# Patient Record
Sex: FEMALE | Race: WHITE | Employment: OTHER | URBAN - METROPOLITAN AREA
[De-identification: names, ages, dates, MRNs, and addresses within clinical notes are randomized per-mention and may not be internally consistent; named-entity substitution may affect disease eponyms.]

---

## 2016-12-01 PROCEDURE — G9678 ONCOLOGY CARE MODEL SERVICE: HCPCS | Performed by: INTERNAL MEDICINE

## 2017-01-01 PROCEDURE — G9678 ONCOLOGY CARE MODEL SERVICE: HCPCS | Performed by: INTERNAL MEDICINE

## 2017-01-03 RX ORDER — DILTIAZEM HYDROCHLORIDE 180 MG/1
CAPSULE, EXTENDED RELEASE ORAL
Qty: 180 CAPSULE | Refills: 1 | Status: SHIPPED | OUTPATIENT
Start: 2017-01-03 | End: 2017-07-06

## 2017-01-12 ENCOUNTER — SNF/IP PROF CHARGE ONLY (OUTPATIENT)
Dept: HEMATOLOGY/ONCOLOGY | Facility: HOSPITAL | Age: 82
End: 2017-01-12

## 2017-01-12 DIAGNOSIS — C50.211 BREAST CANCER OF UPPER-INNER QUADRANT OF RIGHT FEMALE BREAST (HCC): Primary | ICD-10-CM

## 2017-02-15 RX ORDER — LOSARTAN POTASSIUM 25 MG/1
TABLET ORAL
Qty: 90 TABLET | Refills: 0 | Status: SHIPPED | OUTPATIENT
Start: 2017-02-15 | End: 2017-05-16

## 2017-02-15 RX ORDER — ATENOLOL 50 MG/1
TABLET ORAL
Qty: 90 TABLET | Refills: 0 | Status: SHIPPED | OUTPATIENT
Start: 2017-02-15 | End: 2017-05-21

## 2017-02-15 RX ORDER — KETOCONAZOLE 20 MG/G
CREAM TOPICAL
Qty: 30 G | Refills: 1 | Status: SHIPPED | OUTPATIENT
Start: 2017-02-15 | End: 2017-03-08

## 2017-02-22 ENCOUNTER — OFFICE VISIT (OUTPATIENT)
Dept: HEMATOLOGY/ONCOLOGY | Facility: HOSPITAL | Age: 82
End: 2017-02-22
Attending: NURSE PRACTITIONER
Payer: MEDICARE

## 2017-02-22 VITALS
BODY MASS INDEX: 31.72 KG/M2 | SYSTOLIC BLOOD PRESSURE: 132 MMHG | HEART RATE: 54 BPM | RESPIRATION RATE: 16 BRPM | WEIGHT: 168 LBS | HEIGHT: 61 IN | TEMPERATURE: 99 F | DIASTOLIC BLOOD PRESSURE: 52 MMHG

## 2017-02-22 DIAGNOSIS — Z79.811 LONG TERM CURRENT USE OF AROMATASE INHIBITOR: ICD-10-CM

## 2017-02-22 DIAGNOSIS — C50.211 BREAST CANCER OF UPPER-INNER QUADRANT OF RIGHT FEMALE BREAST (HCC): Primary | ICD-10-CM

## 2017-02-22 DIAGNOSIS — Z51.81 MEDICATION MONITORING ENCOUNTER: ICD-10-CM

## 2017-02-22 DIAGNOSIS — Z78.0 POST-MENOPAUSAL: ICD-10-CM

## 2017-02-22 PROCEDURE — 99213 OFFICE O/P EST LOW 20 MIN: CPT | Performed by: NURSE PRACTITIONER

## 2017-02-22 PROCEDURE — G0463 HOSPITAL OUTPT CLINIC VISIT: HCPCS | Performed by: NURSE PRACTITIONER

## 2017-02-22 NOTE — PROGRESS NOTES
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  Disp: 30 g Rfl: 1   LOSARTAN POTASSIUM 25 MG Oral Tab TAKE ONE TABLET BY MOUTH ONCE DAILY Disp: 90 tablet Rfl: 0   ATENOLOL 50 MG Oral Tab TAKE 1 T KNEE REPLACEMENT SURGERY Right 2012    ELECTROCARDIOGRAM, COMPLETE  10/9/2012    Comment scanned to media tab    CATARACT EXTRACTION W/  INTRAOCULAR LENS IMPLANT Bilateral 2008    Comment Elijah Dos Santos MD    [address] Neck: Normal range of motion. Neck supple. No tracheal deviation present. No thyromegaly present. Cardiovascular: Normal rate, regular rhythm and normal heart sounds. No murmur heard.   Pulmonary/Chest: Effort normal and breath sounds normal. No respir Staging comments: KI 67 18%        Pathologic stage from 8/12/2016: Stage IA (T1b, N0(i-), cM0) - Signed by Lissette Sanchez MD on 8/24/2016      Again, discussed with the patient the natural history of breast cancer.      Given her early stage, and he

## 2017-02-26 RX ORDER — ATORVASTATIN CALCIUM 20 MG/1
TABLET, FILM COATED ORAL
Qty: 30 TABLET | Refills: 0 | Status: SHIPPED | OUTPATIENT
Start: 2017-02-26 | End: 2017-03-26

## 2017-03-08 NOTE — TELEPHONE ENCOUNTER
Please advise no current ha1c    Diabetes Medications  Protocol Criteria:  · Appointment scheduled in the past 6 months or the next 3 months  · A1C < 7.5 in the past 6 months  · Creatinine in the past 12 months  · Creatinine result < 1.5   Recent Visits

## 2017-03-08 NOTE — TELEPHONE ENCOUNTER
From: Daniel Patel  To:  David King MD  Sent: 3/6/2017 9:56 AM CST  Subject: Medication Renewal Request    Original authorizing provider: Jhonathan Wellington MD    [address] [facility] [place] would like a refill of the following medications:  METFORMIN HCL

## 2017-03-09 RX ORDER — KETOCONAZOLE 20 MG/G
CREAM TOPICAL
Qty: 30 G | Refills: 0 | Status: SHIPPED | OUTPATIENT
Start: 2017-03-09 | End: 2017-05-01

## 2017-03-09 RX ORDER — METFORMIN HYDROCHLORIDE 500 MG/1
500 TABLET, EXTENDED RELEASE ORAL
Qty: 90 TABLET | Refills: 0 | Status: SHIPPED | OUTPATIENT
Start: 2017-03-09 | End: 2017-06-06

## 2017-03-16 ENCOUNTER — TELEPHONE (OUTPATIENT)
Dept: HEMATOLOGY/ONCOLOGY | Facility: HOSPITAL | Age: 82
End: 2017-03-16

## 2017-03-17 ENCOUNTER — SNF/IP PROF CHARGE ONLY (OUTPATIENT)
Dept: HEMATOLOGY/ONCOLOGY | Facility: HOSPITAL | Age: 82
End: 2017-03-17

## 2017-03-17 DIAGNOSIS — C50.211 BREAST CANCER OF UPPER-INNER QUADRANT OF RIGHT FEMALE BREAST (HCC): Primary | ICD-10-CM

## 2017-03-21 ENCOUNTER — HOSPITAL ENCOUNTER (OUTPATIENT)
Dept: MAMMOGRAPHY | Facility: HOSPITAL | Age: 82
Discharge: HOME | End: 2017-03-21
Attending: INTERNAL MEDICINE
Payer: MEDICARE

## 2017-03-21 DIAGNOSIS — C50.919 BREAST CANCER (HCC): ICD-10-CM

## 2017-03-21 PROCEDURE — 77065 DX MAMMO INCL CAD UNI: CPT | Performed by: RADIOLOGY

## 2017-03-27 RX ORDER — ATORVASTATIN CALCIUM 20 MG/1
TABLET, FILM COATED ORAL
Qty: 30 TABLET | Refills: 2 | Status: SHIPPED | OUTPATIENT
Start: 2017-03-27 | End: 2017-06-02

## 2017-05-02 RX ORDER — KETOCONAZOLE 20 MG/G
CREAM TOPICAL
Qty: 30 G | Refills: 0 | Status: SHIPPED | OUTPATIENT
Start: 2017-05-02 | End: 2017-06-01

## 2017-05-17 NOTE — TELEPHONE ENCOUNTER
Hypertensive Medications; PLEASE ADVISE ON REFILL. THANKS.   Protocol Criteria:  · Appointment scheduled in the past 6 months or in the next 3 months  · BMP or CMP in the past 12 months  · Creatinine result < 2  Recent Visits       Provider Department [facility]

## 2017-05-19 RX ORDER — LOSARTAN POTASSIUM 25 MG/1
TABLET ORAL
Qty: 90 TABLET | Refills: 0 | Status: SHIPPED | OUTPATIENT
Start: 2017-05-19 | End: 2017-08-15

## 2017-05-26 RX ORDER — ATENOLOL 50 MG/1
TABLET ORAL
Qty: 90 TABLET | Refills: 0 | Status: SHIPPED | OUTPATIENT
Start: 2017-05-26 | End: 2017-08-15

## 2017-05-29 DIAGNOSIS — E11.9 DIABETES TYPE 2, CONTROLLED (HCC): ICD-10-CM

## 2017-05-30 NOTE — TELEPHONE ENCOUNTER
From: Ever Patel  To:  Markie Avery MD  Sent: 5/29/2017 7:04 AM CDT  Subject: Medication Renewal Request    Original authorizing provider: Cristina Curtis MD    [address] [facility] would like a refill of the following medications:  Glucose Blood (LEE

## 2017-06-04 NOTE — TELEPHONE ENCOUNTER
Last refilled on 5/2/17  Tommy Goltz out    ·   Recent Visits       Provider Department Primary Dx    6 months ago Javier Bravo MD [facility], [facility], [address], [place] Sudden onset of severe headache    7 months ago Javier Bravo MD [address] [place] [address]

## 2017-06-04 NOTE — TELEPHONE ENCOUNTER
Failed protocol, labs done > 12 months ago. Please advise.    Vetrone out-  Cholesterol Medications  Protocol Criteria:  · Appointment scheduled in the past 12 months or in the next 3 months  · ALT & LDL on file in the past 12 months  · ALT result < 80  ·

## 2017-06-05 ENCOUNTER — HOSPITAL ENCOUNTER (OUTPATIENT)
Dept: BONE DENSITY | Facility: HOSPITAL | Age: 82
Discharge: HOME | End: 2017-06-05
Attending: NURSE PRACTITIONER
Payer: MEDICARE

## 2017-06-05 DIAGNOSIS — C50.211 BREAST CANCER OF UPPER-INNER QUADRANT OF RIGHT FEMALE BREAST (HCC): ICD-10-CM

## 2017-06-05 DIAGNOSIS — Z78.0 POST-MENOPAUSAL: ICD-10-CM

## 2017-06-05 DIAGNOSIS — Z79.811 LONG TERM CURRENT USE OF AROMATASE INHIBITOR: ICD-10-CM

## 2017-06-05 PROCEDURE — 77080 DXA BONE DENSITY AXIAL: CPT | Performed by: NURSE PRACTITIONER

## 2017-06-07 RX ORDER — KETOCONAZOLE 20 MG/G
CREAM TOPICAL
Qty: 30 G | Refills: 0 | Status: SHIPPED | OUTPATIENT
Start: 2017-06-07 | End: 2018-01-23

## 2017-06-07 RX ORDER — METFORMIN HYDROCHLORIDE 500 MG/1
TABLET, EXTENDED RELEASE ORAL
Qty: 30 TABLET | Refills: 0 | Status: SHIPPED | OUTPATIENT
Start: 2017-06-07 | End: 2017-07-06

## 2017-06-09 RX ORDER — ATORVASTATIN CALCIUM 20 MG/1
TABLET, FILM COATED ORAL
Qty: 30 TABLET | Refills: 0 | Status: SHIPPED | OUTPATIENT
Start: 2017-06-09 | End: 2017-06-28

## 2017-06-19 RX ORDER — ANASTROZOLE 1 MG/1
TABLET ORAL
Qty: 30 TABLET | Refills: 5 | Status: SHIPPED | OUTPATIENT
Start: 2017-06-19 | End: 2018-02-14

## 2017-06-29 RX ORDER — ATORVASTATIN CALCIUM 20 MG/1
TABLET, FILM COATED ORAL
Qty: 30 TABLET | Refills: 0 | Status: SHIPPED
Start: 2017-06-29 | End: 2017-09-04

## 2017-06-29 NOTE — TELEPHONE ENCOUNTER
Refill Protocol Appointment Criteria: Refilled per protocol    · Appointment scheduled in the past 12 months or in the next 3 months  Recent Outpatient Visits            4 months ago Breast cancer of upper-inner quadrant of right female breast ([facility] [address]    Karissa Fernández

## 2017-06-29 NOTE — TELEPHONE ENCOUNTER
From: Junior Patel  Sent: 6/28/2017 7:59 PM CDT  Subject: Medication Renewal Request    Junior Villa.  Amanda would like a refill of the following medications:  atorvastatin 20 MG Oral Tab Eloina Aguillon MD]    Preferred pharmacy: [address]

## 2017-07-09 NOTE — TELEPHONE ENCOUNTER
Pending Prescriptions Disp Refills    DilTIAZem HCl ER Coated Beads (CARTIA XT) 180 MG Oral Capsule SR 24 Hr 180 capsule 1    MetFORMIN HCl  MG Oral Tablet 24 Hr 30 tablet 0     Sig: PLEASE SCHEDULE AN APPOINTMENT.  1 daily.            Unable to nestor

## 2017-07-09 NOTE — TELEPHONE ENCOUNTER
Hypertensive Medications  Protocol Criteria:  · Appointment scheduled in the past 6 months or in the next 3 months  · BMP or CMP in the past 12 months  · Creatinine result < 2  Recent Outpatient Visits            4 months ago Breast cancer of upper-inner q Hematology Oncology JONA Sarkar    Office Visit    7 months ago Sudden onset of severe headache    Anup Matute MD    Office Visit    8 months ago [facility], [facility]

## 2017-07-09 NOTE — TELEPHONE ENCOUNTER
From: Jennifer Marin  Sent: 7/6/2017 8:01 PM CDT  Subject: Medication Renewal Request    Dominic MoralesDhiraj Patel would like a refill of the following medications:  CARTIA  MG Oral Capsule SR 24 Hr Joyce Leavitt MD]  MetFORMIN HCl  MG Oral Tablet

## 2017-07-10 RX ORDER — METFORMIN HYDROCHLORIDE 500 MG/1
TABLET, EXTENDED RELEASE ORAL
Qty: 90 TABLET | Refills: 0 | Status: SHIPPED | OUTPATIENT
Start: 2017-07-10 | End: 2018-01-09

## 2017-07-10 RX ORDER — DILTIAZEM HYDROCHLORIDE 180 MG/1
180 CAPSULE, COATED, EXTENDED RELEASE ORAL DAILY
Qty: 60 CAPSULE | Refills: 0 | Status: SHIPPED
Start: 2017-07-10 | End: 2017-07-11

## 2017-07-11 ENCOUNTER — TELEPHONE (OUTPATIENT)
Dept: INTERNAL MEDICINE CLINIC | Facility: CLINIC | Age: 82
End: 2017-07-11

## 2017-07-11 RX ORDER — DILTIAZEM HYDROCHLORIDE 180 MG/1
360 CAPSULE, COATED, EXTENDED RELEASE ORAL DAILY
Qty: 120 CAPSULE | Refills: 0 | Status: SHIPPED | OUTPATIENT
Start: 2017-07-11 | End: 2017-09-05

## 2017-07-11 RX ORDER — DILTIAZEM HYDROCHLORIDE 180 MG/1
180 CAPSULE, COATED, EXTENDED RELEASE ORAL DAILY
Qty: 60 CAPSULE | Refills: 0 | Status: CANCELLED
Start: 2017-07-11

## 2017-07-11 NOTE — TELEPHONE ENCOUNTER
Called the pharmacy who stated prior refills are 2 capsule daily of Debi. Which was sent on 1/3/17 for 90 day and 1 refill. I pended as such.

## 2017-07-11 NOTE — TELEPHONE ENCOUNTER
Chace Hernandez states that they have a question regarding the script for the cartia medication that was sent to them yesterday.

## 2017-07-19 ENCOUNTER — OFFICE VISIT (OUTPATIENT)
Dept: INTERNAL MEDICINE CLINIC | Facility: CLINIC | Age: 82
End: 2017-07-19

## 2017-07-19 VITALS
HEIGHT: 61 IN | BODY MASS INDEX: 31.3 KG/M2 | HEART RATE: 55 BPM | DIASTOLIC BLOOD PRESSURE: 63 MMHG | WEIGHT: 165.81 LBS | SYSTOLIC BLOOD PRESSURE: 116 MMHG

## 2017-07-19 DIAGNOSIS — R26.9 GAIT DISORDER: ICD-10-CM

## 2017-07-19 DIAGNOSIS — R53.83 FATIGUE, UNSPECIFIED TYPE: ICD-10-CM

## 2017-07-19 DIAGNOSIS — E78.00 HYPERCHOLESTEROLEMIA: ICD-10-CM

## 2017-07-19 DIAGNOSIS — I10 ESSENTIAL HYPERTENSION: ICD-10-CM

## 2017-07-19 DIAGNOSIS — E11.9 CONTROLLED TYPE 2 DIABETES MELLITUS WITHOUT COMPLICATION, WITHOUT LONG-TERM CURRENT USE OF INSULIN (HCC): Primary | ICD-10-CM

## 2017-07-19 PROCEDURE — 99214 OFFICE O/P EST MOD 30 MIN: CPT | Performed by: INTERNAL MEDICINE

## 2017-07-19 PROCEDURE — G0463 HOSPITAL OUTPT CLINIC VISIT: HCPCS | Performed by: INTERNAL MEDICINE

## 2017-07-19 NOTE — PROGRESS NOTES
HPI:    Patient ID: Cecilio Nava is a 80year old female. She has shoulder and left knee pain  She has trouble walking. She feels tired on the Anastrazole. Diabetes   She presents for her follow-up diabetic visit.  She has type 2 diabetes savannah 5-325 MG Oral Tab Take 1 tablet by mouth every 6 (six) hours as needed for Pain. Disp: 40 tablet Rfl: 0   Neomycin-Polymyxin-Pramoxine 1 % External Cream Apply 1 Application topically 2 (two) times daily.  Disp: 14 g Rfl: 0   Calcium-Phosphorus-Vitamin D (C help her severe osteopenia  - PHYSICAL THERAPY - INTERNAL           #[id number]

## 2017-08-16 ENCOUNTER — OFFICE VISIT (OUTPATIENT)
Dept: HEMATOLOGY/ONCOLOGY | Facility: HOSPITAL | Age: 82
End: 2017-08-16
Attending: NURSE PRACTITIONER
Payer: MEDICARE

## 2017-08-16 VITALS
BODY MASS INDEX: 30.96 KG/M2 | WEIGHT: 164 LBS | SYSTOLIC BLOOD PRESSURE: 129 MMHG | HEIGHT: 61 IN | HEART RATE: 58 BPM | TEMPERATURE: 98 F | DIASTOLIC BLOOD PRESSURE: 49 MMHG | RESPIRATION RATE: 18 BRPM

## 2017-08-16 DIAGNOSIS — Z17.0 MALIGNANT NEOPLASM OF UPPER-INNER QUADRANT OF RIGHT BREAST IN FEMALE, ESTROGEN RECEPTOR POSITIVE (HCC): Primary | ICD-10-CM

## 2017-08-16 DIAGNOSIS — M85.89 OSTEOPENIA OF MULTIPLE SITES: ICD-10-CM

## 2017-08-16 DIAGNOSIS — Z51.81 MEDICATION MONITORING ENCOUNTER: ICD-10-CM

## 2017-08-16 DIAGNOSIS — C50.211 MALIGNANT NEOPLASM OF UPPER-INNER QUADRANT OF RIGHT BREAST IN FEMALE, ESTROGEN RECEPTOR POSITIVE (HCC): Primary | ICD-10-CM

## 2017-08-16 PROCEDURE — G0463 HOSPITAL OUTPT CLINIC VISIT: HCPCS | Performed by: INTERNAL MEDICINE

## 2017-08-16 PROCEDURE — 99214 OFFICE O/P EST MOD 30 MIN: CPT | Performed by: INTERNAL MEDICINE

## 2017-08-16 NOTE — PROGRESS NOTES
HPI       Shannon Herrera is a 80year old female who is here today for f/u diagnosis of Malignant neoplasm of upper-inner quadrant of right breast in female, estrogen receptor positive (hcc)  (primary encounter diagnosis)  Medication monitoring encount Hematological: Negative for adenopathy. Bruises/bleeds easily. Psychiatric/Behavioral: Positive for sleep disturbance (good nights and bad nights). The patient is not nervous/anxious. Negative depression.              Current Outpatient Prescripti Diagnosis Date   • Breast CA ([facility] [address] 8/12/2016    Invasive ductal carcinoma, right   • Essential hypertension    • Hyperlipidemia    • Osteoarthritis    • Type II or unspecified type diabetes mellitus without mention of complication, not stated as uncontroll 08/16/17 : 74.4 kg (164 lb)  07/19/17 : 75.2 kg (165 lb 12.8 oz)  02/22/17 : 76.2 kg (168 lb)  11/28/16 : 75.5 kg (166 lb 6.4 oz)  11/20/16 : 75.8 kg (167 lb)  11/03/16 : 75.7 kg (166 lb 14.4 oz)    Physical Exam   Constitutional: She is oriented to person Neurological: She is alert and oriented to person, place, and time. Gait normal.   Skin: No rash noted. No erythema. Psychiatric: She has a normal mood and affect.  Her behavior is normal. Judgment and thought content normal.   Nursing note and vitals rev COMPARISON:           [facility] for [facility], [facility] DEXA SCAN OSTEOPORSIS EVAL, 4/08/2015, 14:58.      INDICATIONS:            Asymptomatic menopausal state     TECHNIQUE:              Measurement of bone mineral density of the lumbar spine and h * Low bone mass (T score between -1.0 and -2.5 at the femoral neck or spine) and a 10-year probability of a hip fracture > 3% or a 10-year probability of a major osteoporosis-related fracture > 20% based on the [place]-adapted WHO algorithm     Dictated by (CST

## 2017-08-20 RX ORDER — LOSARTAN POTASSIUM 25 MG/1
25 TABLET ORAL DAILY
Qty: 90 TABLET | Refills: 0 | Status: SHIPPED
Start: 2017-08-20 | End: 2017-11-11

## 2017-08-20 RX ORDER — ATENOLOL 50 MG/1
50 TABLET ORAL DAILY
Qty: 90 TABLET | Refills: 0 | Status: SHIPPED
Start: 2017-08-20 | End: 2017-11-26

## 2017-08-23 ENCOUNTER — LAB ENCOUNTER (OUTPATIENT)
Dept: LAB | Facility: HOSPITAL | Age: 82
End: 2017-08-23
Attending: INTERNAL MEDICINE
Payer: MEDICARE

## 2017-08-23 DIAGNOSIS — E11.9 CONTROLLED TYPE 2 DIABETES MELLITUS WITHOUT COMPLICATION, WITHOUT LONG-TERM CURRENT USE OF INSULIN (HCC): ICD-10-CM

## 2017-08-23 DIAGNOSIS — R53.83 FATIGUE, UNSPECIFIED TYPE: ICD-10-CM

## 2017-08-23 LAB
ALBUMIN SERPL BCP-MCNC: 3.8 G/DL (ref 3.5–4.8)
ALBUMIN/GLOB SERPL: 1.3 {RATIO} (ref 1–2)
ALP SERPL-CCNC: 97 U/L (ref 32–100)
ALT SERPL-CCNC: 17 U/L (ref 14–54)
ANION GAP SERPL CALC-SCNC: 6 MMOL/L (ref 0–18)
AST SERPL-CCNC: 24 U/L (ref 15–41)
BASOPHILS # BLD: 0.1 K/UL (ref 0–0.2)
BASOPHILS NFR BLD: 1 %
BILIRUB SERPL-MCNC: 0.7 MG/DL (ref 0.3–1.2)
BUN SERPL-MCNC: 23 MG/DL (ref 8–20)
BUN/CREAT SERPL: 30.3 (ref 10–20)
CALCIUM SERPL-MCNC: 9.4 MG/DL (ref 8.5–10.5)
CHLORIDE SERPL-SCNC: 101 MMOL/L (ref 95–110)
CHOLEST SERPL-MCNC: 164 MG/DL (ref 110–200)
CO2 SERPL-SCNC: 29 MMOL/L (ref 22–32)
CREAT SERPL-MCNC: 0.76 MG/DL (ref 0.5–1.5)
CREAT UR-MCNC: 43 MG/DL
EOSINOPHIL # BLD: 0.1 K/UL (ref 0–0.7)
EOSINOPHIL NFR BLD: 2 %
ERYTHROCYTE [DISTWIDTH] IN BLOOD BY AUTOMATED COUNT: 15.3 % (ref 11–15)
GLOBULIN PLAS-MCNC: 3 G/DL (ref 2.5–3.7)
GLUCOSE SERPL-MCNC: 105 MG/DL (ref 70–99)
HBA1C MFR BLD: 6.4 % (ref 4–6)
HCT VFR BLD AUTO: 42.7 % (ref 35–48)
HDLC SERPL-MCNC: 60 MG/DL
HGB BLD-MCNC: 14.2 G/DL (ref 12–16)
LDLC SERPL CALC-MCNC: 87 MG/DL (ref 0–99)
LYMPHOCYTES # BLD: 1.5 K/UL (ref 1–4)
LYMPHOCYTES NFR BLD: 21 %
MCH RBC QN AUTO: 29.7 PG (ref 27–32)
MCHC RBC AUTO-ENTMCNC: 33.2 G/DL (ref 32–37)
MCV RBC AUTO: 89.6 FL (ref 80–100)
MICROALBUMIN UR-MCNC: 0.3 MG/DL (ref 0–1.8)
MICROALBUMIN/CREAT UR: 7 MG/G{CREAT} (ref 0–20)
MONOCYTES # BLD: 0.5 K/UL (ref 0–1)
MONOCYTES NFR BLD: 7 %
NEUTROPHILS # BLD AUTO: 5.1 K/UL (ref 1.8–7.7)
NEUTROPHILS NFR BLD: 70 %
NONHDLC SERPL-MCNC: 104 MG/DL
OSMOLALITY UR CALC.SUM OF ELEC: 286 MOSM/KG (ref 275–295)
PLATELET # BLD AUTO: 245 K/UL (ref 140–400)
PMV BLD AUTO: 8.3 FL (ref 7.4–10.3)
POTASSIUM SERPL-SCNC: 4.6 MMOL/L (ref 3.3–5.1)
PROT SERPL-MCNC: 6.8 G/DL (ref 5.9–8.4)
RBC # BLD AUTO: 4.76 M/UL (ref 3.7–5.4)
SODIUM SERPL-SCNC: 136 MMOL/L (ref 136–144)
TRIGL SERPL-MCNC: 87 MG/DL (ref 1–149)
TSH SERPL-ACNC: 1.48 UIU/ML (ref 0.45–5.33)
VIT B12 SERPL-MCNC: 1077 PG/ML (ref 181–914)
WBC # BLD AUTO: 7.3 K/UL (ref 4–11)

## 2017-08-23 PROCEDURE — 80053 COMPREHEN METABOLIC PANEL: CPT

## 2017-08-23 PROCEDURE — 83036 HEMOGLOBIN GLYCOSYLATED A1C: CPT

## 2017-08-23 PROCEDURE — 36415 COLL VENOUS BLD VENIPUNCTURE: CPT

## 2017-08-23 PROCEDURE — 84443 ASSAY THYROID STIM HORMONE: CPT

## 2017-08-23 PROCEDURE — 85025 COMPLETE CBC W/AUTO DIFF WBC: CPT

## 2017-08-23 PROCEDURE — 82570 ASSAY OF URINE CREATININE: CPT

## 2017-08-23 PROCEDURE — 80061 LIPID PANEL: CPT

## 2017-08-23 PROCEDURE — 82607 VITAMIN B-12: CPT

## 2017-08-23 PROCEDURE — 82043 UR ALBUMIN QUANTITATIVE: CPT

## 2017-09-02 RX ORDER — ATORVASTATIN CALCIUM 20 MG/1
TABLET, FILM COATED ORAL
Qty: 30 TABLET | Refills: 0 | Status: CANCELLED
Start: 2017-09-02

## 2017-09-04 RX ORDER — ATORVASTATIN CALCIUM 20 MG/1
TABLET, FILM COATED ORAL
Qty: 90 TABLET | Refills: 1 | Status: SHIPPED | OUTPATIENT
Start: 2017-09-04 | End: 2018-03-08

## 2017-09-05 NOTE — TELEPHONE ENCOUNTER
From: Dahlia Patel  Sent: 9/2/2017 11:14 AM CDT  Subject: Medication Renewal Request    Dahlia Bailey.  Amanda would like a refill of the following medications:  atorvastatin 20 MG Oral Tab Shira Pugh MD]    Preferred pharmacy: [address]

## 2017-09-06 RX ORDER — DILTIAZEM HYDROCHLORIDE 180 MG/1
360 CAPSULE, COATED, EXTENDED RELEASE ORAL DAILY
Qty: 120 CAPSULE | Refills: 0 | Status: SHIPPED
Start: 2017-09-06 | End: 2017-11-13

## 2017-09-07 NOTE — TELEPHONE ENCOUNTER
Hypertensive Medications  Protocol Criteria:  · Appointment scheduled in the past 6 months or in the next 3 months  · BMP or CMP in the past 12 months  · Creatinine result < 2  Recent Outpatient Visits            3 weeks ago Malignant neoplasm of upper-inn

## 2017-09-07 NOTE — TELEPHONE ENCOUNTER
From: Gadiel Patel  Sent: 9/5/2017 10:32 AM CDT  Subject: Medication Renewal Request    Gadiel Padron.  Amanda would like a refill of the following medications:  DilTIAZem HCl ER Coated Beads (CARTIA XT) 180 MG Oral Capsule SR 24 Hr Vy Joshi MD]    P

## 2017-10-24 ENCOUNTER — NURSE TRIAGE (OUTPATIENT)
Dept: OTHER | Age: 82
End: 2017-10-24

## 2017-10-24 NOTE — TELEPHONE ENCOUNTER
Action Requested: Summary for Provider     []  Critical Lab, Recommendations Needed  [] Need Additional Advice  []   FYI    []   Need Orders  [] Need Medications Sent to Pharmacy  [x]  Other     SUMMARY: Patient requesting appt with Dr. Penny Germain for drainin

## 2017-10-25 ENCOUNTER — OFFICE VISIT (OUTPATIENT)
Dept: INTERNAL MEDICINE CLINIC | Facility: CLINIC | Age: 82
End: 2017-10-25

## 2017-10-25 VITALS
HEART RATE: 72 BPM | SYSTOLIC BLOOD PRESSURE: 118 MMHG | DIASTOLIC BLOOD PRESSURE: 62 MMHG | RESPIRATION RATE: 18 BRPM | BODY MASS INDEX: 31.87 KG/M2 | HEIGHT: 61 IN | WEIGHT: 168.81 LBS

## 2017-10-25 DIAGNOSIS — I10 ESSENTIAL HYPERTENSION: ICD-10-CM

## 2017-10-25 DIAGNOSIS — L02.415 CUTANEOUS ABSCESS OF RIGHT LOWER EXTREMITY: Primary | ICD-10-CM

## 2017-10-25 DIAGNOSIS — R42 VERTIGO: ICD-10-CM

## 2017-10-25 DIAGNOSIS — E11.9 CONTROLLED TYPE 2 DIABETES MELLITUS WITHOUT COMPLICATION, WITHOUT LONG-TERM CURRENT USE OF INSULIN (HCC): ICD-10-CM

## 2017-10-25 PROBLEM — H61.23 BILATERAL IMPACTED CERUMEN: Status: ACTIVE | Noted: 2017-10-25

## 2017-10-25 PROCEDURE — G0463 HOSPITAL OUTPT CLINIC VISIT: HCPCS | Performed by: INTERNAL MEDICINE

## 2017-10-25 PROCEDURE — 99214 OFFICE O/P EST MOD 30 MIN: CPT | Performed by: INTERNAL MEDICINE

## 2017-10-25 RX ORDER — MECLIZINE HCL 12.5 MG/1
12.5 TABLET ORAL 3 TIMES DAILY PRN
Qty: 30 TABLET | Refills: 3 | Status: ON HOLD | OUTPATIENT
Start: 2017-10-25 | End: 2018-12-03

## 2017-10-25 NOTE — ASSESSMENT & PLAN NOTE
Pt has had this in the past.  She does the home exercises for positional vertigo, but does not have time for PT. She would like to try meclizine.   She may benefit from cerumen removal.  Advised Debrox, then earwash

## 2017-10-25 NOTE — PATIENT INSTRUCTIONS
Use Debrox ear drops for 2 weeks. Come back for an earwash if your ears are still plugged. Please get a flu shot at a pharmacy.

## 2017-10-25 NOTE — ASSESSMENT & PLAN NOTE
This has an odd appearance. It may be due to the fact that it has been chronically inflamed, but I discussed with the pt that it may be a skin cancer. Will refer to surgery for excision.

## 2017-10-25 NOTE — PROGRESS NOTES
HPI:    Patient ID: Cayla Rodriguez is a 80year old female. Pt has a skin cyst on her leg which has been there for years. Three weeks ago it started to drain and hurt. When she gets out of bed she gets dizzy.   It happens every once in a while, but i DilTIAZem HCl ER Coated Beads (CARTIA XT) 180 MG Oral Capsule SR 24 Hr Take 2 capsules (360 mg total) by mouth daily.  Disp: 120 capsule Rfl: 0   ATORVASTATIN 20 MG Oral Tab TAKE ONE TABLET BY MOUTH EVERY DAY Disp: 90 tablet Rfl: 1   Losartan Potassium 25 M Constitutional: She is oriented to person, place, and time. She appears well-developed and well-nourished. No distress. HENT:   Head: Normocephalic and atraumatic. Right Ear: Cerumen present . Left Ear: Cerumen present.   Nose: Nose normal.   Mouth/Thr

## 2017-10-26 ENCOUNTER — CHARTING TRANS (OUTPATIENT)
Dept: OTHER | Age: 82
End: 2017-10-26

## 2017-10-30 ENCOUNTER — TELEPHONE (OUTPATIENT)
Dept: INTERNAL MEDICINE CLINIC | Facility: CLINIC | Age: 82
End: 2017-10-30

## 2017-10-30 RX ORDER — CIPROFLOXACIN 500 MG/1
500 TABLET, FILM COATED ORAL 2 TIMES DAILY
Qty: 20 TABLET | Refills: 0 | Status: SHIPPED | OUTPATIENT
Start: 2017-10-30 | End: 2017-11-09

## 2017-10-30 NOTE — TELEPHONE ENCOUNTER
I reached the pt. She expressed understanding and will take the medication. She said the abscess is smaller since I saw her last week.

## 2017-10-30 NOTE — TELEPHONE ENCOUNTER
Left message. I sent a prescription to her pharmacy. It is very important for her to see the surgeon.

## 2017-11-02 ENCOUNTER — OFFICE VISIT (OUTPATIENT)
Dept: SURGERY | Facility: CLINIC | Age: 82
End: 2017-11-02

## 2017-11-02 VITALS — WEIGHT: 168 LBS | BODY MASS INDEX: 31.72 KG/M2 | HEIGHT: 61 IN

## 2017-11-02 DIAGNOSIS — L72.3 SEBACEOUS CYST: Primary | ICD-10-CM

## 2017-11-02 DIAGNOSIS — L02.415 ABSCESS OF RIGHT THIGH: ICD-10-CM

## 2017-11-02 PROCEDURE — G0463 HOSPITAL OUTPT CLINIC VISIT: HCPCS | Performed by: SURGERY

## 2017-11-02 PROCEDURE — 99204 OFFICE O/P NEW MOD 45 MIN: CPT | Performed by: SURGERY

## 2017-11-02 NOTE — PROGRESS NOTES
History and Physical      Brandon Kingston is a 80year old female. HPI   Patient presents with:  Abscess: Pt referred by Dr. Tristan Huang regarding abscess under right buttock area.   Pt states abscess started 3 wks ago,  started draining on its own and is Rfl: 0   ANASTROZOLE 1 MG Oral Tab tab TAKE 1 TABLET (1 MG TOTAL) BY MOUTH DAILY.  Disp: 30 tablet Rfl: 5   KETOCONAZOLE 2 % External Cream APPLY TO AFFECTED AREA(S) TWO TIMES A DAY Disp: 30 g Rfl: 0   Glucose Blood (LEE CONTOUR NEXT TEST) In Vitro Strip alert and responsive no acute distress noted  Head/Face: normocephalic  Nose/Mouth/Throat: nose and throat are clear palate is intact mucous membranes are moist no oral lesions are noted  Neck/Thyroid: neck is supple without adenopathy  Respiratory: normal

## 2017-11-12 RX ORDER — LOSARTAN POTASSIUM 25 MG/1
TABLET ORAL
Qty: 90 TABLET | Refills: 0 | Status: SHIPPED | OUTPATIENT
Start: 2017-11-12 | End: 2018-02-16

## 2017-11-13 RX ORDER — DILTIAZEM HYDROCHLORIDE 180 MG/1
CAPSULE, EXTENDED RELEASE ORAL
Qty: 120 CAPSULE | Refills: 5 | Status: SHIPPED | OUTPATIENT
Start: 2017-11-13 | End: 2018-01-13

## 2017-11-28 RX ORDER — ATENOLOL 50 MG/1
TABLET ORAL
Qty: 90 TABLET | Refills: 0 | Status: SHIPPED | OUTPATIENT
Start: 2017-11-28 | End: 2018-01-30

## 2017-11-28 NOTE — TELEPHONE ENCOUNTER
Hypertensive Medications: Refilled per protocol    Protocol Criteria:  · Appointment scheduled in the past 6 months or in the next 3 months  · BMP or CMP in the past 12 months  · Creatinine result < 2  Recent Outpatient Visits            3 weeks ago [facility]

## 2017-12-03 RX ORDER — ATENOLOL 50 MG/1
TABLET ORAL
Qty: 90 TABLET | Refills: 1 | Status: SHIPPED | OUTPATIENT
Start: 2017-12-03 | End: 2018-01-13

## 2017-12-05 ENCOUNTER — OFFICE VISIT (OUTPATIENT)
Dept: SURGERY | Facility: CLINIC | Age: 82
End: 2017-12-05

## 2017-12-05 DIAGNOSIS — L72.3 SEBACEOUS CYST: Primary | ICD-10-CM

## 2017-12-05 PROCEDURE — G0463 HOSPITAL OUTPT CLINIC VISIT: HCPCS | Performed by: SURGERY

## 2017-12-05 PROCEDURE — 99214 OFFICE O/P EST MOD 30 MIN: CPT | Performed by: SURGERY

## 2017-12-06 ENCOUNTER — TELEPHONE (OUTPATIENT)
Dept: SURGERY | Facility: CLINIC | Age: 82
End: 2017-12-06

## 2017-12-06 NOTE — H&P
History and Physical      [place] is a 80year old female. HPI   Patient presents with: Follow - Up: Patient here for follow up sebaceous cyst, abscess of right thigh. Last seen 11/02/2017. Denies drainage or bleeding or fever.  States area i test blood sugar 1 time every day Disp: 100 each Rfl: 3   LEE MICROLET LANCETS Does not apply Misc Test by Intradermal route 1 times every day Disp: 100 each Rfl: 3   CARTIA  MG Oral Capsule SR 24 Hr  Disp:  Rfl: 1       ALLERGIES  No Known Allergi adenopathy  Respiratory: normal to inspection lungs are clear to auscultation bilaterally normal respiratory effort  Cardiovascular: regular rate and rhythm no murmurs, gallups, or rubs  Abdomen: soft non-tender non-distended no organomegaly noted no ashkan

## 2017-12-06 NOTE — TELEPHONE ENCOUNTER
Contacted patient. She states she would like to keep surgery date 01/17/2018 as she has made arrangements. All questions answered.

## 2017-12-20 ENCOUNTER — APPOINTMENT (OUTPATIENT)
Dept: LAB | Facility: HOSPITAL | Age: 82
End: 2017-12-20
Attending: INTERNAL MEDICINE
Payer: MEDICARE

## 2017-12-20 DIAGNOSIS — E11.9 CONTROLLED TYPE 2 DIABETES MELLITUS WITHOUT COMPLICATION, WITHOUT LONG-TERM CURRENT USE OF INSULIN (HCC): ICD-10-CM

## 2017-12-20 PROCEDURE — 83036 HEMOGLOBIN GLYCOSYLATED A1C: CPT

## 2017-12-20 PROCEDURE — 36415 COLL VENOUS BLD VENIPUNCTURE: CPT

## 2018-01-10 RX ORDER — METFORMIN HYDROCHLORIDE 500 MG/1
TABLET, EXTENDED RELEASE ORAL
Qty: 90 TABLET | Refills: 0 | Status: SHIPPED | OUTPATIENT
Start: 2018-01-10 | End: 2018-04-15

## 2018-01-10 NOTE — TELEPHONE ENCOUNTER
Diabetes Medications Medication refilled for 90 days per protocol for Metformin/    Protocol Criteria:  · Appointment scheduled in the past 6 months or the next 3 months  · A1C < 7.5 in the past 6 months  · Creatinine in the past 12 months  · Creatinine re

## 2018-01-13 VITALS — HEIGHT: 61 IN | BODY MASS INDEX: 30.21 KG/M2 | WEIGHT: 160 LBS

## 2018-01-13 RX ORDER — ASCORBIC ACID 1000 MG
100 TABLET ORAL 2 TIMES DAILY
COMMUNITY

## 2018-01-13 RX ORDER — OMEGA-3S/DHA/EPA/FISH OIL/D3 1150-1000
LIQUID (ML) ORAL DAILY
COMMUNITY
End: 2021-04-06

## 2018-01-13 RX ORDER — MULTIVIT-MIN/IRON FUM/FOLIC AC 7.5 MG-4
1 TABLET ORAL DAILY
COMMUNITY
End: 2019-07-24 | Stop reason: ALTCHOICE

## 2018-01-17 ENCOUNTER — HOSPITAL ENCOUNTER (OUTPATIENT)
Facility: HOSPITAL | Age: 83
Setting detail: HOSPITAL OUTPATIENT SURGERY
Discharge: HOME | End: 2018-01-17
Attending: SURGERY | Admitting: SURGERY
Payer: MEDICARE

## 2018-01-17 ENCOUNTER — SURGERY (OUTPATIENT)
Age: 83
End: 2018-01-17

## 2018-01-17 PROCEDURE — 0JBL0ZZ EXCISION OF RIGHT UPPER LEG SUBCUTANEOUS TISSUE AND FASCIA, OPEN APPROACH: ICD-10-PCS | Performed by: SURGERY

## 2018-01-17 PROCEDURE — 11602 EXC TR-EXT MAL+MARG 1.1-2 CM: CPT | Performed by: SURGERY

## 2018-01-17 PROCEDURE — 0JQL0ZZ REPAIR RIGHT UPPER LEG SUBCUTANEOUS TISSUE AND FASCIA, OPEN APPROACH: ICD-10-PCS | Performed by: SURGERY

## 2018-01-17 PROCEDURE — 12032 INTMD RPR S/A/T/EXT 2.6-7.5: CPT | Performed by: SURGERY

## 2018-01-17 RX ORDER — BUPIVACAINE HYDROCHLORIDE AND EPINEPHRINE 5; 5 MG/ML; UG/ML
INJECTION, SOLUTION PERINEURAL AS NEEDED
Status: DISCONTINUED | OUTPATIENT
Start: 2018-01-17 | End: 2018-01-17 | Stop reason: HOSPADM

## 2018-01-17 NOTE — BRIEF OP NOTE
Pre-Operative Diagnosis: Right posterior thigh skin cyst      Post-Operative Diagnosis: Right posterior thigh skin cyst      Procedure Performed:   Procedure(s):  Excisional biopsy right posterior thigh skin cyst     Surgeon(s) and Role:     NILTON Salinas

## 2018-01-17 NOTE — OPERATIVE REPORT
[facility]    PATIENT'S NAME: Vanna Morocho   ATTENDING PHYSICIAN: Vira Thurman MD   OPERATING PHYSICIAN: Vira Thurman MD   PATIENT ACCOUNT#:   [id number]    LOCATION:  [address]  MEDICAL RECORD #:   [id number]       DATE [facility] condition.      Dictated By Ian Leone MD  d: 01/17/2018 08:09:12  t: 01/17/2018 46:79:92  Leonardo Guillen [id number]  [facility]/

## 2018-01-17 NOTE — H&P
History and Physical        Carroll Trejo is a 80year old female.        HPI   Patient presents with: Follow - Up: Patient here for follow up sebaceous cyst, abscess of right thigh. Last seen 11/02/2017. Denies drainage or bleeding or fever.  States ar In Vitro Strip To test blood sugar 1 time every day Disp: 100 each Rfl: 3   LEE MICROLET LANCETS Does not apply Misc Test by Intradermal route 1 times every day Disp: 100 each Rfl: 3   CARTIA  MG Oral Capsule SR 24 Hr   Disp:  Rfl: 1         ALLERG are noted  Neck/Thyroid: neck is supple without adenopathy  Respiratory: normal to inspection lungs are clear to auscultation bilaterally normal respiratory effort  Cardiovascular: regular rate and rhythm no murmurs, gallups, or rubs  Abdomen: soft non-ten

## 2018-01-17 NOTE — INTERVAL H&P NOTE
Pre-op Diagnosis: Right posterior thigh skin cyst     The above referenced H&P was reviewed by Merline Rudd, MD on 1/17/2018, the patient was examined and no significant changes have occurred in the patient's condition since the H&P was performed.   I discu

## 2018-01-24 RX ORDER — KETOCONAZOLE 20 MG/G
CREAM TOPICAL
Qty: 30 G | Refills: 2 | Status: SHIPPED | OUTPATIENT
Start: 2018-01-24 | End: 2018-07-03

## 2018-01-24 NOTE — TELEPHONE ENCOUNTER
No existing protocol, pt has been having this filled for quite some time, last refill 06/2017 with no additional refills. Med pended for your review, thank you.

## 2018-01-30 ENCOUNTER — OFFICE VISIT (OUTPATIENT)
Dept: SURGERY | Facility: CLINIC | Age: 83
End: 2018-01-30

## 2018-01-30 DIAGNOSIS — C44.92 KERATOACANTHOMA TYPE SQUAMOUS CELL CARCINOMA OF SKIN: Primary | ICD-10-CM

## 2018-01-30 PROCEDURE — G0463 HOSPITAL OUTPT CLINIC VISIT: HCPCS | Performed by: SURGERY

## 2018-01-30 PROCEDURE — 99024 POSTOP FOLLOW-UP VISIT: CPT | Performed by: SURGERY

## 2018-01-30 RX ORDER — ATENOLOL 100 MG/1
TABLET ORAL
COMMUNITY
Start: 2017-11-28 | End: 2018-05-09

## 2018-01-31 RX ORDER — ATENOLOL 100 MG/1
TABLET ORAL
Qty: 45 TABLET | Refills: 0 | Status: SHIPPED | OUTPATIENT
Start: 2018-01-31 | End: 2018-05-09

## 2018-01-31 NOTE — TELEPHONE ENCOUNTER
Rx approved for 90 days per protocol.     Hypertensive Medications  Protocol Criteria:  · Appointment scheduled in the past 6 months or in the next 3 months  · BMP or CMP in the past 12 months  · Creatinine result < 2  Recent Outpatient Visits            Rafi Smith

## 2018-01-31 NOTE — PROGRESS NOTES
Postoperative Patient Follow-up      1/30/2018    Timothy Patel 80year old      HPI  Patient presents with:  Post-Op: Patient here for first post op and suture removal. S/P Excisional biopsy, right posterior thigh skin cyst (1.6 cm narrowest diameter)

## 2018-02-07 ENCOUNTER — NURSE NAVIGATOR ENCOUNTER (OUTPATIENT)
Dept: HEMATOLOGY/ONCOLOGY | Facility: HOSPITAL | Age: 83
End: 2018-02-07

## 2018-02-14 ENCOUNTER — OFFICE VISIT (OUTPATIENT)
Dept: HEMATOLOGY/ONCOLOGY | Facility: HOSPITAL | Age: 83
End: 2018-02-14
Attending: INTERNAL MEDICINE
Payer: MEDICARE

## 2018-02-14 VITALS
DIASTOLIC BLOOD PRESSURE: 47 MMHG | TEMPERATURE: 98 F | HEIGHT: 61 IN | HEART RATE: 52 BPM | WEIGHT: 165 LBS | RESPIRATION RATE: 16 BRPM | BODY MASS INDEX: 31.15 KG/M2 | SYSTOLIC BLOOD PRESSURE: 125 MMHG

## 2018-02-14 DIAGNOSIS — Z17.0 MALIGNANT NEOPLASM OF UPPER-INNER QUADRANT OF RIGHT BREAST IN FEMALE, ESTROGEN RECEPTOR POSITIVE (HCC): Primary | ICD-10-CM

## 2018-02-14 DIAGNOSIS — M85.89 OSTEOPENIA OF MULTIPLE SITES: ICD-10-CM

## 2018-02-14 DIAGNOSIS — Z51.81 MEDICATION MONITORING ENCOUNTER: ICD-10-CM

## 2018-02-14 DIAGNOSIS — C50.211 MALIGNANT NEOPLASM OF UPPER-INNER QUADRANT OF RIGHT BREAST IN FEMALE, ESTROGEN RECEPTOR POSITIVE (HCC): Primary | ICD-10-CM

## 2018-02-14 PROCEDURE — G0463 HOSPITAL OUTPT CLINIC VISIT: HCPCS | Performed by: INTERNAL MEDICINE

## 2018-02-14 PROCEDURE — 99214 OFFICE O/P EST MOD 30 MIN: CPT | Performed by: INTERNAL MEDICINE

## 2018-02-14 RX ORDER — ANASTROZOLE 1 MG/1
TABLET ORAL
Qty: 30 TABLET | Refills: 6 | Status: SHIPPED | OUTPATIENT
Start: 2018-02-14 | End: 2018-09-14

## 2018-02-14 NOTE — PROGRESS NOTES
JEET       Merle Gastelum is a 80year old female who is here today for f/u diagnosis of Malignant neoplasm of upper-inner quadrant of right breast in female, estrogen receptor positive (hcc)  (primary encounter diagnosis)  Medication monitoring encount Neurological: Positive for headaches (rarely). Negative for dizziness, weakness, light-headedness and numbness. Vertigo in past    Hematological: Negative for adenopathy. Does not bruise/bleed easily.    Psychiatric/Behavioral: Positive for sleep dis • Diabetes mellitus, type II ([facility] [address]    • Hearing impairment    • High blood pressure    • Hyperlipidemia    • Osteoarthritis    • [facility]    R posterior thigh     Past Surgical History:  No date: APPENDECTOMY  2008: 07/19/17 : 75.2 kg (165 lb 12.8 oz)    Physical Exam   Constitutional: She is oriented to person, place, and time. Vital signs are normal. She appears well-developed and well-nourished. HENT:   Head: Normocephalic and atraumatic.    Right Ear: External ea Psychiatric: She has a normal mood and affect. Her behavior is normal. Judgment and thought content normal.   Nursing note and vitals reviewed.           ASSESSMENT/PLAN:   Malignant neoplasm of upper-inner quadrant of right breast in female, estrogen recep TECHNIQUE:              Measurement of bone mineral density of the lumbar spine and hip was performed on a Hologic dual energy x-ray absorptiometry scanner.      FINDINGS:                       LEFT FEMORAL NECK                         BMD:              0.5 Approved by (CST): Yanni Cooper MD on 6/05/2017 at 14:00          =====  CONCLUSION:             * Severe osteopenia left femoral neck and mild osteopenia total left hip. Normal bone density lumbar spine.   * 7% decrease in bone density of left h

## 2018-02-15 NOTE — PROGRESS NOTES
Met with patient during her appointment with Dr. Je Garay today.   Per Dr. Je Garay, orders placed for diagnostic mammogram.  Navigator assisted patient in coordinating mammo appointment for 2/20/18 at 10:40am.  Appointment information given to patient - pt acknowled

## 2018-02-16 LAB — 25(OH)D3 SERPL-MCNC: 46.6 NG/ML

## 2018-02-16 RX ORDER — LOSARTAN POTASSIUM 25 MG/1
TABLET ORAL
Qty: 90 TABLET | Refills: 0 | Status: SHIPPED | OUTPATIENT
Start: 2018-02-16 | End: 2018-05-09

## 2018-02-16 NOTE — TELEPHONE ENCOUNTER
Hypertensive Medications: Refilled per protocol    Protocol Criteria:  · Appointment scheduled in the past 6 months or in the next 3 months  · BMP or CMP in the past 12 months  · Creatinine result < 2  Recent Outpatient Visits            2 days ago [facility]

## 2018-02-20 ENCOUNTER — HOSPITAL ENCOUNTER (OUTPATIENT)
Dept: MAMMOGRAPHY | Facility: HOSPITAL | Age: 83
Discharge: HOME | End: 2018-02-20
Attending: INTERNAL MEDICINE
Payer: MEDICARE

## 2018-02-20 DIAGNOSIS — C50.211 MALIGNANT NEOPLASM OF UPPER-INNER QUADRANT OF RIGHT BREAST IN FEMALE, ESTROGEN RECEPTOR POSITIVE (HCC): ICD-10-CM

## 2018-02-20 DIAGNOSIS — Z17.0 MALIGNANT NEOPLASM OF UPPER-INNER QUADRANT OF RIGHT BREAST IN FEMALE, ESTROGEN RECEPTOR POSITIVE (HCC): ICD-10-CM

## 2018-02-20 PROCEDURE — 77066 DX MAMMO INCL CAD BI: CPT | Performed by: INTERNAL MEDICINE

## 2018-02-28 ENCOUNTER — SNF/IP PROF CHARGE ONLY (OUTPATIENT)
Dept: HEMATOLOGY/ONCOLOGY | Facility: HOSPITAL | Age: 83
End: 2018-02-28

## 2018-02-28 DIAGNOSIS — Z17.0 MALIGNANT NEOPLASM OF UPPER-INNER QUADRANT OF RIGHT BREAST IN FEMALE, ESTROGEN RECEPTOR POSITIVE (HCC): ICD-10-CM

## 2018-02-28 DIAGNOSIS — C50.211 MALIGNANT NEOPLASM OF UPPER-INNER QUADRANT OF RIGHT BREAST IN FEMALE, ESTROGEN RECEPTOR POSITIVE (HCC): ICD-10-CM

## 2018-02-28 PROCEDURE — G9678 ONCOLOGY CARE MODEL SERVICE: HCPCS | Performed by: INTERNAL MEDICINE

## 2018-03-08 RX ORDER — ATORVASTATIN CALCIUM 20 MG/1
TABLET, FILM COATED ORAL
Qty: 90 TABLET | Refills: 0 | Status: SHIPPED | OUTPATIENT
Start: 2018-03-08 | End: 2018-06-04

## 2018-03-08 NOTE — TELEPHONE ENCOUNTER
Medication refilled for 90 days per protocol.  Atorvastatin    Cholesterol Medications  Protocol Criteria:  · Appointment scheduled in the past 12 months or in the next 3 months  · ALT & LDL on file in the past 12 months  · ALT result < 80  · LDL result <13

## 2018-03-31 ENCOUNTER — SNF/IP PROF CHARGE ONLY (OUTPATIENT)
Dept: HEMATOLOGY/ONCOLOGY | Facility: HOSPITAL | Age: 83
End: 2018-03-31

## 2018-03-31 DIAGNOSIS — Z17.0 MALIGNANT NEOPLASM OF UPPER-INNER QUADRANT OF RIGHT BREAST IN FEMALE, ESTROGEN RECEPTOR POSITIVE (HCC): ICD-10-CM

## 2018-03-31 DIAGNOSIS — C50.211 MALIGNANT NEOPLASM OF UPPER-INNER QUADRANT OF RIGHT BREAST IN FEMALE, ESTROGEN RECEPTOR POSITIVE (HCC): ICD-10-CM

## 2018-03-31 PROCEDURE — G9678 ONCOLOGY CARE MODEL SERVICE: HCPCS | Performed by: INTERNAL MEDICINE

## 2018-04-15 RX ORDER — METFORMIN HYDROCHLORIDE 500 MG/1
TABLET, EXTENDED RELEASE ORAL
Qty: 90 TABLET | Refills: 1 | Status: SHIPPED | OUTPATIENT
Start: 2018-04-15 | End: 2018-10-13

## 2018-04-30 ENCOUNTER — SNF/IP PROF CHARGE ONLY (OUTPATIENT)
Dept: HEMATOLOGY/ONCOLOGY | Facility: HOSPITAL | Age: 83
End: 2018-04-30

## 2018-04-30 DIAGNOSIS — Z17.0 MALIGNANT NEOPLASM OF UPPER-INNER QUADRANT OF RIGHT BREAST IN FEMALE, ESTROGEN RECEPTOR POSITIVE (HCC): ICD-10-CM

## 2018-04-30 DIAGNOSIS — C50.211 MALIGNANT NEOPLASM OF UPPER-INNER QUADRANT OF RIGHT BREAST IN FEMALE, ESTROGEN RECEPTOR POSITIVE (HCC): ICD-10-CM

## 2018-04-30 PROCEDURE — G9678 ONCOLOGY CARE MODEL SERVICE: HCPCS | Performed by: INTERNAL MEDICINE

## 2018-05-07 RX ORDER — ATENOLOL 100 MG/1
TABLET ORAL
Qty: 45 TABLET | Refills: 0 | Status: CANCELLED | OUTPATIENT
Start: 2018-05-07

## 2018-05-08 ENCOUNTER — OFFICE VISIT (OUTPATIENT)
Dept: OTOLARYNGOLOGY | Facility: CLINIC | Age: 83
End: 2018-05-08

## 2018-05-08 VITALS
BODY MASS INDEX: 31.15 KG/M2 | SYSTOLIC BLOOD PRESSURE: 155 MMHG | TEMPERATURE: 98 F | DIASTOLIC BLOOD PRESSURE: 69 MMHG | HEIGHT: 61 IN | WEIGHT: 165 LBS

## 2018-05-08 DIAGNOSIS — H61.23 BILATERAL IMPACTED CERUMEN: Primary | ICD-10-CM

## 2018-05-08 PROCEDURE — 69210 REMOVE IMPACTED EAR WAX UNI: CPT | Performed by: OTOLARYNGOLOGY

## 2018-05-09 ENCOUNTER — OFFICE VISIT (OUTPATIENT)
Dept: INTERNAL MEDICINE CLINIC | Facility: CLINIC | Age: 83
End: 2018-05-09

## 2018-05-09 ENCOUNTER — APPOINTMENT (OUTPATIENT)
Dept: LAB | Facility: HOSPITAL | Age: 83
End: 2018-05-09
Attending: INTERNAL MEDICINE
Payer: MEDICARE

## 2018-05-09 ENCOUNTER — HOSPITAL ENCOUNTER (OUTPATIENT)
Dept: GENERAL RADIOLOGY | Facility: HOSPITAL | Age: 83
Discharge: HOME | End: 2018-05-09
Attending: INTERNAL MEDICINE | Admitting: INTERNAL MEDICINE
Payer: MEDICARE

## 2018-05-09 VITALS
HEART RATE: 56 BPM | SYSTOLIC BLOOD PRESSURE: 162 MMHG | HEIGHT: 61 IN | DIASTOLIC BLOOD PRESSURE: 78 MMHG | WEIGHT: 161.88 LBS | BODY MASS INDEX: 30.56 KG/M2

## 2018-05-09 DIAGNOSIS — M79.604 BILATERAL LEG PAIN: Primary | ICD-10-CM

## 2018-05-09 DIAGNOSIS — I10 ESSENTIAL HYPERTENSION: ICD-10-CM

## 2018-05-09 DIAGNOSIS — E11.65 UNCONTROLLED TYPE 2 DIABETES MELLITUS WITH HYPERGLYCEMIA, WITHOUT LONG-TERM CURRENT USE OF INSULIN (HCC): ICD-10-CM

## 2018-05-09 DIAGNOSIS — M79.605 BILATERAL LEG PAIN: ICD-10-CM

## 2018-05-09 DIAGNOSIS — M79.605 BILATERAL LEG PAIN: Primary | ICD-10-CM

## 2018-05-09 DIAGNOSIS — N81.89 PELVIC RELAXATION: ICD-10-CM

## 2018-05-09 DIAGNOSIS — M79.604 BILATERAL LEG PAIN: ICD-10-CM

## 2018-05-09 DIAGNOSIS — M85.89 OSTEOPENIA OF MULTIPLE SITES: ICD-10-CM

## 2018-05-09 PROCEDURE — 36415 COLL VENOUS BLD VENIPUNCTURE: CPT

## 2018-05-09 PROCEDURE — 99214 OFFICE O/P EST MOD 30 MIN: CPT | Performed by: INTERNAL MEDICINE

## 2018-05-09 PROCEDURE — 72100 X-RAY EXAM L-S SPINE 2/3 VWS: CPT | Performed by: INTERNAL MEDICINE

## 2018-05-09 PROCEDURE — 80053 COMPREHEN METABOLIC PANEL: CPT

## 2018-05-09 PROCEDURE — G0463 HOSPITAL OUTPT CLINIC VISIT: HCPCS | Performed by: INTERNAL MEDICINE

## 2018-05-09 PROCEDURE — 83036 HEMOGLOBIN GLYCOSYLATED A1C: CPT

## 2018-05-09 PROCEDURE — 82306 VITAMIN D 25 HYDROXY: CPT

## 2018-05-09 RX ORDER — LOSARTAN POTASSIUM 50 MG/1
50 TABLET ORAL
Qty: 90 TABLET | Refills: 1 | Status: SHIPPED | OUTPATIENT
Start: 2018-05-09 | End: 2018-11-12

## 2018-05-09 RX ORDER — ATENOLOL 50 MG/1
50 TABLET ORAL DAILY
Qty: 90 TABLET | Refills: 1 | Status: SHIPPED | OUTPATIENT
Start: 2018-05-09 | End: 2018-11-02

## 2018-05-09 NOTE — PROGRESS NOTES
HPI:    Patient ID: Kirk Stephen is a 80year old female. Pt has c/o pain in her legs in the front and back--the entire leg from the buttocks to the ankles. No back pain. It hurts to stand and walk in the evening.   She doesn't have it in the [facility] CHEMOTHERAPY      Comment: rt breast.  1995: CHOLECYSTECTOMY  2012: KNEE REPLACEMENT SURGERY Right  2016: LUMPECTOMY RIGHT      Comment: cancer  8/2016: MASTECTOMY PARTIAL Right  01/17/2018: SKIN SURGERY Right  No date: TONSILLECTOMY  No date: WRIST FRACTU History   Problem Relation Age of Onset   • Heart Disease Father      CAD   • Heart Disease Mother      CAD   • Diabetes Mother    • Breast Cancer Mother 80     had lumpectomy and RT. No other treatment.    of stroke at 80   • appendicitis[other] [OTHE urinating. Will refer to urology. Uncontrolled type 2 diabetes mellitus with hyperglycemia, without long-term current use of insulin ([facility] [address]     She is overdue for labs. She will do them today.          Relevant Orders    HEMOGLOBIN A1C    COMP META

## 2018-05-09 NOTE — ASSESSMENT & PLAN NOTE
Her pain symptoms are consistent with spinal stenosis, since she has pain with walking which is relieved with sitting. Will check x-ray. Continue Tylenol qpm. Follow-up in 6 weeks.

## 2018-05-09 NOTE — PATIENT INSTRUCTIONS
Dr. Bentley Iglesias (urologist)  [phone]   [facility] [address], [place], [place] [address]    Tylenol (acetomenophen)  500 mg 2 tabs in the early evening.

## 2018-05-09 NOTE — PROGRESS NOTES
Preet Kebede is a 80year old female.  Patient presents with:  Ear Wax: both ears    HPI:   She feels that her ears are blocked with wax once again    Current Outpatient Prescriptions:  METFORMIN HCL  MG Oral Tablet 24 Hr TAKE ONE TABLET BY MOUTH Smokeless tobacco: Never Used                      Alcohol use:  No                 REVIEW OF SYSTEMS:   GENERAL HEALTH: feels well otherwise  GENERAL : denies fever, chills, sweats, weight loss, weight gain  SKIN

## 2018-05-31 ENCOUNTER — SNF/IP PROF CHARGE ONLY (OUTPATIENT)
Dept: HEMATOLOGY/ONCOLOGY | Facility: HOSPITAL | Age: 83
End: 2018-05-31

## 2018-05-31 DIAGNOSIS — Z17.0 MALIGNANT NEOPLASM OF UPPER-INNER QUADRANT OF RIGHT BREAST IN FEMALE, ESTROGEN RECEPTOR POSITIVE (HCC): ICD-10-CM

## 2018-05-31 DIAGNOSIS — C50.211 MALIGNANT NEOPLASM OF UPPER-INNER QUADRANT OF RIGHT BREAST IN FEMALE, ESTROGEN RECEPTOR POSITIVE (HCC): ICD-10-CM

## 2018-05-31 PROCEDURE — G9678 ONCOLOGY CARE MODEL SERVICE: HCPCS | Performed by: INTERNAL MEDICINE

## 2018-06-05 RX ORDER — ATORVASTATIN CALCIUM 20 MG/1
TABLET, FILM COATED ORAL
Qty: 90 TABLET | Refills: 0 | Status: SHIPPED | OUTPATIENT
Start: 2018-06-05 | End: 2018-09-04

## 2018-06-06 NOTE — TELEPHONE ENCOUNTER
Cholesterol Medications  Protocol Criteria:  · Appointment scheduled in the past 12 months or in the next 3 months  · ALT & LDL on file in the past 12 months  · ALT result < 80  · LDL result <130   Recent Outpatient Visits            3 weeks ago Bilateral

## 2018-06-20 ENCOUNTER — OFFICE VISIT (OUTPATIENT)
Dept: INTERNAL MEDICINE CLINIC | Facility: CLINIC | Age: 83
End: 2018-06-20

## 2018-06-20 VITALS
TEMPERATURE: 98 F | SYSTOLIC BLOOD PRESSURE: 122 MMHG | HEIGHT: 61 IN | RESPIRATION RATE: 18 BRPM | BODY MASS INDEX: 30.74 KG/M2 | DIASTOLIC BLOOD PRESSURE: 70 MMHG | HEART RATE: 54 BPM | WEIGHT: 162.81 LBS

## 2018-06-20 DIAGNOSIS — M79.604 BILATERAL LEG PAIN: Primary | ICD-10-CM

## 2018-06-20 DIAGNOSIS — E11.9 CONTROLLED TYPE 2 DIABETES MELLITUS WITHOUT COMPLICATION, WITHOUT LONG-TERM CURRENT USE OF INSULIN (HCC): ICD-10-CM

## 2018-06-20 DIAGNOSIS — N81.89 PELVIC RELAXATION: ICD-10-CM

## 2018-06-20 DIAGNOSIS — M79.605 BILATERAL LEG PAIN: Primary | ICD-10-CM

## 2018-06-20 PROCEDURE — G0463 HOSPITAL OUTPT CLINIC VISIT: HCPCS | Performed by: INTERNAL MEDICINE

## 2018-06-20 PROCEDURE — 99214 OFFICE O/P EST MOD 30 MIN: CPT | Performed by: INTERNAL MEDICINE

## 2018-06-20 RX ORDER — ACETAMINOPHEN 500 MG
1000 TABLET ORAL EVERY 6 HOURS PRN
COMMUNITY

## 2018-06-20 NOTE — PROGRESS NOTES
HPI:    Patient ID: Preet Kebede is a 80year old female. She feels good now, but she gets pain at night. She sleeps 2-3 hours at a time, gets up to go to the bathroom and then has a lot of pain. Sometimes her blood sugar is a little high---130. CHOLECYSTECTOMY  2012: KNEE REPLACEMENT SURGERY Right  2016: LUMPECTOMY RIGHT      Comment: cancer  8/2016: MASTECTOMY PARTIAL Right  01/17/2018: SKIN SURGERY Right  No date: TONSILLECTOMY  No date: WRIST FRACTURE SURGERY Right         Current Outpatient P Alcohol use: No              Family History   Problem Relation Age of Onset   • Heart Disease Father      CAD   • Heart Disease Mother      CAD   • Diabetes Mother    • Breast Cancer Mother 80     had lumpectomy and RT.   No other treat

## 2018-06-20 NOTE — ASSESSMENT & PLAN NOTE
Patient takes 2 Tylenol at 6 PM.  Since the patient has most of her pain at night, I advised her to take 2 extra Tylenol at bedtime.   Maximum 6 Tylenol daily

## 2018-06-26 PROBLEM — N81.4 UTEROVAGINAL PROLAPSE: Status: ACTIVE | Noted: 2018-06-26

## 2018-06-26 PROBLEM — N39.3 STRESS INCONTINENCE (FEMALE) (MALE): Status: ACTIVE | Noted: 2018-06-26

## 2018-06-30 ENCOUNTER — SNF/IP PROF CHARGE ONLY (OUTPATIENT)
Dept: HEMATOLOGY/ONCOLOGY | Facility: HOSPITAL | Age: 83
End: 2018-06-30

## 2018-06-30 DIAGNOSIS — Z17.0 MALIGNANT NEOPLASM OF UPPER-INNER QUADRANT OF RIGHT BREAST IN FEMALE, ESTROGEN RECEPTOR POSITIVE (HCC): ICD-10-CM

## 2018-06-30 DIAGNOSIS — C50.211 MALIGNANT NEOPLASM OF UPPER-INNER QUADRANT OF RIGHT BREAST IN FEMALE, ESTROGEN RECEPTOR POSITIVE (HCC): ICD-10-CM

## 2018-06-30 PROCEDURE — G9678 ONCOLOGY CARE MODEL SERVICE: HCPCS | Performed by: INTERNAL MEDICINE

## 2018-07-03 RX ORDER — KETOCONAZOLE 20 MG/G
CREAM TOPICAL
Qty: 30 G | Refills: 2 | Status: SHIPPED | OUTPATIENT
Start: 2018-07-03 | End: 2018-10-12

## 2018-07-03 NOTE — TELEPHONE ENCOUNTER
Please advise on refill request.    Refill Protocol Appointment Criteria  · Appointment scheduled in the past 6 months or in the next 3 months  Recent Outpatient Visits            1 week ago Uterovaginal prolapse    [address]

## 2018-07-16 DIAGNOSIS — E11.9 DIABETES TYPE 2, CONTROLLED (HCC): ICD-10-CM

## 2018-07-17 RX ORDER — LANCETS
EACH MISCELLANEOUS
Qty: 100 EACH | Refills: 2 | Status: SHIPPED | OUTPATIENT
Start: 2018-07-17 | End: 2020-03-11

## 2018-07-17 NOTE — TELEPHONE ENCOUNTER
Pharmacy called indicated that needed information on Rx for medicare also needed test strips as well.  Rx's resent to pharmacy

## 2018-07-17 NOTE — TELEPHONE ENCOUNTER
Refill Protocol Appointment Criteria  · Appointment scheduled in the past 12 months or in the next 3 months  Recent Outpatient Visits            3 weeks ago Uterovaginal prolapse    Saadia Awad MD    Office Visit

## 2018-07-31 ENCOUNTER — SNF/IP PROF CHARGE ONLY (OUTPATIENT)
Dept: HEMATOLOGY/ONCOLOGY | Facility: HOSPITAL | Age: 83
End: 2018-07-31

## 2018-07-31 DIAGNOSIS — C50.211 MALIGNANT NEOPLASM OF UPPER-INNER QUADRANT OF RIGHT BREAST IN FEMALE, ESTROGEN RECEPTOR POSITIVE (HCC): ICD-10-CM

## 2018-07-31 DIAGNOSIS — Z17.0 MALIGNANT NEOPLASM OF UPPER-INNER QUADRANT OF RIGHT BREAST IN FEMALE, ESTROGEN RECEPTOR POSITIVE (HCC): ICD-10-CM

## 2018-07-31 PROCEDURE — G9678 ONCOLOGY CARE MODEL SERVICE: HCPCS | Performed by: INTERNAL MEDICINE

## 2018-08-14 ENCOUNTER — OFFICE VISIT (OUTPATIENT)
Dept: HEMATOLOGY/ONCOLOGY | Facility: HOSPITAL | Age: 83
End: 2018-08-14
Attending: INTERNAL MEDICINE
Payer: MEDICARE

## 2018-08-14 VITALS
BODY MASS INDEX: 30.96 KG/M2 | HEIGHT: 61 IN | RESPIRATION RATE: 16 BRPM | DIASTOLIC BLOOD PRESSURE: 52 MMHG | TEMPERATURE: 98 F | HEART RATE: 60 BPM | SYSTOLIC BLOOD PRESSURE: 137 MMHG | WEIGHT: 164 LBS

## 2018-08-14 DIAGNOSIS — Z51.81 MEDICATION MONITORING ENCOUNTER: ICD-10-CM

## 2018-08-14 DIAGNOSIS — C50.211 MALIGNANT NEOPLASM OF UPPER-INNER QUADRANT OF RIGHT BREAST IN FEMALE, ESTROGEN RECEPTOR POSITIVE (HCC): Primary | ICD-10-CM

## 2018-08-14 DIAGNOSIS — Z17.0 MALIGNANT NEOPLASM OF UPPER-INNER QUADRANT OF RIGHT BREAST IN FEMALE, ESTROGEN RECEPTOR POSITIVE (HCC): Primary | ICD-10-CM

## 2018-08-14 PROCEDURE — 99214 OFFICE O/P EST MOD 30 MIN: CPT | Performed by: NURSE PRACTITIONER

## 2018-08-14 NOTE — PROGRESS NOTES
JEET       Carroll Trejo is a 80year old female who is here today for f/u diagnosis of Malignant neoplasm of upper-inner quadrant of right breast in female, estrogen receptor positive (hcc)  (primary encounter diagnosis)  Medication monitoring encount Neurological: Positive for dizziness (hx vertigo ) and headaches (rarely). Negative for weakness, light-headedness and numbness. Vertigo in past    Hematological: Negative for adenopathy. Does not bruise/bleed easily.    Psychiatric/Behavioral: Posit Glucose Blood (LEE CONTOUR NEXT TEST) In Vitro Strip To test blood sugar 1 time every day Disp: 100 each Rfl: 3   CARTIA  MG Oral Capsule SR 24 Hr Take 360 mg by mouth daily.    Disp:  Rfl: 1     Allergies:     Adhesive Tape           RASH    Past M /52 (BP Location: Left arm, Patient Position: Sitting, Cuff Size: adult)   Pulse 60   Temp 98.4 °F (36.9 °C) (Oral)   Resp 16   Ht 1.549 m (5' 1\")   Wt 74.4 kg (164 lb)   BMI 30.99 kg/m²   Wt Readings from Last 6 Encounters:  06/26/18 : 73.5 kg (16 She has no cervical adenopathy. She has no axillary adenopathy. Right: No inguinal and no supraclavicular adenopathy present. Left: No inguinal and no supraclavicular adenopathy present.    Neurological: She is alert and oriented to pers COMPARISON: [facility], [facility] DIGITAL UNI RT, 8/12/2016, 8:26. [facility] DIGITAL UNI RT, 7/12/2016, 12:06. [place], [place] DIGITAL ADDITIONAL VIEW RT, 6/27/2016, 9:58.   [facility] [place]

## 2018-09-04 DIAGNOSIS — E11.9 CONTROLLED TYPE 2 DIABETES MELLITUS WITHOUT COMPLICATION, WITHOUT LONG-TERM CURRENT USE OF INSULIN (HCC): Primary | ICD-10-CM

## 2018-09-04 RX ORDER — ATORVASTATIN CALCIUM 20 MG/1
TABLET, FILM COATED ORAL
Qty: 90 TABLET | Refills: 0 | Status: SHIPPED | OUTPATIENT
Start: 2018-09-04 | End: 2018-12-04

## 2018-09-04 NOTE — TELEPHONE ENCOUNTER
Pt failed protocol due to no recent labs. Pt does have OV scheduled 9/20/18 with Dr Marcelina Anand. Routed to MD for review.

## 2018-09-14 RX ORDER — ANASTROZOLE 1 MG/1
TABLET ORAL
Qty: 30 TABLET | Refills: 5 | Status: SHIPPED | OUTPATIENT
Start: 2018-09-14 | End: 2019-02-18

## 2018-09-20 ENCOUNTER — OFFICE VISIT (OUTPATIENT)
Dept: INTERNAL MEDICINE CLINIC | Facility: CLINIC | Age: 83
End: 2018-09-20
Payer: MEDICARE

## 2018-09-20 VITALS
SYSTOLIC BLOOD PRESSURE: 139 MMHG | WEIGHT: 162.69 LBS | HEART RATE: 54 BPM | DIASTOLIC BLOOD PRESSURE: 60 MMHG | BODY MASS INDEX: 30.71 KG/M2 | HEIGHT: 61 IN

## 2018-09-20 DIAGNOSIS — M79.605 BILATERAL LEG PAIN: ICD-10-CM

## 2018-09-20 DIAGNOSIS — I10 ESSENTIAL HYPERTENSION: ICD-10-CM

## 2018-09-20 DIAGNOSIS — H81.10 BENIGN PAROXYSMAL POSITIONAL VERTIGO, UNSPECIFIED LATERALITY: Primary | ICD-10-CM

## 2018-09-20 DIAGNOSIS — M79.604 BILATERAL LEG PAIN: ICD-10-CM

## 2018-09-20 DIAGNOSIS — E11.9 CONTROLLED TYPE 2 DIABETES MELLITUS WITHOUT COMPLICATION, WITHOUT LONG-TERM CURRENT USE OF INSULIN (HCC): ICD-10-CM

## 2018-09-20 PROCEDURE — 99214 OFFICE O/P EST MOD 30 MIN: CPT | Performed by: INTERNAL MEDICINE

## 2018-09-20 PROCEDURE — G0463 HOSPITAL OUTPT CLINIC VISIT: HCPCS | Performed by: INTERNAL MEDICINE

## 2018-09-20 NOTE — ASSESSMENT & PLAN NOTE
Her diabetes is controlled. Her last A1c was 6.3. The patient is concerned because her blood sugars have been a little bit higher lately. We will check another A1c along with fasting lipids.

## 2018-09-20 NOTE — PROGRESS NOTES
HPI:    Patient ID: Shanna Jacques is a 80year old female. She has positional vertigo. She has had it in the past.  She saw Dr. Kai Francois in the past and found the treatment to be very helpful.   Last week she had a temp of 100 and diarrhea which last Neurological: Positive for vertigo. Negative for tingling and numbness. Past Surgical History:  No date: APPENDECTOMY  2008: CATARACT EXTRACTION W/  INTRAOCULAR LENS IMPLANT;  Bilateral      Comment:  Dhaval Landa MD  2016: CHEMOTHERAPY      Com daily. Disp:  Rfl:    NON FORMULARY daily. Disp:  Rfl:    Meclizine HCl 12.5 MG Oral Tab Take 1 tablet (12.5 mg total) by mouth 3 (three) times daily as needed for Dizziness.  CAUSES DROWSINESS Disp: 30 tablet Rfl: 3   Glucose Blood (LEE CONTOUR NEXT TEST therapy. I will refer her for more therapy. Relevant Orders    PHYSICAL THERAPY - INTERNAL       Unprioritized    Diabetes type 2, controlled ([facility] [address]     Her diabetes is controlled. Her last A1c was 6.3.   The patient is concerned because her blood

## 2018-09-21 NOTE — ASSESSMENT & PLAN NOTE
Patient had this in the past and it responded to vestibular physical therapy. I will refer her for more therapy.

## 2018-10-02 ENCOUNTER — LAB ENCOUNTER (OUTPATIENT)
Dept: LAB | Facility: HOSPITAL | Age: 83
End: 2018-10-02
Attending: INTERNAL MEDICINE
Payer: MEDICARE

## 2018-10-02 DIAGNOSIS — E11.9 CONTROLLED TYPE 2 DIABETES MELLITUS WITHOUT COMPLICATION, WITHOUT LONG-TERM CURRENT USE OF INSULIN (HCC): ICD-10-CM

## 2018-10-02 PROCEDURE — 80048 BASIC METABOLIC PNL TOTAL CA: CPT

## 2018-10-02 PROCEDURE — 83036 HEMOGLOBIN GLYCOSYLATED A1C: CPT

## 2018-10-02 PROCEDURE — 84443 ASSAY THYROID STIM HORMONE: CPT

## 2018-10-02 PROCEDURE — 85025 COMPLETE CBC W/AUTO DIFF WBC: CPT

## 2018-10-02 PROCEDURE — 82043 UR ALBUMIN QUANTITATIVE: CPT

## 2018-10-02 PROCEDURE — 82570 ASSAY OF URINE CREATININE: CPT

## 2018-10-02 PROCEDURE — 36415 COLL VENOUS BLD VENIPUNCTURE: CPT

## 2018-10-14 RX ORDER — METFORMIN HYDROCHLORIDE 500 MG/1
TABLET, EXTENDED RELEASE ORAL
Qty: 90 TABLET | Refills: 1 | Status: SHIPPED | OUTPATIENT
Start: 2018-10-14 | End: 2019-04-14

## 2018-10-15 RX ORDER — KETOCONAZOLE 20 MG/G
CREAM TOPICAL
Qty: 60 G | Refills: 2 | Status: SHIPPED | OUTPATIENT
Start: 2018-10-15 | End: 2020-03-06 | Stop reason: ALTCHOICE

## 2018-11-02 DIAGNOSIS — I10 ESSENTIAL HYPERTENSION: ICD-10-CM

## 2018-11-03 RX ORDER — ATENOLOL 50 MG/1
TABLET ORAL
Qty: 90 TABLET | Refills: 0 | Status: SHIPPED | OUTPATIENT
Start: 2018-11-03 | End: 2019-01-30

## 2018-11-12 DIAGNOSIS — I10 ESSENTIAL HYPERTENSION: ICD-10-CM

## 2018-11-13 RX ORDER — DILTIAZEM HYDROCHLORIDE 180 MG/1
CAPSULE, EXTENDED RELEASE ORAL
Qty: 180 CAPSULE | Refills: 0 | Status: ON HOLD | OUTPATIENT
Start: 2018-11-13 | End: 2018-12-03

## 2018-11-13 RX ORDER — LOSARTAN POTASSIUM 50 MG/1
TABLET ORAL
Qty: 90 TABLET | Refills: 0 | Status: SHIPPED | OUTPATIENT
Start: 2018-11-13 | End: 2019-02-20

## 2018-11-14 NOTE — TELEPHONE ENCOUNTER
Refill passed per [facility] protocol.     Hypertensive Medications  Protocol Criteria:  · Appointment scheduled in the past 6 months or in the next 3 months  · BMP or CMP in the past 12 months  · Creatinine result < 2  Recent Outpatient Visits

## 2018-12-03 ENCOUNTER — APPOINTMENT (OUTPATIENT)
Dept: GENERAL RADIOLOGY | Facility: HOSPITAL | Age: 83
End: 2018-12-03
Attending: EMERGENCY MEDICINE
Payer: MEDICARE

## 2018-12-03 ENCOUNTER — APPOINTMENT (OUTPATIENT)
Dept: CV DIAGNOSTICS | Facility: HOSPITAL | Age: 83
End: 2018-12-03
Attending: Other
Payer: MEDICARE

## 2018-12-03 ENCOUNTER — APPOINTMENT (OUTPATIENT)
Dept: CT IMAGING | Facility: HOSPITAL | Age: 83
End: 2018-12-03
Attending: EMERGENCY MEDICINE
Payer: MEDICARE

## 2018-12-03 ENCOUNTER — APPOINTMENT (OUTPATIENT)
Dept: MRI IMAGING | Facility: HOSPITAL | Age: 83
End: 2018-12-03
Attending: Other
Payer: MEDICARE

## 2018-12-03 ENCOUNTER — HOSPITAL ENCOUNTER (OUTPATIENT)
Facility: HOSPITAL | Age: 83
Setting detail: OBSERVATION
Discharge: HOME | End: 2018-12-04
Attending: EMERGENCY MEDICINE
Payer: MEDICARE

## 2018-12-03 ENCOUNTER — APPOINTMENT (OUTPATIENT)
Dept: ULTRASOUND IMAGING | Facility: HOSPITAL | Age: 83
End: 2018-12-03
Attending: Other
Payer: MEDICARE

## 2018-12-03 DIAGNOSIS — R53.1 LEFT-SIDED WEAKNESS: ICD-10-CM

## 2018-12-03 DIAGNOSIS — R53.1 WEAKNESS GENERALIZED: Primary | ICD-10-CM

## 2018-12-03 DIAGNOSIS — M54.5 CHRONIC LOW BACK PAIN, UNSPECIFIED BACK PAIN LATERALITY, WITH SCIATICA PRESENCE UNSPECIFIED: ICD-10-CM

## 2018-12-03 DIAGNOSIS — G89.29 CHRONIC LOW BACK PAIN, UNSPECIFIED BACK PAIN LATERALITY, WITH SCIATICA PRESENCE UNSPECIFIED: ICD-10-CM

## 2018-12-03 PROBLEM — I63.9 ACUTE CVA (CEREBROVASCULAR ACCIDENT) (HCC): Status: ACTIVE | Noted: 2018-12-03

## 2018-12-03 PROBLEM — G45.9 TIA (TRANSIENT ISCHEMIC ATTACK): Status: ACTIVE | Noted: 2018-12-03

## 2018-12-03 PROCEDURE — 99220 INITIAL OBSERVATION CARE,LEVL III: CPT | Performed by: HOSPITALIST

## 2018-12-03 PROCEDURE — 93880 EXTRACRANIAL BILAT STUDY: CPT | Performed by: OTHER

## 2018-12-03 PROCEDURE — 70450 CT HEAD/BRAIN W/O DYE: CPT | Performed by: EMERGENCY MEDICINE

## 2018-12-03 PROCEDURE — 99203 OFFICE O/P NEW LOW 30 MIN: CPT | Performed by: OTHER

## 2018-12-03 PROCEDURE — 93307 TTE W/O DOPPLER COMPLETE: CPT | Performed by: OTHER

## 2018-12-03 PROCEDURE — 71045 X-RAY EXAM CHEST 1 VIEW: CPT | Performed by: EMERGENCY MEDICINE

## 2018-12-03 PROCEDURE — 70551 MRI BRAIN STEM W/O DYE: CPT | Performed by: OTHER

## 2018-12-03 PROCEDURE — 70496 CT ANGIOGRAPHY HEAD: CPT | Performed by: EMERGENCY MEDICINE

## 2018-12-03 RX ORDER — ANASTROZOLE 1 MG/1
1 TABLET ORAL DAILY
Status: DISCONTINUED | OUTPATIENT
Start: 2018-12-04 | End: 2018-12-04

## 2018-12-03 RX ORDER — SODIUM CHLORIDE 9 MG/ML
INJECTION, SOLUTION INTRAVENOUS ONCE
Status: COMPLETED | OUTPATIENT
Start: 2018-12-03 | End: 2018-12-03

## 2018-12-03 RX ORDER — ASPIRIN 81 MG/1
324 TABLET, CHEWABLE ORAL ONCE
Status: COMPLETED | OUTPATIENT
Start: 2018-12-03 | End: 2018-12-03

## 2018-12-03 RX ORDER — ATENOLOL 50 MG/1
50 TABLET ORAL
Status: DISCONTINUED | OUTPATIENT
Start: 2018-12-03 | End: 2018-12-03

## 2018-12-03 RX ORDER — LIDOCAINE 50 MG/G
1 PATCH TOPICAL DAILY PRN
Status: DISCONTINUED | OUTPATIENT
Start: 2018-12-03 | End: 2018-12-04

## 2018-12-03 RX ORDER — DILTIAZEM HYDROCHLORIDE 180 MG/1
360 CAPSULE, COATED, EXTENDED RELEASE ORAL NIGHTLY
Status: DISCONTINUED | OUTPATIENT
Start: 2018-12-03 | End: 2018-12-04

## 2018-12-03 RX ORDER — SODIUM CHLORIDE 0.9 % (FLUSH) 0.9 %
3 SYRINGE (ML) INJECTION AS NEEDED
Status: DISCONTINUED | OUTPATIENT
Start: 2018-12-03 | End: 2018-12-04

## 2018-12-03 RX ORDER — LOSARTAN POTASSIUM 50 MG/1
50 TABLET ORAL
Status: DISCONTINUED | OUTPATIENT
Start: 2018-12-04 | End: 2018-12-04

## 2018-12-03 RX ORDER — ACETAMINOPHEN 325 MG/1
650 TABLET ORAL EVERY 6 HOURS PRN
Status: DISCONTINUED | OUTPATIENT
Start: 2018-12-03 | End: 2018-12-04

## 2018-12-03 RX ORDER — ANASTROZOLE 1 MG/1
1 TABLET ORAL DAILY
Status: DISCONTINUED | OUTPATIENT
Start: 2018-12-03 | End: 2018-12-03

## 2018-12-03 RX ORDER — LOSARTAN POTASSIUM 50 MG/1
50 TABLET ORAL
Status: DISCONTINUED | OUTPATIENT
Start: 2018-12-03 | End: 2018-12-03

## 2018-12-03 RX ORDER — ASPIRIN 81 MG/1
81 TABLET, CHEWABLE ORAL DAILY
Status: DISCONTINUED | OUTPATIENT
Start: 2018-12-04 | End: 2018-12-04

## 2018-12-03 RX ORDER — ATENOLOL 50 MG/1
50 TABLET ORAL NIGHTLY
Status: DISCONTINUED | OUTPATIENT
Start: 2018-12-03 | End: 2018-12-04

## 2018-12-03 RX ORDER — HEPARIN SODIUM 5000 [USP'U]/ML
5000 INJECTION, SOLUTION INTRAVENOUS; SUBCUTANEOUS EVERY 12 HOURS SCHEDULED
Status: DISCONTINUED | OUTPATIENT
Start: 2018-12-03 | End: 2018-12-04

## 2018-12-03 RX ORDER — ONDANSETRON 2 MG/ML
4 INJECTION INTRAMUSCULAR; INTRAVENOUS EVERY 6 HOURS PRN
Status: DISCONTINUED | OUTPATIENT
Start: 2018-12-03 | End: 2018-12-04

## 2018-12-03 RX ORDER — ATORVASTATIN CALCIUM 20 MG/1
20 TABLET, FILM COATED ORAL NIGHTLY
Status: DISCONTINUED | OUTPATIENT
Start: 2018-12-03 | End: 2018-12-04

## 2018-12-03 RX ORDER — DEXTROSE MONOHYDRATE 25 G/50ML
50 INJECTION, SOLUTION INTRAVENOUS AS NEEDED
Status: DISCONTINUED | OUTPATIENT
Start: 2018-12-03 | End: 2018-12-04

## 2018-12-03 RX ORDER — ACETAMINOPHEN 500 MG
1000 TABLET ORAL EVERY 6 HOURS PRN
Status: DISCONTINUED | OUTPATIENT
Start: 2018-12-03 | End: 2018-12-04

## 2018-12-03 NOTE — H&P
[facility]    PATIENT'S NAME: Danielle Little   ATTENDING PHYSICIAN: Derek Ledbetter MD   PATIENT ACCOUNT#:   [id number]    LOCATION:  [place] [address]  MEDICAL RECORD #:   [id number]       YOB: 1931  ADMISSION DATE:       12/03/ to left knee osteoarthritis. This morning, she felt that her leg was more weak than usual and she had fine motor skills and could not  small objects with her left hand, and she was dropping objects from her left hand.   She woke up with the symptoms be admitted to telemetry floor on observation. Aspirin. Follow up on 2-D echocardiogram study results. Physical and occupational therapy, neurology consultation, Dr. Jason Coyne was notified. Fall precautions. Further recommendations to follow.     Dictated

## 2018-12-03 NOTE — ED INITIAL ASSESSMENT (HPI)
Via EMS from home--patient was trying to get up out of her chair but was unable to due to weakness in her legs. [place] scale negative.

## 2018-12-03 NOTE — CONSULTS
Consult dictated. TIA. No neurological deficits at the present time. No indication for TPA. Stroke order sets implemented. Thank you for consult.

## 2018-12-03 NOTE — CONSULTS
[facility]    PATIENT'S NAME: Bette Barnes   ATTENDING PHYSICIAN: Nichole Briseno.  Ariana Escobedo MD   CONSULTING PHYSICIAN: Tee Munguia MD   PATIENT ACCOUNT#:   [id number]    LOCATION:  [address]  MEDICAL RECORD #:   [id number]       DATE OF BIRTH XII are normal.  Strength in the arms and legs is normal.  No pronator drift. Deep tendon reflexes are symmetric, albeit reduced at the Achilles without Babinski sign. No carotid bruits.   Joint position and sense to hands and feet normal.  Vibration -2 a

## 2018-12-03 NOTE — ED PROVIDER NOTES
Patient Seen in: [facility] Emergency Department    History   Patient presents with:  Weakness    Stated Complaint: weakness    HPI    Patient presents to the emergency department today with weakness.   She was sitting in a chair and was going to g TAKE ONE TABLET BY MOUTH ONE TIME DAILY    ATENOLOL 50 MG Oral Tab,  TAKE ONE TABLET BY MOUTH ONE TIME DAILY    ketoconazole 2 % External Cream,  APPLY 1 APPLICATION TO THE AFFECTED AREA(S) TWO TIMES A DAY   METFORMIN HCL  MG Oral Tablet 24 Hr,  TAKE use: No    Drug use: No      Review of Systems  Constitutional: no fever, has otherwise been feeling well, normal appetite  HEENT: No cough, no congestion  Cardiovascular: no chest pain  Respiratory: no shortness of breath  Gastrointestinal: no abdominal p the heel to toe down the shin with the left leg as compared to the right it was able to do it fairly well. Psychiatric:  Normal affect. Oriented. No unusual behavior. Interacting well.     Presents with generalized weakness we will do blood tests urinal oximetry    Cardiac Monitor: Normal sinus rhythm    Disposition and Plan     We recommend that you schedule follow up care with a primary care provider within the next three months to obtain basic health screening including reassessment of your blood press

## 2018-12-04 VITALS
WEIGHT: 163.13 LBS | HEART RATE: 54 BPM | TEMPERATURE: 98 F | DIASTOLIC BLOOD PRESSURE: 60 MMHG | OXYGEN SATURATION: 95 % | SYSTOLIC BLOOD PRESSURE: 147 MMHG | RESPIRATION RATE: 16 BRPM | HEIGHT: 61 IN | BODY MASS INDEX: 30.8 KG/M2

## 2018-12-04 PROCEDURE — 99213 OFFICE O/P EST LOW 20 MIN: CPT | Performed by: OTHER

## 2018-12-04 PROCEDURE — 99217 OBSERVATION CARE DISCHARGE: CPT | Performed by: HOSPITALIST

## 2018-12-04 RX ORDER — ASPIRIN 81 MG/1
81 TABLET, CHEWABLE ORAL DAILY
Qty: 30 TABLET | Refills: 11 | Status: SHIPPED | OUTPATIENT
Start: 2018-12-05

## 2018-12-04 RX ORDER — MAGNESIUM OXIDE 400 MG (241.3 MG MAGNESIUM) TABLET
400 TABLET ONCE
Status: COMPLETED | OUTPATIENT
Start: 2018-12-04 | End: 2018-12-04

## 2018-12-04 RX ORDER — POTASSIUM CHLORIDE 20 MEQ/1
40 TABLET, EXTENDED RELEASE ORAL ONCE
Status: COMPLETED | OUTPATIENT
Start: 2018-12-04 | End: 2018-12-04

## 2018-12-04 RX ORDER — HYDROCODONE BITARTRATE AND ACETAMINOPHEN 5; 325 MG/1; MG/1
1 TABLET ORAL EVERY 6 HOURS PRN
Status: DISCONTINUED | OUTPATIENT
Start: 2018-12-04 | End: 2018-12-04

## 2018-12-04 RX ORDER — LIDOCAINE 50 MG/G
1 PATCH TOPICAL DAILY PRN
Qty: 30 PATCH | Refills: 1 | Status: SHIPPED | OUTPATIENT
Start: 2018-12-04 | End: 2020-03-06 | Stop reason: ALTCHOICE

## 2018-12-04 NOTE — PROGRESS NOTES
[facility]    Progress Note    Omega Rosalino Aspito Patient Status:  Observation    1931 MRN [id number]   Location [address] [facility] Attending Candance Lewis, MD   Hosp Day # 0 PCP Faviola Gonzalez MD       Subjective:   Ivis Barnes 1,000 mg 1,000 mg Oral Q6H PRN   atorvastatin (LIPITOR) tab 20 mg 20 mg Oral Nightly   DilTIAZem HCl ER Coated Beads (CARDIZEM CD) 24 hr cap 360 mg 360 mg Oral Nightly   anastrozole (ARIMIDEX) tab 1 mg 1 mg Oral Daily   atenolol (TENORMIN) tab 50 mg 50 mg Recommend followup imaging with MRI/MRA or CTA. 2.  There are mild microvascular white matter ischemic changes, likely related to long-standing hypertension and/or diabetes. 3.  Atherosclerosis in the anterior and posterior circulations.   Exam discussed wi internal carotid artery, normal variation. 3. No large proximal ICA MCA or vertebral basilar occlusion or dissection. 4. No other significant abnormalities. .    Dictated by (CST): Brody Fitzgerald MD on 12/03/2018 at 12:53     Approved by (CST): Chester Lovell

## 2018-12-04 NOTE — CM/SW NOTE
MARY received MD order to verify social support and [facility] needs. Pt has been screened per chart review and during nursing rounds. Pt is from home w/ her spouse and 2 sons. RN informed MARY that pt has declined C. Possible discharge today 12/4/18.  MARY/ARIEL will re

## 2018-12-04 NOTE — PLAN OF CARE
Problem: Diabetes/Glucose Control  Goal: Glucose maintained within prescribed range  INTERVENTIONS:  - Monitor Blood Glucose as ordered  - Assess for signs and symptoms of hyperglycemia and hypoglycemia  - Administer ordered medications to maintain glucose threatening arrhythmias  - Monitor electrolytes and administer replacement therapy as ordered  Outcome: Progressing      Problem: NEUROLOGICAL - ADULT  Goal: Achieves stable or improved neurological status  INTERVENTIONS  - Assess for and report changes in

## 2018-12-04 NOTE — PLAN OF CARE
Problem: Patient Centered Care  Goal: Patient preferences are identified and integrated in the patient's plan of care  Interventions:  - What would you like us to know as we care for you?  \"I live at home with my  and two sons: jamil and lawrence\"  - P medications to optimize hemodynamic stability  - Monitor arterial and/or venous puncture sites for bleeding and/or hematoma  - Assess quality of pulses, skin color and temperature  - Assess for signs of decreased coronary artery perfusion - ex.  Angina  - E

## 2018-12-05 ENCOUNTER — PATIENT OUTREACH (OUTPATIENT)
Dept: CASE MANAGEMENT | Age: 83
End: 2018-12-05

## 2018-12-05 DIAGNOSIS — Z02.9 ENCOUNTERS FOR ADMINISTRATIVE PURPOSES: ICD-10-CM

## 2018-12-05 NOTE — DISCHARGE SUMMARY
[facility]    Discharge Summary    Junior Dense Aspito Patient Status:  Observation    1931 MRN [id number]   Location [address] [facility] Attending No att. providers found   Hosp Day # 0 PCP Eloina Aguillon MD     Date of Admission: 12 left hand, and was having difficulty with fine motor skills in her left hand. Came into the emergency department for evaluation. Initially, she had a stroke alert. CT scan of brain and CTA of the brain were unremarkable. Symptoms improved.   MRI scan of TYLENOL EXTRA STRENGTH      Take 1,000 mg by mouth every 6 (six) hours as needed for Pain.    Refills:  0     anastrozole 1 MG Tabs tab  Commonly known as:  ARIMIDEX      TAKE ONE TABLET BY MOUTH ONE TIME DAILY   Quantity:  30 tablet  Refills:  5     atenol Visits: Follow-up Information     Tiffanie Norman MD In 1 week.     Specialty:  Internal Medicine  Why:  primary care follow up  Contact information:  [address]  [phone]             Nilton Sauceda MD.    Specialty:  Jaqueline Ocampo

## 2018-12-05 NOTE — PROGRESS NOTES
S/w patient for hospital follow up. Patient rushed phone call stating that she was doing god, left sided weakness was improved. She still has some weakness with her  in left hand. No home healthcare. Patient stated that she doesn't need it.  Attempted t

## 2018-12-06 RX ORDER — ATORVASTATIN CALCIUM 20 MG/1
TABLET, FILM COATED ORAL
Qty: 90 TABLET | Refills: 0 | Status: SHIPPED | OUTPATIENT
Start: 2018-12-06 | End: 2019-03-04

## 2019-01-30 DIAGNOSIS — I10 ESSENTIAL HYPERTENSION: ICD-10-CM

## 2019-01-30 RX ORDER — ATENOLOL 50 MG/1
TABLET ORAL
Qty: 90 TABLET | Refills: 0 | Status: SHIPPED | OUTPATIENT
Start: 2019-01-30 | End: 2019-04-30

## 2019-02-09 NOTE — TELEPHONE ENCOUNTER
LMTCB on  H#.      Future Appointments   Date Time Provider Dario Almonte   2/18/2019 11:00 AM Jairo Salcedo MD [address] [place] [address] HEM ONC EMO

## 2019-02-13 RX ORDER — DILTIAZEM HYDROCHLORIDE 180 MG/1
CAPSULE, EXTENDED RELEASE ORAL
Qty: 180 CAPSULE | Refills: 0 | Status: SHIPPED | OUTPATIENT
Start: 2019-02-13 | End: 2019-05-13

## 2019-02-18 ENCOUNTER — OFFICE VISIT (OUTPATIENT)
Dept: HEMATOLOGY/ONCOLOGY | Facility: HOSPITAL | Age: 84
End: 2019-02-18
Attending: INTERNAL MEDICINE
Payer: MEDICARE

## 2019-02-18 VITALS
HEIGHT: 61 IN | OXYGEN SATURATION: 94 % | RESPIRATION RATE: 18 BRPM | HEART RATE: 56 BPM | TEMPERATURE: 99 F | BODY MASS INDEX: 29.83 KG/M2 | SYSTOLIC BLOOD PRESSURE: 153 MMHG | DIASTOLIC BLOOD PRESSURE: 52 MMHG | WEIGHT: 158 LBS

## 2019-02-18 DIAGNOSIS — Z51.81 MEDICATION MONITORING ENCOUNTER: ICD-10-CM

## 2019-02-18 DIAGNOSIS — Z17.0 MALIGNANT NEOPLASM OF UPPER-INNER QUADRANT OF RIGHT BREAST IN FEMALE, ESTROGEN RECEPTOR POSITIVE (HCC): Primary | ICD-10-CM

## 2019-02-18 DIAGNOSIS — M85.89 OSTEOPENIA OF MULTIPLE SITES: ICD-10-CM

## 2019-02-18 DIAGNOSIS — C50.211 MALIGNANT NEOPLASM OF UPPER-INNER QUADRANT OF RIGHT BREAST IN FEMALE, ESTROGEN RECEPTOR POSITIVE (HCC): Primary | ICD-10-CM

## 2019-02-18 PROCEDURE — 99214 OFFICE O/P EST MOD 30 MIN: CPT | Performed by: INTERNAL MEDICINE

## 2019-02-18 RX ORDER — ANASTROZOLE 1 MG/1
TABLET ORAL
Qty: 30 TABLET | Refills: 6 | Status: SHIPPED | OUTPATIENT
Start: 2019-02-18 | End: 2019-08-21

## 2019-02-18 NOTE — PROGRESS NOTES
JEET       Brandon Kingston is a 80year old female who is here today for f/u diagnosis of Malignant neoplasm of upper-inner quadrant of right breast in female, estrogen receptor positive (hcc)  (primary encounter diagnosis)  Medication monitoring encount atorvastatin 20 MG Oral Tab TAKE ONE TABLET BY MOUTH ONE TIME DAILY. please do fasting blood tests Disp: 90 tablet Rfl: 0   aspirin 81 MG Oral Chew Tab Chew 1 tablet (81 mg total) by mouth daily.  Disp: 30 tablet Rfl: 11   lidocaine 5 % External Patch Place • Osteoarthritis    • Squamous cell carcinoma, keratinizing 2018    R posterior thigh     Past Surgical History:   Procedure Laterality Date   • APPENDECTOMY     • CATARACT EXTRACTION W/  INTRAOCULAR LENS IMPLANT Bilateral 2008    Scarlet Cross MD   • C Forced sexual activity: Not on file    Other Topics      Concerns:         Service: No        Blood Transfusions: Not Asked        Caffeine Concern: Yes          coffee, 1 cups daily        Occupational Exposure: No        Hobby Hazards: Not No murmur heard. Pulmonary/Chest: Effort normal and breath sounds normal. No respiratory distress. She has no wheezes. Right breast exhibits skin change (see diagram).  Right breast exhibits no inverted nipple, no mass, no nipple discharge and no tendernes Local therapy completed with lumpectomy and staging with SLNBx. No RT recommended as tumor < 1cm and ER + on AI. On anastrozole since August of 2016 to complete 5 yrs on August of 2021. Arthralgias and fatigue:  Recommend increased activity.      D BI-RADS CATEGORY:      DIAGNOSTIC CATEGORY 2--BENIGN FINDING:       RECOMMENDATIONS:   ROUTINE SCREENING MAMMOGRAM AND CLINICAL EVALUATION. PLEASE NOTE: NORMAL MAMMOGRAM DOES NOT EXCLUDE THE POSSIBILITY OF BREAST CANCER.   A CLINICAL

## 2019-02-20 DIAGNOSIS — I10 ESSENTIAL HYPERTENSION: ICD-10-CM

## 2019-02-20 RX ORDER — LOSARTAN POTASSIUM 50 MG/1
TABLET ORAL
Qty: 90 TABLET | Refills: 0 | Status: SHIPPED | OUTPATIENT
Start: 2019-02-20 | End: 2019-05-15

## 2019-03-04 RX ORDER — ATORVASTATIN CALCIUM 20 MG/1
TABLET, FILM COATED ORAL
Qty: 90 TABLET | Refills: 0 | Status: SHIPPED | OUTPATIENT
Start: 2019-03-04 | End: 2019-05-31

## 2019-04-14 RX ORDER — METFORMIN HYDROCHLORIDE 500 MG/1
TABLET, EXTENDED RELEASE ORAL
Qty: 30 TABLET | Refills: 0 | Status: SHIPPED | OUTPATIENT
Start: 2019-04-14 | End: 2019-05-12

## 2019-04-30 DIAGNOSIS — I10 ESSENTIAL HYPERTENSION: ICD-10-CM

## 2019-05-01 RX ORDER — ATENOLOL 50 MG/1
TABLET ORAL
Qty: 90 TABLET | Refills: 0 | Status: SHIPPED | OUTPATIENT
Start: 2019-05-01 | End: 2019-07-24

## 2019-05-13 RX ORDER — METFORMIN HYDROCHLORIDE 500 MG/1
TABLET, EXTENDED RELEASE ORAL
Qty: 90 TABLET | Refills: 0 | Status: SHIPPED | OUTPATIENT
Start: 2019-05-13 | End: 2019-08-08

## 2019-05-13 RX ORDER — DILTIAZEM HYDROCHLORIDE 180 MG/1
CAPSULE, COATED, EXTENDED RELEASE ORAL
Qty: 180 CAPSULE | Refills: 0 | Status: SHIPPED | OUTPATIENT
Start: 2019-05-13 | End: 2019-07-24 | Stop reason: ALTCHOICE

## 2019-05-13 RX ORDER — METFORMIN HYDROCHLORIDE 500 MG/1
500 TABLET, EXTENDED RELEASE ORAL
Qty: 30 TABLET | Refills: 0 | Status: CANCELLED | OUTPATIENT
Start: 2019-05-13

## 2019-05-13 NOTE — TELEPHONE ENCOUNTER
90 day refill given on 05/13/19, appointment needed for further refills. [facility] sent to schedule appt.   Hypertensive Medications  Protocol Criteria:  · Appointment scheduled in the past 6 months or in the next 3 months  · BMP or CMP in the past 12 mo

## 2019-05-13 NOTE — TELEPHONE ENCOUNTER
Refill passed per [facility] protocol. 90 day refill given on 05/13/19, appointment needed for further refills.       Diabetes Medications  Protocol Criteria:  · Appointment scheduled in the past 6 months or the next 3 months  · A1C < 7.5 in the past 6

## 2019-05-15 DIAGNOSIS — I10 ESSENTIAL HYPERTENSION: ICD-10-CM

## 2019-05-15 RX ORDER — LOSARTAN POTASSIUM 50 MG/1
TABLET ORAL
Qty: 30 TABLET | Refills: 0 | Status: SHIPPED | OUTPATIENT
Start: 2019-05-15 | End: 2019-06-13

## 2019-06-01 RX ORDER — ATORVASTATIN CALCIUM 20 MG/1
TABLET, FILM COATED ORAL
Qty: 30 TABLET | Refills: 0 | Status: SHIPPED | OUTPATIENT
Start: 2019-06-01 | End: 2019-06-28

## 2019-06-01 NOTE — TELEPHONE ENCOUNTER
Please review; protocol failed. Please advise been over a year since ALT tested.   Cholesterol Medications  Protocol Criteria:  · Appointment scheduled in the past 12 months or in the next 3 months  · ALT & LDL on file in the past 12 months  · ALT result <

## 2019-06-13 DIAGNOSIS — I10 ESSENTIAL HYPERTENSION: ICD-10-CM

## 2019-06-14 NOTE — TELEPHONE ENCOUNTER
Please advise on refill request. Failed protocol due to appt required, LOV: 9/20/18, patient was contacted and advised a follow up appt is being requested at this time, pt agreed to call back to schedule appt.  Please advise on refill request.    Note to Ph 3.1 05/09/2018    AGRATIO 1.2 07/27/2016    ANIONGAP 7 12/04/2018    OSMOCALC 287 12/04/2018

## 2019-06-15 RX ORDER — LOSARTAN POTASSIUM 50 MG/1
TABLET ORAL
Qty: 30 TABLET | Refills: 0 | Status: SHIPPED | OUTPATIENT
Start: 2019-06-15 | End: 2019-07-13

## 2019-06-28 NOTE — TELEPHONE ENCOUNTER
Please review; protocol failed due to labs. Patient was informed on 6/1/19 script to schedule appt and no OV scheduled.   Note to Pharmacy:   PLEASE ASK PT TO SCHEDULE AN APPOINTMENT    Requested Prescriptions     Pending Prescriptions Disp Refills   •

## 2019-07-01 RX ORDER — ATORVASTATIN CALCIUM 20 MG/1
TABLET, FILM COATED ORAL
Qty: 30 TABLET | Refills: 0 | Status: SHIPPED | OUTPATIENT
Start: 2019-07-01 | End: 2019-07-27

## 2019-07-13 DIAGNOSIS — I10 ESSENTIAL HYPERTENSION: ICD-10-CM

## 2019-07-14 NOTE — TELEPHONE ENCOUNTER
CSS please assist patient in scheduling a follow up appointment - thank you - patient was to make appointment and did not     My chart message also sent

## 2019-07-22 DIAGNOSIS — I10 ESSENTIAL HYPERTENSION: ICD-10-CM

## 2019-07-22 RX ORDER — LOSARTAN POTASSIUM 50 MG/1
50 TABLET ORAL
Qty: 30 TABLET | Refills: 0 | OUTPATIENT
Start: 2019-07-22

## 2019-07-22 RX ORDER — LOSARTAN POTASSIUM 50 MG/1
TABLET ORAL
Qty: 30 TABLET | Refills: 0 | Status: SHIPPED | OUTPATIENT
Start: 2019-07-22 | End: 2019-08-25

## 2019-07-22 RX ORDER — ATENOLOL 50 MG/1
50 TABLET ORAL
Qty: 90 TABLET | Refills: 0 | OUTPATIENT
Start: 2019-07-22

## 2019-07-22 NOTE — TELEPHONE ENCOUNTER
Refill passed per [facility] protocol.   Hypertensive Medications  Protocol Criteria:  · Appointment scheduled in the past 6 months or in the next 3 months  · BMP or CMP in the past 12 months  · Creatinine result < 2  Recent Outpatient Visits

## 2019-07-23 ENCOUNTER — NURSE TRIAGE (OUTPATIENT)
Dept: INTERNAL MEDICINE CLINIC | Facility: CLINIC | Age: 84
End: 2019-07-23

## 2019-07-23 DIAGNOSIS — I10 ESSENTIAL HYPERTENSION: ICD-10-CM

## 2019-07-23 RX ORDER — LOSARTAN POTASSIUM 50 MG/1
TABLET ORAL
Qty: 30 TABLET | Refills: 0 | OUTPATIENT
Start: 2019-07-23

## 2019-07-23 NOTE — TELEPHONE ENCOUNTER
Pt scheduled via [employer]  Appointment For: Carroll Trejo ([id number])   Visit Type: MYCHART FOLLOW UP [phone])      8/20/2019    1:00 PM  20 mins. Niyah Adams MD        [facility]-INTERNAL MED      Patient Comments:   spinal stenosis and cough with phlegm

## 2019-07-24 ENCOUNTER — OFFICE VISIT (OUTPATIENT)
Dept: INTERNAL MEDICINE CLINIC | Facility: CLINIC | Age: 84
End: 2019-07-24
Payer: MEDICARE

## 2019-07-24 VITALS
BODY MASS INDEX: 29.92 KG/M2 | HEIGHT: 61 IN | SYSTOLIC BLOOD PRESSURE: 144 MMHG | DIASTOLIC BLOOD PRESSURE: 69 MMHG | HEART RATE: 59 BPM | TEMPERATURE: 99 F | WEIGHT: 158.5 LBS

## 2019-07-24 DIAGNOSIS — I10 ESSENTIAL HYPERTENSION: ICD-10-CM

## 2019-07-24 DIAGNOSIS — M79.604 PAIN IN BOTH LOWER EXTREMITIES: ICD-10-CM

## 2019-07-24 DIAGNOSIS — M79.605 PAIN IN BOTH LOWER EXTREMITIES: ICD-10-CM

## 2019-07-24 DIAGNOSIS — J20.9 ACUTE BRONCHITIS, UNSPECIFIED ORGANISM: Primary | ICD-10-CM

## 2019-07-24 PROCEDURE — G0463 HOSPITAL OUTPT CLINIC VISIT: HCPCS | Performed by: INTERNAL MEDICINE

## 2019-07-24 PROCEDURE — 99214 OFFICE O/P EST MOD 30 MIN: CPT | Performed by: INTERNAL MEDICINE

## 2019-07-24 RX ORDER — ATENOLOL 50 MG/1
50 TABLET ORAL
Qty: 90 TABLET | Refills: 0 | Status: SHIPPED | OUTPATIENT
Start: 2019-07-24 | End: 2019-09-24

## 2019-07-24 RX ORDER — AZITHROMYCIN 250 MG/1
TABLET, FILM COATED ORAL
Qty: 6 TABLET | Refills: 0 | Status: SHIPPED | OUTPATIENT
Start: 2019-07-24 | End: 2019-09-24 | Stop reason: ALTCHOICE

## 2019-07-24 NOTE — PATIENT INSTRUCTIONS
You may use otc plain Robitussin cough syrup. Take the antibiotics as prescribed.   Follow-up with PCP in 1 to 2 months

## 2019-07-24 NOTE — TELEPHONE ENCOUNTER
Action Requested: Summary for Provider     []  Critical Lab, Recommendations Needed  [] Need Additional Advice  [x]   FYI    []   Need Orders  [] Need Medications Sent to Pharmacy  []  Other     SUMMARY: Patient has had a dry cough, post nasal drip, and ru

## 2019-07-24 NOTE — PROGRESS NOTES
Domenica Dyer is a 80year old female. Patient presents with:  Cough  Leg Pain      HPI:     HPI  Patient is here for acute visit. PCP Dr. Amalia Calvin. She has a cough that started about 2 weeks ago. She has phlegm but not able to bring it up.   Her nose APPLY 1 APPLICATION TO THE AFFECTED AREA(S) TWO TIMES A DAY Disp: 60 g Rfl: 2   MICROLET LANCETS Does not apply Misc TEST BLOOD GLUCOSE ONCE DAILY Disp: 100 each Rfl: 2   LEE MICROLET LANCETS Does not apply Misc Test blood sugar once daily Disp: 100 each Positive for hearing loss. Running nose   Respiratory: Positive for cough. Negative for hemoptysis, sputum production, shortness of breath and wheezing. Cardiovascular: Negative for chest pain, palpitations, claudication and leg swelling.    [place] mouth once daily. Refill. Blood pressure slightly elevated. On atenolol 50 mg daily and Cartia 180. Refill given for atenolol.   Patient to follow-up with PCP in 1 to 2 months for physical and labs    Pain in both lower extremities  Has DJD lumbar spine

## 2019-07-29 RX ORDER — ATORVASTATIN CALCIUM 20 MG/1
TABLET, FILM COATED ORAL
Qty: 30 TABLET | Refills: 0 | Status: SHIPPED | OUTPATIENT
Start: 2019-07-29 | End: 2019-08-25

## 2019-07-29 NOTE — TELEPHONE ENCOUNTER
Cholesterol Medications  Protocol Criteria:  · Appointment scheduled in the past 12 months or in the next 3 months  · ALT & LDL on file in the past 12 months  · ALT result < 80  · LDL result <130   Recent Outpatient Visits            5 days ago [facility]

## 2019-07-29 NOTE — TELEPHONE ENCOUNTER
Please review; protocol failed. Pt overdue for labs. Pt had OV 7/24/19, but it was a focused visit.   Please advise if pt needs f/u OV

## 2019-08-11 RX ORDER — METFORMIN HYDROCHLORIDE 500 MG/1
500 TABLET, EXTENDED RELEASE ORAL
Qty: 30 TABLET | Refills: 0 | Status: SHIPPED | OUTPATIENT
Start: 2019-08-11 | End: 2019-08-12

## 2019-08-11 RX ORDER — DILTIAZEM HYDROCHLORIDE 180 MG/1
CAPSULE, COATED, EXTENDED RELEASE ORAL
Qty: 60 CAPSULE | Refills: 0 | Status: SHIPPED | OUTPATIENT
Start: 2019-08-11 | End: 2019-08-12

## 2019-08-14 RX ORDER — DILTIAZEM HYDROCHLORIDE 180 MG/1
CAPSULE, COATED, EXTENDED RELEASE ORAL
Qty: 60 CAPSULE | Refills: 0 | Status: SHIPPED | OUTPATIENT
Start: 2019-08-14 | End: 2019-09-24

## 2019-08-14 RX ORDER — METFORMIN HYDROCHLORIDE 500 MG/1
500 TABLET, EXTENDED RELEASE ORAL
Qty: 30 TABLET | Refills: 0 | Status: SHIPPED | OUTPATIENT
Start: 2019-08-14 | End: 2019-09-24

## 2019-08-14 NOTE — TELEPHONE ENCOUNTER
Dr Ninfa Horowitz, please advise. OV 7/24/19 Dr Tyrese Stock, referred patient to see you for annual appt. Last scripts marked 30-day only until appt.   Requested Prescriptions     Pending Prescriptions Disp Refills   • dilTIAZem HCl ER Coated Beads (CARTIA XT) 180 MG O

## 2019-08-21 ENCOUNTER — OFFICE VISIT (OUTPATIENT)
Dept: HEMATOLOGY/ONCOLOGY | Facility: HOSPITAL | Age: 84
End: 2019-08-21
Attending: INTERNAL MEDICINE
Payer: MEDICARE

## 2019-08-21 VITALS
HEART RATE: 54 BPM | OXYGEN SATURATION: 93 % | SYSTOLIC BLOOD PRESSURE: 137 MMHG | TEMPERATURE: 99 F | BODY MASS INDEX: 30.21 KG/M2 | HEIGHT: 61 IN | RESPIRATION RATE: 18 BRPM | WEIGHT: 160 LBS | DIASTOLIC BLOOD PRESSURE: 48 MMHG

## 2019-08-21 DIAGNOSIS — Z17.0 MALIGNANT NEOPLASM OF UPPER-INNER QUADRANT OF RIGHT BREAST IN FEMALE, ESTROGEN RECEPTOR POSITIVE (HCC): Primary | ICD-10-CM

## 2019-08-21 DIAGNOSIS — Z51.81 MEDICATION MONITORING ENCOUNTER: ICD-10-CM

## 2019-08-21 DIAGNOSIS — C50.211 MALIGNANT NEOPLASM OF UPPER-INNER QUADRANT OF RIGHT BREAST IN FEMALE, ESTROGEN RECEPTOR POSITIVE (HCC): Primary | ICD-10-CM

## 2019-08-21 PROCEDURE — 99214 OFFICE O/P EST MOD 30 MIN: CPT | Performed by: INTERNAL MEDICINE

## 2019-08-21 RX ORDER — ANASTROZOLE 1 MG/1
TABLET ORAL
Qty: 90 TABLET | Refills: 3 | Status: SHIPPED | OUTPATIENT
Start: 2019-08-21 | End: 2019-11-14

## 2019-08-21 NOTE — PROGRESS NOTES
JEET Aguirre is a 80year old female who is here today for f/u diagnosis of Malignant neoplasm of upper-inner quadrant of right breast in female, estrogen receptor positive (hcc)  (primary encounter diagnosis)  Medication monitoring encounter azithromycin 250 MG Oral Tab Take two tablets by mouth today, then one daily.  Disp: 6 tablet Rfl: 0   LOSARTAN POTASSIUM 50 MG Oral Tab TAKE ONE TABLET BY MOUTH ONE TIME DAILY  Disp: 30 tablet Rfl: 0   anastrozole 1 MG Oral Tab tab TAKE ONE TABLET BY MOUTH Bharti Biswas MD   • CHEMOTHERAPY  2016    rt breast.   • CHOLECYSTECTOMY  1995   • EXCISION LESION LOWER EXTREMITY Right 1/17/2018    Performed by Ghulam Fleming MD at [address] [facility] [address]   • KNEE REPLACEMENT SURGERY Right 2012   • LUMPECTOMY RIGHT  2016    c Pulmonary/Chest: Effort normal and breath sounds normal. No respiratory distress. She has no wheezes. Right breast exhibits skin change (see diagram). Right breast exhibits no inverted nipple, no mass, no nipple discharge and no tenderness.  Left breast exh No RT recommended as tumor < 1cm and ER + on AI. On anastrozole since August of 2016 to complete 5 yrs on August of 2021. Arthralgias and fatigue:  Recommend increased activity. DEXA 2017 with worsening osteopenia. Vitamin D level wnl.  Taking ca

## 2019-08-23 ENCOUNTER — OFFICE VISIT (OUTPATIENT)
Dept: OTOLARYNGOLOGY | Facility: CLINIC | Age: 84
End: 2019-08-23
Payer: MEDICARE

## 2019-08-23 VITALS
BODY MASS INDEX: 30.21 KG/M2 | HEIGHT: 61 IN | WEIGHT: 160 LBS | DIASTOLIC BLOOD PRESSURE: 62 MMHG | SYSTOLIC BLOOD PRESSURE: 128 MMHG | TEMPERATURE: 99 F

## 2019-08-23 DIAGNOSIS — H61.23 BILATERAL IMPACTED CERUMEN: Primary | ICD-10-CM

## 2019-08-23 PROCEDURE — 69210 REMOVE IMPACTED EAR WAX UNI: CPT | Performed by: OTOLARYNGOLOGY

## 2019-08-23 NOTE — PROGRESS NOTES
Shy Garcia is a 80year old female. Patient presents with:  Cerumen Impaction: pt prresents with bilateral cerumen impaction     HPI:   Her is ears have been blocked.   She got some water in her ears a few days ago and she really cannot hear    Curren Disp:  Rfl:    Glucose Blood (LEE CONTOUR NEXT TEST) In Vitro Strip To test blood sugar 1 time every day Disp: 100 each Rfl: 3   CARTIA  MG Oral Capsule SR 24 Hr Take 360 mg by mouth nightly.    Disp:  Rfl: 1      Past Medical History:   Diagnosis D procedure well. All questions were answered. ASSESSMENT AND PLAN:   1. Bilateral impacted cerumen  Cerumen cleared bilaterally with immediate improvement in her ears.   Return for any problems.  - REMOVAL IMPACTED CERUMEN REQUIRING INSTRUMENTATION, UNILAT

## 2019-08-25 DIAGNOSIS — I10 ESSENTIAL HYPERTENSION: ICD-10-CM

## 2019-08-25 NOTE — TELEPHONE ENCOUNTER
Please review; protocol failed. Staff unsuccessful reaching pt via phone on 8/12/19 TE re: overdue for appt. Victor message also sent and read by pt on 8/17/19.       Requested Prescriptions   Pending Prescriptions Disp Refills   • ATORVASTATIN 20 MG Oral

## 2019-08-26 RX ORDER — LOSARTAN POTASSIUM 50 MG/1
TABLET ORAL
Qty: 30 TABLET | Refills: 0 | Status: SHIPPED | OUTPATIENT
Start: 2019-08-26 | End: 2019-09-24

## 2019-08-26 RX ORDER — ATORVASTATIN CALCIUM 20 MG/1
TABLET, FILM COATED ORAL
Qty: 30 TABLET | Refills: 0 | Status: SHIPPED | OUTPATIENT
Start: 2019-08-26 | End: 2019-09-22

## 2019-09-05 RX ORDER — DILTIAZEM HYDROCHLORIDE 180 MG/1
CAPSULE, COATED, EXTENDED RELEASE ORAL
Qty: 180 CAPSULE | Refills: 1 | Status: SHIPPED | OUTPATIENT
Start: 2019-09-05 | End: 2020-03-23

## 2019-09-05 NOTE — TELEPHONE ENCOUNTER
Refill passed per [facility] protocol.     Hypertensive Medications  Protocol Criteria:  · Appointment scheduled in the past 6 months or in the next 3 months  · BMP or CMP in the past 12 months  · Creatinine result < 2  Recent Outpatient Visits

## 2019-09-16 DIAGNOSIS — I10 ESSENTIAL HYPERTENSION: ICD-10-CM

## 2019-09-17 NOTE — TELEPHONE ENCOUNTER
To reception staff, pls call pt for appt,then  send back to Triage when completed. Future Appointments   Date Time Provider Dario Almonte   2/25/2020  1:00 PM Kota Camarena MD [facility] HEM ONC EMO         Refill passed per [facility] protocol. 12/04/2018    [facility] [phone] 12/04/2018

## 2019-09-20 DIAGNOSIS — I10 ESSENTIAL HYPERTENSION: ICD-10-CM

## 2019-09-22 RX ORDER — ATORVASTATIN CALCIUM 20 MG/1
TABLET, FILM COATED ORAL
Qty: 90 TABLET | Refills: 1 | Status: SHIPPED | OUTPATIENT
Start: 2019-09-22 | End: 2021-12-07

## 2019-09-22 NOTE — TELEPHONE ENCOUNTER
Please review; protocol failed.     Requested Prescriptions   Pending Prescriptions Disp Refills   • ATORVASTATIN 20 MG Oral Tab [Pharmacy Med Name: Atorvastatin Calcium 20 Mg Tab Nort] 30 tablet 0     Sig: TAKE ONE TABLET BY MOUTH ONE TIME DAILY       Chol

## 2019-09-24 ENCOUNTER — LAB ENCOUNTER (OUTPATIENT)
Dept: LAB | Facility: HOSPITAL | Age: 84
End: 2019-09-24
Attending: INTERNAL MEDICINE
Payer: MEDICARE

## 2019-09-24 ENCOUNTER — OFFICE VISIT (OUTPATIENT)
Dept: INTERNAL MEDICINE CLINIC | Facility: CLINIC | Age: 84
End: 2019-09-24
Payer: MEDICARE

## 2019-09-24 VITALS
HEART RATE: 54 BPM | WEIGHT: 160.19 LBS | DIASTOLIC BLOOD PRESSURE: 76 MMHG | SYSTOLIC BLOOD PRESSURE: 130 MMHG | BODY MASS INDEX: 30.24 KG/M2 | HEIGHT: 61 IN

## 2019-09-24 DIAGNOSIS — E11.9 CONTROLLED TYPE 2 DIABETES MELLITUS WITHOUT COMPLICATION, WITHOUT LONG-TERM CURRENT USE OF INSULIN (HCC): Primary | ICD-10-CM

## 2019-09-24 DIAGNOSIS — E11.9 CONTROLLED TYPE 2 DIABETES MELLITUS WITHOUT COMPLICATION, WITHOUT LONG-TERM CURRENT USE OF INSULIN (HCC): ICD-10-CM

## 2019-09-24 DIAGNOSIS — M48.061 SPINAL STENOSIS OF LUMBAR REGION, UNSPECIFIED WHETHER NEUROGENIC CLAUDICATION PRESENT: ICD-10-CM

## 2019-09-24 DIAGNOSIS — H35.30 MACULAR DEGENERATION, UNSPECIFIED LATERALITY, UNSPECIFIED TYPE: ICD-10-CM

## 2019-09-24 DIAGNOSIS — I10 ESSENTIAL HYPERTENSION: ICD-10-CM

## 2019-09-24 PROBLEM — R53.1 WEAKNESS GENERALIZED: Status: RESOLVED | Noted: 2018-12-03 | Resolved: 2019-09-24

## 2019-09-24 PROBLEM — E11.65 UNCONTROLLED TYPE 2 DIABETES MELLITUS WITH HYPERGLYCEMIA, WITHOUT LONG-TERM CURRENT USE OF INSULIN (HCC): Status: RESOLVED | Noted: 2018-05-09 | Resolved: 2019-09-24

## 2019-09-24 PROBLEM — R53.1 LEFT-SIDED WEAKNESS: Status: RESOLVED | Noted: 2018-12-03 | Resolved: 2019-09-24

## 2019-09-24 LAB
ALBUMIN SERPL-MCNC: 3.7 G/DL (ref 3.4–5)
ALBUMIN/GLOB SERPL: 0.9 {RATIO} (ref 1–2)
ALP LIVER SERPL-CCNC: 130 U/L (ref 55–142)
ALT SERPL-CCNC: 17 U/L (ref 13–56)
ANION GAP SERPL CALC-SCNC: 6 MMOL/L (ref 0–18)
AST SERPL-CCNC: 6 U/L (ref 15–37)
BASOPHILS # BLD AUTO: 0.04 X10(3) UL (ref 0–0.2)
BASOPHILS NFR BLD AUTO: 0.5 %
BILIRUB SERPL-MCNC: 0.3 MG/DL (ref 0.1–2)
BUN BLD-MCNC: 34 MG/DL (ref 7–18)
BUN/CREAT SERPL: 28.8 (ref 10–20)
CALCIUM BLD-MCNC: 9.7 MG/DL (ref 8.5–10.1)
CHLORIDE SERPL-SCNC: 106 MMOL/L (ref 98–112)
CO2 SERPL-SCNC: 29 MMOL/L (ref 21–32)
CREAT BLD-MCNC: 1.18 MG/DL (ref 0.55–1.02)
CREAT UR-SCNC: 158 MG/DL
DEPRECATED RDW RBC AUTO: 52.6 FL (ref 35.1–46.3)
EOSINOPHIL # BLD AUTO: 0.13 X10(3) UL (ref 0–0.7)
EOSINOPHIL NFR BLD AUTO: 1.6 %
ERYTHROCYTE [DISTWIDTH] IN BLOOD BY AUTOMATED COUNT: 15 % (ref 11–15)
EST. AVERAGE GLUCOSE BLD GHB EST-MCNC: 137 MG/DL (ref 68–126)
GLOBULIN PLAS-MCNC: 3.9 G/DL (ref 2.8–4.4)
GLUCOSE BLD-MCNC: 92 MG/DL (ref 70–99)
HBA1C MFR BLD HPLC: 6.4 % (ref ?–5.7)
HCT VFR BLD AUTO: 46.5 % (ref 35–48)
HGB BLD-MCNC: 14.9 G/DL (ref 12–16)
IMM GRANULOCYTES # BLD AUTO: 0.01 X10(3) UL (ref 0–1)
IMM GRANULOCYTES NFR BLD: 0.1 %
LDLC SERPL DIRECT ASSAY-MCNC: 161 MG/DL (ref ?–100)
LYMPHOCYTES # BLD AUTO: 2.03 X10(3) UL (ref 1–4)
LYMPHOCYTES NFR BLD AUTO: 24.3 %
M PROTEIN MFR SERPL ELPH: 7.6 G/DL (ref 6.4–8.2)
MCH RBC QN AUTO: 30.3 PG (ref 26–34)
MCHC RBC AUTO-ENTMCNC: 32 G/DL (ref 31–37)
MCV RBC AUTO: 94.5 FL (ref 80–100)
MICROALBUMIN UR-MCNC: 1.55 MG/DL
MICROALBUMIN/CREAT 24H UR-RTO: 9.8 UG/MG (ref ?–30)
MONOCYTES # BLD AUTO: 0.83 X10(3) UL (ref 0.1–1)
MONOCYTES NFR BLD AUTO: 9.9 %
NEUTROPHILS # BLD AUTO: 5.32 X10 (3) UL (ref 1.5–7.7)
NEUTROPHILS # BLD AUTO: 5.32 X10(3) UL (ref 1.5–7.7)
NEUTROPHILS NFR BLD AUTO: 63.6 %
OSMOLALITY SERPL CALC.SUM OF ELEC: 299 MOSM/KG (ref 275–295)
PATIENT FASTING: NO
PLATELET # BLD AUTO: 284 10(3)UL (ref 150–450)
POTASSIUM SERPL-SCNC: 4.5 MMOL/L (ref 3.5–5.1)
RBC # BLD AUTO: 4.92 X10(6)UL (ref 3.8–5.3)
SODIUM SERPL-SCNC: 141 MMOL/L (ref 136–145)
TSI SER-ACNC: 1.16 MIU/ML (ref 0.36–3.74)
WBC # BLD AUTO: 8.4 X10(3) UL (ref 4–11)

## 2019-09-24 PROCEDURE — G0463 HOSPITAL OUTPT CLINIC VISIT: HCPCS | Performed by: INTERNAL MEDICINE

## 2019-09-24 PROCEDURE — 36415 COLL VENOUS BLD VENIPUNCTURE: CPT

## 2019-09-24 PROCEDURE — 80053 COMPREHEN METABOLIC PANEL: CPT

## 2019-09-24 PROCEDURE — 82043 UR ALBUMIN QUANTITATIVE: CPT

## 2019-09-24 PROCEDURE — 83036 HEMOGLOBIN GLYCOSYLATED A1C: CPT

## 2019-09-24 PROCEDURE — 99214 OFFICE O/P EST MOD 30 MIN: CPT | Performed by: INTERNAL MEDICINE

## 2019-09-24 PROCEDURE — 82570 ASSAY OF URINE CREATININE: CPT

## 2019-09-24 PROCEDURE — 85025 COMPLETE CBC W/AUTO DIFF WBC: CPT

## 2019-09-24 PROCEDURE — 83721 ASSAY OF BLOOD LIPOPROTEIN: CPT

## 2019-09-24 PROCEDURE — 84443 ASSAY THYROID STIM HORMONE: CPT

## 2019-09-24 RX ORDER — LOSARTAN POTASSIUM 50 MG/1
50 TABLET ORAL
Qty: 90 TABLET | Refills: 1 | Status: SHIPPED | OUTPATIENT
Start: 2019-09-24 | End: 2020-03-11

## 2019-09-24 RX ORDER — METFORMIN HYDROCHLORIDE 500 MG/1
500 TABLET, EXTENDED RELEASE ORAL
Qty: 90 TABLET | Refills: 1 | Status: SHIPPED | OUTPATIENT
Start: 2019-09-24 | End: 2020-03-22

## 2019-09-24 RX ORDER — ATENOLOL 50 MG/1
50 TABLET ORAL
Qty: 90 TABLET | Refills: 1 | Status: SHIPPED | OUTPATIENT
Start: 2019-09-24 | End: 2020-04-13

## 2019-09-24 NOTE — PROGRESS NOTES
HPI:    Patient ID: Vick Teresa is a 80year old female. Pt's   in February. They were  76 years. Her 2 sons live with her. She sees Dr. Melissa Francisco for her breast cancer and takes the anastrozole.   Her vision is very bad due to macula Current Outpatient Medications:  metFORMIN HCl  MG Oral Tablet 24 Hr Take 1 tablet (500 mg total) by mouth daily with breakfast. Disp: 90 tablet Rfl: 1   losartan Potassium 50 MG Oral Tab Take 1 tablet (50 mg total) by mouth once daily.  Disp: 90 Smokeless tobacco: Never Used    Alcohol use: No    Drug use: No    Family History   Problem Relation Age of Onset   • Heart Disease Father         CAD   • Heart Disease Mother         CAD   • Diabetes Mother    • Breast Cancer Mother 80        had lumpect (Completed)    CBC WITH DIFFERENTIAL WITH PLATELET (Completed)    Spinal stenosis of lumbar region     Patient continues to have leg pain.          Macular degeneration     Per patient she has severe vision loss and has been referred to the low vision speci

## 2019-09-25 RX ORDER — LOSARTAN POTASSIUM 50 MG/1
50 TABLET ORAL
Qty: 30 TABLET | Refills: 0 | OUTPATIENT
Start: 2019-09-25

## 2019-09-25 RX ORDER — LOSARTAN POTASSIUM 50 MG/1
TABLET ORAL
Qty: 90 TABLET | Refills: 0 | OUTPATIENT
Start: 2019-09-25

## 2019-11-15 RX ORDER — ANASTROZOLE 1 MG/1
TABLET ORAL
Qty: 90 TABLET | Refills: 3 | Status: SHIPPED | OUTPATIENT
Start: 2019-11-15 | End: 2021-03-16

## 2019-12-21 DIAGNOSIS — I10 ESSENTIAL HYPERTENSION: ICD-10-CM

## 2019-12-30 RX ORDER — LOSARTAN POTASSIUM 50 MG/1
TABLET ORAL
Qty: 90 TABLET | Refills: 0 | OUTPATIENT
Start: 2019-12-30

## 2020-02-25 ENCOUNTER — OFFICE VISIT (OUTPATIENT)
Dept: HEMATOLOGY/ONCOLOGY | Facility: HOSPITAL | Age: 85
End: 2020-02-25
Attending: INTERNAL MEDICINE
Payer: MEDICARE

## 2020-02-25 VITALS
DIASTOLIC BLOOD PRESSURE: 60 MMHG | SYSTOLIC BLOOD PRESSURE: 148 MMHG | RESPIRATION RATE: 18 BRPM | HEIGHT: 61 IN | WEIGHT: 164 LBS | OXYGEN SATURATION: 91 % | TEMPERATURE: 98 F | HEART RATE: 62 BPM | BODY MASS INDEX: 30.96 KG/M2

## 2020-02-25 DIAGNOSIS — Z17.0 MALIGNANT NEOPLASM OF UPPER-INNER QUADRANT OF RIGHT BREAST IN FEMALE, ESTROGEN RECEPTOR POSITIVE (HCC): Primary | ICD-10-CM

## 2020-02-25 DIAGNOSIS — Z51.81 MEDICATION MONITORING ENCOUNTER: ICD-10-CM

## 2020-02-25 DIAGNOSIS — C50.211 MALIGNANT NEOPLASM OF UPPER-INNER QUADRANT OF RIGHT BREAST IN FEMALE, ESTROGEN RECEPTOR POSITIVE (HCC): Primary | ICD-10-CM

## 2020-02-25 DIAGNOSIS — M85.89 OSTEOPENIA OF MULTIPLE SITES: ICD-10-CM

## 2020-02-25 PROCEDURE — 99214 OFFICE O/P EST MOD 30 MIN: CPT | Performed by: INTERNAL MEDICINE

## 2020-02-25 NOTE — PROGRESS NOTES
HPI     Sarah Cordero is a 80year old female who is here today for f/u diagnosis of Malignant neoplasm of upper-inner quadrant of right breast in female, estrogen receptor positive (hcc)  (primary encounter diagnosis)  Medication monitoring encounter • metFORMIN HCl  MG Oral Tablet 24 Hr Take 1 tablet (500 mg total) by mouth daily with breakfast. 90 tablet 1   • losartan Potassium 50 MG Oral Tab Take 1 tablet (50 mg total) by mouth once daily.  90 tablet 1   • atenolol 50 MG Oral Tab Take 1 tablet Dennis Pizano MD   • CHEMOTHERAPY  2016    rt breast.   • CHOLECYSTECTOMY  1995   • EXCISION LESION LOWER EXTREMITY Right 1/17/2018    Performed by Sheron Thao MD at [address]   • KNEE REPLACEMENT SURGERY Right 2012   • LUMPECTOMY RIGHT  2016    c Pulmonary/Chest: Effort normal and breath sounds normal. No respiratory distress. She has no wheezes. Right breast exhibits skin change (see diagram). Right breast exhibits no inverted nipple, no mass, no nipple discharge and no tenderness.  Left breast exh Local therapy completed with lumpectomy and staging with SLNBx. No RT recommended as tumor < 1cm and ER + on AI. On anastrozole since August of 2016 to complete 5 yrs on August of 2021. Arthralgias and fatigue:  Recommend increased activity.      D

## 2020-03-06 ENCOUNTER — OFFICE VISIT (OUTPATIENT)
Dept: INTERNAL MEDICINE CLINIC | Facility: CLINIC | Age: 85
End: 2020-03-06
Payer: MEDICARE

## 2020-03-06 VITALS
SYSTOLIC BLOOD PRESSURE: 148 MMHG | DIASTOLIC BLOOD PRESSURE: 64 MMHG | HEART RATE: 65 BPM | TEMPERATURE: 98 F | WEIGHT: 163.63 LBS | BODY MASS INDEX: 30.89 KG/M2 | HEIGHT: 61 IN

## 2020-03-06 DIAGNOSIS — J06.9 ACUTE URI: Primary | ICD-10-CM

## 2020-03-06 PROCEDURE — 99213 OFFICE O/P EST LOW 20 MIN: CPT | Performed by: INTERNAL MEDICINE

## 2020-03-06 PROCEDURE — G0463 HOSPITAL OUTPT CLINIC VISIT: HCPCS | Performed by: INTERNAL MEDICINE

## 2020-03-06 RX ORDER — BENZONATATE 100 MG/1
100 CAPSULE ORAL 3 TIMES DAILY PRN
Qty: 20 CAPSULE | Refills: 0 | Status: SHIPPED | OUTPATIENT
Start: 2020-03-06 | End: 2020-06-13 | Stop reason: ALTCHOICE

## 2020-03-06 NOTE — PROGRESS NOTES
Cayla Rodriguez is a 80year old female. Patient presents with:  Cough    HPI:   Since Monday, 4 days ago, she has had persistent symptoms of nasal congestion with clear drainage and nonproductive coughing.   Sometimes coughing spells can be prolonged and CONTOUR NEXT TEST) In Vitro Strip To test blood sugar 1 time every day 100 each 3   • CARTIA  MG Oral Capsule SR 24 Hr Take 360 mg by mouth nightly.     1       Adhesive Tape           RASH   Past Medical History:   Diagnosis Date   • Breast CA ([facility] [address]

## 2020-03-09 DIAGNOSIS — I10 ESSENTIAL HYPERTENSION: ICD-10-CM

## 2020-03-11 DIAGNOSIS — E11.9 DIABETES TYPE 2, CONTROLLED (HCC): ICD-10-CM

## 2020-03-11 RX ORDER — LANCETS
EACH MISCELLANEOUS
Qty: 100 EACH | Refills: 3 | Status: SHIPPED | OUTPATIENT
Start: 2020-03-11 | End: 2021-10-15

## 2020-03-11 RX ORDER — LOSARTAN POTASSIUM 50 MG/1
TABLET ORAL
Qty: 90 TABLET | Refills: 0 | Status: SHIPPED | OUTPATIENT
Start: 2020-03-11 | End: 2020-06-15

## 2020-03-11 NOTE — TELEPHONE ENCOUNTER
Please review; protocol failed.     Hypertensive Medications Protocol Failed3/9 9:18 AM   GFR Non- > 50

## 2020-03-12 NOTE — TELEPHONE ENCOUNTER
Refill passed per [facility], [facility] protocol.   Refill Protocol Appointment Criteria  · Appointment scheduled in the past 12 months or in the next 3 months  Recent Outpatient Visits            5 days ago Acute URI    [facility], [facility], [address], [facility]

## 2020-03-16 RX ORDER — BLOOD SUGAR DIAGNOSTIC
STRIP MISCELLANEOUS
Qty: 100 STRIP | Refills: 5 | OUTPATIENT
Start: 2020-03-16 | End: 2020-03-16

## 2020-03-16 RX ORDER — LANCETS
EACH MISCELLANEOUS
Qty: 100 EACH | Refills: 5 | Status: SHIPPED | OUTPATIENT
Start: 2020-03-16 | End: 2020-03-16

## 2020-03-16 RX ORDER — LANCETS
EACH MISCELLANEOUS
Qty: 100 EACH | Refills: 0 | Status: SHIPPED | OUTPATIENT
Start: 2020-03-16 | End: 2020-03-16

## 2020-03-16 NOTE — TELEPHONE ENCOUNTER
Refill passed per [facility], [facility] protocol.     Diabetic Supplies  Protocol Criteria:  · Appointment scheduled in past 12 months or the next 3 months    Requested Prescriptions     Pending Prescriptions Disp Refills   • Glucose Blood (CONTOUR NEXT TEST) In

## 2020-03-17 RX ORDER — LANCETS
EACH MISCELLANEOUS
Qty: 100 EACH | Refills: 0 | Status: SHIPPED | OUTPATIENT
Start: 2020-03-17 | End: 2021-03-16

## 2020-03-17 RX ORDER — BLOOD SUGAR DIAGNOSTIC
STRIP MISCELLANEOUS
Qty: 100 STRIP | Refills: 5 | Status: SHIPPED | OUTPATIENT
Start: 2020-03-17 | End: 2020-06-02

## 2020-03-22 DIAGNOSIS — E11.9 CONTROLLED TYPE 2 DIABETES MELLITUS WITHOUT COMPLICATION, WITHOUT LONG-TERM CURRENT USE OF INSULIN (HCC): ICD-10-CM

## 2020-03-22 RX ORDER — METFORMIN HYDROCHLORIDE 500 MG/1
500 TABLET, EXTENDED RELEASE ORAL
Qty: 90 TABLET | Refills: 0 | Status: SHIPPED | OUTPATIENT
Start: 2020-03-22 | End: 2020-07-10

## 2020-03-23 RX ORDER — DILTIAZEM HYDROCHLORIDE 180 MG/1
CAPSULE, COATED, EXTENDED RELEASE ORAL
Qty: 180 CAPSULE | Refills: 0 | Status: SHIPPED | OUTPATIENT
Start: 2020-03-23 | End: 2020-11-03

## 2020-04-13 DIAGNOSIS — I10 ESSENTIAL HYPERTENSION: ICD-10-CM

## 2020-04-13 RX ORDER — ATENOLOL 50 MG/1
50 TABLET ORAL
Qty: 90 TABLET | Refills: 0 | Status: SHIPPED | OUTPATIENT
Start: 2020-04-13 | End: 2020-06-13

## 2020-04-13 RX ORDER — ATENOLOL 50 MG/1
50 TABLET ORAL
Qty: 90 TABLET | Refills: 1 | Status: SHIPPED | OUTPATIENT
Start: 2020-04-13 | End: 2021-03-04

## 2020-06-02 RX ORDER — BLOOD SUGAR DIAGNOSTIC
STRIP MISCELLANEOUS
Qty: 100 STRIP | Refills: 5 | Status: SHIPPED | OUTPATIENT
Start: 2020-06-02 | End: 2021-03-16

## 2020-06-02 RX ORDER — BLOOD SUGAR DIAGNOSTIC
STRIP MISCELLANEOUS
Qty: 100 STRIP | Refills: 5 | Status: SHIPPED | OUTPATIENT
Start: 2020-06-02 | End: 2020-06-02

## 2020-06-03 ENCOUNTER — TELEPHONE (OUTPATIENT)
Dept: INTERNAL MEDICINE CLINIC | Facility: CLINIC | Age: 85
End: 2020-06-03

## 2020-06-03 NOTE — TELEPHONE ENCOUNTER
Pharmacy wanted to know if Dr could change directions on Acc-chek strips since pt checks twice a day at times and insurance will not refill since it will be too soon.  Please advise       Current Outpatient Medications   Medication Sig Dispense Refill   • G

## 2020-06-03 NOTE — TELEPHONE ENCOUNTER
Please call pt. Medicare only covers diabetic strips for testing once a day unless she is on insulin. If she tests more than once a day, she needs to pay for the strips out of pocket.   Also, please ask the pt to schedule an appointment with me, either by

## 2020-06-09 ENCOUNTER — NURSE TRIAGE (OUTPATIENT)
Dept: INTERNAL MEDICINE CLINIC | Facility: CLINIC | Age: 85
End: 2020-06-09

## 2020-06-09 NOTE — TELEPHONE ENCOUNTER
Per appointment notes, patient states she is experiencing, \"Pain with swelling in left shoulder. \"    Patient scheduled herself an appointment 06/13/2020 with Dr. Mickey Bean via My Chart.  Contact patient to discuss symptoms and transfer to Triage, daphney

## 2020-06-09 NOTE — TELEPHONE ENCOUNTER
Action Requested: Summary for Provider     []  Critical Lab, Recommendations Needed  [] Need Additional Advice  []   FYI    []   Need Orders  [] Need Medications Sent to Pharmacy  []  Other     SUMMARY: Per protocol advised sooner appt but pt declines and

## 2020-06-13 ENCOUNTER — OFFICE VISIT (OUTPATIENT)
Dept: INTERNAL MEDICINE CLINIC | Facility: CLINIC | Age: 85
End: 2020-06-13
Payer: MEDICARE

## 2020-06-13 VITALS
TEMPERATURE: 97 F | SYSTOLIC BLOOD PRESSURE: 138 MMHG | WEIGHT: 160.5 LBS | RESPIRATION RATE: 20 BRPM | HEART RATE: 61 BPM | BODY MASS INDEX: 30.3 KG/M2 | HEIGHT: 61 IN | DIASTOLIC BLOOD PRESSURE: 65 MMHG

## 2020-06-13 DIAGNOSIS — R22.32 MASS OF SKIN OF LEFT SHOULDER: ICD-10-CM

## 2020-06-13 DIAGNOSIS — I10 ESSENTIAL HYPERTENSION: ICD-10-CM

## 2020-06-13 DIAGNOSIS — E11.9 CONTROLLED TYPE 2 DIABETES MELLITUS WITHOUT COMPLICATION, WITHOUT LONG-TERM CURRENT USE OF INSULIN (HCC): Primary | ICD-10-CM

## 2020-06-13 DIAGNOSIS — M19.90 OSTEOARTHRITIS, UNSPECIFIED OSTEOARTHRITIS TYPE, UNSPECIFIED SITE: ICD-10-CM

## 2020-06-13 DIAGNOSIS — E78.00 HYPERCHOLESTEROLEMIA: ICD-10-CM

## 2020-06-13 PROBLEM — M79.604 BILATERAL LEG PAIN: Status: RESOLVED | Noted: 2018-05-09 | Resolved: 2020-06-13

## 2020-06-13 PROBLEM — I63.9 ACUTE CVA (CEREBROVASCULAR ACCIDENT) (HCC): Status: RESOLVED | Noted: 2018-12-03 | Resolved: 2020-06-13

## 2020-06-13 PROBLEM — R42 VERTIGO: Status: RESOLVED | Noted: 2017-10-25 | Resolved: 2020-06-13

## 2020-06-13 PROBLEM — H81.10 BENIGN PAROXYSMAL POSITIONAL VERTIGO: Status: RESOLVED | Noted: 2018-09-20 | Resolved: 2020-06-13

## 2020-06-13 PROBLEM — M79.605 BILATERAL LEG PAIN: Status: RESOLVED | Noted: 2018-05-09 | Resolved: 2020-06-13

## 2020-06-13 PROBLEM — L02.415 CUTANEOUS ABSCESS OF RIGHT LOWER EXTREMITY: Status: RESOLVED | Noted: 2017-10-25 | Resolved: 2020-06-13

## 2020-06-13 PROCEDURE — 99214 OFFICE O/P EST MOD 30 MIN: CPT | Performed by: INTERNAL MEDICINE

## 2020-06-13 PROCEDURE — G0463 HOSPITAL OUTPT CLINIC VISIT: HCPCS | Performed by: INTERNAL MEDICINE

## 2020-06-13 NOTE — PATIENT INSTRUCTIONS
Do fasting labs soon. Fast for 12 hours. Water is okay. Walk in. At this time, they are asking patients to schedule blood tests, rather than walk-in. Please call central registration at [phone].

## 2020-06-13 NOTE — ASSESSMENT & PLAN NOTE
No unusual location for ganglion cyst, yet it feels like that. Alternatively it could be a lipoma. I will refer her to the orthopedist for further evaluation and treatment.

## 2020-06-13 NOTE — PROGRESS NOTES
HPI:    Patient ID: Shanna Jacques is a 80year old female. Pt c/o new mass on her left shoulder for the past 2-3 weeks or so. Pt c/o arthritis everywhere. Shoulder pain, back, leg, and hip pain. Pt sees the eye doctor.     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 • ASTAXANTHIN OR Take by mouth. • LUTEIN OR Take by mouth.      • Multip Allergies:  Adhesive Tape           RASH     Social History    Tobacco Use      Smoking status: Never Smoker      Smokeless tobacco: Never Used    Alcohol use: No    Drug use: No    Family History   Problem Relation Age of Onset   • Heart Disease Fat Patient's last A1c was 6.3 which is excellent. Patient is due for another A1c, urine microalbumin, and metabolic panel. She sees the eye doctor regularly for her macular degeneration. Follow-up in 4 months.            HTN (hypertension)     Patient is du

## 2020-06-13 NOTE — ASSESSMENT & PLAN NOTE
Patient does not get relief with Tylenol and says ibuprofen bothers her stomach. She takes supplements that her son gives her.

## 2020-06-13 NOTE — ASSESSMENT & PLAN NOTE
Patient's last A1c was 6.3 which is excellent. Patient is due for another A1c, urine microalbumin, and metabolic panel. She sees the eye doctor regularly for her macular degeneration. Follow-up in 4 months.

## 2020-06-15 DIAGNOSIS — I10 ESSENTIAL HYPERTENSION: ICD-10-CM

## 2020-06-15 RX ORDER — LOSARTAN POTASSIUM 50 MG/1
TABLET ORAL
Qty: 90 TABLET | Refills: 1 | Status: SHIPPED | OUTPATIENT
Start: 2020-06-15 | End: 2021-01-12

## 2020-06-24 ENCOUNTER — LAB ENCOUNTER (OUTPATIENT)
Dept: LAB | Facility: HOSPITAL | Age: 85
End: 2020-06-24
Attending: INTERNAL MEDICINE
Payer: MEDICARE

## 2020-06-24 DIAGNOSIS — E11.9 CONTROLLED TYPE 2 DIABETES MELLITUS WITHOUT COMPLICATION, WITHOUT LONG-TERM CURRENT USE OF INSULIN (HCC): ICD-10-CM

## 2020-06-24 DIAGNOSIS — I10 ESSENTIAL HYPERTENSION: ICD-10-CM

## 2020-06-24 DIAGNOSIS — E78.00 HYPERCHOLESTEROLEMIA: ICD-10-CM

## 2020-06-24 PROCEDURE — 82570 ASSAY OF URINE CREATININE: CPT

## 2020-06-24 PROCEDURE — 80053 COMPREHEN METABOLIC PANEL: CPT

## 2020-06-24 PROCEDURE — 36415 COLL VENOUS BLD VENIPUNCTURE: CPT

## 2020-06-24 PROCEDURE — 80061 LIPID PANEL: CPT

## 2020-06-24 PROCEDURE — 83036 HEMOGLOBIN GLYCOSYLATED A1C: CPT

## 2020-06-24 PROCEDURE — 85025 COMPLETE CBC W/AUTO DIFF WBC: CPT

## 2020-06-24 PROCEDURE — 82043 UR ALBUMIN QUANTITATIVE: CPT

## 2020-06-24 PROCEDURE — 84443 ASSAY THYROID STIM HORMONE: CPT

## 2020-07-01 ENCOUNTER — HOSPITAL ENCOUNTER (OUTPATIENT)
Dept: GENERAL RADIOLOGY | Facility: HOSPITAL | Age: 85
Discharge: HOME | End: 2020-07-01
Attending: ORTHOPAEDIC SURGERY | Admitting: ORTHOPAEDIC SURGERY
Payer: MEDICARE

## 2020-07-01 ENCOUNTER — OFFICE VISIT (OUTPATIENT)
Dept: ORTHOPEDICS CLINIC | Facility: CLINIC | Age: 85
End: 2020-07-01
Payer: MEDICARE

## 2020-07-01 VITALS
WEIGHT: 160 LBS | SYSTOLIC BLOOD PRESSURE: 113 MMHG | DIASTOLIC BLOOD PRESSURE: 59 MMHG | RESPIRATION RATE: 22 BRPM | HEART RATE: 88 BPM | HEIGHT: 61 IN | BODY MASS INDEX: 30.21 KG/M2

## 2020-07-01 DIAGNOSIS — M25.512 LEFT SHOULDER PAIN, UNSPECIFIED CHRONICITY: ICD-10-CM

## 2020-07-01 DIAGNOSIS — M75.102 LEFT ROTATOR CUFF TEAR ARTHROPATHY: Primary | ICD-10-CM

## 2020-07-01 DIAGNOSIS — M12.812 LEFT ROTATOR CUFF TEAR ARTHROPATHY: Primary | ICD-10-CM

## 2020-07-01 PROCEDURE — 99203 OFFICE O/P NEW LOW 30 MIN: CPT | Performed by: ORTHOPAEDIC SURGERY

## 2020-07-01 PROCEDURE — 73030 X-RAY EXAM OF SHOULDER: CPT | Performed by: ORTHOPAEDIC SURGERY

## 2020-07-01 PROCEDURE — 20610 DRAIN/INJ JOINT/BURSA W/O US: CPT | Performed by: ORTHOPAEDIC SURGERY

## 2020-07-01 RX ORDER — TRIAMCINOLONE ACETONIDE 40 MG/ML
40 INJECTION, SUSPENSION INTRA-ARTICULAR; INTRAMUSCULAR ONCE
Status: COMPLETED | OUTPATIENT
Start: 2020-07-01 | End: 2020-07-01

## 2020-07-01 RX ADMIN — TRIAMCINOLONE ACETONIDE 40 MG: 40 INJECTION, SUSPENSION INTRA-ARTICULAR; INTRAMUSCULAR at 17:30:00

## 2020-07-01 NOTE — PROGRESS NOTES
Per verbal order from Dr. Rocco Gayle, draw up 3ml of 0.5% Marcaine & 2ml 1% lidocaine and 1ml of Kenalog 40 for cortisone injection to left. shoulder Musa López    Patient provided education handout for cortisone injection.    Patient left office without

## 2020-07-01 NOTE — PROGRESS NOTES
NURSING INTAKE COMMENTS: Patient presents with:  Mass: Left shoulder - onset about 1 mo ago - no injury - here with her son - has no pain in the mass - has pain in her shoulder rated as 4-5/10 on and off - no numbness or tingling       HPI: This 80 year ol Vitro Strip Test blood sugar once daily1 Dx:E11.9, non-insulin dependent 100 strip 5   • atenolol 50 MG Oral Tab Take 1 tablet (50 mg total) by mouth once daily.  90 tablet 1   • dilTIAZem HCl ER Coated Beads (CARTIA XT) 180 MG Oral Capsule SR 24 Hr TAKE TW Glaucoma Neg    • Macular degeneration Neg        Social History    Occupational History      Occupation:         Comment: retired    Tobacco Use      Smoking status: Never Smoker      Smokeless tobacco: Never Used    Substance and Sexual Activi flexion 150 degrees. External rotation active and passive is to 50 degrees. Internal rotation 60 degrees. Abduction external rotation belly press strength are 4-5. Corbin impingement sign mildly positive.   Neurological: Left hand neurologically intact 06/24/2020    CREATSERUM 0.96 06/24/2020    GFRNAA 53 (L) 06/24/2020    GFRAA 61 06/24/2020        Assessment and Plan:  Diagnoses and all orders for this visit:    Left rotator cuff tear arthropathy  -     DRAIN/INJECT LARGE JOINT/BURSA  -     triamcinolo

## 2020-07-05 ENCOUNTER — HOSPITAL ENCOUNTER (EMERGENCY)
Facility: HOSPITAL | Age: 85
Discharge: HOME | End: 2020-07-05
Attending: EMERGENCY MEDICINE
Payer: MEDICARE

## 2020-07-05 VITALS
TEMPERATURE: 98 F | OXYGEN SATURATION: 94 % | RESPIRATION RATE: 18 BRPM | HEIGHT: 61 IN | HEART RATE: 82 BPM | DIASTOLIC BLOOD PRESSURE: 66 MMHG | BODY MASS INDEX: 29.27 KG/M2 | WEIGHT: 155 LBS | SYSTOLIC BLOOD PRESSURE: 145 MMHG

## 2020-07-05 DIAGNOSIS — M75.21 BICEPS TENDONITIS ON RIGHT: Primary | ICD-10-CM

## 2020-07-05 PROCEDURE — 99282 EMERGENCY DEPT VISIT SF MDM: CPT

## 2020-07-05 NOTE — ED INITIAL ASSESSMENT (HPI)
Patient complain of right arm pain that started this am. Patient has history of arthritis. No fall or trauma.

## 2020-07-05 NOTE — ED PROVIDER NOTES
Patient Seen in: [facility] AND [facility] Emergency Department      History   Patient presents with:  Pain    Stated Complaint: pain in right anterior elbow    HPI    20-year-old female with multiple medical problems presents to the emergency department for kassandra 145/66   Pulse 82   Temp 98.2 °F (36.8 °C) (Temporal)   Resp 18   Ht 154.9 cm (5' 1\")   Wt 70.3 kg   SpO2 94%   BMI 29.29 kg/m²         Physical Exam    Physical Exam   Constitutional: AAOx3, well nourished, NAD  Head: Normocephalic and atraumatic.    Ears

## 2020-07-10 DIAGNOSIS — E11.9 CONTROLLED TYPE 2 DIABETES MELLITUS WITHOUT COMPLICATION, WITHOUT LONG-TERM CURRENT USE OF INSULIN (HCC): ICD-10-CM

## 2020-07-10 RX ORDER — METFORMIN HYDROCHLORIDE 500 MG/1
500 TABLET, EXTENDED RELEASE ORAL
Qty: 90 TABLET | Refills: 1 | Status: SHIPPED | OUTPATIENT
Start: 2020-07-10 | End: 2021-02-07

## 2020-07-14 ENCOUNTER — HOSPITAL ENCOUNTER (EMERGENCY)
Facility: HOSPITAL | Age: 85
Discharge: HOME | End: 2020-07-14
Attending: EMERGENCY MEDICINE
Payer: MEDICARE

## 2020-07-14 ENCOUNTER — APPOINTMENT (OUTPATIENT)
Dept: GENERAL RADIOLOGY | Facility: HOSPITAL | Age: 85
End: 2020-07-14
Attending: EMERGENCY MEDICINE
Payer: MEDICARE

## 2020-07-14 VITALS
TEMPERATURE: 98 F | HEART RATE: 81 BPM | HEIGHT: 61 IN | DIASTOLIC BLOOD PRESSURE: 74 MMHG | RESPIRATION RATE: 18 BRPM | BODY MASS INDEX: 29.27 KG/M2 | SYSTOLIC BLOOD PRESSURE: 128 MMHG | OXYGEN SATURATION: 98 % | WEIGHT: 155 LBS

## 2020-07-14 DIAGNOSIS — S42.202A CLOSED FRACTURE OF PROXIMAL END OF LEFT HUMERUS, UNSPECIFIED FRACTURE MORPHOLOGY, INITIAL ENCOUNTER: Primary | ICD-10-CM

## 2020-07-14 PROCEDURE — 73030 X-RAY EXAM OF SHOULDER: CPT | Performed by: EMERGENCY MEDICINE

## 2020-07-14 PROCEDURE — 93005 ELECTROCARDIOGRAM TRACING: CPT

## 2020-07-14 PROCEDURE — 99284 EMERGENCY DEPT VISIT MOD MDM: CPT

## 2020-07-14 PROCEDURE — 93010 ELECTROCARDIOGRAM REPORT: CPT | Performed by: EMERGENCY MEDICINE

## 2020-07-14 RX ORDER — ACETAMINOPHEN 500 MG
1000 TABLET ORAL ONCE
Status: COMPLETED | OUTPATIENT
Start: 2020-07-14 | End: 2020-07-14

## 2020-07-15 NOTE — ED PROVIDER NOTES
Patient Seen in: [facility] AND [facility] Emergency Department    History   Patient presents with:  Fall    Stated Complaint: fall    HPI    HPI: Faviola Gamble is a 80year old female who presents after an injury to the l shoulder that occurred after glf ont TAKE TWO CAPSULES BY MOUTH DAILY   Accu-Chek Soft Touch Lancets Does not apply Misc,  TEST ONE TIME DAILY   Microlet Lancets Does not apply Misc,  TEST ONE TIME DAILY   anastrozole 1 MG Oral Tab tab,  TAKE ONE TABLET BY MOUTH ONE TIME DAILY   Glucose Bloo [07/14/20 1953]   /88   Pulse 92   Resp 20   Temp 98 °F (36.7 °C)   Temp src    SpO2 99 %   O2 Device None (Room air)       Current:/74   Pulse 81   Temp 98 °F (36.7 °C)   Resp 18   Ht 154.9 cm (5' 1\")   Wt 70.3 kg   SpO2 98%   BMI 29.29 kg/m²

## 2020-07-15 NOTE — ED INITIAL ASSESSMENT (HPI)
Arrived via ems for mechanical fall while in kitchen tonight. No LOC and did not hit head. C/o left shoulder pain. Pt has cyst to left shoulder.

## 2020-07-15 NOTE — ED NOTES
Discharge instructions reviewed. Pt verbalized understanding with no further questions. Pain controlled. Steady gait. Speaking in full clear sentences at discharge. Sling in place to LUE. Spouse present at bedside for discharge instructions.

## 2020-07-16 ENCOUNTER — MED REC SCAN ONLY (OUTPATIENT)
Dept: INTERNAL MEDICINE CLINIC | Facility: CLINIC | Age: 85
End: 2020-07-16

## 2020-07-20 ENCOUNTER — HOSPITAL ENCOUNTER (EMERGENCY)
Facility: HOSPITAL | Age: 85
Discharge: HOME | End: 2020-07-20
Attending: EMERGENCY MEDICINE
Payer: MEDICARE

## 2020-07-20 ENCOUNTER — OFFICE VISIT (OUTPATIENT)
Dept: ORTHOPEDICS CLINIC | Facility: CLINIC | Age: 85
End: 2020-07-20
Payer: MEDICARE

## 2020-07-20 VITALS
SYSTOLIC BLOOD PRESSURE: 151 MMHG | OXYGEN SATURATION: 97 % | DIASTOLIC BLOOD PRESSURE: 61 MMHG | HEART RATE: 64 BPM | TEMPERATURE: 98 F | WEIGHT: 155 LBS | HEIGHT: 61 IN | BODY MASS INDEX: 29.27 KG/M2 | RESPIRATION RATE: 21 BRPM

## 2020-07-20 VITALS — HEIGHT: 61 IN | BODY MASS INDEX: 29.27 KG/M2 | WEIGHT: 155 LBS

## 2020-07-20 DIAGNOSIS — S42.255A NONDISPLACED FRACTURE OF GREATER TUBEROSITY OF LEFT HUMERUS, INITIAL ENCOUNTER FOR CLOSED FRACTURE: Primary | ICD-10-CM

## 2020-07-20 DIAGNOSIS — S42.255D CLOSED NONDISPLACED FRACTURE OF GREATER TUBEROSITY OF LEFT HUMERUS WITH ROUTINE HEALING, SUBSEQUENT ENCOUNTER: Primary | ICD-10-CM

## 2020-07-20 PROCEDURE — 99283 EMERGENCY DEPT VISIT LOW MDM: CPT

## 2020-07-20 PROCEDURE — 93005 ELECTROCARDIOGRAM TRACING: CPT

## 2020-07-20 PROCEDURE — G0463 HOSPITAL OUTPT CLINIC VISIT: HCPCS | Performed by: ORTHOPAEDIC SURGERY

## 2020-07-20 PROCEDURE — 23620 CLTX GR HMRL TBRS FX WO MNPJ: CPT | Performed by: ORTHOPAEDIC SURGERY

## 2020-07-20 PROCEDURE — 99214 OFFICE O/P EST MOD 30 MIN: CPT | Performed by: ORTHOPAEDIC SURGERY

## 2020-07-20 PROCEDURE — 93010 ELECTROCARDIOGRAM REPORT: CPT | Performed by: EMERGENCY MEDICINE

## 2020-07-20 RX ORDER — TRAMADOL HYDROCHLORIDE 50 MG/1
50 TABLET ORAL EVERY 8 HOURS PRN
Qty: 10 TABLET | Refills: 0 | Status: SHIPPED | OUTPATIENT
Start: 2020-07-20 | End: 2020-07-22

## 2020-07-20 NOTE — ED PROVIDER NOTES
Patient Seen in: [facility] Emergency Department      History   Patient presents with:  Pain    Stated Complaint: Shoulder/Arm Pain (fractured last week)     HPI    51-year-old female presents for complaint of left arm pain.   She was seen here rec All other systems reviewed and are negative. Positive for stated complaint: Shoulder/Arm Pain (fractured last week)   Other systems are as noted in HPI. Constitutional and vital signs reviewed.       All other systems reviewed and negative except as n Psychiatric:         Attention and Perception: Attention normal.         Mood and Affect: Mood normal.         Speech: Speech normal.         Behavior: Behavior normal.               ED Course   Labs Reviewed - No data to display  EKG    Rate, intervals an

## 2020-07-20 NOTE — ED INITIAL ASSESSMENT (HPI)
Patient presents to ER with c/o left arm pain. Patient was seen here last Tuesday s/p fall and fx her arm. Patient states the pain is worse and cant manage it at home.  Sling in  Place upon arrival

## 2020-07-20 NOTE — H&P
NURSING INTAKE COMMENTS: Patient presents with:  Consult: left arm injury-pt here with son, Shani Johnson and he states pt fell on  7/14/20 and went to ER . pt went to ER today again d/t pain      HPI: This 80year old female presents today with complaints of left 01/17/2018   • TONSILLECTOMY     • WRIST FRACTURE SURGERY Right      Current Outpatient Medications   Medication Sig Dispense Refill   • traMADol HCl 50 MG Oral Tab Take 1 tablet (50 mg total) by mouth every 8 (eight) hours as needed for Pain.  10 tablet 0 daily.     • NON FORMULARY daily. • CARTIA  MG Oral Capsule SR 24 Hr Take 360 mg by mouth nightly.     1       Adhesive Tape           RASH  Family History   Problem Relation Age of Onset   • Heart Disease Father         CAD   • Heart Disease Moth upper extremity exam demonstrates walker circumscribed cyst overlying the acromioclavicular joint that is not tender to palpation. There is no specific sites of tenderness to palpation about the shoulder.   There is limited shoulder range of motion seconda [facility] 2nd Floor, X SHOULDER LT, 1/05/2016, 10:48 AM.  INDICATIONS: Left shoulder pain and palpable mass x 1 month, no trauma. TECHNIQUE: 3 views were obtained.    FINDINGS:  BONES: Mild narrowing of the glenohumeral joint with inferior osteophy degenerative joint disease expected healing time can be up to 4 to 6 months from her injury. I expect she will continue to have some pain and disability related to her degenerative joint disease as well.   I recommended sling for 6 weeks, but want the vane

## 2020-07-20 NOTE — ED NOTES
PT safe to DC home per MD. Pt's sling from home adjusted and fitted properly. DC teaching done, pt verbalizes understanding. Ambulatory with steady gait to exit.

## 2020-07-20 NOTE — CM/SW NOTE
Appointment made for today at 10:45 with Dr Swati Shelton, orthopedic surgeon at the [address] [place] [address], [address] [place] [address], 2nd floor

## 2020-07-28 RX ORDER — TRAMADOL HYDROCHLORIDE 50 MG/1
50 TABLET ORAL EVERY 8 HOURS PRN
Qty: 10 TABLET | Refills: 0 | Status: ON HOLD | OUTPATIENT
Start: 2020-07-28 | End: 2020-12-07

## 2020-07-30 ENCOUNTER — OFFICE VISIT (OUTPATIENT)
Dept: PHYSICAL THERAPY | Age: 85
End: 2020-07-30
Attending: ORTHOPAEDIC SURGERY
Payer: MEDICARE

## 2020-07-30 DIAGNOSIS — S42.255A NONDISPLACED FRACTURE OF GREATER TUBEROSITY OF LEFT HUMERUS, INITIAL ENCOUNTER FOR CLOSED FRACTURE: ICD-10-CM

## 2020-07-30 PROCEDURE — 97110 THERAPEUTIC EXERCISES: CPT | Performed by: PHYSICAL THERAPIST

## 2020-07-30 PROCEDURE — 97162 PT EVAL MOD COMPLEX 30 MIN: CPT | Performed by: PHYSICAL THERAPIST

## 2020-07-30 NOTE — PROGRESS NOTES
UPPER EXTREMITY EVALUATION:   Referring Physician: Dr. Jeimy Grajeda  Diagnosis: Nondisplaced fracture of greater tuberosity of left humerus, initial encounter for closed fracture (E88.311I)     Date of Onset: 7/14/2020 Date of Service: 7/30/2020     PATIENT PROM:  Shoulder (Left)   Flexion:  48 degs*   Abduction:  40 degs*   ER:  -7 degs*    IR:  22 degs*    (*) Endrange pain      Today’s Treatment and Response: Patient education provided on PROM, gentle ROM, proper use of the sling, goal setting with pat participate in planning and for this course of care. Thank you for your referral. Please co-sign or sign and return this letter via fax as soon as possible to [phone].  If you have any questions, please contact me at Dept: [phone]    Sincerely,

## 2020-08-04 ENCOUNTER — TELEPHONE (OUTPATIENT)
Dept: INTERNAL MEDICINE CLINIC | Facility: CLINIC | Age: 85
End: 2020-08-04

## 2020-08-04 RX ORDER — CIPROFLOXACIN 250 MG/1
250 TABLET, FILM COATED ORAL 2 TIMES DAILY
Qty: 10 TABLET | Refills: 0 | Status: SHIPPED | OUTPATIENT
Start: 2020-08-04 | End: 2020-08-09

## 2020-08-04 NOTE — TELEPHONE ENCOUNTER
Patient and  son calling reports burning and pain with urination , frequency and urgency , x 3 days   Denies unusual color and odor, fever     Pt is recovering from a recent shoulder fracture, very hard for her to get around     Asking for an antibiotic  t

## 2020-08-04 NOTE — TELEPHONE ENCOUNTER
Requested Prescriptions     Signed Prescriptions Disp Refills   • Ciprofloxacin HCl 250 MG Oral Tab 10 tablet 0     Sig: Take 1 tablet (250 mg total) by mouth 2 (two) times daily for 5 days.      Authorizing Provider: Hussain Mukherjee

## 2020-08-05 ENCOUNTER — OFFICE VISIT (OUTPATIENT)
Dept: PHYSICAL THERAPY | Age: 85
End: 2020-08-05
Attending: ORTHOPAEDIC SURGERY
Payer: MEDICARE

## 2020-08-05 DIAGNOSIS — S42.255A NONDISPLACED FRACTURE OF GREATER TUBEROSITY OF LEFT HUMERUS, INITIAL ENCOUNTER FOR CLOSED FRACTURE: ICD-10-CM

## 2020-08-05 PROCEDURE — 97110 THERAPEUTIC EXERCISES: CPT | Performed by: PHYSICAL THERAPIST

## 2020-08-05 NOTE — PROGRESS NOTES
Date: 8/5/2020      Dx: Nondisplaced fracture of greater tuberosity of left humerus, initial encounter for closed fracture (P90.444T)            Authorized # of Visits:  18       Referring MD: Barbara Cali  Next MD visit: none scheduled    Medication Changes si Patient to have reduced and abolished (L) shoulder pain to enable a return to dressing, cooking, crossword puzzle, and driving activities.   3. Patient to have WFL of (L) shoulder AROM and at least 4-/5 MMT in all motions to promote all lifting, reaching, c

## 2020-08-12 ENCOUNTER — OFFICE VISIT (OUTPATIENT)
Dept: PHYSICAL THERAPY | Age: 85
End: 2020-08-12
Attending: ORTHOPAEDIC SURGERY
Payer: MEDICARE

## 2020-08-12 DIAGNOSIS — S42.255A NONDISPLACED FRACTURE OF GREATER TUBEROSITY OF LEFT HUMERUS, INITIAL ENCOUNTER FOR CLOSED FRACTURE: ICD-10-CM

## 2020-08-12 PROCEDURE — 97110 THERAPEUTIC EXERCISES: CPT | Performed by: PHYSICAL THERAPIST

## 2020-08-12 NOTE — PROGRESS NOTES
Date: 8/12/2020      Dx: Nondisplaced fracture of greater tuberosity of left humerus, initial encounter for closed fracture (R09.731D)            Authorized # of Visits:  18       Referring MD: Avery Perez  Next MD visit: none scheduled    Medication Changes s back symptoms. Goals:   Goals     • Therapy Goals      (18 visits)  1. Patient to consistently perform their HEP and it's progression to maintain their improved condition.   2. Patient to have reduced and abolished (L) shoulder pain to enable a return

## 2020-08-14 ENCOUNTER — OFFICE VISIT (OUTPATIENT)
Dept: PHYSICAL THERAPY | Age: 85
End: 2020-08-14
Attending: ORTHOPAEDIC SURGERY
Payer: MEDICARE

## 2020-08-14 DIAGNOSIS — S42.255A NONDISPLACED FRACTURE OF GREATER TUBEROSITY OF LEFT HUMERUS, INITIAL ENCOUNTER FOR CLOSED FRACTURE: ICD-10-CM

## 2020-08-14 PROCEDURE — 97110 THERAPEUTIC EXERCISES: CPT | Performed by: PHYSICAL THERAPIST

## 2020-08-14 NOTE — PROGRESS NOTES
Date: 8/14/2020      Dx: Nondisplaced fracture of greater tuberosity of left humerus, initial encounter for closed fracture (Q92.806H)            Authorized # of Visits:  18       Referring MD: Swati Hanson  Next MD visit: none scheduled    Medication Changes s (L) arm    Supine: (L) bicep curl (active) x 20 reps    Supine: PROM (L) shoulder abduction x 20 reps, flexion 2 x 10 reps, IR-ER x 20 reps    Re-measured    Seated: (B) scapula squeezes 10 reps x 10 cts ea    FRED over mat/table x 10 reps           Assess

## 2020-08-19 ENCOUNTER — OFFICE VISIT (OUTPATIENT)
Dept: PHYSICAL THERAPY | Age: 85
End: 2020-08-19
Attending: ORTHOPAEDIC SURGERY
Payer: MEDICARE

## 2020-08-19 DIAGNOSIS — S42.255A NONDISPLACED FRACTURE OF GREATER TUBEROSITY OF LEFT HUMERUS, INITIAL ENCOUNTER FOR CLOSED FRACTURE: ICD-10-CM

## 2020-08-19 PROCEDURE — 97110 THERAPEUTIC EXERCISES: CPT | Performed by: PHYSICAL THERAPIST

## 2020-08-19 NOTE — PROGRESS NOTES
Date: 8/19/2020      Dx: Nondisplaced fracture of greater tuberosity of left humerus, initial encounter for closed fracture (Q81.530Q)  (7/14/2020)          Authorized # of Visits:  18       Referring MD: Hellen Randolph  Next MD visit: none scheduled    Medicatio lumbar roll and 1-2 pillows under (L) arm    Supine: (L) bicep curl (active) x 20 reps    Supine: PROM (L) shoulder abduction x 20 reps, flexion 2 x 10 reps, IR-ER x 20 reps    Re-measured    Seated: (B) scapula squeezes 10 reps x 10 cts ea    FRED over ma

## 2020-08-24 ENCOUNTER — HOSPITAL ENCOUNTER (OUTPATIENT)
Dept: GENERAL RADIOLOGY | Facility: HOSPITAL | Age: 85
Discharge: HOME | End: 2020-08-24
Attending: ORTHOPAEDIC SURGERY
Payer: MEDICARE

## 2020-08-24 ENCOUNTER — OFFICE VISIT (OUTPATIENT)
Dept: ORTHOPEDICS CLINIC | Facility: CLINIC | Age: 85
End: 2020-08-24
Payer: MEDICARE

## 2020-08-24 DIAGNOSIS — Z47.89 ORTHOPEDIC AFTERCARE: ICD-10-CM

## 2020-08-24 DIAGNOSIS — S42.255D CLOSED NONDISPLACED FRACTURE OF GREATER TUBEROSITY OF LEFT HUMERUS WITH ROUTINE HEALING, SUBSEQUENT ENCOUNTER: Primary | ICD-10-CM

## 2020-08-24 PROCEDURE — G0463 HOSPITAL OUTPT CLINIC VISIT: HCPCS | Performed by: ORTHOPAEDIC SURGERY

## 2020-08-24 PROCEDURE — 73030 X-RAY EXAM OF SHOULDER: CPT | Performed by: ORTHOPAEDIC SURGERY

## 2020-08-24 PROCEDURE — 99024 POSTOP FOLLOW-UP VISIT: CPT | Performed by: ORTHOPAEDIC SURGERY

## 2020-08-24 NOTE — PROGRESS NOTES
NURSING INTAKE COMMENTS: Patient presents with:  Fracture: left humerus -- Rates pain 0/10. Denies numbness or tingling. Right handed. Son with pt.        HPI: This 80year old female presents today with complaints of follow-up left proximal humerus greater blood sugar once daily1 Dx:E11.9, non-insulin dependent 100 strip 5   • atenolol 50 MG Oral Tab Take 1 tablet (50 mg total) by mouth once daily.  90 tablet 1   • dilTIAZem HCl ER Coated Beads (CARTIA XT) 180 MG Oral Capsule SR 24 Hr TAKE TWO CAPSULES BY CANDIDO retired    Tobacco Use      Smoking status: Never Smoker      Smokeless tobacco: Never Used    Substance and Sexual Activity      Alcohol use: No      Drug use: No      Sexual activity: Not on file       Review of Systems:  GENERAL: feels generally well, n COMPLETE (MIN 2 VIEWS), LEFT (CPT=73030), 7/01/2020, 1:39 PM.  [facility], [facility]. [facility] 2nd Floor, X SHOULDER LT, 1/05/2016, 10:48 AM.  INDICATIONS: FU left shoulder greater tuberosity fracture. TECHNIQUE: 4 views were obtained.    FINDINGS:   B this visit:    Closed nondisplaced fracture of greater tuberosity of left humerus with routine healing, subsequent encounter    Orthopedic aftercare  -     XR SHOULDER, COMPLETE (MIN 2 VIEWS), LEFT (CPT=73030);  Future        Assessment: Clinically and radi

## 2020-08-26 ENCOUNTER — OFFICE VISIT (OUTPATIENT)
Dept: HEMATOLOGY/ONCOLOGY | Facility: HOSPITAL | Age: 85
End: 2020-08-26
Attending: INTERNAL MEDICINE
Payer: MEDICARE

## 2020-08-26 ENCOUNTER — APPOINTMENT (OUTPATIENT)
Dept: PHYSICAL THERAPY | Age: 85
End: 2020-08-26
Attending: ORTHOPAEDIC SURGERY
Payer: MEDICARE

## 2020-08-26 VITALS
OXYGEN SATURATION: 96 % | TEMPERATURE: 99 F | BODY MASS INDEX: 30.21 KG/M2 | WEIGHT: 160 LBS | RESPIRATION RATE: 16 BRPM | HEIGHT: 61 IN | HEART RATE: 82 BPM | DIASTOLIC BLOOD PRESSURE: 61 MMHG | SYSTOLIC BLOOD PRESSURE: 151 MMHG

## 2020-08-26 DIAGNOSIS — C50.211 MALIGNANT NEOPLASM OF UPPER-INNER QUADRANT OF RIGHT BREAST IN FEMALE, ESTROGEN RECEPTOR POSITIVE (HCC): Primary | ICD-10-CM

## 2020-08-26 DIAGNOSIS — Z17.0 MALIGNANT NEOPLASM OF UPPER-INNER QUADRANT OF RIGHT BREAST IN FEMALE, ESTROGEN RECEPTOR POSITIVE (HCC): Primary | ICD-10-CM

## 2020-08-26 DIAGNOSIS — Z51.81 MEDICATION MONITORING ENCOUNTER: ICD-10-CM

## 2020-08-26 PROCEDURE — 99214 OFFICE O/P EST MOD 30 MIN: CPT | Performed by: INTERNAL MEDICINE

## 2020-08-26 NOTE — PROGRESS NOTES
JEET Brunner is a 80year old female who is here today for f/u diagnosis of Malignant neoplasm of upper-inner quadrant of right breast in female, estrogen receptor positive (hcc)  (primary encounter diagnosis)  Medication monitoring encounter 1   • LOSARTAN POTASSIUM 50 MG Oral Tab TAKE ONE TABLET BY MOUTH ONE TIME DAILY  90 tablet 1   • TURMERIC CURCUMIN OR Take by mouth. • ASTAXANTHIN OR Take by mouth. • LUTEIN OR Take by mouth.      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 Conjunctivae and EOM are normal. No scleral icterus. Neck: Normal range of motion. Neck supple. Cardiovascular: Normal rate, regular rhythm and normal heart sounds. No murmur heard.   Pulmonary/Chest: Effort normal and br Pathologic stage from 8/12/2016: Stage IA (T1b, N0(i-), cM0) - Signed by Katty Galloway MD on 8/24/2016      Her prognosis is good. Local therapy completed with lumpectomy and staging with SLNBx.     No RT recommended as tumor < 1cm and ER + on A

## 2020-08-28 ENCOUNTER — OFFICE VISIT (OUTPATIENT)
Dept: PHYSICAL THERAPY | Age: 85
End: 2020-08-28
Attending: ORTHOPAEDIC SURGERY
Payer: MEDICARE

## 2020-08-28 DIAGNOSIS — S42.255A NONDISPLACED FRACTURE OF GREATER TUBEROSITY OF LEFT HUMERUS, INITIAL ENCOUNTER FOR CLOSED FRACTURE: ICD-10-CM

## 2020-08-28 PROCEDURE — 97110 THERAPEUTIC EXERCISES: CPT | Performed by: PHYSICAL THERAPIST

## 2020-08-28 NOTE — PROGRESS NOTES
Date: 8/28/2020      Dx: Nondisplaced fracture of greater tuberosity of left humerus, initial encounter for closed fracture (L88.239V)  (7/14/2020)          Authorized # of Visits:  18       Referring MD: Josie Worthy  Next MD visit: none scheduled    Medicatio (active) x 20 reps    Supine: PROM (L) shoulder abduction x 20 reps, flexion 2 x 10 reps, IR-ER x 20 reps    Re-measured    Seated: (B) scapula squeezes 10 reps x 10 cts ea    FRED over mat/table x 10 reps    NuStep LE only L2-1 x 10 mins     Pendulum (L) Treatment: 45 mins Total Treatment Time: 46 mins

## 2020-09-01 ENCOUNTER — OFFICE VISIT (OUTPATIENT)
Dept: PHYSICAL THERAPY | Age: 85
End: 2020-09-01
Attending: ORTHOPAEDIC SURGERY
Payer: MEDICARE

## 2020-09-01 PROCEDURE — 97110 THERAPEUTIC EXERCISES: CPT | Performed by: PHYSICAL THERAPIST

## 2020-09-01 NOTE — PROGRESS NOTES
Date: 8/28/2020      Dx: Nondisplaced fracture of greater tuberosity of left humerus, initial encounter for closed fracture (Q82.126E)  (7/14/2020)          Authorized # of Visits:  18       Referring MD: Margie Diaz  Next MD visit: none scheduled    Medicatio reps    Re-measured    Seated: (B) scapula squeezes 10 reps x 10 cts ea    FRED over mat/table x 10 reps    NuStep LE only L2-1 x 10 mins     Pendulum (L) UE x 20 reps all directions    Sitting posture correction with lumbar roll and (L) arm in abduction o all motions to promote all lifting, reaching, carrying, pulling, and pushing without discomfort or deviation. Plan:   Re-assess next session and continue with PROM and postural exercises. Charges:  TherEx x 3       Total Timed Treatment: 47 mi

## 2020-09-02 ENCOUNTER — TELEPHONE (OUTPATIENT)
Dept: PHYSICAL THERAPY | Age: 85
End: 2020-09-02

## 2020-09-02 ENCOUNTER — APPOINTMENT (OUTPATIENT)
Dept: PHYSICAL THERAPY | Age: 85
End: 2020-09-02
Attending: ORTHOPAEDIC SURGERY
Payer: MEDICARE

## 2020-09-02 NOTE — TELEPHONE ENCOUNTER
Patient did not show for her appointment today. She was called up but no answer. Left message reminding her of her next PT appointment next week on 9/9/2020 (Wednesday) @ 2:30 pm.  Asked to confirm appointment.

## 2020-09-09 ENCOUNTER — OFFICE VISIT (OUTPATIENT)
Dept: PHYSICAL THERAPY | Age: 85
End: 2020-09-09
Attending: ORTHOPAEDIC SURGERY
Payer: MEDICARE

## 2020-09-09 PROCEDURE — 97110 THERAPEUTIC EXERCISES: CPT | Performed by: PHYSICAL THERAPIST

## 2020-09-09 NOTE — PROGRESS NOTES
Date: 8/28/2020      Dx: Nondisplaced fracture of greater tuberosity of left humerus, initial encounter for closed fracture (D26.727S)  (7/14/2020)          Authorized # of Visits:  18       Referring MD: Avery Perez  Next MD visit: none scheduled    Medicatio directions    Sitting posture correction with lumbar roll and 1-2 pillows under (L) arm    Supine: (L) bicep curl (active) x 20 reps    Supine: PROM (L) shoulder abduction x 20 reps, flexion 2 x 10 reps, IR-ER x 20 reps    Re-measured    Seated: (B) scapul 30 degs 0 wt 2 x 10 reps    (B) UE redTB:    - n.row 10 reps x 10 cts  - w.row 5 reps x 10 cts; NE tired    Seated: (R/L) functional bicep curl 1 lb x 10 reps ea; NE tired            Assessment:   Jose Antonio Slot progressed today with light resisted AROM using 1 l

## 2020-09-11 ENCOUNTER — OFFICE VISIT (OUTPATIENT)
Dept: PHYSICAL THERAPY | Age: 85
End: 2020-09-11
Attending: ORTHOPAEDIC SURGERY
Payer: MEDICARE

## 2020-09-11 PROCEDURE — 97110 THERAPEUTIC EXERCISES: CPT | Performed by: PHYSICAL THERAPIST

## 2020-09-11 NOTE — PROGRESS NOTES
Date: 9/11/2020      Dx: Nondisplaced fracture of greater tuberosity of left humerus, initial encounter for closed fracture (A68.099Y)  (7/14/2020)          Authorized # of Visits:  18       Referring MD: Lashell Rodriguez  Next MD visit: none scheduled    Medicatio directions    Sitting posture correction with lumbar roll and 1-2 pillows under (L) arm    Supine: (L) bicep curl (active) x 20 reps    Supine: PROM (L) shoulder abduction x 20 reps, flexion 2 x 10 reps, IR-ER x 20 reps    Re-measured    Seated: (B) scapul 30 degs 0 wt 2 x 10 reps    (B) UE redTB:    - n.row 10 reps x 10 cts  - w.row 5 reps x 10 cts; NE tired    Seated: (R/L) functional bicep curl straight/diagonal  1 lb 2 x 10 reps ea; NE tired                Date: 9/11/2020  Tx#: 9/18        Scifit UE only

## 2020-09-16 ENCOUNTER — OFFICE VISIT (OUTPATIENT)
Dept: PHYSICAL THERAPY | Age: 85
End: 2020-09-16
Attending: ORTHOPAEDIC SURGERY
Payer: MEDICARE

## 2020-09-16 PROCEDURE — 97110 THERAPEUTIC EXERCISES: CPT | Performed by: PHYSICAL THERAPIST

## 2020-09-16 NOTE — PROGRESS NOTES
Date: 9/11/2020      Dx: Nondisplaced fracture of greater tuberosity of left humerus, initial encounter for closed fracture (R98.818E)  (7/14/2020)          Authorized # of Visits:  18       Referring MD: Margie Diaz  Next MD visit: none scheduled    Medicatio directions    Sitting posture correction with lumbar roll and 1-2 pillows under (L) arm    Supine: (L) bicep curl (active) x 20 reps    Supine: PROM (L) shoulder abduction x 20 reps, flexion 2 x 10 reps, IR-ER x 20 reps    Re-measured    Seated: (B) scapul 30 degs 0 wt 2 x 10 reps    (B) UE redTB:    - n.row 10 reps x 10 cts  - w.row 5 reps x 10 cts; NE tired    Seated: (R/L) functional bicep curl straight/diagonal  1 lb 2 x 10 reps ea; NE tired                Date: 9/11/2020  Tx#: 9/18 Date: 9/16/2020  Tx#: shoulder pain to enable a return to dressing, cooking, crossword puzzle, and driving activities.   3. Patient to have WFL of (L) shoulder AROM and at least 4-/5 MMT in all motions to promote all lifting, reaching, carrying, pulling, and pushing without disc

## 2020-09-18 ENCOUNTER — OFFICE VISIT (OUTPATIENT)
Dept: PHYSICAL THERAPY | Age: 85
End: 2020-09-18
Attending: ORTHOPAEDIC SURGERY
Payer: MEDICARE

## 2020-09-18 PROCEDURE — 97110 THERAPEUTIC EXERCISES: CPT | Performed by: PHYSICAL THERAPIST

## 2020-09-18 NOTE — PROGRESS NOTES
Date: 9/11/2020      Dx: Nondisplaced fracture of greater tuberosity of left humerus, initial encounter for closed fracture (X45.484L)  (7/14/2020)          Authorized # of Visits:  18       Referring MD: Marilu Oconnor  Next MD visit: none scheduled    Medicatio directions    Sitting posture correction with lumbar roll and 1-2 pillows under (L) arm    Supine: (L) bicep curl (active) x 20 reps    Supine: PROM (L) shoulder abduction x 20 reps, flexion 2 x 10 reps, IR-ER x 20 reps    Re-measured    Seated: (B) scapul 30 degs 0 wt 2 x 10 reps    (B) UE redTB:    - n.row 10 reps x 10 cts  - w.row 5 reps x 10 cts; NE tired    Seated: (R/L) functional bicep curl straight/diagonal  1 lb 2 x 10 reps ea; NE tired                Date: 9/11/2020  Tx#: 9/18 Date: 9/16/2020  Tx#: ea; tired painful (R>L) shoulder    (L) UE OH wall towel reach 10 reps x 10 ct hold ea        Assessment:   Didier Humphreys was able to progress today with heavier weights, more repetitions, and shorter rest in between exercises.   She required minimal cueing to av

## 2020-09-23 ENCOUNTER — OFFICE VISIT (OUTPATIENT)
Dept: PHYSICAL THERAPY | Age: 85
End: 2020-09-23
Attending: ORTHOPAEDIC SURGERY
Payer: MEDICARE

## 2020-09-23 PROCEDURE — 97110 THERAPEUTIC EXERCISES: CPT | Performed by: PHYSICAL THERAPIST

## 2020-09-23 NOTE — PROGRESS NOTES
Date: 9/23/2020      Dx: Nondisplaced fracture of greater tuberosity of left humerus, initial encounter for closed fracture (S23.821P)  (7/14/2020)          Authorized # of Visits:  18       Referring MD: Swati Shelton  Next MD visit: none scheduled    Medicatio correction with lumbar roll and 1-2 pillows under (L) arm    Supine: (L) bicep curl (active) x 20 reps    Supine: PROM (L) shoulder abduction x 20 reps, flexion 2 x 10 reps, IR-ER x 20 reps    Re-measured    Seated: (B) scapula squeezes 10 reps x 10 cts ea UE redTB:    - n.row 10 reps x 10 cts  - w.row 5 reps x 10 cts; NE tired    Seated: (R/L) functional bicep curl straight/diagonal  1 lb 2 x 10 reps ea; NE tired                Date: 9/11/2020  Tx#: 9/18 Date: 9/16/2020  Tx#: 10/18 9/18/2020  Tx#: 10/18 Noe tired painful (R>L) shoulder    (L) UE OH wall towel reach 10 reps x 10 ct hold ea    Seated: (L) UE eccentric scaption 6 reps x 10 eccen ct ea; PDM Scifit UE only L5 x 5 mins ea fwd/bwkd    AROM-PRE:   - Supine inclined (mid): (L) UE punch to OH 1 lb x 20

## 2020-09-25 ENCOUNTER — OFFICE VISIT (OUTPATIENT)
Dept: PHYSICAL THERAPY | Age: 85
End: 2020-09-25
Attending: ORTHOPAEDIC SURGERY
Payer: MEDICARE

## 2020-09-25 PROCEDURE — 97110 THERAPEUTIC EXERCISES: CPT | Performed by: PHYSICAL THERAPIST

## 2020-09-25 NOTE — PROGRESS NOTES
Date: 9/25/2020      Dx: Nondisplaced fracture of greater tuberosity of left humerus, initial encounter for closed fracture (Z92.350O)  (7/14/2020)          Authorized # of Visits:  18       Referring MD: Ernie Key  Next MD visit: none scheduled    Medicatio lumbar roll and 1-2 pillows under (L) arm    Supine: (L) bicep curl (active) x 20 reps    Supine: PROM (L) shoulder abduction x 20 reps, flexion 2 x 10 reps, IR-ER x 20 reps    Re-measured    Seated: (B) scapula squeezes 10 reps x 10 cts ea    FRED over ma n.row 10 reps x 10 cts  - w.row 5 reps x 10 cts; NE tired    Seated: (R/L) functional bicep curl straight/diagonal  1 lb 2 x 10 reps ea; NE tired                Date: 9/11/2020  Tx#: 9/18 Date: 9/16/2020  Tx#: 10/18 9/18/2020  Tx#: 10/18 Date: 9/23/2020  T cts ea; tired painful (R>L) shoulder    (L) UE OH wall towel reach 10 reps x 10 ct hold ea    Seated: (L) UE eccentric scaption 6 reps x 10 eccen ct ea; PDM Scifit UE only L5 x 5 mins ea fwd/bwkd    AROM-PRE:   - Supine inclined (mid): (L) UE punch to OH 1 Goals      (18 visits)  1. Patient to consistently perform their HEP and it's progression to maintain their improved condition.   2. Patient to have reduced and abolished (L) shoulder pain to enable a return to dressing, cooking, crossword puzzle, and drivi

## 2020-09-30 ENCOUNTER — OFFICE VISIT (OUTPATIENT)
Dept: PHYSICAL THERAPY | Age: 85
End: 2020-09-30
Attending: ORTHOPAEDIC SURGERY
Payer: MEDICARE

## 2020-09-30 PROCEDURE — 97110 THERAPEUTIC EXERCISES: CPT | Performed by: PHYSICAL THERAPIST

## 2020-09-30 NOTE — PROGRESS NOTES
Date: 9/30/2020      Dx: Nondisplaced fracture of greater tuberosity of left humerus, initial encounter for closed fracture (U11.557P)  (7/14/2020)          Authorized # of Visits:  18       Referring MD: Tali Petersen  Next MD visit: none scheduled    Medicatio Pendulum (L) UE x 20 reps all directions    Sitting posture correction with lumbar roll and 1-2 pillows under (L) arm    Supine: (L) bicep curl (active) x 20 reps    Supine: PROM (L) shoulder abduction x 20 reps, flexion 2 x 10 reps, IR-ER x 20 reps x 10 reps     SLY: (L) UE abd to 30 degs 0 wt 2 x 10 reps    (B) UE redTB:    - n.row 10 reps x 10 cts  - w.row 5 reps x 10 cts; NE tired    Seated: (R/L) functional bicep curl straight/diagonal  1 lb 2 x 10 reps ea; NE tired                Date: 9/11/2020 lbs 2 x 20 reps    (B) UE greenTB n.row 10 reps x 10 cts ea; tired    (B) UE greenTB extension 5 reps x 10 cts ea; tired painful (R>L) shoulder    (L) UE OH wall towel reach 10 reps x 10 ct hold ea    Seated: (L) UE eccentric scaption 6 reps x 10 eccen ct scaption 5 reps x 10 eccen ct ea; NE    + 1 lb 5 reps x 5 eccen ct ea; PDM (mid range)    (L) UE D1D2 extension with redTB 2 x 10 reps ea       Assessment:   Kimani Ring performed all of her (L) shoulder exercises in standing or sitting today.   She had minimal

## 2020-10-02 ENCOUNTER — OFFICE VISIT (OUTPATIENT)
Dept: PHYSICAL THERAPY | Age: 85
End: 2020-10-02
Attending: ORTHOPAEDIC SURGERY
Payer: MEDICARE

## 2020-10-02 PROCEDURE — 97110 THERAPEUTIC EXERCISES: CPT | Performed by: PHYSICAL THERAPIST

## 2020-10-02 NOTE — PROGRESS NOTES
Date: 10/2/2020      Dx: Nondisplaced fracture of greater tuberosity of left humerus, initial encounter for closed fracture (V25.948J)  (7/14/2020)          Authorized # of Visits:  18       Referring MD: Jaswinder Montana  Next MD visit: none scheduled    Medicatio knee    Pendulum (L) UE x 20 reps all directions    Supine: (L) bicep curl (active) x 20 reps    Supine: PROM (L) shoulder abduction x 20 reps, flexion 2 x 10 reps, IR-ER x 20 reps    Sitting posture correction with lumbar roll    (B) scapula squeezes 10 r cts       Standing: (L) UE isometric extension 10 reps x 10 cts ea Scifit UE only L1 x 5 mins ea fwd/bwkd    AROM:   - Supine inclined: (L) UE punch @ 90 degs 1 lb 2 x 10 reps; NE    - Supine inclined: (L) UE s.punch (1 step) 1 lb 2 x 10 reps; NE    - Supi (mid): (L) UE punch @ 90 degs 2 lb x 20 reps; NE    - Supine inclined (mid): (L) UE s.punch (1 step) 3 lbs 2 x 20 reps; NE    - Supine inclined (mid): (L) UE flexion to OH x 10 reps + 1 lb x 10 reps; NE tired    SLY: (L) UE ER 1 lb x 10 reps + 2 lbs x 7 re lbs forward/diagonal 2 x 10 reps    (B) UE greenTB n.row, extension, w.row 10 reps x 10 cts ea    (L) UE OH wall towel reach with 1 lb x 10 reps; no pain just tired    (L) UE OH reach on wall with towel to eccen abduction 2 x 10 reps    (L/R) UE eccentric HEP and it's progression to maintain their improved condition. 2. Patient to have reduced and abolished (L) shoulder pain to enable a return to dressing, cooking, crossword puzzle, and driving activities.   3. Patient to have WFL of (L) shoulder AROM and a

## 2020-10-06 ENCOUNTER — OFFICE VISIT (OUTPATIENT)
Dept: PHYSICAL THERAPY | Age: 85
End: 2020-10-06
Attending: ORTHOPAEDIC SURGERY
Payer: MEDICARE

## 2020-10-06 PROCEDURE — 97110 THERAPEUTIC EXERCISES: CPT | Performed by: PHYSICAL THERAPIST

## 2020-10-06 NOTE — PROGRESS NOTES
Date: 10/6/2020      Dx: Nondisplaced fracture of greater tuberosity of left humerus, initial encounter for closed fracture (A36.245E)  (7/14/2020)          Authorized # of Visits:  18       Referring MD: Ford Do  Next MD visit: none scheduled    Medicatio (active) x 20 reps    Supine: PROM (L) shoulder abduction x 20 reps, flexion 2 x 10 reps, IR-ER x 20 reps    Re-measured    Seated: (B) scapula squeezes 10 reps x 10 cts ea    FRED over mat/table x 10 reps    NuStep LE only L2-1 x 10 mins     Pendulum (L) (R/L) functional bicep curl straight/diagonal  1 lb 2 x 10 reps ea; NE tired                Date: 9/11/2020  Tx#: 9/18 Date: 9/16/2020  Tx#: 10/18 9/18/2020  Tx#: 10/18 Date: 9/23/2020  Tx#: 11/18 Date: 9/25/2020  Tx#: 12/18 Date: 9/30/2020  Tx#: 13/18   S shoulder    (L) UE OH wall towel reach 10 reps x 10 ct hold ea    Seated: (L) UE eccentric scaption 6 reps x 10 eccen ct ea; PDM Scifit UE only L5 x 5 mins ea fwd/bwkd    AROM-PRE:   - Supine inclined (mid): (L) UE punch to OH 1 lb x 20 reps; NE    - [employer] ea       Date: 10/2/2020  Tx#: 14/18 Date: 10/6/2020  Tx#: 15/18      Scifit UE only L6-5 x 5 mins ea fwd/bkwd    (R) Shoulder test motion: Flexion: major loss; ERP,  Abduction: major loss; ERP  Extension: nil loss; NE  ER: nil loss; NE  IR: major loss; ER shoulder directional preference exercise toward IR. She still has limited (R) shoulder flexion and abduction motions but with less pain than last visit.   She was instructed to continue with the same exercise for the (R) shoulder at home but less now to ab

## 2020-10-08 RX ORDER — TRAMADOL HYDROCHLORIDE 50 MG/1
TABLET ORAL
Qty: 10 TABLET | Refills: 0 | OUTPATIENT
Start: 2020-10-08

## 2020-10-08 RX ORDER — TRAMADOL HYDROCHLORIDE 50 MG/1
50 TABLET ORAL EVERY 8 HOURS PRN
Qty: 10 TABLET | Refills: 0 | OUTPATIENT
Start: 2020-10-08

## 2020-10-09 ENCOUNTER — OFFICE VISIT (OUTPATIENT)
Dept: PHYSICAL THERAPY | Age: 85
End: 2020-10-09
Attending: ORTHOPAEDIC SURGERY
Payer: MEDICARE

## 2020-10-09 PROCEDURE — 97110 THERAPEUTIC EXERCISES: CPT | Performed by: PHYSICAL THERAPIST

## 2020-10-09 NOTE — PROGRESS NOTES
Date: 10/9/2020      Dx: Nondisplaced fracture of greater tuberosity of left humerus, initial encounter for closed fracture (J47.808T)  (7/14/2020)          Authorized # of Visits:  18       Referring MD: Damien London  Next MD visit: none scheduled    Medicatio under (L) arm    Supine: (L) bicep curl (active) x 20 reps    Supine: PROM (L) shoulder abduction x 20 reps, flexion 2 x 10 reps, IR-ER x 20 reps    Re-measured    Seated: (B) scapula squeezes 10 reps x 10 cts ea    FRED over mat/table x 10 reps    NuStep w.row 5 reps x 10 cts; NE tired    Seated: (R/L) functional bicep curl straight/diagonal  1 lb 2 x 10 reps ea; NE tired                Date: 9/11/2020  Tx#: 9/18 Date: 9/16/2020  Tx#: 10/18 9/18/2020  Tx#: 10/18 Date: 9/23/2020  Tx#: 11/18 Date: 9/25/2020 10 cts ea; tired painful (R>L) shoulder    (L) UE OH wall towel reach 10 reps x 10 ct hold ea    Seated: (L) UE eccentric scaption 6 reps x 10 eccen ct ea; PDM Scifit UE only L5 x 5 mins ea fwd/bwkd    AROM-PRE:   - Supine inclined (mid): (L) UE punch to O extension with redTB 2 x 10 reps ea       Date: 10/2/2020  Tx#: 14/18 Date: 10/6/2020  Tx#: 15/18 Date: 10/9/2020  Tx#: 16/18     Scifit UE only L6-5 x 5 mins ea fwd/bkwd    (R) Shoulder test motion: Flexion: major loss; ERP,  Abduction: major loss; ERP  E moderate loss; ERP  IR: moderate loss; ERP    Standing: (R) shoulder repeated IR with towel OP with therapist guide 2 x 10 reps; P, NW (R) anterior shoulder    (R/L) UE eccen IR load greenTB 2 x 5 reps x 10 eccen ct ea    Supine: (R) UE punch @ 90 degs 2 x

## 2020-10-14 ENCOUNTER — OFFICE VISIT (OUTPATIENT)
Dept: PHYSICAL THERAPY | Age: 85
End: 2020-10-14
Attending: ORTHOPAEDIC SURGERY
Payer: MEDICARE

## 2020-10-14 PROCEDURE — 97110 THERAPEUTIC EXERCISES: CPT | Performed by: PHYSICAL THERAPIST

## 2020-10-14 NOTE — PROGRESS NOTES
Date: 10/14/2020      Dx: Nondisplaced fracture of greater tuberosity of left humerus, initial encounter for closed fracture (P16.114S)  (7/14/2020)          Authorized # of Visits:  18       Referring MD: Sherry Briggs  Next MD visit: none scheduled    Savita Martins roll and 1-2 pillows under (L) arm    Supine: (L) bicep curl (active) x 20 reps    Supine: PROM (L) shoulder abduction x 20 reps, flexion 2 x 10 reps, IR-ER x 20 reps    Re-measured    Seated: (B) scapula squeezes 10 reps x 10 cts ea    FRED over mat/table 10 reps x 10 cts  - w.row 5 reps x 10 cts; NE tired    Seated: (R/L) functional bicep curl straight/diagonal  1 lb 2 x 10 reps ea; NE tired                Date: 9/11/2020  Tx#: 9/18 Date: 9/16/2020  Tx#: 10/18 9/18/2020  Tx#: 10/18 Date: 9/23/2020  Tx#: 11 extension 5 reps x 10 cts ea; tired painful (R>L) shoulder    (L) UE OH wall towel reach 10 reps x 10 ct hold ea    Seated: (L) UE eccentric scaption 6 reps x 10 eccen ct ea; PDM Scifit UE only L5 x 5 mins ea fwd/bwkd    AROM-PRE:   - Supine inclined (mid) range)    (L) UE D1D2 extension with redTB 2 x 10 reps ea       Date: 10/2/2020  Tx#: 14/18 Date: 10/6/2020  Tx#: 15/18 Date: 10/9/2020  Tx#: 16/18 Date: 10/14/2020  Tx#: 17/18    Scifit UE only L6-5 x 5 mins ea fwd/bkwd    (R) Shoulder test motion: Flexio motion: Flexion: moderate loss; ERP   Abduction: moderate loss; ERP  IR: moderate loss; ERP    Standing: (R) shoulder repeated IR with towel OP with therapist guide 2 x 10 reps; P, NW (R) anterior shoulder    (R/L) UE eccen IR load greenTB 2 x 5 reps x 10 AROM and at least 4-/5 MMT in all motions to promote all lifting, reaching, carrying, pulling, and pushing without discomfort or deviation.             Plan:   Re-assess next session and continue with AROM to light PRE's and postural exercises for the (L) s

## 2020-10-23 ENCOUNTER — OFFICE VISIT (OUTPATIENT)
Dept: PHYSICAL THERAPY | Age: 85
End: 2020-10-23
Attending: ORTHOPAEDIC SURGERY
Payer: MEDICARE

## 2020-10-23 PROCEDURE — 97110 THERAPEUTIC EXERCISES: CPT | Performed by: PHYSICAL THERAPIST

## 2020-10-23 NOTE — PROGRESS NOTES
Date: 10/23/2020      Dx: Nondisplaced fracture of greater tuberosity of left humerus, initial encounter for closed fracture (Y81.440L)  (7/14/2020)          Authorized # of Visits:  18       Referring MD: Damien London  Next MD visit: none scheduled    Junito Villarreal reps    Sitting posture correction with lumbar roll    (B) scapula squeezes 10 reps x 10 cts ea    FRED over rail x 10 reps     NuStep LE only L1 x 10 mins     Pendulum (L) UE x 20 reps all directions    Sitting posture correction with lumbar roll and 1-2 reps; NE    - Supine inclined: (L) UE s.punch (1 step) 1 lb 2 x 10 reps; NE    - Supine inclined: (L) UE flexion to OH 2 x 10 reps; NE    SLY: (L) UE ER 0 wt 2 x 10 reps     SLY: (L) UE abd to 30 degs 0 wt 2 x 10 reps    (B) UE redTB:    - n.row 10 reps x 10 reps + 1 lb x 10 reps; NE tired    SLY: (L) UE ER 1 lb x 10 reps + 2 lbs x 7 reps     SLY: (L) UE abd to 30 degs 1 lb x 10+15 reps    Seated: (L) bicep curl2 lbs 2 x 20 reps    (B) UE greenTB n.row 10 reps x 10 cts ea; tired    (B) UE greenTB extension OH reach on wall with towel to eccen abduction 2 x 10 reps    (L/R) UE eccentric IR load with yellowTB 10 reps x 10 eccen ct ea; NE    Seated: (L) UE eccentric scaption 5 reps x 10 eccen ct ea; NE    + 1 lb 5 reps x 5 eccen ct ea; PDM (mid range)    (L) UE ea    Seated: (L) shoulder eccentric scaption with 1 lb 10 reps x 5 eccen ct ea    Seated: (B) UE functional bicep curl 2 lbs  forward/diagonal 2 x 10 reps     Standing (R) UE IR with towel OP x 10 rps; P, NW Scifit UE only L6-5 x 5 mins ea fwd/bkwd    (R) UE OH reach with towel on wall 10+5 reps x 3-5 cts hold       Assessment: Emily Latif has attended 18 physical therapy from 7/30/2020 to 10/23/2020.   She is painfree in the (L) shoulder and now presents with the following: (L) Shoulder AROM/MMT: Abduction; 183

## 2020-10-28 ENCOUNTER — OFFICE VISIT (OUTPATIENT)
Dept: PHYSICAL THERAPY | Age: 85
End: 2020-10-28
Attending: ORTHOPAEDIC SURGERY
Payer: MEDICARE

## 2020-10-28 PROCEDURE — 97110 THERAPEUTIC EXERCISES: CPT | Performed by: PHYSICAL THERAPIST

## 2020-10-28 NOTE — PROGRESS NOTES
Date: 10/28/2020      Dx: Chronic right shoulder pain (M25.511,G89.29)        Authorized # of Visits:  18 + 10 = 28       Referring MD: Travis Bailey.   Next MD visit: none scheduled    Medication Changes since last visit?: No    Subjective:  Jairo Epps report th cts ea    FRED over rail x 10 reps     NuStep LE only L1 x 10 mins     Pendulum (L) UE x 20 reps all directions    Sitting posture correction with lumbar roll and 1-2 pillows under (L) arm    Supine: (L) bicep curl (active) x 20 reps    Supine: PROM (L) sh inclined: (L) UE flexion to OH 2 x 10 reps; NE    SLY: (L) UE ER 0 wt 2 x 10 reps     SLY: (L) UE abd to 30 degs 0 wt 2 x 10 reps    (B) UE redTB:    - n.row 10 reps x 10 cts  - w.row 5 reps x 10 cts; NE tired    Seated: (R/L) functional bicep curl straigh SLY: (L) UE abd to 30 degs 1 lb x 10+15 reps    Seated: (L) bicep curl2 lbs 2 x 20 reps    (B) UE greenTB n.row 10 reps x 10 cts ea; tired    (B) UE greenTB extension 5 reps x 10 cts ea; tired painful (R>L) shoulder    (L) UE OH wall towel reach 10 reps load with yellowTB 10 reps x 10 eccen ct ea; NE    Seated: (L) UE eccentric scaption 5 reps x 10 eccen ct ea; NE    + 1 lb 5 reps x 5 eccen ct ea; PDM (mid range)    (L) UE D1D2 extension with redTB 2 x 10 reps ea       Date: 10/2/2020  Tx#: 14/18 Date: 10 ea    Seated: (B) UE functional bicep curl 2 lbs  forward/diagonal 2 x 10 reps     Standing (R) UE IR with towel OP x 10 rps; P, NW Scifit UE only L6-5 x 5 mins ea fwd/bkwd    (R) Shoulder test motion: Flexion: moderate loss; ERP   Abduction: moderate loss 10/28/2020  Tx#: 19/28       Scifit UE only L6.5 x 5 mins ea fwd/bkwd    Standing: (R) shoulder repeated IR with belt OP x 10 reps; P, NW (R) anterior shoulder    (R/L) UE eccen IR load greenTB x 15 reps x 10 eccen ct ea    Supine incline (minimal): (R) UE Treatment: 56 mins Total Treatment Time:  58 mins

## 2020-10-28 NOTE — PROGRESS NOTES
Date: 10/28/2020      Dx: Chronic right shoulder pain (M25.511,G89.29)        Authorized # of Visits:  18 + 10 = 28       Referring MD: Aleja German.   Next MD visit: none scheduled    Medication Changes since last visit?: No    Subjective:  Jake Martinez report th education.

## 2020-10-30 ENCOUNTER — APPOINTMENT (OUTPATIENT)
Dept: PHYSICAL THERAPY | Age: 85
End: 2020-10-30
Attending: ORTHOPAEDIC SURGERY
Payer: MEDICARE

## 2020-11-03 RX ORDER — DILTIAZEM HYDROCHLORIDE 180 MG/1
CAPSULE, COATED, EXTENDED RELEASE ORAL
Qty: 180 CAPSULE | Refills: 0 | Status: SHIPPED | OUTPATIENT
Start: 2020-11-03 | End: 2021-03-05

## 2020-11-04 ENCOUNTER — OFFICE VISIT (OUTPATIENT)
Dept: PHYSICAL THERAPY | Age: 85
End: 2020-11-04
Attending: ORTHOPAEDIC SURGERY
Payer: MEDICARE

## 2020-11-04 PROCEDURE — 97110 THERAPEUTIC EXERCISES: CPT | Performed by: PHYSICAL THERAPIST

## 2020-11-04 NOTE — PROGRESS NOTES
Date: 10/28/2020      Dx: Chronic right shoulder pain (M25.511,G89.29)        Authorized # of Visits:  18 + 10 = 28       Referring MD: Rylan Ellis.   Next MD visit: none scheduled    Medication Changes since last visit?: No    Subjective:  Erma Noonan report th cts ea    FRED over rail x 10 reps     NuStep LE only L1 x 10 mins     Pendulum (L) UE x 20 reps all directions    Sitting posture correction with lumbar roll and 1-2 pillows under (L) arm    Supine: (L) bicep curl (active) x 20 reps    Supine: PROM (L) sh inclined: (L) UE flexion to OH 2 x 10 reps; NE    SLY: (L) UE ER 0 wt 2 x 10 reps     SLY: (L) UE abd to 30 degs 0 wt 2 x 10 reps    (B) UE redTB:    - n.row 10 reps x 10 cts  - w.row 5 reps x 10 cts; NE tired    Seated: (R/L) functional bicep curl straigh SLY: (L) UE abd to 30 degs 1 lb x 10+15 reps    Seated: (L) bicep curl2 lbs 2 x 20 reps    (B) UE greenTB n.row 10 reps x 10 cts ea; tired    (B) UE greenTB extension 5 reps x 10 cts ea; tired painful (R>L) shoulder    (L) UE OH wall towel reach 10 reps load with yellowTB 10 reps x 10 eccen ct ea; NE    Seated: (L) UE eccentric scaption 5 reps x 10 eccen ct ea; NE    + 1 lb 5 reps x 5 eccen ct ea; PDM (mid range)    (L) UE D1D2 extension with redTB 2 x 10 reps ea       Date: 10/2/2020  Tx#: 14/18 Date: 10 ea    Seated: (B) UE functional bicep curl 2 lbs  forward/diagonal 2 x 10 reps     Standing (R) UE IR with towel OP x 10 rps; P, NW Scifit UE only L6-5 x 5 mins ea fwd/bkwd    (R) Shoulder test motion: Flexion: moderate loss; ERP   Abduction: moderate loss 10/28/2020  Tx#: 19/28 Date: 11/4/2020  Tx#: 20/28      Scifit UE only L6.5 x 5 mins ea fwd/bkwd    Standing: (R) shoulder repeated IR with belt OP x 10 reps; P, NW (R) anterior shoulder    (R/L) UE eccen IR load greenTB x 15 reps x 10 eccen ct ea    Supin pulling, and pushing without discomfort or deviation. Plan:   Continue to treat the (R) shoulder primarily with directional preference exercise, stability, strengthening, postural exercises, and patient education. Charges:  TherEx x 3        Tot

## 2020-11-06 ENCOUNTER — OFFICE VISIT (OUTPATIENT)
Dept: PHYSICAL THERAPY | Age: 85
End: 2020-11-06
Attending: ORTHOPAEDIC SURGERY
Payer: MEDICARE

## 2020-11-06 PROCEDURE — 97110 THERAPEUTIC EXERCISES: CPT | Performed by: PHYSICAL THERAPIST

## 2020-11-06 NOTE — PROGRESS NOTES
Date: 10/28/2020      Dx: Chronic right shoulder pain (M25.511,G89.29)        Authorized # of Visits:  18 + 10 = 28       Referring MD: Hayley Jean.   Next MD visit: none scheduled    Medication Changes since last visit?: No    Subjective:  Karely perez th cts ea    FRED over rail x 10 reps     NuStep LE only L1 x 10 mins     Pendulum (L) UE x 20 reps all directions    Sitting posture correction with lumbar roll and 1-2 pillows under (L) arm    Supine: (L) bicep curl (active) x 20 reps    Supine: PROM (L) sh inclined: (L) UE flexion to OH 2 x 10 reps; NE    SLY: (L) UE ER 0 wt 2 x 10 reps     SLY: (L) UE abd to 30 degs 0 wt 2 x 10 reps    (B) UE redTB:    - n.row 10 reps x 10 cts  - w.row 5 reps x 10 cts; NE tired    Seated: (R/L) functional bicep curl straigh SLY: (L) UE abd to 30 degs 1 lb x 10+15 reps    Seated: (L) bicep curl2 lbs 2 x 20 reps    (B) UE greenTB n.row 10 reps x 10 cts ea; tired    (B) UE greenTB extension 5 reps x 10 cts ea; tired painful (R>L) shoulder    (L) UE OH wall towel reach 10 reps load with yellowTB 10 reps x 10 eccen ct ea; NE    Seated: (L) UE eccentric scaption 5 reps x 10 eccen ct ea; NE    + 1 lb 5 reps x 5 eccen ct ea; PDM (mid range)    (L) UE D1D2 extension with redTB 2 x 10 reps ea       Date: 10/2/2020  Tx#: 14/18 Date: 10 ea    Seated: (B) UE functional bicep curl 2 lbs  forward/diagonal 2 x 10 reps     Standing (R) UE IR with towel OP x 10 rps; P, NW Scifit UE only L6-5 x 5 mins ea fwd/bkwd    (R) Shoulder test motion: Flexion: moderate loss; ERP   Abduction: moderate loss 10/28/2020  Tx#: 19/28 Date: 11/4/2020  Tx#: 20/28 Date: 11/6/2020  Tx#: 21/28     Scifit UE only L6.5 x 5 mins ea fwd/bkwd    Standing: (R) shoulder repeated IR with belt OP x 10 reps; P, NW (R) anterior shoulder    (R/L) UE eccen IR load greenTB x 15 rep Assessment: Isabel continue to improve with (R) shoulder strength. She is able to lift heavier weights with increased repetition. Her (R) shoulder fatigues easily but she is steadily improving in her endurance in performing exercises.   She has (R) s

## 2020-11-11 ENCOUNTER — OFFICE VISIT (OUTPATIENT)
Dept: PHYSICAL THERAPY | Age: 85
End: 2020-11-11
Attending: ORTHOPAEDIC SURGERY
Payer: MEDICARE

## 2020-11-11 PROCEDURE — 97110 THERAPEUTIC EXERCISES: CPT | Performed by: PHYSICAL THERAPIST

## 2020-11-11 NOTE — PROGRESS NOTES
Date: 11/11/2020      Dx: Chronic right shoulder pain (M25.511,G89.29)        Authorized # of Visits:  18 + 10 = 28       Referring MD: Freedom Larson.   Next MD visit: none scheduled    Medication Changes since last visit?: No    Subjective:  Luna perez th x 10 reps, IR-ER x 20 reps    Re-measured    Seated: (B) scapula squeezes 10 reps x 10 cts ea    FRED over mat/table x 10 reps    NuStep LE only L2-1 x 10 mins     Pendulum (L) UE x 20 reps all directions    Sitting posture correction with lumbar roll and tired                Date: 9/11/2020  Tx#: 9/18 Date: 9/16/2020  Tx#: 10/18 9/18/2020  Tx#: 10/18 Date: 9/23/2020  Tx#: 11/18 Date: 9/25/2020  Tx#: 12/18 Date: 9/30/2020  Tx#: 13/18   Scifit UE only L3 x 3 mins ea fwd/bwkd    AROM-PRE:   - Supine inclined eccentric scaption 6 reps x 10 eccen ct ea; PDM Scifit UE only L5 x 5 mins ea fwd/bwkd    AROM-PRE:   - Supine inclined (mid): (L) UE punch to OH 1 lb x 20 reps; NE    - Supine inclined (mid): (L) UE s.punch (1 step) 3 lbs 2 x 20 reps; NE    - Supine incli 10/9/2020  Tx#: 16/18 Date: 10/14/2020  Tx#: 17/18 Date: 10/23/2020  Tx#: 18/18   Scifit UE only L6-5 x 5 mins ea fwd/bkwd    (R) Shoulder test motion: Flexion: major loss; ERP,  Abduction: major loss; ERP  Extension: nil loss; NE  ER: nil loss; NE  IR: ma ERP    Standing: (R) shoulder repeated IR with towel OP with therapist guide 2 x 10 reps; P, NW (R) anterior shoulder    (R/L) UE eccen IR load greenTB 2 x 5 reps x 10 eccen ct ea    Supine: (R) UE punch @ 90 degs 2 x 10 reps; NE tired    Supine: (R) UE s. eccen ct ea    Supine incline (minimal): (R) UE punch to ceiling 1 lb 2 x 10 reps; NE tired    Supine incline (minimal): (R) UE s.punch to ceiling (1 step) 2-3 lbs 2 x 20 reps; NE tired    Supine incline: (B) UE flexion to [facility] with cane + 2 lbs 2 x 5 reps incline: (R) UE flexion to OH 1 lb 2 x 7 reps; NE tired    Supine: (R) shoulder AAROM flexion to OH x 10 reps; P, NW    Seated: (R/L) bicep curl straight/diagonal 3 lbs 3 x 10 reps ea; NE tired    Seated: (R) ER with redTB manual resist 2 x 10 reps x 5 cts

## 2020-11-13 ENCOUNTER — OFFICE VISIT (OUTPATIENT)
Dept: PHYSICAL THERAPY | Age: 85
End: 2020-11-13
Attending: ORTHOPAEDIC SURGERY
Payer: MEDICARE

## 2020-11-13 PROCEDURE — 97110 THERAPEUTIC EXERCISES: CPT | Performed by: PHYSICAL THERAPIST

## 2020-11-13 NOTE — PROGRESS NOTES
Date: 11/13/2020      Dx: Chronic right shoulder pain (M25.511,G89.29)        Authorized # of Visits:  18 + 10 = 28       Referring MD: Aleja German.   Next MD visit: none scheduled    Medication Changes since last visit?: No    Subjective:  Jake Martinez report th reps, flexion 2 x 10 reps, IR-ER x 20 reps    Re-measured    Seated: (B) scapula squeezes 10 reps x 10 cts ea    FRED over mat/table x 10 reps    NuStep LE only L2-1 x 10 mins     Pendulum (L) UE x 20 reps all directions    Sitting posture correction with repsebastian de jesus; NE tired                Date: 9/11/2020  Tx#: 9/18 Date: 9/16/2020  Tx#: 10/18 9/18/2020  Tx#: 10/18 Date: 9/23/2020  Tx#: 11/18 Date: 9/25/2020  Tx#: 12/18 Date: 9/30/2020  Tx#: 13/18   Scifit UE only L3 x 3 mins ea fwd/bwkd    AROM-PRE:   Sheron Riedel ea    Seated: (L) UE eccentric scaption 6 reps x 10 eccen ct ea; PDM Scifit UE only L5 x 5 mins ea fwd/bwkd    AROM-PRE:   - Supine inclined (mid): (L) UE punch to OH 1 lb x 20 reps; NE    - Supine inclined (mid): (L) UE s.punch (1 step) 3 lbs 2 x 20 reps; 15/18 Date: 10/9/2020  Tx#: 16/18 Date: 10/14/2020  Tx#: 17/18 Date: 10/23/2020  Tx#: 18/18   Scifit UE only L6-5 x 5 mins ea fwd/bkwd    (R) Shoulder test motion: Flexion: major loss; ERP,  Abduction: major loss; ERP  Extension: nil loss; NE  ER: nil loss loss; ERP    Standing: (R) shoulder repeated IR with towel OP with therapist guide 2 x 10 reps; P, NW (R) anterior shoulder    (R/L) UE eccen IR load greenTB 2 x 5 reps x 10 eccen ct ea    Supine: (R) UE punch @ 90 degs 2 x 10 reps; NE tired    Supine: (R) eccen IR load greenTB x 15 reps x 10 eccen ct ea    Supine incline (minimal): (R) UE punch to ceiling 1 lb 2 x 10 reps; NE tired    Supine incline (minimal): (R) UE s.punch to ceiling (1 step) 2-3 lbs 2 x 20 reps; NE tired    Supine incline: (B) UE flexion lbs 2 x 20 reps; NE tired    Supine incline: (R) UE flexion to OH 1 lb 2 x 7 reps; NE tired    Supine: (R) shoulder AAROM flexion to OH x 10 reps; P, NW    Seated: (R/L) bicep curl straight/diagonal 3 lbs 3 x 10 reps ea; NE tired    Seated: (R) ER with red Plan:   Continue to treat the (R) shoulder primarily with directional preference exercise, stability, strengthening, postural exercises, and patient education. Charges:  TherEx x 4       Total Timed Treatment:  55 mins Total Treatment Time:  56 mi

## 2020-11-18 ENCOUNTER — OFFICE VISIT (OUTPATIENT)
Dept: PHYSICAL THERAPY | Age: 85
End: 2020-11-18
Attending: ORTHOPAEDIC SURGERY
Payer: MEDICARE

## 2020-11-18 PROCEDURE — 97110 THERAPEUTIC EXERCISES: CPT | Performed by: PHYSICAL THERAPIST

## 2020-11-18 NOTE — PROGRESS NOTES
Date: 11/18/2020      Dx: Chronic right shoulder pain (M25.511,G89.29)        Authorized # of Visits:  18 + 10 = 28       Referring MD: Petros Cage.   Next MD visit: none scheduled    Medication Changes since last visit?: No    Subjective:  Delores Nash report th reps    Supine: PROM (L) shoulder abduction x 20 reps, flexion 2 x 10 reps, IR-ER x 20 reps    Re-measured    Seated: (B) scapula squeezes 10 reps x 10 cts ea    FRED over mat/table x 10 reps    NuStep LE only L2-1 x 10 mins     Pendulum (L) UE x 20 reps a functional bicep curl straight/diagonal  1 lb 2 x 10 reps ea; NE tired                Date: 9/11/2020  Tx#: 9/18 Date: 9/16/2020  Tx#: 10/18 9/18/2020  Tx#: 10/18 Date: 9/23/2020  Tx#: 11/18 Date: 9/25/2020  Tx#: 12/18 Date: 9/30/2020  Tx#: 13/18   Scifit OH wall towel reach 10 reps x 10 ct hold ea    Seated: (L) UE eccentric scaption 6 reps x 10 eccen ct ea; PDM Scifit UE only L5 x 5 mins ea fwd/bwkd    AROM-PRE:   - Supine inclined (mid): (L) UE punch to OH 1 lb x 20 reps; NE    - Supine inclined (mid): ( 10/2/2020  Tx#: 14/18 Date: 10/6/2020  Tx#: 15/18 Date: 10/9/2020  Tx#: 16/18 Date: 10/14/2020  Tx#: 17/18 Date: 10/23/2020  Tx#: 18/18   Scifit UE only L6-5 x 5 mins ea fwd/bkwd    (R) Shoulder test motion: Flexion: major loss; ERP,  Abduction: major loss Abduction: moderate loss; ERP  IR: moderate loss; ERP    Standing: (R) shoulder repeated IR with towel OP with therapist guide 2 x 10 reps; P, NW (R) anterior shoulder    (R/L) UE eccen IR load greenTB 2 x 5 reps x 10 eccen ct ea    Supine: (R) UE punch reps; P, NW (R) anterior shoulder    (R/L) UE eccen IR load greenTB x 15 reps x 10 eccen ct ea    Supine incline (minimal): (R) UE punch to ceiling 1 lb 2 x 10 reps; NE tired    Supine incline (minimal): (R) UE s.punch to ceiling (1 step) 2-3 lbs 2 x 20 re incline: (R) UE s.punch to ceiling (1 step) 4 lbs 2 x 20 reps; NE tired    Supine incline: (R) UE flexion to OH 1 lb 2 x 7 reps; NE tired    Supine: (R) shoulder AAROM flexion to OH x 10 reps; P, NW    Seated: (R/L) bicep curl straight/diagonal 3 lbs 3 x 1 the elbow and scapula. She required rest periods between her exercises. Goals:    (18+10 visits)  1. Patient to consistently perform her HEP and it's progression to maintain their improved condition.   2. Patient to have reduced and abolished (R) shou

## 2020-11-20 ENCOUNTER — OFFICE VISIT (OUTPATIENT)
Dept: PHYSICAL THERAPY | Age: 85
End: 2020-11-20
Attending: ORTHOPAEDIC SURGERY
Payer: MEDICARE

## 2020-11-20 PROCEDURE — 97110 THERAPEUTIC EXERCISES: CPT | Performed by: PHYSICAL THERAPIST

## 2020-11-20 NOTE — PROGRESS NOTES
Date: 11/20/2020      Dx: Chronic right shoulder pain (M25.511,G89.29)        Authorized # of Visits:  18 + 10 = 28       Referring MD: Lydia Meredith.   Next MD visit: none scheduled    Medication Changes since last visit?: No    Subjective:  Malini Donnelly report th flexion 2 x 10 reps, IR-ER x 20 reps    Re-measured    Seated: (B) scapula squeezes 10 reps x 10 cts ea    FRED over mat/table x 10 reps    NuStep LE only L2-1 x 10 mins     Pendulum (L) UE x 20 reps all directions    Sitting posture correction with lumbar NE tired                Date: 9/11/2020  Tx#: 9/18 Date: 9/16/2020  Tx#: 10/18 9/18/2020  Tx#: 10/18 Date: 9/23/2020  Tx#: 11/18 Date: 9/25/2020  Tx#: 12/18 Date: 9/30/2020  Tx#: 13/18   Scifit UE only L3 x 3 mins ea fwd/bwkd    AROM-PRE:   - Supine inclin eccentric scaption 6 reps x 10 eccen ct ea; PDM Scifit UE only L5 x 5 mins ea fwd/bwkd    AROM-PRE:   - Supine inclined (mid): (L) UE punch to OH 1 lb x 20 reps; NE    - Supine inclined (mid): (L) UE s.punch (1 step) 3 lbs 2 x 20 reps; NE    - Supine incli 10/9/2020  Tx#: 16/18 Date: 10/14/2020  Tx#: 17/18 Date: 10/23/2020  Tx#: 18/18   Scifit UE only L6-5 x 5 mins ea fwd/bkwd    (R) Shoulder test motion: Flexion: major loss; ERP,  Abduction: major loss; ERP  Extension: nil loss; NE  ER: nil loss; NE  IR: ma ERP    Standing: (R) shoulder repeated IR with towel OP with therapist guide 2 x 10 reps; P, NW (R) anterior shoulder    (R/L) UE eccen IR load greenTB 2 x 5 reps x 10 eccen ct ea    Supine: (R) UE punch @ 90 degs 2 x 10 reps; NE tired    Supine: (R) UE s. load greenTB x 15 reps x 10 eccen ct ea    Supine incline (minimal): (R) UE punch to ceiling 1 lb 2 x 10 reps; NE tired    Supine incline (minimal): (R) UE s.punch to ceiling (1 step) 2-3 lbs 2 x 20 reps; NE tired    Supine incline: (B) UE flexion to OH wi 20 reps; NE tired    Supine incline: (R) UE flexion to OH 1 lb 2 x 7 reps; NE tired    Supine: (R) shoulder AAROM flexion to OH x 10 reps; P, NW    Seated: (R/L) bicep curl straight/diagonal 3 lbs 3 x 10 reps ea; NE tired    Seated: (R) ER with redWHITNEY hinds lift 5 reps x 5 cts ea + 1 lb 5 reps x 5 cts; P, NW    Supine incline: (R) UE punch to ceiling 3 lbs 2 x 10; NE tired    Supine incline: (R) UE s.punch to ceiling (1 step) 4 lbs 2 x 15 reps    Supine incline: (R) UE flexion to OH 1 lb 2 x 10 + 2 lbs x 5 re

## 2020-11-25 ENCOUNTER — OFFICE VISIT (OUTPATIENT)
Dept: PHYSICAL THERAPY | Age: 85
End: 2020-11-25
Attending: ORTHOPAEDIC SURGERY
Payer: MEDICARE

## 2020-11-25 PROCEDURE — 97110 THERAPEUTIC EXERCISES: CPT | Performed by: PHYSICAL THERAPIST

## 2020-11-25 NOTE — PROGRESS NOTES
Date: 11/25/2020      Dx: Chronic right shoulder pain (M25.511,G89.29)        Authorized # of Visits:  18 + 10 = 28       Referring MD: Marisa Thornton.   Next MD visit: none scheduled    Medication Changes since last visit?: No    Subjective:  Jose Antonio Slot report th reps    Re-measured    Seated: (B) scapula squeezes 10 reps x 10 cts ea    FRED over mat/table x 10 reps    NuStep LE only L2-1 x 10 mins     Pendulum (L) UE x 20 reps all directions    Sitting posture correction with lumbar roll and (L) arm in abduction o 9/11/2020  Tx#: 9/18 Date: 9/16/2020  Tx#: 10/18 9/18/2020  Tx#: 10/18 Date: 9/23/2020  Tx#: 11/18 Date: 9/25/2020  Tx#: 12/18 Date: 9/30/2020  Tx#: 13/18   Scifit UE only L3 x 3 mins ea fwd/bwkd    AROM-PRE:   - Supine inclined (mid): (L) UE punch @ 90 de eccen ct ea; PDM Scifit UE only L5 x 5 mins ea fwd/bwkd    AROM-PRE:   - Supine inclined (mid): (L) UE punch to OH 1 lb x 20 reps; NE    - Supine inclined (mid): (L) UE s.punch (1 step) 3 lbs 2 x 20 reps; NE    - Supine inclined (mid): (L) UE flexion to [facility] 10/14/2020  Tx#: 17/18 Date: 10/23/2020  Tx#: 18/18   Scifit UE only L6-5 x 5 mins ea fwd/bkwd    (R) Shoulder test motion: Flexion: major loss; ERP,  Abduction: major loss; ERP  Extension: nil loss; NE  ER: nil loss; NE  IR: major loss; ERP    Standing: r repeated IR with towel OP with therapist guide 2 x 10 reps; P, NW (R) anterior shoulder    (R/L) UE eccen IR load greenTB 2 x 5 reps x 10 eccen ct ea    Supine: (R) UE punch @ 90 degs 2 x 10 reps; NE tired    Supine: (R) UE s.punch @ 90 degs (1 step) 2 x 2 eccen ct ea    Supine incline (minimal): (R) UE punch to ceiling 1 lb 2 x 10 reps; NE tired    Supine incline (minimal): (R) UE s.punch to ceiling (1 step) 2-3 lbs 2 x 20 reps; NE tired    Supine incline: (B) UE flexion to [facility] with cane + 2 lbs 2 x 5 reps incline: (R) UE flexion to OH 1 lb 2 x 7 reps; NE tired    Supine: (R) shoulder AAROM flexion to OH x 10 reps; P, NW    Seated: (R/L) bicep curl straight/diagonal 3 lbs 3 x 10 reps ea; NE tired    Seated: (R) ER with redTB manual resist 2 x 10 reps x 5 cts lift 5 reps x 5 cts ea + 1 lb 5 reps x 5 cts; P, NW    Supine incline: (R) UE punch to ceiling 3 lbs 2 x 10; NE tired    Supine incline: (R) UE s.punch to ceiling (1 step) 4 lbs 2 x 15 reps    Supine incline: (R) UE flexion to OH 1 lb 2 x 10 + 2 lbs x 5 re

## 2020-11-27 ENCOUNTER — OFFICE VISIT (OUTPATIENT)
Dept: PHYSICAL THERAPY | Age: 85
End: 2020-11-27
Attending: ORTHOPAEDIC SURGERY
Payer: MEDICARE

## 2020-11-27 PROCEDURE — 97110 THERAPEUTIC EXERCISES: CPT | Performed by: PHYSICAL THERAPIST

## 2020-11-27 NOTE — PROGRESS NOTES
Date: 11/25/2020      Dx: Chronic right shoulder pain (M25.511,G89.29)        Authorized # of Visits:  18 + 10 = 28       Referring MD: Rylan Ellis.   Next MD visit: none scheduled    Medication Changes since last visit?: No    Subjective:  Erma Noonan report th reps    Re-measured    Seated: (B) scapula squeezes 10 reps x 10 cts ea    FRED over mat/table x 10 reps    NuStep LE only L2-1 x 10 mins     Pendulum (L) UE x 20 reps all directions    Sitting posture correction with lumbar roll and (L) arm in abduction o 9/11/2020  Tx#: 9/18 Date: 9/16/2020  Tx#: 10/18 9/18/2020  Tx#: 10/18 Date: 9/23/2020  Tx#: 11/18 Date: 9/25/2020  Tx#: 12/18 Date: 9/30/2020  Tx#: 13/18   Scifit UE only L3 x 3 mins ea fwd/bwkd    AROM-PRE:   - Supine inclined (mid): (L) UE punch @ 90 de eccen ct ea; PDM Scifit UE only L5 x 5 mins ea fwd/bwkd    AROM-PRE:   - Supine inclined (mid): (L) UE punch to OH 1 lb x 20 reps; NE    - Supine inclined (mid): (L) UE s.punch (1 step) 3 lbs 2 x 20 reps; NE    - Supine inclined (mid): (L) UE flexion to [facility] 10/14/2020  Tx#: 17/18 Date: 10/23/2020  Tx#: 18/18   Scifit UE only L6-5 x 5 mins ea fwd/bkwd    (R) Shoulder test motion: Flexion: major loss; ERP,  Abduction: major loss; ERP  Extension: nil loss; NE  ER: nil loss; NE  IR: major loss; ERP    Standing: r repeated IR with towel OP with therapist guide 2 x 10 reps; P, NW (R) anterior shoulder    (R/L) UE eccen IR load greenTB 2 x 5 reps x 10 eccen ct ea    Supine: (R) UE punch @ 90 degs 2 x 10 reps; NE tired    Supine: (R) UE s.punch @ 90 degs (1 step) 2 x 2 eccen ct ea    Supine incline (minimal): (R) UE punch to ceiling 1 lb 2 x 10 reps; NE tired    Supine incline (minimal): (R) UE s.punch to ceiling (1 step) 2-3 lbs 2 x 20 reps; NE tired    Supine incline: (B) UE flexion to [facility] with cane + 2 lbs 2 x 5 reps incline: (R) UE flexion to OH 1 lb 2 x 7 reps; NE tired    Supine: (R) shoulder AAROM flexion to OH x 10 reps; P, NW    Seated: (R/L) bicep curl straight/diagonal 3 lbs 3 x 10 reps ea; NE tired    Seated: (R) ER with redTB manual resist 2 x 10 reps x 5 cts ea    Seated: (B) UE cane OH lift 5 reps x 5 cts ea + 1 lb 5 reps x 5 cts; P, NW    Supine incline: (R) UE punch to ceiling 3 lbs 2 x 10; NE tired    Supine incline: (R) UE s.punch to ceiling (1 step) 4 lbs 2 x 15 reps    Supine incline: (R) UE flexion to consistently perform her HEP and it's progression to maintain their improved condition. 2. Patient to have reduced and abolished (R) shoulder pain to enable a return to dressing, cooking, crossword puzzle, and driving activities.   3. Patient to have [facility]

## 2020-12-02 ENCOUNTER — APPOINTMENT (OUTPATIENT)
Dept: GENERAL RADIOLOGY | Facility: HOSPITAL | Age: 85
DRG: 481 | End: 2020-12-02
Attending: EMERGENCY MEDICINE
Payer: MEDICARE

## 2020-12-02 ENCOUNTER — HOSPITAL ENCOUNTER (INPATIENT)
Facility: HOSPITAL | Age: 85
LOS: 4 days | Discharge: NURSING, REHAB OR LONG-TERM CARE | DRG: 481 | End: 2020-12-07
Attending: EMERGENCY MEDICINE | Admitting: HOSPITALIST
Payer: MEDICARE

## 2020-12-02 ENCOUNTER — OFFICE VISIT (OUTPATIENT)
Dept: PHYSICAL THERAPY | Age: 85
End: 2020-12-02
Attending: ORTHOPAEDIC SURGERY
Payer: MEDICARE

## 2020-12-02 DIAGNOSIS — S72.421A CLOSED BICONDYLAR FRACTURE OF RIGHT FEMUR, INITIAL ENCOUNTER (HCC): Primary | ICD-10-CM

## 2020-12-02 DIAGNOSIS — S72.431A CLOSED BICONDYLAR FRACTURE OF RIGHT FEMUR, INITIAL ENCOUNTER (HCC): Primary | ICD-10-CM

## 2020-12-02 PROCEDURE — 73502 X-RAY EXAM HIP UNI 2-3 VIEWS: CPT | Performed by: EMERGENCY MEDICINE

## 2020-12-02 PROCEDURE — 71045 X-RAY EXAM CHEST 1 VIEW: CPT | Performed by: EMERGENCY MEDICINE

## 2020-12-02 PROCEDURE — 73552 X-RAY EXAM OF FEMUR 2/>: CPT | Performed by: EMERGENCY MEDICINE

## 2020-12-02 PROCEDURE — 99222 1ST HOSP IP/OBS MODERATE 55: CPT | Performed by: HOSPITALIST

## 2020-12-02 PROCEDURE — 97110 THERAPEUTIC EXERCISES: CPT | Performed by: PHYSICAL THERAPIST

## 2020-12-02 RX ORDER — MORPHINE SULFATE 4 MG/ML
2 INJECTION, SOLUTION INTRAMUSCULAR; INTRAVENOUS ONCE
Status: COMPLETED | OUTPATIENT
Start: 2020-12-02 | End: 2020-12-02

## 2020-12-02 RX ORDER — TRAMADOL HYDROCHLORIDE 50 MG/1
50 TABLET ORAL ONCE
Status: DISCONTINUED | OUTPATIENT
Start: 2020-12-02 | End: 2020-12-02

## 2020-12-02 RX ORDER — ONDANSETRON 2 MG/ML
4 INJECTION INTRAMUSCULAR; INTRAVENOUS ONCE
Status: COMPLETED | OUTPATIENT
Start: 2020-12-02 | End: 2020-12-02

## 2020-12-02 NOTE — PATIENT INSTRUCTIONS
(R) Shoulder 10 reps 1-2x/day          (L) Shoulder 10 reps 1-2x/day. (R) Shoulder; 1-2x/day only        (L) Shoulder 1-2x/day only. 2-3x/week only.

## 2020-12-02 NOTE — PROGRESS NOTES
Date: 12/2/2020      Dx: Chronic right shoulder pain (M25.511,G89.29)        Authorized # of Visits:  18 + 10 = 28       Referring MD: Antonette Gonzalez.   Next MD visit: none scheduled    Medication Changes since last visit?: No    Subjective:  Isabel andres bicep curl (active) x 20 reps    Supine: PROM (L) shoulder abduction x 20 reps, flexion 2 x 10 reps, IR-ER x 20 reps    Re-measured    Seated: (B) scapula squeezes 10 reps x 10 cts ea    FRED over mat/table x 10 reps    NuStep LE only L2-1 x 10 mins     Pe tired    Seated: (R/L) functional bicep curl straight/diagonal  1 lb 2 x 10 reps ea; NE tired                Date: 9/11/2020  Tx#: 9/18 Date: 9/16/2020  Tx#: 10/18 9/18/2020  Tx#: 10/18 Date: 9/23/2020  Tx#: 11/18 Date: 9/25/2020  Tx#: 12/18 Date: 9/30/202 (R>L) shoulder    (L) UE OH wall towel reach 10 reps x 10 ct hold ea    Seated: (L) UE eccentric scaption 6 reps x 10 eccen ct ea; PDM Scifit UE only L5 x 5 mins ea fwd/bwkd    AROM-PRE:   - Supine inclined (mid): (L) UE punch to OH 1 lb x 20 reps; NE    - reps ea       Date: 10/2/2020  Tx#: 14/18 Date: 10/6/2020  Tx#: 15/18 Date: 10/9/2020  Tx#: 16/18 Date: 10/14/2020  Tx#: 17/18 Date: 10/23/2020  Tx#: 18/18   Scifit UE only L6-5 x 5 mins ea fwd/bkwd    (R) Shoulder test motion: Flexion: major loss; ERP,  A moderate loss; ERP   Abduction: moderate loss; ERP  IR: moderate loss; ERP    Standing: (R) shoulder repeated IR with towel OP with therapist guide 2 x 10 reps; P, NW (R) anterior shoulder    (R/L) UE eccen IR load greenTB 2 x 5 reps x 10 eccen ct ea    Gonzales with belt OP x 10 reps; P, NW (R) anterior shoulder    (R/L) UE eccen IR load greenTB x 15 reps x 10 eccen ct ea    Supine incline (minimal): (R) UE punch to ceiling 1 lb 2 x 10 reps; NE tired    Supine incline (minimal): (R) UE s.punch to ceiling (1 step) NE tired    Supine incline: (R) UE s.punch to ceiling (1 step) 4 lbs 2 x 20 reps; NE tired    Supine incline: (R) UE flexion to OH 1 lb 2 x 7 reps; NE tired    Supine: (R) shoulder AAROM flexion to OH x 10 reps; P, NW    Seated: (R/L) bicep curl straight/d fwd/bkwd    Standing: repeated (R) UE h.adduction x 10 reps; NE    Standing: (R) UE eccentric h.adduction greenTB 10 reps x 10 eccen ct ea    Seated: (B) UE cane OH lift 5 reps x 5 cts ea + 1 lb 5 reps x 5 cts; P, NW    Supine incline: (R) UE punch to ceil fwd/bkwd    Standing: repeated (R) UE h.adduction x 10 reps; NE    Standing: (R) UE eccentric h.adduction blueTB 10 reps x 10 eccen ct ea    Standing: (R) shoulder repeated IR with belt OP x 10 reps; P, NW (R) anterior shoulder    (R/L) UE eccen IR load bl

## 2020-12-03 ENCOUNTER — ANESTHESIA (OUTPATIENT)
Dept: SURGERY | Facility: HOSPITAL | Age: 85
DRG: 481 | End: 2020-12-03
Payer: MEDICARE

## 2020-12-03 ENCOUNTER — APPOINTMENT (OUTPATIENT)
Dept: GENERAL RADIOLOGY | Facility: HOSPITAL | Age: 85
DRG: 481 | End: 2020-12-03
Attending: ORTHOPAEDIC SURGERY
Payer: MEDICARE

## 2020-12-03 ENCOUNTER — ANESTHESIA EVENT (OUTPATIENT)
Dept: SURGERY | Facility: HOSPITAL | Age: 85
DRG: 481 | End: 2020-12-03
Payer: MEDICARE

## 2020-12-03 PROBLEM — S72.431A CLOSED BICONDYLAR FRACTURE OF RIGHT FEMUR, INITIAL ENCOUNTER (HCC): Status: ACTIVE | Noted: 2020-12-03

## 2020-12-03 PROBLEM — S72.421A CLOSED BICONDYLAR FRACTURE OF RIGHT FEMUR, INITIAL ENCOUNTER (HCC): Status: ACTIVE | Noted: 2020-12-03

## 2020-12-03 PROCEDURE — 27506 TREATMENT OF THIGH FRACTURE: CPT | Performed by: ORTHOPAEDIC SURGERY

## 2020-12-03 PROCEDURE — 0QSC36Z REPOSITION LEFT LOWER FEMUR WITH INTRAMEDULLARY INTERNAL FIXATION DEVICE, PERCUTANEOUS APPROACH: ICD-10-PCS | Performed by: ORTHOPAEDIC SURGERY

## 2020-12-03 PROCEDURE — 99233 SBSQ HOSP IP/OBS HIGH 50: CPT | Performed by: HOSPITALIST

## 2020-12-03 PROCEDURE — 76000 FLUOROSCOPY <1 HR PHYS/QHP: CPT | Performed by: ORTHOPAEDIC SURGERY

## 2020-12-03 DEVICE — IMPLANTABLE DEVICE: Type: IMPLANTABLE DEVICE | Site: LEG | Status: FUNCTIONAL

## 2020-12-03 DEVICE — SCREW BN 5MM 4.3MM 36MM NLEX: Type: IMPLANTABLE DEVICE | Site: LEG | Status: FUNCTIONAL

## 2020-12-03 DEVICE — SCREW BN 5MM 4.3MM 38MM NLEX: Type: IMPLANTABLE DEVICE | Site: LEG | Status: FUNCTIONAL

## 2020-12-03 RX ORDER — HALOPERIDOL 5 MG/ML
0.25 INJECTION INTRAMUSCULAR ONCE AS NEEDED
Status: DISCONTINUED | OUTPATIENT
Start: 2020-12-03 | End: 2020-12-03 | Stop reason: HOSPADM

## 2020-12-03 RX ORDER — FAMOTIDINE 10 MG/ML
20 INJECTION, SOLUTION INTRAVENOUS DAILY
Status: DISCONTINUED | OUTPATIENT
Start: 2020-12-03 | End: 2020-12-06

## 2020-12-03 RX ORDER — NEOSTIGMINE METHYLSULFATE 1 MG/ML
INJECTION INTRAVENOUS AS NEEDED
Status: DISCONTINUED | OUTPATIENT
Start: 2020-12-03 | End: 2020-12-03 | Stop reason: SURG

## 2020-12-03 RX ORDER — ONDANSETRON 2 MG/ML
4 INJECTION INTRAMUSCULAR; INTRAVENOUS ONCE AS NEEDED
Status: DISCONTINUED | OUTPATIENT
Start: 2020-12-03 | End: 2020-12-03 | Stop reason: HOSPADM

## 2020-12-03 RX ORDER — SODIUM CHLORIDE, SODIUM LACTATE, POTASSIUM CHLORIDE, CALCIUM CHLORIDE 600; 310; 30; 20 MG/100ML; MG/100ML; MG/100ML; MG/100ML
INJECTION, SOLUTION INTRAVENOUS CONTINUOUS PRN
Status: DISCONTINUED | OUTPATIENT
Start: 2020-12-03 | End: 2020-12-03 | Stop reason: SURG

## 2020-12-03 RX ORDER — DEXAMETHASONE SODIUM PHOSPHATE 4 MG/ML
VIAL (ML) INJECTION AS NEEDED
Status: DISCONTINUED | OUTPATIENT
Start: 2020-12-03 | End: 2020-12-03 | Stop reason: SURG

## 2020-12-03 RX ORDER — HYDROMORPHONE HYDROCHLORIDE 1 MG/ML
0.6 INJECTION, SOLUTION INTRAMUSCULAR; INTRAVENOUS; SUBCUTANEOUS EVERY 5 MIN PRN
Status: DISCONTINUED | OUTPATIENT
Start: 2020-12-03 | End: 2020-12-03 | Stop reason: HOSPADM

## 2020-12-03 RX ORDER — PROCHLORPERAZINE EDISYLATE 5 MG/ML
5 INJECTION INTRAMUSCULAR; INTRAVENOUS ONCE AS NEEDED
Status: DISCONTINUED | OUTPATIENT
Start: 2020-12-03 | End: 2020-12-03 | Stop reason: HOSPADM

## 2020-12-03 RX ORDER — LABETALOL HYDROCHLORIDE 5 MG/ML
INJECTION, SOLUTION INTRAVENOUS AS NEEDED
Status: DISCONTINUED | OUTPATIENT
Start: 2020-12-03 | End: 2020-12-03 | Stop reason: SURG

## 2020-12-03 RX ORDER — GLYCOPYRROLATE 0.2 MG/ML
INJECTION, SOLUTION INTRAMUSCULAR; INTRAVENOUS AS NEEDED
Status: DISCONTINUED | OUTPATIENT
Start: 2020-12-03 | End: 2020-12-03 | Stop reason: SURG

## 2020-12-03 RX ORDER — SODIUM CHLORIDE, SODIUM LACTATE, POTASSIUM CHLORIDE, CALCIUM CHLORIDE 600; 310; 30; 20 MG/100ML; MG/100ML; MG/100ML; MG/100ML
INJECTION, SOLUTION INTRAVENOUS CONTINUOUS
Status: DISCONTINUED | OUTPATIENT
Start: 2020-12-03 | End: 2020-12-03 | Stop reason: HOSPADM

## 2020-12-03 RX ORDER — MORPHINE SULFATE 4 MG/ML
4 INJECTION, SOLUTION INTRAMUSCULAR; INTRAVENOUS EVERY 10 MIN PRN
Status: DISCONTINUED | OUTPATIENT
Start: 2020-12-03 | End: 2020-12-03 | Stop reason: HOSPADM

## 2020-12-03 RX ORDER — HYDROMORPHONE HYDROCHLORIDE 1 MG/ML
0.2 INJECTION, SOLUTION INTRAMUSCULAR; INTRAVENOUS; SUBCUTANEOUS EVERY 5 MIN PRN
Status: DISCONTINUED | OUTPATIENT
Start: 2020-12-03 | End: 2020-12-03 | Stop reason: HOSPADM

## 2020-12-03 RX ORDER — HYDROCODONE BITARTRATE AND ACETAMINOPHEN 5; 325 MG/1; MG/1
2 TABLET ORAL AS NEEDED
Status: DISCONTINUED | OUTPATIENT
Start: 2020-12-03 | End: 2020-12-03 | Stop reason: HOSPADM

## 2020-12-03 RX ORDER — HYDROMORPHONE HYDROCHLORIDE 1 MG/ML
1 INJECTION, SOLUTION INTRAMUSCULAR; INTRAVENOUS; SUBCUTANEOUS EVERY 2 HOUR PRN
Status: DISCONTINUED | OUTPATIENT
Start: 2020-12-03 | End: 2020-12-07

## 2020-12-03 RX ORDER — ONDANSETRON 2 MG/ML
4 INJECTION INTRAMUSCULAR; INTRAVENOUS ONCE
Status: COMPLETED | OUTPATIENT
Start: 2020-12-03 | End: 2020-12-03

## 2020-12-03 RX ORDER — ASPIRIN 325 MG
325 TABLET ORAL 2 TIMES DAILY
Status: DISCONTINUED | OUTPATIENT
Start: 2020-12-03 | End: 2020-12-04

## 2020-12-03 RX ORDER — NALOXONE HYDROCHLORIDE 0.4 MG/ML
80 INJECTION, SOLUTION INTRAMUSCULAR; INTRAVENOUS; SUBCUTANEOUS AS NEEDED
Status: DISCONTINUED | OUTPATIENT
Start: 2020-12-03 | End: 2020-12-03 | Stop reason: HOSPADM

## 2020-12-03 RX ORDER — MORPHINE SULFATE 10 MG/ML
6 INJECTION, SOLUTION INTRAMUSCULAR; INTRAVENOUS EVERY 10 MIN PRN
Status: DISCONTINUED | OUTPATIENT
Start: 2020-12-03 | End: 2020-12-03 | Stop reason: HOSPADM

## 2020-12-03 RX ORDER — HYDROCODONE BITARTRATE AND ACETAMINOPHEN 5; 325 MG/1; MG/1
1 TABLET ORAL AS NEEDED
Status: DISCONTINUED | OUTPATIENT
Start: 2020-12-03 | End: 2020-12-03 | Stop reason: HOSPADM

## 2020-12-03 RX ORDER — POLYETHYLENE GLYCOL 3350 17 G/17G
17 POWDER, FOR SOLUTION ORAL DAILY PRN
Status: DISCONTINUED | OUTPATIENT
Start: 2020-12-03 | End: 2020-12-07

## 2020-12-03 RX ORDER — ROCURONIUM BROMIDE 10 MG/ML
INJECTION, SOLUTION INTRAVENOUS AS NEEDED
Status: DISCONTINUED | OUTPATIENT
Start: 2020-12-03 | End: 2020-12-03 | Stop reason: SURG

## 2020-12-03 RX ORDER — HYDROMORPHONE HYDROCHLORIDE 1 MG/ML
0.4 INJECTION, SOLUTION INTRAMUSCULAR; INTRAVENOUS; SUBCUTANEOUS EVERY 5 MIN PRN
Status: DISCONTINUED | OUTPATIENT
Start: 2020-12-03 | End: 2020-12-03 | Stop reason: HOSPADM

## 2020-12-03 RX ORDER — DEXTROSE MONOHYDRATE 25 G/50ML
50 INJECTION, SOLUTION INTRAVENOUS
Status: DISCONTINUED | OUTPATIENT
Start: 2020-12-03 | End: 2020-12-03 | Stop reason: HOSPADM

## 2020-12-03 RX ORDER — SODIUM PHOSPHATE, DIBASIC AND SODIUM PHOSPHATE, MONOBASIC 7; 19 G/133ML; G/133ML
1 ENEMA RECTAL ONCE AS NEEDED
Status: DISCONTINUED | OUTPATIENT
Start: 2020-12-03 | End: 2020-12-07

## 2020-12-03 RX ORDER — ATENOLOL 50 MG/1
50 TABLET ORAL
Status: DISCONTINUED | OUTPATIENT
Start: 2020-12-03 | End: 2020-12-07

## 2020-12-03 RX ORDER — HYDROMORPHONE HYDROCHLORIDE 1 MG/ML
2 INJECTION, SOLUTION INTRAMUSCULAR; INTRAVENOUS; SUBCUTANEOUS EVERY 2 HOUR PRN
Status: DISCONTINUED | OUTPATIENT
Start: 2020-12-03 | End: 2020-12-07

## 2020-12-03 RX ORDER — HYDROCODONE BITARTRATE AND ACETAMINOPHEN 5; 325 MG/1; MG/1
1 TABLET ORAL EVERY 4 HOURS PRN
Status: DISCONTINUED | OUTPATIENT
Start: 2020-12-03 | End: 2020-12-04

## 2020-12-03 RX ORDER — MORPHINE SULFATE 2 MG/ML
2 INJECTION, SOLUTION INTRAMUSCULAR; INTRAVENOUS EVERY 2 HOUR PRN
Status: DISCONTINUED | OUTPATIENT
Start: 2020-12-03 | End: 2020-12-07

## 2020-12-03 RX ORDER — DOCUSATE SODIUM 100 MG/1
100 CAPSULE, LIQUID FILLED ORAL 2 TIMES DAILY
Status: DISCONTINUED | OUTPATIENT
Start: 2020-12-03 | End: 2020-12-07

## 2020-12-03 RX ORDER — MORPHINE SULFATE 4 MG/ML
2 INJECTION, SOLUTION INTRAMUSCULAR; INTRAVENOUS EVERY 10 MIN PRN
Status: DISCONTINUED | OUTPATIENT
Start: 2020-12-03 | End: 2020-12-03 | Stop reason: HOSPADM

## 2020-12-03 RX ORDER — BISACODYL 10 MG
10 SUPPOSITORY, RECTAL RECTAL
Status: DISCONTINUED | OUTPATIENT
Start: 2020-12-03 | End: 2020-12-07

## 2020-12-03 RX ORDER — METOPROLOL TARTRATE 5 MG/5ML
2.5 INJECTION INTRAVENOUS ONCE
Status: DISCONTINUED | OUTPATIENT
Start: 2020-12-03 | End: 2020-12-03 | Stop reason: HOSPADM

## 2020-12-03 RX ORDER — ACETAMINOPHEN 325 MG/1
650 TABLET ORAL EVERY 4 HOURS PRN
Status: DISCONTINUED | OUTPATIENT
Start: 2020-12-03 | End: 2020-12-07

## 2020-12-03 RX ORDER — HYDROCODONE BITARTRATE AND ACETAMINOPHEN 5; 325 MG/1; MG/1
2 TABLET ORAL EVERY 4 HOURS PRN
Status: DISCONTINUED | OUTPATIENT
Start: 2020-12-03 | End: 2020-12-04

## 2020-12-03 RX ORDER — HYDROMORPHONE HYDROCHLORIDE 1 MG/ML
0.5 INJECTION, SOLUTION INTRAMUSCULAR; INTRAVENOUS; SUBCUTANEOUS EVERY 2 HOUR PRN
Status: DISCONTINUED | OUTPATIENT
Start: 2020-12-03 | End: 2020-12-07

## 2020-12-03 RX ORDER — EPHEDRINE SULFATE 50 MG/ML
INJECTION, SOLUTION INTRAVENOUS AS NEEDED
Status: DISCONTINUED | OUTPATIENT
Start: 2020-12-03 | End: 2020-12-03 | Stop reason: SURG

## 2020-12-03 RX ORDER — DEXTROSE MONOHYDRATE 25 G/50ML
50 INJECTION, SOLUTION INTRAVENOUS
Status: DISCONTINUED | OUTPATIENT
Start: 2020-12-03 | End: 2020-12-07

## 2020-12-03 RX ORDER — CEFAZOLIN SODIUM/WATER 2 G/20 ML
2 SYRINGE (ML) INTRAVENOUS EVERY 8 HOURS
Status: COMPLETED | OUTPATIENT
Start: 2020-12-03 | End: 2020-12-04

## 2020-12-03 RX ORDER — SODIUM CHLORIDE 9 MG/ML
INJECTION, SOLUTION INTRAVENOUS CONTINUOUS
Status: DISCONTINUED | OUTPATIENT
Start: 2020-12-03 | End: 2020-12-04

## 2020-12-03 RX ORDER — ONDANSETRON 2 MG/ML
INJECTION INTRAMUSCULAR; INTRAVENOUS
Status: COMPLETED
Start: 2020-12-03 | End: 2020-12-03

## 2020-12-03 RX ORDER — TRANEXAMIC ACID 10 MG/ML
INJECTION, SOLUTION INTRAVENOUS AS NEEDED
Status: DISCONTINUED | OUTPATIENT
Start: 2020-12-03 | End: 2020-12-03 | Stop reason: SURG

## 2020-12-03 RX ORDER — PHENYLEPHRINE HCL 10 MG/ML
VIAL (ML) INJECTION AS NEEDED
Status: DISCONTINUED | OUTPATIENT
Start: 2020-12-03 | End: 2020-12-03 | Stop reason: SURG

## 2020-12-03 RX ORDER — MORPHINE SULFATE 4 MG/ML
4 INJECTION, SOLUTION INTRAMUSCULAR; INTRAVENOUS EVERY 2 HOUR PRN
Status: DISCONTINUED | OUTPATIENT
Start: 2020-12-03 | End: 2020-12-07

## 2020-12-03 RX ORDER — ONDANSETRON 2 MG/ML
4 INJECTION INTRAMUSCULAR; INTRAVENOUS EVERY 6 HOURS PRN
Status: DISCONTINUED | OUTPATIENT
Start: 2020-12-03 | End: 2020-12-07

## 2020-12-03 RX ORDER — LIDOCAINE HYDROCHLORIDE 10 MG/ML
INJECTION, SOLUTION EPIDURAL; INFILTRATION; INTRACAUDAL; PERINEURAL AS NEEDED
Status: DISCONTINUED | OUTPATIENT
Start: 2020-12-03 | End: 2020-12-03 | Stop reason: SURG

## 2020-12-03 RX ADMIN — PHENYLEPHRINE HCL 100 MCG: 10 MG/ML VIAL (ML) INJECTION at 15:40:00

## 2020-12-03 RX ADMIN — SODIUM CHLORIDE, SODIUM LACTATE, POTASSIUM CHLORIDE, CALCIUM CHLORIDE: 600; 310; 30; 20 INJECTION, SOLUTION INTRAVENOUS at 14:35:00

## 2020-12-03 RX ADMIN — DEXAMETHASONE SODIUM PHOSPHATE 4 MG: 4 MG/ML VIAL (ML) INJECTION at 14:10:00

## 2020-12-03 RX ADMIN — NEOSTIGMINE METHYLSULFATE 4 MG: 1 INJECTION INTRAVENOUS at 16:06:00

## 2020-12-03 RX ADMIN — PHENYLEPHRINE HCL 50 MCG: 10 MG/ML VIAL (ML) INJECTION at 15:01:00

## 2020-12-03 RX ADMIN — ONDANSETRON 4 MG: 2 INJECTION INTRAMUSCULAR; INTRAVENOUS at 15:52:00

## 2020-12-03 RX ADMIN — ROCURONIUM BROMIDE 30 MG: 10 INJECTION, SOLUTION INTRAVENOUS at 14:10:00

## 2020-12-03 RX ADMIN — LABETALOL HYDROCHLORIDE 10 MG: 5 INJECTION, SOLUTION INTRAVENOUS at 16:11:00

## 2020-12-03 RX ADMIN — GLYCOPYRROLATE 0.8 MG: 0.2 INJECTION, SOLUTION INTRAMUSCULAR; INTRAVENOUS at 16:04:00

## 2020-12-03 RX ADMIN — PHENYLEPHRINE HCL 100 MCG: 10 MG/ML VIAL (ML) INJECTION at 15:43:00

## 2020-12-03 RX ADMIN — EPHEDRINE SULFATE 5 MG: 50 INJECTION, SOLUTION INTRAVENOUS at 14:32:00

## 2020-12-03 RX ADMIN — LIDOCAINE HYDROCHLORIDE 50 MG: 10 INJECTION, SOLUTION EPIDURAL; INFILTRATION; INTRACAUDAL; PERINEURAL at 14:10:00

## 2020-12-03 RX ADMIN — TRANEXAMIC ACID 1000 MG: 10 INJECTION, SOLUTION INTRAVENOUS at 16:06:00

## 2020-12-03 NOTE — OPERATIVE REPORT
[facility] OPERATING ROOM  Operative Note     Donal Patel Location: OR   [facility] [id number] MRN [id number]   Admission Date 12/2/2020 Operation Date 12/3/2020   Attending Physician Tamia Skinner MD Operating Physician Alisa Stearns MD      Pr None     Condition: Stable and good     Estimated Blood Loss: Blood Output: 100 mL (12/3/2020  3:49 PM)      IVF: 1400 mL     Case in Detail: Patient was admitted to the hospital after a fall sustaining an isolated left distal femur fracture.   I discussed passed until it was fully seated. Guidewire was removed.   Triple trochar was passed through the guide to skin and an incision was made over the lateral femur down to bone, guide was passed down to bone and drilled bicortically, measured and then appropria

## 2020-12-03 NOTE — PROGRESS NOTES
[facility]  Hospitalist Progress Note     Kota Foster Aspito Patient Status:  Inpatient    119/1931  80year old [facility] [id number]   Location 431/431-A Attending Leopoldo Bruns, MD   Hosp Day # 0 PCP Sunni Meredith MD     ASSESSMENT/PLAN    Dis 28.0     No results for input(s): PT, INR, PTT in the last 168 hours.     • atenolol  50 mg Oral Daily   • Insulin Regular Human  1-7 Units Subcutaneous Q6H [facility]   • famoTIDine  20 mg Intravenous Daily   • ceFAZolin  1 g Intravenous On Call to OR     glucose

## 2020-12-03 NOTE — ANESTHESIA PREPROCEDURE EVALUATION
Anesthesia PreOp Note    HPI:     Gordon Garrett is a 80year old female who presents for preoperative consultation requested by: Destin Whitt MD    Date of Surgery: 12/2/2020 - 12/3/2020    Procedure(s):  DISTAL FEMUR OPEN REDUCTION INTERNAL FIX 06/27/2012        Past Medical History:   Diagnosis Date   • Breast CA ([facility] 08/12/2016   • Diabetes ([facility]    • Diabetes mellitus, type II ([facility]    • Essential hypertension    • Hearing impairment    • High blood pressure    • High cholesterol    • Hyperli mouth nightly.  , Disp: , Rfl: 1, 12/2/2020 at Unknown time    •  traMADol HCl 50 MG Oral Tab, Take 1 tablet (50 mg total) by mouth every 8 (eight) hours as needed for Pain., Disp: 10 tablet, Rfl: 0, More than a month at Unknown time    •  LOSARTAN EDWARDOI tab 4 tablet, 4 tablet, Oral, Q15 Min PRN, Edward Greco MD    Or    •  [facility] Hold] dextrose 50 % injection 50 mL, 50 mL, Intravenous, Q15 Min PRN, Edward Greco MD    Or    •  [facility] Hold] glucose (DEX4) oral liquid 30 g, 30 g, Oral, Q15 Min PRN, Ashkan Parents, Cancer Mother 80        had lumpectomy and RT. No other treatment.    of stroke at 80   • Other (appendicitis[other]) Brother         age 15   • Other (alive and well[other]) Sister    • Other (alive and well[other]) Son         x3   • Breast Cancer Se Weight Concern: Not Asked        Special Diet: Not Asked        Back Care: Not Asked        Exercise: Not Asked        Bike Helmet: Not Asked        Seat Belt: Not Asked        Self-Exams: Not Asked    Social History Narrative      Not on file      Anna legal guardian or family member of the nature of the anesthetic plan, benefits, risks including possible dental damage if relevant, major complications, and any alternative forms of anesthetic management.    All of the patient's questions were answered to t

## 2020-12-03 NOTE — PLAN OF CARE
Problem: Patient Centered Care  Goal: Patient preferences are identified and integrated in the patient's plan of care  Description: Interventions:  - What would you like us to know as we care for you? Hard of hearing, live at home with sons.   - Provide t Patient alert and oriented X4. On room air, tele in place. Deloria Kudo in place, scds on, Immobilizer on, Patient refused Morphine stating she feels uncomfortable after taking it , pain med switched to IV dilaudid. Right arm precaution.  Fall precautions follo

## 2020-12-03 NOTE — ED PROVIDER NOTES
Patient Seen in: [facility] AND [facility] Emergency Department      History   Patient presents with:  Fall    Stated Complaint: fall    HPI    The patient is an 19-year-old female who had her left knee gave out causing her to fall landing onto the left knee jus O2 Device None (Room air)       Current:/60   Pulse 68   Temp 99 °F (37.2 °C) (Temporal)   Resp 15   SpO2 93%         Physical Exam  Vitals signs and nursing note reviewed. Constitutional:       General: She is not in acute distress.      Appearance CBC W/ DIFFERENTIAL - Abnormal; Notable for the following components:       Result Value    RDW-SD 47.7 (*)     All other components within normal limits   CBC WITH DIFFERENTIAL WITH PLATELET    Narrative:      The following orders were created for panel or Comminuted fracture distal femoral metadiaphysis with one full shaft with posterior displacement. No definite intra-articular extension. Background of severe degenerative changes in the knee. Left hip/pelvis:  No fractures or diastasis.   Mild degenera

## 2020-12-03 NOTE — H&P
[address] Patient Status:  Emergency    1931 MRN [id number]   Location [address] Attending Vesta Beverly MD   Hosp Day # 0 PCP Dusty Baker MD     Date: Breast Cancer Mother 80        had lumpectomy and RT. No other treatment.    of stroke at 80   • Other (appendicitis[other]) Brother         age 15   • Other (alive and well[other]) Sister    • Other (alive and well[other]) Son         x3   • Breast Ca daily.   NON FORMULARY   Yes No   Sig: daily. TURMERIC CURCUMIN OR   Yes No   Sig: Take by mouth. acetaminophen 500 MG Oral Tab   Yes No   Sig: Take 1,000 mg by mouth every 6 (six) hours as needed for Pain.    anastrozole 1 MG Oral Tab tab   No No   Sig organomegaly. Lymphatics:  No lymphadenopathy neck, axilla, groin. Musculoskeletal: Normal range of motion. normal strength. Feet:  Normal pulses. Neurologic:  Alert, oriented, no focal deficits, cranial nerves II-XII are grossly intact.   Cognition an

## 2020-12-03 NOTE — CONSULTS
[facility]    Report of Consultation    Veronica Patel Patient Status:  Inpatient    1931 MRN [id number]   Location [address] [facility] Attending Carlyle Dash MD   Hosp Day # 0 PCP Taina White MD     Date of Admi Age of Onset   • Heart Disease Father         CAD   • Heart Disease Mother         CAD   • Diabetes Mother    • Breast Cancer Mother 80        had lumpectomy and RT. No other treatment.    of stroke at 80   • Other (appendicitis[other]) Brother Or    •  [MAR Hold] HYDROmorphone HCl (DILAUDID) 1 MG/ML injection 2 mg, 2 mg, Intravenous, Q2H PRN    •  ceFAZolin (ANCEF) IVPB 1g/100ml in 0.9% NaCl minibag/add-van, 1 g, Intravenous, On Call to OR        •  dilTIAZem HCl ER Coated Beads (CARTIA XT) 180 mg by mouth 2 (two) times daily. •  NON FORMULARY, daily.     •  Glucose Blood (LEE CONTOUR NEXT TEST) In Vitro Strip, To test blood sugar 1 time every day        Allergies    Adhesive Tape           RASH    Review of Systems:    A comprehensive review 6:02 AM     Finalized by (CST): Joseluis Chester MD on 12/03/2020 at 6:04 AM          Xr Chest Ap Portable  (cpt=71045)    Result Date: 12/3/2020  CONCLUSION:  1. No acute findings. 2. Findings were described by [facility].     Dictated by (CST

## 2020-12-03 NOTE — ANESTHESIA POSTPROCEDURE EVALUATION
Patient: Faviola Gamble    Procedure Summary     Date: 12/03/20 Room / Location: [address] [place] [address] OR 04 / [facility] OR    Anesthesia Start: 1732 Anesthesia Stop: 1640    Procedure: DISTAL FEMUR OPEN REDUCTION INTERNAL FIXATION (Left Leg Upper) Diagnosis:       Fe

## 2020-12-03 NOTE — ED NOTES
Orders for admission, patient is aware of plan and ready to go upstairs. Any questions, please call ED RN Sky Thomason  at extension [phone]. Type of COVID test sent: Rapid  COVID Suspicion level: Low    Titratable drug(s) infusing: None.  2mg MS IVP given x2  Rat

## 2020-12-03 NOTE — ANESTHESIA PROCEDURE NOTES
Airway  Date/Time: 12/3/2020 2:13 PM  Urgency: Elective    Airway not difficult    General Information and Staff    Patient location during procedure: OR  Anesthesiologist: Odalis Brandt MD  Resident/CRNA: Leoncio Marion CRNA  Performed: CRNA     Indicat

## 2020-12-03 NOTE — PLAN OF CARE
Pt A&O.x4. VSS and meds given with sips of water in am. Pt NPO and maintaining bed rest with immobilizer on left leg with deformity and swelling noted. Bruising noted and skin intact. RA.  SCD and blue boots on right leg. Pure wick in place, voiding.   An meds.      Interventions:  - PT, OT, pain meds, non-pharmacologic pain control, education on pain meds.   - See additional Care Plan goals for specific interventions  Outcome: Progressing  Goal: Patient/Family Short Term Goal  Description: Patient's Short T

## 2020-12-04 PROCEDURE — 99233 SBSQ HOSP IP/OBS HIGH 50: CPT | Performed by: HOSPITALIST

## 2020-12-04 RX ORDER — HEPARIN SODIUM 5000 [USP'U]/ML
5000 INJECTION, SOLUTION INTRAVENOUS; SUBCUTANEOUS EVERY 12 HOURS SCHEDULED
Status: DISCONTINUED | OUTPATIENT
Start: 2020-12-04 | End: 2020-12-07

## 2020-12-04 RX ORDER — TRAMADOL HYDROCHLORIDE 50 MG/1
50 TABLET ORAL EVERY 6 HOURS PRN
Status: DISCONTINUED | OUTPATIENT
Start: 2020-12-04 | End: 2020-12-05

## 2020-12-04 NOTE — OCCUPATIONAL THERAPY NOTE
OCCUPATIONAL THERAPY EVALUATION - INPATIENT      Room Number: 431/431-A  Evaluation Date: 12/4/2020  Type of Evaluation: Initial  Presenting Problem: (L distal femur ORIF, IM nail ) due to fall.      Physician Order: IP Consult to Occupational Therapy  Reas education;Patient/Family training;Equipment eval/education; Compensatory technique education       OCCUPATIONAL THERAPY MEDICAL/SOCIAL HISTORY     Problem List   Principal Problem:    Closed bicondylar fracture of right femur, initial encounter ([facility]  Activ BEARING RESTRICTION  Weight Bearing Restriction: L lower extremity           L Lower Extremity: Non-Weight Bearing    PAIN ASSESSMENT  Ratin  Location: (LLE )  Management Techniques: Repositioning(Pt did not need pain medication at this time )    DENIAI aware of session/findings; All patient questions and concerns addressed; Alarm set    OT Goals  Patient self-stated goal is: Recover from surgery      Patient will complete LE dressing with CGA with AE prn and adhere to LLE NWB.    Comment:     Patient will c

## 2020-12-04 NOTE — PROGRESS NOTES
[facility]  Hospitalist Progress Note     Julianna Patel Patient Status:  Inpatient    119/1931  80year old CSN [id number]   Location 431/431-A Attending Justin Dunham MD   Hosp Day # 1 PCP Patrick Dickens MD     ASSESSMENT/PLAN    Dis 12/02/20  2340 12/03/20  0741 12/04/20  0826   * 212* 145*   BUN 45* 40* 36*   CREATSERUM 1.22* 1.13* 1.17*   GFRAA 45* 50* 48*   GFRNAA 39* 43* 41*   CA 9.4 8.8 8.3*    139 140   K 4.7 4.3 4.4    107 108   CO2 31.0 28.0 26.0     No resu 12-16 yrs

## 2020-12-04 NOTE — CM/SW NOTE
MARY met with the pt. At bedside. The pt. Lives with her 2 sons Holly Molina and Shaista Singh in a 2 level home with the bedrooms on the 2nd floor. There are 2 steps to enter from the garage. The pt.  Reports being independent prior to admission with adls and ambulation

## 2020-12-04 NOTE — PLAN OF CARE
Problem: Diabetes/Glucose Control  Goal: Glucose maintained within prescribed range  Description: INTERVENTIONS:  - Monitor Blood Glucose as ordered  - Assess for signs and symptoms of hyperglycemia and hypoglycemia  - Administer ordered medications to m resources  Description: INTERVENTIONS:  - Identify barriers to discharge w/pt and caregiver  - Include patient/family/discharge partner in discharge planning  - Arrange for needed discharge resources and transportation as appropriate  - Identify discharge

## 2020-12-04 NOTE — PROGRESS NOTES
[facility]    Progress Note    Namita Trina Aspito Patient Status:  Inpatient    1931 MRN [id number]   Location [facility] 4W/SW/SE Attending Maxi Barry MD   Hosp Day # 1 PCP Mickey Bean MD        Subjective:   Boris Frye CREATSERUM 1.17 (H) 12/04/2020    BUN 36 (H) 12/04/2020     12/04/2020    K 4.4 12/04/2020     12/04/2020    CO2 26.0 12/04/2020     (H) 12/04/2020    CA 8.3 (L) 12/04/2020    ALB 3.6 06/24/2020    ALKPHO 99 06/24/2020    BILT 0.6 06/24/ Fritz Lopez MD  12/4/2020

## 2020-12-04 NOTE — PHYSICAL THERAPY NOTE
PHYSICAL THERAPY EVALUATION - INPATIENT     Room Number: 431/431-A  Evaluation Date: 12/4/2020  Type of Evaluation: Initial   Physician Order: PT Eval and Treat    Presenting Problem: Distal femur ORIF  Reason for Therapy: Mobility Dysfunction and Dischar bed mobility, which are below the patient's pre-admission status. Pt is currently requiring two person assist, prior to surgery was navigating house hold distances mod I.     The patient's Approx Degree of Impairment: 61.29% has been calculated based on do SURGERY Right 2012   • LUMPECTOMY RIGHT  2016    cancer   • MASTECTOMY PARTIAL Right 8/2016   • SKIN SURGERY Right 01/17/2018   • TONSILLECTOMY     • WRIST FRACTURE SURGERY Right        HOME SITUATION  Type of Home: House   Home Layout: Two level  Stairs t Sitting down on and standing up from a chair with arms (e.g., wheelchair, bedside commode, etc.): A Lot   -   Moving from lying on back to sitting on the side of the bed?: A Little   How much help from another person does the patient currently need. ..   - patient in preparation for discharge.    Goal #5   Current Status    Goal #6    Goal #6  Current Status

## 2020-12-05 PROBLEM — S72.402A CLOSED FRACTURE OF LEFT DISTAL FEMUR (HCC): Status: ACTIVE | Noted: 2020-12-02

## 2020-12-05 PROCEDURE — 99232 SBSQ HOSP IP/OBS MODERATE 35: CPT | Performed by: HOSPITALIST

## 2020-12-05 RX ORDER — HYDRALAZINE HYDROCHLORIDE 20 MG/ML
10 INJECTION INTRAMUSCULAR; INTRAVENOUS EVERY 4 HOURS PRN
Status: DISCONTINUED | OUTPATIENT
Start: 2020-12-05 | End: 2020-12-07

## 2020-12-05 RX ORDER — DILTIAZEM HYDROCHLORIDE 180 MG/1
180 CAPSULE, EXTENDED RELEASE ORAL DAILY
Status: DISCONTINUED | OUTPATIENT
Start: 2020-12-05 | End: 2020-12-07

## 2020-12-05 RX ORDER — TRAMADOL HYDROCHLORIDE 50 MG/1
50 TABLET ORAL EVERY 12 HOURS PRN
Status: DISCONTINUED | OUTPATIENT
Start: 2020-12-05 | End: 2020-12-07

## 2020-12-05 RX ORDER — TRAMADOL HYDROCHLORIDE 50 MG/1
50 TABLET ORAL EVERY 12 HOURS PRN
Status: DISCONTINUED | OUTPATIENT
Start: 2020-12-05 | End: 2020-12-05

## 2020-12-05 NOTE — PROGRESS NOTES
[facility] Pharmacy Note:  Renal Dose Adjustment for Tramadol Elsa Cheng    Bari Patel has been prescribed Tramadol (ULTRAM) 50 mg orally every 6 hours as needed for pain. Estimated Creatinine Clearance: 24.6 mL/min (A) (based on SCr of 1.17 mg/dL (H)).

## 2020-12-05 NOTE — PROGRESS NOTES
[facility]    Progress Note    [facility] Aspito Patient Status:  Inpatient    1931 MRN [id number]   Location [facility] 4W/SW/SE Attending Matilde Collins MD   Hosp Day # 2 PCP Judi Freitas MD        Subjective:   Sol Brazil 12/05/2020    HCT 27.2 (L) 12/05/2020    .0 12/05/2020    CREATSERUM 1.17 (H) 12/04/2020    BUN 36 (H) 12/04/2020     12/04/2020    K 4.4 12/04/2020     12/04/2020    CO2 26.0 12/04/2020     (H) 12/04/2020    CA 8.3 (L) 12/04/2020

## 2020-12-05 NOTE — PROGRESS NOTES
[facility]  Hospitalist Progress Note     Boris Patel Patient Status:  Inpatient    119/1931  80year old CSN [id number]   Location 431/431-A Attending Lambert Vasquez MD   Hosp Day # 2 PCP Jean Garcia MD     ASSESSMENT/PLAN    Dis 1.17*   GFRAA 45* 50* 48*   GFRNAA 39* 43* 41*   CA 9.4 8.8 8.3*    139 140   K 4.7 4.3 4.4    107 108   CO2 31.0 28.0 26.0     No results for input(s): PT, INR, PTT in the last 168 hours.     • dilTIAZem  180 mg Oral Daily   • Insulin Aspart Pe

## 2020-12-05 NOTE — PHYSICAL THERAPY NOTE
PHYSICAL THERAPY HIP TREATMENT NOTE - INPATIENT    Room Number: 431/431-A            Presenting Problem: Distal femur ORIF    Problem List  Principal Problem:    Closed bicondylar fracture of right femur, initial encounter [facility])  Active Problems:    Closed Restriction: L lower extremity           L Lower Extremity: Non-Weight Bearing    PAIN ASSESSMENT   Ratin  Location: L knee  Management Techniques: Activity promotion; Body mechanics;Repositioning;Breathing techniques    BALANCE  Static Sitting: Good  D present    CURRENT GOALS   Goals to be met by: 12/18/20  Patient Goal Patient's self-stated goal is: get better.     Goal #1 Patient is able to demonstrate supine - sit EOB @ level: modified independent     Goal #1   Current Status Mod A   Goal #2 Patient i

## 2020-12-05 NOTE — PLAN OF CARE
Problem: Patient Centered Care  Goal: Patient preferences are identified and integrated in the patient's plan of care  Description: Interventions:  - Provide timely, complete, and accurate information to patient/family  - Incorporate patient and family k needs  - Identify cognitive and physical deficits and behaviors that affect risk of falls.   - [place] fall precautions as indicated by assessment.  - Educate pt/family on patient safety including physical limitations  - Instruct pt to call for assistance this morning that pt BP was 174/61, MD ordered diltizem and pt given atenolol as prescribed. Recheck BP at 1045 was 129/53. Plan for patient is to discharge to skilled nursing facility. Awaiting on  recommendations. Will continue to monitor pt.

## 2020-12-05 NOTE — CM/SW NOTE
Received message from Dr. Lamon Babinski - pt's son in room and would like to discuss SNF options. SW obtained list of accepting SNFs via [employer]. SW met w/ pt and her son, Kriss Cook, in pt's room to discuss.  Kriss 182Jocy and pt confirmed top choice would be [facility]

## 2020-12-06 PROCEDURE — 99232 SBSQ HOSP IP/OBS MODERATE 35: CPT | Performed by: HOSPITALIST

## 2020-12-06 RX ORDER — FAMOTIDINE 20 MG/1
20 TABLET ORAL DAILY
Status: DISCONTINUED | OUTPATIENT
Start: 2020-12-07 | End: 2020-12-07

## 2020-12-06 NOTE — PROGRESS NOTES
[facility]    Progress Note    Ashkan Dobbins Amanda Patient Status:  Inpatient    1931 MRN [id number]   Location [facility] 4W/SW/SE Attending Lj Mustafa MD   Hosp Day # 3 PCP Dana Gordon MD        Subjective:   Eleazar Kolb  12/04/2020    CO2 26.0 12/04/2020     (H) 12/04/2020    CA 8.3 (L) 12/04/2020    ALB 3.6 06/24/2020    ALKPHO 99 06/24/2020    BILT 0.6 06/24/2020    TP 7.3 06/24/2020    AST 13 (L) 06/24/2020    ALT 19 06/24/2020    INR 1.0 12/03/2018    TSH

## 2020-12-06 NOTE — PHYSICAL THERAPY NOTE
PHYSICAL THERAPY TREATMENT NOTE - INPATIENT     Room Number: 431/431-A       Presenting Problem: Distal femur ORIF    Problem List  Active Problems:    Closed fracture of left distal femur ([address]    Closed bicondylar fracture of distal end of right femur (H Static Sitting: Good  Dynamic Sitting: Fair +           Static Standing: Poor +  Dynamic Standing: Poor    ACTIVITY TOLERANCE                         O2 W assistance level: minimum assistance   Goal #3   Current Status SPT with mod A and NWB on LLE while holding onto the therapist   Goal #4 Patient will negotiate 2 stairs/one curb w/ assistive device and mod Ax1   Goal #4   Current Status NT   Goal #5 Juliette

## 2020-12-06 NOTE — PROGRESS NOTES
[facility] Pharmacy Note: Route Optimization for Famotidine (PEPCID)    Patient is currently on Famotidine (PEPCID) 20 mg IV every 24 hours.    The patient meets the criteria to convert to the oral equivalent as established by the IV to Oral conversion protocol ap

## 2020-12-06 NOTE — PROGRESS NOTES
[place] [facility] - [facility]  Hospitalist Progress Note     Emerita Patel Patient Status:  Inpatient    119/1931  80year old CSN [id number]   Location 431/431-A Attending Paty Flores,  [facility] Se Day # 3 PCP Kathleen Vallecillo MD     ASSESSMENT/PLAN    Dis CREATSERUM 1.22* 1.13* 1.17*   GFRAA 45* 50* 48*   GFRNAA 39* 43* 41*   CA 9.4 8.8 8.3*    139 140   K 4.7 4.3 4.4    107 108   CO2 31.0 28.0 26.0     No results for input(s): PT, INR, PTT in the last 168 hours.     • dilTIAZem  180 mg Oral Da

## 2020-12-06 NOTE — CM/SW NOTE
SW reviewed [place] referrals for LYNN and noted Alysa Mejia from [facility] sent message stating they can accept patient. SW resent referral to [facility] and asked for facility to accept patient so SW can reserve in [place].  SW called and spoke

## 2020-12-06 NOTE — PLAN OF CARE
Problem: Patient Centered Care  Goal: Patient preferences are identified and integrated in the patient's plan of care  Description: Interventions:  - What would you like us to know as we care for you?   - Provide timely, complete, and accurate informatio as appropriate  12/6/2020 0212 by Esha Bedoya RN  Outcome: Progressing  12/6/2020 0212 by Esha Bedoya RN  Outcome: Progressing     Problem: SAFETY ADULT - FALL  Goal: Free from fall injury  Description: INTERVENTIONS:  - Assess pt frequently for own health  - Refer to Case Management Department for coordinating discharge planning if the patient needs post-hospital services based on physician/LIP order or complex needs related to functional status, cognitive ability or social support system  12/6/2

## 2020-12-06 NOTE — PLAN OF CARE
Problem: Patient Centered Care  Goal: Patient preferences are identified and integrated in the patient's plan of      Problem: Diabetes/Glucose Control  Goal: Glucose maintained within prescribed range  Description: INTERVENTIONS:  - Monitor Blood Glucos MUSCULOSKELETAL - ADULT  Goal: Maintain proper alignment of affected body part  Description: INTERVENTIONS:  - Support and protect limb and body alignment per provider's orders  - Instruct and reinforce with patient and family use of appropriate assistive

## 2020-12-07 ENCOUNTER — SNF ADMIT/H&P (OUTPATIENT)
Dept: INTERNAL MEDICINE CLINIC | Facility: SKILLED NURSING FACILITY | Age: 85
End: 2020-12-07

## 2020-12-07 VITALS
DIASTOLIC BLOOD PRESSURE: 52 MMHG | BODY MASS INDEX: 31 KG/M2 | OXYGEN SATURATION: 93 % | SYSTOLIC BLOOD PRESSURE: 135 MMHG | HEART RATE: 71 BPM | TEMPERATURE: 99 F | WEIGHT: 162.13 LBS | RESPIRATION RATE: 16 BRPM

## 2020-12-07 DIAGNOSIS — R53.1 WEAKNESS: ICD-10-CM

## 2020-12-07 DIAGNOSIS — S72.402D CLOSED FRACTURE OF DISTAL END OF LEFT FEMUR WITH ROUTINE HEALING, UNSPECIFIED FRACTURE MORPHOLOGY, SUBSEQUENT ENCOUNTER: ICD-10-CM

## 2020-12-07 DIAGNOSIS — I10 ESSENTIAL HYPERTENSION: ICD-10-CM

## 2020-12-07 DIAGNOSIS — N18.30 CONTROLLED TYPE 2 DIABETES MELLITUS WITH STAGE 3 CHRONIC KIDNEY DISEASE, WITHOUT LONG-TERM CURRENT USE OF INSULIN (HCC): ICD-10-CM

## 2020-12-07 DIAGNOSIS — E11.22 CONTROLLED TYPE 2 DIABETES MELLITUS WITH STAGE 3 CHRONIC KIDNEY DISEASE, WITHOUT LONG-TERM CURRENT USE OF INSULIN (HCC): ICD-10-CM

## 2020-12-07 PROCEDURE — 1123F ACP DISCUSS/DSCN MKR DOCD: CPT | Performed by: NURSE PRACTITIONER

## 2020-12-07 PROCEDURE — 99310 SBSQ NF CARE HIGH MDM 45: CPT | Performed by: NURSE PRACTITIONER

## 2020-12-07 PROCEDURE — 99239 HOSP IP/OBS DSCHRG MGMT >30: CPT | Performed by: HOSPITALIST

## 2020-12-07 RX ORDER — TRAMADOL HYDROCHLORIDE 50 MG/1
50 TABLET ORAL EVERY 12 HOURS PRN
Qty: 30 TABLET | Refills: 0 | Status: SHIPPED | OUTPATIENT
Start: 2020-12-07 | End: 2021-03-02

## 2020-12-07 RX ORDER — PSEUDOEPHEDRINE HCL 30 MG
100 TABLET ORAL 2 TIMES DAILY
Refills: 0 | Status: SHIPPED | COMMUNITY
Start: 2020-12-07

## 2020-12-07 RX ORDER — HEPARIN SODIUM 5000 [USP'U]/ML
5000 INJECTION, SOLUTION INTRAVENOUS; SUBCUTANEOUS EVERY 12 HOURS SCHEDULED
Refills: 0 | Status: SHIPPED | COMMUNITY
Start: 2020-12-07 | End: 2021-03-02 | Stop reason: ALTCHOICE

## 2020-12-07 NOTE — PROGRESS NOTES
[employer] [facility] - [facility]  Hospitalist Progress Note     Efrem Patel Patient Status:  Inpatient    119/1931  80year old CSN [id number]   Location 431/431-A Attending Ludivina Garcia,  [facility] Se Day # 4 PCP Katie Bojorquez MD     ASSESSMENT/PLAN    Dis 1.22* 1.13* 1.17*   GFRAA 45* 50* 48*   GFRNAA 39* 43* 41*   CA 9.4 8.8 8.3*    139 140   K 4.7 4.3 4.4    107 108   CO2 31.0 28.0 26.0     No results for input(s): PT, INR, PTT in the last 168 hours.     • famotidine  20 mg Oral Daily   • dilTI

## 2020-12-07 NOTE — CM/SW NOTE
12: 62PM  MARY followed up on DC planning. MARY spoke with RN who states pt's son has requested [facility]. MARY called and spoke with pt's Son Shani Johnson to confirm. MARY spoke with Kadi Urbina at [facility] who states they can accept the pt today.  SW waiting on DC

## 2020-12-07 NOTE — DISCHARGE SUMMARY
[facility] HOSPITALIST  DISCHARGE SUMMARY     Daniel Patel Patient Status:  Inpatient    1931 MRN [id number]   Location [facility] 4W/SW/SE Attending Vladimir Berrios MD   Hosp Day # 4 PCP Jhonathan Wellington MD     DATE OF ADMISSION: 2020  D rales, rhonchi. Abd: Soft, NTND, BS normal, no mass, no HSM, no rebound/guarding. Neuro: Normal reflexes, CN. Sensory/motor exams grossly normal deficit. Coordination  and gait normal.   MS: No joint effusions. No peripheral edema.   Incision clean dry once daily   Quantity: 100 each  Refills: 3     Microlet Lancets Misc      TEST ONE TIME DAILY   Quantity: 100 each  Refills: 3     Accu-Chek Soft Touch Lancets Misc      TEST ONE TIME DAILY   Quantity: 100 each  Refills: 0     Cartia  MG Cp24  Gener ORTHOPEDIC          FOLLOW UP:  Bessie Martin MD  [address]  [phone]    In 2 weeks      The above plan and follow-up instructions were reviewed with the patient and they verbalized understanding and agreement.

## 2020-12-07 NOTE — PLAN OF CARE
Comments: Pt is alert, but slightly forgetful at times, poor short-term recall. Wearing . 5L O2, mostly at all times, attempting to wean. Heparin SubQ. RT/NSR. Tylenol for pain management. OOB S&Px2 to rolling chair.  Dressings over site are c/d/I. AC/HS acc non-pharmacological measures as appropriate and evaluate response  - Consider cultural and social influences on pain and pain management  - Manage/alleviate anxiety  - Utilize distraction and/or relaxation techniques  - Monitor for opioid side effects  - N POLST form as appropriate  - Assess patient's ability to be responsible for managing their own health  - Refer to Case Management Department for coordinating discharge planning if the patient needs post-hospital services based on physician/LIP order or com

## 2020-12-07 NOTE — PROGRESS NOTES
HPI: Keyanna Stephens  Is an 79 yo female with a past medical history significant for hypertension, diabetes, dyslipidemia, breast ca who was admitted to [facility] after her knee \"gave out\", she lowered herself to the ground and felt a snap.  She was found to h tobacco: Never Used    Alcohol use: No    Drug use: No      ALLERGIES:    Adhesive Tape           RASH    CODE STATUS: FULL  reviewed    CURRENT MEDICATIONS: Reviewed on SNF EMR  VITALS: Reviewed   LABS/Imaging: Reviewed     SUBJECTIVE/REVIEW OF SYSTEMS:

## 2020-12-07 NOTE — PROGRESS NOTES
[facility]    Progress Note    Kim Patel Patient Status:  Inpatient    1931 MRN [id number]   Location [facility] 4W/SW/SE Attending Marquis Nate MD   [facility] Day # 4 PCP Kaleigh Power MD        Subjective:   Quentin Blackburn 06/24/2020    ALKPHO 99 06/24/2020    BILT 0.6 06/24/2020    TP 7.3 06/24/2020    AST 13 (L) 06/24/2020    ALT 19 06/24/2020    INR 1.0 12/03/2018    TSH 1.140 06/24/2020    MG 1.7 (L) 12/04/2018    TROP 0.00 12/03/2018    B12 1,077 (H) 08/23/2017       No

## 2020-12-07 NOTE — PLAN OF CARE
Problem: Patient Centered Care  Goal: Patient preferences are identified and integrated in the patient's plan of care  Description: Interventions:  - What would you like us to know as we care for you?   - Provide timely, complete, and accurate informatio physical deficits and behaviors that affect risk of falls.   - [place] fall precautions as indicated by assessment.  - Educate pt/family on patient safety including physical limitations  - Instruct pt to call for assistance with activity based on assessme

## 2020-12-07 NOTE — PHYSICAL THERAPY NOTE
PHYSICAL THERAPY TREATMENT NOTE - INPATIENT     Room Number: 431/431-A       Presenting Problem: Distal femur ORIF    Problem List  Active Problems:    Closed fracture of left distal femur ([address]    Closed bicondylar fracture of distal end of right femur (H Static Sitting: Good  Dynamic Sitting: Fair +           Static Standing: Poor  Dynamic Standing: Poor    ACTIVITY TOLERANCE                         O2 W assistance level: minimum assistance   Goal #3   Current Status SPT with mod A and NWB on LLE while holding onto the therapist   Goal #4 Patient will negotiate 2 stairs/one curb w/ assistive device and mod Ax1   Goal #4   Current Status NT   Goal #5 Juliette

## 2020-12-09 ENCOUNTER — EXTERNAL FACILITY (OUTPATIENT)
Dept: INTERNAL MEDICINE CLINIC | Facility: CLINIC | Age: 85
End: 2020-12-09

## 2020-12-09 DIAGNOSIS — M17.11 PRIMARY OSTEOARTHRITIS OF RIGHT KNEE: ICD-10-CM

## 2020-12-09 DIAGNOSIS — I10 ESSENTIAL HYPERTENSION: ICD-10-CM

## 2020-12-09 DIAGNOSIS — R26.2 DIFFICULTY WALKING: ICD-10-CM

## 2020-12-09 DIAGNOSIS — M85.89 OSTEOPENIA OF MULTIPLE SITES: ICD-10-CM

## 2020-12-09 DIAGNOSIS — R53.1 GENERALIZED WEAKNESS: ICD-10-CM

## 2020-12-09 DIAGNOSIS — S72.402D CLOSED FRACTURE OF DISTAL END OF LEFT FEMUR WITH ROUTINE HEALING, UNSPECIFIED FRACTURE MORPHOLOGY, SUBSEQUENT ENCOUNTER: ICD-10-CM

## 2020-12-09 PROCEDURE — 99306 1ST NF CARE HIGH MDM 50: CPT | Performed by: INTERNAL MEDICINE

## 2020-12-09 PROCEDURE — 1111F DSCHRG MED/CURRENT MED MERGE: CPT | Performed by: INTERNAL MEDICINE

## 2020-12-10 NOTE — PROGRESS NOTES
pt seen 12/09/2020 at [facility]    seen in in bed, no distress noted    pain is stable,     working with pt       81 yo female with a past medical history significant for hypertension, diabetes, dyslipidemia, breast ca who was admitted to [facility] after her knee \" status: Never Smoker      Smokeless tobacco: Never Used    Alcohol use: No    Drug use: No      ALLERGIES:    Adhesive Tape           RASH       SUBJECTIVE/REVIEW OF SYSTEMS:  Seen in room. No sob/cough/cp/palpitations. No hematuria/dysuria/frequency.

## 2020-12-12 ENCOUNTER — EXTERNAL FACILITY (OUTPATIENT)
Dept: INTERNAL MEDICINE CLINIC | Facility: CLINIC | Age: 85
End: 2020-12-12

## 2020-12-12 DIAGNOSIS — I10 ESSENTIAL HYPERTENSION: ICD-10-CM

## 2020-12-12 DIAGNOSIS — M12.812 LEFT ROTATOR CUFF TEAR ARTHROPATHY: ICD-10-CM

## 2020-12-12 DIAGNOSIS — G45.9 TIA (TRANSIENT ISCHEMIC ATTACK): ICD-10-CM

## 2020-12-12 DIAGNOSIS — M48.061 SPINAL STENOSIS OF LUMBAR REGION, UNSPECIFIED WHETHER NEUROGENIC CLAUDICATION PRESENT: ICD-10-CM

## 2020-12-12 DIAGNOSIS — M17.11 PRIMARY OSTEOARTHRITIS OF RIGHT KNEE: ICD-10-CM

## 2020-12-12 DIAGNOSIS — S72.402D CLOSED FRACTURE OF DISTAL END OF LEFT FEMUR WITH ROUTINE HEALING, UNSPECIFIED FRACTURE MORPHOLOGY, SUBSEQUENT ENCOUNTER: ICD-10-CM

## 2020-12-12 DIAGNOSIS — M75.102 LEFT ROTATOR CUFF TEAR ARTHROPATHY: ICD-10-CM

## 2020-12-12 PROCEDURE — 99308 SBSQ NF CARE LOW MDM 20: CPT | Performed by: INTERNAL MEDICINE

## 2020-12-12 NOTE — PROGRESS NOTES
pt seen  12/11/2020 at [facility]    seen in room, no distress noted     pain improving     latest vitals and labs seen       SUBJECTIVE/REVIEW OF SYSTEMS:  Seen in room. No sob/cough/cp/palpitations. No hematuria/dysuria/frequency.        PHYSICAL EXAM:  G

## 2020-12-14 PROCEDURE — 99308 SBSQ NF CARE LOW MDM 20: CPT | Performed by: INTERNAL MEDICINE

## 2020-12-15 ENCOUNTER — SNF VISIT (OUTPATIENT)
Dept: INTERNAL MEDICINE CLINIC | Facility: SKILLED NURSING FACILITY | Age: 85
End: 2020-12-15

## 2020-12-15 ENCOUNTER — EXTERNAL FACILITY (OUTPATIENT)
Dept: INTERNAL MEDICINE CLINIC | Facility: CLINIC | Age: 85
End: 2020-12-15

## 2020-12-15 DIAGNOSIS — R53.1 WEAKNESS: ICD-10-CM

## 2020-12-15 DIAGNOSIS — S72.402D CLOSED FRACTURE OF DISTAL END OF LEFT FEMUR WITH ROUTINE HEALING, UNSPECIFIED FRACTURE MORPHOLOGY, SUBSEQUENT ENCOUNTER: ICD-10-CM

## 2020-12-15 DIAGNOSIS — I10 ESSENTIAL HYPERTENSION: ICD-10-CM

## 2020-12-15 DIAGNOSIS — R53.1 GENERALIZED WEAKNESS: ICD-10-CM

## 2020-12-15 DIAGNOSIS — M85.89 OSTEOPENIA OF MULTIPLE SITES: ICD-10-CM

## 2020-12-15 DIAGNOSIS — E78.00 HYPERCHOLESTEROLEMIA: ICD-10-CM

## 2020-12-15 DIAGNOSIS — M48.061 SPINAL STENOSIS OF LUMBAR REGION, UNSPECIFIED WHETHER NEUROGENIC CLAUDICATION PRESENT: ICD-10-CM

## 2020-12-15 DIAGNOSIS — M25.511 ACUTE PAIN OF RIGHT SHOULDER: ICD-10-CM

## 2020-12-15 PROCEDURE — 99309 SBSQ NF CARE MODERATE MDM 30: CPT | Performed by: NURSE PRACTITIONER

## 2020-12-15 NOTE — PROGRESS NOTES
HPI: Luis Carlos Low  Is an 81 yo female with a past medical history significant for hypertension, diabetes, dyslipidemia, breast ca who was admitted to [facility] after her knee \"gave out\", she lowered herself to the ground and felt a snap.  She was found to h atraumatic/normocephalic, mucous membranes pink and moist, PERRLA, EOMI, sclera anicteric, conjunctiva normal.  +[place]  RESPIRATORY:CTAB  CARDIOVASCULAR:S1S2 RRR  ABDOMEN:  normal active BS+, soft, non distended, nontender   MUSCULOSKELETAL/EXTREMITIES: LLE

## 2020-12-15 NOTE — PROGRESS NOTES
pt seen  12/14/2020 at [facility]    seen in room, no distress noted     pain improving   continue with current care       SUBJECTIVE/REVIEW OF SYSTEMS:  Seen in room. No sob/cough/cp/palpitations. No hematuria/dysuria/frequency.        PHYSICAL EXAM:  GENE

## 2020-12-16 ENCOUNTER — EXTERNAL FACILITY (OUTPATIENT)
Dept: INTERNAL MEDICINE CLINIC | Facility: CLINIC | Age: 85
End: 2020-12-16

## 2020-12-16 DIAGNOSIS — M17.11 PRIMARY OSTEOARTHRITIS OF RIGHT KNEE: ICD-10-CM

## 2020-12-16 DIAGNOSIS — S72.402D CLOSED FRACTURE OF DISTAL END OF LEFT FEMUR WITH ROUTINE HEALING, UNSPECIFIED FRACTURE MORPHOLOGY, SUBSEQUENT ENCOUNTER: ICD-10-CM

## 2020-12-16 DIAGNOSIS — I10 ESSENTIAL HYPERTENSION: ICD-10-CM

## 2020-12-16 DIAGNOSIS — M75.102 LEFT ROTATOR CUFF TEAR ARTHROPATHY: ICD-10-CM

## 2020-12-16 DIAGNOSIS — M12.812 LEFT ROTATOR CUFF TEAR ARTHROPATHY: ICD-10-CM

## 2020-12-16 DIAGNOSIS — M48.061 SPINAL STENOSIS OF LUMBAR REGION, UNSPECIFIED WHETHER NEUROGENIC CLAUDICATION PRESENT: ICD-10-CM

## 2020-12-16 DIAGNOSIS — E78.00 HYPERCHOLESTEROLEMIA: ICD-10-CM

## 2020-12-16 PROCEDURE — 99308 SBSQ NF CARE LOW MDM 20: CPT | Performed by: INTERNAL MEDICINE

## 2020-12-16 NOTE — PROGRESS NOTES
pt seen  12/16/2020 at [facility]    seen in room, no distress noted     most recent labs and vitals noted     SUBJECTIVE/REVIEW OF SYSTEMS:  Seen in room. No sob/cough/cp/palpitations. No hematuria/dysuria/frequency.        PHYSICAL EXAM:  GENERAL HEALTH:

## 2020-12-18 ENCOUNTER — EXTERNAL FACILITY (OUTPATIENT)
Dept: INTERNAL MEDICINE CLINIC | Facility: CLINIC | Age: 85
End: 2020-12-18

## 2020-12-18 DIAGNOSIS — E78.00 HYPERCHOLESTEROLEMIA: ICD-10-CM

## 2020-12-18 DIAGNOSIS — I10 ESSENTIAL HYPERTENSION: ICD-10-CM

## 2020-12-18 DIAGNOSIS — S72.402D CLOSED FRACTURE OF DISTAL END OF LEFT FEMUR WITH ROUTINE HEALING, UNSPECIFIED FRACTURE MORPHOLOGY, SUBSEQUENT ENCOUNTER: ICD-10-CM

## 2020-12-18 DIAGNOSIS — M48.061 SPINAL STENOSIS OF LUMBAR REGION, UNSPECIFIED WHETHER NEUROGENIC CLAUDICATION PRESENT: ICD-10-CM

## 2020-12-18 DIAGNOSIS — G45.9 TIA (TRANSIENT ISCHEMIC ATTACK): ICD-10-CM

## 2020-12-18 DIAGNOSIS — M75.102 LEFT ROTATOR CUFF TEAR ARTHROPATHY: ICD-10-CM

## 2020-12-18 DIAGNOSIS — M12.812 LEFT ROTATOR CUFF TEAR ARTHROPATHY: ICD-10-CM

## 2020-12-18 PROCEDURE — 99308 SBSQ NF CARE LOW MDM 20: CPT | Performed by: INTERNAL MEDICINE

## 2020-12-21 ENCOUNTER — EXTERNAL FACILITY (OUTPATIENT)
Dept: INTERNAL MEDICINE CLINIC | Facility: CLINIC | Age: 85
End: 2020-12-21

## 2020-12-21 DIAGNOSIS — M25.511 ACUTE PAIN OF RIGHT SHOULDER: ICD-10-CM

## 2020-12-21 DIAGNOSIS — S72.421D CLOSED BICONDYLAR FRACTURE OF RIGHT FEMUR WITH ROUTINE HEALING, SUBSEQUENT ENCOUNTER: ICD-10-CM

## 2020-12-21 DIAGNOSIS — S72.431D CLOSED BICONDYLAR FRACTURE OF RIGHT FEMUR WITH ROUTINE HEALING, SUBSEQUENT ENCOUNTER: ICD-10-CM

## 2020-12-21 DIAGNOSIS — E11.22 CONTROLLED TYPE 2 DIABETES MELLITUS WITH STAGE 3 CHRONIC KIDNEY DISEASE, WITHOUT LONG-TERM CURRENT USE OF INSULIN (HCC): ICD-10-CM

## 2020-12-21 DIAGNOSIS — M48.061 SPINAL STENOSIS OF LUMBAR REGION, UNSPECIFIED WHETHER NEUROGENIC CLAUDICATION PRESENT: ICD-10-CM

## 2020-12-21 DIAGNOSIS — I10 ESSENTIAL HYPERTENSION: ICD-10-CM

## 2020-12-21 DIAGNOSIS — E78.00 HYPERCHOLESTEROLEMIA: ICD-10-CM

## 2020-12-21 DIAGNOSIS — N18.30 CONTROLLED TYPE 2 DIABETES MELLITUS WITH STAGE 3 CHRONIC KIDNEY DISEASE, WITHOUT LONG-TERM CURRENT USE OF INSULIN (HCC): ICD-10-CM

## 2020-12-21 PROCEDURE — 99309 SBSQ NF CARE MODERATE MDM 30: CPT | Performed by: INTERNAL MEDICINE

## 2020-12-21 NOTE — PROGRESS NOTES
pt seen  12/21/2020 at [facility]    seen in room, no distress noted except for continued rt shoulder pain, pain patch helps, working with pt, pt to supposed to see ortho today will follow any new recommendations     working with pt,     labs and vitals noted

## 2020-12-22 ENCOUNTER — OFFICE VISIT (OUTPATIENT)
Dept: ORTHOPEDICS CLINIC | Facility: CLINIC | Age: 85
End: 2020-12-22
Payer: MEDICARE

## 2020-12-22 ENCOUNTER — HOSPITAL ENCOUNTER (OUTPATIENT)
Dept: GENERAL RADIOLOGY | Facility: HOSPITAL | Age: 85
Discharge: HOME | End: 2020-12-22
Attending: ORTHOPAEDIC SURGERY
Payer: MEDICARE

## 2020-12-22 ENCOUNTER — SNF VISIT (OUTPATIENT)
Dept: INTERNAL MEDICINE CLINIC | Facility: SKILLED NURSING FACILITY | Age: 85
End: 2020-12-22

## 2020-12-22 VITALS — BODY MASS INDEX: 30.58 KG/M2 | WEIGHT: 162 LBS | HEIGHT: 61 IN

## 2020-12-22 DIAGNOSIS — N18.30 CONTROLLED TYPE 2 DIABETES MELLITUS WITH STAGE 3 CHRONIC KIDNEY DISEASE, WITHOUT LONG-TERM CURRENT USE OF INSULIN (HCC): ICD-10-CM

## 2020-12-22 DIAGNOSIS — Z47.89 ORTHOPEDIC AFTERCARE: Primary | ICD-10-CM

## 2020-12-22 DIAGNOSIS — E11.22 CONTROLLED TYPE 2 DIABETES MELLITUS WITH STAGE 3 CHRONIC KIDNEY DISEASE, WITHOUT LONG-TERM CURRENT USE OF INSULIN (HCC): ICD-10-CM

## 2020-12-22 DIAGNOSIS — S72.421D CLOSED BICONDYLAR FRACTURE OF RIGHT FEMUR WITH ROUTINE HEALING, SUBSEQUENT ENCOUNTER: ICD-10-CM

## 2020-12-22 DIAGNOSIS — Z47.89 ORTHOPEDIC AFTERCARE: ICD-10-CM

## 2020-12-22 DIAGNOSIS — I10 ESSENTIAL HYPERTENSION: ICD-10-CM

## 2020-12-22 DIAGNOSIS — S72.431D CLOSED BICONDYLAR FRACTURE OF RIGHT FEMUR WITH ROUTINE HEALING, SUBSEQUENT ENCOUNTER: ICD-10-CM

## 2020-12-22 PROCEDURE — 73552 X-RAY EXAM OF FEMUR 2/>: CPT | Performed by: ORTHOPAEDIC SURGERY

## 2020-12-22 PROCEDURE — 99024 POSTOP FOLLOW-UP VISIT: CPT | Performed by: ORTHOPAEDIC SURGERY

## 2020-12-22 PROCEDURE — G0463 HOSPITAL OUTPT CLINIC VISIT: HCPCS | Performed by: ORTHOPAEDIC SURGERY

## 2020-12-22 PROCEDURE — 99308 SBSQ NF CARE LOW MDM 20: CPT | Performed by: NURSE PRACTITIONER

## 2020-12-22 NOTE — PROGRESS NOTES
HPI: Gordon Garrett  Is an 81 yo female with a past medical history significant for hypertension, diabetes, dyslipidemia, breast ca who was admitted to [facility] after her knee \"gave out\", she lowered herself to the ground and felt a snap.  She was found to h +[place]  RESPIRATORY:CTAB  CARDIOVASCULAR:S1S2 RRR  ABDOMEN:  normal active BS+, soft, non distended, nontender   MUSCULOSKELETAL/EXTREMITIES: LLE 1+edema  SKIN: warm, dry  WOUND: LLE incisions intact staples no redness/drainage/swelling  NEUROLOGIC: intact;

## 2020-12-22 NOTE — PROGRESS NOTES
NURSING INTAKE COMMENTS: Patient presents with:  Leg Pain: femur fx,s/p orif  Shoulder Pain: right, s/p stretcher transfer, xr done, no fx      HPI: This 80year old female presents today with complaints of routine follow-up 2 weeks status post left distal 1 tablet (50 mg total) by mouth every 12 (twelve) hours as needed for Pain. 30 tablet 0   • Heparin Sodium, Porcine, 5000 UNIT/ML Injection Solution Inject 1 mL (5,000 Units total) into the skin every 12 (twelve) hours.   0   • docusate sodium 100 MG Oral C (LEE CONTOUR NEXT TEST) In Vitro Strip To test blood sugar 1 time every day 100 each 3   • CARTIA  MG Oral Capsule SR 24 Hr Take 360 mg by mouth nightly.     1       Adhesive Tape           RASH  Family History   Problem Relation Age of Onset   • He (snt=62543)    Result Date: 12/22/2020  PROCEDURE: XR FEMUR MIN 2 VIEWS LEFT (CPT=73552)  COMPARISON: [facility], XR FEMUR MIN 2 VIEWS LEFT (CPT=73552), 12/02/2020, 10:58 PM.  INDICATIONS: Distal left femur fracture, post ORIF.   TECHNIQUE: AM     Finalized by (CST): Abner Chester MD on 12/03/2020 at 6:04 AM          Xr Chest Ap Portable  (cpt=71045)    Result Date: 12/3/2020  PROCEDURE: XR CHEST AP PORTABLE  (CPT=71045) TIME: 2258 hours.    COMPARISON: [facility], XR INDICATIONS: Pain in left hip and femur post fall today. TECHNIQUE: AP pelvis and two views left hip. FINDINGS:  BONES: Mild DJD bilateral hips. No evidence of fractures in the pelvis or hips. SOFT TISSUES: Negative. No visible soft tissue swelling.  EFF

## 2020-12-23 PROCEDURE — 99308 SBSQ NF CARE LOW MDM 20: CPT | Performed by: INTERNAL MEDICINE

## 2020-12-24 ENCOUNTER — EXTERNAL FACILITY (OUTPATIENT)
Dept: INTERNAL MEDICINE CLINIC | Facility: CLINIC | Age: 85
End: 2020-12-24

## 2020-12-24 ENCOUNTER — PATIENT MESSAGE (OUTPATIENT)
Dept: ORTHOPEDICS CLINIC | Facility: CLINIC | Age: 85
End: 2020-12-24

## 2020-12-24 DIAGNOSIS — N18.30 CONTROLLED TYPE 2 DIABETES MELLITUS WITH STAGE 3 CHRONIC KIDNEY DISEASE, WITHOUT LONG-TERM CURRENT USE OF INSULIN (HCC): ICD-10-CM

## 2020-12-24 DIAGNOSIS — M48.061 SPINAL STENOSIS OF LUMBAR REGION, UNSPECIFIED WHETHER NEUROGENIC CLAUDICATION PRESENT: ICD-10-CM

## 2020-12-24 DIAGNOSIS — I10 ESSENTIAL HYPERTENSION: ICD-10-CM

## 2020-12-24 DIAGNOSIS — E11.22 CONTROLLED TYPE 2 DIABETES MELLITUS WITH STAGE 3 CHRONIC KIDNEY DISEASE, WITHOUT LONG-TERM CURRENT USE OF INSULIN (HCC): ICD-10-CM

## 2020-12-24 DIAGNOSIS — S42.255D CLOSED NONDISPLACED FRACTURE OF GREATER TUBEROSITY OF LEFT HUMERUS WITH ROUTINE HEALING, SUBSEQUENT ENCOUNTER: Primary | ICD-10-CM

## 2020-12-24 DIAGNOSIS — S72.431D CLOSED BICONDYLAR FRACTURE OF RIGHT FEMUR WITH ROUTINE HEALING, SUBSEQUENT ENCOUNTER: ICD-10-CM

## 2020-12-24 DIAGNOSIS — S72.421D CLOSED BICONDYLAR FRACTURE OF RIGHT FEMUR WITH ROUTINE HEALING, SUBSEQUENT ENCOUNTER: ICD-10-CM

## 2020-12-24 DIAGNOSIS — E78.00 HYPERCHOLESTEROLEMIA: ICD-10-CM

## 2020-12-24 NOTE — TELEPHONE ENCOUNTER
Please see message to patient, I recommend right shoulder physical therapy at [facility] to progress motion and strengthening as tolerated for right shoulder osteoarthritis. Please provide [facility] with a formal order/script if this is required.

## 2020-12-25 NOTE — PROGRESS NOTES
pt seen  12/21/2020 at [facility] [place]    seen in room, no distress noted rt shoulder pain improved , pain patch helps, working with pt,     pt seen ortho     working with pt,     labs and vitals noted     spoke with nurse    [address] [place] [address] [address]:  Seen in r

## 2020-12-28 NOTE — TELEPHONE ENCOUNTER
Dr. Elen Barnett,   I am placing order for PT to [facility] in [facility].   How many times per week? How many weeks?

## 2020-12-29 ENCOUNTER — TELEPHONE (OUTPATIENT)
Dept: INTERNAL MEDICINE CLINIC | Facility: CLINIC | Age: 85
End: 2020-12-29

## 2020-12-29 ENCOUNTER — PATIENT OUTREACH (OUTPATIENT)
Dept: CASE MANAGEMENT | Age: 85
End: 2020-12-29

## 2020-12-29 DIAGNOSIS — Z02.9 ENCOUNTERS FOR ADMINISTRATIVE PURPOSE: ICD-10-CM

## 2020-12-29 NOTE — TELEPHONE ENCOUNTER
S/w Benito Francois PT from [facility] and she wanted to clarify WB order for pt- as notes say NWB but pt family states touch toe WB. Per Dr. Fredy Cooper for toe touch WB during transfers. Dread notified of PM orders.  She also states she will put in for OT as pt needs a

## 2020-12-29 NOTE — PROGRESS NOTES
Initial Post Discharge Follow Up   Discharge Date from SNF: 12/28/20  Contact Date: 12/29/2020    Consent Verification:  Assessment Completed With: Patient  HIPAA Verified?   Yes    Discharge Dx:   S/p LYNN for Distal femur fracture        General:   • Ho Beads (CARTIA XT) 180 MG Oral Capsule SR 24 Hr TAKE TWO CAPSULES BY MOUTH DAILY 180 capsule 0   • METFORMIN HCL  MG Oral Tablet 24 Hr Take 1 tablet (500 mg total) by mouth daily with breakfast. 90 tablet 1   • LOSARTAN POTASSIUM 50 MG Oral Tab TAKE O anything? yes  • Are there any reasons that keep you from taking your medication as prescribed? No  Are you having any concerns with constipation? No    Referrals/orders at D/C:  Home Health/Services ordered at D/C?   Yes   What services:   Rehab, CHF, Strok that the incision was healed up. She states that her pain is minimal. She states that she is tired right now as she has just completed a PT session. She denies having any fever, n/v/c/d, sob or any new or worsening symptoms.  She states that she has not martha

## 2020-12-29 NOTE — TELEPHONE ENCOUNTER
Noted.  Thank you.
Please note reason for call and advise.
TALIA, S/w patient for TCM. She was dc'd 12/28 from rehab. She declined to schedule appt at this time stating that she is not very mobile as she is NWB on left leg. She states that she will call back to schedule when she heals up more.      TCM book by date:
no

## 2020-12-30 ENCOUNTER — PATIENT MESSAGE (OUTPATIENT)
Dept: ORTHOPEDICS CLINIC | Facility: CLINIC | Age: 85
End: 2020-12-30

## 2020-12-30 RX ORDER — LIDOCAINE 50 MG/G
1 PATCH TOPICAL
Qty: 30 PATCH | Refills: 0 | Status: SHIPPED | OUTPATIENT
Start: 2020-12-30

## 2020-12-30 NOTE — TELEPHONE ENCOUNTER
I placed an order for lidocaine patch for the right shoulder but the patient can  at [facility] in [facility].

## 2020-12-31 ENCOUNTER — TELEPHONE (OUTPATIENT)
Dept: ORTHOPEDICS CLINIC | Facility: CLINIC | Age: 85
End: 2020-12-31

## 2020-12-31 DIAGNOSIS — S72.432A CLOSED BICONDYLAR FRACTURE OF LEFT FEMUR, INITIAL ENCOUNTER (HCC): Primary | ICD-10-CM

## 2020-12-31 DIAGNOSIS — S72.422A CLOSED BICONDYLAR FRACTURE OF LEFT FEMUR, INITIAL ENCOUNTER (HCC): Primary | ICD-10-CM

## 2021-01-04 NOTE — TELEPHONE ENCOUNTER
Faxed order to [facility]  Confirmation of fax received.

## 2021-01-05 ENCOUNTER — PATIENT MESSAGE (OUTPATIENT)
Dept: INTERNAL MEDICINE CLINIC | Facility: CLINIC | Age: 86
End: 2021-01-05

## 2021-01-05 ENCOUNTER — NURSE TRIAGE (OUTPATIENT)
Dept: INTERNAL MEDICINE CLINIC | Facility: CLINIC | Age: 86
End: 2021-01-05

## 2021-01-05 RX ORDER — CEPHALEXIN 250 MG/1
250 CAPSULE ORAL 2 TIMES DAILY
Qty: 14 CAPSULE | Refills: 0 | Status: SHIPPED | OUTPATIENT
Start: 2021-01-05 | End: 2021-01-12

## 2021-01-06 NOTE — TELEPHONE ENCOUNTER
Action Requested: Summary for Provider     []  Critical Lab, Recommendations Needed  [x] Need Additional Advice  []   FYI    []   Need Orders  [x] Need Medications Sent to Pharmacy  []  Other     SUMMARY: Verified name and .     Patient reports urinary s

## 2021-01-06 NOTE — TELEPHONE ENCOUNTER
Pt advised to call    From: Keyanna Stephens  To: Harini Cooper MD  Sent: 1/5/2021  2:17 PM CST  Subject: Prescription Question    I have a urinary tract infection. This started a couple of days ago. It burns when I urinate. I have no fever.  I broke my le

## 2021-01-06 NOTE — TELEPHONE ENCOUNTER
From: Evie Patel  To: Iris Silverman MD  Sent: 1/5/2021 2:17 PM CST  Subject: Prescription Question    I have a urinary tract infection. This started a couple of days ago. It burns when I urinate. I have no fever.  I broke my left leg so I am unable t

## 2021-01-12 DIAGNOSIS — I10 ESSENTIAL HYPERTENSION: ICD-10-CM

## 2021-01-12 RX ORDER — LOSARTAN POTASSIUM 50 MG/1
TABLET ORAL
Qty: 90 TABLET | Refills: 0 | Status: SHIPPED | OUTPATIENT
Start: 2021-01-12 | End: 2021-03-16

## 2021-01-19 ENCOUNTER — HOSPITAL ENCOUNTER (OUTPATIENT)
Dept: GENERAL RADIOLOGY | Facility: HOSPITAL | Age: 86
Discharge: HOME | End: 2021-01-19
Attending: ORTHOPAEDIC SURGERY
Payer: MEDICARE

## 2021-01-19 ENCOUNTER — OFFICE VISIT (OUTPATIENT)
Dept: ORTHOPEDICS CLINIC | Facility: CLINIC | Age: 86
End: 2021-01-19
Payer: MEDICARE

## 2021-01-19 ENCOUNTER — TELEPHONE (OUTPATIENT)
Dept: ORTHOPEDICS CLINIC | Facility: CLINIC | Age: 86
End: 2021-01-19

## 2021-01-19 VITALS
SYSTOLIC BLOOD PRESSURE: 119 MMHG | BODY MASS INDEX: 30.58 KG/M2 | DIASTOLIC BLOOD PRESSURE: 53 MMHG | HEIGHT: 61 IN | HEART RATE: 52 BPM | WEIGHT: 162 LBS

## 2021-01-19 DIAGNOSIS — Z47.89 ORTHOPEDIC AFTERCARE: ICD-10-CM

## 2021-01-19 DIAGNOSIS — S72.422D CLOSED BICONDYLAR FRACTURE OF LEFT FEMUR WITH ROUTINE HEALING, SUBSEQUENT ENCOUNTER: ICD-10-CM

## 2021-01-19 DIAGNOSIS — M25.511 RIGHT SHOULDER PAIN, UNSPECIFIED CHRONICITY: ICD-10-CM

## 2021-01-19 DIAGNOSIS — M19.011 PRIMARY OSTEOARTHRITIS OF RIGHT SHOULDER: Primary | ICD-10-CM

## 2021-01-19 DIAGNOSIS — S42.255D CLOSED NONDISPLACED FRACTURE OF GREATER TUBEROSITY OF LEFT HUMERUS WITH ROUTINE HEALING, SUBSEQUENT ENCOUNTER: Primary | ICD-10-CM

## 2021-01-19 DIAGNOSIS — S72.432D CLOSED BICONDYLAR FRACTURE OF LEFT FEMUR WITH ROUTINE HEALING, SUBSEQUENT ENCOUNTER: ICD-10-CM

## 2021-01-19 PROCEDURE — 73552 X-RAY EXAM OF FEMUR 2/>: CPT | Performed by: ORTHOPAEDIC SURGERY

## 2021-01-19 PROCEDURE — 20610 DRAIN/INJ JOINT/BURSA W/O US: CPT | Performed by: ORTHOPAEDIC SURGERY

## 2021-01-19 PROCEDURE — 99024 POSTOP FOLLOW-UP VISIT: CPT | Performed by: ORTHOPAEDIC SURGERY

## 2021-01-19 PROCEDURE — 73030 X-RAY EXAM OF SHOULDER: CPT | Performed by: ORTHOPAEDIC SURGERY

## 2021-01-19 RX ORDER — TRIAMCINOLONE ACETONIDE 40 MG/ML
40 INJECTION, SUSPENSION INTRA-ARTICULAR; INTRAMUSCULAR ONCE
Status: COMPLETED | OUTPATIENT
Start: 2021-01-19 | End: 2021-01-19

## 2021-01-19 RX ADMIN — TRIAMCINOLONE ACETONIDE 40 MG: 40 INJECTION, SUSPENSION INTRA-ARTICULAR; INTRAMUSCULAR at 11:04:00

## 2021-01-19 NOTE — PROGRESS NOTES
NURSING INTAKE COMMENTS: Patient presents with:  Leg Pain: follow up left femur fx  Shoulder Pain: follow up right shoulder, ogoing pain      HPI: This 80year old female presents today with complaints of right shoulder pain.   The patient also presents for (MEDI-PATCH-LIDOCAINE EX) Apply topically. • Heparin Sodium, Porcine, 5000 UNIT/ML Injection Solution Inject 1 mL (5,000 Units total) into the skin every 12 (twelve) hours.   0   • docusate sodium 100 MG Oral Cap Take 100 mg by mouth 2 (two) times daily (Patient not taking: Reported on 1/19/2021 ) 30 patch 0   • traMADol HCl 50 MG Oral Tab Take 1 tablet (50 mg total) by mouth every 12 (twelve) hours as needed for Pain.  (Patient not taking: Reported on 1/19/2021 ) 30 tablet 0   • acetaminophen 500 MG Oral demonstrates healed surgical scars that are clean, dry, intact. Knee range of motion is about 10 degrees to 100 degrees with minimal pain and some retropatellar crepitus. Good quad control with no extensor lag with leg raise.     Imaging: Xr Femur Min 2 V Lester Lowe MD on 1/19/2021 at 11:57 AM          Xr Femur Min 2 Views Left (cpt=73552)    Result Date: 12/22/2020  PROCEDURE: XR FEMUR MIN 2 VIEWS LEFT (CPT=73552)  COMPARISON: [facility], XR FEMUR MIN 2 VIEWS LEFT (CPT=73552), 12/02 swelling. CONCLUSION: 1. Osteoarthritis more severe at the glenohumeral joint. 2.     Demineralization. Dictated by (CST): Renata Pickering MD on 1/19/2021 at 1:21 PM     Finalized by (CST):  Renata Pickering MD on 1/19/2021 at 1:24 PM recognition technology. Please excuse any typos.     Dennie Ice, MD

## 2021-01-19 NOTE — PROCEDURES
Patient identified right shoulder as correct procedure site, this was verified with consent and 2 patient identifiers. A timeout was performed. Posterior inferior acromion skin was prepped with Betadine and alcohol.  Injection site was anesthetized with eth

## 2021-01-19 NOTE — TELEPHONE ENCOUNTER
Per Kinjal Francisco needs to verify how many visits w/ physical therapist is allowed.  Please advise

## 2021-01-20 NOTE — TELEPHONE ENCOUNTER
Spoke to David from [place] [address] therapy. Informed her of Brinda's message. David states that they recommend OT 2 times per week for 3 weeks in addition to the PT. States that this is home therapy. Order placed for OT external for [facility] home therapy.    Faxed or

## 2021-01-20 NOTE — TELEPHONE ENCOUNTER
OUTPATIENT PROGRESS NOTE  TRANSITIONAL CARE MANAGEMENT - HOSPITAL DISCHARGE FOLLOW-UP      CHIEF COMPLAINT  Transitional Care Management (Hospital Discharge Follow-Up)      Ms. Kumari is unaccompanied today. She comes in after a short stay in the hospital with congestive heart failure exacerbation she has had chronic congestive heart failure for a number of years she saw Dr. Ambriz in the hospital medications were changed diagnostic studies were reviewed.    SUBJECTIVE   The patient was discharged from the hospital on 9/1/17. The Discharge Summary was reviewed. It documents that the patient was hospitalized for congestive heart failure, coronary heart disease, systolic and diastolic congestive heart failure, hypertension.    Pertinent un-finalized hospital performed diagnostic tests - were reviewed..    Pertinent un-finalized hospital lab tests - were reviewed.    Advanced Directives:  · Patient has an Advance Directives document, which is on file    Durable Medical Equipment/Assistive devices prescribed: None     MEDICATIONS  The discharge medication list was reviewed. Outpatient medications were updated today. She is fully compliant with the medication regimen prescribed at the time of discharge.    HISTORIES  I have personally reviewed and updated the following electronic medical record sections: Allergies, Problem List, Past Medical History, Past Surgical History, Social History and Family History.    REVIEW OF SYSTEMS  GENERAL: denies fever, chills, night sweats, fatigue, weight loss and weight gain  NECK: denies swelling or masses in the neck, stiffness in the neck, limited range of motion in the neck, goiter, pain in the neck, dysphagia, odynophagia, sensation of food sticking and reflux  CARDIORESPIRATORY: denies chest pain, palpitations, fast heart rate, edema, cough, hemoptysis, wheeze, shortness of breath, sputum, paroxysmal nocturnal dyspnea, orthopnea, cyanosis and claudication  GASTROINTESTINAL: denies  Yes, OT too, please place order.   You can ask [facility] how many visits they want of OT, usually less than PT. abdominal pain, nausea, vomiting, constipation, diarrhea, black tarry stools, blood in the stools, dysphagia, odynophagia, sour burps, hematemesis, change in bowel habits, flatulence, jaundice and hemorrhoids  NEUROLOGIC: denies headache, weakness, numbness, loss of sensation, visual disturbance, taste disturbance, speech disturbance, trouble with balance or coordination, loss of memory, confusion and tremor  PSYCHOLOGICAL: denies symptoms of depression, feeling sad or blue, social avoidance, suicide ideation, history of suicide attempt, family history of suicide, hopelessness, helplessness, guilt, insomnia, anxiety and lack of motivation  MUSCULOSKELETAL: denies joint stiffness, joint pain, joint swelling, muscle pain, blanching of the fingers with cold exposure, stiff neck, tension headache, back pain, shoulder pain and radiating symptoms    PHYSICAL EXAM  Visit Vitals  /60   Pulse 64   Resp 16   Ht 5' 9\" (1.753 m)   Wt 104.8 kg   BMI 34.11 kg/m²     General appearance: alert and cooperative  Neck: no adenopathy, no carotid bruit, no JVD, supple, symmetrical, trachea midline and thyroid not enlarged, symmetric, no tenderness/mass/nodules  Lungs: clear to auscultation bilaterally  Heart: regular rate and rhythm, S1, S2 normal, no murmur, click, rub or gallop and regularly irregular rhythm  Abdomen: Soft, non-tender; bowel sounds normal; no masses, no organomegaly  Extremities: extremities normal, atraumatic, no cyanosis or edema  Neurologic: Alert and oriented x3, normal strength and tone. Normal symmetric reflexes. Normal coordination and gait      ASSESSMENT  1. Hypertensive heart disease without CHF (congestive heart failure)    2. Hospital discharge follow-up    3. Chronic atrial fibrillation (CMS/HCC)    4. Acute on chronic combined systolic and diastolic congestive heart failure (CMS/HCC)         PLAN  Continue same diuretics antihypertensives, D SOFIA diet, watch sodium, recheck lab, she has follow-up  with her cardiologist in a few weeks. No need at this time that I can see for cardiac rehabilitation. Follow-up in 3 months    Patient adherence to her treatment plan was assessed. She is   fully compliant with the entire discharge treatment plan. Patient was seen for a detailed history, detailed examination, medical decision making of moderate complexity Jhony Spring DO    .

## 2021-01-26 ENCOUNTER — TELEPHONE (OUTPATIENT)
Dept: ORTHOPEDICS CLINIC | Facility: CLINIC | Age: 86
End: 2021-01-26

## 2021-01-26 NOTE — TELEPHONE ENCOUNTER
S/w Fran Perez at [facility] and she states Dr. Damien London had ordered an additional 2 wks of HH to help pt transition from [facility] to [facility] and she has been working with pt with gait and stairs, but thinks pt needs a a couple more weeks after next wk to help pt get more com

## 2021-01-27 DIAGNOSIS — Z23 NEED FOR VACCINATION: ICD-10-CM

## 2021-02-07 ENCOUNTER — TELEPHONE (OUTPATIENT)
Dept: INTERNAL MEDICINE CLINIC | Facility: CLINIC | Age: 86
End: 2021-02-07

## 2021-02-07 DIAGNOSIS — E11.9 CONTROLLED TYPE 2 DIABETES MELLITUS WITHOUT COMPLICATION, WITHOUT LONG-TERM CURRENT USE OF INSULIN (HCC): ICD-10-CM

## 2021-02-07 RX ORDER — METFORMIN HYDROCHLORIDE 500 MG/1
500 TABLET, EXTENDED RELEASE ORAL
Qty: 90 TABLET | Refills: 0 | Status: SHIPPED | OUTPATIENT
Start: 2021-02-07 | End: 2021-05-04

## 2021-02-07 NOTE — TELEPHONE ENCOUNTER
Do you still use [employer]? You have not responded to the message regarding the COVID vaccine. Please schedule your COVID vaccine through [employer] or call [phone] and press 3 to speak to a .   The vaccine can be done at an [facility] in

## 2021-02-10 NOTE — TELEPHONE ENCOUNTER
1st call left message to patient voice mail,  Dr Cyndy Chávez needs patient to come in for follow up.

## 2021-03-02 ENCOUNTER — HOSPITAL ENCOUNTER (OUTPATIENT)
Dept: GENERAL RADIOLOGY | Facility: HOSPITAL | Age: 86
Discharge: HOME | End: 2021-03-02
Attending: ORTHOPAEDIC SURGERY
Payer: MEDICARE

## 2021-03-02 ENCOUNTER — ORDER TRANSCRIPTION (OUTPATIENT)
Dept: PHYSICAL THERAPY | Age: 86
End: 2021-03-02

## 2021-03-02 ENCOUNTER — OFFICE VISIT (OUTPATIENT)
Dept: ORTHOPEDICS CLINIC | Facility: CLINIC | Age: 86
End: 2021-03-02
Payer: MEDICARE

## 2021-03-02 DIAGNOSIS — Z47.89 ORTHOPEDIC AFTERCARE: ICD-10-CM

## 2021-03-02 DIAGNOSIS — S72.432D CLOSED BICONDYLAR FRACTURE OF LEFT FEMUR WITH ROUTINE HEALING, SUBSEQUENT ENCOUNTER: ICD-10-CM

## 2021-03-02 DIAGNOSIS — S72.432D CLOSED BICONDYLAR FRACTURE OF LEFT FEMUR WITH ROUTINE HEALING, SUBSEQUENT ENCOUNTER: Primary | ICD-10-CM

## 2021-03-02 DIAGNOSIS — S72.422D CLOSED BICONDYLAR FRACTURE OF LEFT FEMUR WITH ROUTINE HEALING, SUBSEQUENT ENCOUNTER: Primary | ICD-10-CM

## 2021-03-02 DIAGNOSIS — S72.422D CLOSED BICONDYLAR FRACTURE OF LEFT FEMUR WITH ROUTINE HEALING, SUBSEQUENT ENCOUNTER: ICD-10-CM

## 2021-03-02 DIAGNOSIS — M19.011 PRIMARY OSTEOARTHRITIS OF RIGHT SHOULDER: Primary | ICD-10-CM

## 2021-03-02 PROCEDURE — 99024 POSTOP FOLLOW-UP VISIT: CPT | Performed by: ORTHOPAEDIC SURGERY

## 2021-03-02 PROCEDURE — 73552 X-RAY EXAM OF FEMUR 2/>: CPT | Performed by: ORTHOPAEDIC SURGERY

## 2021-03-02 NOTE — PROGRESS NOTES
NURSING INTAKE COMMENTS: Patient presents with: Follow - Up: Follow up for right shoulder pain and s/p left distal femur fracture intramedullary nail about 12 weeks ago. Did not feel home therapy was too helpful for her right shoulder. No pain today.  Here total) by mouth daily with breakfast. 90 tablet 0   • LOSARTAN POTASSIUM 50 MG Oral Tab TAKE ONE TABLET BY MOUTH ONE TIME DAILY  90 tablet 0   • lidocaine 5 % External Patch Place 1 patch onto the skin Q24H PRN.  30 patch 0   • Lido-Capsaicin-Men-Methyl Sal Take 360 mg by mouth nightly.     1       Adhesive Tape           RASH  Family History   Problem Relation Age of Onset   • Heart Disease Father         CAD   • Heart Disease Mother         CAD   • Diabetes Mother    • Breast Cancer Mother 80        had lump Floor, XR FEMUR MIN 2 VIEWS LEFT (CPT=73552), 12/22/2020, 11:21 AM.  [facility], XR FLUOROSCOPY C-ARM  TIME< 1 HOUR (CPT=76000), 12/03/2020, 2:36 PM.  [facility], XR HIP W OR WO PELVIS 2 OR 3 VIEWS, LEFT (CPT=73502), 12/02/2 on 3/02/2021 at 10:49 AM     Finalized by (CST): Arsalan Delgado MD on 3/02/2021 at 10:52 AM                 Lab Results   Component Value Date    WBC 8.5 12/08/2020    HGB 8.3 (L) 12/15/2020    .0 12/08/2020      Lab Results   Component Value Da

## 2021-03-03 ENCOUNTER — TELEPHONE (OUTPATIENT)
Dept: INTERNAL MEDICINE CLINIC | Facility: CLINIC | Age: 86
End: 2021-03-03

## 2021-03-03 DIAGNOSIS — I10 ESSENTIAL HYPERTENSION: ICD-10-CM

## 2021-03-03 NOTE — TELEPHONE ENCOUNTER
Please ask pt to schedule a follow-up appointment with me. We called last month and she has not scheduled yet.

## 2021-03-04 RX ORDER — ATENOLOL 50 MG/1
50 TABLET ORAL DAILY
Qty: 30 TABLET | Refills: 0 | Status: SHIPPED | OUTPATIENT
Start: 2021-03-04 | End: 2021-03-05

## 2021-03-05 DIAGNOSIS — I10 ESSENTIAL HYPERTENSION: ICD-10-CM

## 2021-03-05 RX ORDER — DILTIAZEM HYDROCHLORIDE 180 MG/1
CAPSULE, COATED, EXTENDED RELEASE ORAL
Qty: 180 CAPSULE | Refills: 0 | Status: SHIPPED | OUTPATIENT
Start: 2021-03-05 | End: 2021-06-12

## 2021-03-05 RX ORDER — ATENOLOL 50 MG/1
TABLET ORAL
Qty: 90 TABLET | Refills: 0 | Status: SHIPPED | OUTPATIENT
Start: 2021-03-05 | End: 2021-03-16

## 2021-03-08 ENCOUNTER — OFFICE VISIT (OUTPATIENT)
Dept: PHYSICAL THERAPY | Age: 86
End: 2021-03-08
Attending: ORTHOPAEDIC SURGERY
Payer: MEDICARE

## 2021-03-08 DIAGNOSIS — S72.432D CLOSED BICONDYLAR FRACTURE OF LEFT FEMUR WITH ROUTINE HEALING, SUBSEQUENT ENCOUNTER: ICD-10-CM

## 2021-03-08 DIAGNOSIS — S72.422D CLOSED BICONDYLAR FRACTURE OF LEFT FEMUR WITH ROUTINE HEALING, SUBSEQUENT ENCOUNTER: ICD-10-CM

## 2021-03-08 PROCEDURE — 97110 THERAPEUTIC EXERCISES: CPT

## 2021-03-08 PROCEDURE — 97163 PT EVAL HIGH COMPLEX 45 MIN: CPT

## 2021-03-08 NOTE — PROGRESS NOTES
PHYSICAL THERAPY EVALUATION:     Referring Physician: Dr. 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 No      Have you recently had thoughts of hurting yourself?   Jose Alberto Barrera stand and needs assistance on functional transfers. TUG test 26/25/21 secs, 30 secs sit to stand x4 reps only.     Precautions:  Fall Risk  OBJECTIVE:   Observation/Transfers mod I with sit to supine, supine to sit with mod A of 1  Gait:  pt ambulates on le including changing pain levels. PLAN OF CARE:    Goals: (to be met in 12 visits)  1. Pt will be I with HEP,its progression and management of symptoms to continue with gains in therapy.   2. Pt will demonstrate improved strength on BLE muscles graded below need for these services furnished under this plan of treatment and while under my care.     X___________________________________________________ Date____________________    Certification From: 9/5/8773  To:6/6/2021

## 2021-03-10 ENCOUNTER — OFFICE VISIT (OUTPATIENT)
Dept: PHYSICAL THERAPY | Age: 86
End: 2021-03-10
Attending: ORTHOPAEDIC SURGERY
Payer: MEDICARE

## 2021-03-10 DIAGNOSIS — S72.422D CLOSED BICONDYLAR FRACTURE OF LEFT FEMUR WITH ROUTINE HEALING, SUBSEQUENT ENCOUNTER: ICD-10-CM

## 2021-03-10 DIAGNOSIS — S72.432D CLOSED BICONDYLAR FRACTURE OF LEFT FEMUR WITH ROUTINE HEALING, SUBSEQUENT ENCOUNTER: ICD-10-CM

## 2021-03-10 PROCEDURE — 97110 THERAPEUTIC EXERCISES: CPT

## 2021-03-10 NOTE — PROGRESS NOTES
Dx: Closed bicondylar fracture of left femur with routine healing, subsequent encounter (E33.462M,V85.872P)       Insurance   medicare       POC# of visits: 12       Authorized  # visits by insurance  :  12  Expiration date  of Authorization: (90 days from or better to be able to return to PLOF  7.  Pt will improve functional transfers in all aspects by increasing sit to stand ability and perform supine to sit wit no assist in the clinic       Date: 3/10/2021  TX#: 2/12 Date:               TX#: 3/ Date:

## 2021-03-15 ENCOUNTER — OFFICE VISIT (OUTPATIENT)
Dept: PHYSICAL THERAPY | Age: 86
End: 2021-03-15
Attending: ORTHOPAEDIC SURGERY
Payer: MEDICARE

## 2021-03-15 DIAGNOSIS — S72.422D CLOSED BICONDYLAR FRACTURE OF LEFT FEMUR WITH ROUTINE HEALING, SUBSEQUENT ENCOUNTER: ICD-10-CM

## 2021-03-15 DIAGNOSIS — S72.432D CLOSED BICONDYLAR FRACTURE OF LEFT FEMUR WITH ROUTINE HEALING, SUBSEQUENT ENCOUNTER: ICD-10-CM

## 2021-03-15 PROCEDURE — 97110 THERAPEUTIC EXERCISES: CPT

## 2021-03-15 NOTE — PROGRESS NOTES
Dx: Closed bicondylar fracture of left femur with routine healing, subsequent encounter (G02.340H,P86.400P)       Insurance   medicare       POC# of visits: 12       Authorized  # visits by insurance  :  12  Expiration date  of Authorization: (90 days from better to be able to return to PLOF  7.  Pt will improve functional transfers in all aspects by increasing sit to stand ability and perform supine to sit wit no assist in the clinic       Date: 3/10/2021  TX#: 2/12 Date:     3/15/43741          TX#: 3/12 Hung

## 2021-03-16 ENCOUNTER — LAB ENCOUNTER (OUTPATIENT)
Dept: LAB | Facility: HOSPITAL | Age: 86
End: 2021-03-16
Attending: INTERNAL MEDICINE
Payer: MEDICARE

## 2021-03-16 ENCOUNTER — OFFICE VISIT (OUTPATIENT)
Dept: INTERNAL MEDICINE CLINIC | Facility: CLINIC | Age: 86
End: 2021-03-16
Payer: MEDICARE

## 2021-03-16 VITALS
DIASTOLIC BLOOD PRESSURE: 68 MMHG | HEART RATE: 67 BPM | BODY MASS INDEX: 31.63 KG/M2 | SYSTOLIC BLOOD PRESSURE: 145 MMHG | RESPIRATION RATE: 16 BRPM | WEIGHT: 167.5 LBS | HEIGHT: 61 IN

## 2021-03-16 DIAGNOSIS — N18.30 CONTROLLED TYPE 2 DIABETES MELLITUS WITH STAGE 3 CHRONIC KIDNEY DISEASE, WITHOUT LONG-TERM CURRENT USE OF INSULIN (HCC): ICD-10-CM

## 2021-03-16 DIAGNOSIS — D64.9 ANEMIA, UNSPECIFIED TYPE: ICD-10-CM

## 2021-03-16 DIAGNOSIS — E11.22 CONTROLLED TYPE 2 DIABETES MELLITUS WITH STAGE 3 CHRONIC KIDNEY DISEASE, WITHOUT LONG-TERM CURRENT USE OF INSULIN (HCC): ICD-10-CM

## 2021-03-16 DIAGNOSIS — R60.0 BILATERAL LOWER EXTREMITY EDEMA: ICD-10-CM

## 2021-03-16 DIAGNOSIS — I10 ESSENTIAL HYPERTENSION: ICD-10-CM

## 2021-03-16 DIAGNOSIS — R60.0 BILATERAL LOWER EXTREMITY EDEMA: Primary | ICD-10-CM

## 2021-03-16 PROBLEM — H35.3212 EXUDATIVE AGE-RELATED MACULAR DEGENERATION, RIGHT EYE, WITH INACTIVE CHOROIDAL NEOVASCULARIZATION (HCC): Status: ACTIVE | Noted: 2021-03-16

## 2021-03-16 PROBLEM — H35.3212 EXUDATIVE AGE-RELATED MACULAR DEGENERATION, RIGHT EYE, WITH INACTIVE CHOROIDAL NEOVASCULARIZATION (HCC): Status: RESOLVED | Noted: 2021-03-16 | Resolved: 2021-03-16

## 2021-03-16 LAB
ALBUMIN SERPL-MCNC: 3.5 G/DL (ref 3.4–5)
ANION GAP SERPL CALC-SCNC: 3 MMOL/L (ref 0–18)
BASOPHILS # BLD AUTO: 0.04 X10(3) UL (ref 0–0.2)
BASOPHILS NFR BLD AUTO: 0.5 %
BUN BLD-MCNC: 31 MG/DL (ref 7–18)
BUN/CREAT SERPL: 27.7 (ref 10–20)
CALCIUM BLD-MCNC: 9.3 MG/DL (ref 8.5–10.1)
CHLORIDE SERPL-SCNC: 105 MMOL/L (ref 98–112)
CO2 SERPL-SCNC: 31 MMOL/L (ref 21–32)
CREAT BLD-MCNC: 1.12 MG/DL
DEPRECATED HBV CORE AB SER IA-ACNC: 31.1 NG/ML
DEPRECATED RDW RBC AUTO: 50.8 FL (ref 35.1–46.3)
EOSINOPHIL # BLD AUTO: 0.05 X10(3) UL (ref 0–0.7)
EOSINOPHIL NFR BLD AUTO: 0.6 %
ERYTHROCYTE [DISTWIDTH] IN BLOOD BY AUTOMATED COUNT: 14.6 % (ref 11–15)
EST. AVERAGE GLUCOSE BLD GHB EST-MCNC: 148 MG/DL (ref 68–126)
FOLATE SERPL-MCNC: 8.8 NG/ML (ref 8.7–?)
GLUCOSE BLD-MCNC: 94 MG/DL (ref 70–99)
HBA1C MFR BLD HPLC: 6.8 % (ref ?–5.7)
HCT VFR BLD AUTO: 42.2 %
HGB BLD-MCNC: 13.1 G/DL
IMM GRANULOCYTES # BLD AUTO: 0.02 X10(3) UL (ref 0–1)
IMM GRANULOCYTES NFR BLD: 0.2 %
IRON SATURATION: 12 %
IRON SERPL-MCNC: 55 UG/DL
LDLC SERPL DIRECT ASSAY-MCNC: 159 MG/DL (ref ?–100)
LYMPHOCYTES # BLD AUTO: 1.93 X10(3) UL (ref 1–4)
LYMPHOCYTES NFR BLD AUTO: 23.6 %
MCH RBC QN AUTO: 29 PG (ref 26–34)
MCHC RBC AUTO-ENTMCNC: 31 G/DL (ref 31–37)
MCV RBC AUTO: 93.6 FL
MONOCYTES # BLD AUTO: 0.66 X10(3) UL (ref 0.1–1)
MONOCYTES NFR BLD AUTO: 8.1 %
NEUTROPHILS # BLD AUTO: 5.49 X10 (3) UL (ref 1.5–7.7)
NEUTROPHILS # BLD AUTO: 5.49 X10(3) UL (ref 1.5–7.7)
NEUTROPHILS NFR BLD AUTO: 67 %
OSMOLALITY SERPL CALC.SUM OF ELEC: 294 MOSM/KG (ref 275–295)
PHOSPHATE SERPL-MCNC: 3.2 MG/DL (ref 2.5–4.9)
PLATELET # BLD AUTO: 289 10(3)UL (ref 150–450)
POTASSIUM SERPL-SCNC: 4.3 MMOL/L (ref 3.5–5.1)
RBC # BLD AUTO: 4.51 X10(6)UL
SODIUM SERPL-SCNC: 139 MMOL/L (ref 136–145)
TOTAL IRON BINDING CAPACITY: 478 UG/DL (ref 240–450)
TRANSFERRIN SERPL-MCNC: 321 MG/DL (ref 200–360)
TSI SER-ACNC: 1.36 MIU/ML (ref 0.36–3.74)
VIT B12 SERPL-MCNC: 599 PG/ML (ref 193–986)
WBC # BLD AUTO: 8.2 X10(3) UL (ref 4–11)

## 2021-03-16 PROCEDURE — 84443 ASSAY THYROID STIM HORMONE: CPT

## 2021-03-16 PROCEDURE — 83036 HEMOGLOBIN GLYCOSYLATED A1C: CPT

## 2021-03-16 PROCEDURE — 99214 OFFICE O/P EST MOD 30 MIN: CPT | Performed by: INTERNAL MEDICINE

## 2021-03-16 PROCEDURE — 82746 ASSAY OF FOLIC ACID SERUM: CPT

## 2021-03-16 PROCEDURE — 85025 COMPLETE CBC W/AUTO DIFF WBC: CPT

## 2021-03-16 PROCEDURE — 82607 VITAMIN B-12: CPT

## 2021-03-16 PROCEDURE — 82728 ASSAY OF FERRITIN: CPT

## 2021-03-16 PROCEDURE — 36415 COLL VENOUS BLD VENIPUNCTURE: CPT

## 2021-03-16 PROCEDURE — 83721 ASSAY OF BLOOD LIPOPROTEIN: CPT

## 2021-03-16 PROCEDURE — 80069 RENAL FUNCTION PANEL: CPT

## 2021-03-16 PROCEDURE — 84466 ASSAY OF TRANSFERRIN: CPT

## 2021-03-16 PROCEDURE — 83540 ASSAY OF IRON: CPT

## 2021-03-16 RX ORDER — LANCETS
EACH MISCELLANEOUS
Qty: 100 EACH | Refills: 1 | Status: SHIPPED | OUTPATIENT
Start: 2021-03-16 | End: 2021-03-17

## 2021-03-16 RX ORDER — LOSARTAN POTASSIUM 50 MG/1
50 TABLET ORAL DAILY
Qty: 90 TABLET | Refills: 1 | Status: SHIPPED | OUTPATIENT
Start: 2021-03-16 | End: 2021-04-06

## 2021-03-16 RX ORDER — ANASTROZOLE 1 MG/1
TABLET ORAL
Qty: 90 TABLET | Refills: 0 | Status: SHIPPED | OUTPATIENT
Start: 2021-03-16 | End: 2021-06-18

## 2021-03-16 RX ORDER — FUROSEMIDE 20 MG/1
20 TABLET ORAL DAILY
Qty: 14 TABLET | Refills: 0 | Status: SHIPPED | OUTPATIENT
Start: 2021-03-16 | End: 2021-04-06

## 2021-03-16 RX ORDER — KETOCONAZOLE 20 MG/G
1 CREAM TOPICAL 2 TIMES DAILY
Qty: 30 G | Refills: 1 | Status: SHIPPED | OUTPATIENT
Start: 2021-03-16

## 2021-03-16 RX ORDER — POTASSIUM CHLORIDE 750 MG/1
10 TABLET, EXTENDED RELEASE ORAL DAILY
Qty: 14 TABLET | Refills: 0 | Status: SHIPPED | OUTPATIENT
Start: 2021-03-16 | End: 2021-04-06

## 2021-03-16 RX ORDER — ATENOLOL 50 MG/1
50 TABLET ORAL DAILY
Qty: 90 TABLET | Refills: 1 | Status: SHIPPED | OUTPATIENT
Start: 2021-03-16 | End: 2021-10-09

## 2021-03-16 RX ORDER — BLOOD SUGAR DIAGNOSTIC
STRIP MISCELLANEOUS
Qty: 100 STRIP | Refills: 5 | Status: SHIPPED | OUTPATIENT
Start: 2021-03-16

## 2021-03-16 NOTE — PATIENT INSTRUCTIONS
You can schedule a COVID vaccine appointment by calling [phone] and choosing option #3 (schedule an appointment). The scheduling telephone line is available Monday – Friday, 7 a.m. to 6 p.m. and Saturday, 8 a.m. to 1 p.m.   You can also schedule the va

## 2021-03-16 NOTE — ASSESSMENT & PLAN NOTE
Patient has recent onset worsening lower extremity edema. She has had this in the past.  I will give her furosemide and potassium for 2 weeks. Follow-up in 3 weeks.

## 2021-03-16 NOTE — PROGRESS NOTES
HPI:    Patient ID: [place] is a 80year old female. HPI:  Pt has swelling in her legs bilat for a week or so. No CP or SOB. She needs refills on her medications.     Past Surgical History:   Procedure Laterality Date   • APPENDECTOMY     • CA furosemide 20 MG Oral Tab Take 1 tablet (20 mg total) by mouth daily.  14 tablet 0   • DILTIAZEM HCL ER COATED BEADS 180 MG Oral Capsule SR 24 Hr TAKE TWO CAPSULES BY MOUTH DAILY 180 capsule 0   • METFORMIN HCL  MG Oral Tablet 24 Hr Take 1 tablet (500 CAD   • Heart Disease Mother         CAD   • Diabetes Mother    • Breast Cancer Mother 80        had lumpectomy and RT. No other treatment.    of stroke at 80   • Other (appendicitis[other]) Brother         age 15   • Other (alive and well[other]) Sis use of insulin ([facility]     The patient's A1c was 6.5 in December. We will recheck this today. Continue current medication.          Relevant Medications    losartan Potassium 50 MG Oral Tab    Glucose Blood (ACCU-CHEK AMBIKA PLUS) In Vitro Strip    Accu-Chek

## 2021-03-17 ENCOUNTER — TELEPHONE (OUTPATIENT)
Dept: INTERNAL MEDICINE CLINIC | Facility: CLINIC | Age: 86
End: 2021-03-17

## 2021-03-17 DIAGNOSIS — N18.30 CONTROLLED TYPE 2 DIABETES MELLITUS WITH STAGE 3 CHRONIC KIDNEY DISEASE, WITHOUT LONG-TERM CURRENT USE OF INSULIN (HCC): ICD-10-CM

## 2021-03-17 DIAGNOSIS — E11.22 CONTROLLED TYPE 2 DIABETES MELLITUS WITH STAGE 3 CHRONIC KIDNEY DISEASE, WITHOUT LONG-TERM CURRENT USE OF INSULIN (HCC): ICD-10-CM

## 2021-03-17 RX ORDER — LANCETS
EACH MISCELLANEOUS
Qty: 100 EACH | Refills: 1 | Status: SHIPPED | OUTPATIENT
Start: 2021-03-17 | End: 2021-10-08

## 2021-03-17 NOTE — TELEPHONE ENCOUNTER
[facility] pharmacy needs the diagnosis code and if the patient is insulin dependant     Accu-Chek Soft Touch Lancets Does not apply Misc

## 2021-03-22 ENCOUNTER — OFFICE VISIT (OUTPATIENT)
Dept: PHYSICAL THERAPY | Age: 86
End: 2021-03-22
Attending: ORTHOPAEDIC SURGERY
Payer: MEDICARE

## 2021-03-22 DIAGNOSIS — S72.432D CLOSED BICONDYLAR FRACTURE OF LEFT FEMUR WITH ROUTINE HEALING, SUBSEQUENT ENCOUNTER: ICD-10-CM

## 2021-03-22 DIAGNOSIS — S72.422D CLOSED BICONDYLAR FRACTURE OF LEFT FEMUR WITH ROUTINE HEALING, SUBSEQUENT ENCOUNTER: ICD-10-CM

## 2021-03-22 PROCEDURE — 97110 THERAPEUTIC EXERCISES: CPT

## 2021-03-22 NOTE — PROGRESS NOTES
Dx: Closed bicondylar fracture of left femur with routine healing, subsequent encounter (R86.220X,C46.285T)       Insurance   medicare       POC# of visits: 12       Authorized  # visits by insurance  :  12  Expiration date  of Authorization: (90 days from ,symptoms and functional limitations 50% or better to be able to return to PLOF  7.  Pt will improve functional transfers in all aspects by increasing sit to stand ability and perform supine to sit wit no assist in the clinic       Date: 3/10/2021  TX#: 2/1

## 2021-03-24 ENCOUNTER — OFFICE VISIT (OUTPATIENT)
Dept: PHYSICAL THERAPY | Age: 86
End: 2021-03-24
Attending: ORTHOPAEDIC SURGERY
Payer: MEDICARE

## 2021-03-24 DIAGNOSIS — S72.432D CLOSED BICONDYLAR FRACTURE OF LEFT FEMUR WITH ROUTINE HEALING, SUBSEQUENT ENCOUNTER: ICD-10-CM

## 2021-03-24 DIAGNOSIS — S72.422D CLOSED BICONDYLAR FRACTURE OF LEFT FEMUR WITH ROUTINE HEALING, SUBSEQUENT ENCOUNTER: ICD-10-CM

## 2021-03-24 PROCEDURE — 97110 THERAPEUTIC EXERCISES: CPT

## 2021-03-24 NOTE — PROGRESS NOTES
Dx: Closed bicondylar fracture of left femur with routine healing, subsequent encounter (K54.628H,N40.098W)       Insurance   medicare       POC# of visits: 12       Authorized  # visits by insurance  :  12  Expiration date  of Authorization: (90 days from assessment. 5. Pt will demonstrate ability to ambulate with least resistive device to no device 150'x 1 or better with modified I if safe  6.  Pt will report decreased in pain ,symptoms and functional limitations 50% or better to be able to return to PLOF mod A with HHA due to fear of falling  Gait/NMRed:   Others: Others: Others: Others:  Others:     HEP: ( see pt instructions )   3/10/21: seated LAQ, hip flexion, AP, hip add and hip abd    Charges: thereax x3       Total Timed Treatment: 40 min  Total [place]

## 2021-03-29 ENCOUNTER — OFFICE VISIT (OUTPATIENT)
Dept: PHYSICAL THERAPY | Age: 86
End: 2021-03-29
Attending: ORTHOPAEDIC SURGERY
Payer: MEDICARE

## 2021-03-29 DIAGNOSIS — S72.422D CLOSED BICONDYLAR FRACTURE OF LEFT FEMUR WITH ROUTINE HEALING, SUBSEQUENT ENCOUNTER: ICD-10-CM

## 2021-03-29 DIAGNOSIS — S72.432D CLOSED BICONDYLAR FRACTURE OF LEFT FEMUR WITH ROUTINE HEALING, SUBSEQUENT ENCOUNTER: ICD-10-CM

## 2021-03-29 PROCEDURE — 97116 GAIT TRAINING THERAPY: CPT

## 2021-03-29 PROCEDURE — 97110 THERAPEUTIC EXERCISES: CPT

## 2021-03-29 NOTE — PROGRESS NOTES
Dx: Closed bicondylar fracture of left femur with routine healing, subsequent encounter (G45.589R,T48.076K)       Insurance   medicare       POC# of visits: 12       Authorized  # visits by insurance  :  12  Expiration date  of Authorization: (90 days from on various surfaces and static/dynamic balance at least 5-10 secs from initial assessment.: on going  5. Pt will demonstrate ability to ambulate with least resistive device to no device 150'x 1 or better with modified I if safe: CGA with cane  6.  Pt will r foam  SLR x10 actively with QS  Heel slides 2x10  LAQ2# 2x10  seated hip flexion x2x10 x1#     Manual: Manual: Manual: Manual: Manual:   Gait/NMRed: Gait/NMRed: Gait/NMRed: Gait/NMRed:  Unilateral support of bed with walking forward and backwards x3 rounds

## 2021-03-31 ENCOUNTER — OFFICE VISIT (OUTPATIENT)
Dept: PHYSICAL THERAPY | Age: 86
End: 2021-03-31
Attending: ORTHOPAEDIC SURGERY
Payer: MEDICARE

## 2021-03-31 DIAGNOSIS — S72.422D CLOSED BICONDYLAR FRACTURE OF LEFT FEMUR WITH ROUTINE HEALING, SUBSEQUENT ENCOUNTER: ICD-10-CM

## 2021-03-31 DIAGNOSIS — S72.432D CLOSED BICONDYLAR FRACTURE OF LEFT FEMUR WITH ROUTINE HEALING, SUBSEQUENT ENCOUNTER: ICD-10-CM

## 2021-03-31 PROCEDURE — 97110 THERAPEUTIC EXERCISES: CPT

## 2021-03-31 NOTE — PROGRESS NOTES
Dx: Closed bicondylar fracture of left femur with routine healing, subsequent encounter (M00.171M,G16.673A)       Insurance   medicare       POC# of visits: 12       Authorized  # visits by insurance  :  12  Expiration date  of Authorization: (90 days from half a grade or better to be able to walk with least restrictive device.: on going  3. Pt will demonstrate improved  AROM on L knee to equal that of the contralateral knee for improved gait pattern.: on going  4.  Pt will demonstrate LLE  SLS to improve gait assisted x10 only    Supine TKEs 1# 2x10  With roll under  LAQ1# 2x10  seated hip flexion x2x10    Standing hip exercises YTB 2x10 : flex/ext/abd     Theraex:  Standing hip exercises YTB 2x10 : flex/ext/abd on foam  SLR x10 actively with QS  Heel slides 2x

## 2021-04-05 ENCOUNTER — OFFICE VISIT (OUTPATIENT)
Dept: PHYSICAL THERAPY | Age: 86
End: 2021-04-05
Attending: ORTHOPAEDIC SURGERY
Payer: MEDICARE

## 2021-04-05 PROCEDURE — 97110 THERAPEUTIC EXERCISES: CPT

## 2021-04-05 NOTE — PROGRESS NOTES
ProgressSummary  Pt has attended 8 visits in Physical Therapy.      Dx: Closed bicondylar fracture of left femur with routine healing, subsequent encounter (B66.372D,Z11.432D)       Insurance   medicare       POC# of visits: 12       Authorized  # visits continued skilled Physical Therapy to address the above impairments and meet established goals as per prior POC. Plan is to see pt 3-4 more time then discharge to HEP I as she will be continuing to use RW for safety.       Objectives and Goal status as note pattern.: on going  4. Pt will demonstrate LLE  SLS to improve gait on various surfaces and static/dynamic balance at least 5-10 secs from initial assessment.: 5 secs with increased confidence but BUE support  5.  Pt will demonstrate ability to ambulate wit falling  Gait/NMRed:  CGA HHA with straight cane 25'x2 with rest between x70'x1  Scar mobility x3 mins   Others: Others: Others: Others:  Others:     HEP: ( see pt instructions )   3/10/21: seated LAQ, hip flexion, AP, hip add and hip abd  4/5/21: standing

## 2021-04-06 ENCOUNTER — LAB ENCOUNTER (OUTPATIENT)
Dept: LAB | Facility: HOSPITAL | Age: 86
End: 2021-04-06
Attending: INTERNAL MEDICINE
Payer: MEDICARE

## 2021-04-06 ENCOUNTER — OFFICE VISIT (OUTPATIENT)
Dept: INTERNAL MEDICINE CLINIC | Facility: CLINIC | Age: 86
End: 2021-04-06
Payer: MEDICARE

## 2021-04-06 VITALS
HEIGHT: 61 IN | RESPIRATION RATE: 18 BRPM | BODY MASS INDEX: 31.15 KG/M2 | HEART RATE: 61 BPM | SYSTOLIC BLOOD PRESSURE: 136 MMHG | DIASTOLIC BLOOD PRESSURE: 78 MMHG | WEIGHT: 165 LBS

## 2021-04-06 DIAGNOSIS — N18.31 STAGE 3A CHRONIC KIDNEY DISEASE (HCC): ICD-10-CM

## 2021-04-06 DIAGNOSIS — R60.0 BILATERAL LOWER EXTREMITY EDEMA: Primary | ICD-10-CM

## 2021-04-06 DIAGNOSIS — C50.211 MALIGNANT NEOPLASM OF UPPER-INNER QUADRANT OF RIGHT BREAST IN FEMALE, ESTROGEN RECEPTOR POSITIVE (HCC): ICD-10-CM

## 2021-04-06 DIAGNOSIS — Z17.0 MALIGNANT NEOPLASM OF UPPER-INNER QUADRANT OF RIGHT BREAST IN FEMALE, ESTROGEN RECEPTOR POSITIVE (HCC): ICD-10-CM

## 2021-04-06 DIAGNOSIS — R60.0 BILATERAL LOWER EXTREMITY EDEMA: ICD-10-CM

## 2021-04-06 DIAGNOSIS — E11.21 CONTROLLED TYPE 2 DIABETES MELLITUS WITH DIABETIC NEPHROPATHY, WITHOUT LONG-TERM CURRENT USE OF INSULIN (HCC): ICD-10-CM

## 2021-04-06 DIAGNOSIS — H35.3212 EXUDATIVE AGE-RELATED MACULAR DEGENERATION, RIGHT EYE, WITH INACTIVE CHOROIDAL NEOVASCULARIZATION (HCC): ICD-10-CM

## 2021-04-06 DIAGNOSIS — Z78.0 ASYMPTOMATIC MENOPAUSE: ICD-10-CM

## 2021-04-06 DIAGNOSIS — I10 ESSENTIAL HYPERTENSION: ICD-10-CM

## 2021-04-06 PROCEDURE — 36415 COLL VENOUS BLD VENIPUNCTURE: CPT

## 2021-04-06 PROCEDURE — 82306 VITAMIN D 25 HYDROXY: CPT | Performed by: INTERNAL MEDICINE

## 2021-04-06 PROCEDURE — 80048 BASIC METABOLIC PNL TOTAL CA: CPT

## 2021-04-06 PROCEDURE — 99214 OFFICE O/P EST MOD 30 MIN: CPT | Performed by: INTERNAL MEDICINE

## 2021-04-06 RX ORDER — POTASSIUM CHLORIDE 750 MG/1
10 TABLET, EXTENDED RELEASE ORAL DAILY PRN
Qty: 30 TABLET | Refills: 2 | Status: SHIPPED | OUTPATIENT
Start: 2021-04-06 | End: 2021-12-07

## 2021-04-06 RX ORDER — LOSARTAN POTASSIUM 50 MG/1
50 TABLET ORAL DAILY
Qty: 90 TABLET | Refills: 1 | Status: SHIPPED | OUTPATIENT
Start: 2021-04-06 | End: 2021-10-09

## 2021-04-06 RX ORDER — LOSARTAN POTASSIUM 100 MG/1
100 TABLET ORAL DAILY
Qty: 90 TABLET | Refills: 1 | Status: SHIPPED | OUTPATIENT
Start: 2021-04-06 | End: 2021-04-06

## 2021-04-06 RX ORDER — FUROSEMIDE 20 MG/1
20 TABLET ORAL DAILY PRN
Qty: 30 TABLET | Refills: 2 | Status: SHIPPED | OUTPATIENT
Start: 2021-04-06 | End: 2021-12-07

## 2021-04-06 NOTE — PROGRESS NOTES
HPI:    Patient ID: David Brunner is a 80year old female. HPI:  The swelling in her legs improved after 2 weeks of furosemide. She stopped the furosemide 1 week ago when her legs are still improved compared to previously.   Her son is concerned abou ONE TIME DAILY 100 each 1   • ANASTROZOLE 1 MG Oral Tab tab TAKE ONE TABLET BY MOUTH ONE TIME DAILY 90 tablet 0   • atenolol 50 MG Oral Tab Take 1 tablet (50 mg total) by mouth daily.  90 tablet 1   • Glucose Blood (ACCU-CHEK AMBIKA PLUS) In [facility] Heart Disease Father         CAD   • Heart Disease Mother         CAD   • Diabetes Mother    • Breast Cancer Mother 80        had lumpectomy and RT. No other treatment.    of stroke at 80   • Other (appendicitis[other]) Brother         age 15   • Other remarkably. We will continue her current blood pressure medication doses. Relevant Medications    furosemide 20 MG Oral Tab    losartan 50 MG Oral Tab    Breast cancer of upper-inner quadrant of right female breast ([address]     This is in remission.

## 2021-04-06 NOTE — ASSESSMENT & PLAN NOTE
Advised patient to get compression stockings. Patient may take furosemide and potassium as needed. Recheck BMP today.

## 2021-04-06 NOTE — ASSESSMENT & PLAN NOTE
Patient did not do the bone density which was ordered twice in the past by Dr. Alisha Cronin. I will reorder this. She has had 2 fractures in the past 2 years.

## 2021-04-06 NOTE — ASSESSMENT & PLAN NOTE
Patient's blood pressure was rechecked and improved remarkably. We will continue her current blood pressure medication doses.

## 2021-04-07 ENCOUNTER — OFFICE VISIT (OUTPATIENT)
Dept: PHYSICAL THERAPY | Age: 86
End: 2021-04-07
Attending: ORTHOPAEDIC SURGERY
Payer: MEDICARE

## 2021-04-07 PROCEDURE — 97110 THERAPEUTIC EXERCISES: CPT

## 2021-04-07 NOTE — PROGRESS NOTES
Dx: Closed bicondylar fracture of left femur with routine healing, subsequent encounter (Q50.557N,Q04.608X)       Insurance   medicare       POC# of visits: 12       Authorized  # visits by insurance  :  12  Expiration date  of Authorization: (90 days fro go:26/25/21 secs now 23/23/22      Functional transfers supine <>Sit with SBA of 1       RLE (MMT) LLE (MMT)   Hip flexion 3+/5 3+/5   Hip extension 2+/5able to do bridges 2+/5 able to do bridges   Hip abd 3+/5 modified 2+/5        RLE (MMT) LLE (MMT)   An Theraex:  Reassessed  Sit to stand with RW  Step ups 3\" 2x10  Standing hip exercises YTB 2x10:flex/abd/ext  SLS on foam  5 secs x5 x2  standing with feet together on foam 1 min x2 tries:CGA   Foam beam x2 rounds then c/o knee pain  Theraex:  Step ups 3\"

## 2021-04-13 ENCOUNTER — OFFICE VISIT (OUTPATIENT)
Dept: PHYSICAL THERAPY | Age: 86
End: 2021-04-13
Attending: ORTHOPAEDIC SURGERY
Payer: MEDICARE

## 2021-04-13 PROCEDURE — 97110 THERAPEUTIC EXERCISES: CPT

## 2021-04-13 NOTE — PROGRESS NOTES
Dx: Closed bicondylar fracture of left femur with routine healing, subsequent encounter (B36.437U,T42.638C)       Insurance   medicare       POC# of visits: 12       Authorized  # visits by insurance  :  12  Expiration date  of Authorization: (90 days fro Hip abd 3+/5 modified 2+/5        RLE (MMT) LLE (MMT)   Ankle DF 5/5 5/5   Ankle PF NT/5 NT/5           RLE(ROM) LLE (ROM) RLE (MMT) LLE (MMT)   Knee flexion 120 112 5/5 4/5   Knee extension 0 0 5/5 4/5         Goals: (to be met in 12 visits)  1.  Pt will Foam beam x2 rounds then c/o knee pain  Theraex:  Step ups 3\" 2x10  on foam  standing Marches 1# 5 secs x5 x2  Standing on foam hip abd 1# 2x10  Seated hip flexion 2x10  Seated knee extension 1# 2x10  standing with feet together on foam 1 min x2 tries:C

## 2021-04-19 ENCOUNTER — OFFICE VISIT (OUTPATIENT)
Dept: PHYSICAL THERAPY | Age: 86
End: 2021-04-19
Attending: ORTHOPAEDIC SURGERY
Payer: MEDICARE

## 2021-04-19 PROCEDURE — 97110 THERAPEUTIC EXERCISES: CPT

## 2021-04-19 NOTE — PROGRESS NOTES
DischargeSumshayy  Pt has attended 11 visits in Physical Therapy   Dx: Closed bicondylar fracture of left femur with routine healing, subsequent encounter (E02.422D,S72.432D)       Insurance   medicare       POC# of visits: 12       Authorized  # visits likewise improved on L knee at 110-112 but has been staying the same so advised to keep consistent with HEP ,shown different ways to maintain    Pt will be discharged from skilled PT at this time, being a good candidate to continue with HEP I.  She is to co to improve gait on various surfaces and static/dynamic balance at least 5-10 secs from initial assessment.: 5 secs with increased confidence but BUE support  5.  Pt will demonstrate ability to ambulate with least resistive device to no device 150'x 1 or bet forward with BUE  Sit to stand tests  Heel slides x2x10  Seated knee flexion 10 secs x5 reps with contralateral leg OP  Seated hip flexion 2# 1x10  Seated knee extension 2# 1x10  Seated knee flexion with towel on floor x7   Manual: Manual: Manual: Manual:

## 2021-05-04 DIAGNOSIS — E11.9 CONTROLLED TYPE 2 DIABETES MELLITUS WITHOUT COMPLICATION, WITHOUT LONG-TERM CURRENT USE OF INSULIN (HCC): ICD-10-CM

## 2021-05-04 RX ORDER — METFORMIN HYDROCHLORIDE 500 MG/1
500 TABLET, EXTENDED RELEASE ORAL
Qty: 90 TABLET | Refills: 1 | Status: SHIPPED | OUTPATIENT
Start: 2021-05-04 | End: 2021-12-07

## 2021-06-12 RX ORDER — DILTIAZEM HYDROCHLORIDE 180 MG/1
CAPSULE, COATED, EXTENDED RELEASE ORAL
Qty: 180 CAPSULE | Refills: 0 | Status: SHIPPED | OUTPATIENT
Start: 2021-06-12 | End: 2021-09-08

## 2021-06-18 RX ORDER — ANASTROZOLE 1 MG/1
TABLET ORAL
Qty: 90 TABLET | Refills: 0 | Status: SHIPPED | OUTPATIENT
Start: 2021-06-18 | End: 2021-08-25

## 2021-08-25 ENCOUNTER — OFFICE VISIT (OUTPATIENT)
Dept: HEMATOLOGY/ONCOLOGY | Facility: HOSPITAL | Age: 86
End: 2021-08-25
Attending: INTERNAL MEDICINE
Payer: MEDICARE

## 2021-08-25 VITALS
HEART RATE: 52 BPM | WEIGHT: 167.69 LBS | RESPIRATION RATE: 16 BRPM | DIASTOLIC BLOOD PRESSURE: 58 MMHG | HEIGHT: 61 IN | TEMPERATURE: 99 F | BODY MASS INDEX: 31.66 KG/M2 | OXYGEN SATURATION: 94 % | SYSTOLIC BLOOD PRESSURE: 136 MMHG

## 2021-08-25 DIAGNOSIS — C50.211 MALIGNANT NEOPLASM OF UPPER-INNER QUADRANT OF RIGHT BREAST IN FEMALE, ESTROGEN RECEPTOR POSITIVE (HCC): Primary | ICD-10-CM

## 2021-08-25 DIAGNOSIS — Z17.0 MALIGNANT NEOPLASM OF UPPER-INNER QUADRANT OF RIGHT BREAST IN FEMALE, ESTROGEN RECEPTOR POSITIVE (HCC): Primary | ICD-10-CM

## 2021-08-25 DIAGNOSIS — Z51.81 MEDICATION MONITORING ENCOUNTER: ICD-10-CM

## 2021-08-25 PROCEDURE — 99214 OFFICE O/P EST MOD 30 MIN: CPT | Performed by: INTERNAL MEDICINE

## 2021-08-25 NOTE — PROGRESS NOTES
HPI     Keyanna Stephens is a 80year old female who is here today for f/u diagnosis of Malignant neoplasm of upper-inner quadrant of right breast in female, estrogen receptor positive (hcc)  (primary encounter diagnosis)  Medication monitoring encounter Tab Take 1 tablet (20 mg total) by mouth daily as needed. 30 tablet 2   • Potassium Chloride ER 10 MEQ Oral Tab CR Take 1 tablet (10 mEq total) by mouth daily as needed.  Take with furosemide 30 tablet 2   • cholecalciferol (VITAMIN D HIGH POTENCY) 25 MCG ( • Hearing impairment    • High blood pressure    • High cholesterol    • Hyperlipidemia    • Osteoarthritis    • Squamous cell carcinoma, keratinizing 2018    R posterior thigh     Past Surgical History:   Procedure Laterality Date   • APPENDECTOMY and reactive to light. Cardiovascular:      Rate and Rhythm: Normal rate and regular rhythm. Heart sounds: Normal heart sounds. No murmur heard. Pulmonary:      Effort: Pulmonary effort is normal. No respiratory distress.       Breath sounds: Nor - Signed by Thong Lockhart MD on 8/10/2016        Staging comments: Tana Mckeons 67 18%        Pathologic stage from 8/12/2016: Stage IA (T1b, N0(i-), cM0) - Signed by Thong Lockhart MD on 8/24/2016      Her prognosis is good.       Local therapy completed with lum

## 2021-09-08 RX ORDER — DILTIAZEM HYDROCHLORIDE 180 MG/1
360 CAPSULE, COATED, EXTENDED RELEASE ORAL DAILY
Qty: 180 CAPSULE | Refills: 0 | Status: SHIPPED | OUTPATIENT
Start: 2021-09-08 | End: 2021-12-07

## 2021-09-08 NOTE — TELEPHONE ENCOUNTER
Please review. Protocol failed / No Protocol. Requested Prescriptions   Pending Prescriptions Disp Refills    dilTIAZem HCl ER Coated Beads 180 MG Oral Capsule SR 24 Hr 180 capsule 0     Sig: Take 2 capsules (360 mg total) by mouth daily.         Hyperte

## 2021-10-08 DIAGNOSIS — I10 ESSENTIAL HYPERTENSION: ICD-10-CM

## 2021-10-08 DIAGNOSIS — N18.30 CONTROLLED TYPE 2 DIABETES MELLITUS WITH STAGE 3 CHRONIC KIDNEY DISEASE, WITHOUT LONG-TERM CURRENT USE OF INSULIN (HCC): ICD-10-CM

## 2021-10-08 DIAGNOSIS — E11.22 CONTROLLED TYPE 2 DIABETES MELLITUS WITH STAGE 3 CHRONIC KIDNEY DISEASE, WITHOUT LONG-TERM CURRENT USE OF INSULIN (HCC): ICD-10-CM

## 2021-10-08 RX ORDER — ATENOLOL 50 MG/1
50 TABLET ORAL DAILY
Qty: 90 TABLET | Refills: 1 | OUTPATIENT
Start: 2021-10-08

## 2021-10-08 RX ORDER — LOSARTAN POTASSIUM 50 MG/1
50 TABLET ORAL DAILY
Qty: 90 TABLET | Refills: 1 | OUTPATIENT
Start: 2021-10-08

## 2021-10-08 RX ORDER — LANCETS
EACH MISCELLANEOUS
Qty: 100 EACH | Refills: 3 | Status: SHIPPED | OUTPATIENT
Start: 2021-10-08 | End: 2021-10-11

## 2021-10-08 NOTE — TELEPHONE ENCOUNTER
Refill passed per [facility], [facility] protocol. Requested Prescriptions   Pending Prescriptions Disp Refills    ATENOLOL 50 MG Oral Tab [Pharmacy Med Name: Atenolol 50 Mg Tab Unic] 90 tablet 0     Sig: Take 1 tablet (50 mg total) by mouth daily.         Hypert In 2 months Lauren Dixon MD [address] [facility] [facility] small growth on hand and ulcer on leg  10/8 sent [employer] offering to schedule a sooner appointment with a different provider    In 10 months Kem Wilcox 19

## 2021-10-08 NOTE — TELEPHONE ENCOUNTER
Please review; protocol failed or has no protocol    Requested Prescriptions   Pending Prescriptions Disp Refills    ATENOLOL 50 MG Oral Tab [Pharmacy Med Name: Atenolol 50 Mg Tab [facility]] 90 tablet 0     Sig: Take 1 tablet (50 mg total) by mouth daily. months ago     [facility] in [place], [place]    Office Visit    6 months ago     [facility] in [place], [place]    Office Visit    6 months ago Bilateral lower extremity edema    [facility], [facility], [address], [place]

## 2021-10-09 RX ORDER — ATENOLOL 50 MG/1
50 TABLET ORAL DAILY
Qty: 90 TABLET | Refills: 0 | Status: SHIPPED | OUTPATIENT
Start: 2021-10-09 | End: 2021-12-07

## 2021-10-09 RX ORDER — LOSARTAN POTASSIUM 50 MG/1
50 TABLET ORAL DAILY
Qty: 90 TABLET | Refills: 0 | Status: SHIPPED | OUTPATIENT
Start: 2021-10-09 | End: 2021-12-07

## 2021-10-10 ENCOUNTER — PATIENT MESSAGE (OUTPATIENT)
Dept: INTERNAL MEDICINE CLINIC | Facility: CLINIC | Age: 86
End: 2021-10-10

## 2021-10-10 DIAGNOSIS — N18.30 CONTROLLED TYPE 2 DIABETES MELLITUS WITH STAGE 3 CHRONIC KIDNEY DISEASE, WITHOUT LONG-TERM CURRENT USE OF INSULIN (HCC): ICD-10-CM

## 2021-10-10 DIAGNOSIS — E11.22 CONTROLLED TYPE 2 DIABETES MELLITUS WITH STAGE 3 CHRONIC KIDNEY DISEASE, WITHOUT LONG-TERM CURRENT USE OF INSULIN (HCC): ICD-10-CM

## 2021-10-10 RX ORDER — LANCETS
EACH MISCELLANEOUS
Qty: 100 EACH | Refills: 3 | Status: CANCELLED | OUTPATIENT
Start: 2021-10-10

## 2021-10-11 RX ORDER — LANCETS
EACH MISCELLANEOUS
Qty: 100 EACH | Refills: 3 | Status: SHIPPED | OUTPATIENT
Start: 2021-10-11 | End: 2021-10-15

## 2021-10-11 NOTE — TELEPHONE ENCOUNTER
From: Vick Teresa  To: Katie Bojorquez MD  Sent: 10/10/2021 5:29 PM CDT  Subject: refill for accu-chek softclix lancets    The pharmacy said they did not receive the prescription for the Accu-Chek softclix lancets. Please re-send it. Thank You.

## 2021-10-15 ENCOUNTER — TELEPHONE (OUTPATIENT)
Dept: INTERNAL MEDICINE CLINIC | Facility: CLINIC | Age: 86
End: 2021-10-15

## 2021-10-15 DIAGNOSIS — E11.9 DIABETES TYPE 2, CONTROLLED (HCC): ICD-10-CM

## 2021-10-15 RX ORDER — LANCETS
1 EACH MISCELLANEOUS DAILY
Qty: 100 EACH | Refills: 3 | Status: SHIPPED | OUTPATIENT
Start: 2021-10-15 | End: 2023-03-10

## 2021-10-29 ENCOUNTER — LAB ENCOUNTER (OUTPATIENT)
Dept: LAB | Facility: HOSPITAL | Age: 86
End: 2021-10-29
Attending: INTERNAL MEDICINE
Payer: MEDICARE

## 2021-10-29 ENCOUNTER — TELEPHONE (OUTPATIENT)
Dept: INTERNAL MEDICINE CLINIC | Facility: CLINIC | Age: 86
End: 2021-10-29

## 2021-10-29 DIAGNOSIS — E11.21 CONTROLLED TYPE 2 DIABETES MELLITUS WITH DIABETIC NEPHROPATHY, WITHOUT LONG-TERM CURRENT USE OF INSULIN (HCC): ICD-10-CM

## 2021-10-29 DIAGNOSIS — R39.9 URINARY SYMPTOM OR SIGN: ICD-10-CM

## 2021-10-29 DIAGNOSIS — R39.9 URINARY SYMPTOM OR SIGN: Primary | ICD-10-CM

## 2021-10-29 PROCEDURE — 87088 URINE BACTERIA CULTURE: CPT

## 2021-10-29 PROCEDURE — 83036 HEMOGLOBIN GLYCOSYLATED A1C: CPT

## 2021-10-29 PROCEDURE — 87186 SC STD MICRODIL/AGAR DIL: CPT

## 2021-10-29 PROCEDURE — 80069 RENAL FUNCTION PANEL: CPT

## 2021-10-29 PROCEDURE — 81001 URINALYSIS AUTO W/SCOPE: CPT

## 2021-10-29 PROCEDURE — 87086 URINE CULTURE/COLONY COUNT: CPT

## 2021-10-29 PROCEDURE — 36415 COLL VENOUS BLD VENIPUNCTURE: CPT

## 2021-10-29 NOTE — TELEPHONE ENCOUNTER
Patient states she has had dysuria, frequency, and some incontinence for approximately two week. Patient denies hematuria, fever, abdominal/back/flank pain, nausea, vomiting. Patient requesting an antibiotic.

## 2021-10-29 NOTE — TELEPHONE ENCOUNTER
I wrote an order for a urine specimen. After I see the preliminary results, I will send a prescription for antibiotics.   Also, when she is at the lab, I would like for her to do a blood test.  She does not have to fast.

## 2021-10-29 NOTE — TELEPHONE ENCOUNTER
Patient informed of provider's response/order as per below, and voiced understating and agrees with plan.

## 2021-10-29 NOTE — TELEPHONE ENCOUNTER
----- Message from Crane. Aspito sent at 10/29/2021  9:35 AM CDT -----  Regarding: Pain when urinating  Hi Dr. Enriquez Sites,  I have been having off and on pain when urinating for about a couple of weeks. It burns and stings when it comes out.  Can you pleas

## 2021-11-22 ENCOUNTER — OFFICE VISIT (OUTPATIENT)
Dept: ORTHOPEDICS CLINIC | Facility: CLINIC | Age: 86
End: 2021-11-22
Payer: MEDICARE

## 2021-11-22 VITALS
HEART RATE: 52 BPM | HEIGHT: 61 IN | BODY MASS INDEX: 31.15 KG/M2 | DIASTOLIC BLOOD PRESSURE: 60 MMHG | SYSTOLIC BLOOD PRESSURE: 139 MMHG | WEIGHT: 165 LBS

## 2021-11-22 DIAGNOSIS — M19.011 PRIMARY OSTEOARTHRITIS OF RIGHT SHOULDER: Primary | ICD-10-CM

## 2021-11-22 PROCEDURE — 20610 DRAIN/INJ JOINT/BURSA W/O US: CPT | Performed by: ORTHOPAEDIC SURGERY

## 2021-11-22 RX ORDER — TRIAMCINOLONE ACETONIDE 40 MG/ML
40 INJECTION, SUSPENSION INTRA-ARTICULAR; INTRAMUSCULAR ONCE
Status: COMPLETED | OUTPATIENT
Start: 2021-11-22 | End: 2021-11-22

## 2021-11-22 RX ADMIN — TRIAMCINOLONE ACETONIDE 40 MG: 40 INJECTION, SUSPENSION INTRA-ARTICULAR; INTRAMUSCULAR at 12:00:00

## 2021-11-22 NOTE — PROGRESS NOTES
NURSING INTAKE COMMENTS: Patient presents with:  Shoulder Pain: Pt here for f/u on Right shoulder pain, requesting injection today, pain 5/10      HPI: This 80year old female presents today for chronic right shoulder pain consistent with advanced osteoart furosemide 20 MG Oral Tab Take 1 tablet (20 mg total) by mouth daily as needed. 30 tablet 2   • Potassium Chloride ER 10 MEQ Oral Tab CR Take 1 tablet (10 mEq total) by mouth daily as needed.  Take with furosemide 30 tablet 2   • cholecalciferol (VITAMIN D Social History    Occupational History      Occupation:         Comment: retired    Tobacco Use      Smoking status: Never Smoker      Smokeless tobacco: Never Used    Vaping Use      Vaping Use: Never used    Substance and Sexual Activity

## 2021-11-22 NOTE — PROCEDURES
Per Dr. Sharon Farah verbal instruction, draw up 1mL Kenalog-40 and 4mL Xylocaine 1% for right shoulder injection. Patient had left prior to obtaining post-injection vitals. Pt provided w/ education handout for cortisone injection.

## 2021-12-07 ENCOUNTER — OFFICE VISIT (OUTPATIENT)
Dept: INTERNAL MEDICINE CLINIC | Facility: CLINIC | Age: 86
End: 2021-12-07
Payer: MEDICARE

## 2021-12-07 VITALS
RESPIRATION RATE: 16 BRPM | HEIGHT: 61 IN | TEMPERATURE: 98 F | HEART RATE: 64 BPM | WEIGHT: 165 LBS | SYSTOLIC BLOOD PRESSURE: 145 MMHG | DIASTOLIC BLOOD PRESSURE: 72 MMHG | BODY MASS INDEX: 31.15 KG/M2

## 2021-12-07 DIAGNOSIS — E78.00 HYPERCHOLESTEROLEMIA: ICD-10-CM

## 2021-12-07 DIAGNOSIS — R60.0 BILATERAL LOWER EXTREMITY EDEMA: ICD-10-CM

## 2021-12-07 DIAGNOSIS — E11.21 CONTROLLED TYPE 2 DIABETES MELLITUS WITH DIABETIC NEPHROPATHY, WITHOUT LONG-TERM CURRENT USE OF INSULIN (HCC): Primary | ICD-10-CM

## 2021-12-07 DIAGNOSIS — I10 HYPERTENSION, UNSPECIFIED TYPE: ICD-10-CM

## 2021-12-07 DIAGNOSIS — L57.0 KERATOSIS: ICD-10-CM

## 2021-12-07 PROBLEM — E11.22 TYPE 2 DIABETES MELLITUS WITH DIABETIC CHRONIC KIDNEY DISEASE (HCC): Status: ACTIVE | Noted: 2021-12-07

## 2021-12-07 PROBLEM — N18.31 STAGE 3A CHRONIC KIDNEY DISEASE (HCC): Status: RESOLVED | Noted: 2021-04-06 | Resolved: 2021-12-07

## 2021-12-07 PROCEDURE — 99215 OFFICE O/P EST HI 40 MIN: CPT | Performed by: INTERNAL MEDICINE

## 2021-12-07 RX ORDER — POTASSIUM CHLORIDE 750 MG/1
10 TABLET, EXTENDED RELEASE ORAL DAILY PRN
Qty: 30 TABLET | Refills: 2 | Status: SHIPPED | OUTPATIENT
Start: 2021-12-07

## 2021-12-07 RX ORDER — LOSARTAN POTASSIUM 50 MG/1
50 TABLET ORAL DAILY
Qty: 90 TABLET | Refills: 1 | Status: SHIPPED | OUTPATIENT
Start: 2021-12-07

## 2021-12-07 RX ORDER — ATENOLOL 50 MG/1
50 TABLET ORAL DAILY
Qty: 90 TABLET | Refills: 1 | Status: SHIPPED | OUTPATIENT
Start: 2021-12-07

## 2021-12-07 RX ORDER — METFORMIN HYDROCHLORIDE 500 MG/1
500 TABLET, EXTENDED RELEASE ORAL
Qty: 90 TABLET | Refills: 1 | Status: CANCELLED | OUTPATIENT
Start: 2021-12-07

## 2021-12-07 RX ORDER — DILTIAZEM HYDROCHLORIDE 180 MG/1
360 CAPSULE, COATED, EXTENDED RELEASE ORAL DAILY
Qty: 180 CAPSULE | Refills: 1 | Status: SHIPPED | OUTPATIENT
Start: 2021-12-07

## 2021-12-07 RX ORDER — ATORVASTATIN CALCIUM 20 MG/1
20 TABLET, FILM COATED ORAL DAILY
Qty: 90 TABLET | Refills: 1 | Status: SHIPPED | OUTPATIENT
Start: 2021-12-07

## 2021-12-07 RX ORDER — FUROSEMIDE 20 MG/1
20 TABLET ORAL DAILY PRN
Qty: 30 TABLET | Refills: 2 | Status: SHIPPED | OUTPATIENT
Start: 2021-12-07

## 2021-12-07 NOTE — PATIENT INSTRUCTIONS
Dr. Sheri Rudd (dermatologist):  [phone]  or  Dr. Julio César Frances. [phone]    Stop taking metformin. Call if home blood sugar readings are greater than 200 for more than 3 days. Please schedule your next appointment in March.   Do fasting l

## 2021-12-07 NOTE — PROGRESS NOTES
HPI:    Patient ID: Marbella Bailey is a 80year old female. HPI:  She has a growth on her right hand. She didn't see Dr. Chun Andres (ophthalmologist) this year yet. She did do blood tests in late October. The leg swelling waxes and wanes.     Past Surgic Units total) by mouth daily.  30 capsule 0   • Glucose Blood (ACCU-CHEK AMBIKA PLUS) In Vitro Strip Test blood sugar once daily1 Dx:E11.9, non-insulin dependent 100 strip 5   • ketoconazole 2 % External Cream Apply 1 Application topically 2 (two) times daily Location: Left arm, Patient Position: Sitting, Cuff Size: adult)   Pulse 64   Temp 98 °F (36.7 °C) (Temporal)   Resp 16   Ht 5' 1\" (1.549 m)   Wt 165 lb (74.8 kg)   Breastfeeding No   BMI 31.18 kg/m²   PHYSICAL EXAM:   Physical Exam  Vitals and nursing no Hypercholesterolemia     Controlled. Continue present management. Relevant Medications    atorvastatin 20 MG Oral Tab    Bilateral lower extremity edema     Patient does not have pain due to the edema.   Advised patient's son that she can take furo

## 2021-12-08 NOTE — ASSESSMENT & PLAN NOTE
Controlled. Continue present management.
Patient does not have pain due to the edema. Advised patient's son that she can take furosemide and potassium when the legs are uncomfortably swollen, but that we should use it sparingly to prevent worsening nephropathy.
Patient's blood pressure is acceptable for age. Continue present management.
Patient's kidney function has worsened slightly. We will stop the Metformin. Advised patient and her son that we do not need tight diabetic control and that we need to balance the risks and benefits of the medications.   Patient will continue to monitor h
This is likely a seborrheic keratosis.   Since it causes some discomfort, she will see a dermatologist.
No

## 2022-03-19 ENCOUNTER — LAB ENCOUNTER (OUTPATIENT)
Dept: LAB | Facility: HOSPITAL | Age: 87
End: 2022-03-19
Attending: INTERNAL MEDICINE
Payer: MEDICARE

## 2022-03-19 DIAGNOSIS — E11.21 CONTROLLED TYPE 2 DIABETES MELLITUS WITH DIABETIC NEPHROPATHY, WITHOUT LONG-TERM CURRENT USE OF INSULIN (HCC): ICD-10-CM

## 2022-03-19 LAB
ALBUMIN SERPL-MCNC: 3.4 G/DL (ref 3.4–5)
ALBUMIN/GLOB SERPL: 0.9 {RATIO} (ref 1–2)
ALP LIVER SERPL-CCNC: 167 U/L
ALT SERPL-CCNC: 25 U/L
ANION GAP SERPL CALC-SCNC: 6 MMOL/L (ref 0–18)
AST SERPL-CCNC: 16 U/L (ref 15–37)
BASOPHILS # BLD AUTO: 0.04 X10(3) UL (ref 0–0.2)
BASOPHILS NFR BLD AUTO: 0.4 %
BILIRUB SERPL-MCNC: 0.4 MG/DL (ref 0.1–2)
BUN BLD-MCNC: 38 MG/DL (ref 7–18)
BUN/CREAT SERPL: 33.3 (ref 10–20)
CALCIUM BLD-MCNC: 9.5 MG/DL (ref 8.5–10.1)
CHLORIDE SERPL-SCNC: 107 MMOL/L (ref 98–112)
CHOLEST SERPL-MCNC: 236 MG/DL (ref ?–200)
CO2 SERPL-SCNC: 29 MMOL/L (ref 21–32)
CREAT BLD-MCNC: 1.14 MG/DL
CREAT UR-SCNC: 87.2 MG/DL
DEPRECATED RDW RBC AUTO: 49.9 FL (ref 35.1–46.3)
EOSINOPHIL # BLD AUTO: 0.15 X10(3) UL (ref 0–0.7)
EOSINOPHIL NFR BLD AUTO: 1.7 %
ERYTHROCYTE [DISTWIDTH] IN BLOOD BY AUTOMATED COUNT: 14.6 % (ref 11–15)
EST. AVERAGE GLUCOSE BLD GHB EST-MCNC: 154 MG/DL (ref 68–126)
FASTING PATIENT LIPID ANSWER: YES
FASTING STATUS PATIENT QL REPORTED: YES
GLOBULIN PLAS-MCNC: 3.7 G/DL (ref 2.8–4.4)
GLUCOSE BLD-MCNC: 114 MG/DL (ref 70–99)
HBA1C MFR BLD: 7 % (ref ?–5.7)
HCT VFR BLD AUTO: 43.4 %
HDLC SERPL-MCNC: 67 MG/DL (ref 40–59)
HGB BLD-MCNC: 13.8 G/DL
IMM GRANULOCYTES # BLD AUTO: 0.03 X10(3) UL (ref 0–1)
IMM GRANULOCYTES NFR BLD: 0.3 %
LDLC SERPL CALC-MCNC: 154 MG/DL (ref ?–100)
LYMPHOCYTES # BLD AUTO: 3.11 X10(3) UL (ref 1–4)
LYMPHOCYTES NFR BLD AUTO: 34.5 %
MCH RBC QN AUTO: 29.4 PG (ref 26–34)
MCHC RBC AUTO-ENTMCNC: 31.8 G/DL (ref 31–37)
MCV RBC AUTO: 92.3 FL
MICROALBUMIN UR-MCNC: 2.28 MG/DL
MICROALBUMIN/CREAT 24H UR-RTO: 26.1 UG/MG (ref ?–30)
MONOCYTES # BLD AUTO: 0.76 X10(3) UL (ref 0.1–1)
MONOCYTES NFR BLD AUTO: 8.4 %
NEUTROPHILS # BLD AUTO: 4.93 X10 (3) UL (ref 1.5–7.7)
NEUTROPHILS # BLD AUTO: 4.93 X10(3) UL (ref 1.5–7.7)
NEUTROPHILS NFR BLD AUTO: 54.7 %
NONHDLC SERPL-MCNC: 169 MG/DL (ref ?–130)
OSMOLALITY SERPL CALC.SUM OF ELEC: 304 MOSM/KG (ref 275–295)
PLATELET # BLD AUTO: 266 10(3)UL (ref 150–450)
POTASSIUM SERPL-SCNC: 4.6 MMOL/L (ref 3.5–5.1)
PROT SERPL-MCNC: 7.1 G/DL (ref 6.4–8.2)
RBC # BLD AUTO: 4.7 X10(6)UL
SODIUM SERPL-SCNC: 142 MMOL/L (ref 136–145)
TRIGL SERPL-MCNC: 84 MG/DL (ref 30–149)
VLDLC SERPL CALC-MCNC: 16 MG/DL (ref 0–30)
WBC # BLD AUTO: 9 X10(3) UL (ref 4–11)

## 2022-03-19 PROCEDURE — 80053 COMPREHEN METABOLIC PANEL: CPT

## 2022-03-19 PROCEDURE — 36415 COLL VENOUS BLD VENIPUNCTURE: CPT

## 2022-03-19 PROCEDURE — 80061 LIPID PANEL: CPT

## 2022-03-19 PROCEDURE — 82570 ASSAY OF URINE CREATININE: CPT

## 2022-03-19 PROCEDURE — 82043 UR ALBUMIN QUANTITATIVE: CPT

## 2022-03-19 PROCEDURE — 85025 COMPLETE CBC W/AUTO DIFF WBC: CPT

## 2022-03-19 PROCEDURE — 83036 HEMOGLOBIN GLYCOSYLATED A1C: CPT

## 2022-03-22 ENCOUNTER — OFFICE VISIT (OUTPATIENT)
Dept: INTERNAL MEDICINE CLINIC | Facility: CLINIC | Age: 87
End: 2022-03-22
Payer: MEDICARE

## 2022-03-22 VITALS
HEIGHT: 61 IN | SYSTOLIC BLOOD PRESSURE: 116 MMHG | HEART RATE: 60 BPM | BODY MASS INDEX: 31.15 KG/M2 | WEIGHT: 165 LBS | DIASTOLIC BLOOD PRESSURE: 68 MMHG | RESPIRATION RATE: 16 BRPM

## 2022-03-22 DIAGNOSIS — R07.89 CHEST HEAVINESS: ICD-10-CM

## 2022-03-22 DIAGNOSIS — N81.4 UTEROVAGINAL PROLAPSE: ICD-10-CM

## 2022-03-22 DIAGNOSIS — H35.3212 EXUDATIVE AGE-RELATED MACULAR DEGENERATION, RIGHT EYE, WITH INACTIVE CHOROIDAL NEOVASCULARIZATION (HCC): ICD-10-CM

## 2022-03-22 DIAGNOSIS — N18.30 CONTROLLED TYPE 2 DIABETES MELLITUS WITH STAGE 3 CHRONIC KIDNEY DISEASE, WITHOUT LONG-TERM CURRENT USE OF INSULIN (HCC): ICD-10-CM

## 2022-03-22 DIAGNOSIS — N39.3 SUI (STRESS URINARY INCONTINENCE, FEMALE): ICD-10-CM

## 2022-03-22 DIAGNOSIS — E11.22 CONTROLLED TYPE 2 DIABETES MELLITUS WITH STAGE 3 CHRONIC KIDNEY DISEASE, WITHOUT LONG-TERM CURRENT USE OF INSULIN (HCC): ICD-10-CM

## 2022-03-22 DIAGNOSIS — I10 HYPERTENSION, UNSPECIFIED TYPE: ICD-10-CM

## 2022-03-22 DIAGNOSIS — E78.00 HYPERCHOLESTEROLEMIA: ICD-10-CM

## 2022-03-22 DIAGNOSIS — R60.0 BILATERAL LOWER EXTREMITY EDEMA: ICD-10-CM

## 2022-03-22 PROCEDURE — 99215 OFFICE O/P EST HI 40 MIN: CPT | Performed by: INTERNAL MEDICINE

## 2022-03-22 RX ORDER — LOSARTAN POTASSIUM 50 MG/1
50 TABLET ORAL DAILY
Qty: 90 TABLET | Refills: 1 | Status: SHIPPED | OUTPATIENT
Start: 2022-03-22

## 2022-03-22 RX ORDER — POTASSIUM CHLORIDE 750 MG/1
10 TABLET, EXTENDED RELEASE ORAL DAILY PRN
Qty: 30 TABLET | Refills: 2 | Status: SHIPPED | OUTPATIENT
Start: 2022-03-22

## 2022-03-22 RX ORDER — FUROSEMIDE 20 MG/1
20 TABLET ORAL DAILY PRN
Qty: 30 TABLET | Refills: 2 | Status: SHIPPED | OUTPATIENT
Start: 2022-03-22

## 2022-03-22 RX ORDER — DILTIAZEM HYDROCHLORIDE 180 MG/1
360 CAPSULE, COATED, EXTENDED RELEASE ORAL DAILY
Qty: 180 CAPSULE | Refills: 1 | Status: SHIPPED | OUTPATIENT
Start: 2022-03-22

## 2022-03-22 RX ORDER — ATENOLOL 50 MG/1
50 TABLET ORAL DAILY
Qty: 90 TABLET | Refills: 1 | Status: SHIPPED | OUTPATIENT
Start: 2022-03-22

## 2022-03-22 RX ORDER — ATORVASTATIN CALCIUM 20 MG/1
20 TABLET, FILM COATED ORAL DAILY
Qty: 90 TABLET | Refills: 1 | Status: SHIPPED | OUTPATIENT
Start: 2022-03-22

## 2022-03-22 RX ORDER — NITROGLYCERIN 0.4 MG/1
0.4 TABLET SUBLINGUAL EVERY 5 MIN PRN
Qty: 25 TABLET | Refills: 2 | Status: SHIPPED | OUTPATIENT
Start: 2022-03-22

## 2022-03-22 NOTE — ASSESSMENT & PLAN NOTE
Patient was given a pessary by Dr. Delia Garcia. She would like a second opinion. I will refer her to Dr. Avel Latif.

## 2022-03-22 NOTE — ASSESSMENT & PLAN NOTE
We stopped the Metformin 3 months ago because patient's GFR had declined. Patient's A1c off the Metformin today is 7.0 which is good for her age. She was not able to see the ophthalmologist yet, but she will schedule that. Continue diet. Follow-up in 3 months.

## 2022-03-22 NOTE — PATIENT INSTRUCTIONS
Please schedule your next appointment in June. Please do your blood tests 2-5 days prior to your appointment with me.   You do not need to fast.     Please do not take vitamins or supplements for 3 days prior to the blood test.

## 2022-03-22 NOTE — ASSESSMENT & PLAN NOTE
Patient complains of an episode of chest heaviness lasting 5 minutes. Advised patient that this could well be angina and I recommended a chemical stress test.  Patient declined this. Due to her age, we will treat her conservatively. Continue current medications for high blood pressure and hypercholesterolemia. Continue aspirin. Advised ambulance and ER for any episodes lasting longer than 15 minutes. I also gave her sublingual nitroglycerin with instructions on how to use it.

## 2022-04-07 ENCOUNTER — TELEPHONE (OUTPATIENT)
Dept: INTERNAL MEDICINE CLINIC | Facility: CLINIC | Age: 87
End: 2022-04-07

## 2022-04-07 NOTE — TELEPHONE ENCOUNTER
Received form from pharmacy requesting more information regarding patient's diabetic testing supplies. Patient is a type II diabetic with diabetic nephropathy. Her diabetes is controlled. Her last A1c was 7.0 on 3/19/2022. Patient is to test once daily. Please fax progress notes from 12/7/2021.

## 2022-04-22 ENCOUNTER — TELEPHONE (OUTPATIENT)
Dept: INTERNAL MEDICINE CLINIC | Facility: CLINIC | Age: 87
End: 2022-04-22

## 2022-04-22 DIAGNOSIS — E11.22 CONTROLLED TYPE 2 DIABETES MELLITUS WITH STAGE 3 CHRONIC KIDNEY DISEASE, WITHOUT LONG-TERM CURRENT USE OF INSULIN (HCC): ICD-10-CM

## 2022-04-22 DIAGNOSIS — N18.30 CONTROLLED TYPE 2 DIABETES MELLITUS WITH STAGE 3 CHRONIC KIDNEY DISEASE, WITHOUT LONG-TERM CURRENT USE OF INSULIN (HCC): ICD-10-CM

## 2022-04-22 RX ORDER — BLOOD SUGAR DIAGNOSTIC
STRIP MISCELLANEOUS
Qty: 100 STRIP | Refills: 3 | Status: SHIPPED | OUTPATIENT
Start: 2022-04-22 | End: 2022-04-22

## 2022-04-22 RX ORDER — BLOOD SUGAR DIAGNOSTIC
STRIP MISCELLANEOUS
Qty: 100 STRIP | Refills: 3 | Status: SHIPPED | OUTPATIENT
Start: 2022-04-22

## 2022-04-22 RX ORDER — BLOOD SUGAR DIAGNOSTIC
STRIP MISCELLANEOUS
Qty: 100 STRIP | Refills: 5 | OUTPATIENT
Start: 2022-04-22

## 2022-04-22 NOTE — TELEPHONE ENCOUNTER
Refill passed per [facility] protocol. Requested Prescriptions   Pending Prescriptions Disp Refills    ACCU-CHEK KARON PLUS In Vitro Strip [Pharmacy Med Name: Accu-Chek Karon Plus Cassie Roch]  0     Sig: TEST ONE TIME DAILY        Diabetic Supplies Protocol Passed - 4/22/2022 10:42 AM        Passed - Appointment in past 12 or next 3 months              Future Appointments         Provider Department Appt Notes    In 4 months Gisele Reeves, [address] Hematology Oncology [facility] VISIT. CL  1y            Recent Outpatient Visits              1 month ago Chest heaviness    [facility], Sharon Nava MD    Office Visit    4 months ago Controlled type 2 diabetes mellitus with diabetic nephropathy, without long-term current use of insulin [facility])    [place], [place], Sharon Nava MD    Office Visit    5 months ago Primary osteoarthritis of right shoulder    [facility] for Rosalie Gooden MD    Office Visit    8 months ago Malignant neoplasm of upper-inner quadrant of right breast in female, estrogen receptor positive [facility])    [facility] Gisele Reeves MD    Office Visit    1 year ago     [facility] in [place], [place], [place]    Office Visit

## 2022-04-22 NOTE — TELEPHONE ENCOUNTER
[id number] please resend script due to needing insulin statement, if patient is on insulin or not.

## 2022-04-22 NOTE — TELEPHONE ENCOUNTER
Dr. Carlos Cronin please advise script needs to be re-sent so that it states if patient takes insulin or not

## 2022-06-18 ENCOUNTER — LAB ENCOUNTER (OUTPATIENT)
Dept: LAB | Facility: HOSPITAL | Age: 87
End: 2022-06-18
Attending: INTERNAL MEDICINE
Payer: MEDICARE

## 2022-06-18 DIAGNOSIS — N18.30 CONTROLLED TYPE 2 DIABETES MELLITUS WITH STAGE 3 CHRONIC KIDNEY DISEASE, WITHOUT LONG-TERM CURRENT USE OF INSULIN (HCC): ICD-10-CM

## 2022-06-18 DIAGNOSIS — E11.22 CONTROLLED TYPE 2 DIABETES MELLITUS WITH STAGE 3 CHRONIC KIDNEY DISEASE, WITHOUT LONG-TERM CURRENT USE OF INSULIN (HCC): ICD-10-CM

## 2022-06-18 LAB
ANION GAP SERPL CALC-SCNC: 7 MMOL/L (ref 0–18)
BUN BLD-MCNC: 39 MG/DL (ref 7–18)
BUN/CREAT SERPL: 33.9 (ref 10–20)
CALCIUM BLD-MCNC: 9.8 MG/DL (ref 8.5–10.1)
CHLORIDE SERPL-SCNC: 105 MMOL/L (ref 98–112)
CO2 SERPL-SCNC: 29 MMOL/L (ref 21–32)
CREAT BLD-MCNC: 1.15 MG/DL
EST. AVERAGE GLUCOSE BLD GHB EST-MCNC: 143 MG/DL (ref 68–126)
FASTING STATUS PATIENT QL REPORTED: NO
GLUCOSE BLD-MCNC: 183 MG/DL (ref 70–99)
HBA1C MFR BLD: 6.6 % (ref ?–5.7)
OSMOLALITY SERPL CALC.SUM OF ELEC: 306 MOSM/KG (ref 275–295)
POTASSIUM SERPL-SCNC: 4.9 MMOL/L (ref 3.5–5.1)
SODIUM SERPL-SCNC: 141 MMOL/L (ref 136–145)

## 2022-06-18 PROCEDURE — 80048 BASIC METABOLIC PNL TOTAL CA: CPT

## 2022-06-18 PROCEDURE — 36415 COLL VENOUS BLD VENIPUNCTURE: CPT

## 2022-06-18 PROCEDURE — 83036 HEMOGLOBIN GLYCOSYLATED A1C: CPT

## 2022-06-21 ENCOUNTER — OFFICE VISIT (OUTPATIENT)
Dept: INTERNAL MEDICINE CLINIC | Facility: CLINIC | Age: 87
End: 2022-06-21
Payer: MEDICARE

## 2022-06-21 ENCOUNTER — HOSPITAL ENCOUNTER (OUTPATIENT)
Dept: GENERAL RADIOLOGY | Facility: HOSPITAL | Age: 87
Discharge: HOME | End: 2022-06-21
Attending: INTERNAL MEDICINE
Payer: MEDICARE

## 2022-06-21 VITALS
HEART RATE: 58 BPM | DIASTOLIC BLOOD PRESSURE: 72 MMHG | WEIGHT: 177.19 LBS | SYSTOLIC BLOOD PRESSURE: 128 MMHG | HEIGHT: 61 IN | RESPIRATION RATE: 16 BRPM | BODY MASS INDEX: 33.45 KG/M2

## 2022-06-21 DIAGNOSIS — I10 HYPERTENSION, UNSPECIFIED TYPE: ICD-10-CM

## 2022-06-21 DIAGNOSIS — N18.30 CONTROLLED TYPE 2 DIABETES MELLITUS WITH STAGE 3 CHRONIC KIDNEY DISEASE, WITHOUT LONG-TERM CURRENT USE OF INSULIN (HCC): ICD-10-CM

## 2022-06-21 DIAGNOSIS — R60.0 BILATERAL LOWER EXTREMITY EDEMA: ICD-10-CM

## 2022-06-21 DIAGNOSIS — M25.512 ACUTE PAIN OF LEFT SHOULDER: Primary | ICD-10-CM

## 2022-06-21 DIAGNOSIS — M25.512 ACUTE PAIN OF LEFT SHOULDER: ICD-10-CM

## 2022-06-21 DIAGNOSIS — E11.22 CONTROLLED TYPE 2 DIABETES MELLITUS WITH STAGE 3 CHRONIC KIDNEY DISEASE, WITHOUT LONG-TERM CURRENT USE OF INSULIN (HCC): ICD-10-CM

## 2022-06-21 PROBLEM — R07.89 CHEST HEAVINESS: Status: RESOLVED | Noted: 2022-03-22 | Resolved: 2022-06-21

## 2022-06-21 PROCEDURE — 73030 X-RAY EXAM OF SHOULDER: CPT | Performed by: INTERNAL MEDICINE

## 2022-06-21 PROCEDURE — 1125F AMNT PAIN NOTED PAIN PRSNT: CPT | Performed by: INTERNAL MEDICINE

## 2022-06-21 PROCEDURE — 99214 OFFICE O/P EST MOD 30 MIN: CPT | Performed by: INTERNAL MEDICINE

## 2022-06-21 RX ORDER — TRAMADOL HYDROCHLORIDE 50 MG/1
50 TABLET ORAL EVERY 6 HOURS PRN
Qty: 40 TABLET | Refills: 1 | Status: SHIPPED | OUTPATIENT
Start: 2022-06-21

## 2022-06-21 RX ORDER — LOSARTAN POTASSIUM 50 MG/1
50 TABLET ORAL DAILY
Qty: 90 TABLET | Refills: 1 | Status: SHIPPED | OUTPATIENT
Start: 2022-06-21

## 2022-06-21 RX ORDER — ATENOLOL 50 MG/1
50 TABLET ORAL DAILY
Qty: 90 TABLET | Refills: 1 | Status: SHIPPED | OUTPATIENT
Start: 2022-06-21

## 2022-06-21 RX ORDER — POTASSIUM CHLORIDE 750 MG/1
10 TABLET, EXTENDED RELEASE ORAL DAILY PRN
Qty: 30 TABLET | Refills: 2 | COMMUNITY
Start: 2022-06-21

## 2022-06-21 NOTE — PATIENT INSTRUCTIONS
Please see your eye doctor. Please schedule your next appointment in September. Please do your blood tests 2-5 days prior to your appointment with me. You do not need to fast.     Please do not take vitamins or supplements for 3 days prior to the blood test.    Take furosemide every other day for the next week.

## 2022-07-19 ENCOUNTER — OFFICE VISIT (OUTPATIENT)
Dept: INTERNAL MEDICINE CLINIC | Facility: CLINIC | Age: 87
End: 2022-07-19
Payer: MEDICARE

## 2022-07-19 VITALS
HEIGHT: 61 IN | SYSTOLIC BLOOD PRESSURE: 133 MMHG | BODY MASS INDEX: 33.04 KG/M2 | HEART RATE: 58 BPM | WEIGHT: 175 LBS | DIASTOLIC BLOOD PRESSURE: 69 MMHG

## 2022-07-19 DIAGNOSIS — R60.0 BILATERAL LOWER EXTREMITY EDEMA: Primary | ICD-10-CM

## 2022-07-19 DIAGNOSIS — I10 HYPERTENSION, UNSPECIFIED TYPE: ICD-10-CM

## 2022-07-19 PROCEDURE — 99213 OFFICE O/P EST LOW 20 MIN: CPT | Performed by: INTERNAL MEDICINE

## 2022-07-19 PROCEDURE — 1126F AMNT PAIN NOTED NONE PRSNT: CPT | Performed by: INTERNAL MEDICINE

## 2022-07-19 NOTE — ASSESSMENT & PLAN NOTE
Likely due to venous insufficiency. No improvement with Lasix, so she stopped it. Pt can tolerate the symptoms.

## 2022-07-19 NOTE — PATIENT INSTRUCTIONS
Try Voltaren (diclofenac) gel 1-2 times daily. I will be at the [facility] location at [address] on Mondays 8:30 - 4 and Tuesdays 9:30 -5:40. I will be at [employer] at [address] [facility] on Thursdays from 8:30-4.

## 2022-08-24 ENCOUNTER — OFFICE VISIT (OUTPATIENT)
Dept: HEMATOLOGY/ONCOLOGY | Facility: HOSPITAL | Age: 87
End: 2022-08-24
Attending: INTERNAL MEDICINE
Payer: MEDICARE

## 2022-08-24 VITALS
DIASTOLIC BLOOD PRESSURE: 70 MMHG | RESPIRATION RATE: 18 BRPM | HEART RATE: 63 BPM | HEIGHT: 61 IN | BODY MASS INDEX: 32.92 KG/M2 | TEMPERATURE: 99 F | WEIGHT: 174.38 LBS | SYSTOLIC BLOOD PRESSURE: 144 MMHG | OXYGEN SATURATION: 94 %

## 2022-08-24 DIAGNOSIS — Z85.3 HISTORY OF RIGHT BREAST CANCER: Primary | ICD-10-CM

## 2022-08-24 DIAGNOSIS — Z08 ENCOUNTER FOR FOLLOW-UP SURVEILLANCE OF BREAST CANCER: ICD-10-CM

## 2022-08-24 DIAGNOSIS — Z85.3 ENCOUNTER FOR FOLLOW-UP SURVEILLANCE OF BREAST CANCER: ICD-10-CM

## 2022-08-24 PROBLEM — Z51.81 MEDICATION MONITORING ENCOUNTER: Status: RESOLVED | Noted: 2017-02-22 | Resolved: 2022-08-24

## 2022-08-24 PROCEDURE — 99213 OFFICE O/P EST LOW 20 MIN: CPT | Performed by: INTERNAL MEDICINE

## 2022-10-15 ENCOUNTER — LAB ENCOUNTER (OUTPATIENT)
Dept: LAB | Facility: HOSPITAL | Age: 87
End: 2022-10-15
Attending: INTERNAL MEDICINE
Payer: MEDICARE

## 2022-10-15 DIAGNOSIS — N18.30 CONTROLLED TYPE 2 DIABETES MELLITUS WITH STAGE 3 CHRONIC KIDNEY DISEASE, WITHOUT LONG-TERM CURRENT USE OF INSULIN (HCC): ICD-10-CM

## 2022-10-15 DIAGNOSIS — E11.22 CONTROLLED TYPE 2 DIABETES MELLITUS WITH STAGE 3 CHRONIC KIDNEY DISEASE, WITHOUT LONG-TERM CURRENT USE OF INSULIN (HCC): ICD-10-CM

## 2022-10-15 LAB
ANION GAP SERPL CALC-SCNC: 7 MMOL/L (ref 0–18)
BUN BLD-MCNC: 36 MG/DL (ref 7–18)
BUN/CREAT SERPL: 30.8 (ref 10–20)
CALCIUM BLD-MCNC: 9.4 MG/DL (ref 8.5–10.1)
CHLORIDE SERPL-SCNC: 107 MMOL/L (ref 98–112)
CO2 SERPL-SCNC: 26 MMOL/L (ref 21–32)
CREAT BLD-MCNC: 1.17 MG/DL
EST. AVERAGE GLUCOSE BLD GHB EST-MCNC: 148 MG/DL (ref 68–126)
FASTING STATUS PATIENT QL REPORTED: YES
GFR SERPLBLD BASED ON 1.73 SQ M-ARVRAT: 44 ML/MIN/1.73M2 (ref 60–?)
GLUCOSE BLD-MCNC: 125 MG/DL (ref 70–99)
HBA1C MFR BLD: 6.8 % (ref ?–5.7)
OSMOLALITY SERPL CALC.SUM OF ELEC: 300 MOSM/KG (ref 275–295)
POTASSIUM SERPL-SCNC: 4.6 MMOL/L (ref 3.5–5.1)
SODIUM SERPL-SCNC: 140 MMOL/L (ref 136–145)

## 2022-10-15 PROCEDURE — 80048 BASIC METABOLIC PNL TOTAL CA: CPT

## 2022-10-15 PROCEDURE — 83036 HEMOGLOBIN GLYCOSYLATED A1C: CPT

## 2022-10-15 PROCEDURE — 36415 COLL VENOUS BLD VENIPUNCTURE: CPT

## 2022-10-17 ENCOUNTER — OFFICE VISIT (OUTPATIENT)
Dept: INTERNAL MEDICINE CLINIC | Facility: CLINIC | Age: 87
End: 2022-10-17
Payer: MEDICARE

## 2022-10-17 VITALS
HEIGHT: 61 IN | RESPIRATION RATE: 16 BRPM | HEART RATE: 59 BPM | SYSTOLIC BLOOD PRESSURE: 126 MMHG | TEMPERATURE: 98 F | BODY MASS INDEX: 32.85 KG/M2 | DIASTOLIC BLOOD PRESSURE: 53 MMHG | WEIGHT: 174 LBS | OXYGEN SATURATION: 96 %

## 2022-10-17 DIAGNOSIS — M79.89 BILATERAL SWELLING OF FEET: ICD-10-CM

## 2022-10-17 DIAGNOSIS — G89.29 CHRONIC LEFT SHOULDER PAIN: Primary | ICD-10-CM

## 2022-10-17 DIAGNOSIS — M25.512 CHRONIC LEFT SHOULDER PAIN: Primary | ICD-10-CM

## 2022-10-17 PROCEDURE — 1126F AMNT PAIN NOTED NONE PRSNT: CPT | Performed by: INTERNAL MEDICINE

## 2022-10-17 PROCEDURE — 99214 OFFICE O/P EST MOD 30 MIN: CPT | Performed by: INTERNAL MEDICINE

## 2022-11-10 ENCOUNTER — TELEMEDICINE (OUTPATIENT)
Dept: INTERNAL MEDICINE CLINIC | Facility: CLINIC | Age: 87
End: 2022-11-10

## 2022-11-10 DIAGNOSIS — R05.1 ACUTE COUGH: ICD-10-CM

## 2022-11-10 DIAGNOSIS — R68.89 CONGESTION OF THROAT: ICD-10-CM

## 2022-11-10 DIAGNOSIS — J06.9 UPPER RESPIRATORY TRACT INFECTION, UNSPECIFIED TYPE: Primary | ICD-10-CM

## 2022-11-10 PROCEDURE — 99213 OFFICE O/P EST LOW 20 MIN: CPT | Performed by: INTERNAL MEDICINE

## 2022-11-10 RX ORDER — BENZONATATE 100 MG/1
100 CAPSULE ORAL 3 TIMES DAILY PRN
Qty: 20 CAPSULE | Refills: 0 | Status: SHIPPED | OUTPATIENT
Start: 2022-11-10 | End: 2022-11-17

## 2022-12-03 DIAGNOSIS — I10 HYPERTENSION, UNSPECIFIED TYPE: ICD-10-CM

## 2022-12-05 RX ORDER — DILTIAZEM HYDROCHLORIDE 180 MG/1
CAPSULE, COATED, EXTENDED RELEASE ORAL
Qty: 180 CAPSULE | Refills: 0 | Status: SHIPPED | OUTPATIENT
Start: 2022-12-05

## 2023-01-02 DIAGNOSIS — I10 HYPERTENSION, UNSPECIFIED TYPE: ICD-10-CM

## 2023-01-03 RX ORDER — ATENOLOL 50 MG/1
TABLET ORAL
Qty: 90 TABLET | Refills: 0 | Status: SHIPPED | OUTPATIENT
Start: 2023-01-03

## 2023-02-25 ENCOUNTER — LAB ENCOUNTER (OUTPATIENT)
Dept: LAB | Facility: HOSPITAL | Age: 88
End: 2023-02-25
Attending: INTERNAL MEDICINE
Payer: MEDICARE

## 2023-02-25 DIAGNOSIS — E11.21 CONTROLLED TYPE 2 DIABETES MELLITUS WITH DIABETIC NEPHROPATHY, WITHOUT LONG-TERM CURRENT USE OF INSULIN (HCC): ICD-10-CM

## 2023-02-25 LAB
ALBUMIN SERPL-MCNC: 3.5 G/DL (ref 3.4–5)
ALBUMIN/GLOB SERPL: 0.9 {RATIO} (ref 1–2)
ALP LIVER SERPL-CCNC: 125 U/L
ALT SERPL-CCNC: 12 U/L
ANION GAP SERPL CALC-SCNC: 4 MMOL/L (ref 0–18)
AST SERPL-CCNC: 9 U/L (ref 15–37)
BASOPHILS # BLD AUTO: 0.03 X10(3) UL (ref 0–0.2)
BASOPHILS NFR BLD AUTO: 0.3 %
BILIRUB SERPL-MCNC: 0.5 MG/DL (ref 0.1–2)
BUN BLD-MCNC: 37 MG/DL (ref 7–18)
BUN/CREAT SERPL: 31.9 (ref 10–20)
CALCIUM BLD-MCNC: 9.9 MG/DL (ref 8.5–10.1)
CHLORIDE SERPL-SCNC: 107 MMOL/L (ref 98–112)
CHOLEST SERPL-MCNC: 163 MG/DL (ref ?–200)
CO2 SERPL-SCNC: 28 MMOL/L (ref 21–32)
CREAT BLD-MCNC: 1.16 MG/DL
CREAT UR-SCNC: 99 MG/DL
DEPRECATED RDW RBC AUTO: 50.4 FL (ref 35.1–46.3)
EOSINOPHIL # BLD AUTO: 0.04 X10(3) UL (ref 0–0.7)
EOSINOPHIL NFR BLD AUTO: 0.4 %
ERYTHROCYTE [DISTWIDTH] IN BLOOD BY AUTOMATED COUNT: 15 % (ref 11–15)
EST. AVERAGE GLUCOSE BLD GHB EST-MCNC: 143 MG/DL (ref 68–126)
FASTING PATIENT LIPID ANSWER: YES
FASTING STATUS PATIENT QL REPORTED: YES
GFR SERPLBLD BASED ON 1.73 SQ M-ARVRAT: 45 ML/MIN/1.73M2 (ref 60–?)
GLOBULIN PLAS-MCNC: 3.9 G/DL (ref 2.8–4.4)
GLUCOSE BLD-MCNC: 120 MG/DL (ref 70–99)
HBA1C MFR BLD: 6.6 % (ref ?–5.7)
HCT VFR BLD AUTO: 43 %
HDLC SERPL-MCNC: 64 MG/DL (ref 40–59)
HGB BLD-MCNC: 13.8 G/DL
IMM GRANULOCYTES # BLD AUTO: 0.04 X10(3) UL (ref 0–1)
IMM GRANULOCYTES NFR BLD: 0.4 %
LDLC SERPL CALC-MCNC: 83 MG/DL (ref ?–100)
LYMPHOCYTES # BLD AUTO: 2.75 X10(3) UL (ref 1–4)
LYMPHOCYTES NFR BLD AUTO: 27.8 %
MCH RBC QN AUTO: 29.2 PG (ref 26–34)
MCHC RBC AUTO-ENTMCNC: 32.1 G/DL (ref 31–37)
MCV RBC AUTO: 90.9 FL
MICROALBUMIN UR-MCNC: 3.73 MG/DL
MICROALBUMIN/CREAT 24H UR-RTO: 37.7 UG/MG (ref ?–30)
MONOCYTES # BLD AUTO: 0.76 X10(3) UL (ref 0.1–1)
MONOCYTES NFR BLD AUTO: 7.7 %
NEUTROPHILS # BLD AUTO: 6.27 X10 (3) UL (ref 1.5–7.7)
NEUTROPHILS # BLD AUTO: 6.27 X10(3) UL (ref 1.5–7.7)
NEUTROPHILS NFR BLD AUTO: 63.4 %
NONHDLC SERPL-MCNC: 99 MG/DL (ref ?–130)
OSMOLALITY SERPL CALC.SUM OF ELEC: 298 MOSM/KG (ref 275–295)
PLATELET # BLD AUTO: 261 10(3)UL (ref 150–450)
POTASSIUM SERPL-SCNC: 4.2 MMOL/L (ref 3.5–5.1)
PROT SERPL-MCNC: 7.4 G/DL (ref 6.4–8.2)
RBC # BLD AUTO: 4.73 X10(6)UL
SODIUM SERPL-SCNC: 139 MMOL/L (ref 136–145)
TRIGL SERPL-MCNC: 83 MG/DL (ref 30–149)
VLDLC SERPL CALC-MCNC: 13 MG/DL (ref 0–30)
WBC # BLD AUTO: 9.9 X10(3) UL (ref 4–11)

## 2023-02-25 PROCEDURE — 85025 COMPLETE CBC W/AUTO DIFF WBC: CPT

## 2023-02-25 PROCEDURE — 36415 COLL VENOUS BLD VENIPUNCTURE: CPT

## 2023-02-25 PROCEDURE — 80053 COMPREHEN METABOLIC PANEL: CPT

## 2023-02-25 PROCEDURE — 82043 UR ALBUMIN QUANTITATIVE: CPT

## 2023-02-25 PROCEDURE — 82570 ASSAY OF URINE CREATININE: CPT

## 2023-02-25 PROCEDURE — 80061 LIPID PANEL: CPT

## 2023-02-25 PROCEDURE — 83036 HEMOGLOBIN GLYCOSYLATED A1C: CPT

## 2023-02-27 ENCOUNTER — OFFICE VISIT (OUTPATIENT)
Facility: CLINIC | Age: 88
End: 2023-02-27

## 2023-02-27 VITALS
HEIGHT: 61 IN | SYSTOLIC BLOOD PRESSURE: 136 MMHG | HEART RATE: 62 BPM | WEIGHT: 170 LBS | RESPIRATION RATE: 18 BRPM | OXYGEN SATURATION: 98 % | BODY MASS INDEX: 32.1 KG/M2 | DIASTOLIC BLOOD PRESSURE: 58 MMHG

## 2023-02-27 DIAGNOSIS — R32 URINARY INCONTINENCE, UNSPECIFIED TYPE: Primary | ICD-10-CM

## 2023-02-27 DIAGNOSIS — M79.604 PAIN OF RIGHT LOWER EXTREMITY: ICD-10-CM

## 2023-02-27 DIAGNOSIS — E78.00 HYPERCHOLESTEROLEMIA: ICD-10-CM

## 2023-02-27 DIAGNOSIS — I10 HYPERTENSION, UNSPECIFIED TYPE: ICD-10-CM

## 2023-02-27 DIAGNOSIS — E11.22 CONTROLLED TYPE 2 DIABETES MELLITUS WITH STAGE 3 CHRONIC KIDNEY DISEASE, WITHOUT LONG-TERM CURRENT USE OF INSULIN (HCC): ICD-10-CM

## 2023-02-27 DIAGNOSIS — N18.30 CONTROLLED TYPE 2 DIABETES MELLITUS WITH STAGE 3 CHRONIC KIDNEY DISEASE, WITHOUT LONG-TERM CURRENT USE OF INSULIN (HCC): ICD-10-CM

## 2023-02-27 DIAGNOSIS — Z46.89 PESSARY MAINTENANCE: ICD-10-CM

## 2023-02-27 DIAGNOSIS — H35.3212 EXUDATIVE AGE-RELATED MACULAR DEGENERATION, RIGHT EYE, WITH INACTIVE CHOROIDAL NEOVASCULARIZATION (HCC): ICD-10-CM

## 2023-02-27 PROCEDURE — 1125F AMNT PAIN NOTED PAIN PRSNT: CPT | Performed by: INTERNAL MEDICINE

## 2023-02-27 PROCEDURE — 99215 OFFICE O/P EST HI 40 MIN: CPT | Performed by: INTERNAL MEDICINE

## 2023-02-27 RX ORDER — ATENOLOL 50 MG/1
50 TABLET ORAL DAILY
Qty: 90 TABLET | Refills: 1 | Status: SHIPPED | OUTPATIENT
Start: 2023-02-27

## 2023-02-27 RX ORDER — LOSARTAN POTASSIUM 50 MG/1
50 TABLET ORAL DAILY
Qty: 90 TABLET | Refills: 1 | Status: SHIPPED | OUTPATIENT
Start: 2023-02-27

## 2023-02-27 RX ORDER — DILTIAZEM HYDROCHLORIDE 180 MG/1
360 CAPSULE, EXTENDED RELEASE ORAL NIGHTLY
Qty: 90 CAPSULE | Refills: 1 | Status: SHIPPED | OUTPATIENT
Start: 2023-02-27

## 2023-02-27 RX ORDER — KETOCONAZOLE 20 MG/G
CREAM TOPICAL
Qty: 60 G | Refills: 2 | Status: SHIPPED | OUTPATIENT
Start: 2023-02-27

## 2023-02-27 RX ORDER — ATORVASTATIN CALCIUM 20 MG/1
20 TABLET, FILM COATED ORAL DAILY
Qty: 90 TABLET | Refills: 1 | Status: SHIPPED | OUTPATIENT
Start: 2023-02-27

## 2023-02-27 NOTE — ASSESSMENT & PLAN NOTE
I was unable to reposition the pessary. Patient has an appointment with Dr. Carlos Latif for next month and I called and spoke to [facility] in the office, who will try to get the patient in sooner than that.

## 2023-02-27 NOTE — PATIENT INSTRUCTIONS
Take ibuprofen 200 mg 2 tabs 4 times daily as needed for pain. Stop it and call if you have nausea, abdominal pain, or other stomach problems.

## 2023-03-04 ENCOUNTER — HOSPITAL ENCOUNTER (EMERGENCY)
Facility: HOSPITAL | Age: 88
Discharge: HOME | End: 2023-03-04
Attending: EMERGENCY MEDICINE
Payer: MEDICARE

## 2023-03-04 VITALS
TEMPERATURE: 98 F | BODY MASS INDEX: 32.1 KG/M2 | WEIGHT: 170 LBS | RESPIRATION RATE: 18 BRPM | SYSTOLIC BLOOD PRESSURE: 155 MMHG | HEIGHT: 61 IN | HEART RATE: 75 BPM | OXYGEN SATURATION: 95 % | DIASTOLIC BLOOD PRESSURE: 81 MMHG

## 2023-03-04 DIAGNOSIS — Z96.0 PRESENCE OF PESSARY: Primary | ICD-10-CM

## 2023-03-04 PROCEDURE — 99282 EMERGENCY DEPT VISIT SF MDM: CPT

## 2023-03-04 PROCEDURE — 99283 EMERGENCY DEPT VISIT LOW MDM: CPT

## 2023-03-04 NOTE — DISCHARGE INSTRUCTIONS
Take Tylenol as needed for pain. Case management will call you tomorrow or Monday to help facilitate follow-up with urogynecology.

## 2023-03-04 NOTE — ED INITIAL ASSESSMENT (HPI)
Pt presents with son for c/o pain from pessary with vaginal bleeding. Pt reports she was seen by PCP on Monday who reported that the pessary is broken and must come out. Pt's son reports appointment is scheduled for march 29 but states \"she cannot wait that long. \"

## 2023-03-07 ENCOUNTER — PATIENT OUTREACH (OUTPATIENT)
Dept: CASE MANAGEMENT | Age: 88
End: 2023-03-07

## 2023-03-07 NOTE — PROGRESS NOTES
1st attempt Urogynecology    [facility] [address]   [facility]  [phone]  Apt made for Thurs.  March 9th @10:30am    Patients son Dick Haines notified

## 2023-03-09 ENCOUNTER — TELEPHONE (OUTPATIENT)
Dept: INTERNAL MEDICINE CLINIC | Facility: CLINIC | Age: 88
End: 2023-03-09

## 2023-03-09 ENCOUNTER — EKG ENCOUNTER (OUTPATIENT)
Dept: LAB | Facility: HOSPITAL | Age: 88
End: 2023-03-09
Attending: OBSTETRICS & GYNECOLOGY
Payer: MEDICARE

## 2023-03-09 ENCOUNTER — OFFICE VISIT (OUTPATIENT)
Dept: UROLOGY | Facility: HOSPITAL | Age: 88
End: 2023-03-09
Attending: OBSTETRICS & GYNECOLOGY
Payer: MEDICARE

## 2023-03-09 VITALS — TEMPERATURE: 98 F | HEIGHT: 61 IN | WEIGHT: 170 LBS | BODY MASS INDEX: 32.1 KG/M2

## 2023-03-09 DIAGNOSIS — R35.1 NOCTURIA: ICD-10-CM

## 2023-03-09 DIAGNOSIS — T83.9XXA PROBLEM WITH VAGINAL PESSARY, INITIAL ENCOUNTER (HCC): Primary | ICD-10-CM

## 2023-03-09 DIAGNOSIS — N39.3 FEMALE STRESS INCONTINENCE: ICD-10-CM

## 2023-03-09 DIAGNOSIS — E11.21 CONTROLLED TYPE 2 DIABETES MELLITUS WITH DIABETIC NEPHROPATHY, WITHOUT LONG-TERM CURRENT USE OF INSULIN (HCC): Primary | ICD-10-CM

## 2023-03-09 DIAGNOSIS — N39.41 URGE INCONTINENCE: ICD-10-CM

## 2023-03-09 DIAGNOSIS — N95.2 POSTMENOPAUSAL ATROPHIC VAGINITIS: ICD-10-CM

## 2023-03-09 DIAGNOSIS — N81.84 PELVIC MUSCLE WASTING: ICD-10-CM

## 2023-03-09 DIAGNOSIS — E11.21 CONTROLLED TYPE 2 DIABETES MELLITUS WITH DIABETIC NEPHROPATHY, WITHOUT LONG-TERM CURRENT USE OF INSULIN (HCC): ICD-10-CM

## 2023-03-09 LAB
BILIRUB UR QL: NEGATIVE
CLARITY UR: CLEAR
COLOR UR: YELLOW
CONTROL RUN WITHIN 24 HOURS?: YES
GLUCOSE UR-MCNC: NEGATIVE MG/DL
KETONES UR-MCNC: NEGATIVE MG/DL
NITRITE UR QL STRIP.AUTO: NEGATIVE
NITRITE URINE: NEGATIVE
PH UR: 5 [PH] (ref 5–8)
PROT UR-MCNC: NEGATIVE MG/DL
SP GR UR STRIP: 1.01 (ref 1–1.03)
UROBILINOGEN UR STRIP-ACNC: <2
VIT C UR-MCNC: NEGATIVE MG/DL

## 2023-03-09 PROCEDURE — 81001 URINALYSIS AUTO W/SCOPE: CPT | Performed by: OBSTETRICS & GYNECOLOGY

## 2023-03-09 PROCEDURE — 93005 ELECTROCARDIOGRAM TRACING: CPT

## 2023-03-09 PROCEDURE — 99212 OFFICE O/P EST SF 10 MIN: CPT

## 2023-03-09 PROCEDURE — 93010 ELECTROCARDIOGRAM REPORT: CPT | Performed by: INTERNAL MEDICINE

## 2023-03-09 PROCEDURE — 51701 INSERT BLADDER CATHETER: CPT

## 2023-03-09 PROCEDURE — 81002 URINALYSIS NONAUTO W/O SCOPE: CPT | Performed by: OBSTETRICS & GYNECOLOGY

## 2023-03-09 PROCEDURE — 87086 URINE CULTURE/COLONY COUNT: CPT | Performed by: OBSTETRICS & GYNECOLOGY

## 2023-03-09 RX ORDER — LIDOCAINE HYDROCHLORIDE 20 MG/ML
10 JELLY TOPICAL ONCE
Status: COMPLETED | OUTPATIENT
Start: 2023-03-09 | End: 2023-03-09

## 2023-03-09 RX ADMIN — LIDOCAINE HYDROCHLORIDE 10 ML: 20 JELLY TOPICAL at 11:53:00

## 2023-03-09 NOTE — TELEPHONE ENCOUNTER
I called patient and spoke to her son , he agreed to take her to complete EKG by today. Unable to document sooner.

## 2023-03-09 NOTE — TELEPHONE ENCOUNTER
Patient son called asking for clarification on if patient is cleared or not for a procedure that is scheduled for Monday at the [facility] [phone]. Requesting for a call back.

## 2023-03-09 NOTE — TELEPHONE ENCOUNTER
Patient is at [facility]. No order in system for EKG. Per Dr Viji Quintero, patient needs EKG. Will have to place order now. Order entered. Registration notified.

## 2023-03-09 NOTE — TELEPHONE ENCOUNTER
Please call pt's son and ask if he can take her to do an EKG either this evening (open at [facility] until 7:30) or tomorrow morning. Dr. Curtis Schafer might be able to schedule her for Monday and she will need an EKG.

## 2023-03-10 ENCOUNTER — TELEPHONE (OUTPATIENT)
Dept: UROLOGY | Facility: HOSPITAL | Age: 88
End: 2023-03-10

## 2023-03-10 LAB
ATRIAL RATE: 69 BPM
P AXIS: 74 DEGREES
P-R INTERVAL: 186 MS
Q-T INTERVAL: 374 MS
QRS DURATION: 68 MS
QTC CALCULATION (BEZET): 400 MS
R AXIS: -34 DEGREES
T AXIS: 10 DEGREES
VENTRICULAR RATE: 69 BPM

## 2023-03-10 NOTE — PAT NURSING NOTE
Spoke with Courtney for Dr Carlos Latif, notifying her that patient has been taking turmeric. Last dose was 3/9/2023. States she will notify doctor.

## 2023-03-10 NOTE — TELEPHONE ENCOUNTER
TC to son Donya Crow of   He had questions regarding anesthesia for upcoming pessary removal procedure for his mother on Monday  Informed we will defer to anesthesiologist for anesthesia recommendations and will use light sedation if able, may have to escalate depending on procedure findings. Answered his questions and he verbalized understanding.

## 2023-03-10 NOTE — TELEPHONE ENCOUNTER
TC to patient spoke with son Felipa Parents and informed him that the urine culture from 3/09/2023 showed no growth. No treatment is necessary.   Felipa Parents verbalized understanding

## 2023-03-10 NOTE — TELEPHONE ENCOUNTER
EKG shows left axis deviation and question of inferior infarct age undetermined. There is significant artifact which makes it difficult to interpret. When compared to the EKG of 12/2/2020, there are minimal changes. EKG is okay for surgery. CMP, CBC, and A1c are also okay for surgery. patient is cleared for surgery.

## 2023-03-10 NOTE — TELEPHONE ENCOUNTER
TC from [facility] regarding pt's upcoming procedure Monday 3/13/23. PAT wanted us aware pt was on tumeric and was instructed by PAT to stop taking it d/t NSAID/blood thinning effects. Pt last took tumeric 3/9/23. Shira Stewart, [facility] aware and pt ok to proceed with surgery Monday.

## 2023-03-11 ENCOUNTER — LAB ENCOUNTER (OUTPATIENT)
Dept: LAB | Age: 88
End: 2023-03-11
Attending: OBSTETRICS & GYNECOLOGY
Payer: MEDICARE

## 2023-03-11 DIAGNOSIS — Z01.818 PREOP TESTING: ICD-10-CM

## 2023-03-12 LAB — SARS-COV-2 RNA RESP QL NAA+PROBE: NOT DETECTED

## 2023-03-13 ENCOUNTER — ANESTHESIA (OUTPATIENT)
Dept: SURGERY | Facility: HOSPITAL | Age: 88
End: 2023-03-13
Payer: MEDICARE

## 2023-03-13 ENCOUNTER — ANESTHESIA EVENT (OUTPATIENT)
Dept: SURGERY | Facility: HOSPITAL | Age: 88
End: 2023-03-13
Payer: MEDICARE

## 2023-03-13 ENCOUNTER — HOSPITAL ENCOUNTER (OUTPATIENT)
Facility: HOSPITAL | Age: 88
Setting detail: HOSPITAL OUTPATIENT SURGERY
Discharge: HOME | End: 2023-03-13
Attending: OBSTETRICS & GYNECOLOGY | Admitting: OBSTETRICS & GYNECOLOGY
Payer: MEDICARE

## 2023-03-13 VITALS
HEIGHT: 61 IN | TEMPERATURE: 97 F | RESPIRATION RATE: 16 BRPM | SYSTOLIC BLOOD PRESSURE: 120 MMHG | WEIGHT: 170 LBS | HEART RATE: 54 BPM | BODY MASS INDEX: 32.1 KG/M2 | OXYGEN SATURATION: 91 % | DIASTOLIC BLOOD PRESSURE: 50 MMHG

## 2023-03-13 DIAGNOSIS — Z01.818 PREOP TESTING: Primary | ICD-10-CM

## 2023-03-13 LAB
GLUCOSE BLDC GLUCOMTR-MCNC: 123 MG/DL (ref 70–99)
GLUCOSE BLDC GLUCOMTR-MCNC: 141 MG/DL (ref 70–99)

## 2023-03-13 PROCEDURE — 82962 GLUCOSE BLOOD TEST: CPT

## 2023-03-13 PROCEDURE — 0UCG7ZZ EXTIRPATION OF MATTER FROM VAGINA, VIA NATURAL OR ARTIFICIAL OPENING: ICD-10-PCS | Performed by: OBSTETRICS & GYNECOLOGY

## 2023-03-13 RX ORDER — NICOTINE POLACRILEX 4 MG
15 LOZENGE BUCCAL
Status: DISCONTINUED | OUTPATIENT
Start: 2023-03-13 | End: 2023-03-13

## 2023-03-13 RX ORDER — MORPHINE SULFATE 4 MG/ML
2 INJECTION, SOLUTION INTRAMUSCULAR; INTRAVENOUS EVERY 10 MIN PRN
Status: CANCELLED | OUTPATIENT
Start: 2023-03-13

## 2023-03-13 RX ORDER — HYDROMORPHONE HYDROCHLORIDE 1 MG/ML
0.4 INJECTION, SOLUTION INTRAMUSCULAR; INTRAVENOUS; SUBCUTANEOUS EVERY 5 MIN PRN
Status: DISCONTINUED | OUTPATIENT
Start: 2023-03-13 | End: 2023-03-13

## 2023-03-13 RX ORDER — HYDROMORPHONE HYDROCHLORIDE 1 MG/ML
0.2 INJECTION, SOLUTION INTRAMUSCULAR; INTRAVENOUS; SUBCUTANEOUS EVERY 5 MIN PRN
Status: CANCELLED | OUTPATIENT
Start: 2023-03-13

## 2023-03-13 RX ORDER — NALOXONE HYDROCHLORIDE 0.4 MG/ML
80 INJECTION, SOLUTION INTRAMUSCULAR; INTRAVENOUS; SUBCUTANEOUS AS NEEDED
Status: CANCELLED | OUTPATIENT
Start: 2023-03-13 | End: 2023-03-13

## 2023-03-13 RX ORDER — MORPHINE SULFATE 10 MG/ML
6 INJECTION, SOLUTION INTRAMUSCULAR; INTRAVENOUS EVERY 10 MIN PRN
Status: DISCONTINUED | OUTPATIENT
Start: 2023-03-13 | End: 2023-03-13

## 2023-03-13 RX ORDER — ONDANSETRON 2 MG/ML
4 INJECTION INTRAMUSCULAR; INTRAVENOUS EVERY 6 HOURS PRN
Status: DISCONTINUED | OUTPATIENT
Start: 2023-03-13 | End: 2023-03-13

## 2023-03-13 RX ORDER — METOCLOPRAMIDE 10 MG/1
10 TABLET ORAL ONCE
Status: COMPLETED | OUTPATIENT
Start: 2023-03-13 | End: 2023-03-13

## 2023-03-13 RX ORDER — MORPHINE SULFATE 4 MG/ML
2 INJECTION, SOLUTION INTRAMUSCULAR; INTRAVENOUS EVERY 10 MIN PRN
Status: DISCONTINUED | OUTPATIENT
Start: 2023-03-13 | End: 2023-03-13

## 2023-03-13 RX ORDER — FAMOTIDINE 20 MG/1
20 TABLET, FILM COATED ORAL ONCE
Status: COMPLETED | OUTPATIENT
Start: 2023-03-13 | End: 2023-03-13

## 2023-03-13 RX ORDER — NICOTINE POLACRILEX 4 MG
15 LOZENGE BUCCAL
Status: DISCONTINUED | OUTPATIENT
Start: 2023-03-13 | End: 2023-03-13 | Stop reason: HOSPADM

## 2023-03-13 RX ORDER — HYDROMORPHONE HYDROCHLORIDE 1 MG/ML
0.6 INJECTION, SOLUTION INTRAMUSCULAR; INTRAVENOUS; SUBCUTANEOUS EVERY 5 MIN PRN
Status: CANCELLED | OUTPATIENT
Start: 2023-03-13

## 2023-03-13 RX ORDER — HYDROMORPHONE HYDROCHLORIDE 1 MG/ML
0.4 INJECTION, SOLUTION INTRAMUSCULAR; INTRAVENOUS; SUBCUTANEOUS EVERY 5 MIN PRN
Status: CANCELLED | OUTPATIENT
Start: 2023-03-13

## 2023-03-13 RX ORDER — LIDOCAINE HYDROCHLORIDE 20 MG/ML
JELLY TOPICAL AS NEEDED
Status: DISCONTINUED | OUTPATIENT
Start: 2023-03-13 | End: 2023-03-13 | Stop reason: HOSPADM

## 2023-03-13 RX ORDER — METOCLOPRAMIDE HYDROCHLORIDE 5 MG/ML
10 INJECTION INTRAMUSCULAR; INTRAVENOUS EVERY 8 HOURS PRN
Status: CANCELLED | OUTPATIENT
Start: 2023-03-13

## 2023-03-13 RX ORDER — LIDOCAINE HYDROCHLORIDE 10 MG/ML
INJECTION, SOLUTION EPIDURAL; INFILTRATION; INTRACAUDAL; PERINEURAL AS NEEDED
Status: DISCONTINUED | OUTPATIENT
Start: 2023-03-13 | End: 2023-03-13 | Stop reason: SURG

## 2023-03-13 RX ORDER — LIDOCAINE HYDROCHLORIDE 20 MG/ML
10 JELLY TOPICAL ONCE
Status: SHIPPED | OUTPATIENT
Start: 2023-03-13

## 2023-03-13 RX ORDER — SODIUM CHLORIDE, SODIUM LACTATE, POTASSIUM CHLORIDE, CALCIUM CHLORIDE 600; 310; 30; 20 MG/100ML; MG/100ML; MG/100ML; MG/100ML
INJECTION, SOLUTION INTRAVENOUS CONTINUOUS
Status: DISCONTINUED | OUTPATIENT
Start: 2023-03-13 | End: 2023-03-13

## 2023-03-13 RX ORDER — METOCLOPRAMIDE HYDROCHLORIDE 5 MG/ML
10 INJECTION INTRAMUSCULAR; INTRAVENOUS EVERY 8 HOURS PRN
Status: DISCONTINUED | OUTPATIENT
Start: 2023-03-13 | End: 2023-03-13

## 2023-03-13 RX ORDER — NICOTINE POLACRILEX 4 MG
30 LOZENGE BUCCAL
Status: DISCONTINUED | OUTPATIENT
Start: 2023-03-13 | End: 2023-03-13

## 2023-03-13 RX ORDER — CEFAZOLIN SODIUM/WATER 2 G/20 ML
2 SYRINGE (ML) INTRAVENOUS ONCE
Status: COMPLETED | OUTPATIENT
Start: 2023-03-13 | End: 2023-03-13

## 2023-03-13 RX ORDER — SODIUM CHLORIDE, SODIUM LACTATE, POTASSIUM CHLORIDE, CALCIUM CHLORIDE 600; 310; 30; 20 MG/100ML; MG/100ML; MG/100ML; MG/100ML
INJECTION, SOLUTION INTRAVENOUS CONTINUOUS
Status: CANCELLED | OUTPATIENT
Start: 2023-03-13

## 2023-03-13 RX ORDER — ONDANSETRON 2 MG/ML
4 INJECTION INTRAMUSCULAR; INTRAVENOUS EVERY 6 HOURS PRN
Status: CANCELLED | OUTPATIENT
Start: 2023-03-13

## 2023-03-13 RX ORDER — ACETAMINOPHEN 500 MG
1000 TABLET ORAL ONCE
Status: COMPLETED | OUTPATIENT
Start: 2023-03-13 | End: 2023-03-13

## 2023-03-13 RX ORDER — DEXTROSE MONOHYDRATE 25 G/50ML
50 INJECTION, SOLUTION INTRAVENOUS
Status: DISCONTINUED | OUTPATIENT
Start: 2023-03-13 | End: 2023-03-13

## 2023-03-13 RX ORDER — HYDROMORPHONE HYDROCHLORIDE 1 MG/ML
0.6 INJECTION, SOLUTION INTRAMUSCULAR; INTRAVENOUS; SUBCUTANEOUS EVERY 5 MIN PRN
Status: DISCONTINUED | OUTPATIENT
Start: 2023-03-13 | End: 2023-03-13

## 2023-03-13 RX ORDER — METOPROLOL TARTRATE 5 MG/5ML
2.5 INJECTION INTRAVENOUS ONCE
Status: DISCONTINUED | OUTPATIENT
Start: 2023-03-13 | End: 2023-03-13

## 2023-03-13 RX ORDER — NALOXONE HYDROCHLORIDE 0.4 MG/ML
80 INJECTION, SOLUTION INTRAMUSCULAR; INTRAVENOUS; SUBCUTANEOUS AS NEEDED
Status: DISCONTINUED | OUTPATIENT
Start: 2023-03-13 | End: 2023-03-13

## 2023-03-13 RX ORDER — MORPHINE SULFATE 10 MG/ML
6 INJECTION, SOLUTION INTRAMUSCULAR; INTRAVENOUS EVERY 10 MIN PRN
Status: CANCELLED | OUTPATIENT
Start: 2023-03-13

## 2023-03-13 RX ORDER — DEXTROSE MONOHYDRATE 25 G/50ML
50 INJECTION, SOLUTION INTRAVENOUS
Status: DISCONTINUED | OUTPATIENT
Start: 2023-03-13 | End: 2023-03-13 | Stop reason: HOSPADM

## 2023-03-13 RX ORDER — METOPROLOL TARTRATE 5 MG/5ML
2.5 INJECTION INTRAVENOUS ONCE
Status: CANCELLED | OUTPATIENT
Start: 2023-03-13 | End: 2023-03-13

## 2023-03-13 RX ORDER — HYDROMORPHONE HYDROCHLORIDE 1 MG/ML
0.2 INJECTION, SOLUTION INTRAMUSCULAR; INTRAVENOUS; SUBCUTANEOUS EVERY 5 MIN PRN
Status: DISCONTINUED | OUTPATIENT
Start: 2023-03-13 | End: 2023-03-13

## 2023-03-13 RX ORDER — MORPHINE SULFATE 4 MG/ML
4 INJECTION, SOLUTION INTRAMUSCULAR; INTRAVENOUS EVERY 10 MIN PRN
Status: CANCELLED | OUTPATIENT
Start: 2023-03-13

## 2023-03-13 RX ORDER — NICOTINE POLACRILEX 4 MG
30 LOZENGE BUCCAL
Status: DISCONTINUED | OUTPATIENT
Start: 2023-03-13 | End: 2023-03-13 | Stop reason: HOSPADM

## 2023-03-13 RX ORDER — BUPIVACAINE HYDROCHLORIDE AND EPINEPHRINE 2.5; 5 MG/ML; UG/ML
INJECTION, SOLUTION EPIDURAL; INFILTRATION; INTRACAUDAL; PERINEURAL AS NEEDED
Status: DISCONTINUED | OUTPATIENT
Start: 2023-03-13 | End: 2023-03-13 | Stop reason: HOSPADM

## 2023-03-13 RX ORDER — MORPHINE SULFATE 4 MG/ML
4 INJECTION, SOLUTION INTRAMUSCULAR; INTRAVENOUS EVERY 10 MIN PRN
Status: DISCONTINUED | OUTPATIENT
Start: 2023-03-13 | End: 2023-03-13

## 2023-03-13 RX ADMIN — LIDOCAINE HYDROCHLORIDE 25 MG: 10 INJECTION, SOLUTION EPIDURAL; INFILTRATION; INTRACAUDAL; PERINEURAL at 10:23:00

## 2023-03-13 RX ADMIN — CEFAZOLIN SODIUM/WATER 2 G: 2 G/20 ML SYRINGE (ML) INTRAVENOUS at 10:26:00

## 2023-03-13 NOTE — OPERATIVE REPORT
Pre Op: vaginal foreign body (retained pessary), uterovaginal prolapse  Post op: same    Procedure: exam under anesthesia, removal of vaginal foreign body (cube pessary), cystoscopy    Surgeon: Krista Odell DO  Assist: Sun Lackey    EBL: 25cc   UOP 22VS     No complications  No specimen sent: cube pessary & stone fragments removed    Findings: bilateral ureteral efflux, no evidence of bladder, ureteral, or urethral injury. Hemostasis upon completion. Stage III uterovaginal prolapse      PROCEDURE:  The patient was taken to the operating room, with IV running after informed consent was obtained. She was placed in the supine position and induced under general anesthesia via LMA. The patient was then placed in the dorsal lithotomy position and prepped and draped in the usual sterile fashion. Exam demonstrated calcified cube pessary. Urine was drained from the bladder    The edge of the cube pessary grasped with ring forceps, stone/calcification noted along each side of the cube pessary. Stone and debris was cleared from each side and cube pessary and pessary collapsed and removed. Vagina copiously irrigated of stone fragments and debris. Photos taken. Careful examination of vagina demonstrated uterovaginal prolapse without evidence of fistula. Cystoscopy was performed  urine was seen from both ureteral orifices. There was no evidence of bladder, ureteral, or urethral injury noted on cystoscopy. Rectal exam was wnl, rectal prolapse noted while under anesthesia    Inspection at this point revealed intact vagina, with hemostasis. The patient was then removed from the dorsal lithotomy position, awakened and extubated and transferred from the operating room to the recovery room in stable condition, having tolerated the procedure well. Sponge, lap, & needle counts were correct.

## 2023-03-13 NOTE — ANESTHESIA POSTPROCEDURE EVALUATION
Patient: Alvina Cavazos    Procedure Summary     Date: 03/13/23 Room / Location: [facility] / [address] [place] [address] MAIN OR    Anesthesia Start: 5800 Anesthesia Stop:     Procedure: EXAM UNDER ANESTHESIA, REMOVAL OF VAGINAL FOREIGN BODY; RETAINED PESSARY, Cystoscopy (Vagina ) Diagnosis: (Uterovaginal prolapse)    Surgeons: Nhung Carroll DO Anesthesiologist: Alecia Campoverde MD    Anesthesia Type: general ASA Status: 3          Anesthesia Type: general    Vitals Value Taken Time   /56 03/13/23 1100   Temp 97.0 03/13/23 1103   Pulse 52 03/13/23 1102   Resp 18 03/13/23 1102   SpO2 95 % 03/13/23 1102   Vitals shown include unvalidated device data.     [address] [place] [address] AN Post Evaluation:   Patient Evaluated in PACU  Patient Participation: complete - patient participated  Level of Consciousness: awake and alert  Pain Score: 0  Pain Management: adequate  Airway Patency:patent  Dental exam unchanged from preop  Yes    Cardiovascular Status: acceptable  Respiratory Status: acceptable and nasal cannula  Postoperative Hydration acceptable  Comments: Report to [address] [place] [address] Donovan Chand CRNA  3/13/2023 11:03 AM

## 2023-03-13 NOTE — DISCHARGE INSTRUCTIONS
[facility]     Post-Operative Guidelines      AVOID CONSTIPATION - Take Miralax: one capful in water or juice each morning. You can also take each evening if needed. Use milk of magnesia daily as needed to avoid straining with BMs  No lifting over 10 lbs. (1 gallon of milk is 8 lbs. )  It is okay to walk up and down stairs and to walk outside, but be careful not to become fatigued. Showers and tub baths are okay, even if you have a catheter or abdominal incision. You may ride in a car immediately. Please call us for any of the following:  Temperature above 100.5 for 4 hours or above 101.0 at any time  Chest pain or trouble breathing  Vaginal bleeding heavier than a period  Redness, tenderness or swelling of your legs  Pain or burning when you urinate  Redness, pain or foul discharge from incision    Office telephone, [phone], after hours [address]: POST-OP ANESTHESIA  The medication that you received for sedation or general anesthesia can last up to 24 hours. Your judgment and reflexes may be altered, even if you feel like your normal self. We Recommend:   Do not drive any motor vehicle or bicycle   Avoid mowing the lawn, playing sports, or working with power tools/applicances (power saws, electric knives or mixers)   That you have someone stay with you on your first night home   Do not drink alcohol or take sleeping pills or tranquilizers   Do not sign legal documents within 24 hours of your procedure   If you had a nerve block for your surgery, take extra care not to put any pressure on your arm or hand for 24 hours    It is normal:  For you to have a sore throat if you had a breathing tube during surgery (while you were asleep!). The sore throat should get better within 48 hours.  You can gargle with warm salt water (1/2 tsp in 4 oz warm water) or use a throat lozenge for comfort  To feel muscle aches or soreness especially in the abdomen, chest or neck. The achy feeling should go away in the next 24 hours  To feel weak, sleepy or \"wiped out\". Your should start feeling better in the next 24 hours. To experience mild discomforts such as sore lip or tongue, headache, cramps, gas pains or a bloated feeling in your abdomen. To experience mild back pain or soreness for a day or two if you had spinal or epidural anesthesia. If you had laparoscopic surgery, to feel shoulder pain or discomfort on the day of surgery. For some patients to have nausea after surgery/anesthesia    If you feel nausea or experience vomiting:   Try to move around less.    Eat less than usual or drink only liquids until the next morning   Nausea should resolve in about 24 hours    If you have a problem when you are at home:    Call your surgeons office

## 2023-03-13 NOTE — ANESTHESIA PROCEDURE NOTES
Airway  Date/Time: 3/13/2023 10:25 AM  Urgency: elective    Airway not difficult    General Information and Staff    Patient location during procedure: OR  Resident/CRNA: Jhony Funez CRNA  Performed: CRNA   Performed by: Jhony Funez CRNA  Authorized by: Elton Valles MD      Indications and Patient Condition  Indications for airway management: anesthesia  Spontaneous Ventilation: absent  Mask difficulty assessment: 0 - not attempted    Final Airway Details  Final airway type: supraglottic airway      Successful airway: classic  Size 3       Number of attempts at approach: 1  Number of other approaches attempted: 0

## 2023-03-13 NOTE — H&P
79 y/o female with vaginal pain, retained foreign body (pessary), and uterovaginal prolapse for surgical mgmt  Pre operative clearance with Dr Piyush Villarreal, pt seen & examined without changes. Thorough discussion of surgical risks, benefits, and alternatives including, but not limited to bleeding/clots, infection, injury to nearby organs (urethra, bladder, ureters, bowel, blood vessels), dyspareunia, de jayna UI, worsening UI, recurrence, voiding dysfunction, and pain. Discussed pain mgmt and potential need for narcotics. Discussed addiction potential with narcotics. IL  reviewed. Plan to proceed with removal of vaginal foreign body (retained pessary), cystoscopy, possible posterior repair as consented  All questions answered.    Joy Simmons  [phone]

## 2023-03-14 ENCOUNTER — TELEPHONE (OUTPATIENT)
Dept: UROLOGY | Facility: CLINIC | Age: 88
End: 2023-03-14

## 2023-03-14 NOTE — TELEPHONE ENCOUNTER
TC to patient after procedure for pessary removal  Spoke with son, Sarah Ramsay  Patient is doing well  Some discomfort, controlled with Tylenol  Some spotting  Voiding well on her own  Denies nausea or vomiting  Appetite normal  Instructed to make appointment for pessary fitting in 2-3 weeks at [facility] clinic  He verbalized understanding and had no further questions

## 2023-03-31 ENCOUNTER — OFFICE VISIT (OUTPATIENT)
Dept: UROLOGY | Facility: HOSPITAL | Age: 88
End: 2023-03-31
Attending: OBSTETRICS & GYNECOLOGY
Payer: MEDICARE

## 2023-03-31 VITALS — WEIGHT: 170 LBS | HEIGHT: 61 IN | RESPIRATION RATE: 18 BRPM | BODY MASS INDEX: 32.1 KG/M2

## 2023-03-31 DIAGNOSIS — N39.41 URGE INCONTINENCE: ICD-10-CM

## 2023-03-31 DIAGNOSIS — N81.2 UTEROVAGINAL PROLAPSE, INCOMPLETE: Primary | ICD-10-CM

## 2023-03-31 DIAGNOSIS — R35.1 NOCTURIA: ICD-10-CM

## 2023-03-31 DIAGNOSIS — N95.2 POSTMENOPAUSAL ATROPHIC VAGINITIS: ICD-10-CM

## 2023-03-31 DIAGNOSIS — N81.84 PELVIC MUSCLE WASTING: ICD-10-CM

## 2023-03-31 LAB
CONTROL RUN WITHIN 24 HOURS?: YES
LEUKOCYTE ESTERASE URINE: NEGATIVE
NITRITE URINE: NEGATIVE

## 2023-03-31 PROCEDURE — 87086 URINE CULTURE/COLONY COUNT: CPT | Performed by: OBSTETRICS & GYNECOLOGY

## 2023-03-31 PROCEDURE — 87088 URINE BACTERIA CULTURE: CPT | Performed by: OBSTETRICS & GYNECOLOGY

## 2023-03-31 PROCEDURE — 87186 SC STD MICRODIL/AGAR DIL: CPT | Performed by: OBSTETRICS & GYNECOLOGY

## 2023-03-31 PROCEDURE — 57160 INSERT PESSARY/OTHER DEVICE: CPT | Performed by: OBSTETRICS & GYNECOLOGY

## 2023-03-31 PROCEDURE — 81002 URINALYSIS NONAUTO W/O SCOPE: CPT | Performed by: OBSTETRICS & GYNECOLOGY

## 2023-03-31 PROCEDURE — 51701 INSERT BLADDER CATHETER: CPT

## 2023-04-03 ENCOUNTER — TELEPHONE (OUTPATIENT)
Dept: UROLOGY | Facility: HOSPITAL | Age: 88
End: 2023-04-03

## 2023-04-03 DIAGNOSIS — N39.0 UTI (URINARY TRACT INFECTION): Primary | ICD-10-CM

## 2023-04-03 RX ORDER — CIPROFLOXACIN 250 MG/1
250 TABLET, FILM COATED ORAL 2 TIMES DAILY
Qty: 20 TABLET | Refills: 0 | Status: SHIPPED | OUTPATIENT
Start: 2023-04-03 | End: 2023-04-13

## 2023-04-03 NOTE — TELEPHONE ENCOUNTER
Telephone call to patient with test results    Ucx + for    10,000 - 50,000 CFU/ML Pseudomonas aeruginosa            Allergies reviewed    Discussed with KOLTON Mary Cipro 250 mg BID x 10 days     Will send antibiotics to pt's preferred pharmacy    Encouraged PO hydration, AZO prn for pain    Encouraged to avoid bladder irritants such as caffeine, alcohol, or carbonation    Inquired about how new pessary is doing. Pt reports no discomfort or pain.       All questions answered     Follow up as scheduled, sooner prn    Patient understands and agrees to plan

## 2023-04-11 ENCOUNTER — OFFICE VISIT (OUTPATIENT)
Dept: UROLOGY | Facility: CLINIC | Age: 88
End: 2023-04-11
Attending: OBSTETRICS & GYNECOLOGY
Payer: MEDICARE

## 2023-04-11 VITALS — BODY MASS INDEX: 32.1 KG/M2 | HEIGHT: 61 IN | TEMPERATURE: 98 F | WEIGHT: 170 LBS

## 2023-04-11 DIAGNOSIS — N39.3 FEMALE STRESS INCONTINENCE: ICD-10-CM

## 2023-04-11 DIAGNOSIS — N39.41 URGE INCONTINENCE: ICD-10-CM

## 2023-04-11 DIAGNOSIS — R35.1 NOCTURIA: Primary | ICD-10-CM

## 2023-04-11 DIAGNOSIS — N81.2 UTEROVAGINAL PROLAPSE, INCOMPLETE: ICD-10-CM

## 2023-04-11 PROCEDURE — 57160 INSERT PESSARY/OTHER DEVICE: CPT | Performed by: OBSTETRICS & GYNECOLOGY

## 2023-04-11 PROCEDURE — 99212 OFFICE O/P EST SF 10 MIN: CPT

## 2023-04-11 PROCEDURE — 87086 URINE CULTURE/COLONY COUNT: CPT | Performed by: OBSTETRICS & GYNECOLOGY

## 2023-05-16 ENCOUNTER — OFFICE VISIT (OUTPATIENT)
Dept: UROLOGY | Facility: CLINIC | Age: 88
End: 2023-05-16
Attending: OBSTETRICS & GYNECOLOGY
Payer: MEDICARE

## 2023-05-16 VITALS — WEIGHT: 170 LBS | HEIGHT: 61 IN | BODY MASS INDEX: 32.1 KG/M2

## 2023-05-16 DIAGNOSIS — N95.2 POSTMENOPAUSAL ATROPHIC VAGINITIS: ICD-10-CM

## 2023-05-16 DIAGNOSIS — N81.2 UTEROVAGINAL PROLAPSE, INCOMPLETE: Primary | ICD-10-CM

## 2023-05-16 DIAGNOSIS — N39.41 URGE INCONTINENCE: ICD-10-CM

## 2023-05-16 LAB
CONTROL RUN WITHIN 24 HOURS?: YES
LEUKOCYTE ESTERASE URINE: NEGATIVE
NITRITE URINE: NEGATIVE

## 2023-05-16 PROCEDURE — 99212 OFFICE O/P EST SF 10 MIN: CPT

## 2023-05-16 PROCEDURE — 87186 SC STD MICRODIL/AGAR DIL: CPT | Performed by: OBSTETRICS & GYNECOLOGY

## 2023-05-16 PROCEDURE — 81002 URINALYSIS NONAUTO W/O SCOPE: CPT | Performed by: OBSTETRICS & GYNECOLOGY

## 2023-05-16 PROCEDURE — 87086 URINE CULTURE/COLONY COUNT: CPT | Performed by: OBSTETRICS & GYNECOLOGY

## 2023-05-16 PROCEDURE — 51701 INSERT BLADDER CATHETER: CPT | Performed by: OBSTETRICS & GYNECOLOGY

## 2023-05-16 RX ORDER — TROSPIUM CHLORIDE ER 60 MG/1
60 CAPSULE ORAL DAILY
Qty: 30 CAPSULE | Refills: 11 | Status: SHIPPED | OUTPATIENT
Start: 2023-05-16

## 2023-05-16 RX ORDER — ESTRADIOL 0.1 MG/G
CREAM VAGINAL
Qty: 42 G | Refills: 3 | Status: SHIPPED | OUTPATIENT
Start: 2023-05-16

## 2023-05-19 ENCOUNTER — TELEPHONE (OUTPATIENT)
Dept: UROLOGY | Facility: CLINIC | Age: 88
End: 2023-05-19

## 2023-05-19 RX ORDER — CIPROFLOXACIN 250 MG/1
250 TABLET, FILM COATED ORAL 2 TIMES DAILY
Qty: 20 TABLET | Refills: 0 | Status: SHIPPED | OUTPATIENT
Start: 2023-05-19 | End: 2023-05-29

## 2023-05-19 NOTE — TELEPHONE ENCOUNTER
Son Bin Villegas answered phone for patient and states patient is eating and unavailable to talk. HIPAA verified and gave ucx results and plan for cipro 250mg bid x 10d to [employer]. [employer] states that his \"mom has no symptoms, the urine dip showed neg nitrites and neg leukocyte esterase, and she has asymptomatic bacteremia\". At this point [employer] is declining antibiotics for his mom, he says he will have her push fluids and he can giver her D-Mannose. He is also declining the vaginal estrace cream and the trospium rx'd at last visit. He states he contacted patients oncologist, as she has history of breast CA, and they said they would not recommend it. He states that patient is emptying well at home and not bothered by UUI so doesn't need the trospium. Explained to [employer] that we are offering treatment for positive ucx and he understands the risk of not treating such as worsening symptoms, possible sepsis, and hospitalization. He understands and will observe for fever, chills, confusion, worsening condition. Update provided to Miky GONZALES and .

## 2023-05-19 NOTE — TELEPHONE ENCOUNTER
TC to [employer] in re: pseudomonas + ucx  LM  Encourage abx as rx'd given bacteria severity  Will d/w pt's family

## 2023-05-30 ENCOUNTER — OFFICE VISIT (OUTPATIENT)
Facility: CLINIC | Age: 88
End: 2023-05-30

## 2023-05-30 VITALS
TEMPERATURE: 98 F | OXYGEN SATURATION: 96 % | HEIGHT: 61 IN | DIASTOLIC BLOOD PRESSURE: 68 MMHG | SYSTOLIC BLOOD PRESSURE: 142 MMHG | HEART RATE: 55 BPM | BODY MASS INDEX: 32 KG/M2 | RESPIRATION RATE: 20 BRPM

## 2023-05-30 DIAGNOSIS — I10 HYPERTENSION, UNSPECIFIED TYPE: ICD-10-CM

## 2023-05-30 DIAGNOSIS — G89.29 CHRONIC SHOULDER PAIN, UNSPECIFIED LATERALITY: ICD-10-CM

## 2023-05-30 DIAGNOSIS — E78.00 HYPERCHOLESTEROLEMIA: ICD-10-CM

## 2023-05-30 DIAGNOSIS — M25.519 CHRONIC SHOULDER PAIN, UNSPECIFIED LATERALITY: ICD-10-CM

## 2023-05-30 DIAGNOSIS — E11.21 CONTROLLED TYPE 2 DIABETES MELLITUS WITH DIABETIC NEPHROPATHY, WITHOUT LONG-TERM CURRENT USE OF INSULIN (HCC): Primary | ICD-10-CM

## 2023-05-30 PROCEDURE — 1126F AMNT PAIN NOTED NONE PRSNT: CPT | Performed by: INTERNAL MEDICINE

## 2023-05-30 PROCEDURE — 99214 OFFICE O/P EST MOD 30 MIN: CPT | Performed by: INTERNAL MEDICINE

## 2023-05-30 RX ORDER — LOSARTAN POTASSIUM 50 MG/1
50 TABLET ORAL DAILY
Qty: 90 TABLET | Refills: 1 | Status: SHIPPED | OUTPATIENT
Start: 2023-05-30

## 2023-05-30 RX ORDER — DILTIAZEM HYDROCHLORIDE 180 MG/1
360 CAPSULE, COATED, EXTENDED RELEASE ORAL DAILY
Qty: 180 CAPSULE | Refills: 1 | Status: SHIPPED | OUTPATIENT
Start: 2023-05-30

## 2023-05-30 RX ORDER — BLOOD-GLUCOSE METER
1 EACH MISCELLANEOUS DAILY
Qty: 1 KIT | Refills: 0 | Status: SHIPPED | OUTPATIENT
Start: 2023-05-30 | End: 2023-05-30

## 2023-05-30 RX ORDER — BLOOD GLUCOSE CONTROL HIGH,LOW
1 EACH MISCELLANEOUS
Qty: 1 EACH | Refills: 11 | Status: SHIPPED | OUTPATIENT
Start: 2023-05-30

## 2023-05-30 RX ORDER — ATORVASTATIN CALCIUM 20 MG/1
20 TABLET, FILM COATED ORAL DAILY
Qty: 90 TABLET | Refills: 1 | Status: SHIPPED | OUTPATIENT
Start: 2023-05-30

## 2023-05-30 RX ORDER — BLOOD SUGAR DIAGNOSTIC
STRIP MISCELLANEOUS
Qty: 100 STRIP | Refills: 11 | Status: SHIPPED | OUTPATIENT
Start: 2023-05-30

## 2023-05-30 RX ORDER — ATENOLOL 50 MG/1
50 TABLET ORAL DAILY
Qty: 90 TABLET | Refills: 1 | Status: SHIPPED | OUTPATIENT
Start: 2023-05-30

## 2023-05-30 RX ORDER — LANCETS
EACH MISCELLANEOUS
Qty: 100 EACH | Refills: 3 | Status: SHIPPED | OUTPATIENT
Start: 2023-05-30

## 2023-05-30 RX ORDER — BLOOD-GLUCOSE METER
1 EACH MISCELLANEOUS DAILY
Qty: 1 KIT | Refills: 0 | Status: SHIPPED | OUTPATIENT
Start: 2023-05-30

## 2023-05-30 NOTE — PATIENT INSTRUCTIONS
Please schedule your next appointment in August.    Please do your blood tests 2-5 days prior to your appointment with me.   You do not need to fast.     Please do not take vitamins or supplements for 3 days prior to the blood test.

## 2023-06-06 ENCOUNTER — PATIENT MESSAGE (OUTPATIENT)
Facility: CLINIC | Age: 88
End: 2023-06-06

## 2023-06-06 DIAGNOSIS — N18.30 CONTROLLED TYPE 2 DIABETES MELLITUS WITH STAGE 3 CHRONIC KIDNEY DISEASE, WITHOUT LONG-TERM CURRENT USE OF INSULIN (HCC): Primary | ICD-10-CM

## 2023-06-06 DIAGNOSIS — E11.22 CONTROLLED TYPE 2 DIABETES MELLITUS WITH STAGE 3 CHRONIC KIDNEY DISEASE, WITHOUT LONG-TERM CURRENT USE OF INSULIN (HCC): Primary | ICD-10-CM

## 2023-06-06 DIAGNOSIS — E11.21 CONTROLLED TYPE 2 DIABETES MELLITUS WITH DIABETIC NEPHROPATHY, WITHOUT LONG-TERM CURRENT USE OF INSULIN (HCC): ICD-10-CM

## 2023-06-07 RX ORDER — BLOOD GLUCOSE CONTROL HIGH,LOW
1 EACH MISCELLANEOUS
Qty: 1 EACH | Refills: 11 | Status: SHIPPED | OUTPATIENT
Start: 2023-06-07

## 2023-06-07 RX ORDER — LANCETS
EACH MISCELLANEOUS
Qty: 100 EACH | Refills: 3 | Status: SHIPPED | OUTPATIENT
Start: 2023-06-07

## 2023-06-07 RX ORDER — BLOOD SUGAR DIAGNOSTIC
STRIP MISCELLANEOUS
Qty: 100 STRIP | Refills: 11 | Status: SHIPPED | OUTPATIENT
Start: 2023-06-07

## 2023-06-07 RX ORDER — BLOOD-GLUCOSE METER
1 EACH MISCELLANEOUS DAILY
Qty: 1 KIT | Refills: 0 | Status: SHIPPED | OUTPATIENT
Start: 2023-06-07

## 2023-06-07 NOTE — TELEPHONE ENCOUNTER
From: Andrzej Patel  To: Kyung Crigler, MD  Sent: 6/6/2023 4:00 PM CDT  Subject: Freddy Graves Me meter etc.    Hi Dr. Stephanie Lopez,  My pharmacy is requesting documentation regarding insulin in order to process the order for the Accu-check Guide Me meter, lancets and test strips. Thank You.

## 2023-06-19 ENCOUNTER — VIRTUAL PHONE E/M (OUTPATIENT)
Dept: UROLOGY | Facility: CLINIC | Age: 88
End: 2023-06-19
Attending: OBSTETRICS & GYNECOLOGY
Payer: MEDICARE

## 2023-06-19 DIAGNOSIS — R35.1 NOCTURIA: ICD-10-CM

## 2023-06-19 DIAGNOSIS — N39.41 URGE INCONTINENCE: Primary | ICD-10-CM

## 2023-06-19 NOTE — PROGRESS NOTES
Patient to follow up UUI  Given circumstances surrounding COVID-19 this visit is being conducted as a televisit with pt's consent. Pt in safe, private environment for televisit, provider located in the office setting    Spoke with pt's son    She is currently using pessary   Didn't start bladder meds, has noted improvement in \"dribbling\" complaints with increased PO intake and bladder retraining    Happy with pessary, no complaints  Bowels reg per son    No UTIs    They report +improvement Oncology doesn't want her to use vag estrogen given h/o breast ca    There were no vitals taken for this visit. Impression/Plan:  (N39.41) Urge incontinence  (primary encounter diagnosis)    (R35.1) Nocturia      Discussion Items:   Discussed dietary and behavioral modifications for mgmt of urinary symptoms  Discussed pharmacologic and nonpharmacologic mgmt options of urinary symptoms - reviewed risks, benefits, alternatives, and goals of treatment    Discussed management of pelvic organ prolapse including but not limited to behavioral modifications, conservative options, and surgical management. Discussed pessary management including benefits and risks. Discussed importance of keeping regularly scheduled pessary checks in prevention of complications related to pessary use.    Cont pessary with office care    CPM  Call with s/sx of UTI  F/u Pessary care appt as scheduled in August, sooner prn      All questions answered  They understand and agree to plan        Sabi Fraction, DO, FACOG, FACS

## 2023-07-16 NOTE — PROGRESS NOTES
pt seen  12/18/2020 at [facility]    seen in room, no distress noted     working with pt,     labs and vitals noted     spoke with nurse    [address] [place] [address] [address]:  Seen in room. No sob/cough/cp/palpitations. No hematuria/dysuria/frequency.        PHY
Support Present

## 2023-08-30 ENCOUNTER — APPOINTMENT (OUTPATIENT)
Dept: HEMATOLOGY/ONCOLOGY | Facility: HOSPITAL | Age: 88
End: 2023-08-30
Attending: INTERNAL MEDICINE
Payer: MEDICARE

## 2023-09-22 ENCOUNTER — LAB ENCOUNTER (OUTPATIENT)
Dept: LAB | Age: 88
End: 2023-09-22
Attending: INTERNAL MEDICINE
Payer: MEDICARE

## 2023-09-22 DIAGNOSIS — E11.21 CONTROLLED TYPE 2 DIABETES MELLITUS WITH DIABETIC NEPHROPATHY, WITHOUT LONG-TERM CURRENT USE OF INSULIN (HCC): ICD-10-CM

## 2023-09-22 LAB
ALBUMIN SERPL-MCNC: 3.1 G/DL (ref 3.4–5)
ANION GAP SERPL CALC-SCNC: 6 MMOL/L (ref 0–18)
BUN BLD-MCNC: 33 MG/DL (ref 7–18)
BUN/CREAT SERPL: 26.2 (ref 10–20)
CALCIUM BLD-MCNC: 9.3 MG/DL (ref 8.5–10.1)
CHLORIDE SERPL-SCNC: 107 MMOL/L (ref 98–112)
CO2 SERPL-SCNC: 28 MMOL/L (ref 21–32)
CREAT BLD-MCNC: 1.26 MG/DL
CREAT UR-SCNC: 65.9 MG/DL
EGFRCR SERPLBLD CKD-EPI 2021: 40 ML/MIN/1.73M2 (ref 60–?)
EST. AVERAGE GLUCOSE BLD GHB EST-MCNC: 157 MG/DL (ref 68–126)
GLUCOSE BLD-MCNC: 145 MG/DL (ref 70–99)
HBA1C MFR BLD: 7.1 % (ref ?–5.7)
MICROALBUMIN UR-MCNC: 4.66 MG/DL
MICROALBUMIN/CREAT 24H UR-RTO: 70.7 UG/MG (ref ?–30)
OSMOLALITY SERPL CALC.SUM OF ELEC: 302 MOSM/KG (ref 275–295)
PHOSPHATE SERPL-MCNC: 3.2 MG/DL (ref 2.5–4.9)
POTASSIUM SERPL-SCNC: 4.1 MMOL/L (ref 3.5–5.1)
SODIUM SERPL-SCNC: 141 MMOL/L (ref 136–145)

## 2023-09-22 PROCEDURE — 82043 UR ALBUMIN QUANTITATIVE: CPT

## 2023-09-22 PROCEDURE — 80069 RENAL FUNCTION PANEL: CPT

## 2023-09-22 PROCEDURE — 83036 HEMOGLOBIN GLYCOSYLATED A1C: CPT

## 2023-09-22 PROCEDURE — 36415 COLL VENOUS BLD VENIPUNCTURE: CPT

## 2023-09-22 PROCEDURE — 82570 ASSAY OF URINE CREATININE: CPT

## 2023-09-26 ENCOUNTER — OFFICE VISIT (OUTPATIENT)
Facility: CLINIC | Age: 88
End: 2023-09-26

## 2023-09-26 VITALS
BODY MASS INDEX: 32 KG/M2 | SYSTOLIC BLOOD PRESSURE: 128 MMHG | OXYGEN SATURATION: 94 % | RESPIRATION RATE: 18 BRPM | DIASTOLIC BLOOD PRESSURE: 52 MMHG | HEART RATE: 60 BPM | HEIGHT: 61 IN | TEMPERATURE: 98 F

## 2023-09-26 DIAGNOSIS — E78.00 HYPERCHOLESTEROLEMIA: ICD-10-CM

## 2023-09-26 DIAGNOSIS — I10 HYPERTENSION, UNSPECIFIED TYPE: ICD-10-CM

## 2023-09-26 DIAGNOSIS — E11.21 CONTROLLED TYPE 2 DIABETES MELLITUS WITH DIABETIC NEPHROPATHY, WITHOUT LONG-TERM CURRENT USE OF INSULIN (HCC): Primary | ICD-10-CM

## 2023-09-26 PROCEDURE — 1125F AMNT PAIN NOTED PAIN PRSNT: CPT | Performed by: INTERNAL MEDICINE

## 2023-09-26 PROCEDURE — 99214 OFFICE O/P EST MOD 30 MIN: CPT | Performed by: INTERNAL MEDICINE

## 2023-09-26 RX ORDER — ATORVASTATIN CALCIUM 20 MG/1
20 TABLET, FILM COATED ORAL DAILY
Qty: 90 TABLET | Refills: 1 | Status: SHIPPED | OUTPATIENT
Start: 2023-09-26

## 2023-09-26 RX ORDER — DILTIAZEM HYDROCHLORIDE 180 MG/1
360 CAPSULE, COATED, EXTENDED RELEASE ORAL DAILY
Qty: 180 CAPSULE | Refills: 1 | Status: SHIPPED | OUTPATIENT
Start: 2023-09-26

## 2023-09-26 RX ORDER — ATENOLOL 50 MG/1
50 TABLET ORAL DAILY
Qty: 90 TABLET | Refills: 1 | Status: SHIPPED | OUTPATIENT
Start: 2023-09-26

## 2023-09-26 NOTE — ASSESSMENT & PLAN NOTE
Reviewed recent labs. A1c was 7.1. Advised patient to schedule her eye doctor appointment which is due in November. Kidney function is stable. Continue present management.

## 2023-10-17 ENCOUNTER — OFFICE VISIT (OUTPATIENT)
Dept: UROLOGY | Facility: CLINIC | Age: 88
End: 2023-10-17
Attending: OBSTETRICS & GYNECOLOGY
Payer: MEDICARE

## 2023-10-17 VITALS
HEIGHT: 61 IN | DIASTOLIC BLOOD PRESSURE: 69 MMHG | SYSTOLIC BLOOD PRESSURE: 128 MMHG | BODY MASS INDEX: 30.21 KG/M2 | WEIGHT: 160 LBS

## 2023-10-17 DIAGNOSIS — N95.2 POSTMENOPAUSAL ATROPHIC VAGINITIS: ICD-10-CM

## 2023-10-17 DIAGNOSIS — N39.41 URGE INCONTINENCE: ICD-10-CM

## 2023-10-17 DIAGNOSIS — N81.2 UTEROVAGINAL PROLAPSE, INCOMPLETE: Primary | ICD-10-CM

## 2023-10-17 PROCEDURE — 87186 SC STD MICRODIL/AGAR DIL: CPT | Performed by: OBSTETRICS & GYNECOLOGY

## 2023-10-17 PROCEDURE — 87086 URINE CULTURE/COLONY COUNT: CPT | Performed by: OBSTETRICS & GYNECOLOGY

## 2023-10-17 PROCEDURE — 51701 INSERT BLADDER CATHETER: CPT | Performed by: OBSTETRICS & GYNECOLOGY

## 2023-10-17 PROCEDURE — 99212 OFFICE O/P EST SF 10 MIN: CPT

## 2023-10-17 PROCEDURE — 87077 CULTURE AEROBIC IDENTIFY: CPT | Performed by: OBSTETRICS & GYNECOLOGY

## 2023-10-20 ENCOUNTER — TELEPHONE (OUTPATIENT)
Dept: UROLOGY | Facility: CLINIC | Age: 88
End: 2023-10-20

## 2023-10-20 RX ORDER — CIPROFLOXACIN 250 MG/1
250 TABLET, FILM COATED ORAL 2 TIMES DAILY
Qty: 20 TABLET | Refills: 0 | Status: SHIPPED | OUTPATIENT
Start: 2023-10-20

## 2023-10-20 NOTE — TELEPHONE ENCOUNTER
TC to pt son w/ ucx results. Kenn Jhaveri Daily, PA-cipro 250mg po BID x 10 d. Pt son Irene Orourke, said he spoke to Dr Avel Latif about the fact his mom didn't sx and he feels he doesn't want to tx UTI. Discussed the result of Pseudomonas aeruginosa   and the importance of tx this particular bacteria and potential for urosepsis. He voiced an understanding. Informed will order abx in case he changes his mind over the wknd.

## 2023-11-29 DIAGNOSIS — I10 HYPERTENSION, UNSPECIFIED TYPE: ICD-10-CM

## 2023-11-30 RX ORDER — DILTIAZEM HYDROCHLORIDE 180 MG/1
360 CAPSULE, COATED, EXTENDED RELEASE ORAL DAILY
Qty: 180 CAPSULE | Refills: 1 | OUTPATIENT
Start: 2023-11-30

## 2023-11-30 NOTE — TELEPHONE ENCOUNTER
Duplicate request, previously addressed. Too soon     Disp Refills Start End    dilTIAZem HCl ER Coated Beads 180 MG Oral Capsule SR 24 Hr 180 capsule 1 9/26/2023     Sig - Route:  Take 2 capsules (360 mg total) by mouth daily. - Oral    Sent to pharmacy as: dilTIAZem HCl ER Coated Beads 180 MG Oral Capsule Extended Release 24 Hour (CARDIZEM CD)    E-Prescribing Status: Receipt confirmed by pharmacy (9/26/2023 10:48 AM CDT)

## 2024-01-02 DIAGNOSIS — I10 HYPERTENSION, UNSPECIFIED TYPE: ICD-10-CM

## 2024-01-03 RX ORDER — LOSARTAN POTASSIUM 50 MG/1
50 TABLET ORAL DAILY
Qty: 90 TABLET | Refills: 1 | Status: SHIPPED | OUTPATIENT
Start: 2024-01-03

## 2024-01-03 NOTE — TELEPHONE ENCOUNTER
Please review; protocol failed/ has no protocol    No active /future labs noted     Requested Prescriptions   Pending Prescriptions Disp Refills    losartan 50 MG Oral Tab 90 tablet 1     Sig: Take 1 tablet (50 mg total) by mouth daily.       Hypertensive Medications Protocol Failed - 1/2/2024  7:18 PM        Failed - CMP or BMP in past 6 months     No results found for this or any previous visit (from the past 4392 hour(s)).            Failed - EGFRCR or GFRNAA > 50     GFR Evaluation  EGFRCR: 40 , resulted on 9/22/2023          Passed - In person appointment in the past 12 or next 3 months     Recent Outpatient Visits              2 months ago Uterovaginal prolapse, incomplete    [facility] [place] [facility] Liz Truong DO    Office Visit    3 months ago Controlled type 2 diabetes mellitus with diabetic nephropathy, without long-term current use of insulin ([facility])    [facility] Laura, MD    Office Visit    6 months ago Urge incontinence    [facility] [place] [facility] Liz Truong DO    Virtual Phone E/M    7 months ago Controlled type 2 diabetes mellitus with diabetic nephropathy, without long-term current use of insulin ([facility])    [facility] [place] Vetrone, Laura, MD    Office Visit    7 months ago Uterovaginal prolapse, incomplete    [facility] [place] [facility] Liz Truong DO    Office Visit          Future Appointments         Provider Department Appt Notes    In 1 week Ashley Irizarry, PA-C [facility] [place] [facility] 3 MO PESS CHECK    In 3 weeks Fredrick Cornelius MD [facility], [place], [place] arthritis               Passed - Last BP reading less than 140/90     BP Readings from Last 1 Encounters:   10/17/23 128/69               Passed - In person appointment or  virtual visit in the past 6 months     Recent Outpatient Visits              2 months ago Uterovaginal prolapse, incomplete    [facility] [place] [facility] Liz Truong,     Office Visit    3 months ago Controlled type 2 diabetes mellitus with diabetic nephropathy, without long-term current use of insulin ([facility])    [facility]    Office Visit    6 months ago Urge incontinence    [facility] [place] [facility] Liz Truong,     Virtual Phone E/M    7 months ago Controlled type 2 diabetes mellitus with diabetic nephropathy, without long-term current use of insulin ([facility])    [facility]    Office Visit    7 months ago Uterovaginal prolapse, incomplete    [facility] [place] [facility] Liz Truong,     Office Visit          Future Appointments         Provider Department Appt Notes    In 1 week Ashley Irizarry PA-C [facility] [place] [facility] 3 MO PESS CHECK    In 3 weeks Fredrick Cornelius MD [facility], [place], [place] arthritis                  Recent Outpatient Visits              2 months ago Uterovaginal prolapse, incomplete    [facility] [place] [facility] Liz Truong,     Office Visit    3 months ago Controlled type 2 diabetes mellitus with diabetic nephropathy, without long-term current use of insulin ([facility])    [facility] [place] Vetrone, Laura, MD    Office Visit    6 months ago Urge incontinence    [facility] [place] [facility] Liz Truong,     Virtual Phone E/M    7 months ago Controlled type 2 diabetes mellitus with diabetic nephropathy, without long-term current use of insulin ([facility])    [facility], [place]  Vijaya Thorne Laura, MD    Office Visit    7 months ago Uterovaginal prolapse, incomplete    [facility] [place] [facility] Liz Truong DO    Office Visit          Future Appointments         Provider Department Appt Notes    In 1 week Ashley Irizarry, PA-C [facility] [place] [facility] 3 MO PESS CHECK    In 3 weeks Fredrick Cornelius MD [facility], [place] arthritis

## 2024-01-29 ENCOUNTER — LAB ENCOUNTER (OUTPATIENT)
Dept: LAB | Facility: REFERENCE LAB | Age: 89
End: 2024-01-29
Attending: INTERNAL MEDICINE
Payer: MEDICARE

## 2024-01-29 ENCOUNTER — OFFICE VISIT (OUTPATIENT)
Dept: INTERNAL MEDICINE CLINIC | Facility: CLINIC | Age: 89
End: 2024-01-29

## 2024-01-29 VITALS
TEMPERATURE: 98 F | SYSTOLIC BLOOD PRESSURE: 130 MMHG | HEIGHT: 61 IN | HEART RATE: 68 BPM | DIASTOLIC BLOOD PRESSURE: 80 MMHG | OXYGEN SATURATION: 96 % | WEIGHT: 160 LBS | RESPIRATION RATE: 17 BRPM | BODY MASS INDEX: 30.21 KG/M2

## 2024-01-29 DIAGNOSIS — N18.30 STAGE 3 CHRONIC KIDNEY DISEASE, UNSPECIFIED WHETHER STAGE 3A OR 3B CKD (HCC): ICD-10-CM

## 2024-01-29 DIAGNOSIS — M54.50 ACUTE LEFT-SIDED LOW BACK PAIN WITHOUT SCIATICA: ICD-10-CM

## 2024-01-29 DIAGNOSIS — I10 HYPERTENSION, UNSPECIFIED TYPE: ICD-10-CM

## 2024-01-29 DIAGNOSIS — E11.22 CONTROLLED TYPE 2 DIABETES MELLITUS WITH STAGE 3 CHRONIC KIDNEY DISEASE, WITHOUT LONG-TERM CURRENT USE OF INSULIN (HCC): ICD-10-CM

## 2024-01-29 DIAGNOSIS — N18.30 CONTROLLED TYPE 2 DIABETES MELLITUS WITH STAGE 3 CHRONIC KIDNEY DISEASE, WITHOUT LONG-TERM CURRENT USE OF INSULIN (HCC): ICD-10-CM

## 2024-01-29 DIAGNOSIS — Z76.89 ENCOUNTER TO ESTABLISH CARE: Primary | ICD-10-CM

## 2024-01-29 LAB
ANION GAP SERPL CALC-SCNC: 7 MMOL/L (ref 0–18)
BUN BLD-MCNC: 32 MG/DL (ref 9–23)
BUN/CREAT SERPL: 29.4 (ref 10–20)
CALCIUM BLD-MCNC: 9.8 MG/DL (ref 8.7–10.4)
CHLORIDE SERPL-SCNC: 105 MMOL/L (ref 98–112)
CO2 SERPL-SCNC: 28 MMOL/L (ref 21–32)
CREAT BLD-MCNC: 1.09 MG/DL
EGFRCR SERPLBLD CKD-EPI 2021: 48 ML/MIN/1.73M2 (ref 60–?)
EST. AVERAGE GLUCOSE BLD GHB EST-MCNC: 146 MG/DL (ref 68–126)
FASTING STATUS PATIENT QL REPORTED: NO
GLUCOSE BLD-MCNC: 133 MG/DL (ref 70–99)
HBA1C MFR BLD: 6.7 % (ref ?–5.7)
OSMOLALITY SERPL CALC.SUM OF ELEC: 299 MOSM/KG (ref 275–295)
POTASSIUM SERPL-SCNC: 4.6 MMOL/L (ref 3.5–5.1)
SODIUM SERPL-SCNC: 140 MMOL/L (ref 136–145)

## 2024-01-29 PROCEDURE — 36415 COLL VENOUS BLD VENIPUNCTURE: CPT

## 2024-01-29 PROCEDURE — 83036 HEMOGLOBIN GLYCOSYLATED A1C: CPT

## 2024-01-29 PROCEDURE — 80048 BASIC METABOLIC PNL TOTAL CA: CPT

## 2024-01-29 NOTE — PROGRESS NOTES
Subjective:     Patient ID: Isabel Patel is a 89 year old female.    HPI  Patient comes in today first time to establish care also with complaint of some low back pain to lower side she says she hurt it while in bed trying to get off the bed she has history of chronic arthritis mostly in the shoulders she uses Lidoderm patches which helped and she has been using a patch to the lower back.  Patient lives with 2 sons one of the sons here today he is the caretaker.  Patient is mostly homebound uses a walker she has diabetes as per son last time A1c was 7.1 but not taking any meds, he says the blood sugars have been good in the low 100s would like to recheck also in the past kidney function has been little bit off son admits that patient could drink more water but she does not want to do that because that she has to go to the bathroom     History/Other:   Review of Systems   Constitutional: Negative.    HENT: Negative.     Eyes: Negative.    Respiratory: Negative.     Cardiovascular: Negative.    Gastrointestinal: Negative.    Genitourinary: Negative.    Musculoskeletal:  Positive for back pain.        Left side low back pain   Skin: Negative.    Neurological: Negative.    Psychiatric/Behavioral: Negative.       Current Outpatient Medications   Medication Sig Dispense Refill    losartan 50 MG Oral Tab Take 1 tablet (50 mg total) by mouth daily. 90 tablet 1    ciprofloxacin (CIPRO) 250 MG Oral Tab Take 1 tablet (250 mg total) by mouth 2 (two) times daily. (Patient not taking: Reported on 1/29/2024) 20 tablet 0    dilTIAZem HCl ER Coated Beads 180 MG Oral Capsule SR 24 Hr Take 2 capsules (360 mg total) by mouth daily. 180 capsule 1    atorvastatin 20 MG Oral Tab Take 1 tablet (20 mg total) by mouth daily. 90 tablet 1    atenolol 50 MG Oral Tab Take 1 tablet (50 mg total) by mouth daily. 90 tablet 1    Accu-Chek Softclix Lancets Does not apply Misc Use once a day. 100 each 3    Blood Glucose Calibration (ACCU-CHEK  GUIDE CONTROL) In Vitro Liquid 1 each by In Vitro route every 30 (thirty) days. 1 each 11    Blood Glucose Monitoring Suppl (ACCU-CHEK GUIDE ME) w/Device Does not apply Kit 1 each daily. Check once a day. 1 kit 0    Glucose Blood (ACCU-CHEK GUIDE) In Vitro Strip Check once daily 100 strip 11    ACCU-CHEK AMBIKA PLUS In Vitro Strip TEST ONE TIME DAILY 100 strip 3    ketoconazole 2 % External Cream Apply to affected areas under breasts twice a day. 60 g 2    ACCU-CHEK SOFTCLIX LANCETS Does not apply Misc TEST ONE TIME DAILY 100 each 3    cholecalciferol (VITAMIN D HIGH POTENCY) 25 MCG (1000 UT) Oral Cap Take 1 capsule (1,000 Units total) by mouth daily. 30 capsule 0    lidocaine 5 % External Patch Place 1 patch onto the skin Q24H PRN. 30 patch 0    TURMERIC CURCUMIN OR Take by mouth.      aspirin 81 MG Oral Chew Tab Chew 1 tablet (81 mg total) by mouth daily. 30 tablet 11    acetaminophen 500 MG Oral Tab Take 2 tablets (1,000 mg total) by mouth every 6 (six) hours as needed for Pain.      Coenzyme Q10 (CO Q 10) 10 MG Oral Cap Take 100 mg by mouth 2 (two) times daily.       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    CHOLECYSTECTOMY  1995    D & C  1980    KNEE REPLACEMENT SURGERY Right 2012    LUMPECTOMY RIGHT  2016    cancer     MASTECTOMY PARTIAL Right 2016      [id number]    SKIN SURGERY Right 2018    TONSILLECTOMY      WRIST FRACTURE SURGERY Right       Family History   Problem Relation Age of Onset    Heart Disease Father         CAD    Diabetes Father     Heart Disease Mother         CAD    Diabetes Mother     Breast Cancer Mother 83        had lumpectomy and RT.  No other treatment.   of stroke at 89    Other (appendicitis[other]) Brother         age 13    Other (alive and well[other]) Sister     Other (alive and well[other]) Son         x3    Breast Cancer Self 85    Glaucoma Neg     Macular degeneration Neg       Social History:   Social History     Socioeconomic History    Marital status:     Number of children: 3   Occupational History    Occupation:      Comment: retired   Tobacco Use    Smoking status: Never    Smokeless tobacco: Never   Vaping Use    Vaping Use: Never used   Substance and Sexual Activity    Alcohol use: Not Currently    Drug use: Never    Sexual activity: Not Currently   Other Topics Concern     Service No    Caffeine Concern Yes     Comment: coffee, 1 cups daily    Occupational Exposure No        Objective:   Physical Exam  Vitals and nursing note reviewed.   Constitutional:       Appearance: She is well-developed.   HENT:      Head: Normocephalic and atraumatic.      Right Ear: External ear normal.      Left Ear: External ear normal.      Nose: Nose normal.   Eyes:      Conjunctiva/sclera: Conjunctivae normal.      Pupils: Pupils are equal, round, and reactive to light.   Cardiovascular:      Rate and Rhythm: Normal rate and regular rhythm.      Heart sounds: Normal heart sounds.   Pulmonary:      Effort: Pulmonary effort is normal.      Breath sounds: Normal breath sounds.   Abdominal:      General: Bowel sounds are normal.      Palpations: Abdomen is soft.   Genitourinary:     Vagina: Normal.   Musculoskeletal:         General: Tenderness present. Normal range  of motion.      Cervical back: Normal range of motion and neck supple.      Comments: Left side low back pain   Skin:     General: Skin is warm and dry.   Neurological:      Mental Status: She is alert and oriented to person, place, and time.      Deep Tendon Reflexes: Reflexes are normal and symmetric.   Psychiatric:         Behavior: Behavior normal.         Thought Content: Thought content normal.         Judgment: Judgment normal.         Assessment & Plan:   1. Encounter to establish care - pt will come in for annual exam   2. Hypertension, unspecified type - well controlled take medication watch diet     3. 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 3 chronic kidney disease, without long-term current use of insulin (HCC) - will retest, check bs atr home, watch diet    4. Stage 3 chronic kidney disease, unspecified whether stage 3a or 3b CKD ([facility]) - make sure to drink more fluids, up to 64 oz /day   5. Acute left-sided low back pain without sciatica - warm compression as need med for p;ain        Orders Placed This Encounter   Procedures    Hemoglobin A1C    Basic Metabolic Panel (8) [E]       Meds This Visit:  Requested Prescriptions      No prescriptions requested or ordered in this encounter       Imaging & Referrals:  None

## 2024-02-05 ENCOUNTER — OFFICE VISIT (OUTPATIENT)
Dept: UROLOGY | Facility: CLINIC | Age: 89
End: 2024-02-05
Attending: OBSTETRICS & GYNECOLOGY
Payer: MEDICARE

## 2024-02-05 VITALS — BODY MASS INDEX: 31 KG/M2 | TEMPERATURE: 98 F | WEIGHT: 163 LBS

## 2024-02-05 DIAGNOSIS — N81.84 PELVIC MUSCLE WASTING: ICD-10-CM

## 2024-02-05 DIAGNOSIS — N95.2 POSTMENOPAUSAL ATROPHIC VAGINITIS: ICD-10-CM

## 2024-02-05 DIAGNOSIS — N39.41 URGE INCONTINENCE: ICD-10-CM

## 2024-02-05 DIAGNOSIS — N81.2 UTEROVAGINAL PROLAPSE, INCOMPLETE: Primary | ICD-10-CM

## 2024-02-05 PROCEDURE — 99212 OFFICE O/P EST SF 10 MIN: CPT

## 2024-02-05 NOTE — PROGRESS NOTES
Pt presents for f/u of pelvic organ prolapse, pessary care  Using #3 ring with support, office care  Son, Haja, in waiting room -- discussed visit with son on the phone following visit    Reports pessary is comfortable   Denies discharge  Denies bleeding  Denies s/sx of UTI  Bowels reg    Happy with pessary, feels supported  She is not currently interested in surgical management of her pelvic organ prolapse    Hx of pessary removal under anesthesia 3/2023    Hx of ER+ breast CA, per son pt is to avoid vag estrogen per recs of her oncologist (Dr. Wadsworth)  Using vag moisturizers but externally only    Urogynecology Summary:  Urogynecology Summary  Prolapse: No  AGUSTO: No  Urge Incontinence: Yes  Nocturia Frequency: 2 (2-3)  Frequency: 2 - 3 hours  Incomplete emptying: No  Constipation: No  Wears pad day?: 4 (heavy)  Wears Pad Night?: 3 (heavy)  Activities are limited by UI/POP?: Yes (leaking)  Currently Sexually Active: No      Vitals:  Vitals:    02/05/24 1123   Temp: 98.3 °F (36.8 °C)       GENERAL EXAM:  GENERAL: alert & oriented, NAD  HEENT: NC/AT, sclera without injection  SKIN: no rashes  LUNGS:  without increased respiratory effort  ABDOMEN: soft, non-tender, non-distended, no masses appreciated  EXT: no edema    PELVIC EXAM:  Ext. Gen: +atrophy, no lesions, erythema, scattered epidermal inclusion cysts  Urethra: +atrophy, nontender  Bladder: some fullness, nontender  Vagina: ++atrophy, some erythema of anterior vaginal wall, no bleeding  Cervix: wnl  Uterus mobile  Perineum: wnl  Anus: wnl  Rectum: nontender, nl sphincter tone      PELVIC SUPPORT  [place]:  2  Ant:  3  Post:  2  CST:  neg  UVJ: somewhat hypermobile    Her pessary was removed, cleaned, and reinserted without difficulty. (Calcifications noted, easily washed)    Impression:    ICD-10-CM    1. Uterovaginal prolapse, incomplete  N81.2       2. Postmenopausal atrophic vaginitis  N95.2       3. Pelvic muscle wasting  N81.84       4. Urge incontinence  N39.41            Discussion Items:   Discussed management of pelvic organ prolapse including but not limited to behavioral modifications, conservative options, and surgical management.   Discussed pessary management including benefits and risks. Discussed importance of keeping regularly scheduled pessary checks in prevention of complications related to pessary use.     Treatment Plan:  Continue pessary management, office care -- will need more frequent changes  Cont OTC vag moisturizers -- use intravaginally  Encouraged daily pelvic exercises  Cont bowel regimen  Call with s/sx of UTI, problems with pessary     All questions answered  Pt understands and agrees to plan     Return in about 6 weeks (around 3/18/2024) for pessary care.      Zulema Holland PA-C

## 2024-02-12 ENCOUNTER — HOSPITAL ENCOUNTER (INPATIENT)
Facility: HOSPITAL | Age: 89
LOS: 6 days | Discharge: HOME HEALTH | End: 2024-02-18
Attending: EMERGENCY MEDICINE | Admitting: INTERNAL MEDICINE
Payer: MEDICARE

## 2024-02-12 ENCOUNTER — HOSPITAL ENCOUNTER (INPATIENT)
Facility: HOSPITAL | Age: 89
LOS: 6 days | Discharge: HOME | End: 2024-02-18
Attending: EMERGENCY MEDICINE | Admitting: INTERNAL MEDICINE
Payer: MEDICARE

## 2024-02-12 ENCOUNTER — OFFICE VISIT (OUTPATIENT)
Dept: INTERNAL MEDICINE CLINIC | Facility: CLINIC | Age: 89
End: 2024-02-12

## 2024-02-12 ENCOUNTER — APPOINTMENT (OUTPATIENT)
Dept: GENERAL RADIOLOGY | Facility: HOSPITAL | Age: 89
End: 2024-02-12
Attending: EMERGENCY MEDICINE
Payer: MEDICARE

## 2024-02-12 VITALS
TEMPERATURE: 98 F | SYSTOLIC BLOOD PRESSURE: 120 MMHG | RESPIRATION RATE: 18 BRPM | BODY MASS INDEX: 30.78 KG/M2 | HEIGHT: 61 IN | DIASTOLIC BLOOD PRESSURE: 63 MMHG | HEART RATE: 63 BPM | OXYGEN SATURATION: 97 % | WEIGHT: 163 LBS

## 2024-02-12 DIAGNOSIS — I48.91 ATRIAL FIBRILLATION, NEW ONSET (HCC): Primary | ICD-10-CM

## 2024-02-12 DIAGNOSIS — I49.9 IRREGULAR HEART RATE: Primary | ICD-10-CM

## 2024-02-12 DIAGNOSIS — I48.92 ATRIAL FLUTTER, UNSPECIFIED TYPE (HCC): ICD-10-CM

## 2024-02-12 LAB
ANION GAP SERPL CALC-SCNC: 7 MMOL/L (ref 0–18)
ATRIAL RATE: 326 BPM
BASOPHILS # BLD AUTO: 0.03 X10(3) UL (ref 0–0.2)
BASOPHILS NFR BLD AUTO: 0.4 %
BUN BLD-MCNC: 29 MG/DL (ref 9–23)
BUN/CREAT SERPL: 27.9 (ref 10–20)
CALCIUM BLD-MCNC: 9.6 MG/DL (ref 8.7–10.4)
CHLORIDE SERPL-SCNC: 107 MMOL/L (ref 98–112)
CO2 SERPL-SCNC: 25 MMOL/L (ref 21–32)
CREAT BLD-MCNC: 1.04 MG/DL
DEPRECATED RDW RBC AUTO: 47.8 FL (ref 35.1–46.3)
EGFRCR SERPLBLD CKD-EPI 2021: 50 ML/MIN/1.73M2 (ref 60–?)
EOSINOPHIL # BLD AUTO: 0.06 X10(3) UL (ref 0–0.7)
EOSINOPHIL NFR BLD AUTO: 0.8 %
ERYTHROCYTE [DISTWIDTH] IN BLOOD BY AUTOMATED COUNT: 14.6 % (ref 11–15)
GLUCOSE BLD-MCNC: 105 MG/DL (ref 70–99)
GLUCOSE BLDC GLUCOMTR-MCNC: 130 MG/DL (ref 70–99)
GLUCOSE BLDC GLUCOMTR-MCNC: 139 MG/DL (ref 70–99)
HCT VFR BLD AUTO: 43.5 %
HGB BLD-MCNC: 14.8 G/DL
IMM GRANULOCYTES # BLD AUTO: 0.03 X10(3) UL (ref 0–1)
IMM GRANULOCYTES NFR BLD: 0.4 %
LYMPHOCYTES # BLD AUTO: 1.75 X10(3) UL (ref 1–4)
LYMPHOCYTES NFR BLD AUTO: 22.3 %
MCH RBC QN AUTO: 30.5 PG (ref 26–34)
MCHC RBC AUTO-ENTMCNC: 34 G/DL (ref 31–37)
MCV RBC AUTO: 89.5 FL
MONOCYTES # BLD AUTO: 0.63 X10(3) UL (ref 0.1–1)
MONOCYTES NFR BLD AUTO: 8 %
NEUTROPHILS # BLD AUTO: 5.35 X10 (3) UL (ref 1.5–7.7)
NEUTROPHILS # BLD AUTO: 5.35 X10(3) UL (ref 1.5–7.7)
NEUTROPHILS NFR BLD AUTO: 68.1 %
OSMOLALITY SERPL CALC.SUM OF ELEC: 294 MOSM/KG (ref 275–295)
PLATELET # BLD AUTO: 278 10(3)UL (ref 150–450)
POTASSIUM SERPL-SCNC: 4.5 MMOL/L (ref 3.5–5.1)
Q-T INTERVAL: 338 MS
Q-T INTERVAL: 356 MS
QRS DURATION: 68 MS
QRS DURATION: 78 MS
QTC CALCULATION (BEZET): 415 MS
QTC CALCULATION (BEZET): 455 MS
R AXIS: -31 DEGREES
R AXIS: -35 DEGREES
RBC # BLD AUTO: 4.86 X10(6)UL
SODIUM SERPL-SCNC: 139 MMOL/L (ref 136–145)
T AXIS: -4 DEGREES
T AXIS: -5 DEGREES
TROPONIN I SERPL HS-MCNC: 8 NG/L
TSI SER-ACNC: 1.75 MIU/ML (ref 0.55–4.78)
VENTRICULAR RATE: 109 BPM
VENTRICULAR RATE: 82 BPM
WBC # BLD AUTO: 7.9 X10(3) UL (ref 4–11)

## 2024-02-12 PROCEDURE — 99214 OFFICE O/P EST MOD 30 MIN: CPT | Performed by: INTERNAL MEDICINE

## 2024-02-12 PROCEDURE — 71045 X-RAY EXAM CHEST 1 VIEW: CPT | Performed by: EMERGENCY MEDICINE

## 2024-02-12 PROCEDURE — 93000 ELECTROCARDIOGRAM COMPLETE: CPT | Performed by: INTERNAL MEDICINE

## 2024-02-12 RX ORDER — SODIUM CHLORIDE 9 MG/ML
INJECTION, SOLUTION INTRAVENOUS CONTINUOUS
Status: DISCONTINUED | OUTPATIENT
Start: 2024-02-12 | End: 2024-02-13

## 2024-02-12 RX ORDER — NICOTINE POLACRILEX 4 MG
15 LOZENGE BUCCAL
Status: DISCONTINUED | OUTPATIENT
Start: 2024-02-12 | End: 2024-02-18

## 2024-02-12 RX ORDER — NICOTINE POLACRILEX 4 MG
30 LOZENGE BUCCAL
Status: DISCONTINUED | OUTPATIENT
Start: 2024-02-12 | End: 2024-02-18

## 2024-02-12 RX ORDER — LOSARTAN POTASSIUM 50 MG/1
50 TABLET ORAL DAILY
Status: DISCONTINUED | OUTPATIENT
Start: 2024-02-13 | End: 2024-02-18

## 2024-02-12 RX ORDER — DEXTROSE MONOHYDRATE 25 G/50ML
50 INJECTION, SOLUTION INTRAVENOUS
Status: DISCONTINUED | OUTPATIENT
Start: 2024-02-12 | End: 2024-02-18

## 2024-02-12 RX ORDER — ATORVASTATIN CALCIUM 20 MG/1
20 TABLET, FILM COATED ORAL DAILY
Status: DISCONTINUED | OUTPATIENT
Start: 2024-02-13 | End: 2024-02-18

## 2024-02-12 RX ORDER — ACETAMINOPHEN 500 MG
500 TABLET ORAL EVERY 6 HOURS PRN
Status: DISCONTINUED | OUTPATIENT
Start: 2024-02-12 | End: 2024-02-18

## 2024-02-12 RX ORDER — ATENOLOL 25 MG/1
50 TABLET ORAL DAILY
Status: DISCONTINUED | OUTPATIENT
Start: 2024-02-13 | End: 2024-02-15

## 2024-02-12 RX ORDER — MELATONIN
6 NIGHTLY PRN
Status: DISCONTINUED | OUTPATIENT
Start: 2024-02-12 | End: 2024-02-18

## 2024-02-12 RX ORDER — ASPIRIN 81 MG/1
81 TABLET, CHEWABLE ORAL DAILY
Status: DISCONTINUED | OUTPATIENT
Start: 2024-02-12 | End: 2024-02-12

## 2024-02-12 RX ORDER — DILTIAZEM HYDROCHLORIDE 180 MG/1
360 CAPSULE, EXTENDED RELEASE ORAL DAILY
Status: DISCONTINUED | OUTPATIENT
Start: 2024-02-12 | End: 2024-02-17

## 2024-02-12 NOTE — ED QUICK NOTES
Orders for admission, patient is aware of plan and ready to go upstairs. Any questions, please call ED RN moni at extension [phone].     Patient Covid vaccination status: Unvaccinated     COVID Test Ordered in ED: None    COVID Suspicion at Admission: N/A    Running Infusions:  None    Mental Status/LOC at time of transport: aox4    Other pertinent information:   CIWA score: N/A   NIH score:  N/A

## 2024-02-12 NOTE — PROGRESS NOTES
Subjective:   Patient ID: Isabel Patel is a 89 year old female.    HPI  Patient comes in today with her son with complaint of ongoing heart palpitations for the last few weeks as per son where the heart rate goes up to 130s 140s then comes down patient does feel some discomfort when this happens denies any chest pain.  Patient is on Cardizem looking at the chart no history of atrial fibrillation or a flutter  History/Other:   Review of Systems   Constitutional: Negative.    HENT: Negative.     Eyes: Negative.    Respiratory: Negative.     Cardiovascular:  Positive for palpitations.   Gastrointestinal: Negative.    Genitourinary: Negative.    Musculoskeletal: Negative.    Skin: Negative.    Neurological: Negative.    Psychiatric/Behavioral: Negative.       Current Outpatient Medications   Medication Sig Dispense Refill    losartan 50 MG Oral Tab Take 1 tablet (50 mg total) by mouth daily. 90 tablet 1    dilTIAZem HCl ER Coated Beads 180 MG Oral Capsule SR 24 Hr Take 2 capsules (360 mg total) by mouth daily. 180 capsule 1    atorvastatin 20 MG Oral Tab Take 1 tablet (20 mg total) by mouth daily. 90 tablet 1    atenolol 50 MG Oral Tab Take 1 tablet (50 mg total) by mouth daily. 90 tablet 1    Accu-Chek Softclix Lancets Does not apply Misc Use once a day. 100 each 3    Blood Glucose Calibration (ACCU-CHEK GUIDE CONTROL) In Vitro Liquid 1 each by In Vitro route every 30 (thirty) days. 1 each 11    Blood Glucose Monitoring Suppl (ACCU-CHEK GUIDE ME) w/Device Does not apply Kit 1 each daily. Check once a day. 1 kit 0    Glucose Blood (ACCU-CHEK GUIDE) In Vitro Strip Check once daily 100 strip 11    ACCU-CHEK AMBIKA PLUS In Vitro Strip TEST ONE TIME DAILY 100 strip 3    ACCU-CHEK SOFTCLIX LANCETS Does not apply Misc TEST ONE TIME DAILY 100 each 3    cholecalciferol (VITAMIN D HIGH POTENCY) 25 MCG (1000 UT) Oral Cap Take 1 capsule (1,000 Units total) by mouth daily. 30 capsule 0    lidocaine 5 % External Patch Place 1  patch onto the skin Q24H PRN. 30 patch 0    TURMERIC CURCUMIN OR Take by mouth.      aspirin 81 MG Oral Chew Tab Chew 1 tablet (81 mg total) by mouth daily. 30 tablet 11    acetaminophen 500 MG Oral Tab Take 2 tablets (1,000 mg total) by mouth every 6 (six) hours as needed for Pain.      Coenzyme Q10 (CO Q 10) 10 MG Oral Cap Take 100 mg by mouth 2 (two) times daily.      ketoconazole 2 % External Cream Apply to affected areas under breasts twice a day. (Patient not taking: Reported on 2/5/2024) 60 g 2     Allergies:  Allergies   Allergen Reactions    Adhesive Tape RASH       Objective:   Physical Exam  Vitals and nursing note reviewed.   Constitutional:       Appearance: She is well-developed.   HENT:      Head: Normocephalic and atraumatic.      Right Ear: External ear normal.      Left Ear: External ear normal.      Nose: Nose normal.   Eyes:      Conjunctiva/sclera: Conjunctivae normal.      Pupils: Pupils are equal, round, and reactive to light.   Cardiovascular:      Rate and Rhythm: Tachycardia present. Rhythm irregular.      Heart sounds: Normal heart sounds.   Pulmonary:      Effort: Pulmonary effort is normal. No respiratory distress.      Breath sounds: Normal breath sounds. No wheezing.   Abdominal:      General: Bowel sounds are normal.      Palpations: Abdomen is soft.   Genitourinary:     Vagina: Normal.   Musculoskeletal:         General: Normal range of motion.      Cervical back: Normal range of motion and neck supple.   Skin:     General: Skin is warm and dry.   Neurological:      Mental Status: She is alert and oriented to person, place, and time.      Deep Tendon Reflexes: Reflexes are normal and symmetric.   Psychiatric:         Behavior: Behavior normal.         Thought Content: Thought content normal.         Judgment: Judgment normal.         Assessment & Plan:   1. Irregular heart rate - EKG with atrial flutter with rvr   2. Atrial flutter, unspecified type (HCC) - new diagnosis with rvr,  referred to er for further eval and possible admission. er triage called to notify that pt will be arriving with son,        No orders of the defined types were placed in this encounter.      Meds This Visit:  Requested Prescriptions      No prescriptions requested or ordered in this encounter       Imaging & Referrals:  ELECTROCARDIOGRAM, COMPLETE

## 2024-02-12 NOTE — CONSULTS
Cardiology (consult dictated)    Assessment:  1.  New onset atrial fibrillation with rapid ventricular response.  Symptoms occurring over the past week off-and-on.  [id number] score is 7.  Currently stable.      Plan:  1.  The patient has been started on Eliquis.  She should be given 5 mg twice daily.    2.  IV Cardizem if needed for rate control and then transition back to oral diltiazem.  Will likely need higher dose than on admission.  Continue atenolol and losartan.    3.  Lab work, routine plus thyroid    4.  Echocardiogram.      5.  Dose of Lasix.      Thank you.

## 2024-02-12 NOTE — ED INITIAL ASSESSMENT (HPI)
Patient complains of palpitations for over a week, not associated with chest pain or shortness of breath, saw pcp today for and was told to come to ED for a-fib intermittently over 110

## 2024-02-12 NOTE — ED PROVIDER NOTES
Patient Seen in: [facility] Emergency Department      History     Chief Complaint   Patient presents with    Arrythmia/Palpitations     Stated Complaint: Abnormal EKG    Subjective:   HPI    89-year-old female with history of hypertension, diabetes, hypercholesterolemia, and breast cancer presents with complaints of palpitations and irregular heart beat.  The patient reports symptoms of palpitations over the past 1 to 2 weeks.  Over the past few days she has noted her pulse to be rapid at times (110s) and then slow down to the 80s.  She denies associated dyspnea.  She denies chest pain.  She denies cough, fever, or chills.  She saw her primary physician today who sent the patient to the ED for further evaluation.  She reports feeling well and is without complaints at present.    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    CHOLECYSTECTOMY      D & C      KNEE REPLACEMENT SURGERY Right     LUMPECTOMY RIGHT  2016    cancer    MASTECTOMY PARTIAL Right 2016      [phone]    SKIN SURGERY Right 2018    TONSILLECTOMY      WRIST FRACTURE SURGERY Right                 Social History     Socioeconomic History    Marital status:     Number of children: 3   Occupational History    Occupation:      Comment: retired   Tobacco Use     Smoking status: Never    Smokeless tobacco: Never   Vaping Use    Vaping Use: Never used   Substance and Sexual Activity    Alcohol use: Not Currently    Drug use: Never    Sexual activity: Not Currently   Other Topics Concern     Service No    Caffeine Concern Yes     Comment: coffee, 1 cups daily    Occupational Exposure No              Review of Systems    Positive for stated complaint: Abnormal EKG  Other systems are as noted in HPI.  Constitutional and vital signs reviewed.      All other systems reviewed and negative except as noted above.    Physical Exam     ED Triage Vitals [02/12/24 1229]   /51   Pulse 108   Resp 18   Temp 97.9 °F (36.6 °C)   Temp src Temporal   SpO2 92 %   O2 Device None (Room air)       Current:/83   Pulse 95   Temp 97.9 °F (36.6 °C) (Temporal)   Resp 20   Wt 73.9 kg   SpO2 93%   BMI 30.80 kg/m²         Physical Exam      General Appearance: alert, no distress  Eyes: pupils equal and round no pallor or injection  ENT, Mouth: mucous membranes moist  Respiratory: there are no retractions, lungs are clear to auscultation  Cardiovascular: Irregularly irregular rhythm  Gastrointestinal:  abdomen is soft and non tender, no masses, bowel sounds normal  Neurological: Speech normal.  Moving extremities x 4.  Skin: warm and dry, no rashes.  Musculoskeletal: neck is supple non tender        Extremities are symmetrical, full range of motion.  No leg edema or tenderness noted.  Psychiatric: patient is oriented X 3, there is no agitation    DIFFERENTIAL DIAGNOSIS: After history and physical exam differential diagnosis was considered for cardiac dysrhythmia or other        ED Course     Labs Reviewed   BASIC METABOLIC PANEL (8) - Abnormal; Notable for the following components:       Result Value    Glucose 105 (*)     BUN 29 (*)     Creatinine 1.04 (*)     BUN/CREA Ratio 27.9 (*)     eGFR-Cr 50 (*)     All other components within normal limits   CBC W/ DIFFERENTIAL - Abnormal;  Notable for the following components:    RDW-SD 47.8 (*)     All other components within normal limits   TROPONIN I HIGH SENSITIVITY - Normal   CBC WITH DIFFERENTIAL WITH PLATELET    Narrative:     The following orders were created for panel order CBC With Differential With Platelet.  Procedure                               Abnormality         Status                     ---------                               -----------         ------                     CBC W/ DIFFERENTIAL[[id number]]          Abnormal            Final result                 Please view results for these tests on the individual orders.   TSH W REFLEX TO FREE T4   RAINBOW DRAW LAVENDER   RAINBOW DRAW LIGHT GREEN   RAINBOW DRAW BLUE   RAINBOW DRAW GOLD     EKG    Rate, intervals and axes as noted on EKG Report.  Rate: 82  Rhythm: Atrial Fibrillation  Axis: Left  Reading: Nonspecific EKG                        MDM      Lab, x-ray, and EKG results noted.  Patient with new onset atrial fibrillation with heart rate from .  She is predominantly in the 90s.  Will admit for cardiac telemetry and further cardiology evaluation.  Will begin anticoagulation in the ED.  Discussed with [facility].  Also communicated with Dr. Cornelius, the patient's primary physician.  Admission disposition: 2/12/2024  3:12 PM                                        Medical Decision Making      Disposition and Plan     Clinical Impression:  1. Atrial fibrillation, new onset (HCC)         Disposition:  Admit  2/12/2024  3:12 pm    Follow-up:  No follow-up provider specified.  We recommend that you schedule follow up care with a primary care provider within the next three months to obtain basic health screening including reassessment of your blood pressure.      Medications Prescribed:  Current Discharge Medication List                            Hospital Problems       Present on Admission  Date Reviewed: 2/12/2024            ICD-10-CM Noted POA    *  (Principal) Atrial fibrillation, new onset (HCC) I48.91 2/12/2024 Unknown

## 2024-02-13 ENCOUNTER — APPOINTMENT (OUTPATIENT)
Dept: CV DIAGNOSTICS | Facility: HOSPITAL | Age: 89
End: 2024-02-13
Attending: NURSE PRACTITIONER
Payer: MEDICARE

## 2024-02-13 LAB
ANION GAP SERPL CALC-SCNC: 5 MMOL/L (ref 0–18)
BUN BLD-MCNC: 25 MG/DL (ref 9–23)
BUN/CREAT SERPL: 24.8 (ref 10–20)
CALCIUM BLD-MCNC: 9 MG/DL (ref 8.7–10.4)
CHLORIDE SERPL-SCNC: 107 MMOL/L (ref 98–112)
CO2 SERPL-SCNC: 27 MMOL/L (ref 21–32)
CREAT BLD-MCNC: 1.01 MG/DL
EGFRCR SERPLBLD CKD-EPI 2021: 52 ML/MIN/1.73M2 (ref 60–?)
GLUCOSE BLD-MCNC: 169 MG/DL (ref 70–99)
GLUCOSE BLDC GLUCOMTR-MCNC: 111 MG/DL (ref 70–99)
GLUCOSE BLDC GLUCOMTR-MCNC: 130 MG/DL (ref 70–99)
GLUCOSE BLDC GLUCOMTR-MCNC: 139 MG/DL (ref 70–99)
GLUCOSE BLDC GLUCOMTR-MCNC: 183 MG/DL (ref 70–99)
OSMOLALITY SERPL CALC.SUM OF ELEC: 296 MOSM/KG (ref 275–295)
POTASSIUM SERPL-SCNC: 4.4 MMOL/L (ref 3.5–5.1)
SODIUM SERPL-SCNC: 139 MMOL/L (ref 136–145)

## 2024-02-13 PROCEDURE — 99222 1ST HOSP IP/OBS MODERATE 55: CPT | Performed by: INTERNAL MEDICINE

## 2024-02-13 PROCEDURE — 93306 TTE W/DOPPLER COMPLETE: CPT | Performed by: NURSE PRACTITIONER

## 2024-02-13 RX ORDER — CEFAZOLIN SODIUM/WATER 2 G/20 ML
2 SYRINGE (ML) INTRAVENOUS
Status: DISPENSED | OUTPATIENT
Start: 2024-02-14 | End: 2024-02-14

## 2024-02-13 RX ORDER — CHLORHEXIDINE GLUCONATE 4 G/100ML
30 SOLUTION TOPICAL
Status: COMPLETED | OUTPATIENT
Start: 2024-02-14 | End: 2024-02-14

## 2024-02-13 RX ORDER — SODIUM CHLORIDE 9 MG/ML
INJECTION, SOLUTION INTRAVENOUS
Status: ACTIVE | OUTPATIENT
Start: 2024-02-14 | End: 2024-02-14

## 2024-02-13 RX ORDER — CHLORHEXIDINE GLUCONATE 4 G/100ML
30 SOLUTION TOPICAL
Status: ACTIVE | OUTPATIENT
Start: 2024-02-14 | End: 2024-02-14

## 2024-02-13 RX ORDER — TRAZODONE HYDROCHLORIDE 50 MG/1
50 TABLET ORAL ONCE
Status: COMPLETED | OUTPATIENT
Start: 2024-02-13 | End: 2024-02-13

## 2024-02-13 RX ORDER — CEFAZOLIN SODIUM/WATER 2 G/20 ML
2 SYRINGE (ML) INTRAVENOUS
Status: DISCONTINUED | OUTPATIENT
Start: 2024-02-13 | End: 2024-02-13

## 2024-02-13 NOTE — CONSULTS
[facility] - ELECTROPHYSIOLOGY CONSULT NOTE    Isabel Patel Patient Status:  Inpatient    1931 MRN [id number]   Location [facility] 3W/SW Attending Fredrick Cornelius MD   Hosp Day # 1 PCP Fredrick Cornelius MD     Date of Admission:  2024  Date of Consult:  2024  I was asked by general cardiology, Dr. Banks, and Fredrick Cornelius MD to provide recommendations for evaluation and management of EP rhythm issues.  Impression:     Atrial fibrillation with tachybradycardia  Newly diagnosed atrial fibrillation with normal left atrial size normal EF  Was baseline on atenolol and Cardizem at home came in tachycardic symptomatic palpitations but now severely bradycardic with heart rate of 30  AV node blocking agents being held  Describes shortness of breath walking to the bathroom  Started on Eliquis in the emergency room yesterday  Discussed with son and patient either considering fine-tuning medications preventing syncope or heart failure versus dual-chamber pacemaker with adjustments of medications future cardioversion in a month after anticoagulation and they chose latter    Palpitations  Likely related to the A-fib with rapid rates    Dyspnea on exertion  Normal EF but with dyspnea walking across the room heart rate in the 30s currently    Recommendations:    N.p.o. after midnight hold Eliquis  Dual-chamber permanent pacemaker tomorrow  Will adjust beta-blocker and calcium channel blocker post pacemaker  Will consider cardioversion month after anticoagulation    Reason for Consultation:     Tacky bradycardia, new A-fib    History of Present Illness:   Patient is a 89 year old female who was admitted to the hospital for new A-fib symptomatic with palpitations.    Patient describes feeling short of breath A/G palpitations uncomfortable came to primary and sent to the ER for A-fib rapid rates.  One of her sons is at bedside help with history.  He has a history of osteoarthritis, diabetes,  hypertension possible mini stroke TIA in December 2018, chronic kidney disease, history of breast cancer right lumpectomy.  In the hospital she was given rate control medications for symptomatic A-fib rapid rates now heart rates in the 30s and symptomatic with dyspnea exertion walking to the bathroom.  Echocardiogram shows normal EF and normal-sized left atrium.    Describe the son and patient different ways to manage tachybradycardia syndrome including trying to fine-tune medications to get a middle ground avoid syncope dyspnea palpitations or heart failure on the higher end and concern for syncope with injury with potential for pauses as seen in the hospital.  Lean towards recommendations of a dual-chamber pacemaker followed by rate control medications but could always consider AV node ablation in the future.  But may be able to maintain sinus rhythm with or without antiarrhythmics considering cardioversion a month after anticoagulation since the A-fib is unknown duration and new diagnosis.    Cardiac tests:  EKG atrial fibrillation  Telemetry atrial fibrillation rapid rates later with severe bradycardia heart rate 30 while awake  Echocardiogram normal LV function, left atrium normal volume  Creatinine 1.0  Troponin high-sensitivity 8  TSH 1.7  Hemoglobin 14.8  Past Medical History  Past Medical History:   Diagnosis Date    Acute, but ill-defined, cerebrovascular disease     Arthritis     Breast CA (HCC) 08/12/2016    Cancer (HCC)     Diabetes (HCC)     diet controlled    Diabetes mellitus, type II (HCC)     Essential hypertension     Hearing impairment     High blood pressure     High cholesterol     Hyperlipidemia     Osteoarthritis     Squamous cell carcinoma, 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    CHOLECYSTECTOMY      D & C      KNEE REPLACEMENT SURGERY Right 2012    LUMPECTOMY RIGHT  2016    cancer    MASTECTOMY PARTIAL Right 2016      [phone]    SKIN SURGERY Right 2018    TONSILLECTOMY      WRIST FRACTURE SURGERY Right        Family History  Family History   Problem Relation Age of Onset    Heart Disease Father         CAD    Diabetes Father     Heart Disease Mother         CAD    Diabetes Mother     Breast Cancer Mother 83        had lumpectomy and RT.  No other treatment.   of stroke at 89    Other (appendicitis[other]) Brother         age 13    Other (alive and well[other]) Sister     Other (alive and well[other]) Son         x3    Breast Cancer Self 85    Glaucoma Neg     Macular degeneration Neg        Social History  Pediatric History   Patient Parents    Not on file     Other Topics Concern     Service No    Blood Transfusions Not Asked    Caffeine Concern Yes     Comment: coffee, 1 cups daily    Occupational Exposure No    Hobby Hazards Not Asked    Sleep Concern Not Asked    Stress Concern Not Asked    Weight Concern Not Asked    Special Diet Not Asked    Back Care Not Asked    Exercise Not Asked    Bike Helmet Not Asked    Seat Belt Not Asked    Self-Exams Not Asked   Social History Narrative    Not on file           Current Medications:  Current Facility-Administered Medications   Medication Dose Route Frequency    glucose (Dex4) 15 GM/59ML oral liquid 15 g  15 g Oral Q15 Min PRN    Or    glucose (Glutose) 40% oral gel 15 g  15 g Oral Q15 Min PRN    Or    glucose-vitamin C (Dex-4) chewable tab 4 tablet  4 tablet Oral Q15 Min PRN    Or    dextrose 50% injection 50 mL  50 mL Intravenous Q15 Min PRN    Or    glucose (Dex4) 15 GM/59ML oral liquid 30 g  30 g Oral Q15 Min PRN    Or    glucose (Glutose) 40% oral gel 30 g  30 g Oral Q15 Min PRN    Or    glucose-vitamin C (Dex-4) chewable tab 8 tablet  8  tablet Oral Q15 Min PRN    acetaminophen (Tylenol Extra Strength) tab 500 mg  500 mg Oral Q6H PRN    [Held by provider] atenolol (Tenormin) tab 50 mg  50 mg Oral Daily    atorvastatin (Lipitor) tab 20 mg  20 mg Oral Daily    losartan (Cozaar) tab 50 mg  50 mg Oral Daily    [Held by provider] dilTIAZem ER (CardIZEM CD) 24 hr cap 360 mg  360 mg Oral Daily    apixaban (Eliquis) tab 5 mg  5 mg Oral BID    insulin aspart (NovoLOG) 100 Units/mL FlexPen 1-5 Units  1-5 Units Subcutaneous TID CC    melatonin tab 6 mg  6 mg Oral Nightly PRN     Medications Prior to Admission   Medication Sig    losartan 50 MG Oral Tab Take 1 tablet (50 mg total) by mouth daily.    dilTIAZem HCl ER Coated Beads 180 MG Oral Capsule SR 24 Hr Take 2 capsules (360 mg total) by mouth daily.    atorvastatin 20 MG Oral Tab Take 1 tablet (20 mg total) by mouth daily.    atenolol 50 MG Oral Tab Take 1 tablet (50 mg total) by mouth daily.    cholecalciferol (VITAMIN D HIGH POTENCY) 25 MCG (1000 UT) Oral Cap Take 1 capsule (1,000 Units total) by mouth daily.    lidocaine 5 % External Patch Place 1 patch onto the skin Q24H PRN.    TURMERIC CURCUMIN OR Take by mouth.    aspirin 81 MG Oral Chew Tab Chew 1 tablet (81 mg total) by mouth daily.    acetaminophen 500 MG Oral Tab Take 2 tablets (1,000 mg total) by mouth every 6 (six) hours as needed for Pain.    Coenzyme Q10 (CO Q 10) 10 MG Oral Cap Take 180 mg by mouth daily.    Accu-Chek Softclix Lancets Does not apply Misc Use once a day.    Blood Glucose Calibration (ACCU-CHEK GUIDE CONTROL) In Vitro Liquid 1 each by In Vitro route every 30 (thirty) days.    Blood Glucose Monitoring Suppl (ACCU-CHEK GUIDE ME) w/Device Does not apply Kit 1 each daily. Check once a day.    Glucose Blood (ACCU-CHEK GUIDE) In Vitro Strip Check once daily    ACCU-CHEK AMBIKA PLUS In Vitro Strip TEST ONE TIME DAILY    ACCU-CHEK SOFTCLIX LANCETS Does not apply Misc TEST ONE TIME DAILY       Allergies  Allergies   Allergen Reactions     Adhesive Tape RASH       Review of Systems:   10 pt ROS performed, separate from HPI  Review of Systems:  GENERAL: no fevers, chills, sweats  HEENT: no visual or hearing changes  SKIN: denies any unusual skin lesions or rashes  RESPIRATORY: denies shortness of breath with exertion  CARDIOVASCULAR: no active chest pain, no claudication  GI: denies abdominal pain and denies heartburn  : no dysuria or hematuria  NEURO: denies headaches, focal weaknesses or paresthesias  All other systems reviewed and negative.  fatigue is present  All other systems were reviewed are negative  Physical Exam:   Blood pressure 93/59, pulse 54, temperature 98.3 °F (36.8 °C), temperature source Axillary, resp. rate 18, weight 165 lb 12.8 oz (75.2 kg), SpO2 94%, not currently breastfeeding.    Scheduled Meds:    [Held by provider] atenolol  50 mg Oral Daily    atorvastatin  20 mg Oral Daily    losartan  50 mg Oral Daily    [Held by provider] dilTIAZem HCl ER Coated Beads  360 mg Oral Daily    apixaban  5 mg Oral BID    insulin aspart  1-5 Units Subcutaneous TID CC         Physical Exam:    General: Alert and oriented. No apparent distress. No respiratory or constitutional distress.  HEENT: Normocephalic, anicteric sclera, neck supple  Neck: No JVD, carotids 2+, supple  Cardiac: Regular rate. No pathologic murmur.  Lungs: Clear with normal effort.  Normal excursions and effort.  Abdomen: Soft, non-tender. BS-present.  Extremities: Without clubbing, cyanosis.  Peripheral pulsespresent.  Neurologic: Alert and oriented, normal affect. Motor ok.  Skin: Warm and dry.     Results:     Laboratory Data:  Lab Results   Component Value Date    WBC 7.9 02/12/2024    HGB 14.8 02/12/2024    HCT 43.5 02/12/2024    .0 02/12/2024    CREATSERUM 1.01 02/13/2024    BUN 25 (H) 02/13/2024     02/13/2024    K 4.4 02/13/2024     02/13/2024    CO2 27.0 02/13/2024     (H) 02/13/2024    CA 9.0 02/13/2024    ALB 3.1 (L) 09/22/2023     ALKPHO 125 02/25/2023    TP 7.4 02/25/2023    AST 9 (L) 02/25/2023    ALT 12 (L) 02/25/2023    INR 1.0 12/03/2018    PTP 13.2 12/03/2018    TSH 1.749 02/12/2024    MG 1.7 (L) 12/04/2018    PHOS 3.2 09/22/2023    TROP 0.00 12/03/2018    B12 599 03/16/2021         Imaging: I independently visualized all relevant chest imaging in PACS, agree with radiology interpretation except where noted.  Thank you for allowing me to participate in the care of your patient.    Dejuan Haney MD  [facility]  Cardiac Electrophysiology  2/13/2024    5 level EP Consult

## 2024-02-13 NOTE — PROGRESS NOTES
Cardiology Progress Note    Isabel Patel Patient Status:  Inpatient    1931 MRN [id number]   Location [facility] 3W/SW Attending Fredrick Cornelius MD   Hosp Day # 1 PCP Fredrick Cornelius MD     Interval Note:  - Patient seen and examined  - Vitals show mild desaturations this morning  - Heart rates are within satisfactory range earlier today however now having HR in 30-40s while sitting in chair      Echocardiogram  1. Left ventricle: The cavity size was normal. Wall thickness was normal.      Systolic function was normal. The estimated ejection fraction was 60-65%,      by visual assessment. Unable to assess LV diastolic function due to heart      rhythm.   2. Right ventricle: The cavity size was increased. Systolic function was      reduced.   3. Left atrium: The left atrial volume was normal.   4. Right atrium: The atrium was moderately dilated.   5. Aortic valve: Transvalvular velocity was increased. The findings were      consistent with mild stenosis. There was mild regurgitation. The peak      systolic velocity was 1.57m/sec. The mean systolic gradient was 5mm Hg.      The valve area (VTI) was 1.07cm^2. The valve area (VTI) index was      0.61cm^2/m^2.   6. Tricuspid valve: There was mild-moderate regurgitation.   7. Pulmonary arteries: Systolic pressure was moderately to markedly      increased, estimated to be 60mm Hg.     --------------------------------------------------------------------------------------------------------------------------------  ROS 12 systems reviewed, pertinent findings above.  ROS    History:  Past Medical History:   Diagnosis Date    Acute, but ill-defined, cerebrovascular disease     Arthritis     Breast CA (HCC) 2016    Cancer (HCC)     Diabetes (HCC)     diet controlled    Diabetes mellitus, type II (HCC)     Essential hypertension     Hearing impairment     High blood pressure     High cholesterol     Hyperlipidemia     Osteoarthritis     Squamous cell  carcinoma, keratinizing 2018    R posterior thigh     Past Surgical History:   Procedure Laterality Date    APPENDECTOMY      APPENDECTOMY  194    CATARACT  -    CATARACT EXTRACTION W/  INTRAOCULAR LENS IMPLANT Bilateral     Ian Garcia MD    CHEMOTHERAPY  2016    rt breast.    CHOLECYSTECTOMY      D & C      KNEE REPLACEMENT SURGERY Right 2012    LUMPECTOMY RIGHT  2016    cancer    MASTECTOMY PARTIAL Right 2016      [phone]    SKIN SURGERY Right 2018    TONSILLECTOMY      WRIST FRACTURE SURGERY Right      Family History   Problem Relation Age of Onset    Heart Disease Father         CAD    Diabetes Father     Heart Disease Mother         CAD    Diabetes Mother     Breast Cancer Mother 83        had lumpectomy and RT.  No other treatment.   of stroke at 89    Other (appendicitis[other]) Brother         age 13    Other (alive and well[other]) Sister     Other (alive and well[other]) Son         x3    Breast Cancer Self 85    Glaucoma Neg     Macular degeneration Neg       reports that she has never smoked. She has never used smokeless tobacco. She reports that she does not currently use alcohol. She reports that she does not use drugs.    Objective:   Temp: 98.3 °F (36.8 °C)  Pulse: 83  Resp: 18  BP: 119/60    Intake/Output:     Intake/Output Summary (Last 24 hours) at 2024 1414  Last data filed at 2024 0900  Gross per 24 hour   Intake 687.33 ml   Output --   Net 687.33 ml       Physical Exam:    General: AO3x  HEENT: Normocephalic, anicteric sclera, neck supple.  Neck: No JVD, carotids 2+, no bruits.  Cardiac: Bradycardia, Irregular rate and rhythm. S1, S2 normal. No murmur, pericardial rub, S3.  Lungs: Clear without wheezes, rales, rhonchi or dullness.  Normal excursions and effort.  Abdomen: Soft, non-tender. BS-present.  Extremities: Without clubbing, cyanosis or edema.  Peripheral pulses are 2+.  Neurologic: Non-focal  Skin: Warm and dry.       Assessment    Atrial fibrillation, new onset  Bradycardia, Tachy/guy  Hyperlipidemia  Aortic valve stenosis, mild    Plan  Currently on home doses of atenolol 50mg and diltiazem 360mg  Unable to further titrate meds due to bradycardia - hold courtney blockers until HR recovers  Continue Eliquis  Discussed options with patient's Son which include EP consult for PPM eval - patient and family agreeable for EP eval.    Thank you for allowing me to take part in the care of Marthadilan Patel. Please call with any questions of concerns.      Level of care: L3    Ghazal Lake DO  Interventional Cardiology  [facility]    Ghazal Lake DO  2/13/2024  2:14 PM

## 2024-02-13 NOTE — CONSULTS
[facility]    PATIENT'S NAME: KASI KAM   ATTENDING PHYSICIAN: Fredrick Cornelius MD   CONSULTING PHYSICIAN: Cal Banks MD   PATIENT ACCOUNT#:   [id number]    LOCATION:  [address] [place] [address]  MEDICAL RECORD #:   [id number]       YOB: 1931  ADMISSION DATE:       02/12/2024      CONSULT DATE:  02/12/2024    REPORT OF CONSULTATION    HISTORY OF PRESENT ILLNESS:  The patient is a 89-year-old female who has noted a rapid heartbeat at home off and on for the past week.  She also describes feeling \"edgy\" and occasionally short of breath.  She also has felt generally weak.  There has been no chest pain or dizziness.  She was found to be in rapid atrial fibrillation and is being admitted for further evaluation and management.  She has no prior cardiac history and has not had these symptoms before this week.    PAST MEDICAL HISTORY:  Osteoarthritis; diabetes; hypertension; a \"mini stroke/TIA\" in December 2018, no source was found; chronic kidney disease; and history of breast cancer.  She underwent a right breast lumpectomy and has been on oral medication for 5 years.  No IV chemo or radiation was given.    PAST SURGICAL HISTORY:  Right total knee arthroplasty, pinning of the left femur after femur fracture, appendectomy, cholecystectomy, tonsillectomy, and cataracts.    MEDICATIONS:  Home medications are losartan 50 q.a.m., diltiazem  q.a.m., atorvastatin 20 at bedtime, atenolol 50 q.a.m., aspirin 81 q.a.m.    ALLERGIES:  No known drug allergies.    SOCIAL HISTORY:  She is  and has 2 sons, 1 of whom is with her in the hospital today.  She drinks only occasional alcohol and drinks decaf coffee.  She is a nonsmoker.  She did not work outside, raising the children.     REVIEW OF SYSTEMS:  She has a history of falls and has fallen at least 5 times over the past several years.  One fall is what led to the femur fracture.  A 10-point review of systems otherwise negative except for being  hard of hearing.      PHYSICAL EXAMINATION:    GENERAL:  Well-developed, well-nourished female in no acute distress, alert and oriented x3.  VITAL SIGNS:  Blood pressure upper 130s/80.  Respirations 20, unlabored.  Pulse 116, irregular.  Saturation 92% on room air.  Afebrile.  HEENT:  Unremarkable.  NECK:  Supple.  There is increased jugular venous pressure.  Carotids are brisk without bruits.  No thyromegaly.  LUNGS:  Clear bilaterally except for mild basilar crackles.  HEART:  S1, S2 normal.  There is no gallop, rub, or click.  There is a grade 1 to 2 systolic murmur  early to mid-peaking.  ABDOMEN:  Soft, nontender.  No organomegaly.  EXTREMITIES:  Warm and dry with trace to mild brawny edema bilaterally.    LABORATORY DATA:  Sodium 139, potassium 1.5, bicarb 25, BUN 29, creatinine 1.04.  Troponin normal.  TSH normal.  CBC and differential normal.     EKG shows atrial flutter with a heart rate of 82.  There is leftward axis and poor R-wave progression with nonspecific T-wave change.    Chest x-ray shows early CHF.      ASSESSMENT:    1.   New-onset atrial fibrillation with rapid ventricular response.  One week of symptoms off and on.  CHADS-VASc score is 7.  Currently stable.    2.   Suspect mild CHF.    PLAN:    1.   The patient has been started on Eliquis in the ER.  Will continue.  With her age, weight, and renal function, proper dose is 5 mg b.i.d.   2.   Begin IV Cardizem for rate control and then transition back to oral diltiazem plus atenolol.  3.   Echocardiogram.  4.   Dose of Lasix.    Thank you for this consultation.    Dictated By Cal Banks MD  d: 02/12/2024 16:43:29  t: 02/12/2024 17:13:07  [facility] [id number]  [facility]/

## 2024-02-13 NOTE — H&P
[facility]    History and Physical    Isabel Patel Patient Status:  Inpatient    1931 MRN [id number]   Location [facility] 3W/SW Attending Fredrick Cornelius MD   Hosp Day # 1 PCP Fredrick Cornelius MD     Date:  2024  Date of Admission:  2024    History provided by:patient  HPI:     Chief Complaint   Patient presents with    Arrythmia/Palpitations     Patient came to see me in office yesterday with complaint of having irregular heartbeats where the heart rate will go up in the 130s 140s then down to the 60s 80s EKG done in office showed a flutter patient was sent to ER for further eval found to be in A-fib with rapid ventricular rate cardiology was consulted patient was admitted.  Today seen in bed denies any complaints.    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    CHOLECYSTECTOMY      D & C      KNEE REPLACEMENT SURGERY Right 2012    LUMPECTOMY RIGHT  2016    cancer    MASTECTOMY PARTIAL Right 2016      [id number]    SKIN SURGERY Right 2018    TONSILLECTOMY      WRIST FRACTURE SURGERY Right      Family History   Problem Relation Age of Onset    Heart Disease  Father         CAD    Diabetes Father     Heart Disease Mother         CAD    Diabetes Mother     Breast Cancer Mother 83        had lumpectomy and RT.  No other treatment.   of stroke at 89    Other (appendicitis[other]) Brother         age 13    Other (alive and well[other]) Sister     Other (alive and well[other]) Son         x3    Breast Cancer Self 85    Glaucoma Neg     Macular degeneration Neg      Social History:  Social History     Socioeconomic History    Marital status:     Number of children: 3   Occupational History    Occupation:      Comment: retired   Tobacco Use    Smoking status: Never    Smokeless tobacco: Never   Vaping Use    Vaping Use: Never used   Substance and Sexual Activity    Alcohol use: Not Currently    Drug use: Never    Sexual activity: Not Currently   Other Topics Concern     Service No    Caffeine Concern Yes     Comment: coffee, 1 cups daily    Occupational Exposure No     Social Determinants of Health     Food Insecurity: No Food Insecurity (2024)    Food Insecurity     Food Insecurity: Never true   Transportation Needs: No Transportation Needs (2024)    Transportation Needs     Lack of Transportation: No   Housing Stability: Low Risk  (2024)    Housing Stability     Housing Instability: No     Allergies/Medications:   Allergies:   Allergies   Allergen Reactions    Adhesive Tape RASH     Medications Prior to Admission   Medication Sig    losartan 50 MG Oral Tab Take 1 tablet (50 mg total) by mouth daily.    dilTIAZem HCl ER Coated Beads 180 MG Oral Capsule SR 24 Hr Take 2 capsules (360 mg total) by mouth daily.    atorvastatin 20 MG Oral Tab Take 1 tablet (20 mg total) by mouth daily.    atenolol 50 MG Oral Tab Take 1 tablet (50 mg total) by mouth daily.    cholecalciferol (VITAMIN D HIGH POTENCY) 25 MCG (1000 UT) Oral Cap Take 1 capsule (1,000 Units total) by mouth daily.    lidocaine 5 % External Patch Place 1 patch onto the skin Q24H  PRN.    TURMERIC CURCUMIN OR Take by mouth.    aspirin 81 MG Oral Chew Tab Chew 1 tablet (81 mg total) by mouth daily.    acetaminophen 500 MG Oral Tab Take 2 tablets (1,000 mg total) by mouth every 6 (six) hours as needed for Pain.    Coenzyme Q10 (CO Q 10) 10 MG Oral Cap Take 180 mg by mouth daily.    Accu-Chek Softclix Lancets Does not apply Misc Use once a day.    Blood Glucose Calibration (ACCU-CHEK GUIDE CONTROL) In Vitro Liquid 1 each by In Vitro route every 30 (thirty) days.    Blood Glucose Monitoring Suppl (ACCU-CHEK GUIDE ME) w/Device Does not apply Kit 1 each daily. Check once a day.    Glucose Blood (ACCU-CHEK GUIDE) In Vitro Strip Check once daily    ACCU-CHEK AMBIKA PLUS In Vitro Strip TEST ONE TIME DAILY    ACCU-CHEK SOFTCLIX LANCETS Does not apply Misc TEST ONE TIME DAILY       Review of Systems:     Constitutional: Negative.    HENT: Negative.     Eyes: Negative.    Respiratory:  Positive for shortness of breath.    Cardiovascular: Negative.    Endocrine: Negative.    Genitourinary: Negative.    Musculoskeletal: Negative.    Skin: Negative.    Neurological: Negative.    Psychiatric/Behavioral: Negative.         Physical Exam:   Vital Signs:  Blood pressure 119/60, pulse 83, temperature 98.3 °F (36.8 °C), temperature source Axillary, resp. rate 18, weight 165 lb 12.8 oz (75.2 kg), SpO2 96%, not currently breastfeeding.  Physical Exam  Vitals and nursing note reviewed.   HENT:      Head: Normocephalic.   Cardiovascular:      Rate and Rhythm: Normal rate.   Pulmonary:      Effort: Pulmonary effort is normal.      Breath sounds: Normal breath sounds.   Abdominal:      General: Abdomen is flat.      Palpations: Abdomen is soft.   Musculoskeletal:         General: Normal range of motion.      Cervical back: Normal range of motion.   Neurological:      Mental Status: She is alert and oriented to person, place, and time.   Psychiatric:         Mood and Affect: Mood normal.       Cervical Papanicolaou  contraindicated    Results:     Lab Results   Component Value Date    WBC 7.9 02/12/2024    HGB 14.8 02/12/2024    HCT 43.5 02/12/2024    .0 02/12/2024    CREATSERUM 1.04 (H) 02/12/2024    BUN 29 (H) 02/12/2024     02/12/2024    K 4.5 02/12/2024     02/12/2024    CO2 25.0 02/12/2024     (H) 02/12/2024    CA 9.6 02/12/2024    ALB 3.1 (L) 09/22/2023    ALKPHO 125 02/25/2023    BILT 0.5 02/25/2023    TP 7.4 02/25/2023    AST 9 (L) 02/25/2023    ALT 12 (L) 02/25/2023    INR 1.0 12/03/2018    TSH 1.749 02/12/2024    MG 1.7 (L) 12/04/2018    PHOS 3.2 09/22/2023    TROP 0.00 12/03/2018    TROPHS 8 02/12/2024    B12 599 03/16/2021     XR CHEST AP PORTABLE  (CPT=71045)    Result Date: 2/12/2024  CONCLUSION:  1. No acute airspace consolidation.  Horizontal linear opacities at the left mid and lower chest and to a lesser degree at the right lung base more likely areas of scarring/atelectasis.  Mild cardiomegaly and normal pulmonary vascularity.    Dictated by (CST): Adolph Coreas MD on 2/12/2024 at 3:03 PM     Finalized by (CST): Adolph Coreas MD on 2/12/2024 at 3:05 PM         EKG 12 Lead    Result Date: 2/12/2024  Atrial tachycardia or atypical flutter. Left axis deviation Abnormal ECG When compared with ECG of 12-FEB-2024 11:39, No significant change Confirmed by MARISA DELGADO ([id number]) on 2/12/2024 4:01:51 PM     Assessment/Plan:     Atrial fibrillation, new onset (HCC)  Started on IV Cardizem was switched to p.o. rate okay will follow-up echo results and cardiology recommendations      Controlled type 2 diabetes mellitus with diabetic nephropathy, without long-term current use of insulin (HCC)    Continue with Accu-Cheks and coverage      HTN (hypertension)  Well-controlled continue current treatment        Hypercholesterolemia  Continue home meds          **Certification      PHYSICIAN Certification of Need for Inpatient Hospitalization - Initial Certification    Patient will require inpatient  services that will reasonably be expected to span two midnight's based on the clinical documentation in H+P.   Based on patients current state of illness, I anticipate that, after discharge, patient will require TBD.        Fredrick Cornelius MD  2/13/2024

## 2024-02-13 NOTE — PLAN OF CARE
Ms. is A&O x ***.  Lives at ***.    *** assist with ***.  *** of bowel and bladder.  VSS:  ***.  Denies pain.  Plan:  ***  Will continue to monitor and treat.    Problem: Patient Centered Care  Goal: Patient preferences are identified and integrated in the patient's plan of care  Description: Interventions:  - What would you like us to know as we care for you? I left my hearing aides at home  - Provide timely, complete, and accurate information to patient/family  - Incorporate patient and family knowledge, values, beliefs, and cultural backgrounds into the planning and delivery of care  - Encourage patient/family to participate in care and decision-making at the level they choose  - Honor patient and family perspectives and choices  Outcome: Progressing     Problem: Patient/Family Goals  Goal: Patient/Family Long Term Goal  Description: Patient's Long Term Goal: Discharge home    Interventions:  - Plan of care compliance; medication management, care, and education     - See additional Care Plan goals for specific interventions  Outcome: Progressing  Goal: Patient/Family Short Term Goal  Description: Patient's Short Term Goal: return to normal sinus    Interventions:   - Plan of care compliance; medication management, care, and education     - See additional Care Plan goals for specific interventions  Outcome: Progressing     Problem: CARDIOVASCULAR - ADULT  Goal: Maintains optimal cardiac output and hemodynamic stability  Description: INTERVENTIONS:  - Monitor vital signs, rhythm, and trends  - Monitor for bleeding, hypotension and signs of decreased cardiac output  - Evaluate effectiveness of vasoactive medications to optimize hemodynamic stability  - Monitor arterial and/or venous puncture sites for bleeding and/or hematoma  - Assess quality of pulses, skin color and temperature  - Assess for signs of decreased coronary artery perfusion - ex. Angina  - Evaluate fluid balance, assess for edema, trend  weights  Outcome: Progressing  Goal: Absence of cardiac arrhythmias or at baseline  Description: INTERVENTIONS:  - Continuous cardiac monitoring, monitor vital signs, obtain 12 lead EKG if indicated  - Evaluate effectiveness of antiarrhythmic and heart rate control medications as ordered  - Initiate emergency measures for life threatening arrhythmias  - Monitor electrolytes and administer replacement therapy as ordered  Outcome: Progressing     Problem: METABOLIC/FLUID AND ELECTROLYTES - ADULT  Goal: Glucose maintained within prescribed range  Description: INTERVENTIONS:  - Monitor Blood Glucose as ordered  - Assess for signs and symptoms of hyperglycemia and hypoglycemia  - Administer ordered medications to maintain glucose within target range  - Assess barriers to adequate nutritional intake and initiate nutrition consult as needed  - Instruct patient on self management of diabetes  Outcome: Progressing  Goal: Electrolytes maintained within normal limits  Description: INTERVENTIONS:  - Monitor labs and rhythm and assess patient for signs and symptoms of electrolyte imbalances  - Administer electrolyte replacement as ordered  - Monitor response to electrolyte replacements, including rhythm and repeat lab results as appropriate  - Fluid restriction as ordered  - Instruct patient on fluid and nutrition restrictions as appropriate  Outcome: Progressing     Problem: HEMATOLOGIC - ADULT  Goal: Maintains hematologic stability  Description: INTERVENTIONS  - Assess for signs and symptoms of bleeding or hemorrhage  - Monitor labs and vital signs for trends  - Administer supportive blood products/factors, fluids and medications as ordered and appropriate  - Administer supportive blood products/factors as ordered and appropriate  Outcome: Progressing  Goal: Free from bleeding injury  Description: (Example usage: patient with low platelets)  INTERVENTIONS:  - Avoid intramuscular injections, enemas and rectal medication  administration  - Ensure safe mobilization of patient  - Hold pressure on venipuncture sites to achieve adequate hemostasis  - Assess for signs and symptoms of internal bleeding  - Monitor lab trends  - Patient is to report abnormal signs of bleeding to staff  - Avoid use of toothpicks and dental floss  - Use electric shaver for shaving  - Use soft bristle tooth brush  - Limit straining and forceful nose blowing  Outcome: Progressing

## 2024-02-13 NOTE — PLAN OF CARE
Patient is aox4. 2L NC o2. Plan for 2D echo.   Problem: Patient Centered Care  Goal: Patient preferences are identified and integrated in the patient's plan of care  Description: Interventions:  - What would you like us to know as we care for you? From home with family  - Provide timely, complete, and accurate information to patient/family  - Incorporate patient and family knowledge, values, beliefs, and cultural backgrounds into the planning and delivery of care  - Encourage patient/family to participate in care and decision-making at the level they choose  - Honor patient and family perspectives and choices  Outcome: Progressing     Problem: Patient/Family Goals  Goal: Patient/Family Long Term Goal  Description: Patient's Long Term Goal: to return home    Interventions:  - vital signs  - See additional Care Plan goals for specific interventions  Outcome: Progressing  Problem: CARDIOVASCULAR - ADULT  Goal: Maintains optimal cardiac output and hemodynamic stability  Description: INTERVENTIONS:  - Monitor vital signs, rhythm, and trends  - Monitor for bleeding, hypotension and signs of decreased cardiac output  - Evaluate effectiveness of vasoactive medications to optimize hemodynamic stability  - Monitor arterial and/or venous puncture sites for bleeding and/or hematoma  - Assess quality of pulses, skin color and temperature  - Assess for signs of decreased coronary artery perfusion - ex. Angina  - Evaluate fluid balance, assess for edema, trend weights  Outcome: Progressing  Goal: Absence of cardiac arrhythmias or at baseline  Description: INTERVENTIONS:  - Continuous cardiac monitoring, monitor vital signs, obtain 12 lead EKG if indicated  - Evaluate effectiveness of antiarrhythmic and heart rate control medications as ordered  - Initiate emergency measures for life threatening arrhythmias  - Monitor electrolytes and administer replacement therapy as ordered  Outcome: Progressing     Problem: METABOLIC/FLUID AND  ELECTROLYTES - ADULT  Goal: Glucose maintained within prescribed range  Description: INTERVENTIONS:  - Monitor Blood Glucose as ordered  - Assess for signs and symptoms of hyperglycemia and hypoglycemia  - Administer ordered medications to maintain glucose within target range  - Assess barriers to adequate nutritional intake and initiate nutrition consult as needed  - Instruct patient on self management of diabetes  Outcome: Progressing  Goal: Electrolytes maintained within normal limits  Description: INTERVENTIONS:  - Monitor labs and rhythm and assess patient for signs and symptoms of electrolyte imbalances  - Administer electrolyte replacement as ordered  - Monitor response to electrolyte replacements, including rhythm and repeat lab results as appropriate  - Fluid restriction as ordered  - Instruct patient on fluid and nutrition restrictions as appropriate  Outcome: Progressing     Problem: HEMATOLOGIC - ADULT  Goal: Maintains hematologic stability  Description: INTERVENTIONS  - Assess for signs and symptoms of bleeding or hemorrhage  - Monitor labs and vital signs for trends  - Administer supportive blood products/factors, fluids and medications as ordered and appropriate  - Administer supportive blood products/factors as ordered and appropriate  Outcome: Progressing  Goal: Free from bleeding injury  Description: (Example usage: patient with low platelets)  INTERVENTIONS:  - Avoid intramuscular injections, enemas and rectal medication administration  - Ensure safe mobilization of patient  - Hold pressure on venipuncture sites to achieve adequate hemostasis  - Assess for signs and symptoms of internal bleeding  - Monitor lab trends  - Patient is to report abnormal signs of bleeding to staff  - Avoid use of toothpicks and dental floss  - Use electric shaver for shaving  - Use soft bristle tooth brush  - Limit straining and forceful nose blowing  Outcome: Progressing

## 2024-02-14 ENCOUNTER — APPOINTMENT (OUTPATIENT)
Dept: INTERVENTIONAL RADIOLOGY/VASCULAR | Facility: HOSPITAL | Age: 89
End: 2024-02-14
Attending: NURSE PRACTITIONER
Payer: MEDICARE

## 2024-02-14 LAB
ANION GAP SERPL CALC-SCNC: 5 MMOL/L (ref 0–18)
BASOPHILS # BLD AUTO: 0.03 X10(3) UL (ref 0–0.2)
BASOPHILS NFR BLD AUTO: 0.5 %
BUN BLD-MCNC: 28 MG/DL (ref 9–23)
BUN/CREAT SERPL: 25.9 (ref 10–20)
CALCIUM BLD-MCNC: 9.2 MG/DL (ref 8.7–10.4)
CHLORIDE SERPL-SCNC: 108 MMOL/L (ref 98–112)
CO2 SERPL-SCNC: 27 MMOL/L (ref 21–32)
CREAT BLD-MCNC: 1.08 MG/DL
DEPRECATED RDW RBC AUTO: 50.1 FL (ref 35.1–46.3)
EGFRCR SERPLBLD CKD-EPI 2021: 48 ML/MIN/1.73M2 (ref 60–?)
EOSINOPHIL # BLD AUTO: 0.15 X10(3) UL (ref 0–0.7)
EOSINOPHIL NFR BLD AUTO: 2.3 %
ERYTHROCYTE [DISTWIDTH] IN BLOOD BY AUTOMATED COUNT: 14.6 % (ref 11–15)
GLUCOSE BLD-MCNC: 117 MG/DL (ref 70–99)
GLUCOSE BLDC GLUCOMTR-MCNC: 113 MG/DL (ref 70–99)
GLUCOSE BLDC GLUCOMTR-MCNC: 142 MG/DL (ref 70–99)
GLUCOSE BLDC GLUCOMTR-MCNC: 155 MG/DL (ref 70–99)
HCT VFR BLD AUTO: 43.2 %
HGB BLD-MCNC: 13.7 G/DL
IMM GRANULOCYTES # BLD AUTO: 0.03 X10(3) UL (ref 0–1)
IMM GRANULOCYTES NFR BLD: 0.5 %
LYMPHOCYTES # BLD AUTO: 1.82 X10(3) UL (ref 1–4)
LYMPHOCYTES NFR BLD AUTO: 27.9 %
MCH RBC QN AUTO: 29.3 PG (ref 26–34)
MCHC RBC AUTO-ENTMCNC: 31.7 G/DL (ref 31–37)
MCV RBC AUTO: 92.3 FL
MONOCYTES # BLD AUTO: 0.59 X10(3) UL (ref 0.1–1)
MONOCYTES NFR BLD AUTO: 9 %
MRSA DNA SPEC QL NAA+PROBE: NEGATIVE
NEUTROPHILS # BLD AUTO: 3.9 X10 (3) UL (ref 1.5–7.7)
NEUTROPHILS # BLD AUTO: 3.9 X10(3) UL (ref 1.5–7.7)
NEUTROPHILS NFR BLD AUTO: 59.8 %
OSMOLALITY SERPL CALC.SUM OF ELEC: 297 MOSM/KG (ref 275–295)
PLATELET # BLD AUTO: 255 10(3)UL (ref 150–450)
POTASSIUM SERPL-SCNC: 4.1 MMOL/L (ref 3.5–5.1)
RBC # BLD AUTO: 4.68 X10(6)UL
SODIUM SERPL-SCNC: 140 MMOL/L (ref 136–145)
WBC # BLD AUTO: 6.5 X10(3) UL (ref 4–11)

## 2024-02-14 PROCEDURE — 99232 SBSQ HOSP IP/OBS MODERATE 35: CPT | Performed by: INTERNAL MEDICINE

## 2024-02-14 PROCEDURE — 02H63JZ INSERTION OF PACEMAKER LEAD INTO RIGHT ATRIUM, PERCUTANEOUS APPROACH: ICD-10-PCS | Performed by: INTERNAL MEDICINE

## 2024-02-14 PROCEDURE — 02HK3JZ INSERTION OF PACEMAKER LEAD INTO RIGHT VENTRICLE, PERCUTANEOUS APPROACH: ICD-10-PCS | Performed by: INTERNAL MEDICINE

## 2024-02-14 PROCEDURE — 0JH606Z INSERTION OF PACEMAKER, DUAL CHAMBER INTO CHEST SUBCUTANEOUS TISSUE AND FASCIA, OPEN APPROACH: ICD-10-PCS | Performed by: INTERNAL MEDICINE

## 2024-02-14 RX ORDER — ACETAMINOPHEN 325 MG/1
650 TABLET ORAL EVERY 4 HOURS PRN
Status: DISCONTINUED | OUTPATIENT
Start: 2024-02-14 | End: 2024-02-18

## 2024-02-14 RX ORDER — ACETAMINOPHEN AND CODEINE PHOSPHATE 300; 30 MG/1; MG/1
1 TABLET ORAL EVERY 4 HOURS PRN
Status: DISCONTINUED | OUTPATIENT
Start: 2024-02-14 | End: 2024-02-18

## 2024-02-14 RX ORDER — ACETAMINOPHEN AND CODEINE PHOSPHATE 300; 30 MG/1; MG/1
2 TABLET ORAL EVERY 4 HOURS PRN
Status: DISCONTINUED | OUTPATIENT
Start: 2024-02-14 | End: 2024-02-18

## 2024-02-14 RX ORDER — CEFAZOLIN SODIUM/WATER 2 G/20 ML
2 SYRINGE (ML) INTRAVENOUS EVERY 8 HOURS
Status: COMPLETED | OUTPATIENT
Start: 2024-02-14 | End: 2024-02-15

## 2024-02-14 RX ORDER — LIDOCAINE HYDROCHLORIDE AND EPINEPHRINE 10; 10 MG/ML; UG/ML
INJECTION, SOLUTION INFILTRATION; PERINEURAL
Status: COMPLETED
Start: 2024-02-14 | End: 2024-02-14

## 2024-02-14 RX ORDER — MIDAZOLAM HYDROCHLORIDE 1 MG/ML
INJECTION INTRAMUSCULAR; INTRAVENOUS
Status: COMPLETED
Start: 2024-02-14 | End: 2024-02-14

## 2024-02-14 RX ORDER — CEFAZOLIN SODIUM/WATER 2 G/20 ML
SYRINGE (ML) INTRAVENOUS
Status: COMPLETED
Start: 2024-02-14 | End: 2024-02-14

## 2024-02-14 NOTE — PROGRESS NOTES
[facility]    Progress Note    Isabel Patel Patient Status:  Inpatient    1931 MRN [id number]   Location [facility] 3W/SW Attending Fredrick Cornelius MD   Hosp Day # 2 PCP Fredrick Cornelius MD     Chief Complaint:     Subjective:     Constitutional:  Positive for fatigue.   HENT: Negative.     Eyes: Negative.    Respiratory:  Positive for shortness of breath.    Cardiovascular: Negative.    Gastrointestinal: Negative.    Genitourinary: Negative.    Musculoskeletal: Negative.    Skin: Negative.    Neurological: Negative.        Objective:   Blood pressure (!) 126/93, pulse 102, temperature 98 °F (36.7 °C), temperature source Oral, resp. rate 20, weight 167 lb (75.8 kg), SpO2 93%, not currently breastfeeding.  Physical Exam  Vitals and nursing note reviewed.   HENT:      Head: Normocephalic and atraumatic.      Nose: Nose normal.      Mouth/Throat:      Mouth: Mucous membranes are moist.   Cardiovascular:      Rate and Rhythm: Normal rate.   Pulmonary:      Effort: Pulmonary effort is normal.      Breath sounds: Normal breath sounds.   Abdominal:      General: Abdomen is flat. Bowel sounds are normal.      Palpations: Abdomen is soft.   Musculoskeletal:         General: Normal range of motion.      Cervical back: Normal range of motion and neck supple.   Skin:     General: Skin is warm and dry.   Neurological:      Mental Status: She is alert and oriented to person, place, and time.   Psychiatric:         Mood and Affect: Mood normal.         Results:   Lab Results   Component Value Date    WBC 6.5 2024    HGB 13.7 2024    HCT 43.2 2024    .0 2024    CREATSERUM 1.08 (H) 2024    BUN 28 (H) 2024     2024    K 4.1 2024     2024    CO2 27.0 2024     (H) 2024    CA 9.2 2024    ALB 3.1 (L) 2023    ALKPHO 125 2023    BILT 0.5 2023    TP 7.4 2023    AST 9 (L) 2023    ALT  12 (L) 02/25/2023    INR 1.0 12/03/2018    TSH 1.749 02/12/2024    MG 1.7 (L) 12/04/2018    PHOS 3.2 09/22/2023    TROP 0.00 12/03/2018    TROPHS 8 02/12/2024    B12 599 03/16/2021       XR CHEST AP PORTABLE  (CPT=71045)    Result Date: 2/12/2024  CONCLUSION:  1. No acute airspace consolidation.  Horizontal linear opacities at the left mid and lower chest and to a lesser degree at the right lung base more likely areas of scarring/atelectasis.  Mild cardiomegaly and normal pulmonary vascularity.    Dictated by (CST): Adolph Coreas MD on 2/12/2024 at 3:03 PM     Finalized by (CST): Adolph Coreas MD on 2/12/2024 at 3:05 PM         EKG 12 Lead    Result Date: 2/12/2024  Atrial tachycardia or atypical flutter. Left axis deviation Abnormal ECG When compared with ECG of 12-FEB-2024 11:39, No significant change Confirmed by MARISA DELGADO ([id number]) on 2/12/2024 4:01:51 PM     Assessment & Plan:         Atrial fibrillation, new onset (HCC)  Cardiology on board, for dual chamber pacemaker today             Controlled type 2 diabetes mellitus with diabetic nephropathy, without long-term current use of insulin (HCC)     Continue with Accu-Cheks and coverage       HTN (hypertension)  Well-controlled continue current treatment          Hypercholesterolemia  Continue home meds       I spoke with son, all questions answered     Fredrick Cornelius MD  2/14/2024

## 2024-02-14 NOTE — PLAN OF CARE
NPO for pacemaker placement. Ambulates with 1x walker. CHG bath provided.  Problem: CARDIOVASCULAR - ADULT  Goal: Maintains optimal cardiac output and hemodynamic stability  Description: INTERVENTIONS:  - Monitor vital signs, rhythm, and trends  - Monitor for bleeding, hypotension and signs of decreased cardiac output  - Evaluate effectiveness of vasoactive medications to optimize hemodynamic stability  - Monitor arterial and/or venous puncture sites for bleeding and/or hematoma  - Assess quality of pulses, skin color and temperature  - Assess for signs of decreased coronary artery perfusion - ex. Angina  - Evaluate fluid balance, assess for edema, trend weights  Outcome: Progressing  Goal: Absence of cardiac arrhythmias or at baseline  Description: INTERVENTIONS:  - Continuous cardiac monitoring, monitor vital signs, obtain 12 lead EKG if indicated  - Evaluate effectiveness of antiarrhythmic and heart rate control medications as ordered  - Initiate emergency measures for life threatening arrhythmias  - Monitor electrolytes and administer replacement therapy as ordered  Outcome: Progressing     Problem: METABOLIC/FLUID AND ELECTROLYTES - ADULT  Goal: Glucose maintained within prescribed range  Description: INTERVENTIONS:  - Monitor Blood Glucose as ordered  - Assess for signs and symptoms of hyperglycemia and hypoglycemia  - Administer ordered medications to maintain glucose within target range  - Assess barriers to adequate nutritional intake and initiate nutrition consult as needed  - Instruct patient on self management of diabetes  Outcome: Progressing  Goal: Electrolytes maintained within normal limits  Description: INTERVENTIONS:  - Monitor labs and rhythm and assess patient for signs and symptoms of electrolyte imbalances  - Administer electrolyte replacement as ordered  - Monitor response to electrolyte replacements, including rhythm and repeat lab results as appropriate  - Fluid restriction as ordered  -  Instruct patient on fluid and nutrition restrictions as appropriate  Outcome: Progressing

## 2024-02-14 NOTE — PLAN OF CARE
Patient had pacemaker inserted. VSS stable throughout shift. IV antibiotics given. Plan for chest Xray tomorrow. Call light within reach and fall precautions in place.    Problem: Patient Centered Care  Goal: Patient preferences are identified and integrated in the patient's plan of care  Description: Interventions:  - What would you like us to know as we care for you? From home with son  - Provide timely, complete, and accurate information to patient/family  - Incorporate patient and family knowledge, values, beliefs, and cultural backgrounds into the planning and delivery of care  - Encourage patient/family to participate in care and decision-making at the level they choose  - Honor patient and family perspectives and choices  Outcome: Progressing     Problem: Patient/Family Goals  Goal: Patient/Family Long Term Goal  Description: Patient's Long Term Goal: To go home    Interventions:  - Follow medical regimen  - See additional Care Plan goals for specific interventions  Outcome: Progressing  Goal: Patient/Family Short Term Goal  Description: Patient's Short Term Goal: To not be in AFIB    Interventions:   - Follow medical regimen  - See additional Care Plan goals for specific interventions  Outcome: Progressing     Problem: CARDIOVASCULAR - ADULT  Goal: Maintains optimal cardiac output and hemodynamic stability  Description: INTERVENTIONS:  - Monitor vital signs, rhythm, and trends  - Monitor for bleeding, hypotension and signs of decreased cardiac output  - Evaluate effectiveness of vasoactive medications to optimize hemodynamic stability  - Monitor arterial and/or venous puncture sites for bleeding and/or hematoma  - Assess quality of pulses, skin color and temperature  - Assess for signs of decreased coronary artery perfusion - ex. Angina  - Evaluate fluid balance, assess for edema, trend weights  Outcome: Progressing  Goal: Absence of cardiac arrhythmias or at baseline  Description: INTERVENTIONS:  -  Continuous cardiac monitoring, monitor vital signs, obtain 12 lead EKG if indicated  - Evaluate effectiveness of antiarrhythmic and heart rate control medications as ordered  - Initiate emergency measures for life threatening arrhythmias  - Monitor electrolytes and administer replacement therapy as ordered  Outcome: Progressing     Problem: METABOLIC/FLUID AND ELECTROLYTES - ADULT  Goal: Glucose maintained within prescribed range  Description: INTERVENTIONS:  - Monitor Blood Glucose as ordered  - Assess for signs and symptoms of hyperglycemia and hypoglycemia  - Administer ordered medications to maintain glucose within target range  - Assess barriers to adequate nutritional intake and initiate nutrition consult as needed  - Instruct patient on self management of diabetes  Outcome: Progressing  Goal: Electrolytes maintained within normal limits  Description: INTERVENTIONS:  - Monitor labs and rhythm and assess patient for signs and symptoms of electrolyte imbalances  - Administer electrolyte replacement as ordered  - Monitor response to electrolyte replacements, including rhythm and repeat lab results as appropriate  - Fluid restriction as ordered  - Instruct patient on fluid and nutrition restrictions as appropriate  Outcome: Progressing     Problem: HEMATOLOGIC - ADULT  Goal: Maintains hematologic stability  Description: INTERVENTIONS  - Assess for signs and symptoms of bleeding or hemorrhage  - Monitor labs and vital signs for trends  - Administer supportive blood products/factors, fluids and medications as ordered and appropriate  - Administer supportive blood products/factors as ordered and appropriate  Outcome: Progressing  Goal: Free from bleeding injury  Description: (Example usage: patient with low platelets)  INTERVENTIONS:  - Avoid intramuscular injections, enemas and rectal medication administration  - Ensure safe mobilization of patient  - Hold pressure on venipuncture sites to achieve adequate  hemostasis  - Assess for signs and symptoms of internal bleeding  - Monitor lab trends  - Patient is to report abnormal signs of bleeding to staff  - Avoid use of toothpicks and dental floss  - Use electric shaver for shaving  - Use soft bristle tooth brush  - Limit straining and forceful nose blowing  Outcome: Progressing

## 2024-02-14 NOTE — OPERATIVE REPORT
[facility]    Cardiac Electrophysiology Operative Note    Isabel Patel Location:Cath Lab Suites   [facility] [id number] MRN [id number]   Admission Date 2/12/2024 Procedure Date 2/14/2024   Attending Physician Fredrick Cornelius MD Procedure Physician Shamika Manjarrez MD       Preprocedure Diagnosis:  Atrial fibrillation with tachycardia-bradycardia syndrome, refractory to medical therapy     Postprocedure Diagnosis:  Atrial fibrillation with tachycardia-bradycardia syndrome, refractory to medical therapy    Procedures:    - Implantation of a MRI-conditional dual-chamber permanent pacemaker, right atrial lead, and right ventricular lead ([employer])  - Fluoroscopy   - Venography   - Moderate conscious sedation     :  Shamika Manjarrez M.D.      Anesthesia:  I provided moderate conscious sedation from 11:50 to 12:10.  Versed, fentanyl.     Estimated blood loss:  10 mL      Complications:  None apparent.      Findings:  The patient was brought to the operating room in the postabsorptive and stable state. A procedural pause was performed to confirm the patient's identity and the site and type of surgery. Intravenous conscious sedation was administered. The chest was prepped and draped in a sterile fashion. 1% lidocaine was administered in the left chest, and an incision was made medial to the deltopectoral groove. Electrocautery was used to create a small inferomedially directed prepectoral pocket. The left axillary vein was accessed using venography, fluoroscopy, and the modified Seldinger technique. Two J-tipped guide wires were advanced into the inferior vena cava. Over the first wire, a 6-Korean sheath was advanced into the axillary vein, and through this the right ventricular lead was advanced to the right ventricular septum, confirmed in the NEREYDA and RAMIREZ fluoroscopic perspectives. Lead stability and electrical parameters were satisfactory, no extracardiac stimulation at maximum output, and the  lead was secured to the pectoralis muscle using 3 separate ties of 0-ethibond suture and the collar. The next wire was used to advance a 6-Romanian sheath into the axillary vein, through which the right atrial lead was advanced to the right atrial appendage. Lead stability and electrical parameters were satisfactory, with no extracardiac stimulation at maximum output, and the lead was secured to the pectoralis muscle using 3 separate ties of 0-ethibond suture and the collar.The generator was connected to the leads, with visual inspection and gentle tugging confirming a secure connection. The device pocket was irrigated with sterile solution and hemostasis was excellent. The generator was placed within the pocket with the leads coiled behind it. The incision was closed with deep and intermediate layers of 2-0 Vicryl suture, the subcuticular layer was approximated with Steri-strips, and a sterile bandage placed over the site. The pacemaker was interrogated via the external , with satisfactory findings. The patient was transported to the recovery area in comfortable and stable condition.    Device Settings:   DDDR  ppm         RESULTS:   - Implantation of a MRI-conditional dual-chamber pacemaker      PLAN:   - CXR and device interrogation in AM  - No heparin, lovenox, or other anticoagulants for 48 hours.   - Incision care as directed.  Keep dry for 5 days.  - Follow-up in [facility] for incision check in 7-10 days.  - Arm restrictions, with sling at night for 1 month.     Shamika Manjarrez M.D.   Cardiac Electrophysiology     2/14/2024  -----------------------------------------

## 2024-02-15 ENCOUNTER — APPOINTMENT (OUTPATIENT)
Dept: GENERAL RADIOLOGY | Facility: HOSPITAL | Age: 89
End: 2024-02-15
Attending: INTERNAL MEDICINE
Payer: MEDICARE

## 2024-02-15 PROBLEM — Z95.0 S/P PLACEMENT OF CARDIAC PACEMAKER: Status: ACTIVE | Noted: 2024-02-15

## 2024-02-15 LAB
ANION GAP SERPL CALC-SCNC: 3 MMOL/L (ref 0–18)
BASOPHILS # BLD AUTO: 0.04 X10(3) UL (ref 0–0.2)
BASOPHILS NFR BLD AUTO: 0.4 %
BUN BLD-MCNC: 28 MG/DL (ref 9–23)
BUN/CREAT SERPL: 26.4 (ref 10–20)
CALCIUM BLD-MCNC: 9 MG/DL (ref 8.7–10.4)
CHLORIDE SERPL-SCNC: 108 MMOL/L (ref 98–112)
CO2 SERPL-SCNC: 28 MMOL/L (ref 21–32)
CREAT BLD-MCNC: 1.06 MG/DL
DEPRECATED RDW RBC AUTO: 46.5 FL (ref 35.1–46.3)
EGFRCR SERPLBLD CKD-EPI 2021: 49 ML/MIN/1.73M2 (ref 60–?)
EOSINOPHIL # BLD AUTO: 0.11 X10(3) UL (ref 0–0.7)
EOSINOPHIL NFR BLD AUTO: 1.2 %
ERYTHROCYTE [DISTWIDTH] IN BLOOD BY AUTOMATED COUNT: 14.5 % (ref 11–15)
GLUCOSE BLD-MCNC: 119 MG/DL (ref 70–99)
GLUCOSE BLDC GLUCOMTR-MCNC: 118 MG/DL (ref 70–99)
GLUCOSE BLDC GLUCOMTR-MCNC: 152 MG/DL (ref 70–99)
GLUCOSE BLDC GLUCOMTR-MCNC: 167 MG/DL (ref 70–99)
GLUCOSE BLDC GLUCOMTR-MCNC: 198 MG/DL (ref 70–99)
HCT VFR BLD AUTO: 42.5 %
HGB BLD-MCNC: 13.9 G/DL
IMM GRANULOCYTES # BLD AUTO: 0.03 X10(3) UL (ref 0–1)
IMM GRANULOCYTES NFR BLD: 0.3 %
LYMPHOCYTES # BLD AUTO: 1.34 X10(3) UL (ref 1–4)
LYMPHOCYTES NFR BLD AUTO: 15 %
MCH RBC QN AUTO: 29.3 PG (ref 26–34)
MCHC RBC AUTO-ENTMCNC: 32.7 G/DL (ref 31–37)
MCV RBC AUTO: 89.7 FL
MONOCYTES # BLD AUTO: 0.77 X10(3) UL (ref 0.1–1)
MONOCYTES NFR BLD AUTO: 8.6 %
NEUTROPHILS # BLD AUTO: 6.64 X10 (3) UL (ref 1.5–7.7)
NEUTROPHILS # BLD AUTO: 6.64 X10(3) UL (ref 1.5–7.7)
NEUTROPHILS NFR BLD AUTO: 74.5 %
OSMOLALITY SERPL CALC.SUM OF ELEC: 295 MOSM/KG (ref 275–295)
PLATELET # BLD AUTO: 237 10(3)UL (ref 150–450)
POTASSIUM SERPL-SCNC: 4.2 MMOL/L (ref 3.5–5.1)
RBC # BLD AUTO: 4.74 X10(6)UL
SODIUM SERPL-SCNC: 139 MMOL/L (ref 136–145)
WBC # BLD AUTO: 8.9 X10(3) UL (ref 4–11)

## 2024-02-15 PROCEDURE — 99232 SBSQ HOSP IP/OBS MODERATE 35: CPT | Performed by: INTERNAL MEDICINE

## 2024-02-15 PROCEDURE — 71046 X-RAY EXAM CHEST 2 VIEWS: CPT | Performed by: INTERNAL MEDICINE

## 2024-02-15 RX ORDER — ATENOLOL 25 MG/1
50 TABLET ORAL DAILY
Status: DISCONTINUED | OUTPATIENT
Start: 2024-02-16 | End: 2024-02-18

## 2024-02-15 RX ORDER — FUROSEMIDE 10 MG/ML
40 INJECTION INTRAMUSCULAR; INTRAVENOUS ONCE
Status: COMPLETED | OUTPATIENT
Start: 2024-02-15 | End: 2024-02-15

## 2024-02-15 RX ORDER — ATENOLOL 25 MG/1
100 TABLET ORAL DAILY
Status: DISCONTINUED | OUTPATIENT
Start: 2024-02-16 | End: 2024-02-15

## 2024-02-15 NOTE — PHYSICAL THERAPY NOTE
PHYSICAL THERAPY EVALUATION - INPATIENT     Room Number: 323/323-A  Evaluation Date: 2/15/2024  Type of Evaluation: Initial   Physician Order: PT Eval and Treat    Presenting Problem: Afib with RVR S/P pacemaker implantation 2/14/24  Co-Morbidities : HTN, OA, diabetes, R TKA 2012  Reason for Therapy: Mobility Dysfunction and Discharge Planning    PHYSICAL THERAPY ASSESSMENT   Patient is a 89 year old female admitted 2/12/2024 for Afib with RVR S/P pacemaker implantation 2/14/24.  Prior to admission, patient's baseline is Garth using a rolling walker.  Patient is currently functioning below baseline with bed mobility, transfers, gait, and stair negotiation.  Patient is requiring minimal assist and moderate assist as a result of the following impairments: decreased endurance/aerobic capacity, impaired standing dynamic balance, medical status, limited LUE ROM, increased O2 needs from baseline, and limited usage of RUE (first day after pacemaker sling 24/7, following that likely only at night) .  Physical Therapy will continue to follow for duration of hospitalization.    Patient will benefit from continued skilled PT Services to promote return to prior level of function and safety with continuous assistance and gradual rehabilitative therapy , pending progress when LUE sling removed during day patient demonstrates potential to return home with home-health PT and assist from supportive son.    PLAN  PT Treatment Plan: Bed mobility;Body mechanics;Endurance;Energy conservation;Family education;Patient education;Gait training;Strengthening;Stoop training;Transfer training;Balance training  Rehab Potential : Good  Frequency (Obs): 5x/week    PHYSICAL THERAPY MEDICAL/SOCIAL HISTORY     Problem List  Principal Problem:    Atrial fibrillation, new onset (HCC)  Active Problems:    Controlled type 2 diabetes mellitus with diabetic nephropathy, without long-term current use of insulin (HCC)    HTN (hypertension)     Hypercholesterolemia    S/P placement of cardiac pacemaker      HOME SITUATION  Home Situation  Type of Home: House  Home Layout: Two level;Able to live on main level  Stairs to Enter : 1  Railing: Yes (stoop, uses walker)  Lives With: Son  Drives: No  Patient Regularly Uses: Glasses     Prior Level of [place]: Garth using manuela rolling walker     SUBJECTIVE  \"Hold on to me, I feel like I'm going to fall.\"    PHYSICAL THERAPY EXAMINATION   OBJECTIVE  Precautions: Cardiac;Limb alert - left;Hard of hearing (pacemaker precautions)  Fall Risk: High fall risk    WEIGHT BEARING RESTRICTION  Weight Bearing Restriction: L upper extremity     L Upper Extremity: Other (Comment) (pacemaker, sling first 24 hours AAT then PMs 1 month)          PAIN ASSESSMENT  Ratin  Location: denies       COGNITION  Overall Cognitive Status:  WFL - within functional limits    RANGE OF MOTION AND STRENGTH ASSESSMENT  Upper extremity ROM and strength are within functional limits  RUE, LUE not assessed 2/2 precautions   Lower extremity ROM is within functional limits  BLEs  Lower extremity strength is within functional limits  BLEs    BALANCE  Static Sitting: Good  Dynamic Sitting: Good  Static Standing: Fair +  Dynamic Standing: Fair -    ACTIVITY TOLERANCE  Pulse: 77 (pre, 87 post)  Heart Rate Source: Monitor     BP: 124/64 (sitting pre, 124/63  semifowlers post)  BP Location: Right arm  BP Method: Automatic  Patient Position: Sitting    O2 WALK  Oxygen Therapy  SPO2% on Oxygen at Rest: 95  At rest oxygen flow (liters per minute): 2  SPO2% Ambulation on Oxygen: 94  Ambulation oxygen flow (liters per minute): 2    AM-PAC '6-Clicks' INPATIENT SHORT FORM - BASIC MOBILITY  How much difficulty does the patient currently have...  Patient Difficulty: Turning over in bed (including adjusting bedclothes, sheets and blankets)?: A Little   Patient Difficulty: Sitting down on and standing up from a chair with arms (e.g., wheelchair, bedside  commode, etc.): A Little   Patient Difficulty: Moving from lying on back to sitting on the side of the bed?: A Lot   How much help from another person does the patient currently need...   Help from Another: Moving to and from a bed to a chair (including a wheelchair)?: A Lot   Help from Another: Need to walk in hospital room?: A Lot   Help from Another: Climbing 3-5 steps with a railing?: A Lot     AM-PAC Score:  Raw Score: 14   Approx Degree of Impairment: 61.29%   Standardized Score (AM-PAC Scale): 38.1   CMS Modifier (G-Code): CL    FUNCTIONAL ABILITY STATUS  Functional Mobility/Gait Assessment  Gait Assistance: Moderate assistance;Minimum assistance  Distance (ft): 20'+6'  Assistive Device: Rolling walker (patient-owned manuela walker)  Pattern: L Foot drag;L Decreased stance time (L antalgic gait, min-modA for managing L side of walker, upright posture, and assisting with weight shift to allow LLE advancement)  Rolling: minimal assist  Sit to Supine: maximum assist - at BLEs and trunk  Sit to Stand: minimal assist - from bedside chair, toilet, cues for sequencing and anterior weight shift    Exercise/Education Provided:  Bed mobility  Body mechanics  Energy conservation  Functional activity tolerated  Gait training  Posture  ROM  Transfer training    ADDITIONAL SESSION DETAIL    Pt hard of hearing, seems better in L ear. Pt's son present, both educated on pacemaker precautions (given printed handout). RN reporting patient will be in LUE sling first 24 hours post-op, then just overnight for 1 month. Pt limited at this time 2/2 NWB LUE in sling causing L trunk lean forwards and difficulty managing walker, will likely progress once able to lightly use LUE. Pt reporting history of remote LLE fx and nail, reporting this leg is often more sore.    Patient received seated in bedside chair, agreeable to physical therapy evaluation. Vital signs monitored as noted above, no adverse symptoms and patient stable during  session on 2L. Education with patient and patient's son provided on Pacemaker precautions, Physical therapy plan of care, and physiological benefits of out of bed mobility. Next session anticipate to progress transfers and gait.    Patient history and/or personal factors that may impact the plan of care include co-morbidities (HTN, OA, diabetes, R TKA 2012) affecting activity tolerance, endurance, medical status . Based on the physical therapy examination of the noted systems and functional activity/participation limitations, the patient presentation is evolving given the patient demonstrates worsening of previously stable condition and demonstrates worsening of co-morbidities.    The patient's Approx Degree of Impairment: 61.29% has been calculated based on documentation in the [facility] '6 clicks' Inpatient Basic Mobility Short Form.  Research supports that patients with this level of impairment may benefit from LYNN or IRF, however pending progress once LUE sling is removed patient demonstrates potential to return home with home-health PT and assist from supportive son.  Final disposition will be made by interdisciplinary medical team.    Patient End of Session: In bed;Needs met;Call light within reach;RN aware of session/findings;All patient questions and concerns addressed;Alarm set;Family present    CURRENT GOALS  Goals to be met by: 2/28/24  Patient Goal Patient's self-stated goal is: return home safely   Goal #1 Patient is able to demonstrate supine - sit EOB @ level: minimum assistance     Goal #1   Current Status    Goal #2 Patient is able to demonstrate transfers EOB to/from C at assistance level: supervision with walker - rolling     Goal #2  Current Status    Goal #3 Patient is able to ambulate 50 feet with assist device: walker - rolling at assistance level: supervision   Goal #3   Current Status    Goal #4 Patient will negotiate 1 stairs/one curb w/ assistive device and supervision   Goal #4   Current  Status    Goal #5 Patient to demonstrate independence with home activity/exercise instructions provided to patient in preparation for discharge.   Goal #5   Current Status    Goal #6    Goal #6  Current Status      Patient Evaluation Complexity Level:  History Moderate - 1 or 2 personal factors and/or co-morbidities   Examination of body systems Moderate - addressing a total of 3 or more elements   Clinical Presentation  Moderate - Evolving   Clinical Decision Making  Moderate Complexity     Gait Trainin minutes  Therapeutic Activity:  34 minutes      Gauri Alcaraz, PT, DPT  [facility]  [id number]

## 2024-02-15 NOTE — PROGRESS NOTES
[facility]    Progress Note    Isabel Patel Patient Status:  Inpatient    1931 MRN [id number]   Location [facility] 3W/SW Attending Fredrick Cornelius MD   Hosp Day # 3 PCP Fredrick Cornelius MD     Chief Complaint:     Subjective:     Constitutional:  Positive for fatigue.   HENT: Negative.     Eyes: Negative.    Cardiovascular: Negative.    Gastrointestinal: Negative.    Genitourinary: Negative.    Musculoskeletal: Negative.         Left arm on a sling    Skin: Negative.    Neurological: Negative.        Objective:   Blood pressure 112/79, pulse 97, temperature 98.1 °F (36.7 °C), temperature source Axillary, resp. rate 18, weight 169 lb 4.8 oz (76.8 kg), SpO2 92%, not currently breastfeeding.  Physical Exam  Vitals and nursing note reviewed.   HENT:      Head: Normocephalic and atraumatic.      Nose: Nose normal.      Mouth/Throat:      Mouth: Mucous membranes are moist.   Cardiovascular:      Rate and Rhythm: Normal rate.   Pulmonary:      Effort: Pulmonary effort is normal.      Breath sounds: Normal breath sounds.   Abdominal:      General: Abdomen is flat. Bowel sounds are normal.      Palpations: Abdomen is soft.   Musculoskeletal:         General: Normal range of motion.      Cervical back: Normal range of motion and neck supple.      Comments: Left arm on a sling     Pacemaker wound ok, bandaged    Skin:     General: Skin is warm and dry.   Neurological:      Mental Status: She is alert and oriented to person, place, and time.   Psychiatric:         Mood and Affect: Mood normal.         Results:   Lab Results   Component Value Date    WBC 8.9 02/15/2024    HGB 13.9 02/15/2024    HCT 42.5 02/15/2024    .0 02/15/2024    CREATSERUM 1.06 (H) 02/15/2024    BUN 28 (H) 02/15/2024     02/15/2024    K 4.2 02/15/2024     02/15/2024    CO2 28.0 02/15/2024     (H) 02/15/2024    CA 9.0 02/15/2024    ALB 3.1 (L) 2023    ALKPHO 125 2023    BILT 0.5  02/25/2023    TP 7.4 02/25/2023    AST 9 (L) 02/25/2023    ALT 12 (L) 02/25/2023    INR 1.0 12/03/2018    TSH 1.749 02/12/2024    MG 1.7 (L) 12/04/2018    PHOS 3.2 09/22/2023    TROP 0.00 12/03/2018    TROPHS 8 02/12/2024    B12 599 03/16/2021       No results found.        Assessment & Plan:       Atrial fibrillation, new onset (HCC)  Cardiology on board, fs/p  dual chamber pacemaker placed     Arm on a sling             Controlled type 2 diabetes mellitus with diabetic nephropathy, without long-term current use of insulin (HCC)     Continue with Accu-Cheks and coverage       HTN (hypertension)  Well-controlled continue current treatment          Hypercholesterolemia  Continue home meds        I spoke with son at bedside, all questions answered     Will get sw and pt to evaluate pt for  safe dc    Pt and family want her to go home    2 sons take care of her        Fredrick Cornelius MD  2/15/2024

## 2024-02-15 NOTE — PROGRESS NOTES
Progress Note  Isabel Patel Patient Status:  Inpatient    1931 MRN [id number]   Location [facility] 3W/SW Attending Fredrick Cornelius MD   Hosp Day # 3 PCP Fredrick Cornelius MD     Subjective:  S/p DC-PPM insertion  ([employer])  PO CXR pending  Feels sob, on 2L O2, not on home O2 at baseline    Objective:  /79 (BP Location: Right arm)   Pulse 97   Temp 98.1 °F (36.7 °C) (Axillary)   Resp 18   Wt 169 lb 4.8 oz (76.8 kg)   SpO2 92%   BMI 31.99 kg/m²     Telemetry: AF, rates mostly controlled, occasional RVR 120s    Intake/Output:    Intake/Output Summary (Last 24 hours) at 2/15/2024 1021  Last data filed at 2/15/2024 0323  Gross per 24 hour   Intake 340 ml   Output 900 ml   Net -560 ml     Last 3 Weights   02/15/24 0632 169 lb 4.8 oz (76.8 kg)   24 0615 167 lb (75.8 kg)   24 0449 165 lb 12.8 oz (75.2 kg)   24 1615 164 lb 14.4 oz (74.8 kg)   24 1229 163 lb (73.9 kg)   24 1122 163 lb (73.9 kg)   24 1123 163 lb (73.9 kg)     Labs:  Recent Labs   Lab 24  1023 24  0515 02/15/24  0642   * 117* 119*   BUN 25* 28* 28*   CREATSERUM 1.01 1.08* 1.06*   EGFRCR 52* 48* 49*   CA 9.0 9.2 9.0    140 139   K 4.4 4.1 4.2    108 108   CO2 27.0 27.0 28.0     Recent Labs   Lab 24  1346 24  0515 02/15/24  0642   RBC 4.86 4.68 4.74   HGB 14.8 13.7 13.9   HCT 43.5 43.2 42.5   MCV 89.5 92.3 89.7   MCH 30.5 29.3 29.3   MCHC 34.0 31.7 32.7   RDW 14.6 14.6 14.5   NEPRELIM 5.35 3.90 6.64   WBC 7.9 6.5 8.9   .0 255.0 237.0     Recent Labs   Lab 24  1346   TROPHS 8     Lab Results   Component Value Date/Time    HDL 64 (H) 2023 08:25 AM    LDL 83 2023 08:25 AM    TRIG 83 2023 08:25 AM     No results found for: \"DDIMER\"  Lab Results   Component Value Date    TSH 1.749 2024     Review of Systems:   Constitutional: No fevers, chills, fatigue or night sweats.  ENT: No mouth pain, neck pain, running  nose, headaches or swollen glands.  Skin: No rashes, pruritus or skin changes,  Respiratory: Denies cough, wheezing or shortness of breath.  CV: Denies chest pain, palpitations, orthopnea, PND or dizziness.  Musculoskeletal: No joint pain, stiffness or swelling.  GI: No nausea, vomiting or diarrhea. No blood in stools.  Neurologic: No seizures, tremors, weakness or numbness.     Physical Exam:  General: Alert, cooperative, no distress, appears stated age.  Neck: Supple, symmetrical, trachea midline, no adenopathy, thyroid: no enlargment/tenderness/nodules, no carotid bruit and no JVD.  Lungs: Clear to auscultation bilaterally.  Chest wall: No tenderness or deformity.  Heart: Regular rate and rhythm, S1, S2 normal, no murmur, click, rub or gallop.  Abdomen: Soft, non-tender. Bowel sounds normal. No masses,  No organomegaly.  Extremities: Extremities normal, atraumatic, no cyanosis, mild BLE edema.  Pulses: 2+ and symmetric all extremities.  Neurologic: Grossly intact.    Medications:   atenolol  50 mg Oral Daily    atorvastatin  20 mg Oral Daily    losartan  50 mg Oral Daily    dilTIAZem HCl ER Coated Beads  360 mg Oral Daily    insulin aspart  1-5 Units Subcutaneous TID CC     Assessment:    Tachy-Rebel Syndrome  Came in with AFRVR, had episodes of HR dropping into 30s  -now s/p DC-PPM insertion  -PO device check stable  -PO CXR PA/Lat pending    Atrial fibrillation with RVR  Admitted with AFRVR, started on eliquis in ER  -plan for rate control until 1 month of uninterrupted OAC, then will schedule outpatient DCCV  -diltiazem, atenolol  -HR mostly 90s, occasional 120s  -CHADSVASc= 8 (age x 2, female, htn, chf, TIA x2, DM)    HFpEF  LVEF 60-65%, no RWMA, mild aortic stenosis, mild aortic regurgitation  -appears volume up on exam, feels sob  -BB    Valvular dysfunction  Mild Aortic regurgitation on echo, mild-moderate tricuspid regurgitation, PASP 60mmHg  -mean gradient 5mmHg    Pulmonary Hypertension   PASP  60mmHg    HTN  Normotensive on atenolol, diltiazem, losartan-diastolic intermittently elevated  -can increase atenolol for better BP control if elevated    HLD  LDL 83 last year in February, not since checked  -statin    DM2  A1c 6.7    Hx TIA  Statin, eliquis    CKD-Cr stable 1.06    Hx breast ca  S/p right breast lumpectomy, no hx chemo or radiation    Plan:  -PA/Lat CXR  -Continue to hold eliquis for 48 hour post-op, okay to resume on 2/16 with AM dose  -Continue atenolol, diltiazem 360mg daily, losartan 50mg daily  -Give one time dose of IV lasix 40mg IVP, monitor strict I/Os, daily weights, daily BMP  -Continue statin  -No reaching overhead or behind for 3 months, wear sling at night for one month, keep incision dry for 5 days, plan for outpatient f/u in device clinic in 7-10 days, f/u with Dr. Haney in 4 weeks      Plan of care discussed with patient, RN.      Liz Trevizo, APRN  2/15/2024  10:21 AM  [phone] [place]  [phone] [place]

## 2024-02-15 NOTE — CM/SW NOTE
02/15/24 1000   CM/SW Referral Data   Referral Source Social Work (self-referral)   Reason for Referral Discharge planning   Informant Patient;Son   Medical Hx   Does patient have an established PCP? Yes  (Fredrick Cornelius)   Patient Info   Patient's Current Mental Status at Time of Assessment Alert;Oriented   Patient Communication Issues Hearing impairment   Patient's Home Environment House   Number of Levels in Home 2   Number of Stair in Home Stays on first floor   Patient lives with Son   Patient Status Prior to Admission   Independent with ADLs and Mobility No   Pt. requires assistance with Ambulating;Driving;Bathing  (Walker, shower chair, grab bars)   Discharge Needs   Anticipated D/C needs To be determined       SW self-referral for DC planning. MARY intern met with pt, son Haja at bedside. Pt is home with son. Pt ambulates with a walker. Has grab bars and a shower chair at home. Pt is not interested in HH or other services at this time.     Plan: Return home, pt declining HH    [place], MARY Intern, MSW candidate.

## 2024-02-15 NOTE — CM/SW NOTE
Per chart, OT note indicates pt may benefit from short term rehab. Pt's family prefers home w/ assistance from sons.    SW sent tentative [facility] and HH referrals in [place]. F2F entered/sent for HH. PASRR completed/uploaded to [facility].    Request to [facility] review submitted and pending determination.      Need for DC:  1. PT note  2. CLRC approval/denial  3. Confirm HH or [facility] w/ pt & family  4. If HH or [facility]: List/choice  5. If home: confirm O2 needs (sats/verb/MDO)      PLAN: TBD, poss HH vs [facility] - pending above & med clear      SW/CM to remain available for support and/or discharge planning.         Latesha Alaniz, MSW, LSW [id number]

## 2024-02-15 NOTE — OCCUPATIONAL THERAPY NOTE
OCCUPATIONAL THERAPY EVALUATION - INPATIENT     Room Number: 323/323-A  Evaluation Date: 2/15/2024  Type of Evaluation: Initial    Presenting problem: S/P Dual Chamber Pacemaker implant, New onset of A-Fib, suspected mild CHF    Co-Morbidities: HTN, HX of falls, [place], DM 2, OA, CKD, HX of TIA       Physician Order: IP Consult to Occupational Therapy  Reason for Therapy: ADL/IADL Dysfunction and Discharge Planning    OCCUPATIONAL THERAPY ASSESSMENT   Patient is a 89 year old female admitted 2/12/2024 for Dual Chamber Pacemaker implant, New onset of A-Fib, suspected mild CHF.  Prior to admission, patient's baseline is mod ind with ADL and mobility.  Patient is currently functioning below baseline with toileting, bathing, upper body dressing, lower body dressing, grooming, sling management, bed mobility, transfers, dynamic sitting balance, static standing balance, dynamic standing balance, functional standing tolerance, energy conservation strategies, and aerobic capacity.  Patient is requiring maximum assist as a result of the following impairments: WB restrictions to L UE, pacemaker precautions, decreased functional strength, decreased functional reach, decreased endurance, increased O2 needs from baseline, decreased insight to deficits, and decreased safety awareness. Occupational Therapy will continue to follow for duration of hospitalization.    Patient will benefit from continued skilled OT Services to promote return to prior level of function and safety with continuous assistance and gradual rehabilitative therapy; However, family's goal is for pt to return home with additional support. Pt resides with 2 adult sons; one of which is at home all the time and is able to assist with ADL and mobility.     PLAN  OT Treatment Plan: Balance activities;Energy conservation/work simplification techniques;ADL training;Functional transfer training;UE strengthening/ROM;Patient/Family education;Patient/Family training;Equipment  eval/education;Compensatory technique education  OT Device Recommendations: TBD    OCCUPATIONAL THERAPY MEDICAL/SOCIAL HISTORY   Problem List  Principal Problem:    Atrial fibrillation, new onset (HCC)  Active Problems:    Controlled type 2 diabetes mellitus with diabetic nephropathy, without long-term current use of insulin (HCC)    HTN (hypertension)    Hypercholesterolemia    S/P placement of cardiac pacemaker    HOME SITUATION  Type of Home: House (1 exterior stair)  Home Layout: Two level (Pt lives on main level)  Lives With: Son (2 sons)  Toilet and Equipment: Comfort height toilet; Grab bar  Shower/Tub and Equipment: Tub-shower combo; Grab bar; Shower chair  Hand Dominance: Right  Drives: No  Patient Regularly Uses: Glasses    Stairs in Home: N/A  Use of Assistive Device(s): R/W    Prior Level of [place]: Per pt's son, pt was mod ind with ADL and mobility.    SUBJECTIVE  \"I broke my left leg\"    OCCUPATIONAL THERAPY EXAMINATION    OBJECTIVE  Precautions: Bed/chair alarm; Limb alert - left; Hard of hearing (Pacemaker)  Fall Risk: High fall risk    PAIN ASSESSMENT     ACTIVITY TOLERANCE  Pulse: 77  Heart Rate Source: Monitor     BP: (!) 136/94 ()  BP Location: Right arm  BP Method: Automatic  Patient Position: Semi-Cooper    O2 SATURATIONS  Oxygen Therapy  SPO2% on Oxygen at Rest: 96  At rest oxygen flow (liters per minute): 2  SPO2% Ambulation on Oxygen: 94 (HR 77 BPM, 107/65, MAP 79)  Ambulation oxygen flow (liters per minute): 2    COGNITION  Overall Cognitive Status:  WFL - within functional limits      RANGE OF MOTION   Upper extremity ROM is within functional limits; Except B shoulders WLR, in addition to pacemaker precautions to L UE, pt's B shoulders are limited at baseline     STRENGTH ASSESSMENT  Upper extremity strength: NT     COORDINATION  Gross Motor: WFL   Fine Motor: WFL     ACTIVITIES OF DAILY LIVING ASSESSMENT  AM-PAC ‘6-Clicks’ Inpatient Daily Activity Short Form  How much help  from another person does the patient currently need…  -   Putting on and taking off regular lower body clothing?: A Lot  -   Bathing (including washing, rinsing, drying)?: A Lot  -   Toileting, which includes using toilet, bedpan or urinal? : A Lot  -   Putting on and taking off regular upper body clothing?: A Lot  -   Taking care of personal grooming such as brushing teeth?: A Little  -   Eating meals?: None    AM-PAC Score:  Score: 15  Approx Degree of Impairment: 56.46%  Standardized Score (AM-PAC Scale): 34.69  CMS Modifier (G-Code): CK    FUNCTIONAL TRANSFER ASSESSMENT  Sit to Stand: Edge of Bed  Edge of Bed: Maximum Assist    BED MOBILITY  Rolling: Not Tested  Supine to Sit : Maximum Assist  Sit to Supine (OT): Not Tested  Scooting: Dependent    BALANCE ASSESSMENT  Static Sitting: Stand-by Assist  Static Standing: Maximum Assist       Skilled Therapy Provided: ADL training, bed mobility, functional transfers, standing balance, standing tolerance, sit to stand to sit transfer, bedroom mobility, pt and son education and training.    EDUCATION PROVIDED  Patient: Role of Occupational Therapy; Plan of Care; Discharge Recommendations; Functional Transfer Techniques; Fall Prevention; Weight Bear Status; Surgical Precautions; Compensatory ADL Techniques; Energy Conservation  Patient's Response to Education: Verbalized Understanding; Requires Further Education  Family/Caregiver: Role of Occupational Therapy; Plan of Care; Discharge Recommendations; Functional Transfer Techniques; Fall Prevention; Weight Bear Status; Surgical Precautions; Posture/Positioning; Energy Conservation; Compensatory ADL Techniques  Family/Caregiver's Response to Education: Verbalized Understanding; Requires Further Education    The patient's Approx Degree of Impairment: 56.46% has been calculated based on documentation in the [facility] '6 clicks' Inpatient Daily Activity Short Form.  Research supports that patients with this level of impairment  may benefit from inpatient rehabilitative services for continued skilled OT and PT intervention; however, pt's son reported family is able to provide necessary care at home. Pt would benefit from  services and 24 hr care.  Final disposition will be made by interdisciplinary medical team.     Patient End of Session: Up in chair;Needs met;Call light within reach;RN aware of session/findings;All patient questions and concerns addressed;Alarm set;Family present    OT Goals  Patients self stated goal is: Return to PLOF     Patient will complete functional transfer with Min A  Comment:     Patient will complete toileting with Min A  Comment:     Patient will tolerate standing for 5 minutes in prep for ADL with Min A   Comment:    Patient will complete UE dressing with Min A  Comment:          Goals  on: 24  Frequency: 5x/week    Patient Evaluation Complexity Level:   Occupational Profile/Medical History HIGH - Extensive review of history including review of medical or therapy records    Specific performance deficits impacting engagement in ADL/IADL HIGH  5+ performance deficits    Client Assessment/Performance Deficits HIGH - Comorbidities and significant modifications of tasks    Clinical Decision Making HIGH - Analysis of occupational profile, comprehensive assessments, multiple treatment options    Overall Complexity HIGH       Self-Care Home Management: 10 minutes  Therapeutic Activity: 37 minutes      Sonja Jennings OTR/L  Occupational Therapy  [facility]

## 2024-02-15 NOTE — PLAN OF CARE
Dressing is clean, dry and intact. Pt received PRN tylenol for pain. Plan is for chest xray.  Problem: Patient Centered Care  Goal: Patient preferences are identified and integrated in the patient's plan of care  Description: Interventions:  - What would you like us to know as we care for you? From home with son  - Provide timely, complete, and accurate information to patient/family  - Incorporate patient and family knowledge, values, beliefs, and cultural backgrounds into the planning and delivery of care  - Encourage patient/family to participate in care and decision-making at the level they choose  - Honor patient and family perspectives and choices  Outcome: Progressing     Problem: CARDIOVASCULAR - ADULT  Goal: Maintains optimal cardiac output and hemodynamic stability  Description: INTERVENTIONS:  - Monitor vital signs, rhythm, and trends  - Monitor for bleeding, hypotension and signs of decreased cardiac output  - Evaluate effectiveness of vasoactive medications to optimize hemodynamic stability  - Monitor arterial and/or venous puncture sites for bleeding and/or hematoma  - Assess quality of pulses, skin color and temperature  - Assess for signs of decreased coronary artery perfusion - ex. Angina  - Evaluate fluid balance, assess for edema, trend weights  Outcome: Progressing  Goal: Absence of cardiac arrhythmias or at baseline  Description: INTERVENTIONS:  - Continuous cardiac monitoring, monitor vital signs, obtain 12 lead EKG if indicated  - Evaluate effectiveness of antiarrhythmic and heart rate control medications as ordered  - Initiate emergency measures for life threatening arrhythmias  - Monitor electrolytes and administer replacement therapy as ordered  Outcome: Progressing     Problem: METABOLIC/FLUID AND ELECTROLYTES - ADULT  Goal: Glucose maintained within prescribed range  Description: INTERVENTIONS:  - Monitor Blood Glucose as ordered  - Assess for signs and symptoms of hyperglycemia and  hypoglycemia  - Administer ordered medications to maintain glucose within target range  - Assess barriers to adequate nutritional intake and initiate nutrition consult as needed  - Instruct patient on self management of diabetes  Outcome: Progressing     Problem: HEMATOLOGIC - ADULT  Goal: Maintains hematologic stability  Description: INTERVENTIONS  - Assess for signs and symptoms of bleeding or hemorrhage  - Monitor labs and vital signs for trends  - Administer supportive blood products/factors, fluids and medications as ordered and appropriate  - Administer supportive blood products/factors as ordered and appropriate  Outcome: Progressing  Goal: Free from bleeding injury  Description: (Example usage: patient with low platelets)  INTERVENTIONS:  - Avoid intramuscular injections, enemas and rectal medication administration  - Ensure safe mobilization of patient  - Hold pressure on venipuncture sites to achieve adequate hemostasis  - Assess for signs and symptoms of internal bleeding  - Monitor lab trends  - Patient is to report abnormal signs of bleeding to staff  - Avoid use of toothpicks and dental floss  - Use electric shaver for shaving  - Use soft bristle tooth brush  - Limit straining and forceful nose blowing  Outcome: Progressing

## 2024-02-15 NOTE — PLAN OF CARE
Patient went down for a chest Xray. IV lasix given. 1Lo2 weaning as tolerated. Call light within reach and fall precautions in place.     Problem: Patient Centered Care  Goal: Patient preferences are identified and integrated in the patient's plan of care  Description: Interventions:  - What would you like us to know as we care for you? From home with son  - Provide timely, complete, and accurate information to patient/family  - Incorporate patient and family knowledge, values, beliefs, and cultural backgrounds into the planning and delivery of care  - Encourage patient/family to participate in care and decision-making at the level they choose  - Honor patient and family perspectives and choices  Outcome: Progressing     Problem: Patient/Family Goals  Goal: Patient/Family Long Term Goal  Description: Patient's Long Term Goal: To go home    Interventions:  - Follow medical regimen  - See additional Care Plan goals for specific interventions  Outcome: Progressing     Problem: CARDIOVASCULAR - ADULT  Goal: Maintains optimal cardiac output and hemodynamic stability  Description: INTERVENTIONS:  - Monitor vital signs, rhythm, and trends  - Monitor for bleeding, hypotension and signs of decreased cardiac output  - Evaluate effectiveness of vasoactive medications to optimize hemodynamic stability  - Monitor arterial and/or venous puncture sites for bleeding and/or hematoma  - Assess quality of pulses, skin color and temperature  - Assess for signs of decreased coronary artery perfusion - ex. Angina  - Evaluate fluid balance, assess for edema, trend weights  Outcome: Progressing  Goal: Absence of cardiac arrhythmias or at baseline  Description: INTERVENTIONS:  - Continuous cardiac monitoring, monitor vital signs, obtain 12 lead EKG if indicated  - Evaluate effectiveness of antiarrhythmic and heart rate control medications as ordered  - Initiate emergency measures for life threatening arrhythmias  - Monitor electrolytes and  administer replacement therapy as ordered  Outcome: Progressing     Problem: METABOLIC/FLUID AND ELECTROLYTES - ADULT  Goal: Glucose maintained within prescribed range  Description: INTERVENTIONS:  - Monitor Blood Glucose as ordered  - Assess for signs and symptoms of hyperglycemia and hypoglycemia  - Administer ordered medications to maintain glucose within target range  - Assess barriers to adequate nutritional intake and initiate nutrition consult as needed  - Instruct patient on self management of diabetes  Outcome: Progressing  Goal: Electrolytes maintained within normal limits  Description: INTERVENTIONS:  - Monitor labs and rhythm and assess patient for signs and symptoms of electrolyte imbalances  - Administer electrolyte replacement as ordered  - Monitor response to electrolyte replacements, including rhythm and repeat lab results as appropriate  - Fluid restriction as ordered  - Instruct patient on fluid and nutrition restrictions as appropriate  Outcome: Progressing     Problem: HEMATOLOGIC - ADULT  Goal: Maintains hematologic stability  Description: INTERVENTIONS  - Assess for signs and symptoms of bleeding or hemorrhage  - Monitor labs and vital signs for trends  - Administer supportive blood products/factors, fluids and medications as ordered and appropriate  - Administer supportive blood products/factors as ordered and appropriate  Outcome: Progressing  Goal: Free from bleeding injury  Description: (Example usage: patient with low platelets)  INTERVENTIONS:  - Avoid intramuscular injections, enemas and rectal medication administration  - Ensure safe mobilization of patient  - Hold pressure on venipuncture sites to achieve adequate hemostasis  - Assess for signs and symptoms of internal bleeding  - Monitor lab trends  - Patient is to report abnormal signs of bleeding to staff  - Avoid use of toothpicks and dental floss  - Use electric shaver for shaving  - Use soft bristle tooth brush  - Limit  straining and forceful nose blowing  Outcome: Progressing

## 2024-02-15 NOTE — CDS QUERY
Congestive Heart Failure  CLINICAL DOCUMENTATION CLARIFICATION FORM  Dear Dr. Cornelius:  Clinical information (provided below) congestive heart failure:  PLEASE (X) ALL DIAGNOSES THAT APPLY.  SELECTION BY PROVIDER ONLY    ( x ) Acute on Chronic Diastolic Heart Failure     (  ) Chronic Diastolic Heart Failure    (  ) Other (please specify):       CLINICAL INFORMATION FROM THE MEDICAL RECORD  Clinical Indicators:  2/15 Progress notes: HFpEF LVEF 60-65%  ECHO EF 60-65%  Shortness of breath/dyspnea  Palpitations    Risk Factors:  Mild aortic stenosis  Mild aortic regurgitation  Hypertension      Treatments:  Cardiology Consult  Chest xray  Echo  Oxygen  Daily labs  Dose of Lasix    If you have any questions, please contact Clinical : Sandee Quick RN  at [email or web address]    Thank You!    THIS FORM IS A PERMANENT PART OF THE MEDICAL RECORD

## 2024-02-16 LAB
ANION GAP SERPL CALC-SCNC: 6 MMOL/L (ref 0–18)
BASOPHILS # BLD AUTO: 0.03 X10(3) UL (ref 0–0.2)
BASOPHILS NFR BLD AUTO: 0.3 %
BUN BLD-MCNC: 33 MG/DL (ref 9–23)
BUN/CREAT SERPL: 28.9 (ref 10–20)
CALCIUM BLD-MCNC: 9.5 MG/DL (ref 8.7–10.4)
CHLORIDE SERPL-SCNC: 103 MMOL/L (ref 98–112)
CO2 SERPL-SCNC: 30 MMOL/L (ref 21–32)
CREAT BLD-MCNC: 1.14 MG/DL
D DIMER PPP FEU-MCNC: 2.97 UG/ML FEU (ref ?–0.92)
DEPRECATED RDW RBC AUTO: 46.5 FL (ref 35.1–46.3)
EGFRCR SERPLBLD CKD-EPI 2021: 45 ML/MIN/1.73M2 (ref 60–?)
EOSINOPHIL # BLD AUTO: 0.12 X10(3) UL (ref 0–0.7)
EOSINOPHIL NFR BLD AUTO: 1.3 %
ERYTHROCYTE [DISTWIDTH] IN BLOOD BY AUTOMATED COUNT: 14.6 % (ref 11–15)
GLUCOSE BLD-MCNC: 127 MG/DL (ref 70–99)
GLUCOSE BLDC GLUCOMTR-MCNC: 131 MG/DL (ref 70–99)
GLUCOSE BLDC GLUCOMTR-MCNC: 150 MG/DL (ref 70–99)
GLUCOSE BLDC GLUCOMTR-MCNC: 167 MG/DL (ref 70–99)
GLUCOSE BLDC GLUCOMTR-MCNC: 191 MG/DL (ref 70–99)
HCT VFR BLD AUTO: 44.1 %
HGB BLD-MCNC: 14.4 G/DL
IMM GRANULOCYTES # BLD AUTO: 0.02 X10(3) UL (ref 0–1)
IMM GRANULOCYTES NFR BLD: 0.2 %
LYMPHOCYTES # BLD AUTO: 1.82 X10(3) UL (ref 1–4)
LYMPHOCYTES NFR BLD AUTO: 19.4 %
MCH RBC QN AUTO: 29 PG (ref 26–34)
MCHC RBC AUTO-ENTMCNC: 32.7 G/DL (ref 31–37)
MCV RBC AUTO: 88.9 FL
MONOCYTES # BLD AUTO: 0.82 X10(3) UL (ref 0.1–1)
MONOCYTES NFR BLD AUTO: 8.8 %
NEUTROPHILS # BLD AUTO: 6.56 X10 (3) UL (ref 1.5–7.7)
NEUTROPHILS # BLD AUTO: 6.56 X10(3) UL (ref 1.5–7.7)
NEUTROPHILS NFR BLD AUTO: 70 %
OSMOLALITY SERPL CALC.SUM OF ELEC: 297 MOSM/KG (ref 275–295)
PLATELET # BLD AUTO: 258 10(3)UL (ref 150–450)
POTASSIUM SERPL-SCNC: 4.2 MMOL/L (ref 3.5–5.1)
RBC # BLD AUTO: 4.96 X10(6)UL
SODIUM SERPL-SCNC: 139 MMOL/L (ref 136–145)
WBC # BLD AUTO: 9.4 X10(3) UL (ref 4–11)

## 2024-02-16 PROCEDURE — 99232 SBSQ HOSP IP/OBS MODERATE 35: CPT | Performed by: INTERNAL MEDICINE

## 2024-02-16 PROCEDURE — 99223 1ST HOSP IP/OBS HIGH 75: CPT | Performed by: INTERNAL MEDICINE

## 2024-02-16 RX ORDER — SODIUM CHLORIDE 9 MG/ML
INJECTION, SOLUTION INTRAVENOUS CONTINUOUS
Status: DISCONTINUED | OUTPATIENT
Start: 2024-02-16 | End: 2024-02-17

## 2024-02-16 NOTE — CM/SW NOTE
09:20AM  Per RN rounds, pt's sons expressing wish to bring pt home at DC as one of them is always w/ her.    SW met w/ pt and pt's son Haja at bedside for further DC Planning discussion. HH discussed and list of quality data provided for review.    Pt has hx w/ HH approx 3 yrs ago. Son can not recall name of agency.    Pt remains on 1.5L O2 at this time w/ no home oxygen. SW briefly discussed monitoring this and making arrangements if needed. Pt and son are agreeable.    No questions at this time. SW provided pt and Haja w/ f/up instructions once HH choice is made.    SW initiated referral to [facility] w/ [facility] in the event home O2 needs are indicated at DC.    Documentation for O2 sats: (RN to add to progress note)  Patient's O2 sat on room air is ____% at rest. Pt's O2 sat on room is ____% when ambulating, and ____% on ____ liter while ambulating.      (Reminder: The \"at rest\" and \"while ambulating\" are required statements. If patient is non ambulatory you can use \"with exertion\" such as during a transfer to assess their O2 level)     MD to document AFTER O2 sats:  I had a face to face visit with this patient and I evaluated the patient's O2 saturations.  The patient is mobile in their home and is in need of a portable oxygen tank.  The home oxygen will improve the patient's condition.          Once O2 sats and MD verbiage are completed and IF home O2 is indicated, SW to place MDO in [facility] and request MD to cosign as appropriate. (Dx: CHF)    01:00PM  Per KEYON Wagner - pt currently on RA. Will continue to monitor for needs prior to DC.    Need for DC:  1. HH choice  2. Confirm O2 needs (sats/verb/MDO entered & cosigned)    PLAN: Home w/ 24hr family support, HH, and poss HME O2 - pending above & med clear      SW/CM to remain available for support and/or discharge planning.           Latesha Alaniz, MSW, LSW [id number]

## 2024-02-16 NOTE — PLAN OF CARE
Patient alert and oriented, on 1.5LNC, attempted to wean off oxygen but patient dropped to 87-88% on RA.  No acute events throughout the night. Frequent nurse rounding done.    Problem: Patient Centered Care  Goal: Patient preferences are identified and integrated in the patient's plan of care  Description: Interventions:  - What would you like us to know as we care for you? From home with son  - Provide timely, complete, and accurate information to patient/family  - Incorporate patient and family knowledge, values, beliefs, and cultural backgrounds into the planning and delivery of care  - Encourage patient/family to participate in care and decision-making at the level they choose  - Honor patient and family perspectives and choices  Outcome: Progressing     Problem: Patient/Family Goals  Goal: Patient/Family Long Term Goal  Description: Patient's Long Term Goal: To go home    Interventions:  - Follow medical regimen  - See additional Care Plan goals for specific interventions  Outcome: Progressing  Goal: Patient/Family Short Term Goal  Description: Patient's Short Term Goal: To not be in AFIB    Interventions:   - Follow medical regimen  - See additional Care Plan goals for specific interventions  Outcome: Progressing     Problem: CARDIOVASCULAR - ADULT  Goal: Maintains optimal cardiac output and hemodynamic stability  Description: INTERVENTIONS:  - Monitor vital signs, rhythm, and trends  - Monitor for bleeding, hypotension and signs of decreased cardiac output  - Evaluate effectiveness of vasoactive medications to optimize hemodynamic stability  - Monitor arterial and/or venous puncture sites for bleeding and/or hematoma  - Assess quality of pulses, skin color and temperature  - Assess for signs of decreased coronary artery perfusion - ex. Angina  - Evaluate fluid balance, assess for edema, trend weights  Outcome: Progressing  Goal: Absence of cardiac arrhythmias or at baseline  Description: INTERVENTIONS:  -  Continuous cardiac monitoring, monitor vital signs, obtain 12 lead EKG if indicated  - Evaluate effectiveness of antiarrhythmic and heart rate control medications as ordered  - Initiate emergency measures for life threatening arrhythmias  - Monitor electrolytes and administer replacement therapy as ordered  Outcome: Progressing     Problem: METABOLIC/FLUID AND ELECTROLYTES - ADULT  Goal: Glucose maintained within prescribed range  Description: INTERVENTIONS:  - Monitor Blood Glucose as ordered  - Assess for signs and symptoms of hyperglycemia and hypoglycemia  - Administer ordered medications to maintain glucose within target range  - Assess barriers to adequate nutritional intake and initiate nutrition consult as needed  - Instruct patient on self management of diabetes  Outcome: Progressing  Goal: Electrolytes maintained within normal limits  Description: INTERVENTIONS:  - Monitor labs and rhythm and assess patient for signs and symptoms of electrolyte imbalances  - Administer electrolyte replacement as ordered  - Monitor response to electrolyte replacements, including rhythm and repeat lab results as appropriate  - Fluid restriction as ordered  - Instruct patient on fluid and nutrition restrictions as appropriate  Outcome: Progressing     Problem: HEMATOLOGIC - ADULT  Goal: Maintains hematologic stability  Description: INTERVENTIONS  - Assess for signs and symptoms of bleeding or hemorrhage  - Monitor labs and vital signs for trends  - Administer supportive blood products/factors, fluids and medications as ordered and appropriate  - Administer supportive blood products/factors as ordered and appropriate  Outcome: Progressing  Goal: Free from bleeding injury  Description: (Example usage: patient with low platelets)  INTERVENTIONS:  - Avoid intramuscular injections, enemas and rectal medication administration  - Ensure safe mobilization of patient  - Hold pressure on venipuncture sites to achieve adequate  hemostasis  - Assess for signs and symptoms of internal bleeding  - Monitor lab trends  - Patient is to report abnormal signs of bleeding to staff  - Avoid use of toothpicks and dental floss  - Use electric shaver for shaving  - Use soft bristle tooth brush  - Limit straining and forceful nose blowing  Outcome: Progressing

## 2024-02-16 NOTE — PROGRESS NOTES
Progress Note  Isabel Patel Patient Status:  Inpatient    1931 MRN [id number]   Location [facility] 3W/SW Attending Fredrick Cornelius MD   Hosp Day # 4 PCP Fredrick Cornelius MD     Subjective:  Pt with lightheadedness earlier this afternoon sitting in chair. BP low but recovered when returned to bed. POD 2 s/p PPM. Denies pain or shortness of breath    Objective:  /50 (BP Location: Right arm)   Pulse 101   Temp 98.5 °F (36.9 °C) (Oral)   Resp 19   Wt 163 lb 9.6 oz (74.2 kg)   SpO2 94%   BMI 30.91 kg/m²     Telemetry: afib, v-paced, rate controlled    Intake/Output:    Intake/Output Summary (Last 24 hours) at 2024 1529  Last data filed at 2024 1000  Gross per 24 hour   Intake 580 ml   Output 900 ml   Net -320 ml       Last 3 Weights   24 0340 163 lb 9.6 oz (74.2 kg)   02/15/24 0632 169 lb 4.8 oz (76.8 kg)   24 0615 167 lb (75.8 kg)   24 0449 165 lb 12.8 oz (75.2 kg)   24 1615 164 lb 14.4 oz (74.8 kg)   24 1229 163 lb (73.9 kg)   24 1122 163 lb (73.9 kg)   24 1123 163 lb (73.9 kg)       Labs:  Recent Labs   Lab 24  0515 02/15/24  0642 24  0708   * 119* 127*   BUN 28* 28* 33*   CREATSERUM 1.08* 1.06* 1.14*   EGFRCR 48* 49* 45*   CA 9.2 9.0 9.5    139 139   K 4.1 4.2 4.2    108 103   CO2 27.0 28.0 30.0     Recent Labs   Lab 24  0515 02/15/24  0642 24  0709   RBC 4.68 4.74 4.96   HGB 13.7 13.9 14.4   HCT 43.2 42.5 44.1   MCV 92.3 89.7 88.9   MCH 29.3 29.3 29.0   MCHC 31.7 32.7 32.7   RDW 14.6 14.5 14.6   NEPRELIM 3.90 6.64 6.56   WBC 6.5 8.9 9.4   .0 237.0 258.0         Recent Labs   Lab 24  1346   TROPHS 8       Diagnostics:  No results found.   Review of Systems   Cardiovascular:  Negative for chest pain, dyspnea on exertion, leg swelling and orthopnea.   Respiratory:  Negative for cough and shortness of breath.    Neurological:  Positive for light-headedness.       Physical Exam:    Gen:  alert, oriented x 3, NAD  Heent: pupils equal, reactive. Mucous membranes moist.   Neck: no jvd  Cardiac: irregular rate and rhythm, normal S1,S2, no murmur, clicks, rub or gallop  Lungs: Diminished  Abd: soft, NT/ND +bs  Ext: no edema  Skin: Warm, dry, L upper chest PPI site CDI  Neuro: No focal deficits      Medications:     apixaban  5 mg Oral BID    atenolol  50 mg Oral Daily    atorvastatin  20 mg Oral Daily    losartan  50 mg Oral Daily    dilTIAZem HCl ER Coated Beads  360 mg Oral Daily    insulin aspart  1-5 Units Subcutaneous TID CC      sodium chloride 50 mL/hr at 02/16/24 1422       Assessment:  New Atrial Fibrillation with RVR, Tachy-Rebel Syndrome, S/P [employer] Dual Chamber PPM 2/14  Initially presented with RVR w/intermittent low HR's  Now s/p DC PPM  CXR w/o pneumothorax  V-pacing 70's, afib rate controlled  On bb, dilitiazem  [id number]: On Eliquis - has been on hold for PPM   Chronic HFpEF, Pulm Hypertension  LVEF 60-65%, no RWMA, mild ao stenosis, mild AR, mild-mod TR, PASP 60mmHg  S/P IV Lasix yesterday; wt down   Appears compensated on exam  Hypertension  On atenolol, losartan, diltiazem  Hyperlipidemia - on statin  CKD - Cr stable  Hx Breast Cancer  DMII - A1C 6.7      Plan:  Resume Eliquis tonight  Continue atenolol, diltiazem for afib rate control  If BP remains low, can lower losartan dose  Continue statin  PPM site instructions, sling use and shoulder precautions discussed with patient and son at bedside.     Plan of care discussed with patient, RN.    Jumana Willard, APRN  2/16/2024  3:29 PM  [phone] [facility]  [phone] [facility]

## 2024-02-16 NOTE — OCCUPATIONAL THERAPY NOTE
OCCUPATIONAL THERAPY TREATMENT NOTE - INPATIENT        Room Number: 323/323-A          Problem List  Principal Problem:    Atrial fibrillation, new onset (HCC)  Active Problems:    Controlled type 2 diabetes mellitus with diabetic nephropathy, without long-term current use of insulin (HCC)    HTN (hypertension)    Hypercholesterolemia    S/P placement of cardiac pacemaker      OCCUPATIONAL THERAPY ASSESSMENT   Patient demonstrates good  progress this session, goals remain in progress.    Patient continues to function near baseline with aerobic capacity and ADLs/fx mobility/transfers .   Contributing factors to remaining limitations include decreased functional strength, decreased functional reach, decreased endurance, and impaired balance.  Next session anticipate patient to progress lower body dressing, energy conservation strategies, and aerobic capacity.  Occupational Therapy will continue to follow patient for duration of hospitalization.    Patient continues to benefit from continued skilled OT services: at discharge to promote functional independence and safety with additional support and return home with home health OT.     PLAN  OT Treatment Plan: Balance activities;Energy conservation/work simplification techniques;ADL training;Functional transfer training;UE strengthening/ROM;Patient/Family education;Patient/Family training;Equipment eval/education;Compensatory technique education  OT Device Recommendations: TBD    SUBJECTIVE  \"I feel tired.\"    OBJECTIVE  Precautions: Cardiac;Bed/chair alarm;Hard of hearing (pacemaker precautions)    WEIGHT BEARING RESTRICTION  None    PAIN ASSESSMENT  Ratin    ACTIVITY TOLERANCE  Pulse: 101  Heart Rate Source: Monitor       O2 SATURATIONS  Oxygen Therapy  SPO2% on Oxygen at Rest: 95  At rest oxygen flow (liters per minute): 1.5  SPO2% Ambulation on Room Air: 90 (recovered to 94% within 30 seconds of seated recovery)    ACTIVITIES OF DAILY LIVING ASSESSMENT  AM-PAC  ‘6-Clicks’ Inpatient Daily Activity Short Form  How much help from another person does the patient currently need…  -   Putting on and taking off regular lower body clothing?: A Little  -   Bathing (including washing, rinsing, drying)?: A Little  -   Toileting, which includes using toilet, bedpan or urinal? : A Little  -   Putting on and taking off regular upper body clothing?: A Little  -   Taking care of personal grooming such as brushing teeth?: A Little  -   Eating meals?: None    AM-PAC Score:  Score: 19  Approx Degree of Impairment: 42.8%  Standardized Score (AM-PAC Scale): 40.22  CMS Modifier (G-Code): CK    BED MOBILITY  Not Tested - patient in chair upon therapist arrival    FUNCTIONAL TRANSFER ASSESSMENT  Sit to Stand: CGA  Chair Transfer: CGA    FUNCTIONAL MOBILITY  CGA for in-room mobility using RW  Comments:  Patient with slow shuffled mobility and benefited from frequent standing recovery periods to facilitate PLB. Patient's son reported this is her baseline.    FUNCTIONAL ADL ASSESSMENT  UB Dressing: min assist  LB Dressing: mod assist  Comments:  Provided education on activity pacing d/t SOB. Patient's son reported patient has pulse oximeter at home. Also provided education on pacemaker precautions and compensatory techniques for ADLs.    EDUCATION PROVIDED  Patient: Role of Occupational Therapy; Plan of Care; Discharge Recommendations; Functional Transfer Techniques; Fall Prevention; Posture/Positioning; Energy Conservation; Proper Body Mechanics; Compensatory ADL Techniques (pacemaker precautions)  Patient's Response to Education: Verbalized Understanding; Returned Demonstration  Family/Caregiver: Role of Occupational Therapy; Plan of Care; Discharge Recommendations; Fall Prevention; Energy Conservation  Family/Caregiver's Response to Education: Verbalized Understanding    The patient's Approx Degree of Impairment: 42.8% has been calculated based on documentation in the [facility] '6 clicks' Inpatient  Daily Activity Short Form.  Research supports that patients with this level of impairment may benefit from  OT.  Final disposition will be made by interdisciplinary medical team.    Patient End of Session: Up in chair;Needs met;Call light within reach;RN aware of session/findings;All patient questions and concerns addressed;Alarm set;Family present    OT Goals:  Patient's self stated goal is: Return to PLOF     Patient will complete functional transfer with Min A  Comment: MET with CGA    Patient will complete toileting with Min A  Comment: Ongoing    Patient will tolerate standing for 5 minutes in prep for ADL with Min A   Comment: Ongoing    Patient will complete UE dressing with Min A  Comment: Ongoing          Goals  on: 24  Frequency: 3-5x/week    OT Session Time  Therapeutic Activity: 23 minutes    JALEEL Cool/L  [facility]  #[id number]

## 2024-02-16 NOTE — PHYSICAL THERAPY NOTE
PHYSICAL THERAPY TREATMENT NOTE - INPATIENT     Room Number: 323/323-A       Presenting Problem: Afib with RVR S/P pacemaker implantation 24  Co-Morbidities : HTN, OA, diabetes, R TKA     Problem List  Principal Problem:    Atrial fibrillation, new onset (HCC)  Active Problems:    Controlled type 2 diabetes mellitus with diabetic nephropathy, without long-term current use of insulin (HCC)    HTN (hypertension)    Hypercholesterolemia    S/P placement of cardiac pacemaker      PHYSICAL THERAPY ASSESSMENT   Patient demonstrates good  progress this session, goals  remain in progress.    Patient continues to function near baseline with bed mobility, transfers, gait, and stair negotiation.  Contributing factors to remaining limitations include decreased functional strength, decreased endurance/aerobic capacity, and decreased muscular endurance.  Next session anticipate patient to progress bed mobility, transfers, gait, and stair negotiation.  Physical Therapy will continue to follow patient for duration of hospitalization.    Patient continues to benefit from continued skilled PT services: at discharge to promote functional independence and safety with additional support and return home with home health PT.    PLAN  PT Treatment Plan: Bed mobility;Body mechanics;Coordination;Endurance;Patient education;Gait training;Strengthening;Transfer training;Balance training;Stoop training  Frequency (Obs): 5x/week    SUBJECTIVE  Pt was agreeable to therapy session.         OBJECTIVE  Precautions: Cardiac;Bed/chair alarm;Hard of hearing (pacemaker precautions)    WEIGHT BEARING RESTRICTION     L Upper Extremity: Other (Comment) (pacemaker, sling first 24 hours AAT then PMs 1 month)          PAIN ASSESSMENT   Ratin  Location: denies  Management Techniques: Activity promotion;Body mechanics;Relaxation;Repositioning    BALANCE  Static Sitting: Good  Dynamic Sitting: Fair +  Static Standing: Fair  Dynamic Standing: Fair  -    ACTIVITY TOLERANCE                          O2 WALK  Oxygen Therapy  SPO2% on Oxygen at Rest: 95  At rest oxygen flow (liters per minute): 1.5  SPO2% Ambulation on Room Air: 94    AM-PAC '6-Clicks' INPATIENT SHORT FORM - BASIC MOBILITY  How much difficulty does the patient currently have...  Patient Difficulty: Turning over in bed (including adjusting bedclothes, sheets and blankets)?: A Little   Patient Difficulty: Sitting down on and standing up from a chair with arms (e.g., wheelchair, bedside commode, etc.): A Little   Patient Difficulty: Moving from lying on back to sitting on the side of the bed?: A Little   How much help from another person does the patient currently need...   Help from Another: Moving to and from a bed to a chair (including a wheelchair)?: A Little   Help from Another: Need to walk in hospital room?: A Little   Help from Another: Climbing 3-5 steps with a railing?: A Little     AM-PAC Score:  Raw Score: 18   Approx Degree of Impairment: 46.58%   Standardized Score (AM-PAC Scale): 43.63   CMS Modifier (G-Code): CK    FUNCTIONAL ABILITY STATUS  Functional Mobility/Gait Assessment  Gait Assistance: Contact guard assist  Distance (ft): 20'x2  Assistive Device: Rolling walker  Pattern:  (narrow LUPILLO)  Rolling:  NT  Supine to Sit:  NT  Sit to Supine:  NT  Sit to Stand: contact guard assist    Additional information: Pt is received in the chair with her son present and was cleared for therapy session. Pt is on 1.5L O2 and tested pt on room air throughout the session. Pt is 92 % at rest on room air. Pt is CGA with sit<>stand transfers with the RW. Pt able to AMB about 20' x2 with the RW CGA for balance and safety. Pt with slow gait and decreased step length and narrow LUPILLO but with good balance and safety awareness. Pt educated and took a few standing rest breaks while AMB. Pt required extra time while AMB. Returned pt back to sitting in the chair with all needs within reach. Pt's son reported  that the pt is close to her baseline with AMB.  Tested pt's SpO2 on room air and was 89% but increased to 94% after about 30 sec of purse lipped  breathing  technique. Pt was left on room air after session. Reported RN on the status of the pt.     The patient's Approx Degree of Impairment: 46.58% has been calculated based on documentation in the [facility] '6 clicks' Inpatient Daily Activity Short Form.  Research supports that patients with this level of impairment may benefit from Home with [facility] and assist.  Final disposition will be made by interdisciplinary medical team.      Patient End of Session: In bed;Up in chair;With  staff;Call light within reach;RN aware of session/findings;All patient questions and concerns addressed;Family present    CURRENT GOALS   Goals to be met by: 2/28/24  Patient Goal Patient's self-stated goal is: return home safely   Goal #1 Patient is able to demonstrate supine - sit EOB @ level: minimum assistance     Goal #1   Current Status NT received int he chair   Goal #2 Patient is able to demonstrate transfers [place] to[place] [facility] at assistance level: supervision with walker - rolling     Goal #2  Current Status CGA with the RW   Goal #3 Patient is able to ambulate 50 feet with assist device: walker - rolling at assistance level: supervision   Goal #3   Current Status 20'x2 with the RW CGA   Goal #4 Patient will negotiate 1 stairs/one curb w/ assistive device and supervision   Goal #4   Current Status NT   Goal #5 Patient to demonstrate independence with home activity/exercise instructions provided to patient in preparation for discharge.   Goal #5   Current Status IN PROGRESS   Goal #6    Goal #6  Current Status      Therapeutic Activity: 23 minutes

## 2024-02-16 NOTE — PROGRESS NOTES
[facility]    Progress Note    Isabel Patel Patient Status:  Inpatient    1931 MRN [id number]   Location [facility] 3W/SW Attending Fredrick Cornelius MD   Hosp Day # 4 PCP Fredrick Cornelius MD     Chief Complaint:     Subjective:     Constitutional:  Positive for fatigue.   HENT: Negative.     Eyes: Negative.    Cardiovascular: Negative.    Gastrointestinal: Negative.    Genitourinary: Negative.    Musculoskeletal: Negative.         Left arm on a sling    Skin: Negative.    Neurological: Negative.        Objective:   Blood pressure 132/73, pulse 117, temperature 98 °F (36.7 °C), temperature source Axillary, resp. rate 19, weight 163 lb 9.6 oz (74.2 kg), SpO2 92%, not currently breastfeeding.  Physical Exam  Vitals and nursing note reviewed.   HENT:      Head: Normocephalic and atraumatic.      Nose: Nose normal.      Mouth/Throat:      Mouth: Mucous membranes are moist.   Cardiovascular:      Rate and Rhythm: Normal rate.   Pulmonary:      Effort: Pulmonary effort is normal.      Breath sounds: Normal breath sounds.   Abdominal:      General: Abdomen is flat. Bowel sounds are normal.      Palpations: Abdomen is soft.   Musculoskeletal:         General: Normal range of motion.      Cervical back: Normal range of motion and neck supple.      Comments: Left arm on a sling     Pacemaker wound ok, bandaged    Skin:     General: Skin is warm and dry.   Neurological:      Mental Status: She is alert and oriented to person, place, and time.   Psychiatric:         Mood and Affect: Mood normal.         Results:   Lab Results   Component Value Date    WBC 9.4 2024    HGB 14.4 2024    HCT 44.1 2024    .0 2024    CREATSERUM 1.14 (H) 2024    BUN 33 (H) 2024     2024    K 4.2 2024     2024    CO2 30.0 2024     (H) 2024    CA 9.5 2024    ALB 3.1 (L) 2023    ALKPHO 125 2023    BILT 0.5  02/25/2023    TP 7.4 02/25/2023    AST 9 (L) 02/25/2023    ALT 12 (L) 02/25/2023    INR 1.0 12/03/2018    TSH 1.749 02/12/2024    MG 1.7 (L) 12/04/2018    PHOS 3.2 09/22/2023    TROP 0.00 12/03/2018    TROPHS 8 02/12/2024    B12 599 03/16/2021       XR CHEST PA + LAT CHEST (CPT=71046)    Result Date: 2/15/2024  CONCLUSION:   Interval placement of a left chest wall dual lead pacemaker device.  No pneumothorax.  No new focal opacity.  Redemonstrated bibasilar atelectasis/scarring.  Multiple additional chronic findings are described in detail above and are not significantly changed when compared to 02/12/2024.     Dictated by (CST): Joseluis Hoover MD on 2/15/2024 at 12:04 PM     Finalized by (CST): Joseluis Hoover MD on 2/15/2024 at 12:08 PM               Assessment & Plan:         Pt continues to be hypoxic reqiring o2 1.5 L, cxr with no findings to explain cause, was given IV lasix yesterday by cardiology     Will get pulmo on board to evaluate    Pt later found to be a little hypotensive , cr a little up today will start on low ivf 40 c c hr x 1 L    Will follow     Atrial fibrillation, new onset (HCC)  Cardiology on board, for dual chamber pacemaker today               Controlled type 2 diabetes mellitus with diabetic nephropathy, without long-term current use of insulin (HCC)     Continue with Accu-Cheks and coverage       HTN (hypertension)  Well-controlled continue current treatment          Hypercholesterolemia  Continue home meds        I spoke with son, all questions answered       Fredrick Cornelius MD  2/16/2024

## 2024-02-16 NOTE — CONSULTS
[facility]    Report of Consultation    Isabel Patel Patient Status:  Inpatient    1931 MRN [id number]   Location [facility] 3W/SW Attending Fredrick Cornelius MD   Hosp Day # 4 PCP Fredrick Cornelius MD     Date of Admission:  2024    Reason for Consultation:   Hypoxia    History of Present Illness:   Patient is a 89-year-old female who initially came in with chief complaint of some mild dyspnea found to have atrial fibrillation with by cardiology.  Received dual-chamber pacemaker during hospital course.  Some ongoing hypoxia noted during hospital course.  Requiring oxygen until this morning.  Resting in bed.  Denies history of known lung disease.  Denies significant Dyspnea, cough, wheezing.  No recent history of known pneumonia.    Past Medical History  Past Medical History:   Diagnosis Date    Acute, but ill-defined, cerebrovascular disease     Arthritis     Breast CA (HCC) 2016    Cancer (HCC)     Diabetes (HCC)     diet controlled    Diabetes mellitus, type II (HCC)     Essential hypertension     Hearing impairment     High blood pressure     High cholesterol     Hyperlipidemia     Osteoarthritis     Squamous cell carcinoma, keratinizing     R posterior thigh       Past Surgical History  Past Surgical History:   Procedure Laterality Date    APPENDECTOMY      APPENDECTOMY      CATARACT  -    CATARACT EXTRACTION W/  INTRAOCULAR LENS IMPLANT Bilateral     Ian Garcia MD    CHEMOTHERAPY  2016    rt breast.    CHOLECYSTECTOMY      D & C      KNEE REPLACEMENT SURGERY Right     LUMPECTOMY RIGHT  2016    cancer    MASTECTOMY PARTIAL Right 2016      [phone]    SKIN SURGERY Right 2018    TONSILLECTOMY      WRIST FRACTURE SURGERY Right        Family History  Family History   Problem Relation Age of Onset    Heart Disease Father         CAD    Diabetes Father     Heart Disease Mother         CAD    Diabetes Mother     Breast Cancer  Mother 83        had lumpectomy and RT.  No other treatment.   of stroke at 89    Other (appendicitis[other]) Brother         age 13    Other (alive and well[other]) Sister     Other (alive and well[other]) Son         x3    Breast Cancer Self 85    Glaucoma Neg     Macular degeneration Neg        Social History  Social History     Socioeconomic History    Marital status:     Number of children: 3   Occupational History    Occupation:      Comment: retired   Tobacco Use    Smoking status: Never    Smokeless tobacco: Never   Vaping Use    Vaping Use: Never used   Substance and Sexual Activity    Alcohol use: Not Currently    Drug use: Never    Sexual activity: Not Currently   Other Topics Concern     Service No    Caffeine Concern Yes     Comment: coffee, 1 cups daily    Occupational Exposure No           Current Medications:  Current Facility-Administered Medications   Medication Dose Route Frequency    sodium chloride 0.9% infusion   Intravenous Continuous    atenolol (Tenormin) tab 50 mg  50 mg Oral Daily    acetaminophen (Tylenol) tab 650 mg  650 mg Oral Q4H PRN    Or    acetaminophen-codeine (Tylenol #3) 300-30 MG per tab 1 tablet  1 tablet Oral Q4H PRN    Or    acetaminophen-codeine (Tylenol #3) 300-30 MG per tab 2 tablet  2 tablet Oral Q4H PRN    glucose (Dex4) 15 GM/59ML oral liquid 15 g  15 g Oral Q15 Min PRN    Or    glucose (Glutose) 40% oral gel 15 g  15 g Oral Q15 Min PRN    Or    glucose-vitamin C (Dex-4) chewable tab 4 tablet  4 tablet Oral Q15 Min PRN    Or    dextrose 50% injection 50 mL  50 mL Intravenous Q15 Min PRN    Or    glucose (Dex4) 15 GM/59ML oral liquid 30 g  30 g Oral Q15 Min PRN    Or    glucose (Glutose) 40% oral gel 30 g  30 g Oral Q15 Min PRN    Or    glucose-vitamin C (Dex-4) chewable tab 8 tablet  8 tablet Oral Q15 Min PRN    acetaminophen (Tylenol Extra Strength) tab 500 mg  500 mg Oral Q6H PRN    atorvastatin (Lipitor) tab 20 mg  20 mg Oral Daily     losartan (Cozaar) tab 50 mg  50 mg Oral Daily    dilTIAZem ER (CardIZEM CD) 24 hr cap 360 mg  360 mg Oral Daily    insulin aspart (NovoLOG) 100 Units/mL FlexPen 1-5 Units  1-5 Units Subcutaneous TID CC    melatonin tab 6 mg  6 mg Oral Nightly PRN     Medications Prior to Admission   Medication Sig    losartan 50 MG Oral Tab Take 1 tablet (50 mg total) by mouth daily.    dilTIAZem HCl ER Coated Beads 180 MG Oral Capsule SR 24 Hr Take 2 capsules (360 mg total) by mouth daily.    atorvastatin 20 MG Oral Tab Take 1 tablet (20 mg total) by mouth daily.    atenolol 50 MG Oral Tab Take 1 tablet (50 mg total) by mouth daily.    cholecalciferol (VITAMIN D HIGH POTENCY) 25 MCG (1000 UT) Oral Cap Take 1 capsule (1,000 Units total) by mouth daily.    lidocaine 5 % External Patch Place 1 patch onto the skin Q24H PRN.    TURMERIC CURCUMIN OR Take by mouth.    aspirin 81 MG Oral Chew Tab Chew 1 tablet (81 mg total) by mouth daily.    acetaminophen 500 MG Oral Tab Take 2 tablets (1,000 mg total) by mouth every 6 (six) hours as needed for Pain.    Coenzyme Q10 (CO Q 10) 10 MG Oral Cap Take 180 mg by mouth daily.    Accu-Chek Softclix Lancets Does not apply Misc Use once a day.    Blood Glucose Calibration (ACCU-CHEK GUIDE CONTROL) In Vitro Liquid 1 each by In Vitro route every 30 (thirty) days.    Blood Glucose Monitoring Suppl (ACCU-CHEK GUIDE ME) w/Device Does not apply Kit 1 each daily. Check once a day.    Glucose Blood (ACCU-CHEK GUIDE) In Vitro Strip Check once daily    ACCU-CHEK AMBIKA PLUS In Vitro Strip TEST ONE TIME DAILY    ACCU-CHEK SOFTCLIX LANCETS Does not apply Misc TEST ONE TIME DAILY       Allergies  Allergies   Allergen Reactions    Adhesive Tape RASH       Review of Systems:   Constitutional: denies fevers, chills, weakness, fatigue, recent illness  HEENT: denies headache, sore throat, vision loss  Cardio: denies chest pain, chest pressure, palpitations  Respiratory: denies dyspnea, cough, wheezing, hemoptysis    GI: denies nausea, vomiting, abdominal pain  : denies dysuria, hematuria  Musculoskeletal: denies arthralgia, myalgia  Integumentary: denies rash, itching  Neurological: denies syncope, weakness, dizziness,   Psychiatric: denies depression, anxiety  Hematologic: denies bruising        Physical Exam:   Blood pressure 101/50, pulse 101, temperature 98.5 °F (36.9 °C), temperature source Oral, resp. rate 19, weight 163 lb 9.6 oz (74.2 kg), SpO2 94%, not currently breastfeeding.    Constitutional: no acute distress  Eyes: PERRL  ENT: nares patent  Neck: neck supple, no JVD  Cardio: RRR, S1 S2  Respiratory: clear to auscultation bilaterally, no wheezing, rales, rhonchi, crackles  GI: abdomen soft, non tender, active bowel souds, no organomegaly  Extremities: no clubbing, cyanosis, edema  Neurologic: no gross motor deficits  Skin: warm, dry    Results:   Laboratory Data  Lab Results   Component Value Date    WBC 9.4 02/16/2024    HGB 14.4 02/16/2024    HCT 44.1 02/16/2024    .0 02/16/2024    CREATSERUM 1.14 (H) 02/16/2024    BUN 33 (H) 02/16/2024     02/16/2024    K 4.2 02/16/2024     02/16/2024    CO2 30.0 02/16/2024     (H) 02/16/2024    CA 9.5 02/16/2024    ALB 3.1 (L) 09/22/2023    ALKPHO 125 02/25/2023    TP 7.4 02/25/2023    AST 9 (L) 02/25/2023    ALT 12 (L) 02/25/2023    INR 1.0 12/03/2018    PTP 13.2 12/03/2018    TSH 1.749 02/12/2024    DDIMER 2.97 (H) 02/16/2024    MG 1.7 (L) 12/04/2018    PHOS 3.2 09/22/2023    TROP 0.00 12/03/2018    B12 599 03/16/2021         Imaging  XR CHEST PA + LAT CHEST (CPT=71046)    Result Date: 2/15/2024  CONCLUSION:   Interval placement of a left chest wall dual lead pacemaker device.  No pneumothorax.  No new focal opacity.  Redemonstrated bibasilar atelectasis/scarring.  Multiple additional chronic findings are described in detail above and are not significantly changed when compared to 02/12/2024.     Dictated by (CST): Joseluis Hoover MD on 2/15/2024  at 12:04 PM     Finalized by (CST): Joseluis Hoover MD on 2/15/2024 at 12:08 PM           Assessment   1.  Atrial fibrillation  2.  Hypoxia, improved  3.  Diabetes mellitus  4.  Hypertension  5.  Tachybradycardia syndrome  6.  Pulmonary hypertension    Plan   -Presented with evidence of atrial fibrillation with rapid ventricular rate found to have evidence of episodes of bradycardia noted.  Received PPM during hospital course  -Further conditions per cardiology  -Some ongoing hypoxia present although weaned off oxygen at this time.  -No history of known lung disease  -No clinical suspicion for active pulmonary etiology contributing to patient's hypoxia  -Incentive spirometry  -Discussed with son  -Anticipate discharge today  -Reviewed vitals, labs and imaging    True Lernre DO  Pulmonary Critical Care Medicine  [facility]  2/16/2024  2:54 PM

## 2024-02-16 NOTE — CONGREGATE LIVING REVIEW
Congregate Living Authorization    The [facility] Review Committee (CLRC) has reviewed this case and the committee DOES NOT RECOMMEND discharge to a skilled nursing facility under skilled care.    The CLRC recommends:  Home with home health and any other appropriate caregiver assistance or medical equipment as needed vs respite in SNF.     Does not appear to have skilled services for LYNN, but additional home support appears to be needed.    For questions regarding CLRC approval process, please contact the CM assigned to the case.  For questions regarding RN discharge workflow, please contact the unit Clinical Leader.

## 2024-02-17 LAB
ANION GAP SERPL CALC-SCNC: 5 MMOL/L (ref 0–18)
BUN BLD-MCNC: 33 MG/DL (ref 9–23)
BUN/CREAT SERPL: 32 (ref 10–20)
CALCIUM BLD-MCNC: 9 MG/DL (ref 8.7–10.4)
CHLORIDE SERPL-SCNC: 104 MMOL/L (ref 98–112)
CO2 SERPL-SCNC: 28 MMOL/L (ref 21–32)
CREAT BLD-MCNC: 1.03 MG/DL
EGFRCR SERPLBLD CKD-EPI 2021: 51 ML/MIN/1.73M2 (ref 60–?)
GLUCOSE BLD-MCNC: 120 MG/DL (ref 70–99)
GLUCOSE BLDC GLUCOMTR-MCNC: 134 MG/DL (ref 70–99)
GLUCOSE BLDC GLUCOMTR-MCNC: 140 MG/DL (ref 70–99)
GLUCOSE BLDC GLUCOMTR-MCNC: 143 MG/DL (ref 70–99)
GLUCOSE BLDC GLUCOMTR-MCNC: 225 MG/DL (ref 70–99)
OSMOLALITY SERPL CALC.SUM OF ELEC: 292 MOSM/KG (ref 275–295)
POTASSIUM SERPL-SCNC: 4.2 MMOL/L (ref 3.5–5.1)
SODIUM SERPL-SCNC: 137 MMOL/L (ref 136–145)

## 2024-02-17 PROCEDURE — 99232 SBSQ HOSP IP/OBS MODERATE 35: CPT | Performed by: INTERNAL MEDICINE

## 2024-02-17 RX ORDER — DILTIAZEM HYDROCHLORIDE 180 MG/1
360 CAPSULE, EXTENDED RELEASE ORAL
Status: DISCONTINUED | OUTPATIENT
Start: 2024-02-17 | End: 2024-02-18

## 2024-02-17 NOTE — PROGRESS NOTES
[facility]    Progress Note      Assessment and Plan:   1.  Respiratory impairment-essentially resolved.  The patient is now off oxygen.  She presented with rapid atrial fibrillation and likely had mild interstitial edema with basilar atelectasis.  Now status post permanent pacemaker.    Recommendations: Okay to discharge from my perspective.  Incentive spirometry.    2.  Suspected ESDRAS-can consider outpatient sleep evaluation.    3.  Atrial fibrillation-as per cardiology    4.  Diabetes mellitus    Subjective:   Isabel Patel is a(n) 89 year old female who is breathing comfortably    Objective:   Blood pressure 116/65, pulse 101, temperature 98.3 °F (36.8 °C), temperature source Oral, resp. rate 18, weight 163 lb (73.9 kg), SpO2 92%, not currently breastfeeding.    Physical Exam alert elderly female in good spirits  HEENT examination is unremarkable with pupils equal round and reactive to light and accommodation.   Neck without adenopathy, thyromegaly, JVD nor bruit.   Lungs slightly diminished to auscultation and percussion.  Cardiac regular rate and rhythm no murmur.   Abdomen nontender, without hepatosplenomegaly and no mass appreciable.   Extremities without clubbing cyanosis nor edema.   Neurologic grossly intact with symmetric tone and strength and reflex.  Skin without gross abnormality     Results:     Lab Results   Component Value Date    CREATSERUM 1.03 02/17/2024    BUN 33 02/17/2024     02/17/2024    K 4.2 02/17/2024     02/17/2024    CO2 28.0 02/17/2024     02/17/2024    CA 9.0 02/17/2024       Rosalino Nguyen MD  Medical Director, Critical Care, [facility]  Medical Director, [facility]  Pager: [phone]

## 2024-02-17 NOTE — PLAN OF CARE
Alert x4 pain to shoulders-chronic managed with prn meds. IV fluids 50ml for 1L.  Problem: Patient Centered Care  Goal: Patient preferences are identified and integrated in the patient's plan of care  Description: Interventions:  - What would you like us to know as we care for you? From home with son  - Provide timely, complete, and accurate information to patient/family  - Incorporate patient and family knowledge, values, beliefs, and cultural backgrounds into the planning and delivery of care  - Encourage patient/family to participate in care and decision-making at the level they choose  - Honor patient and family perspectives and choices  Outcome: Progressing     Problem: Patient/Family Goals  Goal: Patient/Family Long Term Goal  Description: Patient's Long Term Goal: To go home    Interventions:  - Follow medical regimen  - See additional Care Plan goals for specific interventions  Outcome: Progressing  Goal: Patient/Family Short Term Goal  Description: Patient's Short Term Goal: To not be in AFIB    Interventions:   - Follow medical regimen  - See additional Care Plan goals for specific interventions  Outcome: Progressing     Problem: CARDIOVASCULAR - ADULT  Goal: Maintains optimal cardiac output and hemodynamic stability  Description: INTERVENTIONS:  - Monitor vital signs, rhythm, and trends  - Monitor for bleeding, hypotension and signs of decreased cardiac output  - Evaluate effectiveness of vasoactive medications to optimize hemodynamic stability  - Monitor arterial and/or venous puncture sites for bleeding and/or hematoma  - Assess quality of pulses, skin color and temperature  - Assess for signs of decreased coronary artery perfusion - ex. Angina  - Evaluate fluid balance, assess for edema, trend weights  Outcome: Progressing  Goal: Absence of cardiac arrhythmias or at baseline  Description: INTERVENTIONS:  - Continuous cardiac monitoring, monitor vital signs, obtain 12 lead EKG if indicated  - Evaluate  effectiveness of antiarrhythmic and heart rate control medications as ordered  - Initiate emergency measures for life threatening arrhythmias  - Monitor electrolytes and administer replacement therapy as ordered  Outcome: Progressing     Problem: METABOLIC/FLUID AND ELECTROLYTES - ADULT  Goal: Glucose maintained within prescribed range  Description: INTERVENTIONS:  - Monitor Blood Glucose as ordered  - Assess for signs and symptoms of hyperglycemia and hypoglycemia  - Administer ordered medications to maintain glucose within target range  - Assess barriers to adequate nutritional intake and initiate nutrition consult as needed  - Instruct patient on self management of diabetes  Outcome: Progressing  Goal: Electrolytes maintained within normal limits  Description: INTERVENTIONS:  - Monitor labs and rhythm and assess patient for signs and symptoms of electrolyte imbalances  - Administer electrolyte replacement as ordered  - Monitor response to electrolyte replacements, including rhythm and repeat lab results as appropriate  - Fluid restriction as ordered  - Instruct patient on fluid and nutrition restrictions as appropriate  Outcome: Progressing     Problem: HEMATOLOGIC - ADULT  Goal: Maintains hematologic stability  Description: INTERVENTIONS  - Assess for signs and symptoms of bleeding or hemorrhage  - Monitor labs and vital signs for trends  - Administer supportive blood products/factors, fluids and medications as ordered and appropriate  - Administer supportive blood products/factors as ordered and appropriate  Outcome: Progressing  Goal: Free from bleeding injury  Description: (Example usage: patient with low platelets)  INTERVENTIONS:  - Avoid intramuscular injections, enemas and rectal medication administration  - Ensure safe mobilization of patient  - Hold pressure on venipuncture sites to achieve adequate hemostasis  - Assess for signs and symptoms of internal bleeding  - Monitor lab trends  - Patient is to  report abnormal signs of bleeding to staff  - Avoid use of toothpicks and dental floss  - Use electric shaver for shaving  - Use soft bristle tooth brush  - Limit straining and forceful nose blowing  Outcome: Progressing

## 2024-02-17 NOTE — PLAN OF CARE
Patient alert, oriented. Forgetful at times. RA. IVF running. Frequent nurse rounding done.    Problem: Patient Centered Care  Goal: Patient preferences are identified and integrated in the patient's plan of care  Description: Interventions:  - What would you like us to know as we care for you? From home with son  - Provide timely, complete, and accurate information to patient/family  - Incorporate patient and family knowledge, values, beliefs, and cultural backgrounds into the planning and delivery of care  - Encourage patient/family to participate in care and decision-making at the level they choose  - Honor patient and family perspectives and choices  Outcome: Progressing     Problem: Patient/Family Goals  Goal: Patient/Family Long Term Goal  Description: Patient's Long Term Goal: To go home    Interventions:  - Follow medical regimen  - See additional Care Plan goals for specific interventions  Outcome: Progressing  Goal: Patient/Family Short Term Goal  Description: Patient's Short Term Goal: To not be in AFIB    Interventions:   - Follow medical regimen  - See additional Care Plan goals for specific interventions  Outcome: Progressing     Problem: CARDIOVASCULAR - ADULT  Goal: Maintains optimal cardiac output and hemodynamic stability  Description: INTERVENTIONS:  - Monitor vital signs, rhythm, and trends  - Monitor for bleeding, hypotension and signs of decreased cardiac output  - Evaluate effectiveness of vasoactive medications to optimize hemodynamic stability  - Monitor arterial and/or venous puncture sites for bleeding and/or hematoma  - Assess quality of pulses, skin color and temperature  - Assess for signs of decreased coronary artery perfusion - ex. Angina  - Evaluate fluid balance, assess for edema, trend weights  Outcome: Progressing  Goal: Absence of cardiac arrhythmias or at baseline  Description: INTERVENTIONS:  - Continuous cardiac monitoring, monitor vital signs, obtain 12 lead EKG if  indicated  - Evaluate effectiveness of antiarrhythmic and heart rate control medications as ordered  - Initiate emergency measures for life threatening arrhythmias  - Monitor electrolytes and administer replacement therapy as ordered  Outcome: Progressing     Problem: METABOLIC/FLUID AND ELECTROLYTES - ADULT  Goal: Glucose maintained within prescribed range  Description: INTERVENTIONS:  - Monitor Blood Glucose as ordered  - Assess for signs and symptoms of hyperglycemia and hypoglycemia  - Administer ordered medications to maintain glucose within target range  - Assess barriers to adequate nutritional intake and initiate nutrition consult as needed  - Instruct patient on self management of diabetes  Outcome: Progressing  Goal: Electrolytes maintained within normal limits  Description: INTERVENTIONS:  - Monitor labs and rhythm and assess patient for signs and symptoms of electrolyte imbalances  - Administer electrolyte replacement as ordered  - Monitor response to electrolyte replacements, including rhythm and repeat lab results as appropriate  - Fluid restriction as ordered  - Instruct patient on fluid and nutrition restrictions as appropriate  Outcome: Progressing     Problem: HEMATOLOGIC - ADULT  Goal: Maintains hematologic stability  Description: INTERVENTIONS  - Assess for signs and symptoms of bleeding or hemorrhage  - Monitor labs and vital signs for trends  - Administer supportive blood products/factors, fluids and medications as ordered and appropriate  - Administer supportive blood products/factors as ordered and appropriate  Outcome: Progressing  Goal: Free from bleeding injury  Description: (Example usage: patient with low platelets)  INTERVENTIONS:  - Avoid intramuscular injections, enemas and rectal medication administration  - Ensure safe mobilization of patient  - Hold pressure on venipuncture sites to achieve adequate hemostasis  - Assess for signs and symptoms of internal bleeding  - Monitor lab  trends  - Patient is to report abnormal signs of bleeding to staff  - Avoid use of toothpicks and dental floss  - Use electric shaver for shaving  - Use soft bristle tooth brush  - Limit straining and forceful nose blowing  Outcome: Progressing

## 2024-02-17 NOTE — CM/SW NOTE
02/17/24 1500   Discharge disposition   Expected discharge disposition Home-Health   Post Acute Care Provider   ([facility])   Discharge transportation Private car     SW informed  provider of pt discharge and requested follow up with pt/family for SOC in the community.     ARMANDO Strong, LSW  Social Work   Ext:#[phone]

## 2024-02-17 NOTE — PROGRESS NOTES
[facility] Cardiology Progress Note    Isabel Patel Patient Status:  Inpatient    1931 MRN [id number]   Location [facility] 3W/SW Attending Fredrick Cornelius MD   Hosp Day # 5 PCP Fredrick Cornelius MD     Subjective:  No CV concerns   Son at bedside     Objective:  /62 (BP Location: Right arm)   Pulse 99   Temp 98.3 °F (36.8 °C) (Oral)   Resp 18   Wt 163 lb (73.9 kg)   SpO2 92%   BMI 30.80 kg/m²     Telemetry: Aflutter       Intake/Output:    Intake/Output Summary (Last 24 hours) at 2024 1438  Last data filed at 2024 1410  Gross per 24 hour   Intake 1224.17 ml   Output 650 ml   Net 574.17 ml       Last 3 Weights   24 0521 163 lb (73.9 kg)   24 0340 163 lb 9.6 oz (74.2 kg)   02/15/24 0632 169 lb 4.8 oz (76.8 kg)   24 0615 167 lb (75.8 kg)   24 0449 165 lb 12.8 oz (75.2 kg)   24 1615 164 lb 14.4 oz (74.8 kg)   24 1229 163 lb (73.9 kg)   24 1122 163 lb (73.9 kg)   24 1123 163 lb (73.9 kg)       Labs:  Recent Labs   Lab 02/15/24  0642 24  0708 24  0546   * 127* 120*   BUN 28* 33* 33*   CREATSERUM 1.06* 1.14* 1.03*   EGFRCR 49* 45* 51*   CA 9.0 9.5 9.0    139 137   K 4.2 4.2 4.2    103 104   CO2 28.0 30.0 28.0     Recent Labs   Lab 24  0515 02/15/24  0642 24  0709   RBC 4.68 4.74 4.96   HGB 13.7 13.9 14.4   HCT 43.2 42.5 44.1   MCV 92.3 89.7 88.9   MCH 29.3 29.3 29.0   MCHC 31.7 32.7 32.7   RDW 14.6 14.5 14.6   NEPRELIM 3.90 6.64 6.56   WBC 6.5 8.9 9.4   .0 237.0 258.0         Recent Labs   Lab 24  1346   TROPHS 8       Diagnostics:         Review of Systems   Respiratory: Negative.     Cardiovascular: Negative.      Physical Exam:    General: Alert and oriented x 3. No apparent distress.   HEENT: Normocephalic, anicteric sclera, neck supple, no thyromegaly or adenopathy.  Neck: No JVD, carotids 2+, no bruits.  Cardiac: Regular rate & rhythm. S1, S2 normal. No murmur,  PAIN - ADULT    • Verbalizes/displays adequate comfort level or patient's stated pain goal Progressing        Patient/Family Goals    • Patient/Family Long Term Goal Progressing    • Patient/Family Short Term Goal Progressing        RESPIRATORY - ADULT pericardial rub, S3, or extra cardiac sounds.  Lungs: Clear without wheezes, rales, rhonchi or dullness.  Normal excursions and effort.  Abdomen: Soft, non-tender. No organosplenomegally, mass or rebound, BS-present.  Extremities: Without clubbing or cyanosis.  No left lower extremity edema, no right lower extremity edema.  Neurologic: Alert and oriented, normal affect. No focal defects  Skin: Warm and dry.   Left upper chest ppm insertion site: C/D/I. Soft. Non-tender. No signs of bruising, bleeding,  hematoma, or infection noted         Medications:   dilTIAZem HCl ER Coated Beads  360 mg Oral Daily with dinner    apixaban  5 mg Oral BID    atenolol  50 mg Oral Daily    atorvastatin  20 mg Oral Daily    losartan  50 mg Oral Daily    insulin aspart  1-5 Units Subcutaneous TID CC      sodium chloride 50 mL/hr at 02/16/24 1422       Assessment:    New Atrial Fibrillation with RVR, Tachy-Rebel Syndrome, S/P [employer] Dual Chamber PPM 2/14  Initially presented with RVR w/intermittent low HR's  Now s/p DC PPM  CXR w/o pneumothorax  V-pacing 70's, afib rate controlled  On atenolol , dilitiazem ER  [id number]: On Eliquis    Chronic HFpEF, Pulm Hypertension  LVEF 60-65%, no RWMA, mild ao stenosis, mild AR, mild-mod TR, PASP 60mmHg  Appears compensated on exam. Off diuretics due to dizziness & hypotension   Hypertension - well controlled   Hyperlipidemia - on statin  CKD - Cr stable  Hx Breast Cancer  DMII - A1C 6.7    Plan:    Discharge withheld yesterday due to hypotension. Pt typically takes cardizem cd at night however it has been given in AM here.   Blood pressures well controlled after tapering medications according to pt's home schedule. Pt asymptomatic & compensated on exam   Ok to discharge home from Cardiology standpoint.   No reaching overhead or behind for 3 months, wear sling at night for one month, keep incision dry for 5 days, plan for outpatient f/u in device clinic in 7-10 days, f/u with   [facility] in 4 weeks     JONA Dc  2/17/2024  2:38 PM  Ph [phone] ([place])  Ph [phone] ([place])        L2  Seen and examined bedside. Agree with the present management.   Blood pressure and heart rate well-controlled.  Stable from cardiac standpoint we will sign off status post pacemaker on 2/14  Thank you for allowing me to participate in the care of your patient, feel free to contact me if you have any questions.    Tom Correa MD  [facility]  Interventional [facility].  [phone]

## 2024-02-17 NOTE — CM/SW NOTE
Reservation made to the following HH provider per pt/son requets.    [facility] [address], [address]  [place] [address]  Phone: [phone]  Fax: [phone]    SW updated pt AVS to reflect choice.    SW/CM to remain available for support and/or discharge planning.      ARMANDO Strong, LSW  Social Work   Ext:#[phone]

## 2024-02-17 NOTE — PROGRESS NOTES
[facility]    Progress Note    Isabel Patel Patient Status:  Inpatient    1931 MRN [id number]   Location [facility] 3W/SW Attending Fredrick Cornelius MD   Hosp Day # 5 PCP Fredrick Cornelius MD     Chief Complaint: She is feeling better.    Subjective:     Constitutional: Negative.    HENT: Negative.     Respiratory: Negative.     Cardiovascular: Negative.    Gastrointestinal: Negative.    Musculoskeletal: Negative.    Skin: Negative.        Objective:   Blood pressure 127/80, pulse 79, temperature 98.9 °F (37.2 °C), temperature source Axillary, resp. rate 18, weight 163 lb (73.9 kg), SpO2 92%, not currently breastfeeding.  Physical Exam  Vitals and nursing note reviewed.   Constitutional:       Appearance: Normal appearance.   HENT:      Head: Normocephalic and atraumatic.   Cardiovascular:      Rate and Rhythm: Normal rate and regular rhythm.      Pulses: Normal pulses.      Heart sounds: Normal heart sounds.   Pulmonary:      Effort: Pulmonary effort is normal.      Breath sounds: Normal breath sounds.   Abdominal:      Palpations: Abdomen is soft.   Musculoskeletal:      Cervical back: Normal range of motion and neck supple.   Skin:     General: Skin is warm.   Neurological:      Mental Status: She is alert. Mental status is at baseline.   Psychiatric:         Mood and Affect: Mood normal.         Results:   Lab Results   Component Value Date    WBC 9.4 2024    HGB 14.4 2024    HCT 44.1 2024    .0 2024    CREATSERUM 1.03 (H) 2024    BUN 33 (H) 2024     2024    K 4.2 2024     2024    CO2 28.0 2024     (H) 2024    CA 9.0 2024    ALB 3.1 (L) 2023    ALKPHO 125 2023    BILT 0.5 2023    TP 7.4 2023    AST 9 (L) 2023    ALT 12 (L) 2023    INR 1.0 2018    TSH 1.749 2024    DDIMER 2.97 (H) 2024    MG 1.7 (L) 2018    PHOS 3.2 2023     TROP 0.00 12/03/2018    TROPHS 8 02/12/2024    B12 599 03/16/2021       XR CHEST PA + LAT CHEST (CPT=71046)    Result Date: 2/15/2024  CONCLUSION:   Interval placement of a left chest wall dual lead pacemaker device.  No pneumothorax.  No new focal opacity.  Redemonstrated bibasilar atelectasis/scarring.  Multiple additional chronic findings are described in detail above and are not significantly changed when compared to 02/12/2024.     Dictated by (CST): Joseluis Hoover MD on 2/15/2024 at 12:04 PM     Finalized by (CST): Joseluis Hoover MD on 2/15/2024 at 12:08 PM               Assessment & Plan:       Respiratory insufficiency resolved she is off oxygen pulmonary is following     Atrial fibrillation, new onset (HCC)  Status post pacemaker, doing okay, cardiology is following             Controlled type 2 diabetes mellitus with diabetic nephropathy, without long-term current use of insulin (HCC)     Continue with Accu-Cheks and sliding scale insulin       HTN (hypertension)  Well-controlled continue current treatment          Hypercholesterolemia  Continue home meds        I spoke with son,      PT OT      MOY MOULTON MD  2/17/2024

## 2024-02-18 VITALS
WEIGHT: 163 LBS | SYSTOLIC BLOOD PRESSURE: 123 MMHG | DIASTOLIC BLOOD PRESSURE: 83 MMHG | HEART RATE: 109 BPM | OXYGEN SATURATION: 93 % | RESPIRATION RATE: 18 BRPM | BODY MASS INDEX: 31 KG/M2 | TEMPERATURE: 99 F

## 2024-02-18 LAB — GLUCOSE BLDC GLUCOMTR-MCNC: 110 MG/DL (ref 70–99)

## 2024-02-18 PROCEDURE — 99232 SBSQ HOSP IP/OBS MODERATE 35: CPT | Performed by: INTERNAL MEDICINE

## 2024-02-18 PROCEDURE — 99239 HOSP IP/OBS DSCHRG MGMT >30: CPT | Performed by: INTERNAL MEDICINE

## 2024-02-18 NOTE — PLAN OF CARE
Medications given per MAR. Patient sleeping, will continue to monitor and follow plan of care.     Problem: Patient Centered Care  Goal: Patient preferences are identified and integrated in the patient's plan of care  Description: Interventions:  - What would you like us to know as we care for you? From home with son  - Provide timely, complete, and accurate information to patient/family  - Incorporate patient and family knowledge, values, beliefs, and cultural backgrounds into the planning and delivery of care  - Encourage patient/family to participate in care and decision-making at the level they choose  - Honor patient and family perspectives and choices  Outcome: Progressing     Problem: Patient/Family Goals  Goal: Patient/Family Long Term Goal  Description: Patient's Long Term Goal: To go home    Interventions:  - Follow medical regimen  - See additional Care Plan goals for specific interventions  Outcome: Progressing  Goal: Patient/Family Short Term Goal  Description: Patient's Short Term Goal: To not be in AFIB    Interventions:   - Follow medical regimen  - See additional Care Plan goals for specific interventions  Outcome: Progressing     Problem: CARDIOVASCULAR - ADULT  Goal: Maintains optimal cardiac output and hemodynamic stability  Description: INTERVENTIONS:  - Monitor vital signs, rhythm, and trends  - Monitor for bleeding, hypotension and signs of decreased cardiac output  - Evaluate effectiveness of vasoactive medications to optimize hemodynamic stability  - Monitor arterial and/or venous puncture sites for bleeding and/or hematoma  - Assess quality of pulses, skin color and temperature  - Assess for signs of decreased coronary artery perfusion - ex. Angina  - Evaluate fluid balance, assess for edema, trend weights  Outcome: Progressing  Goal: Absence of cardiac arrhythmias or at baseline  Description: INTERVENTIONS:  - Continuous cardiac monitoring, monitor vital signs, obtain 12 lead EKG if  indicated  - Evaluate effectiveness of antiarrhythmic and heart rate control medications as ordered  - Initiate emergency measures for life threatening arrhythmias  - Monitor electrolytes and administer replacement therapy as ordered  Outcome: Progressing     Problem: METABOLIC/FLUID AND ELECTROLYTES - ADULT  Goal: Glucose maintained within prescribed range  Description: INTERVENTIONS:  - Monitor Blood Glucose as ordered  - Assess for signs and symptoms of hyperglycemia and hypoglycemia  - Administer ordered medications to maintain glucose within target range  - Assess barriers to adequate nutritional intake and initiate nutrition consult as needed  - Instruct patient on self management of diabetes  Outcome: Progressing  Goal: Electrolytes maintained within normal limits  Description: INTERVENTIONS:  - Monitor labs and rhythm and assess patient for signs and symptoms of electrolyte imbalances  - Administer electrolyte replacement as ordered  - Monitor response to electrolyte replacements, including rhythm and repeat lab results as appropriate  - Fluid restriction as ordered  - Instruct patient on fluid and nutrition restrictions as appropriate  Outcome: Progressing     Problem: HEMATOLOGIC - ADULT  Goal: Maintains hematologic stability  Description: INTERVENTIONS  - Assess for signs and symptoms of bleeding or hemorrhage  - Monitor labs and vital signs for trends  - Administer supportive blood products/factors, fluids and medications as ordered and appropriate  - Administer supportive blood products/factors as ordered and appropriate  Outcome: Progressing  Goal: Free from bleeding injury  Description: (Example usage: patient with low platelets)  INTERVENTIONS:  - Avoid intramuscular injections, enemas and rectal medication administration  - Ensure safe mobilization of patient  - Hold pressure on venipuncture sites to achieve adequate hemostasis  - Assess for signs and symptoms of internal bleeding  - Monitor lab  trends  - Patient is to report abnormal signs of bleeding to staff  - Avoid use of toothpicks and dental floss  - Use electric shaver for shaving  - Use soft bristle tooth brush  - Limit straining and forceful nose blowing  Outcome: Progressing

## 2024-02-18 NOTE — PROGRESS NOTES
[facility]     Progress Note    Isabel Patel Patient Status:  Inpatient    1931 MRN [id number]   Location [facility] 3W/SW Attending Fredrick Cornelius MD   Hosp Day # 6 PCP Fredrick Cornelius MD       Subjective:   Seen and examined.  Denies significant dyspnea    Objective:   Blood pressure 128/65, pulse 85, temperature 98.6 °F (37 °C), temperature source Oral, resp. rate 18, weight 163 lb (73.9 kg), SpO2 92%, not currently breastfeeding.  Intake/Output:   Last 3 shifts: I/O last 3 completed shifts:  In: 1764.2 [P.O.:1000; I.V.:764.2]  Out: 725 [Urine:725]   This shift: No intake/output data recorded.     Vent Settings:      Hemodynamic parameters (last 24 hours):      Scheduled Meds:   Current Facility-Administered Medications   Medication Dose Route Frequency    dilTIAZem ER (CardIZEM CD) 24 hr cap 360 mg  360 mg Oral Daily with dinner    apixaban (Eliquis) tab 5 mg  5 mg Oral BID    atenolol (Tenormin) tab 50 mg  50 mg Oral Daily    acetaminophen (Tylenol) tab 650 mg  650 mg Oral Q4H PRN    Or    acetaminophen-codeine (Tylenol #3) 300-30 MG per tab 1 tablet  1 tablet Oral Q4H PRN    Or    acetaminophen-codeine (Tylenol #3) 300-30 MG per tab 2 tablet  2 tablet Oral Q4H PRN    glucose (Dex4) 15 GM/59ML oral liquid 15 g  15 g Oral Q15 Min PRN    Or    glucose (Glutose) 40% oral gel 15 g  15 g Oral Q15 Min PRN    Or    glucose-vitamin C (Dex-4) chewable tab 4 tablet  4 tablet Oral Q15 Min PRN    Or    dextrose 50% injection 50 mL  50 mL Intravenous Q15 Min PRN    Or    glucose (Dex4) 15 GM/59ML oral liquid 30 g  30 g Oral Q15 Min PRN    Or    glucose (Glutose) 40% oral gel 30 g  30 g Oral Q15 Min PRN    Or    glucose-vitamin C (Dex-4) chewable tab 8 tablet  8 tablet Oral Q15 Min PRN    acetaminophen (Tylenol Extra Strength) tab 500 mg  500 mg Oral Q6H PRN    atorvastatin (Lipitor) tab 20 mg  20 mg Oral Daily    losartan (Cozaar) tab 50 mg  50 mg Oral Daily    insulin aspart (NovoLOG) 100  Units/mL FlexPen 1-5 Units  1-5 Units Subcutaneous TID CC    melatonin tab 6 mg  6 mg Oral Nightly PRN       Continuous Infusions:     Physical Exam  Constitutional: no acute distress  Eyes: PERRL  ENT: nares pateint  Neck: supple, no JVD  Cardio: RRR, S1 S2  Respiratory: clear to auscultation bilaterally, no wheezing, rales, rhonchi, crackles  GI: abdomen soft, non tender, active bowel sounds, no organomegaly  Extremities: no clubbing, cyanosis, edema  Neurologic: no gross motor deficits  Skin: warm, dry      Results:        No results found.          Assessment   1.  Atrial fibrillation  2.  Hypoxia, resolved  3.  Diabetes mellitus  4.  Hypertension  5.  Tachybradycardia syndrome  6.  Pulmonary hypertension     Plan   -Presented with evidence of atrial fibrillation with rapid ventricular rate found to have evidence of episodes of bradycardia noted.  Received PPM during hospital course  -Further conditions per cardiology  -No history of known lung disease  -No clinical suspicion for active pulmonary etiology contributing to patient's hypoxia  -Incentive spirometry  -Discussed with son  -Okay for discharge from pulmonary perspective.  Will sign off    True Lerner DO  Pulmonary Critical Care Medicine  [facility]

## 2024-02-18 NOTE — DISCHARGE SUMMARY
DISCHARGE SUMMARY     Isabel Patel Patient Status:  Inpatient    1931 MRN [id number]   Location [facility] 3W/SW Attending Fredrick Cornelius MD   Hosp Day # 6 PCP Fredrick Cornelius MD     Date of Admission: 2024  Date of Discharge: 2024  Discharge Disposition: Home or Self Care    Admitting Diagnosis:   Atrial fibrillation, new onset (HCC) [I48.91]    Hospital Discharge Diagnoses: a fib Status post pacemaker    TCM Diagnosis at discharge from Hospital: Other: afib s/p pacemaker ; no TCM follow-up needed    Please note that only patients enrolled in the Medicare ACO, BCBS ACO and [facility] HMOs will be handled by a member of the Care Management Team.  For all other patients, please follow usual protocol for discharge care transition.    Lace+ Score: 68  59-90 High Risk  29-58 Medium Risk  0-28   Low Risk.    Risk of readmission: Isabel Patel has Moderate Risk of readmission after discharge from the hospital.    History of Present Illness:     Brief Synopsis: Respiratory insufficiency resolved      Atrial fibrillation, new onset (HCC)  Status post pacemaker, doing okay, cardiology is following             Controlled type 2 diabetes mellitus with diabetic nephropathy, without long-term current use of insulin (HCC)     Continue  home meds        HTN (hypertension)  Well-controlled continue current treatment          Hypercholesterolemia  Continue home meds        I spoke with son,        Discharge Physical Exam:   Physical Exam  Vitals and nursing note reviewed.   Constitutional:       Appearance: Normal appearance.   HENT:      Head: Normocephalic and atraumatic.   Cardiovascular:      Rate and Rhythm: Normal rate and regular rhythm.      Pulses: Normal pulses.      Heart sounds: Normal heart sounds.   Pulmonary:      Effort: Pulmonary effort is normal.      Breath sounds: Normal breath sounds.   Abdominal:      Palpations: Abdomen is soft.   Musculoskeletal:      Cervical back: Normal range of  motion and neck supple.   Skin:     General: Skin is warm.   Neurological:      Mental Status: She is alert. Mental status is at baseline.   Psychiatric:         Mood and Affect: Mood normal.        Discharge Medication List:     Discharge Medications        START taking these medications        Instructions Prescription details   apixaban 5 MG Tabs  Commonly known as: Eliquis      Take 1 tablet (5 mg total) by mouth 2 (two) times daily.   Quantity: 60 tablet  Refills: 1            CONTINUE taking these medications        Instructions Prescription details   Accu-Chek Guide Control Liqd      1 each by In Vitro route every 30 (thirty) days.   Quantity: 1 each  Refills: 11     Accu-Chek Guide Me w/Device Kit      1 each daily. Check once a day.   Quantity: 1 kit  Refills: 0     Accu-Chek Softclix Lancets Misc      TEST ONE TIME DAILY   Quantity: 100 each  Refills: 3     Accu-Chek Softclix Lancets Misc      Use once a day.   Quantity: 100 each  Refills: 3     atenolol 50 MG Tabs  Commonly known as: Tenormin      Take 1 tablet (50 mg total) by mouth daily.   Quantity: 90 tablet  Refills: 1     atorvastatin 20 MG Tabs  Commonly known as: Lipitor      Take 1 tablet (20 mg total) by mouth daily.   Quantity: 90 tablet  Refills: 1     dilTIAZem HCl ER Coated Beads 180 MG Cp24  Commonly known as: CARDIZEM CD      Take 2 capsules (360 mg total) by mouth daily.   Quantity: 180 capsule  Refills: 1     losartan 50 MG Tabs  Commonly known as: Cozaar      Take 1 tablet (50 mg total) by mouth daily.   Quantity: 90 tablet  Refills: 1            STOP taking these medications      aspirin 81 MG Chew               ASK your doctor about these medications        Instructions Prescription details   Accu-Chek Karon Plus Strp      TEST ONE TIME DAILY   Quantity: 100 strip  Refills: 3     Accu-Chek Guide Strp      Check once daily   Quantity: 100 strip  Refills: 11     acetaminophen 500 MG Tabs  Commonly known as: Tylenol Extra Strength       Take 2 tablets (1,000 mg total) by mouth every 6 (six) hours as needed for Pain.   Refills: 0     cholecalciferol 25 MCG (1000 UT) Caps  Commonly known as: Vitamin D High Potency      Take 1 capsule (1,000 Units total) by mouth daily.   Quantity: 30 capsule  Refills: 0     Co Q 10 10 MG Caps      Take 180 mg by mouth daily.   Refills: 0     lidocaine 5 % Ptch  Commonly known as: Lidoderm      Place 1 patch onto the skin Q24H PRN.   Quantity: 30 patch  Refills: 0     TURMERIC CURCUMIN OR      Take by mouth.   Refills: 0               Where to Get Your Medications        These medications were sent to [id number] - [place], [place] - [address] [phone], [phone]  [address] [address]      Hours: 24-hours Phone: [phone]   apixaban 5 MG Tabs         Discharge Plan:  Discharge Condition: Good    Current Discharge Medication List        New Orders    Details   APIXABAN 5 MG Oral Tab Take 1 tablet (5 mg total) by mouth 2 (two) times daily.           Home Meds - Unchanged    Details   losartan 50 MG Oral Tab Take 1 tablet (50 mg total) by mouth daily.      dilTIAZem HCl ER Coated Beads 180 MG Oral Capsule SR 24 Hr Take 2 capsules (360 mg total) by mouth daily.      atorvastatin 20 MG Oral Tab Take 1 tablet (20 mg total) by mouth daily.      atenolol 50 MG Oral Tab Take 1 tablet (50 mg total) by mouth daily.      cholecalciferol (VITAMIN D HIGH POTENCY) 25 MCG (1000 UT) Oral Cap Take 1 capsule (1,000 Units total) by mouth daily.      lidocaine 5 % External Patch Place 1 patch onto the skin Q24H PRN.      TURMERIC CURCUMIN OR Take by mouth.      acetaminophen 500 MG Oral Tab Take 2 tablets (1,000 mg total) by mouth every 6 (six) hours as needed for Pain.      Coenzyme Q10 (CO Q 10) 10 MG Oral Cap Take 180 mg by mouth daily.      !! Accu-Chek Softclix Lancets Does not apply Misc Use once a day.      Blood Glucose Calibration (ACCU-CHEK GUIDE CONTROL) In Vitro Liquid 1 each by In Vitro route every  30 (thirty) days.      Blood Glucose Monitoring Suppl (ACCU-CHEK GUIDE ME) w/Device Does not apply Kit 1 each daily. Check once a day.      !! Glucose Blood (ACCU-CHEK GUIDE) In Vitro Strip Check once daily      !! ACCU-CHEK AMBIKA PLUS In Vitro Strip TEST ONE TIME DAILY      !! ACCU-CHEK SOFTCLIX LANCETS Does not apply Misc TEST ONE TIME DAILY       !! - Potential duplicate medications found. Please discuss with provider.              Discharge Diet: As tolerated    Discharge Activity: As tolerated    Follow-up appointment:   MAX JAIN  [address] [facility] [address]  [facility] [address] [phone]  Follow up in 1 week(s)  Office will call to schedule follow up, For wound re-check    Dejuan Haney MD  [address] [facility] [address]  [facility] [id number] [phone]    Follow up in 4 week(s)  Office will call to schedule      Vital signs:  Temp:  [98.4 °F (36.9 °C)-99 °F (37.2 °C)] 99 °F (37.2 °C)  Pulse:  [] 109  Resp:  [18] 18  BP: (105-130)/(57-93) 123/83  SpO2:  [92 %-93 %] 93 %    -----------------------------------------------------------------------------------------------  PATIENT DISCHARGE INSTRUCTIONS: See electronic chart    MOY MOULTON MD 2/18/2024    Time spent:  30 to 74 minutes

## 2024-02-18 NOTE — PLAN OF CARE
Patient was cleared by pulmonology and cardiology. Ambulated on unit. Plan for discharge with  tomorrow morning per hospitalist.     Problem: CARDIOVASCULAR - ADULT  Goal: Maintains optimal cardiac output and hemodynamic stability  Description: INTERVENTIONS:  - Monitor vital signs, rhythm, and trends  - Monitor for bleeding, hypotension and signs of decreased cardiac output  - Evaluate effectiveness of vasoactive medications to optimize hemodynamic stability  - Monitor arterial and/or venous puncture sites for bleeding and/or hematoma  - Assess quality of pulses, skin color and temperature  - Assess for signs of decreased coronary artery perfusion - ex. Angina  - Evaluate fluid balance, assess for edema, trend weights  Outcome: Progressing  Goal: Absence of cardiac arrhythmias or at baseline  Description: INTERVENTIONS:  - Continuous cardiac monitoring, monitor vital signs, obtain 12 lead EKG if indicated  - Evaluate effectiveness of antiarrhythmic and heart rate control medications as ordered  - Initiate emergency measures for life threatening arrhythmias  - Monitor electrolytes and administer replacement therapy as ordered  Outcome: Progressing     Problem: METABOLIC/FLUID AND ELECTROLYTES - ADULT  Goal: Glucose maintained within prescribed range  Description: INTERVENTIONS:  - Monitor Blood Glucose as ordered  - Assess for signs and symptoms of hyperglycemia and hypoglycemia  - Administer ordered medications to maintain glucose within target range  - Assess barriers to adequate nutritional intake and initiate nutrition consult as needed  - Instruct patient on self management of diabetes  Outcome: Progressing  Goal: Electrolytes maintained within normal limits  Description: INTERVENTIONS:  - Monitor labs and rhythm and assess patient for signs and symptoms of electrolyte imbalances  - Administer electrolyte replacement as ordered  - Monitor response to electrolyte replacements, including rhythm and repeat lab  results as appropriate  - Fluid restriction as ordered  - Instruct patient on fluid and nutrition restrictions as appropriate  Outcome: Progressing     Problem: HEMATOLOGIC - ADULT  Goal: Maintains hematologic stability  Description: INTERVENTIONS  - Assess for signs and symptoms of bleeding or hemorrhage  - Monitor labs and vital signs for trends  - Administer supportive blood products/factors, fluids and medications as ordered and appropriate  - Administer supportive blood products/factors as ordered and appropriate  Outcome: Progressing  Goal: Free from bleeding injury  Description: (Example usage: patient with low platelets)  INTERVENTIONS:  - Avoid intramuscular injections, enemas and rectal medication administration  - Ensure safe mobilization of patient  - Hold pressure on venipuncture sites to achieve adequate hemostasis  - Assess for signs and symptoms of internal bleeding  - Monitor lab trends  - Patient is to report abnormal signs of bleeding to staff  - Avoid use of toothpicks and dental floss  - Use electric shaver for shaving  - Use soft bristle tooth brush  - Limit straining and forceful nose blowing  Outcome: Progressing

## 2024-02-18 NOTE — PLAN OF CARE
Discharge education given to son, verbalized understanding. Brought home by son with home health.

## 2024-02-20 ENCOUNTER — PATIENT OUTREACH (OUTPATIENT)
Dept: CASE MANAGEMENT | Age: 89
End: 2024-02-20

## 2024-02-20 DIAGNOSIS — E11.21 CONTROLLED TYPE 2 DIABETES MELLITUS WITH DIABETIC NEPHROPATHY, WITHOUT LONG-TERM CURRENT USE OF INSULIN (HCC): ICD-10-CM

## 2024-02-20 DIAGNOSIS — I10 HYPERTENSION, UNSPECIFIED TYPE: ICD-10-CM

## 2024-02-20 DIAGNOSIS — I48.91 ATRIAL FIBRILLATION, NEW ONSET (HCC): Primary | ICD-10-CM

## 2024-02-20 DIAGNOSIS — Z02.9 ENCOUNTERS FOR UNSPECIFIED ADMINISTRATIVE PURPOSE: ICD-10-CM

## 2024-02-20 PROCEDURE — 1111F DSCHRG MED/CURRENT MED MERGE: CPT

## 2024-02-20 NOTE — PROGRESS NOTES
Initial Post Discharge Follow Up   Discharge Date: 2/18/24  Contact Date: 2/20/2024    Consent Verification:  Assessment Completed With: Caregiver: Haja Permission received per patient?  written  HIPAA Verified?  Yes    Discharge Dx:   Atrial fibrillation  Status post pacemaker  Controlled type 2 diabetes mellitus with diabetic nephropathy, without long-term current use of insulin   HTN (hypertension)  Hypercholesterolemia      General:   How have you been since your discharge from the hospital? The son reports the patient has remained restless/anxious. The patient has been feeling quivers in the body when trying to take a nap or sleep at night. The patient's pulse has been fluctuating from 's at rest with the pulse oximeter but when the son is checking manually from the wrist the pulse is from . The patient denies any dizziness, SOB, chest pain, palpitations or change in heart beat/rhythm. The patient's oxygen saturation was at 90-94 during the TCM call. The patient is scheduled with cardiology later today. The son did report continued generalized weakness after the hospital stay/procedure. The son reported the chest incision dressing has a dot of blood. Symptoms were mentioned to the cardiologist yesterday. The son denies any redness, edema, discharge, foul odor or heat around the incision site. The son also denies any fever. The son denies any bilateral calf edema, pain, warmth, redness or tenderness. The son also denies any hematuria, melena, hematochezia, or bleeding from the gums, nose or cuts. The son did report a slightly elevated fasting glucose reading the past two days:   2/19: 142  2/20: 144  The son denies any polyuria, polydipsia, headache, fruity breath, n/v, abdominal pain, or uncontrolled glucose readings over 300.  The patient has not had BP checked since discharge.   Do you have any pain since discharge?  No    How well was your pain managed while in the hospital?   On a scale of 1-5    1- Very Poor and 5- Very well   Very Well  When you were leaving the hospital were your discharge instructions reviewed with you? Yes  How well were your discharge instructions explained to you?   On a scale of 1-5   1- Very Poor and 5- Very well   3; NCM did review the following:   No reaching overhead or behind for 3 months, wear sling at night for one month, keep incision dry for 5 days, plan for outpatient f/u in device clinic in 7-10 days, f/u with Dr. Haney in 4 weeks      Do you have any questions about your discharge instructions?  No  Before leaving the hospital was your diagnoses explained to you? Yes  Do you have any questions about your diagnoses? No  Are you able to perform normal daily activities of living as you have prior to your hospital stay (dressing, bathing, ambulating to the bathroom, etc)? yes; The patient is ambulating at ome with the use of a walker or uses a wheel chair as needed. NCM did review/stress the importance of fall precautions.   (NCM) Was patient given a different diet per AVS? no      Medications: NCM did confirm the patient has discontinued the following as directed:  STOP taking:  aspirin 81 MG Chew  Current Outpatient Medications   Medication Sig Dispense Refill    APIXABAN 5 MG Oral Tab Take 1 tablet (5 mg total) by mouth 2 (two) times daily. 60 tablet 1    losartan 50 MG Oral Tab Take 1 tablet (50 mg total) by mouth daily. 90 tablet 1    dilTIAZem HCl ER Coated Beads 180 MG Oral Capsule SR 24 Hr Take 2 capsules (360 mg total) by mouth daily. 180 capsule 1    atorvastatin 20 MG Oral Tab Take 1 tablet (20 mg total) by mouth daily. 90 tablet 1    atenolol 50 MG Oral Tab Take 1 tablet (50 mg total) by mouth daily. 90 tablet 1    Accu-Chek Softclix Lancets Does not apply Misc Use once a day. 100 each 3    Blood Glucose Calibration (ACCU-CHEK GUIDE CONTROL) In Vitro Liquid 1 each by In Vitro route every 30 (thirty) days. 1 each 11    Blood Glucose Monitoring Suppl (ACCU-CHEK  GUIDE ME) w/Device Does not apply Kit 1 each daily. Check once a day. 1 kit 0    Glucose Blood (ACCU-CHEK GUIDE) In Vitro Strip Check once daily 100 strip 11    ACCU-CHEK AMBIKA PLUS In Vitro Strip TEST ONE TIME DAILY 100 strip 3    ACCU-CHEK SOFTCLIX LANCETS Does not apply Misc TEST ONE TIME DAILY 100 each 3    cholecalciferol (VITAMIN D HIGH POTENCY) 25 MCG (1000 UT) Oral Cap Take 1 capsule (1,000 Units total) by mouth daily. 30 capsule 0    lidocaine 5 % External Patch Place 1 patch onto the skin Q24H PRN. 30 patch 0    TURMERIC CURCUMIN OR Take by mouth.      acetaminophen 500 MG Oral Tab Take 2 tablets (1,000 mg total) by mouth every 6 (six) hours as needed for Pain.      Coenzyme Q10 (CO Q 10) 10 MG Oral Cap Take 180 mg by mouth daily.       Were there any changes to your current medication(s) noted on the AVS? Yes  If so, were these medication changes discussed with you prior to leaving the hospital? Yes  If a new medication was prescribed:    Was the new medication's purpose & side effects reviewed? Yes  Do you have any questions about your new medication? No; NCM did review anticoagulation with the patient in detail including possible side effects, and the importance of refraining from NSAID use.     Did you  your discharge medications when you left the hospital? Yes  Let's go over your medications together to make sure we are not missing anything. Medications Reviewed  Are there any reasons that keep you from taking your medication as prescribed? No  Are you having any concerns with constipation? No  Did patient receive their flu shot (Sept-March)? No    Discharge medications reviewed/discussed/and reconciled against outpatient medications with patient.  Any changes or updates to medications sent to PCP.  Patient Acknowledged     Referrals/orders at D/C:  Referrals/orders placed at D/C? yes  What services:   Home health   (If HH was ordered) Has HH been set up?  Yes; Home visits will begin on  2/21/2024.     If Yes  [facility] [id number], [address]  [place] [address]  Phone: [phone]  Fax: [phone]    Except for Home Health Services mentioned above, have you scheduled these other services? No    DME ordered at D/C? Yes  What? Incentive Spirometer, Sling     Have you received your (DME)? yes; The patient has continued deep breathing exercises as directed.     The patient has a PPM placed.     The patient has a walker and wheel chair at home.     Discharge orders, AVS reviewed and discussed with patient. Any changes or updates to orders sent to PCP.  Patient Acknowledged      SDOH:   Transportation:   Transportation Needs: No Transportation Needs (2/20/2024)    Transportation Needs     Lack of Transportation: No     Financial Strain:   Financial Resource Strain: Low Risk  (2/20/2024)    Financial Resource Strain     Difficulty of Paying Living Expenses: Not hard at all     Med Affordability: No       Diagnosis specifics:  CV Surgery:   Have there been any changes in your wound or incision?    yes    If Yes, explain: The son reported blood in the chest dressing. The patient will be evaluated by cardiology later today.   Are you walking more and more each day?    yes  Are you continuing to use your incentive spirometer?  Yes  How well did you feel prepared for your surgery:well prepared   Appointments Scheduled:    PCP in one week:   no   Cardiologist 2 weeks yes          Follow up appointments:      Your appointments       Date & Time Appointment Department (Center)    Mar 20, 2024 11:00 AM CDT Uro Follow Up Pre/Post Op with Zulema Holland PA [facility] [place] (--)        Apr 29, 2024 10:30 AM CDT Medicare Annual Well Visit with Fredrick Cornelius MD [facility] [place] [place] ([facility])              [facility] [place] [place]  [facility]  [facility]  [address]  [facility] [address]  [phone] [facility]  [address]  [facility] [address]  [phone]            TCC  Was TCC ordered: No      PCP (If no TCC appointment)  Does patient already have a PCP appointment scheduled? No  NCM Scheduled PCP office TCM appointment on 2/26/2024 for Dr. Cornelius. The son was requesting the [place] office and this was the soonest available appointment.       Specialist    Does the patient have any other follow up appointment(s) needing to be scheduled? Yes    The patient is scheduled with cardiology today, 2/20/2024 at 1:30.     The patient is also scheduled with Dr. Holland on 3/20/2024.     Is there any reason as to why you cannot make your appointment(s)?  No     Needs post D/C:   Now that you are home, are there any needs or concerns you need addressed before your next visit with your PCP?  (DME, meds, questions, etc.): No    Interventions by [facility]:    Reviewed all discharge instructions with the patient's son with a focus on post-op directions/restrictions and anticoagulation therapy. All medications were reviewed and educated on the importance of taking the medications as directed.  Patient symptoms were assessed, education was completed for signs/symptoms to monitor, and reviewed when to contact providers vs go to the ED/call 911. Appointments were reviewed and stressed the importance of a close follow up with providers. A TCM-HFU appointment was scheduled.  HH orders were confirmed and scheduled. Confirmed patient has DME and understands proper use. The patient's son verbalized understanding and will contact the office with any further questions or concerns.       Overall Rating:   How would you rate the care you received while in the hospital? excellent    CCM referral placed:    Yes    BOOK BY DATE: 3/3/2024

## 2024-02-22 ENCOUNTER — TELEPHONE (OUTPATIENT)
Dept: INTERNAL MEDICINE CLINIC | Facility: CLINIC | Age: 89
End: 2024-02-22

## 2024-02-22 NOTE — TELEPHONE ENCOUNTER
[facility] calling to asking if Dr. Cornelius will sign for home health and plan of care. Patient will be receiving physical therapy, occupational therapy, and a nurse every 2 weeks   Fax# [phone]

## 2024-02-26 ENCOUNTER — OFFICE VISIT (OUTPATIENT)
Dept: INTERNAL MEDICINE CLINIC | Facility: CLINIC | Age: 89
End: 2024-02-26

## 2024-02-26 VITALS
HEART RATE: 86 BPM | DIASTOLIC BLOOD PRESSURE: 64 MMHG | TEMPERATURE: 98 F | HEIGHT: 61 IN | BODY MASS INDEX: 31 KG/M2 | SYSTOLIC BLOOD PRESSURE: 114 MMHG | OXYGEN SATURATION: 95 % | RESPIRATION RATE: 18 BRPM

## 2024-02-26 DIAGNOSIS — Z09 HOSPITAL DISCHARGE FOLLOW-UP: Primary | ICD-10-CM

## 2024-02-26 DIAGNOSIS — Z95.0 STATUS POST BIVENTRICULAR PACEMAKER: ICD-10-CM

## 2024-02-26 DIAGNOSIS — I48.91 ATRIAL FIBRILLATION, NEW ONSET (HCC): ICD-10-CM

## 2024-02-26 PROCEDURE — 99495 TRANSJ CARE MGMT MOD F2F 14D: CPT | Performed by: INTERNAL MEDICINE

## 2024-02-26 NOTE — PROGRESS NOTES
Subjective:     Patient ID: Isabel Patel is a 89 year old female.    HPI  Patient here with son for follow-up after she was admitted in the hospital with new A-fib with fluctuating heart rate she had a biventricular pacemaker placed she has been doing okay but as per son her heart rate continues to fluctuate from 120s 130s then down to the 60s 70s 80s patient was seen for pacemaker check she has a follow-up appoint with cardiology next week  Otherwise doing okay still with shortness of breath with exertion having hard time sleeping since she cannot move her left arm shoulder      History/Other:   Review of Systems   Constitutional: Negative.    HENT: Negative.     Eyes: Negative.    Respiratory: Negative.     Cardiovascular: Negative.    Gastrointestinal: Negative.    Genitourinary: Negative.    Musculoskeletal: Negative.    Skin: Negative.    Neurological: Negative.    Psychiatric/Behavioral: Negative.       Current Outpatient Medications   Medication Sig Dispense Refill    APIXABAN 5 MG Oral Tab Take 1 tablet (5 mg total) by mouth 2 (two) times daily. 60 tablet 1    losartan 50 MG Oral Tab Take 1 tablet (50 mg total) by mouth daily. 90 tablet 1    dilTIAZem HCl ER Coated Beads 180 MG Oral Capsule SR 24 Hr Take 2 capsules (360 mg total) by mouth daily. 180 capsule 1    atorvastatin 20 MG Oral Tab Take 1 tablet (20 mg total) by mouth daily. 90 tablet 1    atenolol 50 MG Oral Tab Take 1 tablet (50 mg total) by mouth daily. 90 tablet 1    Accu-Chek Softclix Lancets Does not apply Misc Use once a day. 100 each 3    Blood Glucose Calibration (ACCU-CHEK GUIDE CONTROL) In Vitro Liquid 1 each by In Vitro route every 30 (thirty) days. 1 each 11    Blood Glucose Monitoring Suppl (ACCU-CHEK GUIDE ME) w/Device Does not apply Kit 1 each daily. Check once a day. 1 kit 0    Glucose Blood (ACCU-CHEK GUIDE) In Vitro Strip Check once daily 100 strip 11    ACCU-CHEK AMBIKA PLUS In Vitro Strip TEST ONE TIME DAILY 100 strip 3     ACCU-CHEK SOFTCLIX LANCETS Does not apply Misc TEST ONE TIME DAILY 100 each 3    cholecalciferol (VITAMIN D HIGH POTENCY) 25 MCG (1000 UT) Oral Cap Take 1 capsule (1,000 Units total) by mouth daily. 30 capsule 0    lidocaine 5 % External Patch Place 1 patch onto the skin Q24H PRN. 30 patch 0    TURMERIC CURCUMIN OR Take by mouth.      acetaminophen 500 MG Oral Tab Take 2 tablets (1,000 mg total) by mouth every 6 (six) hours as needed for Pain.      Coenzyme Q10 (CO Q 10) 10 MG Oral Cap Take 180 mg by mouth daily.       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    CHOLECYSTECTOMY      D & C      KNEE REPLACEMENT SURGERY Right 2012    LUMPECTOMY RIGHT  2016    cancer    MASTECTOMY PARTIAL Right 2016      [id number]    SKIN SURGERY Right 2018    TONSILLECTOMY      WRIST FRACTURE SURGERY Right       Family History   Problem Relation Age of Onset    Heart Disease Father         CAD    Diabetes Father     Heart Disease Mother         CAD    Diabetes Mother     Breast Cancer Mother 83        had lumpectomy and RT.  No other treatment.   of stroke at 89    Other (appendicitis[other]) Brother         age 13    Other (alive and well[other]) Sister     Other (alive and well[other]) Son         x3    Breast Cancer  Self 85    Glaucoma Neg     Macular degeneration Neg       Social History:   Social History     Socioeconomic History    Marital status:     Number of children: 3   Occupational History    Occupation:      Comment: retired   Tobacco Use    Smoking status: Never    Smokeless tobacco: Never   Vaping Use    Vaping Use: Never used   Substance and Sexual Activity    Alcohol use: Not Currently    Drug use: Never    Sexual activity: Not Currently   Other Topics Concern     Service No    Caffeine Concern Yes     Comment: coffee, 1 cups daily    Occupational Exposure No     Social Determinants of Health     Financial Resource Strain: Low Risk  (2/20/2024)    Financial Resource Strain     Difficulty of Paying Living Expenses: Not hard at all     Med Affordability: No   Food Insecurity: No Food Insecurity (2/12/2024)    Food Insecurity     Food Insecurity: Never true   Transportation Needs: No Transportation Needs (2/20/2024)    Transportation Needs     Lack of Transportation: No   Housing Stability: Low Risk  (2/12/2024)    Housing Stability     Housing Instability: No        Objective:   Physical Exam  Constitutional:       Appearance: She is well-developed.   HENT:      Head: Normocephalic and atraumatic.      Right Ear: External ear normal.      Left Ear: External ear normal.      Nose: Nose normal.   Eyes:      Conjunctiva/sclera: Conjunctivae normal.      Pupils: Pupils are equal, round, and reactive to light.   Cardiovascular:      Rate and Rhythm: Normal rate and regular rhythm.      Heart sounds: Normal heart sounds.   Pulmonary:      Effort: Pulmonary effort is normal.      Breath sounds: Normal breath sounds.   Abdominal:      General: Bowel sounds are normal.      Palpations: Abdomen is soft.   Genitourinary:     Vagina: Normal.   Musculoskeletal:         General: Normal range of motion.      Cervical back: Normal range of motion and neck supple.   Skin:     General: Skin is warm and dry.    Neurological:      Mental Status: She is alert and oriented to person, place, and time.      Deep Tendon Reflexes: Reflexes are normal and symmetric.   Psychiatric:         Behavior: Behavior normal.         Thought Content: Thought content normal.         Judgment: Judgment normal.         Assessment & Plan:   1. Hospital discharge follow-up doing okay overall to follow-up with cardiology next week   2. Atrial fibrillation, new onset (HCC) medical management heart rate fluctuating follow-up with cardiology   3. Status post biventricular pacemaker    Pacemaker check follow-up with cardiology       No orders of the defined types were placed in this encounter.      Meds This Visit:  Requested Prescriptions      No prescriptions requested or ordered in this encounter       Imaging & Referrals:  None

## 2024-03-08 ENCOUNTER — TELEPHONE (OUTPATIENT)
Dept: INTERNAL MEDICINE CLINIC | Facility: CLINIC | Age: 89
End: 2024-03-08

## 2024-03-08 RX ORDER — FUROSEMIDE 20 MG/1
20 TABLET ORAL DAILY
Qty: 30 TABLET | Refills: 0 | Status: SHIPPED | OUTPATIENT
Start: 2024-03-08

## 2024-03-08 NOTE — TELEPHONE ENCOUNTER
[facility]   [phone]     Informing Dr Cornelius, pt's weight has increased more than 10lbs since last time I saw her Feb 21st. She was 158 and now is 177lbs.  Swelling of both feet is present; plus 3.  Right shoulder pain is 6-7 out of ten on pain scale.  The heart rate was fluctuating from 66 - 101.  No shortness of breath

## 2024-03-08 NOTE — TELEPHONE ENCOUNTER
Will send Lasix 20 mg daily tfollow-up with me next week sometime Tuesday Wednesday we will see how she does any worsening symptoms need to go to ER

## 2024-03-08 NOTE — TELEPHONE ENCOUNTER
Contacted Racheal from [facility] contacted (name and  of patient verified). Dr. Cornelius's message reviewed, verbalizes understanding and states she will update patient's nurse.    Medication Quantity Refills Start End   furosemide 20 MG Oral Tab 30 tablet 0 3/8/2024 --   Sig:   Take 1 tablet (20 mg total) by mouth daily.     Pharmacy  [id number] [address] [phone], [phone]

## 2024-03-13 ENCOUNTER — LAB ENCOUNTER (OUTPATIENT)
Dept: LAB | Age: 89
End: 2024-03-13
Attending: INTERNAL MEDICINE
Payer: MEDICARE

## 2024-03-13 DIAGNOSIS — G45.9 TIA (TRANSIENT ISCHEMIC ATTACK): ICD-10-CM

## 2024-03-13 DIAGNOSIS — I10 HTN (HYPERTENSION): ICD-10-CM

## 2024-03-13 DIAGNOSIS — I27.21 PULMONARY ARTERY HYPERTENSION (HCC): ICD-10-CM

## 2024-03-13 DIAGNOSIS — R00.1 BRADYCARDIA: Primary | ICD-10-CM

## 2024-03-13 DIAGNOSIS — I48.19 PERSISTENT ATRIAL FIBRILLATION (HCC): ICD-10-CM

## 2024-03-13 LAB
ANION GAP SERPL CALC-SCNC: 2 MMOL/L (ref 0–18)
BUN BLD-MCNC: 33 MG/DL (ref 9–23)
BUN/CREAT SERPL: 22.9 (ref 10–20)
CALCIUM BLD-MCNC: 10.1 MG/DL (ref 8.7–10.4)
CHLORIDE SERPL-SCNC: 104 MMOL/L (ref 98–112)
CO2 SERPL-SCNC: 32 MMOL/L (ref 21–32)
CREAT BLD-MCNC: 1.44 MG/DL
EGFRCR SERPLBLD CKD-EPI 2021: 34 ML/MIN/1.73M2 (ref 60–?)
FASTING STATUS PATIENT QL REPORTED: NO
GLUCOSE BLD-MCNC: 163 MG/DL (ref 70–99)
OSMOLALITY SERPL CALC.SUM OF ELEC: 297 MOSM/KG (ref 275–295)
POTASSIUM SERPL-SCNC: 4.1 MMOL/L (ref 3.5–5.1)
SODIUM SERPL-SCNC: 138 MMOL/L (ref 136–145)

## 2024-03-13 PROCEDURE — 80048 BASIC METABOLIC PNL TOTAL CA: CPT

## 2024-03-13 PROCEDURE — 36415 COLL VENOUS BLD VENIPUNCTURE: CPT

## 2024-03-18 ENCOUNTER — PATIENT MESSAGE (OUTPATIENT)
Dept: INTERNAL MEDICINE CLINIC | Facility: CLINIC | Age: 89
End: 2024-03-18

## 2024-03-18 DIAGNOSIS — I10 HYPERTENSION, UNSPECIFIED TYPE: ICD-10-CM

## 2024-03-18 RX ORDER — ATENOLOL 50 MG/1
100 TABLET ORAL DAILY
Qty: 180 TABLET | Refills: 1 | Status: SHIPPED | OUTPATIENT
Start: 2024-03-18

## 2024-03-18 NOTE — TELEPHONE ENCOUNTER
From: Isabel Patel  To: Fredrick Cornelius  Sent: 3/18/2024 8:17 AM CDT  Subject: refill    Hi Dr. Cornelius, since my cardiologist doubled my dosage of atenolol to 100mg., I need a refill. My pharmacy is [facility] on [address] in [place] [phone]. Thank You.

## 2024-03-20 ENCOUNTER — OFFICE VISIT (OUTPATIENT)
Dept: UROLOGY | Facility: CLINIC | Age: 89
End: 2024-03-20
Attending: OBSTETRICS & GYNECOLOGY
Payer: MEDICARE

## 2024-03-20 VITALS — BODY MASS INDEX: 30.78 KG/M2 | WEIGHT: 163 LBS | HEIGHT: 61 IN | RESPIRATION RATE: 20 BRPM

## 2024-03-20 DIAGNOSIS — N39.3 FEMALE STRESS INCONTINENCE: ICD-10-CM

## 2024-03-20 DIAGNOSIS — N39.41 URGE INCONTINENCE: ICD-10-CM

## 2024-03-20 DIAGNOSIS — N81.84 PELVIC MUSCLE WASTING: ICD-10-CM

## 2024-03-20 DIAGNOSIS — N81.2 UTEROVAGINAL PROLAPSE, INCOMPLETE: Primary | ICD-10-CM

## 2024-03-20 DIAGNOSIS — R82.90 CLOUDY URINE: ICD-10-CM

## 2024-03-20 DIAGNOSIS — N95.2 POSTMENOPAUSAL ATROPHIC VAGINITIS: ICD-10-CM

## 2024-03-20 PROCEDURE — 87186 SC STD MICRODIL/AGAR DIL: CPT | Performed by: PHYSICIAN ASSISTANT

## 2024-03-20 PROCEDURE — 57160 INSERT PESSARY/OTHER DEVICE: CPT

## 2024-03-20 PROCEDURE — 51701 INSERT BLADDER CATHETER: CPT | Performed by: PHYSICIAN ASSISTANT

## 2024-03-20 PROCEDURE — 87086 URINE CULTURE/COLONY COUNT: CPT | Performed by: PHYSICIAN ASSISTANT

## 2024-03-20 PROCEDURE — 87088 URINE BACTERIA CULTURE: CPT | Performed by: PHYSICIAN ASSISTANT

## 2024-03-20 PROCEDURE — 99212 OFFICE O/P EST SF 10 MIN: CPT

## 2024-03-20 NOTE — PROGRESS NOTES
Pt presents for f/u of pelvic organ prolapse, pessary care  Using #3 ring with support, office care    Reports pessary is comfortable but is bothered by leaking with changes in position (ie: sit to stand) and AGUSTO  Denies discharge  Denies bleeding  Denies s/sx of UTI  Bowels reg  Hx of ER+ breast CA, per son pt is to avoid vag estrogen per recs of her oncologist (Dr. Wadsworth)  Sometimes using vag moisturizers    Happy with pessary, feels supported  She is not currently interested in surgical management of her pelvic organ prolapse    Urogynecology Summary:  Urogynecology Summary  Prolapse: No  AGUSTO: Yes  Urge Incontinence: Yes  Nocturia Frequency: 3 (3-4 times)  Frequency: 2 - 3 hours  Incomplete emptying:  (sometimes)  Constipation: No  Wears pad day?: 6 (4-6 heavy)  Wears Pad Night?: 4 (3-4 heavy)  Activities are limited by UI/POP?: Yes (leakage)  Currently Sexually Active: No      Vitals:  Vitals:    03/20/24 1107   Resp: 20       GENERAL EXAM:  GENERAL: alert & oriented, NAD  HEENT: NC/AT, sclera without injection  SKIN: no rashes  LUNGS:  without increased respiratory effort  ABDOMEN: soft, non-tender, non-distended, no masses appreciated  EXT: no edema    PELVIC EXAM:  Ext. Gen: +atrophy, no lesions, erythema, scattered epidermal inclusion cysts  Urethra: +atrophy, nontender  Bladder: some fullness, nontender  Vagina: ++atrophy, some erythema of anterior vaginal wall, no bleeding  Cervix: wnl  Uterus mobile  Perineum: wnl  Anus: wnl  Rectum: nontender, nl sphincter tone      PELVIC SUPPORT  Hollis:  2  Ant:  3  Post:  2  CST:  neg  UVJ: somewhat hypermobile    Her pessary was removed with some resistance, calcifications noted on pessary which were difficult to remove, pessary discarded    A #2 ring with support with knob was placed without difficulty  Pt reported it to be comfortable and supportive  Pt voided 50 mL in the privacy of the bathroom  After obtaining verbal consent from the patient, sterile technique used  to prep urethra and collect urine.  PVR 50 mL, cloudy, +terminal pyuria.  Sent for UCx.    Impression:    ICD-10-CM    1. Uterovaginal prolapse, incomplete  N81.2       2. Postmenopausal atrophic vaginitis  N95.2       3. Pelvic muscle wasting  N81.84       4. Urge incontinence  N39.41 Urine Culture, Routine     Straight Cath PVR      5. Female stress incontinence  N39.3       6. Cloudy urine  R82.90 Urine Culture, Routine     Straight Cath PVR          Discussion Items: -- discussion undertaken with pt's son, Haja, present  Discussed management of pelvic organ prolapse including but not limited to behavioral modifications, conservative options, and surgical management.   Discussed pessary management including benefits and risks. Discussed importance of keeping regularly scheduled pessary checks in prevention of complications related to pessary use.     Discussed mgmt of vulvovaginal atrophy with vaginal estrogen cream. Reviewed associated benefits, risks, alternatives, and goals. Recommend low dose 2-3x/week treatment. -- pt/family will rediscuss with oncology (Dr. Wadsworth) risks/benefits in light of her hx of breast CA    Discussed obtaining UCx d/t increased leaking & cloudy urine  Pt's son requests not treating UTI unless UCx grows >100k cfu/mL    Treatment Plan:  Follow UCx, treat if + (family requests not treating unless >100k cfu/mL)  Continue pessary management, office care  Pt/family will rediscuss use of vag estrogen with oncology  Vag moisturizers  Encouraged daily pelvic exercises  Cont bowel regimen  Call with s/sx of UTI, problems with pessary     All questions answered  Pt understands and agrees to plan       Return in about 4 weeks (around 4/17/2024) for pessary care.      Zulema Holland PA-C

## 2024-03-25 ENCOUNTER — TELEPHONE (OUTPATIENT)
Dept: UROLOGY | Facility: CLINIC | Age: 89
End: 2024-03-25

## 2024-03-25 NOTE — TELEPHONE ENCOUNTER
Outgoing call to pt son Haja per HIPAA disclosure  + urine culture >100 K proteus  TORB Zulema GONZALES  Bactrim DS 1 tab po bid x 7 days #14    Discussed with son who does not wish for his mother to be started on any antibiotics at this time as she is asymptomatic    Will inform PA of plan

## 2024-03-25 NOTE — TELEPHONE ENCOUNTER
Spoke with pt's son, Haja, regarding recent +UCx    He reports that increase in leaking, frequency, nocturia is likely d/t recent start of diuretic    Per son pt denies fever, dysuria, suprapubic pain, flank pain    Declines abx to treat +UCx, will notify us if developing any of the above sx    Son would like us to reach out to Dr. Wadsworth (onc) re: use of vag estrogen cream and then further discuss with pt/family at upcoming office visit.

## 2024-03-29 NOTE — TELEPHONE ENCOUNTER
Review pended refill request as it does not fall under a protocol.    Last Rx: 3/8/24  LOV: 2/26/24

## 2024-03-29 NOTE — TELEPHONE ENCOUNTER
Please review; protocol failed/ has no protocol      Please see message below for upcoming appointment.    Future Appointments   Date Time Provider Department Center          4/29/2024 10:30 AM Fredrick Cornelius MD [facility] [place] [facility] Refills2 days ago     [place]  Refills have been requested for the following medications:         furosemide 20 MG Oral Tab [Fredrick Cornelius]      Patient Comment:  I have 8 remaining tablets.    Requested Prescriptions   Pending Prescriptions Disp Refills    furosemide 20 MG Oral Tab 30 tablet 0     Sig: Take 1 tablet (20 mg total) by mouth daily.       Hypertension Medications Protocol Failed - 3/27/2024  6:01 PM        Failed - EGFRCR or GFRNAA > 50     GFR Evaluation  EGFRCR: 34 , resulted on 3/13/2024          Passed - CMP or BMP in past 12 months        Passed - Last BP reading less than 140/90     BP Readings from Last 1 Encounters:   02/26/24 114/64               Passed - In person appointment or virtual visit in the past 12 mos or appointment in next 3 mos     Recent Outpatient Visits              1 week ago Uterovaginal prolapse, incomplete    [facility] [place] [facility] Zulema Holland PA    Office Visit    1 month ago [facility] [place] [facility]    Office Visit    1 month ago [facility] [place] [facility]    Office Visit    1 month ago Uterovaginal prolapse, incomplete    [facility] [place] [facility] Zulema Holland PA    Office Visit    2 months ago [facility] [place], Agron B, MD    Office Visit          Future Appointments         Provider Department Appt Notes    In 2 weeks Zulema Holland PA [facility] [place] [facility] 4 WEEK PESSARY  CHECK    In 1 month Fredrick Cornelius MD [facility] [place], [place]                   Recent Outpatient Visits              1 week ago Uterovaginal prolapse, incomplete    [facility] [place] [facility] Zulema Holland PA    Office Visit    1 month ago [facility] [place] [facility]    Office Visit    1 month ago Irregular heart rate    [facility] [place] [facility]    Office Visit    1 month ago Uterovaginal prolapse, incomplete    [facility] [place] [facility] Zulema Holland PA    Office Visit    2 months ago Encounter to establish care    [facility] [place], Agron B, MD    Office Visit          Future Appointments         Provider Department Appt Notes    In 2 weeks Zulema Holland PA [facility] [place] [facility] 4 WEEK PESSARY CHECK    In 1 month [facility] [place] [place]

## 2024-03-30 RX ORDER — FUROSEMIDE 20 MG/1
20 TABLET ORAL DAILY
Qty: 30 TABLET | Refills: 0 | Status: SHIPPED | OUTPATIENT
Start: 2024-03-30

## 2024-03-30 RX ORDER — FUROSEMIDE 20 MG/1
20 TABLET ORAL DAILY
Qty: 90 TABLET | Refills: 3 | Status: SHIPPED | OUTPATIENT
Start: 2024-03-30

## 2024-04-02 ENCOUNTER — MED REC SCAN ONLY (OUTPATIENT)
Dept: INTERNAL MEDICINE CLINIC | Facility: CLINIC | Age: 89
End: 2024-04-02

## 2024-04-03 ENCOUNTER — TELEPHONE (OUTPATIENT)
Dept: INTERNAL MEDICINE CLINIC | Facility: CLINIC | Age: 89
End: 2024-04-03

## 2024-04-03 NOTE — TELEPHONE ENCOUNTER
Elvia from  [facility] is calling to advise that the Plan of care being faxed is not complete as the fax is being sent horizontal.  The PCP's signature page is missing it is cut off.   All information being faxed is incomplete.    Please advise.

## 2024-04-08 ENCOUNTER — TELEPHONE (OUTPATIENT)
Dept: INTERNAL MEDICINE CLINIC | Facility: CLINIC | Age: 89
End: 2024-04-08

## 2024-04-08 NOTE — TELEPHONE ENCOUNTER
Informed KEYON Gan from [facility], regarding Dr Cornelius's note below, she will call the son and mention it .

## 2024-04-08 NOTE — TELEPHONE ENCOUNTER
Elvia from [facility] is calling to let  know she was just at the patient's home.    Patient is at home , has a pacemaker and home monitor. Patient has appointment with the cardiologist tomorrow.     Patient was complaining of dizziness at that time heart rate was 120 and O2 sat 89. Nurse had patient lie down in chair with feet elevated. Heart rate went to 76. Nurse states the heart rate keeps going up and down.     At time when nurse left the house pulse was 80, blood pressure 110/75 and O2 sat 93 on room air.

## 2024-04-17 ENCOUNTER — OFFICE VISIT (OUTPATIENT)
Dept: UROLOGY | Facility: CLINIC | Age: 89
End: 2024-04-17
Attending: OBSTETRICS & GYNECOLOGY
Payer: MEDICARE

## 2024-04-17 VITALS — BODY MASS INDEX: 31 KG/M2 | TEMPERATURE: 93 F | RESPIRATION RATE: 18 BRPM | HEIGHT: 61 IN

## 2024-04-17 DIAGNOSIS — N39.3 FEMALE STRESS INCONTINENCE: ICD-10-CM

## 2024-04-17 DIAGNOSIS — N81.84 PELVIC MUSCLE WASTING: ICD-10-CM

## 2024-04-17 DIAGNOSIS — N39.41 URGE INCONTINENCE: ICD-10-CM

## 2024-04-17 DIAGNOSIS — N95.2 POSTMENOPAUSAL ATROPHIC VAGINITIS: ICD-10-CM

## 2024-04-17 DIAGNOSIS — N81.2 UTEROVAGINAL PROLAPSE, INCOMPLETE: Primary | ICD-10-CM

## 2024-04-17 PROCEDURE — 99212 OFFICE O/P EST SF 10 MIN: CPT

## 2024-04-17 RX ORDER — ESTRADIOL 0.1 MG/G
CREAM VAGINAL
Qty: 42.5 G | Refills: 3 | Status: SHIPPED | OUTPATIENT
Start: 2024-04-17

## 2024-04-17 NOTE — PROGRESS NOTES
Pt presents for f/u of pelvic organ prolapse, pessary care  Using #2 ring with support with knob, office care    Bothered by ongoing leaking with sit to stand    Reports pessary is comfortable   Denies discharge  Denies bleeding  Denies s/sx of UTI  Bowels reg  Using vag moisturizers once a week    Happy with pessary, feels supported  She is not currently interested in surgical management of her pelvic organ prolapse    Previously tried:  #3 ring with support    Urogynecology Summary:  Urogynecology Summary  Prolapse: No  AGUSTO: Yes (sometimes)  Urge Incontinence: Yes  Nocturia Frequency: 3  Frequency: 2 - 3 hours  Incomplete emptying: No  Constipation: No  Wears pad day?: 4 (heavy)  Wears Pad Night?: 3 (heavy)  Activities are limited by UI/POP?: Yes (afraid of leakage)  Currently Sexually Active: No      Vitals:  Vitals:    04/17/24 1100   Resp: 18   Temp: (!) 93 °F (33.9 °C)       GENERAL EXAM:  GENERAL: alert & oriented, NAD  HEENT: NC/AT, sclera without injection  SKIN: no rashes  LUNGS:  without increased respiratory effort  ABDOMEN: soft, non-tender, non-distended, no masses appreciated  EXT: no edema    PELVIC EXAM:  Ext. Gen: +atrophy, no lesions, erythema, scattered epidermal inclusion cysts  Urethra: +atrophy, nontender  Bladder: some fullness, nontender  Vagina: ++atrophy, some erythema of anterior vaginal wall, no bleeding  Cervix: wnl  Uterus mobile  Perineum: wnl  Anus: wnl  Rectum: nontender, nl sphincter tone      PELVIC SUPPORT  Derry:  2  Ant:  3  Post:  2  CST:  neg  UVJ: somewhat hypermobile    Her pessary was removed without difficulty, some calcifications noted on pessary which was cleaned and reinserted without difficulty.    Impression:    ICD-10-CM    1. Uterovaginal prolapse, incomplete  N81.2       2. Postmenopausal atrophic vaginitis  N95.2 estradiol (ESTRACE) 0.1 MG/GM Vaginal Cream      3. Pelvic muscle wasting  N81.84       4. Urge incontinence  N39.41       5. Female stress incontinence   N39.3           Discussion Items:   Discussed mgmt of vulvovaginal atrophy with vaginal estrogen cream. Reviewed associated benefits, risks, alternatives, and goals. Recommend low dose 2x weekly mgmt.     Discussed management of pelvic organ prolapse including but not limited to behavioral modifications, conservative options, and surgical management.   Discussed pessary management including benefits and risks. Discussed importance of keeping regularly scheduled pessary checks in prevention of complications related to pessary use.     Treatment Plan:  Continue pessary management, office care  Start vag estrogen 1/2 g twice weekly (discussed with onc, Dr. Wadsworth)  Encouraged daily pelvic exercises  Cont bowel regimen  Call with s/sx of UTI, problems with pessary     Multiple discussions have taken place with pt's son, Haja, re: UTIs.  Son prefers not to treat +UCx unless pt symptomatic.    All questions answered  Pt understands and agrees to plan       Return in about 6 weeks (around 5/29/2024) for pessary care.      Zulema Holland PA-C

## 2024-04-22 ENCOUNTER — TELEPHONE (OUTPATIENT)
Dept: INTERNAL MEDICINE CLINIC | Facility: CLINIC | Age: 89
End: 2024-04-22

## 2024-04-22 NOTE — TELEPHONE ENCOUNTER
Elvia, would like to know if the doctor would authorize additional visits for physical therapy and nursing.

## 2024-04-23 ENCOUNTER — LAB ENCOUNTER (OUTPATIENT)
Dept: LAB | Facility: REFERENCE LAB | Age: 89
End: 2024-04-23
Attending: INTERNAL MEDICINE
Payer: MEDICARE

## 2024-04-23 DIAGNOSIS — E11.22 CONTROLLED TYPE 2 DIABETES MELLITUS WITH STAGE 3 CHRONIC KIDNEY DISEASE, WITHOUT LONG-TERM CURRENT USE OF INSULIN (HCC): ICD-10-CM

## 2024-04-23 DIAGNOSIS — E78.00 HYPERCHOLESTEROLEMIA: ICD-10-CM

## 2024-04-23 DIAGNOSIS — Z00.00 MEDICARE ANNUAL WELLNESS VISIT, SUBSEQUENT: ICD-10-CM

## 2024-04-23 DIAGNOSIS — D64.9 ANEMIA, UNSPECIFIED TYPE: ICD-10-CM

## 2024-04-23 DIAGNOSIS — I48.91 ATRIAL FIBRILLATION, NEW ONSET (HCC): ICD-10-CM

## 2024-04-23 DIAGNOSIS — N18.30 STAGE 3 CHRONIC KIDNEY DISEASE, UNSPECIFIED WHETHER STAGE 3A OR 3B CKD (HCC): ICD-10-CM

## 2024-04-23 DIAGNOSIS — I10 HYPERTENSION, UNSPECIFIED TYPE: ICD-10-CM

## 2024-04-23 DIAGNOSIS — I49.9 IRREGULAR HEART RATE: ICD-10-CM

## 2024-04-23 DIAGNOSIS — N18.30 CONTROLLED TYPE 2 DIABETES MELLITUS WITH STAGE 3 CHRONIC KIDNEY DISEASE, WITHOUT LONG-TERM CURRENT USE OF INSULIN (HCC): ICD-10-CM

## 2024-04-23 LAB
ALBUMIN SERPL-MCNC: 4.3 G/DL (ref 3.2–4.8)
ALBUMIN/GLOB SERPL: 1.4 {RATIO} (ref 1–2)
ALP LIVER SERPL-CCNC: 120 U/L
ALT SERPL-CCNC: 29 U/L
ANION GAP SERPL CALC-SCNC: 7 MMOL/L (ref 0–18)
AST SERPL-CCNC: 17 U/L (ref ?–34)
BASOPHILS # BLD AUTO: 0.03 X10(3) UL (ref 0–0.2)
BASOPHILS NFR BLD AUTO: 0.4 %
BILIRUB SERPL-MCNC: 1 MG/DL (ref 0.2–0.9)
BILIRUB UR QL: NEGATIVE
BUN BLD-MCNC: 29 MG/DL (ref 9–23)
BUN/CREAT SERPL: 23.2 (ref 10–20)
CALCIUM BLD-MCNC: 10.2 MG/DL (ref 8.7–10.4)
CHLORIDE SERPL-SCNC: 108 MMOL/L (ref 98–112)
CHOLEST SERPL-MCNC: 179 MG/DL (ref ?–200)
CO2 SERPL-SCNC: 24 MMOL/L (ref 21–32)
COLOR UR: YELLOW
CREAT BLD-MCNC: 1.25 MG/DL
CREAT UR-SCNC: 101 MG/DL
DEPRECATED RDW RBC AUTO: 53.8 FL (ref 35.1–46.3)
EGFRCR SERPLBLD CKD-EPI 2021: 40 ML/MIN/1.73M2 (ref 60–?)
EOSINOPHIL # BLD AUTO: 0.05 X10(3) UL (ref 0–0.7)
EOSINOPHIL NFR BLD AUTO: 0.7 %
ERYTHROCYTE [DISTWIDTH] IN BLOOD BY AUTOMATED COUNT: 16 % (ref 11–15)
FASTING PATIENT LIPID ANSWER: YES
FASTING STATUS PATIENT QL REPORTED: YES
GLOBULIN PLAS-MCNC: 3.1 G/DL (ref 2.8–4.4)
GLUCOSE BLD-MCNC: 134 MG/DL (ref 70–99)
GLUCOSE UR-MCNC: NORMAL MG/DL
HCT VFR BLD AUTO: 46 %
HDLC SERPL-MCNC: 56 MG/DL (ref 40–59)
HGB BLD-MCNC: 14.5 G/DL
IMM GRANULOCYTES # BLD AUTO: 0.03 X10(3) UL (ref 0–1)
IMM GRANULOCYTES NFR BLD: 0.4 %
KETONES UR-MCNC: NEGATIVE MG/DL
LDLC SERPL CALC-MCNC: 108 MG/DL (ref ?–100)
LEUKOCYTE ESTERASE UR QL STRIP.AUTO: 500
LYMPHOCYTES # BLD AUTO: 1.4 X10(3) UL (ref 1–4)
LYMPHOCYTES NFR BLD AUTO: 20.2 %
MCH RBC QN AUTO: 29.1 PG (ref 26–34)
MCHC RBC AUTO-ENTMCNC: 31.5 G/DL (ref 31–37)
MCV RBC AUTO: 92.2 FL
MICROALBUMIN UR-MCNC: 10.9 MG/DL
MICROALBUMIN/CREAT 24H UR-RTO: 107.9 UG/MG (ref ?–30)
MONOCYTES # BLD AUTO: 0.48 X10(3) UL (ref 0.1–1)
MONOCYTES NFR BLD AUTO: 6.9 %
NEUTROPHILS # BLD AUTO: 4.95 X10 (3) UL (ref 1.5–7.7)
NEUTROPHILS # BLD AUTO: 4.95 X10(3) UL (ref 1.5–7.7)
NEUTROPHILS NFR BLD AUTO: 71.4 %
NONHDLC SERPL-MCNC: 123 MG/DL (ref ?–130)
OSMOLALITY SERPL CALC.SUM OF ELEC: 296 MOSM/KG (ref 275–295)
PH UR: 8.5 [PH] (ref 5–8)
PLATELET # BLD AUTO: 226 10(3)UL (ref 150–450)
POTASSIUM SERPL-SCNC: 5.2 MMOL/L (ref 3.5–5.1)
PROT SERPL-MCNC: 7.4 G/DL (ref 5.7–8.2)
PROT UR-MCNC: 50 MG/DL
RBC # BLD AUTO: 4.99 X10(6)UL
SODIUM SERPL-SCNC: 139 MMOL/L (ref 136–145)
SP GR UR STRIP: 1.02 (ref 1–1.03)
TRIGL SERPL-MCNC: 82 MG/DL (ref 30–149)
TSI SER-ACNC: 1.51 MIU/ML (ref 0.55–4.78)
UROBILINOGEN UR STRIP-ACNC: NORMAL
VLDLC SERPL CALC-MCNC: 14 MG/DL (ref 0–30)
WBC # BLD AUTO: 6.9 X10(3) UL (ref 4–11)
WBC #/AREA URNS AUTO: >50 /HPF

## 2024-04-23 PROCEDURE — 82570 ASSAY OF URINE CREATININE: CPT

## 2024-04-23 PROCEDURE — 82043 UR ALBUMIN QUANTITATIVE: CPT

## 2024-04-23 PROCEDURE — 81001 URINALYSIS AUTO W/SCOPE: CPT

## 2024-04-23 PROCEDURE — 84443 ASSAY THYROID STIM HORMONE: CPT

## 2024-04-23 PROCEDURE — 80061 LIPID PANEL: CPT

## 2024-04-23 PROCEDURE — 85025 COMPLETE CBC W/AUTO DIFF WBC: CPT

## 2024-04-23 PROCEDURE — 80053 COMPREHEN METABOLIC PANEL: CPT

## 2024-04-23 PROCEDURE — 36415 COLL VENOUS BLD VENIPUNCTURE: CPT

## 2024-04-29 ENCOUNTER — OFFICE VISIT (OUTPATIENT)
Dept: INTERNAL MEDICINE CLINIC | Facility: CLINIC | Age: 89
End: 2024-04-29

## 2024-04-29 ENCOUNTER — LAB ENCOUNTER (OUTPATIENT)
Dept: LAB | Facility: REFERENCE LAB | Age: 89
End: 2024-04-29
Attending: INTERNAL MEDICINE
Payer: MEDICARE

## 2024-04-29 VITALS
DIASTOLIC BLOOD PRESSURE: 65 MMHG | HEIGHT: 61 IN | RESPIRATION RATE: 18 BRPM | SYSTOLIC BLOOD PRESSURE: 115 MMHG | BODY MASS INDEX: 31 KG/M2 | TEMPERATURE: 98 F | HEART RATE: 109 BPM | OXYGEN SATURATION: 97 %

## 2024-04-29 DIAGNOSIS — E87.5 HYPERKALEMIA: ICD-10-CM

## 2024-04-29 DIAGNOSIS — N18.30 CONTROLLED TYPE 2 DIABETES MELLITUS WITH STAGE 3 CHRONIC KIDNEY DISEASE, WITHOUT LONG-TERM CURRENT USE OF INSULIN (HCC): ICD-10-CM

## 2024-04-29 DIAGNOSIS — Z00.00 MEDICARE ANNUAL WELLNESS VISIT, SUBSEQUENT: Primary | ICD-10-CM

## 2024-04-29 DIAGNOSIS — E78.00 HYPERCHOLESTEROLEMIA: ICD-10-CM

## 2024-04-29 DIAGNOSIS — E11.22 CONTROLLED TYPE 2 DIABETES MELLITUS WITH STAGE 3 CHRONIC KIDNEY DISEASE, WITHOUT LONG-TERM CURRENT USE OF INSULIN (HCC): ICD-10-CM

## 2024-04-29 DIAGNOSIS — G45.9 TIA (TRANSIENT ISCHEMIC ATTACK): ICD-10-CM

## 2024-04-29 DIAGNOSIS — Z00.00 ENCOUNTER FOR ANNUAL HEALTH EXAMINATION: ICD-10-CM

## 2024-04-29 DIAGNOSIS — I10 HYPERTENSION, UNSPECIFIED TYPE: ICD-10-CM

## 2024-04-29 DIAGNOSIS — M48.061 SPINAL STENOSIS OF LUMBAR REGION, UNSPECIFIED WHETHER NEUROGENIC CLAUDICATION PRESENT: ICD-10-CM

## 2024-04-29 DIAGNOSIS — R32 URINARY INCONTINENCE, UNSPECIFIED TYPE: ICD-10-CM

## 2024-04-29 DIAGNOSIS — H35.30 MACULAR DEGENERATION, UNSPECIFIED LATERALITY, UNSPECIFIED TYPE: ICD-10-CM

## 2024-04-29 DIAGNOSIS — D64.9 ANEMIA, UNSPECIFIED TYPE: ICD-10-CM

## 2024-04-29 DIAGNOSIS — N18.30 STAGE 3 CHRONIC KIDNEY DISEASE, UNSPECIFIED WHETHER STAGE 3A OR 3B CKD (HCC): ICD-10-CM

## 2024-04-29 DIAGNOSIS — E11.21 CONTROLLED TYPE 2 DIABETES MELLITUS WITH DIABETIC NEPHROPATHY, WITHOUT LONG-TERM CURRENT USE OF INSULIN (HCC): ICD-10-CM

## 2024-04-29 DIAGNOSIS — Z95.0 STATUS POST BIVENTRICULAR PACEMAKER: ICD-10-CM

## 2024-04-29 DIAGNOSIS — I48.91 ATRIAL FIBRILLATION, NEW ONSET (HCC): ICD-10-CM

## 2024-04-29 DIAGNOSIS — R60.0 BILATERAL LOWER EXTREMITY EDEMA: ICD-10-CM

## 2024-04-29 DIAGNOSIS — Z00.00 MEDICARE ANNUAL WELLNESS VISIT, SUBSEQUENT: ICD-10-CM

## 2024-04-29 LAB
ANION GAP SERPL CALC-SCNC: 4 MMOL/L (ref 0–18)
BUN BLD-MCNC: 23 MG/DL (ref 9–23)
BUN/CREAT SERPL: 19.8 (ref 10–20)
CALCIUM BLD-MCNC: 9.8 MG/DL (ref 8.7–10.4)
CHLORIDE SERPL-SCNC: 107 MMOL/L (ref 98–112)
CO2 SERPL-SCNC: 27 MMOL/L (ref 21–32)
CREAT BLD-MCNC: 1.16 MG/DL
EGFRCR SERPLBLD CKD-EPI 2021: 44 ML/MIN/1.73M2 (ref 60–?)
EST. AVERAGE GLUCOSE BLD GHB EST-MCNC: 154 MG/DL (ref 68–126)
FASTING STATUS PATIENT QL REPORTED: NO
GLUCOSE BLD-MCNC: 194 MG/DL (ref 70–99)
HBA1C MFR BLD: 7 % (ref ?–5.7)
OSMOLALITY SERPL CALC.SUM OF ELEC: 295 MOSM/KG (ref 275–295)
POTASSIUM SERPL-SCNC: 4.4 MMOL/L (ref 3.5–5.1)
SODIUM SERPL-SCNC: 138 MMOL/L (ref 136–145)

## 2024-04-29 PROCEDURE — 83036 HEMOGLOBIN GLYCOSYLATED A1C: CPT

## 2024-04-29 PROCEDURE — 80048 BASIC METABOLIC PNL TOTAL CA: CPT

## 2024-04-29 PROCEDURE — 36415 COLL VENOUS BLD VENIPUNCTURE: CPT

## 2024-04-29 PROCEDURE — G0439 PPPS, SUBSEQ VISIT: HCPCS | Performed by: INTERNAL MEDICINE

## 2024-05-05 NOTE — PATIENT INSTRUCTIONS
Isabel Patel's SCREENING SCHEDULE   Tests on this list are recommended by your physician but may not be covered, or covered at this frequency, by your insurer.   Please check with your insurance carrier before scheduling to verify coverage.   PREVENTATIVE SERVICES FREQUENCY &  COVERAGE DETAILS LAST COMPLETION DATE   Diabetes Screening    Fasting Blood Sugar /  Glucose    One screening every 12 months if never tested or if previously tested but not diagnosed with pre-diabetes   One screening every 6 months if diagnosed with pre-diabetes Lab Results   Component Value Date     (H) 04/29/2024        Cardiovascular Disease Screening    Lipid Panel  Cholesterol  Lipoprotein (HDL)  Triglycerides Covered every 5 years for all Medicare beneficiaries without apparent signs or symptoms of cardiovascular disease Lab Results   Component Value Date    CHOLEST 179 04/23/2024    HDL 56 04/23/2024     (H) 04/23/2024    LDLD 159 (H) 03/16/2021    TRIG 82 04/23/2024         Electrocardiogram (EKG)   Covered if needed at Welcome to Medicare, and non-screening if indicated for medical reasons 02/13/2024      Ultrasound Screening for Abdominal Aortic Aneurysm (AAA) Covered once in a lifetime for one of the following risk factors   • Men who are 65-75 years old and have ever smoked   • Anyone with a family history -     Colorectal Cancer Screening  Covered for ages 50-85; only need ONE of the following:    Colonoscopy   Covered every 10 years    Covered every 2 years if patient is at high risk or previous colonoscopy was abnormal -    No recommendations at this time    Flexible Sigmoidoscopy   Covered every 4 years -    Fecal Occult Blood Test Covered annually -   Bone Density Screening    Bone density screening    Covered every 2 years after age 65 if diagnosed with risk of osteoporosis or estrogen deficiency.    Covered yearly for long-term glucocorticoid medication use (Steroids) Last Dexa Scan:    XR DEXA BONE  DENSITOMETRY (CPT=77080) 06/05/2017      No recommendations at this time   Pap and Pelvic    Pap   Covered every 2 years for women at normal risk; Annually if at high risk -  No recommendations at this time    Chlamydia Annually if high risk -  No recommendations at this time   Screening Mammogram    Mammogram     Recommend annually for all female patients aged 40 and older    One baseline mammogram covered for patients aged 35-39 -    No recommendations at this time    Immunizations    Influenza Covered once per flu season  Please get every year -  No recommendations at this time    Pneumococcal Each vaccine ([id number] & [id number]) covered once after 65 Prevnar 13: 03/01/2015    Ipoevznfl66: 10/13/2004     No recommendations at this time    Hepatitis B One screening covered for patients with certain risk factors   -  No recommendations at this time    Tetanus Toxoid Not covered by Medicare Part B unless medically necessary (cut with metal); may be covered with your pharmacy prescription benefits -    Tetanus, Diptheria and Pertusis TD and TDaP Not covered by Medicare Part B -  No recommendations at this time    Zoster Not covered by Medicare Part B; may be covered with your pharmacy  prescription benefits -  No recommendations at this time     Diabetes      Hemoglobin A1C Annually; if last result is elevated, may be asked to retest more frequently.    Medicare covers every 3 months Lab Results   Component Value Date     (H) 04/29/2024    A1C 7.0 (H) 04/29/2024       No recommendations at this time    Creat/alb ratio Annually Lab Results   Component Value Date    MICROALBCREA 107.9 (H) 04/23/2024       LDL Annually Lab Results   Component Value Date     (H) 04/23/2024       Dilated Eye Exam Annually Last Diabetic Eye Exam:  No data recorded  No data recorded       Annual Monitoring of Persistent Medications (ACE/ARB, digoxin diuretics, anticonvulsants)    Potassium Annually Lab Results   Component  Value Date    K 4.4 04/29/2024         Creatinine   Annually Lab Results   Component Value Date    CREATSERUM 1.16 (H) 04/29/2024         BUN Annually Lab Results   Component Value Date    BUN 23 04/29/2024       Drug Serum Conc Annually No results found for: \"DIGOXIN\", \"DIG\", \"VALP\"

## 2024-05-05 NOTE — PROGRESS NOTES
Subjective:   Isabel Patel is a 89 year old female who presents for a Medicare Subsequent Annual Wellness visit (Pt already had Initial Annual Wellness) and .     Patient comes in for Medicare annual here with son vitals noted heart rate normal on his side patient had stopped taking one of the atenolol medications from 2 tablets to 1 tablet as per son even though cardiology recommended 2 tablets patient also with shortness of breath with exertion which is chronic  at times worst depending what she does    History/Other:   Fall Risk Assessment:   She has been screened for Falls and is High Risk. Fall Prevention information provided to patient in After Visit Summary.    Do you feel unsteady when standing or walking?: Yes  Do you worry about falling?: Yes  Have you fallen in the past year?: No     Cognitive Assessment:   She had a completely normal cognitive assessment - see flowsheet entries     Functional Ability/Status:   Isabel Patel has some abnormal functions as listed below:  She has Dressing and/or Bathing issues based on screening of functional status.  Difficulty dressing or bathing?: Yes  Bathing or Showering: Need some help  Dressing: Need some help  She has Toileting difficulties based on screening of functional status.She has Driving difficulties based on screening of functional status. She has Meal Preparation difficulties based on screening of functional status.She has difficulties Managing Money/Bills based on screening of functional status.She has difficulties Shopping for Groceries based on screening of functional status. She has Hearing problems based on screening of functional status.She has Vision problems based on screening of functional status. She has Walking problems based on screening of functional status. She has problems with Daily Activities based on screening of functional status. She has problems with Memory based on screening of functional status.       Depression Screening  (PHQ-2/PHQ-9): PHQ-2 SCORE: 0  , done 4/28/2024   If you checked off any problems, how difficult have these problems made it for you to do your work, take care of things at home, or get along with other people?: Not difficult at all    Last [place] Suicide Screening on 4/29/2024 was No Risk.          Advanced Directives:   She does have a Living Will but we do NOT have it on file in Epic.    She has a Power of  for Health Care on file in [facility].  Discussed Advance Care Planning with patient (and family/surrogate if present). Standard forms made available to patient in After Visit Summary.      Patient Active Problem List   Diagnosis    Right knee DJD    Osteoarthritis    Controlled type 2 diabetes mellitus with diabetic nephropathy, without long-term current use of insulin (HCC)    HTN (hypertension)    Hypercholesterolemia    Obesity    Left rotator cuff tear arthropathy    Pessary maintenance    Osteopenia of multiple sites    Pain of right lower extremity    Pelvic relaxation    Uterovaginal prolapse    AGUSTO (stress urinary incontinence, female)    TIA (transient ischemic attack)    Spinal stenosis of lumbar region    Macular degeneration    Mass of skin of left shoulder    Closed fracture of left distal femur (HCC)    Closed bicondylar fracture of distal end of right femur (HCC)    Exudative age-related macular degeneration, right eye, with inactive choroidal neovascularization (HCC)    Anemia    Bilateral lower extremity edema    Asymptomatic menopause    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 3 chronic kidney disease, without long-term current use of insulin (HCC)    Keratosis    Chronic shoulder pain    History of right breast cancer    Urinary incontinence    Atrial fibrillation, new onset (HCC)    Status post biventricular pacemaker     Allergies:  She is allergic to adhesive tape.    Current Medications:  Outpatient Medications Marked as Taking for the 4/29/24 encounter (Office Visit) with  Fredrick Cornelius MD   Medication Sig    [] ciprofloxacin 250 MG Oral Tab Take 1 tablet (250 mg total) by mouth 2 (two) times daily for 5 days.    furosemide 20 MG Oral Tab Take 1 tablet (20 mg total) by mouth daily.    atenolol 50 MG Oral Tab Take 2 tablets (100 mg total) by mouth daily. (Patient taking differently: Take 1 tablet (50 mg total) by mouth daily.)    APIXABAN 5 MG Oral Tab Take 1 tablet (5 mg total) by mouth 2 (two) times daily.    losartan 50 MG Oral Tab Take 1 tablet (50 mg total) by mouth daily.    dilTIAZem HCl ER Coated Beads 180 MG Oral Capsule SR 24 Hr Take 2 capsules (360 mg total) by mouth daily.    atorvastatin 20 MG Oral Tab Take 1 tablet (20 mg total) by mouth daily.    Accu-Chek Softclix Lancets Does not apply Misc Use once a day.    Blood Glucose Calibration (ACCU-CHEK GUIDE CONTROL) In Vitro Liquid 1 each by In Vitro route every 30 (thirty) days.    Blood Glucose Monitoring Suppl (ACCU-CHEK GUIDE ME) w/Device Does not apply Kit 1 each daily. Check once a day.    Glucose Blood (ACCU-CHEK GUIDE) In Vitro Strip Check once daily    ACCU-CHEK AMBIKA PLUS In Vitro Strip TEST ONE TIME DAILY    ACCU-CHEK SOFTCLIX LANCETS Does not apply Misc TEST ONE TIME DAILY    cholecalciferol (VITAMIN D HIGH POTENCY) 25 MCG (1000 UT) Oral Cap Take 1 capsule (1,000 Units total) by mouth daily.    lidocaine 5 % External Patch Place 1 patch onto the skin Q24H PRN.    TURMERIC CURCUMIN OR Take by mouth.    acetaminophen 500 MG Oral Tab Take 2 tablets (1,000 mg total) by mouth every 6 (six) hours as needed for Pain.    Coenzyme Q10 (CO Q 10) 10 MG Oral Cap Take 180 mg by mouth daily.     Current Facility-Administered Medications for the 24 encounter (Office Visit) with Fredrick Cornelius MD   Medication    lidocaine (Urojet) 2 % urethral jelly 10 mL       Medical History:  She  has a past medical history of Acute, but ill-defined, cerebrovascular disease, Arthritis, Breast CA (HCC) (2016), Cancer  (HCC), Diabetes (HCC), Diabetes mellitus, type II (HCC), Essential hypertension, Hearing impairment, High blood pressure, High cholesterol, Hyperlipidemia, Osteoarthritis, and Squamous cell carcinoma, keratinizing (2018).  Surgical History:  She  has a past surgical history that includes knee replacement surgery (Right, ); Cataract extraction w/  intraocular lens implant (Bilateral, ); cholecystectomy (); tonsillectomy; appendectomy; wrist fracture surgery (Right); mastectomy partial (Right, 2016); skin surgery (Right, 2018); chemotherapy (); lumpectomy right (); appendectomy (); cataract (2007); d & c (); and  ([id number]).   Family History:  Her family history includes Breast Cancer (age of onset: 83) in her mother; Breast Cancer (age of onset: 85) in her self; Diabetes in her father and mother; Heart Disease in her father and mother; alive and well in her sister and son; appendicitis in her brother.  Social History:  She  reports that she has never smoked. She has never used smokeless tobacco. She reports that she does not currently use alcohol. She reports that she does not use drugs.    Tobacco:  She has never smoked tobacco.    CAGE Alcohol Screen:   CAGE screening score of 0 on 2024, showing low risk of alcohol abuse.      Patient Care Team:  Fredrick Cornelius MD as PCP - General (Internal Medicine)  Pepe Parry MD (SURGERY, ORTHOPEDIC)    Review of Systems   Constitutional:  Positive for fatigue.   HENT: Negative.     Eyes: Negative.    Respiratory:  Positive for shortness of breath.    Cardiovascular: Negative.    Gastrointestinal: Negative.    Genitourinary: Negative.    Musculoskeletal: Negative.    Skin: Negative.    Neurological: Negative.    Psychiatric/Behavioral: Negative.            Objective:   Physical Exam  Vitals and nursing note reviewed.   Constitutional:       Appearance: She is well-developed.   HENT:      Head: Normocephalic and  atraumatic.      Right Ear: External ear normal.      Left Ear: External ear normal.      Nose: Nose normal.   Eyes:      Conjunctiva/sclera: Conjunctivae normal.      Pupils: Pupils are equal, round, and reactive to light.   Cardiovascular:      Rate and Rhythm: Tachycardia present. Rhythm irregular.      Heart sounds: Normal heart sounds.   Pulmonary:      Effort: Pulmonary effort is normal.      Breath sounds: Normal breath sounds.   Abdominal:      General: Bowel sounds are normal.      Palpations: Abdomen is soft.   Genitourinary:     Vagina: Normal.   Musculoskeletal:         General: Normal range of motion.      Cervical back: Normal range of motion and neck supple.   Skin:     General: Skin is warm and dry.   Neurological:      Mental Status: She is alert and oriented to person, place, and time.      Deep Tendon Reflexes: Reflexes are normal and symmetric.   Psychiatric:         Behavior: Behavior normal.         Thought Content: Thought content normal.         Judgment: Judgment normal.            /65 (BP Location: Left arm, Patient Position: Sitting, Cuff Size: large)   Pulse 109   Temp 98 °F (36.7 °C) (Oral)   Resp 18   Ht 5' 1\" (1.549 m)   SpO2 97%   BMI 30.80 kg/m²  Estimated body mass index is 30.8 kg/m² as calculated from the following:    Height as of this encounter: 5' 1\" (1.549 m).    Weight as of 3/20/24: 163 lb (73.9 kg).    Medicare Hearing Assessment:   Hearing Screening    Time taken: 4/29/2024 10:33 AM  Screening Method: Questionnaire  I have a problem hearing over the telephone: No I have trouble following the conversations when two or more people are talking at the same time: No   I have trouble understanding things on the TV: No I have to strain to understand conversations: No   I have to worry about missing the telephone ring or doorbell: No I have trouble hearing conversations in a noisy background such as a crowded room or restaurant: No   I get confused about where sounds  come from: No I misunderstand some words in a sentence and need to ask people to repeat themselves: No   I especially have trouble understanding the speech of women and children: No I have trouble understanding the speaker in a large room such as at a meeting or place of Shinto: No   Many people I talk to seem to mumble (or don't speak clearly): No People get annoyed because I misunderstand what they say: No   I misunderstand what others are saying and make inappropriate responses: No I avoid social activities because I cannot hear well and fear I will reply improperly: No   Family members and friends have told me they think I may have hearing loss: No             Visual Acuity:   Right Eye Visual Acuity: Uncorrected Right Eye Chart Acuity: 20/30   Left Eye Visual Acuity: Uncorrected Left Eye Chart Acuity: 20/30   Both Eyes Visual Acuity: Uncorrected Both Eyes Chart Acuity: 20/30   Able To Tolerate Visual Acuity: Yes        Assessment & Plan:   Isabel Patel is a 89 year old female who presents for a Medicare Assessment.     1. Medicare annual wellness visit, subsequent (Primary)- exam as above,   -     Hemoglobin A1C; Future; Expected date: 04/29/2024  -     Basic Metabolic Panel (8); Future; Expected date: 04/29/2024  2. 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 3 chronic kidney disease, without long-term current use of insulin (HCC)- will retest   -     Hemoglobin A1C; Future; Expected date: 04/29/2024  -     Basic Metabolic Panel (8); Future; Expected date: 04/29/2024  3. Hyperkalemia- will retest, will follow   -     Hemoglobin A1C; Future; Expected date: 04/29/2024  -     Basic Metabolic Panel (8); Future; Expected date: 04/29/2024  4. Atrial fibrillation, new onset (HCC)- rate elevated told son to start taking atenolol as prescribed by the  cardiologist   5. TIA (transient ischemic attack)- medical management   6. Anemia, unspecified type- stable   7. Stage 3 chronic kidney disease, unspecified  whether stage 3a or 3b CKD (HCC)- will follow   8. Urinary incontinence, unspecified type- stabl e  9. Hypertension, unspecified type- well controlled   10. Bilateral lower extremity edema- stable , monitor keep feet elevated when sitting   12. Hypercholesterolemia- continue with current care   13. Macular degeneration, unspecified laterality, unspecified type- medical management   14. Status post biventricular pacemaker- stable   15. Spinal stenosis of lumbar region, unspecified whether neurogenic claudication present- medical management as need     The patient indicates understanding of these issues and agrees to the plan.  Reinforced healthy diet, lifestyle, and exercise.            No follow-ups on file.     Fredrick Cornelius MD, 5/5/2024     Supplementary Documentation:   General Health:  In the past six months, have you lost more than 10 pounds without trying?: 2 - No  Has your appetite been poor?: No  Type of Diet: Diabetic  How does the patient maintain a good energy level?:  (non)  How would you describe your daily physical activity?: None  How would you describe your current health state?: Fair  On a scale of 0 to 10, with 0 being no pain and 10 being severe pain, what is your pain level?: 4 - (Moderate)  In the past six months, have you experienced urine leakage?: 1-Yes  At any time do you feel concerned for the safety/well-being of yourself and/or your children, in your home or elsewhere?: No  Have you had any immunizations at another office such as Influenza, Hepatitis B, Tetanus, or Pneumococcal?: No       Isabel Patel's SCREENING SCHEDULE   Tests on this list are recommended by your physician but may not be covered, or covered at this frequency, by your insurer.   Please check with your insurance carrier before scheduling to verify coverage.   PREVENTATIVE SERVICES FREQUENCY &  COVERAGE DETAILS LAST COMPLETION DATE   Diabetes Screening    Fasting Blood Sugar /  Glucose    One screening every 12 months  if never tested or if previously tested but not diagnosed with pre-diabetes   One screening every 6 months if diagnosed with pre-diabetes Lab Results   Component Value Date     (H) 04/29/2024        Cardiovascular Disease Screening    Lipid Panel  Cholesterol  Lipoprotein (HDL)  Triglycerides Covered every 5 years for all Medicare beneficiaries without apparent signs or symptoms of cardiovascular disease Lab Results   Component Value Date    CHOLEST 179 04/23/2024    HDL 56 04/23/2024     (H) 04/23/2024    LDLD 159 (H) 03/16/2021    TRIG 82 04/23/2024         Electrocardiogram (EKG)   Covered if needed at Welcome to Medicare, and non-screening if indicated for medical reasons 02/13/2024      Ultrasound Screening for Abdominal Aortic Aneurysm (AAA) Covered once in a lifetime for one of the following risk factors    Men who are 65-75 years old and have ever smoked    Anyone with a family history -     Colorectal Cancer Screening  Covered for ages 50-85; only need ONE of the following:    Colonoscopy   Covered every 10 years    Covered every 2 years if patient is at high risk or previous colonoscopy was abnormal -    No recommendations at this time    Flexible Sigmoidoscopy   Covered every 4 years -    Fecal Occult Blood Test Covered annually -   Bone Density Screening    Bone density screening    Covered every 2 years after age 65 if diagnosed with risk of osteoporosis or estrogen deficiency.    Covered yearly for long-term glucocorticoid medication use (Steroids) Last Dexa Scan:    XR DEXA BONE DENSITOMETRY (CPT=77080) 06/05/2017      No recommendations at this time   Pap and Pelvic    Pap   Covered every 2 years for women at normal risk; Annually if at high risk -  No recommendations at this time    Chlamydia Annually if high risk -  No recommendations at this time   Screening Mammogram    Mammogram     Recommend annually for all female patients aged 40 and older    One baseline mammogram covered for  patients aged 35-39 -    No recommendations at this time    Immunizations    Influenza Covered once per flu season  Please get every year -  No recommendations at this time    Pneumococcal Each vaccine (Ghlirxi03 & [id number]) covered once after 65 Prevnar 13: 03/01/2015    Nyccmhwiw74: 10/13/2004     No recommendations at this time    Hepatitis B One screening covered for patients with certain risk factors   -  No recommendations at this time    Tetanus Toxoid Not covered by Medicare Part B unless medically necessary (cut with metal); may be covered with your pharmacy prescription benefits -    Tetanus, Diptheria and Pertusis TD and TDaP Not covered by Medicare Part B -  No recommendations at this time    Zoster Not covered by Medicare Part B; may be covered with your pharmacy  prescription benefits -  No recommendations at this time     Diabetes      Hemoglobin A1C Annually; if last result is elevated, may be asked to retest more frequently.    Medicare covers every 3 months Lab Results   Component Value Date     (H) 04/29/2024    A1C 7.0 (H) 04/29/2024       No recommendations at this time    Creat/alb ratio Annually Lab Results   Component Value Date    MICROALBCREA 107.9 (H) 04/23/2024       LDL Annually Lab Results   Component Value Date     (H) 04/23/2024       Dilated Eye Exam Annually Last Diabetic Eye Exam:  No data recorded  No data recorded       Annual Monitoring of Persistent Medications (ACE/ARB, digoxin diuretics, anticonvulsants)    Potassium Annually Lab Results   Component Value Date    K 4.4 04/29/2024         Creatinine   Annually Lab Results   Component Value Date    CREATSERUM 1.16 (H) 04/29/2024         BUN Annually Lab Results   Component Value Date    BUN 23 04/29/2024       Drug Serum Conc Annually No results found for: \"DIGOXIN\", \"DIG\", \"VALP\"

## 2024-05-08 ENCOUNTER — TELEPHONE (OUTPATIENT)
Dept: INTERNAL MEDICINE CLINIC | Facility: CLINIC | Age: 89
End: 2024-05-08

## 2024-05-08 NOTE — TELEPHONE ENCOUNTER
Message # [id number]         2024 11:26a   [MONICAG]  To:  From:  DEBBIE Ontiveros MD:  Phone#:  ----------------------------------------------------------------------  [facility] [phone] RE PT KASI KAM  31, HEART RATE 117-130, OXYGEN 85%, THEN UP 94% AFTER BREATHING EXERCISE, CANT SLEEP Paged at number :  PAGE: [phone] at :  May- 11:26

## 2024-05-08 NOTE — TELEPHONE ENCOUNTER
Spoke with ROBEL, pt giana spann with cardiology on the symptoms if HR continues to be high or o2 < 90 she will need to go to ER.

## 2024-05-23 ENCOUNTER — PATIENT OUTREACH (OUTPATIENT)
Dept: CASE MANAGEMENT | Age: 89
End: 2024-05-23

## 2024-05-23 ENCOUNTER — TELEPHONE (OUTPATIENT)
Dept: INTERNAL MEDICINE CLINIC | Facility: CLINIC | Age: 89
End: 2024-05-23

## 2024-05-23 NOTE — TELEPHONE ENCOUNTER
Call from [facility] at [employer].  States patient requested diabetic shoes.  He is faxing an order to Dr Cornelius for review.

## 2024-05-24 NOTE — PROGRESS NOTES
Left message for patient regarding Chronic Care Management program.     Tiffanie Lazaro  [facility]  Care Management Department    [email or web address]   [phone] work  [address],  [place] [address]

## 2024-05-28 ENCOUNTER — MED REC SCAN ONLY (OUTPATIENT)
Dept: INTERNAL MEDICINE CLINIC | Facility: CLINIC | Age: 89
End: 2024-05-28

## 2024-05-31 NOTE — TELEPHONE ENCOUNTER
Call from [facility] at [employer].  States patient requested diabetic shoes.  He is faxing an order to Dr Cornelius for review.

## 2024-05-31 NOTE — TELEPHONE ENCOUNTER
Per Elvia /nurse from [facility] @ [phone] updates on the diabetic shoes of the patient.  Please advise.

## 2024-05-31 NOTE — PROGRESS NOTES
Called patient regarding CCM intro and left a message for patient to call back when they can. Reviewed patient chart. Left contact my contact number [phone].

## 2024-05-31 NOTE — TELEPHONE ENCOUNTER
Per providers verbal, to please call and schedule pt for a visit so he can evaluate pt for DM shoes.  Per Medicare Guidelines, we do not have any qualifying dx codes. If he signs form, Medicare will reject it.     Called pts son, PEPPER BLACKBURN to call back and francisca appt to eval for DM Shoes.     Dr. Cornelius will keep form for now.

## 2024-06-02 ENCOUNTER — PATIENT MESSAGE (OUTPATIENT)
Dept: INTERNAL MEDICINE CLINIC | Facility: CLINIC | Age: 89
End: 2024-06-02

## 2024-06-02 DIAGNOSIS — I10 HYPERTENSION, UNSPECIFIED TYPE: ICD-10-CM

## 2024-06-03 NOTE — TELEPHONE ENCOUNTER
Stella Taylor, RN 6/3/2024 11:32 AM CDT        ----- Message -----  From: Isabel Patel  Sent: 6/2/2024 5:28 PM CDT  To: Em Triage Support  Subject: Request refill     Hi Dr. Cornelius,  I need a refill for Diltiazem. Thank You.

## 2024-06-05 ENCOUNTER — OFFICE VISIT (OUTPATIENT)
Dept: UROLOGY | Facility: CLINIC | Age: 89
End: 2024-06-05
Attending: OBSTETRICS & GYNECOLOGY
Payer: MEDICARE

## 2024-06-05 VITALS — BODY MASS INDEX: 31 KG/M2 | RESPIRATION RATE: 19 BRPM | HEIGHT: 61 IN

## 2024-06-05 DIAGNOSIS — N39.3 FEMALE STRESS INCONTINENCE: ICD-10-CM

## 2024-06-05 DIAGNOSIS — N81.2 UTEROVAGINAL PROLAPSE, INCOMPLETE: Primary | ICD-10-CM

## 2024-06-05 DIAGNOSIS — N39.41 URGE INCONTINENCE: ICD-10-CM

## 2024-06-05 DIAGNOSIS — N95.2 POSTMENOPAUSAL ATROPHIC VAGINITIS: ICD-10-CM

## 2024-06-05 DIAGNOSIS — N81.84 PELVIC MUSCLE WASTING: ICD-10-CM

## 2024-06-05 PROCEDURE — 99212 OFFICE O/P EST SF 10 MIN: CPT

## 2024-06-05 RX ORDER — DILTIAZEM HYDROCHLORIDE 180 MG/1
360 CAPSULE, COATED, EXTENDED RELEASE ORAL DAILY
Qty: 180 CAPSULE | Refills: 3 | Status: SHIPPED | OUTPATIENT
Start: 2024-06-05

## 2024-06-05 RX ORDER — DIGOXIN 125 MCG
TABLET ORAL DAILY
COMMUNITY

## 2024-06-05 RX ORDER — CARVEDILOL 12.5 MG/1
12.5 TABLET ORAL 2 TIMES DAILY WITH MEALS
COMMUNITY

## 2024-06-05 NOTE — PROGRESS NOTES
Pt presents for f/u of pelvic organ prolapse, pessary care  Using #2 ring with support with knob, office care    Reports pessary is comfortable   Denies discharge  Denies bleeding  Denies s/sx of UTI  Bowels reg  Didn't start vag estrogen, using vag moisturizer once a week    Happy with pessary, feels supported  She is not currently interested in surgical management of her pelvic organ prolapse    Previously tried:  #3 ring with support    Urogynecology Summary:  Urogynecology Summary  Prolapse: No  AGUSTO: Yes  Urge Incontinence: Yes  Nocturia Frequency: 3 (2-3 times)  Frequency: 2 - 3 hours  Incomplete emptying: No  Constipation: No  Wears pad day?: 6 (4-6 heavy pads)  Wears Pad Night?: 3 (2-3 heavy pads)  Activities are limited by UI/POP?: Yes (afraid of leakage)  Currently Sexually Active: No      Vitals:  Vitals:    06/05/24 1100   Resp: 19       GENERAL EXAM:  GENERAL: alert & oriented, NAD  HEENT: NC/AT, sclera without injection  SKIN: no rashes  LUNGS:  without increased respiratory effort  ABDOMEN: soft, non-tender, non-distended, no masses appreciated  EXT: no edema    PELVIC EXAM: LOR [place], present for exam as a chaperone  Ext. Gen: +atrophy, no lesions, erythema, scattered epidermal inclusion cysts  Urethra: +atrophy, nontender  Bladder: some fullness, nontender  Vagina: ++atrophy, no lesions, no bleeding  Cervix: wnl  Uterus mobile  Perineum: wnl  Anus: wnl  Rectum: nontender, nl sphincter tone      PELVIC SUPPORT  [place]:  2  Ant:  3  Post:  2  CST:  neg  UVJ: somewhat hypermobile    Her pessary was removed, +calcifications noted, cleaned, and reinserted without difficulty.    Impression:    ICD-10-CM    1. Uterovaginal prolapse, incomplete  N81.2       2. Postmenopausal atrophic vaginitis  N95.2       3. Pelvic muscle wasting  N81.84       4. Female stress incontinence  N39.3       5. Urge incontinence  N39.41           Discussion Items:   Discussed mgmt of vulvovaginal atrophy with vaginal estrogen  cream. Reviewed associated benefits, risks, alternatives, and goals. Recommend low dose 2x weekly mgmt.     Discussed management of pelvic organ prolapse including but not limited to behavioral modifications, conservative options, and surgical management.   Discussed pessary management including benefits and risks. Discussed importance of keeping regularly scheduled pessary checks in prevention of complications related to pessary use.     Discussed OAB meds, options limited d/t age & drug interactions (could consider trospium) -- pt doesn't want to take add'l meds at this time    Treatment Plan:  Continue pessary management, office care  Vag moisturizers -- rec 3x weekly  Encouraged daily pelvic exercises  Cont bowel regimen  Call with s/sx of UTI, problems with pessary     Multiple discussions have taken place with pt's son, Haja, re: UTIs. Son prefers not to treat +UCx unless pt symptomatic.     All questions answered  Pt understands and agrees to plan       Return in about 6 weeks (around 7/17/2024) for pessary care.      Zulema Holland PA-C    Note to patient: The 21st Century Cures Act makes medical notes like these available to patients in the interest of transparency.  However, be advised this is a medical document.  It is intended as peer to peer communication.  It is written in medical language and may contain abbreviations or verbiage that are unfamiliar.  It may appear blunt or direct.  Medical documents are intended to carry relevant information, facts as evident, and the clinical opinion of the practitioner.

## 2024-06-06 NOTE — TELEPHONE ENCOUNTER
Please review. Protocol Failed; No Protocol    Requested Prescriptions   Pending Prescriptions Disp Refills    dilTIAZem HCl ER Coated Beads 180 MG Oral Capsule SR 24 Hr 180 capsule 3     Sig: Take 2 capsules (360 mg total) by mouth daily.       Hypertension Medications Protocol Failed - 6/3/2024 11:33 AM        Failed - EGFRCR or GFRNAA > 50     GFR Evaluation  EGFRCR: 44 , resulted on 4/29/2024          Passed - CMP or BMP in past 12 months        Passed - Last BP reading less than 140/90     BP Readings from Last 1 Encounters:   04/29/24 115/65               Passed - In person appointment or virtual visit in the past 12 mos or appointment in next 3 mos     Recent Outpatient Visits              Today Uterovaginal prolapse, incomplete    [facility] Zulema Holland PA    Office Visit    1 month ago Medicare annual wellness visit, subsequent    [facility]    Office Visit    1 month ago Uterovaginal prolapse, incomplete    [facility] Zulema Holland PA    Office Visit    2 months ago Uterovaginal prolapse, incomplete    [facility] Zulema Holland PA    Office Visit    3 months ago Hospital discharge follow-up    [facility]    Office Visit          Future Appointments         Provider Department Appt Notes    In 1 month Zulema Holland PA [facility] 6 wk pess ck    In 1 month Fredrick Cornelius MD [facility], [place], [place] follow-up visit                           Future Appointments         Provider Department Appt Notes    In 1 month Zulema Holland PA [facility] 6 wk pess ck    In 1 month Fredrick Cornelius MD [facility], [place], [place] follow-up visit          Recent Outpatient Visits              Today Uterovaginal prolapse, incomplete    [facility] Zulema Holland PA    Office Visit    1 month ago Medicare annual wellness visit, subsequent    [facility], [place] Saldaña Agron B, MD    Office Visit    1 month ago Uterovaginal prolapse, incomplete    [facility] Zulema Holland PA    Office Visit    2 months ago Uterovaginal prolapse, incomplete    [facility] Zulema Holland PA    Office Visit    3 months ago Hospital discharge follow-up    [facility] Agron B, [facility]    Office Visit

## 2024-06-17 ENCOUNTER — NURSE TRIAGE (OUTPATIENT)
Dept: INTERNAL MEDICINE CLINIC | Facility: CLINIC | Age: 89
End: 2024-06-17

## 2024-06-17 DIAGNOSIS — G89.29 HIP PAIN, CHRONIC, LEFT: Primary | ICD-10-CM

## 2024-06-17 DIAGNOSIS — M25.552 HIP PAIN, CHRONIC, LEFT: Primary | ICD-10-CM

## 2024-06-17 NOTE — TELEPHONE ENCOUNTER
Per Ayala.  She will call the patient/son to inform an appointment is needed.    1) Ayala from  [facility]   stated for 2 weeks she has been having 1/10 left hip pain.  Per Ayala she had hip surgery 3 years ago.   She is able to stand  on it but is using a wheelchair now.  Per Ayala she is weaker    2) Per Ayala for 1 week she is coughing producing white phlegm,  The lung clear and Temperature was 97.5.   The son has been giving her Musix 5 ml at night.    Action Requested: Summary for Provider     []  Critical Lab, Recommendations Needed  [] Need Additional Advice  []   FYI    []   Need Orders  [] Need Medications Sent to Pharmacy  []  Other     SUMMARY:    I called and spoke to the Son Haja who is on the release of information.    I stated she needs to be seen in the emergency  room.    Per Haja he does not think they will do anything for her.  I stated they will assess her and they can do X rays and lab tests for her.    Per Haja she is weaker since she had the cold from last week.  She is weak, has a cough and now cannot really stand.  Per Haja the left hip 4/10 pain is not continuous.   Per Ayala Rn she was walking to the wheelchair but now cannot.    I kept stressing she should be seen in the ED but the son would like Dr. Cornelius opinion.   Dr. Cornelius can you please call the son.    Reason for call: Condition Update (Home Heatl)  Onset: Data Unavailable      General Assessment (questions to ask the caller)  Do you consider this a medical emergency?: No  Can you access 911?: Yes  What is the date of your last medical exam?: 04/29/24  Additional Assessments  Are these symptoms new, recurrent, or chronic?: recurrent                Reason for Disposition   Can't stand (bear weight) or walk    Protocols used: Hip Pain-A-OH

## 2024-06-17 NOTE — TELEPHONE ENCOUNTER
Spoke to pts son, Haja, states that pt just mentioned this the RN, and thinks we are over reacting to it all. Pt had told RN that her pain in her hip is 1/10 on occasion since she had her surgery about 3yrs ago.  The pain is not constantly there, just every once in a while.     As for the cough, she wants to just treated at home w/ OTC meds for now as she also doesn't have a full blown cough w/ phlegm it is also every now and them    Son states that he does hear his mom complain about the pain every 2-3wks, but not on a daily basis to the point where she has an uncomfortable\" facial expression due to pain,  where in which point he knows he should really just take her into the ER

## 2024-06-17 NOTE — TELEPHONE ENCOUNTER
Please call son and tell him Dr Cornelius agrees pt need to go to ER for evaluation probably xr of hi an cxr and labs , he should take her to er or call an ambulance   If he says he wants to talk with me let me know

## 2024-06-17 NOTE — TELEPHONE ENCOUNTER
Spoke to Haja informed him order of hip is in the system, if they would like to proceed w/ it they can.   Haja mentioned that the HH RN also called that they want to do the xr tomorrow, but the son said that the pt would like to hold off on this for now.

## 2024-06-17 NOTE — TELEPHONE ENCOUNTER
Elvia from [facility] called and would like continuation for nurse visit. Please advise .

## 2024-06-26 ENCOUNTER — TELEPHONE (OUTPATIENT)
Dept: INTERNAL MEDICINE CLINIC | Facility: CLINIC | Age: 89
End: 2024-06-26

## 2024-07-02 ENCOUNTER — TELEPHONE (OUTPATIENT)
Dept: INTERNAL MEDICINE CLINIC | Facility: CLINIC | Age: 89
End: 2024-07-02

## 2024-07-02 ENCOUNTER — MED REC SCAN ONLY (OUTPATIENT)
Dept: INTERNAL MEDICINE CLINIC | Facility: CLINIC | Age: 89
End: 2024-07-02

## 2024-07-02 NOTE — TELEPHONE ENCOUNTER
Please see TE 6/17 for further documentation.   Pt would like to hold off on ppwk until pt comes in on 7/29

## 2024-07-02 NOTE — TELEPHONE ENCOUNTER
[employer] called, verified patient's Name and . He is following up on the order form for patient's diabetic shoes. Form was faxed back in May and then again on , but they never received it.    He will be faxing the form again right now. Fax provided [phone].     [facility] Staff kindly keep an eye on the form, and assist. Thank you.

## 2024-07-06 ENCOUNTER — PATIENT MESSAGE (OUTPATIENT)
Dept: INTERNAL MEDICINE CLINIC | Facility: CLINIC | Age: 89
End: 2024-07-06

## 2024-07-06 DIAGNOSIS — E11.21 CONTROLLED TYPE 2 DIABETES MELLITUS WITH DIABETIC NEPHROPATHY, WITHOUT LONG-TERM CURRENT USE OF INSULIN (HCC): ICD-10-CM

## 2024-07-06 NOTE — TELEPHONE ENCOUNTER
From: Isabel Patel  To: Fredrick Cornelius  Sent: 7/6/2024 8:38 AM CDT  Subject: refill    Hi Dr. Cornelius,    I need a refill for the Accu-Chek Guide Glucose strips. I only have 3 left.

## 2024-07-08 RX ORDER — BLOOD SUGAR DIAGNOSTIC
STRIP MISCELLANEOUS
Qty: 100 STRIP | Refills: 0 | Status: SHIPPED | OUTPATIENT
Start: 2024-07-08

## 2024-07-08 NOTE — TELEPHONE ENCOUNTER
Refill passed per [facility] protocol.  Requested Prescriptions   Pending Prescriptions Disp Refills    Glucose Blood (ACCU-CHEK GUIDE) In Vitro Strip 100 strip 11     Sig: Check once daily       Diabetic Supplies Protocol Passed - 7/6/2024  4:08 PM        Passed - In person appointment or virtual visit in the past 12 mos or appointment in next 3 mos     Recent Outpatient Visits              1 month ago Uterovaginal prolapse, incomplete    [facility] Zulema Holland PA    Office Visit    2 months ago Medicare annual wellness visit, subsequent    [facility]    Office Visit    2 months ago Uterovaginal prolapse, incomplete    [facility] Zulema Holland PA    Office Visit    3 months ago Uterovaginal prolapse, incomplete    [facility] Zulema Holland PA    Office Visit    4 months ago Hospital discharge follow-up    [facility]    Office Visit          Future Appointments         Provider Department Appt Notes    In 1 week Zulema Holland PA [facility] 6 wk pess ck    In 3 weeks Fredrick Cornelius MD [facility], [place], [place] follow-up visit  oliva James                       Future Appointments         Provider Department Appt Notes    In 1 week Zulema Holland PA [facility] 6 wk pess ck    In 3 weeks Fredrick Cornelius MD [facility], [place], [place] follow-up visit  oliva James          Recent Outpatient Visits              1 month ago Uterovaginal prolapse, incomplete    [facility] Zulema Holland PA    Office Visit    2 months ago Medicare annual wellness  visit, subsequent    [facility], Vijaya Saldaña Agron B, MD    Office Visit    2 months ago Uterovaginal prolapse, incomplete    [facility] [place] [facility] Zulema Holland PA    Office Visit    3 months ago Uterovaginal prolapse, incomplete    [facility] [place] [facility] Zulema Holland PA    Office Visit    4 months ago Hospital discharge follow-up    [facility], Vijaya Saldaña Agron B, MD    Office Visit

## 2024-07-15 ENCOUNTER — TELEPHONE (OUTPATIENT)
Dept: INTERNAL MEDICINE CLINIC | Facility: CLINIC | Age: 89
End: 2024-07-15

## 2024-07-15 NOTE — TELEPHONE ENCOUNTER
Elvia states they will not be able to visit patient anymore due to patient being discharged. Goal met.

## 2024-07-17 ENCOUNTER — OFFICE VISIT (OUTPATIENT)
Dept: UROLOGY | Facility: CLINIC | Age: 89
End: 2024-07-17
Attending: OBSTETRICS & GYNECOLOGY
Payer: MEDICARE

## 2024-07-17 VITALS — RESPIRATION RATE: 18 BRPM | BODY MASS INDEX: 31 KG/M2 | HEIGHT: 61 IN

## 2024-07-17 DIAGNOSIS — N81.2 UTEROVAGINAL PROLAPSE, INCOMPLETE: Primary | ICD-10-CM

## 2024-07-17 DIAGNOSIS — N39.41 URGE INCONTINENCE: ICD-10-CM

## 2024-07-17 DIAGNOSIS — N39.3 FEMALE STRESS INCONTINENCE: ICD-10-CM

## 2024-07-17 DIAGNOSIS — N81.84 PELVIC MUSCLE WASTING: ICD-10-CM

## 2024-07-17 DIAGNOSIS — N95.2 POSTMENOPAUSAL ATROPHIC VAGINITIS: ICD-10-CM

## 2024-07-17 PROCEDURE — 99212 OFFICE O/P EST SF 10 MIN: CPT

## 2024-07-17 NOTE — PROGRESS NOTES
Pt presents for f/u of pelvic organ prolapse, pessary care  Using #2 ring with support with knob, office care    Reports pessary is comfortable   Denies discharge  Denies bleeding  Denies s/sx of UTI  Bowels reg  Using vag moisturizer but not consistently    Happy with pessary, feels supported  She is not currently interested in surgical management of her pelvic organ prolapse    Urogynecology Summary:  Urogynecology Summary  Prolapse: No  AGUSTO: Yes  Urge Incontinence: Yes  Nocturia Frequency: 3  Frequency: 2 - 3 hours  Incomplete emptying: No  Constipation: No  Wears pad day?:  (heavy)  Wears Pad Night?:  (heavy)  Activities are limited by UI/POP?: Yes (afraid of leakage)  Currently Sexually Active: No      Vitals:  Vitals:    07/17/24 1131   Resp: 18       GENERAL EXAM:  GENERAL: alert & oriented, NAD  HEENT: NC/AT, sclera without injection  SKIN: no rashes  LUNGS:  without increased respiratory effort  ABDOMEN: soft, non-tender, non-distended, no masses appreciated  EXT: no edema    PELVIC EXAM: LOR Garland, present for exam as a chaperone  Ext. Gen: +atrophy, no lesions, erythema, scattered epidermal inclusion cysts  Urethra: +atrophy, nontender  Bladder: some fullness, nontender  Vagina: ++atrophy, no lesions, no bleeding  Cervix: wnl  Uterus mobile  Perineum: wnl  Anus: wnl  Rectum: nontender, nl sphincter tone      PELVIC SUPPORT  [place]:  2  Ant:  3  Post:  2  CST:  neg  UVJ: somewhat hypermobile  Strength: 1/5, unable to hold >3 sec, uncoordinated  Kegel teaching done today     Her pessary was removed, +calcifications noted, cleaned, and reinserted without difficulty.      Impression:    ICD-10-CM    1. Uterovaginal prolapse, incomplete  N81.2       2. Postmenopausal atrophic vaginitis  N95.2       3. Pelvic muscle wasting  N81.84       4. Female stress incontinence  N39.3       5. Urge incontinence  N39.41           Discussion Items:   Discussed mgmt of vulvovaginal atrophy with vaginal estrogen cream. Reviewed  associated benefits, risks, alternatives, and goals. Recommend low dose 2x weekly mgmt.   -- (discussed with onc, Dr. Wadsworth)   Pt/family decline    Discussed management of pelvic organ prolapse including but not limited to behavioral modifications, conservative options, and surgical management.   Discussed pessary management including benefits and risks. Discussed importance of keeping regularly scheduled pessary checks in prevention of complications related to pessary use.     Treatment Plan:  Continue pessary management, office care  Encouraged daily pelvic exercises  Cont bowel regimen  Call with s/sx of UTI, problems with pessary     All questions answered  Pt understands and agrees to plan       Return in about 6 weeks (around 8/28/2024) for pessary care.      Zulema Holland PA-C    Note to patient: The 21st Century Cures Act makes medical notes like these available to patients in the interest of transparency.  However, be advised this is a medical document.  It is intended as peer to peer communication.  It is written in medical language and may contain abbreviations or verbiage that are unfamiliar.  It may appear blunt or direct.  Medical documents are intended to carry relevant information, facts as evident, and the clinical opinion of the practitioner.

## 2024-07-18 NOTE — TELEPHONE ENCOUNTER
Elvia from [facility] is calling to advise the patient is having pain and cough.  Call transferred to page provider.       Show Applicator Variable?: Yes Consent: The patient's consent was obtained including but not limited to risks of crusting, scabbing, blistering, scarring, darker or lighter pigmentary change, recurrence, incomplete removal and infection. Render Post-Care Instructions In Note?: no Post-Care Instructions: I reviewed with the patient in detail post-care instructions. Patient is to wear sunprotection, and avoid picking at any of the treated lesions. Pt may apply Vaseline to crusted or scabbing areas. Detail Level: Detailed Duration Of Freeze Thaw-Cycle (Seconds): 0

## 2024-07-22 ENCOUNTER — LAB ENCOUNTER (OUTPATIENT)
Dept: LAB | Age: 89
End: 2024-07-22
Attending: INTERNAL MEDICINE
Payer: MEDICARE

## 2024-07-22 ENCOUNTER — OFFICE VISIT (OUTPATIENT)
Dept: INTERNAL MEDICINE CLINIC | Facility: CLINIC | Age: 89
End: 2024-07-22

## 2024-07-22 VITALS
OXYGEN SATURATION: 93 % | HEART RATE: 93 BPM | RESPIRATION RATE: 19 BRPM | SYSTOLIC BLOOD PRESSURE: 123 MMHG | DIASTOLIC BLOOD PRESSURE: 82 MMHG | HEIGHT: 61 IN | BODY MASS INDEX: 31 KG/M2 | TEMPERATURE: 98 F

## 2024-07-22 DIAGNOSIS — L84 CALLUS OF TOE: ICD-10-CM

## 2024-07-22 DIAGNOSIS — R73.09 ABNORMAL GLUCOSE LEVEL: ICD-10-CM

## 2024-07-22 DIAGNOSIS — E11.9 TYPE 2 DIABETES MELLITUS WITHOUT COMPLICATION, WITHOUT LONG-TERM CURRENT USE OF INSULIN (HCC): ICD-10-CM

## 2024-07-22 DIAGNOSIS — I48.91 ATRIAL FIBRILLATION, UNSPECIFIED TYPE (HCC): ICD-10-CM

## 2024-07-22 DIAGNOSIS — I10 HYPERTENSION, UNSPECIFIED TYPE: Primary | ICD-10-CM

## 2024-07-22 LAB
EST. AVERAGE GLUCOSE BLD GHB EST-MCNC: 169 MG/DL (ref 68–126)
HBA1C MFR BLD: 7.5 % (ref ?–5.7)

## 2024-07-22 PROCEDURE — 99214 OFFICE O/P EST MOD 30 MIN: CPT | Performed by: INTERNAL MEDICINE

## 2024-07-22 PROCEDURE — 36415 COLL VENOUS BLD VENIPUNCTURE: CPT

## 2024-07-22 PROCEDURE — 83036 HEMOGLOBIN GLYCOSYLATED A1C: CPT

## 2024-07-22 NOTE — PROGRESS NOTES
Subjective:     Patient ID: Isabel Patel is a 89 year old female.    HPI  Pt comes in for follow up. Pt has dm diet controlled, also needs diabetic shoes reorder. Pt has some calluses to toes and as per pt and son in the past has hd an ulcer which had revolved . Pt seen cardiology and her meds were changed her hr is better , denies any new complaints     History/Other:   Review of Systems   Constitutional: Negative.    HENT: Negative.     Eyes: Negative.    Respiratory: Negative.     Cardiovascular: Negative.    Gastrointestinal: Negative.    Genitourinary: Negative.    Musculoskeletal: Negative.    Skin: Negative.    Neurological:  Positive for numbness.        On and off to lower ex   Psychiatric/Behavioral: Negative.       Current Outpatient Medications   Medication Sig Dispense Refill    losartan 50 MG Oral Tab Take 1 tablet (50 mg total) by mouth daily. 90 tablet 3    Glucose Blood (ACCU-CHEK GUIDE) In Vitro Strip Check once daily 100 strip 0    dilTIAZem HCl ER Coated Beads 180 MG Oral Capsule SR 24 Hr Take 2 capsules (360 mg total) by mouth daily. 180 capsule 3    carvedilol 12.5 MG Oral Tab Take 1 tablet (12.5 mg total) by mouth 2 (two) times daily with meals.      digoxin 0.125 MG Oral Tab Take by mouth daily.      furosemide 20 MG Oral Tab Take 1 tablet (20 mg total) by mouth daily. 30 tablet 0    APIXABAN 5 MG Oral Tab Take 1 tablet (5 mg total) by mouth 2 (two) times daily. 60 tablet 1    atorvastatin 20 MG Oral Tab Take 1 tablet (20 mg total) by mouth daily. 90 tablet 1    Accu-Chek Softclix Lancets Does not apply Misc Use once a day. 100 each 3    Blood Glucose Calibration (ACCU-CHEK GUIDE CONTROL) In Vitro Liquid 1 each by In Vitro route every 30 (thirty) days. 1 each 11    Blood Glucose Monitoring Suppl (ACCU-CHEK GUIDE ME) w/Device Does not apply Kit 1 each daily. Check once a day. 1 kit 0    ACCU-CHEK AMBIKA PLUS In Vitro Strip TEST ONE TIME DAILY 100 strip 3    ACCU-CHEK SOFTCLIX LANCETS Does  not apply Misc TEST ONE TIME DAILY 100 each 3    cholecalciferol (VITAMIN D HIGH POTENCY) 25 MCG (1000 UT) Oral Cap Take 1 capsule (1,000 Units total) by mouth daily. 30 capsule 0    lidocaine 5 % External Patch Place 1 patch onto the skin Q24H PRN. 30 patch 0    TURMERIC CURCUMIN OR Take by mouth.      acetaminophen 500 MG Oral Tab Take 2 tablets (1,000 mg total) by mouth every 6 (six) hours as needed for Pain.      Coenzyme Q10 (CO Q 10) 10 MG Oral Cap Take 180 mg by mouth daily.       Allergies:  Allergies   Allergen Reactions    Adhesive Tape RASH       Past Medical History:    Acute, but ill-defined, cerebrovascular disease    Arthritis    Breast CA (HCC)    Cancer (HCC)    Diabetes (HCC)    diet controlled    Diabetes mellitus, type II (HCC)    Essential hypertension    Hearing impairment    High blood pressure    High cholesterol    Hyperlipidemia    Osteoarthritis    Squamous cell carcinoma, keratinizing    R posterior thigh      Past Surgical History:   Procedure Laterality Date    Appendectomy      Appendectomy      Cataract  -    Cataract extraction w/  intraocular lens implant Bilateral     Ian Garcia MD    Chemotherapy  2016    rt breast.    Cholecystectomy      D & c      Knee replacement surgery Right 2012    Lumpectomy right  2016    cancer    Mastectomy partial Right 2016      [id number]    Skin surgery Right 2018    Tonsillectomy      Wrist fracture surgery Right       Family History   Problem Relation Age of Onset    Heart Disease Father         CAD    Diabetes Father     Heart Disease Mother         CAD    Diabetes Mother     Breast Cancer Mother 83        had lumpectomy and RT.  No other treatment.   of stroke at 89    Other (appendicitis[other]) Brother         age 13    Other (alive and well[other]) Sister     Other (alive and well[other]) Son         x3    Breast Cancer Self 85    Glaucoma Neg     Macular degeneration Neg       Social History:    Social History     Socioeconomic History    Marital status:     Number of children: 3   Occupational History    Occupation:      Comment: retired   Tobacco Use    Smoking status: Never    Smokeless tobacco: Never   Vaping Use    Vaping status: Never Used   Substance and Sexual Activity    Alcohol use: Not Currently    Drug use: Never    Sexual activity: Not Currently   Other Topics Concern     Service No    Caffeine Concern Yes     Comment: coffee, 1 cups daily    Occupational Exposure No     Social Determinants of Health     Financial Resource Strain: Low Risk  (2/20/2024)    Financial Resource Strain     Difficulty of Paying Living Expenses: Not hard at all     Med Affordability: No   Food Insecurity: No Food Insecurity (2/12/2024)    Food Insecurity     Food Insecurity: Never true   Transportation Needs: No Transportation Needs (2/20/2024)    Transportation Needs     Lack of Transportation: No   Housing Stability: Low Risk  (2/12/2024)    Housing Stability     Housing Instability: No        Objective:   Physical Exam  Vitals and nursing note reviewed.   Constitutional:       Appearance: She is well-developed.   HENT:      Head: Normocephalic and atraumatic.      Right Ear: External ear normal.      Left Ear: External ear normal.      Nose: Nose normal.   Eyes:      Conjunctiva/sclera: Conjunctivae normal.      Pupils: Pupils are equal, round, and reactive to light.   Cardiovascular:      Rate and Rhythm: Normal rate and regular rhythm.      Heart sounds: Normal heart sounds.   Pulmonary:      Effort: Pulmonary effort is normal.      Breath sounds: Normal breath sounds.   Abdominal:      General: Bowel sounds are normal.      Palpations: Abdomen is soft.   Genitourinary:     Vagina: Normal.   Musculoskeletal:         General: Normal range of motion.      Cervical back: Normal range of motion and neck supple.      Comments: Some callus formation to toes, some numbness as per pt at times  better    Skin:     General: Skin is warm and dry.   Neurological:      Mental Status: She is alert and oriented to person, place, and time.      Deep Tendon Reflexes: Reflexes are normal and symmetric.   Psychiatric:         Behavior: Behavior normal.         Thought Content: Thought content normal.         Judgment: Judgment normal.         Assessment & Plan:   1. Hypertension, unspecified type - continue with current care    2. Type 2 diabetes mellitus without complication, without long-term current use of insulin (HCC) - will retest    3. Callus of toe - will refer to podiatry also will fill form for diabetic shoes    4. Atrial fibrillation, unspecified type (HCC) - stable hr, continue with current treatment        Orders Placed This Encounter   Procedures    Hemoglobin A1C       Meds This Visit:  Requested Prescriptions      No prescriptions requested or ordered in this encounter       Imaging & Referrals:  PODIATRY - INTERNAL  OPHTHALMOLOGY - INTERNAL

## 2024-08-26 ENCOUNTER — OFFICE VISIT (OUTPATIENT)
Dept: UROLOGY | Facility: CLINIC | Age: 89
End: 2024-08-26
Attending: OBSTETRICS & GYNECOLOGY
Payer: MEDICARE

## 2024-08-26 VITALS — RESPIRATION RATE: 18 BRPM | BODY MASS INDEX: 31 KG/M2 | HEIGHT: 61 IN

## 2024-08-26 DIAGNOSIS — N95.2 POSTMENOPAUSAL ATROPHIC VAGINITIS: ICD-10-CM

## 2024-08-26 DIAGNOSIS — N81.2 UTEROVAGINAL PROLAPSE, INCOMPLETE: Primary | ICD-10-CM

## 2024-08-26 DIAGNOSIS — N39.3 FEMALE STRESS INCONTINENCE: ICD-10-CM

## 2024-08-26 DIAGNOSIS — N39.41 URGE INCONTINENCE: ICD-10-CM

## 2024-08-26 DIAGNOSIS — N81.84 PELVIC MUSCLE WASTING: ICD-10-CM

## 2024-08-26 PROCEDURE — 99212 OFFICE O/P EST SF 10 MIN: CPT

## 2024-08-26 RX ORDER — ESTRADIOL 0.1 MG/G
CREAM VAGINAL
Qty: 42.5 G | Refills: 3 | Status: SHIPPED | OUTPATIENT
Start: 2024-08-26

## 2024-08-26 RX ORDER — ESTRADIOL 0.1 MG/G
CREAM VAGINAL
Qty: 42.5 G | Refills: 3 | Status: SHIPPED | OUTPATIENT
Start: 2024-08-26 | End: 2024-08-26

## 2024-08-26 NOTE — PROGRESS NOTES
Pt presents for f/u of pelvic organ prolapse, pessary care  Using #2 ring with support with knob, office care    Reports pessary is comfortable   Denies discharge  Denies bleeding  Denies s/sx of UTI  Bowels reg  Using vag moisturizer once a week    Happy with pessary, feels supported  She is not currently interested in surgical management of her pelvic organ prolapse    Urogynecology Summary:  Urogynecology Summary  Prolapse: No  AGUSTO: Yes  Urge Incontinence: Yes  Nocturia Frequency: Greater than 4 (3-4 times)  Frequency: 2 - 3 hours  Incomplete emptying: No  Constipation: No  Wears pad day?:  (heavy pads and changes them every 2-3 hours)  Wears Pad Night?:  (heavy (and same as during the day))  Activities are limited by UI/POP?: Yes (afraid of urine leakage)  Currently Sexually Active: No      Vitals:  Vitals:    08/26/24 1046   Resp: 18       GENERAL EXAM:  GENERAL: alert & oriented, NAD  HEENT: NC/AT, sclera without injection  SKIN: no rashes  LUNGS:  without increased respiratory effort  ABDOMEN: soft, non-tender, non-distended, no masses appreciated  EXT: no edema    PELVIC EXAM: LOR Garland, present for exam as a chaperone  Ext. Gen: +atrophy, no lesions, erythema, scattered epidermal inclusion cysts  Urethra: +atrophy, nontender  Bladder: some fullness, nontender  Vagina: ++atrophy, no lesions, no bleeding  Cervix: wnl  Uterus mobile  Perineum: wnl  Anus: wnl  Rectum: deferred      PELVIC SUPPORT  [place]:  2  Ant:  3  Post:  2  CST:  neg  UVJ: somewhat hypermobile     Her pessary was removed, +calcifications noted, cleaned, and reinserted without difficulty.    Impression:    ICD-10-CM    1. Uterovaginal prolapse, incomplete  N81.2       2. Postmenopausal atrophic vaginitis  N95.2 estradiol (ESTRACE) 0.1 MG/GM Vaginal Cream     DISCONTINUED: estradiol (ESTRACE) 0.1 MG/GM Vaginal Cream      3. Pelvic muscle wasting  N81.84       4. Female stress incontinence  N39.3       5. Urge incontinence  N39.41            Discussion Items:   With pt's son, Haja, present  Discussed mgmt of vulvovaginal atrophy with vaginal estrogen cream. Reviewed associated benefits, risks, alternatives, and goals. Recommend low dose 2x weekly mgmt.   -- (discussed with onc, Dr. Wadsworth)     Discussed management of pelvic organ prolapse including but not limited to behavioral modifications, conservative options, and surgical management.   Discussed pessary management including benefits and risks. Discussed importance of keeping regularly scheduled pessary checks in prevention of complications related to pessary use.     Treatment Plan:  Continue pessary management, office care  Vag moisturizers  Encouraged daily pelvic exercises  Cont bowel regimen  Call with s/sx of UTI, problems with pessary     All questions answered  Pt understands and agrees to plan       Return in about 6 weeks (around 10/7/2024) for pessary care.      Zulema Holland PA-C    Note to patient: The 21st Century Cures Act makes medical notes like these available to patients in the interest of transparency.  However, be advised this is a medical document.  It is intended as peer to peer communication.  It is written in medical language and may contain abbreviations or verbiage that are unfamiliar.  It may appear blunt or direct.  Medical documents are intended to carry relevant information, facts as evident, and the clinical opinion of the practitioner.

## 2024-08-28 ENCOUNTER — PATIENT MESSAGE (OUTPATIENT)
Dept: INTERNAL MEDICINE CLINIC | Facility: CLINIC | Age: 89
End: 2024-08-28

## 2024-08-28 DIAGNOSIS — N18.30 CONTROLLED TYPE 2 DIABETES MELLITUS WITH STAGE 3 CHRONIC KIDNEY DISEASE, WITHOUT LONG-TERM CURRENT USE OF INSULIN (HCC): ICD-10-CM

## 2024-08-28 DIAGNOSIS — E11.21 CONTROLLED TYPE 2 DIABETES MELLITUS WITH DIABETIC NEPHROPATHY, WITHOUT LONG-TERM CURRENT USE OF INSULIN (HCC): ICD-10-CM

## 2024-08-28 DIAGNOSIS — E11.22 CONTROLLED TYPE 2 DIABETES MELLITUS WITH STAGE 3 CHRONIC KIDNEY DISEASE, WITHOUT LONG-TERM CURRENT USE OF INSULIN (HCC): ICD-10-CM

## 2024-08-29 RX ORDER — BLOOD SUGAR DIAGNOSTIC
STRIP MISCELLANEOUS
Qty: 100 STRIP | Refills: 3 | Status: SHIPPED | OUTPATIENT
Start: 2024-08-29

## 2024-08-29 RX ORDER — LANCETS
1 EACH MISCELLANEOUS DAILY
Qty: 100 EACH | Refills: 3 | Status: SHIPPED | OUTPATIENT
Start: 2024-08-29

## 2024-08-29 NOTE — TELEPHONE ENCOUNTER
Refill passes per [facility] protocol.    Future Appointments   Date Time Provider Department Center   10/28/2024 10:45 AM Fredrick Cornelius MD [facility] [place]     LAST OFFICE VISIT: 2024    Requested Prescriptions/   Pending Prescriptions Disp Refills    Accu-Chek Softclix Lancets Does not apply Misc 100 each 3     Si Lancet by Finger stick route daily. For blood glucose monitoring.       Diabetic Supplies Protocol Passed - 2024  6:04 PM        Passed - In person appointment or virtual visit in the past 12 mos or appointment in next 3 mos     Recent Outpatient Visits              3 days ago Uterovaginal prolapse, incomplete    [facility] [place] [facility] Zulema Holland PA    Office Visit    1 month ago Hypertension, unspecified type    [facility] [place] [facility]    Office Visit    1 month ago Uterovaginal prolapse, incomplete    [facility] [place] [facility] Zulema Holland PA    Office Visit    2 months ago Uterovaginal prolapse, incomplete    [facility] [place] [facility] Zulema Holland PA    Office Visit    4 months ago Medicare annual wellness visit, subsequent    [facility] [place] [facility] A[facility] B[facility]    Office Visit          Future Appointments         Provider Department Appt Notes    In 1 month Zulema Holland PA [facility] [place] [facility] 6 WEEK PESS CHECK    In 2 months Fredrick Cornelius MD [facility], [place], [place] follow-up                      Glucose Blood (ACCU-CHEK AMBIKA PLUS) In Vitro Strip 100 strip 3     Sig: Use 1 (one) new strip once daily for blood glucose monitoring       Diabetic Supplies Protocol Passed - 2024  6:04 PM        Passed - In person appointment or virtual visit in the past 12 mos or appointment in next 3 mos     Recent  Outpatient Visits              3 days ago Uterovaginal prolapse, incomplete    [facility] [place] [facility] Zulema Holland PA    Office Visit    1 month ago [facility]pertension, unspecified type    [facility] [place] [facility]    Office Visit    1 month ago Uterovaginal prolapse, incomplete    [facility] [place] [facility] Zulema Holland PA    Office Visit    2 months ago Uterovaginal prolapse, incomplete    [facility] [place] [facility] Zulema Holland PA    Office Visit    4 months ago Medicare annual wellness visit, subsequent    [facility] [place] [facility], Agron B, MD    Office Visit          Future Appointments         Provider Department Appt Notes    In 1 month Zulema Holland PA [facility] [place] [facility] 6 WEEK PESS CHECK    In 2 months [facility] [place] follow-up                         Future Appointments         Provider Department Appt Notes    In 1 month Zulema Holland PA [facility] [place] [facility] 6 WEEK PESS CHECK    In 2 months Fredrick Cornelius MD [facility], [place], [place] follow-up          Recent Outpatient Visits              3 days ago Uterovaginal prolapse, incomplete    [facility] [place] [facility] Zulema Holland PA    Office Visit    1 month ago Hypertension, unspecified type    [facility] [place] [facility]    Office Visit    1 month ago Uterovaginal prolapse, incomplete    [facility] [place] [facility] Zulema Holland PA    Office Visit    2 months ago Uterovaginal prolapse, incomplete    [facility] [place] [facility] Zulema Holland PA    Office Visit    4  months ago Medicare annual wellness visit, subsequent    [facility], [place] Agron B, MD    Office Visit

## 2024-09-23 ENCOUNTER — PATIENT MESSAGE (OUTPATIENT)
Dept: INTERNAL MEDICINE CLINIC | Facility: CLINIC | Age: 89
End: 2024-09-23

## 2024-09-23 DIAGNOSIS — E78.00 HYPERCHOLESTEROLEMIA: ICD-10-CM

## 2024-09-23 DIAGNOSIS — E11.21 CONTROLLED TYPE 2 DIABETES MELLITUS WITH DIABETIC NEPHROPATHY, WITHOUT LONG-TERM CURRENT USE OF INSULIN (HCC): Primary | ICD-10-CM

## 2024-09-23 DIAGNOSIS — I48.91 ATRIAL FIBRILLATION, NEW ONSET (HCC): ICD-10-CM

## 2024-09-23 DIAGNOSIS — I10 HYPERTENSION, UNSPECIFIED TYPE: ICD-10-CM

## 2024-09-23 NOTE — TELEPHONE ENCOUNTER
Dr Fredrick Cornelius,    See patients' mychart message and advise on lab orders to complete prior to his appointment with you.    Please reply to pool: EM RN TRIAGE

## 2024-09-23 NOTE — TELEPHONE ENCOUNTER
From: Isabel Patel  To: Fredrick Cornelius  Sent: 9/23/2024 9:15 AM CDT  Subject: Blood test     Hi Dr. Cornelius,    I have an appt. with my cardiologist on Oct. 22 and need a blood test prior to this appt. I have an appt with you on Oct. 28 and need a blood test. Please write a blood work order for the Oct. 28 appt since I would like to get these blood tests done in one visit the week of Oct. 14.   Thank You.

## 2024-10-07 ENCOUNTER — OFFICE VISIT (OUTPATIENT)
Dept: UROLOGY | Facility: CLINIC | Age: 89
End: 2024-10-07
Attending: OBSTETRICS & GYNECOLOGY
Payer: MEDICARE

## 2024-10-07 VITALS — HEIGHT: 61 IN | BODY MASS INDEX: 31 KG/M2

## 2024-10-07 DIAGNOSIS — N39.3 FEMALE STRESS INCONTINENCE: ICD-10-CM

## 2024-10-07 DIAGNOSIS — N81.2 UTEROVAGINAL PROLAPSE, INCOMPLETE: Primary | ICD-10-CM

## 2024-10-07 DIAGNOSIS — N95.2 POSTMENOPAUSAL ATROPHIC VAGINITIS: ICD-10-CM

## 2024-10-07 DIAGNOSIS — N81.84 PELVIC MUSCLE WASTING: ICD-10-CM

## 2024-10-07 PROCEDURE — 99212 OFFICE O/P EST SF 10 MIN: CPT

## 2024-10-07 RX ORDER — LIDOCAINE HYDROCHLORIDE 20 MG/ML
10 JELLY TOPICAL ONCE
Status: COMPLETED | OUTPATIENT
Start: 2024-10-07 | End: 2024-10-07

## 2024-10-07 NOTE — PROGRESS NOTES
Pt presents for f/u of pelvic organ prolapse, pessary care  Using #2 ring with support with knob pessary, office care    Reports pessary is comfortable   Denies discharge  Denies bleeding  Denies s/sx of UTI  Bowels every other day   Using vag moisturizer 2-3x weekly    Happy with pessary, feels supported  She is not currently interested in surgical management of her pelvic organ prolapse    Urogynecology Summary:  Urogynecology Summary  Prolapse: No  AGUSTO: No  Urge Incontinence: Yes (sometimes)  Nocturia Frequency: 3  Frequency: 2 - 3 hours  Incomplete emptying: No  Constipation: No  Wears pad day?:  (heavy)  Wears Pad Night?:  (heavy)  Activities are limited by UI/POP?: Yes (afraid of urine leakage)  Currently Sexually Active: No      Vitals:  There were no vitals filed for this visit.    GENERAL EXAM:  GENERAL: alert & oriented, NAD  HEENT: NC/AT, sclera without injection  SKIN: no rashes  LUNGS:  without increased respiratory effort  ABDOMEN: soft, non-tender, non-distended, no masses appreciated  EXT: no edema    PELVIC EXAM: LOR Garland, present for exam as a chaperone  2% topical lidocaine gel was applied intravaginally to reduce patient discomfort with pessary removal/insertion.  Ext. Gen: +atrophy, no lesions, +erythema, scattered epidermal inclusion cysts  Urethra: +atrophy, nontender  Bladder: some fullness, nontender  Vagina: ++atrophy, no lesions, no bleeding  Cervix: wnl  Uterus mobile  Perineum: wnl  Anus: wnl  Rectum: deferred      PELVIC SUPPORT  [place]:  2  Ant:  3  Post:  2  CST:  neg  UVJ: somewhat hypermobile    Her pessary was removed (+calcifications noted within pessary support), cleaned, and reinserted without difficulty.    Impression:    ICD-10-CM    1. Uterovaginal prolapse, incomplete  N81.2       2. Postmenopausal atrophic vaginitis  N95.2       3. Pelvic muscle wasting  N81.84       4. Female stress incontinence  N39.3           Discussion Items:   Discussed management of pelvic organ  prolapse including but not limited to behavioral modifications, conservative options, and surgical management.   Discussed pessary management including benefits and risks. Discussed importance of keeping regularly scheduled pessary checks in prevention of complications related to pessary use.     Treatment Plan:  Continue pessary management, office care  Vag moisturizers 3x weekly  Encouraged daily pelvic exercises  Cont bowel regimen  Call with s/sx of UTI, problems with pessary     All questions answered  Pt understands and agrees to plan       Return in about 6 weeks (around 11/18/2024) for pessary care.      Zulema Holland PA-C    Note to patient: The 21st Century Cures Act makes medical notes like these available to patients in the interest of transparency.  However, be advised this is a medical document.  It is intended as peer to peer communication.  It is written in medical language and may contain abbreviations or verbiage that are unfamiliar.  It may appear blunt or direct.  Medical documents are intended to carry relevant information, facts as evident, and the clinical opinion of the practitioner.

## 2024-10-15 ENCOUNTER — LAB ENCOUNTER (OUTPATIENT)
Dept: LAB | Age: 89
End: 2024-10-15
Attending: INTERNAL MEDICINE
Payer: MEDICARE

## 2024-10-15 DIAGNOSIS — I10 HYPERTENSION, UNSPECIFIED TYPE: ICD-10-CM

## 2024-10-15 DIAGNOSIS — N18.30 STAGE 3 CHRONIC KIDNEY DISEASE, UNSPECIFIED WHETHER STAGE 3A OR 3B CKD (HCC): Primary | ICD-10-CM

## 2024-10-15 DIAGNOSIS — Z79.899 ENCOUNTER FOR MONITORING DIGOXIN THERAPY: Primary | ICD-10-CM

## 2024-10-15 DIAGNOSIS — Z79.01 CURRENT USE OF ANTICOAGULANT THERAPY: ICD-10-CM

## 2024-10-15 DIAGNOSIS — E11.21 CONTROLLED TYPE 2 DIABETES MELLITUS WITH DIABETIC NEPHROPATHY, WITHOUT LONG-TERM CURRENT USE OF INSULIN (HCC): ICD-10-CM

## 2024-10-15 DIAGNOSIS — E78.00 HYPERCHOLESTEROLEMIA: ICD-10-CM

## 2024-10-15 DIAGNOSIS — Z51.81 ENCOUNTER FOR MONITORING DIGOXIN THERAPY: Primary | ICD-10-CM

## 2024-10-15 DIAGNOSIS — I48.91 ATRIAL FIBRILLATION, NEW ONSET (HCC): ICD-10-CM

## 2024-10-15 DIAGNOSIS — E11.9 TYPE 2 DIABETES MELLITUS WITHOUT COMPLICATION, WITHOUT LONG-TERM CURRENT USE OF INSULIN (HCC): ICD-10-CM

## 2024-10-15 DIAGNOSIS — I10 HYPERTENSION: ICD-10-CM

## 2024-10-15 DIAGNOSIS — I48.19 PERSISTENT ATRIAL FIBRILLATION (HCC): ICD-10-CM

## 2024-10-15 LAB
ALBUMIN SERPL-MCNC: 4.4 G/DL (ref 3.2–4.8)
ALBUMIN/GLOB SERPL: 1.3 {RATIO} (ref 1–2)
ALP LIVER SERPL-CCNC: 106 U/L
ALT SERPL-CCNC: <7 U/L
ANION GAP SERPL CALC-SCNC: 7 MMOL/L (ref 0–18)
AST SERPL-CCNC: 11 U/L (ref ?–34)
BASOPHILS # BLD AUTO: 0.04 X10(3) UL (ref 0–0.2)
BASOPHILS NFR BLD AUTO: 0.6 %
BILIRUB SERPL-MCNC: 0.9 MG/DL (ref 0.2–0.9)
BUN BLD-MCNC: 39 MG/DL (ref 9–23)
BUN/CREAT SERPL: 24.2 (ref 10–20)
CALCIUM BLD-MCNC: 9.9 MG/DL (ref 8.7–10.4)
CHLORIDE SERPL-SCNC: 107 MMOL/L (ref 98–112)
CHOLEST SERPL-MCNC: 166 MG/DL (ref ?–200)
CO2 SERPL-SCNC: 26 MMOL/L (ref 21–32)
CREAT BLD-MCNC: 1.61 MG/DL
DEPRECATED RDW RBC AUTO: 54.8 FL (ref 35.1–46.3)
DIGOXIN SERPL-MCNC: <0.14 NG/ML (ref 0.8–2)
EGFRCR SERPLBLD CKD-EPI 2021: 30 ML/MIN/1.73M2 (ref 60–?)
EOSINOPHIL # BLD AUTO: 0.1 X10(3) UL (ref 0–0.7)
EOSINOPHIL NFR BLD AUTO: 1.6 %
ERYTHROCYTE [DISTWIDTH] IN BLOOD BY AUTOMATED COUNT: 16.7 % (ref 11–15)
EST. AVERAGE GLUCOSE BLD GHB EST-MCNC: 148 MG/DL (ref 68–126)
FASTING PATIENT LIPID ANSWER: YES
FASTING STATUS PATIENT QL REPORTED: YES
GLOBULIN PLAS-MCNC: 3.3 G/DL (ref 2–3.5)
GLUCOSE BLD-MCNC: 117 MG/DL (ref 70–99)
HBA1C MFR BLD: 6.8 % (ref ?–5.7)
HCT VFR BLD AUTO: 44.2 %
HDLC SERPL-MCNC: 52 MG/DL (ref 40–59)
HGB BLD-MCNC: 14.3 G/DL
IMM GRANULOCYTES # BLD AUTO: 0.02 X10(3) UL (ref 0–1)
IMM GRANULOCYTES NFR BLD: 0.3 %
LDLC SERPL CALC-MCNC: 98 MG/DL (ref ?–100)
LYMPHOCYTES # BLD AUTO: 1.25 X10(3) UL (ref 1–4)
LYMPHOCYTES NFR BLD AUTO: 19.6 %
MCH RBC QN AUTO: 29.1 PG (ref 26–34)
MCHC RBC AUTO-ENTMCNC: 32.4 G/DL (ref 31–37)
MCV RBC AUTO: 89.8 FL
MONOCYTES # BLD AUTO: 0.56 X10(3) UL (ref 0.1–1)
MONOCYTES NFR BLD AUTO: 8.8 %
NEUTROPHILS # BLD AUTO: 4.42 X10 (3) UL (ref 1.5–7.7)
NEUTROPHILS # BLD AUTO: 4.42 X10(3) UL (ref 1.5–7.7)
NEUTROPHILS NFR BLD AUTO: 69.1 %
NONHDLC SERPL-MCNC: 114 MG/DL (ref ?–130)
OSMOLALITY SERPL CALC.SUM OF ELEC: 300 MOSM/KG (ref 275–295)
PLATELET # BLD AUTO: 213 10(3)UL (ref 150–450)
POTASSIUM SERPL-SCNC: 4.9 MMOL/L (ref 3.5–5.1)
PROT SERPL-MCNC: 7.7 G/DL (ref 5.7–8.2)
RBC # BLD AUTO: 4.92 X10(6)UL
SODIUM SERPL-SCNC: 140 MMOL/L (ref 136–145)
TRIGL SERPL-MCNC: 83 MG/DL (ref 30–149)
VLDLC SERPL CALC-MCNC: 14 MG/DL (ref 0–30)
WBC # BLD AUTO: 6.4 X10(3) UL (ref 4–11)

## 2024-10-15 PROCEDURE — 80061 LIPID PANEL: CPT

## 2024-10-15 PROCEDURE — 80162 ASSAY OF DIGOXIN TOTAL: CPT

## 2024-10-15 PROCEDURE — 83036 HEMOGLOBIN GLYCOSYLATED A1C: CPT

## 2024-10-15 PROCEDURE — 85025 COMPLETE CBC W/AUTO DIFF WBC: CPT

## 2024-10-15 PROCEDURE — 80053 COMPREHEN METABOLIC PANEL: CPT

## 2024-10-15 PROCEDURE — 36415 COLL VENOUS BLD VENIPUNCTURE: CPT

## 2024-10-18 DIAGNOSIS — E11.21 CONTROLLED TYPE 2 DIABETES MELLITUS WITH DIABETIC NEPHROPATHY, WITHOUT LONG-TERM CURRENT USE OF INSULIN (HCC): ICD-10-CM

## 2024-10-18 RX ORDER — BLOOD SUGAR DIAGNOSTIC
1 STRIP MISCELLANEOUS DAILY
Qty: 100 STRIP | Refills: 3 | Status: SHIPPED | OUTPATIENT
Start: 2024-10-18

## 2024-10-18 NOTE — TELEPHONE ENCOUNTER
Patient's son Haja called (on Release of Information), verified patient's Name and . States patient was sent the wrong strips Accu-Chek Karon. Patient uses Accu-Chek Guide and is out of strips. Medication pended to run through protocol. Pharmacy verified.

## 2024-10-18 NOTE — TELEPHONE ENCOUNTER
Refill passed per [facility] protocol.  Requested Prescriptions   Pending Prescriptions Disp Refills    Glucose Blood (ACCU-CHEK GUIDE) In Vitro Strip 100 strip 3     Sig: Check once daily       Diabetic Supplies Protocol Passed - 10/18/2024  8:20 AM        Passed - In person appointment or virtual visit in the past 12 mos or appointment in next 3 mos     Recent Outpatient Visits              1 week ago Uterovaginal prolapse, incomplete    [facility] [place] [facility] Zulema Holland PA    Office Visit    1 month ago Uterovaginal prolapse, incomplete    [facility] [place] [facility] Zulema Holland PA    Office Visit    2 months ago Hypertension, unspecified type    [facility] [place] [facility]    Office Visit    3 months ago Uterovaginal prolapse, incomplete    [facility] [place] [facility] Zulema Holland PA    Office Visit    4 months ago Uterovaginal prolapse, incomplete    [facility] [place] [facility] Zulema Holland PA    Office Visit          Future Appointments         Provider Department Appt Notes    In 1 week Fredirck Cornelius MD [facility] [place] follow-up    In 1 month Zulema Holland PA [facility] [place] [facility] 6 WEEK PESS CHECK                       Recent Outpatient Visits              1 week ago Uterovaginal prolapse, incomplete    [facility] [place] [facility] Zulema Holland PA    Office Visit    1 month ago Uterovaginal prolapse, incomplete    [facility] [place] [facility] Zulema Holland PA    Office Visit    2 months ago Hypertension, unspecified type    [facility] [place] [facility]    Office Visit    3 months ago Uterovaginal prolapse, incomplete    [facility] [place] [facility] Zulema Holland PA    Office Visit    4 months ago Uterovaginal prolapse, incomplete    [facility] [place] [facility] Zulema Holland PA    Office Visit          Future Appointments         Provider Department Appt Notes    In 1 week Fredrick Cornelius MD [facility], [place], [place] follow-up    In 1 month Zulema Holland PA [facility] [place] [facility] 6 WEEK PESS CHECK

## 2024-10-28 ENCOUNTER — LAB ENCOUNTER (OUTPATIENT)
Dept: LAB | Facility: REFERENCE LAB | Age: 89
End: 2024-10-28
Attending: INTERNAL MEDICINE
Payer: MEDICARE

## 2024-10-28 ENCOUNTER — OFFICE VISIT (OUTPATIENT)
Dept: INTERNAL MEDICINE CLINIC | Facility: CLINIC | Age: 89
End: 2024-10-28

## 2024-10-28 VITALS
HEIGHT: 61 IN | HEART RATE: 117 BPM | SYSTOLIC BLOOD PRESSURE: 112 MMHG | DIASTOLIC BLOOD PRESSURE: 53 MMHG | BODY MASS INDEX: 31 KG/M2 | OXYGEN SATURATION: 93 % | TEMPERATURE: 98 F | RESPIRATION RATE: 18 BRPM

## 2024-10-28 DIAGNOSIS — I10 HYPERTENSION, UNSPECIFIED TYPE: ICD-10-CM

## 2024-10-28 DIAGNOSIS — M79.89 LEFT ARM SWELLING: Primary | ICD-10-CM

## 2024-10-28 DIAGNOSIS — E11.21 CONTROLLED TYPE 2 DIABETES MELLITUS WITH DIABETIC NEPHROPATHY, WITHOUT LONG-TERM CURRENT USE OF INSULIN (HCC): ICD-10-CM

## 2024-10-28 DIAGNOSIS — N18.30 STAGE 3 CHRONIC KIDNEY DISEASE, UNSPECIFIED WHETHER STAGE 3A OR 3B CKD (HCC): ICD-10-CM

## 2024-10-28 DIAGNOSIS — M79.89 LEFT ARM SWELLING: ICD-10-CM

## 2024-10-28 LAB
ANION GAP SERPL CALC-SCNC: 8 MMOL/L (ref 0–18)
BUN BLD-MCNC: 37 MG/DL (ref 9–23)
BUN/CREAT SERPL: 25.3 (ref 10–20)
CALCIUM BLD-MCNC: 9.8 MG/DL (ref 8.7–10.4)
CHLORIDE SERPL-SCNC: 104 MMOL/L (ref 98–112)
CO2 SERPL-SCNC: 26 MMOL/L (ref 21–32)
CREAT BLD-MCNC: 1.46 MG/DL
EGFRCR SERPLBLD CKD-EPI 2021: 33 ML/MIN/1.73M2 (ref 60–?)
FASTING STATUS PATIENT QL REPORTED: NO
GLUCOSE BLD-MCNC: 144 MG/DL (ref 70–99)
OSMOLALITY SERPL CALC.SUM OF ELEC: 297 MOSM/KG (ref 275–295)
POTASSIUM SERPL-SCNC: 4.4 MMOL/L (ref 3.5–5.1)
SODIUM SERPL-SCNC: 138 MMOL/L (ref 136–145)

## 2024-10-28 PROCEDURE — 80048 BASIC METABOLIC PNL TOTAL CA: CPT

## 2024-10-28 PROCEDURE — 99214 OFFICE O/P EST MOD 30 MIN: CPT | Performed by: INTERNAL MEDICINE

## 2024-10-28 PROCEDURE — 36415 COLL VENOUS BLD VENIPUNCTURE: CPT

## 2024-11-01 NOTE — PROGRESS NOTES
Subjective:     Patient ID: Isabel Patel is a 89 year old female.    HPI      Pt complaints today of In L-wrist and arm swelling   Pt doesn't recall bumping into anything, or any pain  Noticed this a couple of wks ago, per pt it comes and goes  No other complaints     History/Other:   Review of Systems   Constitutional: Negative.    HENT: Negative.     Eyes: Negative.    Respiratory: Negative.     Cardiovascular: Negative.    Gastrointestinal: Negative.    Genitourinary: Negative.    Musculoskeletal: Negative.         Left arm swelling   Skin: Negative.    Neurological: Negative.    Psychiatric/Behavioral: Negative.       Current Outpatient Medications   Medication Sig Dispense Refill    Glucose Blood (ACCU-CHEK GUIDE) In Vitro Strip 1 strip by In Vitro route daily. 100 strip 3    Accu-Chek Softclix Lancets Does not apply Misc 1 Lancet by Finger stick route daily. For blood glucose monitoring. 100 each 3    losartan 50 MG Oral Tab Take 1 tablet (50 mg total) by mouth daily. 90 tablet 3    dilTIAZem HCl ER Coated Beads 180 MG Oral Capsule SR 24 Hr Take 2 capsules (360 mg total) by mouth daily. 180 capsule 3    carvedilol 12.5 MG Oral Tab Take 1 tablet (12.5 mg total) by mouth 2 (two) times daily with meals.      digoxin 0.125 MG Oral Tab Take by mouth daily.      furosemide 20 MG Oral Tab Take 1 tablet (20 mg total) by mouth daily. 30 tablet 0    APIXABAN 5 MG Oral Tab Take 1 tablet (5 mg total) by mouth 2 (two) times daily. 60 tablet 1    atorvastatin 20 MG Oral Tab Take 1 tablet (20 mg total) by mouth daily. 90 tablet 1    Blood Glucose Calibration (ACCU-CHEK GUIDE CONTROL) In Vitro Liquid 1 each by In Vitro route every 30 (thirty) days. 1 each 11    Blood Glucose Monitoring Suppl (ACCU-CHEK GUIDE ME) w/Device Does not apply Kit 1 each daily. Check once a day. 1 kit 0    cholecalciferol (VITAMIN D HIGH POTENCY) 25 MCG (1000 UT) Oral Cap Take 1 capsule (1,000 Units total) by mouth daily. 30 capsule 0     lidocaine 5 % External Patch Place 1 patch onto the skin Q24H PRN. 30 patch 0    TURMERIC CURCUMIN OR Take by mouth.      acetaminophen 500 MG Oral Tab Take 2 tablets (1,000 mg total) by mouth every 6 (six) hours as needed for Pain.      Coenzyme Q10 (CO Q 10) 10 MG Oral Cap Take 180 mg by mouth daily.      estradiol (ESTRACE) 0.1 MG/GM Vaginal Cream Apply 1/2 gram vaginally 2 times per week at bedtime. 42.5 g 3     Allergies:Allergies[1]    Past Medical History:    Acute, but ill-defined, cerebrovascular disease    Arthritis    Breast CA (HCC)    Cancer (HCC)    Diabetes (HCC)    diet controlled    Diabetes mellitus, type II (HCC)    Essential hypertension    Hearing impairment    High blood pressure    High cholesterol    Hyperlipidemia    Osteoarthritis    Squamous cell carcinoma, keratinizing    R posterior thigh      Past Surgical History:   Procedure Laterality Date    Appendectomy      Appendectomy      Cataract  -    Cataract extraction w/  intraocular lens implant Bilateral     Ian Garcia MD    Chemotherapy  2016    rt breast.    Cholecystectomy      D & c      Knee replacement surgery Right 2012    Lumpectomy right  2016    cancer    Mastectomy partial Right 2016      [id number]    Skin surgery Right 2018    Tonsillectomy      Wrist fracture surgery Right       Family History   Problem Relation Age of Onset    Heart Disease Father         CAD    Diabetes Father     Heart Disease Mother         CAD    Diabetes Mother     Breast Cancer Mother 83        had lumpectomy and RT.  No other treatment.   of stroke at 89    Other (appendicitis[other]) Brother         age 13    Other (alive and well[other]) Sister     Other (alive and well[other]) Son         x3    Breast Cancer Self 85    Glaucoma Neg     Macular degeneration Neg       Social History:   Social History     Socioeconomic History    Marital status:     Number of children: 3   Occupational History     Occupation:      Comment: retired   Tobacco Use    Smoking status: Never     Passive exposure: Never    Smokeless tobacco: Never   Vaping Use    Vaping status: Never Used   Substance and Sexual Activity    Alcohol use: Not Currently    Drug use: Never    Sexual activity: Not Currently   Other Topics Concern     Service No    Caffeine Concern Yes     Comment: coffee, 1 cups daily    Occupational Exposure No     Social Drivers of Health     Financial Resource Strain: Low Risk  (2/20/2024)    Financial Resource Strain     Difficulty of Paying Living Expenses: Not hard at all     Med Affordability: No   Food Insecurity: No Food Insecurity (2/12/2024)    Food Insecurity     Food Insecurity: Never true   Transportation Needs: No Transportation Needs (2/20/2024)    Transportation Needs     Lack of Transportation: No   Housing Stability: Low Risk  (2/12/2024)    Housing Stability     Housing Instability: No        Objective:   Physical Exam  Vitals and nursing note reviewed.   Constitutional:       Appearance: She is well-developed.   HENT:      Head: Normocephalic and atraumatic.      Right Ear: External ear normal.      Left Ear: External ear normal.      Nose: Nose normal.   Eyes:      Conjunctiva/sclera: Conjunctivae normal.      Pupils: Pupils are equal, round, and reactive to light.   Cardiovascular:      Rate and Rhythm: Normal rate and regular rhythm.      Heart sounds: Normal heart sounds.   Pulmonary:      Effort: Pulmonary effort is normal.      Breath sounds: Normal breath sounds.   Abdominal:      General: Bowel sounds are normal.      Palpations: Abdomen is soft.   Genitourinary:     Vagina: Normal.   Musculoskeletal:         General: Swelling present. Normal range of motion.      Cervical back: Normal range of motion and neck supple.      Comments: Left arm swelling no pain   Skin:     General: Skin is warm and dry.   Neurological:      Mental Status: She is alert and oriented to person,  place, and time.      Deep Tendon Reflexes: Reflexes are normal and symmetric.   Psychiatric:         Behavior: Behavior normal.         Thought Content: Thought content normal.         Judgment: Judgment normal.         Assessment & Plan:   1. Left arm swelling will get an ultrasound to rule out DVT even though patient is on blood thinners not sure what is swelling keep it elevated monitor    2. Controlled type 2 diabetes mellitus with diabetic nephropathy, without long-term current use of insulin (HCC) continue current treatment   3. Hypertension, unspecified type well-controlled continue current treatment    4. Stage 3 chronic kidney disease, unspecified whether stage 3a or 3b CKD (HCC) we will retest increase fluid intake       Orders Placed This Encounter   Procedures    Basic Metabolic Panel (8) [E]       Meds This Visit:  Requested Prescriptions      No prescriptions requested or ordered in this encounter       Imaging & Referrals:  US VENOUS DOPPLER ARM LEFT - DIAG IMG (CPT=93971)            [1]   Allergies  Allergen Reactions    Adhesive Tape RASH

## 2024-11-18 ENCOUNTER — OFFICE VISIT (OUTPATIENT)
Dept: UROLOGY | Facility: CLINIC | Age: 89
End: 2024-11-18
Attending: OBSTETRICS & GYNECOLOGY
Payer: MEDICARE

## 2024-11-18 VITALS — RESPIRATION RATE: 18 BRPM | HEIGHT: 61 IN | BODY MASS INDEX: 31 KG/M2

## 2024-11-18 DIAGNOSIS — N81.2 UTEROVAGINAL PROLAPSE, INCOMPLETE: Primary | ICD-10-CM

## 2024-11-18 DIAGNOSIS — N81.84 PELVIC MUSCLE WASTING: ICD-10-CM

## 2024-11-18 DIAGNOSIS — N39.3 FEMALE STRESS INCONTINENCE: ICD-10-CM

## 2024-11-18 DIAGNOSIS — N95.2 POSTMENOPAUSAL ATROPHIC VAGINITIS: ICD-10-CM

## 2024-11-18 PROCEDURE — 99212 OFFICE O/P EST SF 10 MIN: CPT

## 2024-11-18 RX ORDER — LIDOCAINE HYDROCHLORIDE 20 MG/ML
10 JELLY TOPICAL ONCE
Status: COMPLETED | OUTPATIENT
Start: 2024-11-18 | End: 2024-11-18

## 2024-11-18 NOTE — PROGRESS NOTES
Pt presents for f/u of pelvic organ prolapse, pessary care  Using #2 ring with support with knob pessary, office care    Reports pessary is comfortable   Denies discharge  Denies bleeding  Denies s/sx of UTI  Bowels constipated  Using vag moisturizer 1-2x weekly    Happy with pessary, feels supported  She is not currently interested in surgical management of her pelvic organ prolapse    Urogynecology Summary:  Urogynecology Summary  Prolapse: No  AGUSTO: Yes  Urge Incontinence: Yes  Nocturia Frequency: 2  Frequency: 2 - 3 hours  Incomplete emptying: No  Constipation: No  Wears pad day?: 3 (heavy pads that she changes every 2-3 hours)  Wears Pad Night?: 3 (heavy pads that she changes every 2-3 hours)  Activities are limited by UI/POP?: Yes (afraid of leakage)  Currently Sexually Active: No      Vitals:  Vitals:    11/18/24 1139   Resp: 18       GENERAL EXAM:  GENERAL: alert & oriented, NAD  HEENT: NC/AT, sclera without injection  SKIN: no rashes  LUNGS:  without increased respiratory effort  ABDOMEN: soft, non-tender, non-distended, no masses appreciated  EXT: no edema    PELVIC EXAM: LOR Garland, present for exam as a chaperone  2% topical lidocaine gel was applied intravaginally to reduce patient discomfort with pessary removal/insertion.  Ext. Gen: +atrophy, no lesions, scattered epidermal inclusion cysts  Urethra: +atrophy, nontender  Bladder: some fullness, nontender  Vagina: ++atrophy, no lesions, no bleeding  Cervix: wnl  Uterus mobile  Perineum: wnl  Anus: wnl  Rectum: deferred      PELVIC SUPPORT  [place]:  2  Ant:  3  Post:  2  CST:  neg  UVJ: somewhat hypermobile    Her pessary was removed (+calcifications noted within pessary support), cleaned, and reinserted without difficulty.     Impression:    ICD-10-CM    1. Uterovaginal prolapse, incomplete  N81.2       2. Postmenopausal atrophic vaginitis  N95.2       3. Pelvic muscle wasting  N81.84       4. Female stress incontinence  N39.3           Discussion Items:    Discussed mgmt of vulvovaginal atrophy with vaginal estrogen cream. Reviewed associated benefits, risks, alternatives, and goals. Recommend low dose 2x weekly mgmt.     Discussed vag moisturizers    Discussed management of pelvic organ prolapse including but not limited to behavioral modifications, conservative options, and surgical management.   Discussed pessary management including benefits and risks. Discussed importance of keeping regularly scheduled pessary checks in prevention of complications related to pessary use.     Treatment Plan:  Continue pessary management, office care  Rec vag estrogen (family has repeatedly declined use)  Vag moisturizers  Encouraged daily pelvic exercises  Cont bowel regimen  Call with s/sx of UTI, problems with pessary     All questions answered  Pt understands and agrees to plan       Return in about 6 weeks (around 12/30/2024) for pessary care.      Zulema Holland PA-C    Note to patient: The 21st Century Cures Act makes medical notes like these available to patients in the interest of transparency.  However, be advised this is a medical document.  It is intended as peer to peer communication.  It is written in medical language and may contain abbreviations or verbiage that are unfamiliar.  It may appear blunt or direct.  Medical documents are intended to carry relevant information, facts as evident, and the clinical opinion of the practitioner.

## 2024-12-02 ENCOUNTER — APPOINTMENT (OUTPATIENT)
Dept: GENERAL RADIOLOGY | Facility: HOSPITAL | Age: 89
End: 2024-12-02
Attending: EMERGENCY MEDICINE
Payer: MEDICARE

## 2024-12-02 ENCOUNTER — HOSPITAL ENCOUNTER (INPATIENT)
Facility: HOSPITAL | Age: 89
LOS: 12 days | Discharge: NURSING, REHAB OR LONG-TERM CARE | End: 2024-12-14
Attending: EMERGENCY MEDICINE | Admitting: HOSPITALIST
Payer: MEDICARE

## 2024-12-02 ENCOUNTER — APPOINTMENT (OUTPATIENT)
Dept: CT IMAGING | Facility: HOSPITAL | Age: 89
End: 2024-12-02
Attending: EMERGENCY MEDICINE
Payer: MEDICARE

## 2024-12-02 ENCOUNTER — APPOINTMENT (OUTPATIENT)
Dept: CV DIAGNOSTICS | Facility: HOSPITAL | Age: 89
End: 2024-12-02
Attending: HOSPITALIST
Payer: MEDICARE

## 2024-12-02 DIAGNOSIS — I50.9 ACUTE ON CHRONIC CONGESTIVE HEART FAILURE, UNSPECIFIED HEART FAILURE TYPE (HCC): Primary | ICD-10-CM

## 2024-12-02 DIAGNOSIS — K56.601 COMPLETE INTESTINAL OBSTRUCTION, UNSPECIFIED CAUSE (HCC): ICD-10-CM

## 2024-12-02 DIAGNOSIS — Z79.01 ANTICOAGULATED: ICD-10-CM

## 2024-12-02 DIAGNOSIS — I48.91 ATRIAL FIBRILLATION WITH RAPID VENTRICULAR RESPONSE (HCC): ICD-10-CM

## 2024-12-02 PROBLEM — K56.609 SMALL BOWEL OBSTRUCTION (HCC): Status: ACTIVE | Noted: 2024-12-02

## 2024-12-02 LAB
ALBUMIN SERPL-MCNC: 4.4 G/DL (ref 3.2–4.8)
ALBUMIN/GLOB SERPL: 1.3 {RATIO} (ref 1–2)
ALP LIVER SERPL-CCNC: 93 U/L
ALT SERPL-CCNC: 10 U/L
ANION GAP SERPL CALC-SCNC: 9 MMOL/L (ref 0–18)
APTT PPP: 31.9 SECONDS (ref 23–36)
AST SERPL-CCNC: 15 U/L (ref ?–34)
BASOPHILS # BLD AUTO: 0.02 X10(3) UL (ref 0–0.2)
BASOPHILS NFR BLD AUTO: 0.2 %
BILIRUB SERPL-MCNC: 1 MG/DL (ref 0.2–0.9)
BILIRUB UR QL: NEGATIVE
BUN BLD-MCNC: 29 MG/DL (ref 9–23)
BUN/CREAT SERPL: 19.1 (ref 10–20)
CALCIUM BLD-MCNC: 10.3 MG/DL (ref 8.7–10.4)
CHLORIDE SERPL-SCNC: 98 MMOL/L (ref 98–112)
CLARITY UR: CLEAR
CO2 SERPL-SCNC: 29 MMOL/L (ref 21–32)
COLOR UR: COLORLESS
CREAT BLD-MCNC: 1.52 MG/DL
DEPRECATED RDW RBC AUTO: 51.8 FL (ref 35.1–46.3)
EGFRCR SERPLBLD CKD-EPI 2021: 32 ML/MIN/1.73M2 (ref 60–?)
EOSINOPHIL # BLD AUTO: 0.01 X10(3) UL (ref 0–0.7)
EOSINOPHIL NFR BLD AUTO: 0.1 %
ERYTHROCYTE [DISTWIDTH] IN BLOOD BY AUTOMATED COUNT: 15.9 % (ref 11–15)
GLOBULIN PLAS-MCNC: 3.5 G/DL (ref 2–3.5)
GLUCOSE BLD-MCNC: 155 MG/DL (ref 70–99)
GLUCOSE BLDC GLUCOMTR-MCNC: 149 MG/DL (ref 70–99)
GLUCOSE BLDC GLUCOMTR-MCNC: 168 MG/DL (ref 70–99)
GLUCOSE BLDC GLUCOMTR-MCNC: 175 MG/DL (ref 70–99)
GLUCOSE UR-MCNC: NORMAL MG/DL
HCT VFR BLD AUTO: 45.5 %
HGB BLD-MCNC: 14.7 G/DL
HGB UR QL STRIP.AUTO: NEGATIVE
IMM GRANULOCYTES # BLD AUTO: 0.05 X10(3) UL (ref 0–1)
IMM GRANULOCYTES NFR BLD: 0.6 %
INR BLD: 1.41 (ref 0.8–1.2)
KETONES UR-MCNC: NEGATIVE MG/DL
LACTATE SERPL-SCNC: 1.1 MMOL/L (ref 0.5–2)
LEUKOCYTE ESTERASE UR QL STRIP.AUTO: 25
LIPASE SERPL-CCNC: 32 U/L (ref 12–53)
LYMPHOCYTES # BLD AUTO: 0.87 X10(3) UL (ref 1–4)
LYMPHOCYTES NFR BLD AUTO: 10.4 %
MAGNESIUM SERPL-MCNC: 1.8 MG/DL (ref 1.6–2.6)
MCH RBC QN AUTO: 28.5 PG (ref 26–34)
MCHC RBC AUTO-ENTMCNC: 32.3 G/DL (ref 31–37)
MCV RBC AUTO: 88.3 FL
MONOCYTES # BLD AUTO: 0.53 X10(3) UL (ref 0.1–1)
MONOCYTES NFR BLD AUTO: 6.3 %
NEUTROPHILS # BLD AUTO: 6.89 X10 (3) UL (ref 1.5–7.7)
NEUTROPHILS # BLD AUTO: 6.89 X10(3) UL (ref 1.5–7.7)
NEUTROPHILS NFR BLD AUTO: 82.4 %
NITRITE UR QL STRIP.AUTO: NEGATIVE
NT-PROBNP SERPL-MCNC: 5448 PG/ML (ref ?–450)
OSMOLALITY SERPL CALC.SUM OF ELEC: 291 MOSM/KG (ref 275–295)
PH UR: 6.5 [PH] (ref 5–8)
PLATELET # BLD AUTO: 334 10(3)UL (ref 150–450)
POTASSIUM SERPL-SCNC: 4.2 MMOL/L (ref 3.5–5.1)
PROT SERPL-MCNC: 7.9 G/DL (ref 5.7–8.2)
PROT UR-MCNC: NEGATIVE MG/DL
PROTHROMBIN TIME: 18.1 SECONDS (ref 11.6–14.8)
Q-T INTERVAL: 290 MS
QRS DURATION: 62 MS
QTC CALCULATION (BEZET): 442 MS
R AXIS: -51 DEGREES
RBC # BLD AUTO: 5.15 X10(6)UL
SODIUM SERPL-SCNC: 136 MMOL/L (ref 136–145)
SP GR UR STRIP: 1.01 (ref 1–1.03)
T AXIS: 114 DEGREES
TROPONIN I SERPL HS-MCNC: 16 NG/L
UROBILINOGEN UR STRIP-ACNC: NORMAL
VENTRICULAR RATE: 140 BPM
WBC # BLD AUTO: 8.4 X10(3) UL (ref 4–11)

## 2024-12-02 PROCEDURE — 71045 X-RAY EXAM CHEST 1 VIEW: CPT | Performed by: EMERGENCY MEDICINE

## 2024-12-02 PROCEDURE — 93306 TTE W/DOPPLER COMPLETE: CPT | Performed by: HOSPITALIST

## 2024-12-02 PROCEDURE — 74177 CT ABD & PELVIS W/CONTRAST: CPT | Performed by: EMERGENCY MEDICINE

## 2024-12-02 PROCEDURE — 99223 1ST HOSP IP/OBS HIGH 75: CPT | Performed by: HOSPITALIST

## 2024-12-02 PROCEDURE — 99223 1ST HOSP IP/OBS HIGH 75: CPT | Performed by: STUDENT IN AN ORGANIZED HEALTH CARE EDUCATION/TRAINING PROGRAM

## 2024-12-02 PROCEDURE — 71260 CT THORAX DX C+: CPT | Performed by: EMERGENCY MEDICINE

## 2024-12-02 PROCEDURE — 0D9670Z DRAINAGE OF STOMACH WITH DRAINAGE DEVICE, VIA NATURAL OR ARTIFICIAL OPENING: ICD-10-PCS | Performed by: HOSPITALIST

## 2024-12-02 RX ORDER — MORPHINE SULFATE 2 MG/ML
2 INJECTION, SOLUTION INTRAMUSCULAR; INTRAVENOUS EVERY 2 HOUR PRN
Status: DISCONTINUED | OUTPATIENT
Start: 2024-12-02 | End: 2024-12-07

## 2024-12-02 RX ORDER — DILTIAZEM HYDROCHLORIDE 30 MG/1
30 TABLET, FILM COATED ORAL EVERY 6 HOURS SCHEDULED
Status: DISCONTINUED | OUTPATIENT
Start: 2024-12-02 | End: 2024-12-03

## 2024-12-02 RX ORDER — MAGNESIUM SULFATE HEPTAHYDRATE 40 MG/ML
2 INJECTION, SOLUTION INTRAVENOUS ONCE
Status: COMPLETED | OUTPATIENT
Start: 2024-12-02 | End: 2024-12-02

## 2024-12-02 RX ORDER — ONDANSETRON 2 MG/ML
4 INJECTION INTRAMUSCULAR; INTRAVENOUS EVERY 6 HOURS PRN
Status: DISCONTINUED | OUTPATIENT
Start: 2024-12-02 | End: 2024-12-14

## 2024-12-02 RX ORDER — NICOTINE POLACRILEX 4 MG
30 LOZENGE BUCCAL
Status: DISCONTINUED | OUTPATIENT
Start: 2024-12-02 | End: 2024-12-14

## 2024-12-02 RX ORDER — ACETAMINOPHEN 500 MG
500 TABLET ORAL EVERY 4 HOURS PRN
Status: DISCONTINUED | OUTPATIENT
Start: 2024-12-02 | End: 2024-12-14

## 2024-12-02 RX ORDER — MORPHINE SULFATE 4 MG/ML
4 INJECTION, SOLUTION INTRAMUSCULAR; INTRAVENOUS EVERY 2 HOUR PRN
Status: DISCONTINUED | OUTPATIENT
Start: 2024-12-02 | End: 2024-12-07

## 2024-12-02 RX ORDER — FUROSEMIDE 10 MG/ML
40 INJECTION INTRAMUSCULAR; INTRAVENOUS ONCE
Status: COMPLETED | OUTPATIENT
Start: 2024-12-02 | End: 2024-12-02

## 2024-12-02 RX ORDER — NICOTINE POLACRILEX 4 MG
15 LOZENGE BUCCAL
Status: DISCONTINUED | OUTPATIENT
Start: 2024-12-02 | End: 2024-12-14

## 2024-12-02 RX ORDER — DEXTROSE MONOHYDRATE AND SODIUM CHLORIDE 5; .45 G/100ML; G/100ML
INJECTION, SOLUTION INTRAVENOUS CONTINUOUS
Status: DISCONTINUED | OUTPATIENT
Start: 2024-12-02 | End: 2024-12-07

## 2024-12-02 RX ORDER — METOPROLOL TARTRATE 1 MG/ML
5 INJECTION, SOLUTION INTRAVENOUS EVERY 6 HOURS
Status: DISCONTINUED | OUTPATIENT
Start: 2024-12-02 | End: 2024-12-03

## 2024-12-02 RX ORDER — DEXTROSE MONOHYDRATE 25 G/50ML
50 INJECTION, SOLUTION INTRAVENOUS
Status: DISCONTINUED | OUTPATIENT
Start: 2024-12-02 | End: 2024-12-14

## 2024-12-02 RX ORDER — ONDANSETRON 2 MG/ML
4 INJECTION INTRAMUSCULAR; INTRAVENOUS ONCE
Status: COMPLETED | OUTPATIENT
Start: 2024-12-02 | End: 2024-12-02

## 2024-12-02 RX ORDER — DILTIAZEM HYDROCHLORIDE 5 MG/ML
20 INJECTION INTRAVENOUS ONCE
Status: COMPLETED | OUTPATIENT
Start: 2024-12-02 | End: 2024-12-02

## 2024-12-02 RX ORDER — FUROSEMIDE 10 MG/ML
20 INJECTION INTRAMUSCULAR; INTRAVENOUS
Status: DISCONTINUED | OUTPATIENT
Start: 2024-12-02 | End: 2024-12-11

## 2024-12-02 RX ORDER — MORPHINE SULFATE 2 MG/ML
1 INJECTION, SOLUTION INTRAMUSCULAR; INTRAVENOUS EVERY 2 HOUR PRN
Status: DISCONTINUED | OUTPATIENT
Start: 2024-12-02 | End: 2024-12-07

## 2024-12-02 RX ORDER — FAMOTIDINE 10 MG/ML
20 INJECTION, SOLUTION INTRAVENOUS ONCE
Status: COMPLETED | OUTPATIENT
Start: 2024-12-02 | End: 2024-12-02

## 2024-12-02 NOTE — ED PROVIDER NOTES
Patient Seen in: [facility] Emergency Department    History     Chief Complaint   Patient presents with    Nausea/Vomiting/Diarrhea       HPI    89-year-old female with a history of hypertension, diabetes, A-fib on Eliquis presents ER today with nausea and vomiting that is been going on today.  Patient been having some abdominal pain for the past few days.  No chest pain or trouble breathing.    History reviewed.   Past Medical History:    Acute, but ill-defined, cerebrovascular disease    Arthritis    Breast CA (HCC)    Cancer (HCC)    Diabetes (HCC)    diet controlled    Diabetes mellitus, type II (HCC)    Essential hypertension    Hearing impairment    High blood pressure    High cholesterol    Hyperlipidemia    Osteoarthritis    Squamous cell carcinoma, keratinizing    R posterior thigh       History reviewed.   Past Surgical History:   Procedure Laterality Date    Appendectomy      Appendectomy      Cataract  -    Cataract extraction w/  intraocular lens implant Bilateral     Ian Garcia MD    Chemotherapy      rt breast.    Cholecystectomy      D & c      Knee replacement surgery Right 2012    Lumpectomy right  2016    cancer    Mastectomy partial Right 2016      [id number]    Skin surgery Right 2018    Tonsillectomy      Wrist fracture surgery Right          Medications :  Prescriptions Prior to Admission[1]     Family History   Problem Relation Age of Onset    Heart Disease Father         CAD    Diabetes Father     Heart Disease Mother         CAD    Diabetes Mother     Breast Cancer Mother 83        had lumpectomy and RT.  No other treatment.   of stroke at 89    Other (appendicitis[other]) Brother         age 13    Other (alive and well[other]) Sister     Other (alive and well[other]) Son         x3    Breast Cancer Self 85    Glaucoma Neg     Macular degeneration Neg        Smoking Status:   Social History     Socioeconomic History    Marital  status:     Number of children: 3   Occupational History    Occupation:      Comment: retired   Tobacco Use    Smoking status: Never     Passive exposure: Never    Smokeless tobacco: Never   Vaping Use    Vaping status: Never Used   Substance and Sexual Activity    Alcohol use: Not Currently    Drug use: Never    Sexual activity: Not Currently   Other Topics Concern     Service No    Caffeine Concern Yes     Comment: coffee, 1 cups daily    Occupational Exposure No       Constitutional and vital signs reviewed.      Social History and Family History elements reviewed from today, pertinent positives to the presenting problem noted.    Physical Exam     ED Triage Vitals   BP 12/02/24 0558 (!) 142/105   Pulse 12/02/24 0558 (!) 140   Resp 12/02/24 0558 22   Temp 12/02/24 0558 97.8 °F (36.6 °C)   Temp src --    SpO2 12/02/24 0558 92 %   O2 Device 12/02/24 0700 None (Room air)       All measures to prevent infection transmission during my interaction with the patient were taken. Handwashing was performed prior to and after the exam.  Stethoscope and any equipment used during my examination was cleaned with super sani-cloth germicidal wipes following the exam.     Physical Exam  Vitals and nursing note reviewed.   Abdominal:      Tenderness: There is generalized abdominal tenderness.   Neurological:      Mental Status: She is alert.         ED Course        Labs Reviewed   CBC WITH DIFFERENTIAL WITH PLATELET - Abnormal; Notable for the following components:       Result Value    RDW-SD 51.8 (*)     RDW 15.9 (*)     Lymphocyte Absolute 0.87 (*)     All other components within normal limits   COMP METABOLIC PANEL (14) - Abnormal; Notable for the following components:    Glucose 155 (*)     BUN 29 (*)     Creatinine 1.52 (*)     eGFR-Cr 32 (*)     Bilirubin, Total 1.0 (*)     All other components within normal limits   PROTHROMBIN TIME (PT) - Abnormal; Notable for the following components:    PT 18.1  (*)     INR 1.41 (*)     All other components within normal limits   PRO BETA NATRIURETIC PEPTIDE - Abnormal; Notable for the following components:    Pro-Beta Natriuretic Peptide 5,448 (*)     All other components within normal limits   LIPASE - Normal   TROPONIN I HIGH SENSITIVITY - Normal   MAGNESIUM - Normal   PTT, ACTIVATED - Normal   URINALYSIS, ROUTINE   LACTIC ACID, PLASMA   BLOOD CULTURE   BLOOD CULTURE     EKG    Rate, intervals and axes as noted on EKG Report.  Rate: 140  Rhythm: Atrial Fibrillation  Reading: Atrial fibrillation with RVR, heart rate 140, no ID interval's, left axis deviation           As Interpreted by me    Imaging Results Available and Reviewed while in ED: CT ABDOMEN+PELVIS(CONTRAST ONLY)(CPT=74177)    Result Date: 12/2/2024  CONCLUSION:   1. High-grade small bowel obstruction with focal transition point in the central abdomen.  There is associated fat stranding and scattered areas of free fluid adjacent to the dilated loops of small bowel, which is most noticeable in the left hemiabdomen. 2. No pneumatosis, pneumoperitoneum, drainable fluid collection, or acute diverticulitis.  3. Atrophy and hypoenhancement of the left kidney, which is likely related to the severe left renal artery stenosis. 4. Postoperative changes from prior cholecystectomy with moderate intrahepatic and extrahepatic biliary ductal dilatation.  These findings are not significantly changed when compared to 04/17/2016 are favored to be postoperative/age-related but recommend  correlation with liver function tests. 5. Left lateral bladder wall predominant thickening, which may be secondary to underdistention, cystitis, or less likely an underlying bladder lesion. 6. Unchanged thickening of both adrenal glands, which is nonspecific and may reflect adrenal hyperplasia or sequela of prior infectious/inflammatory etiologies. 7. Lesser incidental findings described above.     Dictated by (CST): Joseluis Hoover MD on  12/02/2024 at 8:57 AM     Finalized by (CST): Joseluis Hoover MD on 12/02/2024 at 9:11 AM          CT CHEST PE AORTA (IV ONLY) (CPT=71260)    Result Date: 12/2/2024  CONCLUSION:   1. No acute pulmonary embolism through the segmental pulmonary arteries. 2. Dilated main pulmonary artery, which can be seen in pulmonary artery hypertension. 3. Biatrial predominant cardiomegaly with triple-vessel coronary artery calcifications and a well-positioned left chest wall dual lead pacemaker device. 4. Mild-to-moderate interstitial pulmonary edema. 5. Small airways disease. 6. Mild multifocal areas of atelectasis/scarring in both lungs. 7. No pneumonia, pleural effusion, or pneumothorax. 8. Mild mediastinal lymphadenopathy, which is nonspecific but favored to be reactive.  9. Lesser incidental findings described above.     Dictated by (CST): Joseluis Hoover MD on 12/02/2024 at 8:41 AM     Finalized by (CST): Joseluis Hoover MD on 12/02/2024 at 8:50 AM          XR CHEST AP PORTABLE  (CPT=71045)    Result Date: 12/2/2024  CONCLUSION:  1. Cardiomegaly.  Tortuous atherosclerotic aorta 2. Atelectatic changes and/or scarring in the perihilar bibasilar regions with slight progression left perihilar region.  No acute airspace consolidation. 3. Transvenous pacing leads unchanged.  No pneumothorax    Dictated by (CST): Kings Underwood MD on 12/02/2024 at 7:49 AM     Finalized by (CST): Kings Underwood MD on 12/02/2024 at 7:51 AM         ED Medications Administered:   Medications   piperacillin-tazobactam (Zosyn) 4.5 g in dextrose 5% 100 mL IVPB-ADDV (has no administration in time range)   dilTIAZem (cardIZEM) 25 mg/5mL injection 20 mg (has no administration in time range)   sodium chloride 0.9 % IV bolus 500 mL (0 mL Intravenous Stopped 12/2/24 5377)   ondansetron (Zofran) 4 MG/2ML injection 4 mg (4 mg Intravenous Given by Other 12/2/24 6524)   famotidine (Pepcid) 20 mg/2mL injection 20 mg (20 mg Intravenous Given by Other 12/2/24  0633)   dilTIAZem (cardIZEM) 25 mg/5mL injection 20 mg (20 mg Intravenous Given 12/2/24 0719)   furosemide (Lasix) 10 mg/mL injection 40 mg (40 mg Intravenous Given 12/2/24 0743)   iopamidol 76% (ISOVUE-370) injection for power injector (80 mL Intravenous Given 12/2/24 0808)   iopamidol 76% (ISOVUE-370) injection for power injector (65 mL Intravenous Given 12/2/24 0818)         MDM     Vitals:    12/02/24 0800 12/02/24 0830 12/02/24 0930 12/02/24 1000   BP: 133/87  (!) 128/92 125/86   Pulse: (!) 123 (!) 131 (!) 125 (!) 129   Resp: (!) 30 22 25 22   Temp:       SpO2: 91% 90% 90% (!) 89%   Weight:       Height:         *I personally reviewed and interpreted all ED vitals.    Pulse Ox: 92%, Room air, Normal     Monitor Interpretation:   atrial fibrillation, with ventricular rate of 140  as interpreted by me.  The cardiac monitor was ordered to monitor cardiac rate and rhythm.    Differential Diagnosis/ Diagnostic Considerations: Gastroenteritis, small bowel obstruction, CHF, A-fib with RVR, PE    Complicating Factors: The patient already has does not have any pertinent problems on file. to contribute to the complexity of this ED evaluation.    Medical Decision Making  Amount and/or Complexity of Data Reviewed  External Data Reviewed: notes.     Details: Reviewed previous outpatient notes to help contribute to history  Labs: ordered. Decision-making details documented in ED Course.  Radiology: ordered and independent interpretation performed. Decision-making details documented in ED Course.  ECG/medicine tests: ordered and independent interpretation performed. Decision-making details documented in ED Course.    Risk  Decision regarding hospitalization.    Critical Care  Total time providing critical care: 30 minutes      Patient's heart rate came down after given IV Cardizem.  Nausea slightly improved with Zofran.  I explained all results to the patient and family at the bedside.  Patient's BNP is very high and reviewed  chest x-ray which shows pulmonary congestion.  Patient is an active CHF exacerbation along with A-fib with RVR.  I reviewed the CT results which shows no PE in the chest however the CT abdomen pelvis that shows high-grade obstruction with stranding around it concerning for infection.  I will also start IV antibiotics patient will need be admitted for further evaluation by surgery along with cardiology consultation for the CHF, A-fib with RVR and most likely cardiac clearance for surgery.  They both agree with this plan.    I discussed case with Dr. Holland-surgery and notified of consult    I discussed case with [facility] cardiology and they are notified of consult    I discussed case with Dr Davis who accepts admission   Condition upon leaving the department: Stable    Disposition and Plan     Clinical Impression:  1. Acute on chronic congestive heart failure, unspecified heart failure type (HCC)    2. Complete intestinal obstruction, unspecified cause (HCC)    3. Atrial fibrillation with rapid ventricular response (HCC)    4. Anticoagulated        Disposition:  Admit    Follow-up:  No follow-up provider specified.    Medications Prescribed:  Current Discharge Medication List          Hospital Problems       Present on Admission  Date Reviewed: 10/31/2024            ICD-10-CM Noted POA    * (Principal) Acute on chronic congestive heart failure, unspecified heart failure type (HCC) I50.9 12/2/2024 Unknown                       [1] (Not in a hospital admission)

## 2024-12-02 NOTE — ED QUICK NOTES
Patient transported on tele and 8L HFNC to room 301 with transporter. Alert and interactive at time of ED departure, Son present. All patient belongings included in transport.

## 2024-12-02 NOTE — H&P
[facility]    PATIENT'S NAME: KASI KAM   ATTENDING PHYSICIAN: Robert Fountain DO   PATIENT ACCOUNT#:   [id number]    LOCATION:  [place] [address]  MEDICAL RECORD #:   [id number]       YOB: 1931  ADMISSION DATE:       12/02/2024    HISTORY AND PHYSICAL EXAMINATION    CHIEF COMPLAINT:  Small-bowel obstruction, atrial fibrillation with rapid ventricular response, and heart failure.    HISTORY OF PRESENT ILLNESS:  The patient is a 89-year-old  female who was brought into the emergency department for evaluation of progressive abdominal pain associated with nausea and vomiting for the last 2 to 3 days.  CBC was unremarkable.  Chemistry showed GFR of 32 which is at baseline, BUN and creatinine of 29 and 1.52.  Noted to be in atrial fibrillation with rapid ventricular response.  Lipase, troponin were negative.  ProBNP 5400.  Imaging studies, including chest x-ray, CT scan of the chest, showed pulmonary edema but no other abnormality.  CT scan of the abdomen showed small-bowel obstruction, high-grade, with transition point at the central abdomen.  The patient was given IV Lasix, Cardizem, Zosyn.  NG tube was inserted in the emergency room.  She will be admitted to the hospital for further management.    PAST MEDICAL HISTORY:  Paroxysmal atrial fibrillation with sick sinus syndrome, status post dual chamber pacemaker, diabetes mellitus type 2, hypertension, hyperlipidemia, osteoarthritis, chronic kidney disease stage 3, heart failure, preserved ejection fraction, mild aortic stenosis, and moderate pulmonary artery hypertension.    PAST SURGICAL HISTORY:  Right thigh excisional biopsy, right breast partial mastectomy plus chemotherapy for breast cancer, right total knee arthroplasty, left distal femur open reduction and internal fixation, appendectomy, cholecystectomy, cataract procedure, tonsillectomy, right wrist open reduction and internal fixation.    MEDICATIONS:  Please  see medication reconciliation list.     ALLERGIES:  Adhesive tape.    FAMILY HISTORY:  Mother had coronary artery disease, diabetes mellitus type 2, and breast cancer.  Father had coronary artery disease and diabetes mellitus type 2.    SOCIAL HISTORY:  No tobacco, alcohol, or drug use.  Lives with her family.  At baseline independent for basic activities of daily living.     REVIEW OF SYSTEMS:  Progressive abdominal pain, crescendo/decrescendo pattern, associated with nausea and vomiting, inability to tolerate oral intake.  No bowel movements for the last 2 to 3 days.  The patient cannot comment on shortness of breath or palpitations.  Other 12-point review of systems is negative.        PHYSICAL EXAMINATION:    GENERAL:  Alert and oriented to time, place, and person.  Moderate distress.    VITAL SIGNS:  Temperature 97.8; pulse initially 140, atrial fibrillation on monitor and EKG; respiratory rate 22; blood pressure 125/86; pulse ox 89% on 8L nasal cannula oxygen.  HEENT:  Atraumatic.  Oropharynx clear.  Dry mucous membranes.    NECK:  Supple.  No lymphadenopathy.  Positive jugular venous distention.   LUNGS:  Faint crackles, both lung bases.  HEART:  Irregularly irregular rhythm, tachycardiac.   ABDOMEN:  Soft.  Mild to moderate distention.  Hypoactive bowel sounds.  NG tube in place.  EXTREMITIES:  Edema +1, both legs.  No clubbing or cyanosis.  NEUROLOGIC:  Motor and sensory intact.       ASSESSMENT:    1.   High-grade small-bowel obstruction, most likely related to intra-abdominal adhesions from prior surgeries.  2.   Atrial fibrillation with rapid ventricular response.  3.   Acute on chronic heart failure, preserved ejection fraction.  4.   Chronic kidney disease stage 3.  5.   Diabetes mellitus type 2.    PLAN:  The patient will be admitted to telemetry floor.  Gentle hydration and gentle diuresis.  Monitor hemodynamic status.  Continue NG tube suction.  IV Cardizem drip.  Cardiology and General Surgery  consult.  N.p.o.  Pain and nausea control.  Monitor Accu-Cheks.  Further recommendations to follow.      Dictated By Renan Davis MD  d: 12/02/2024 10:21:14  t: 12/02/2024 10:42:59  [facility] [id number]  FB/    cc: Robert Fountain DO

## 2024-12-02 NOTE — CONSULTS
Cardiology (consult dictated)    Assessment:  1.  Small bowel obstruction.    2.  Permanent atrial fibrillation.  Now with rapid ventricular response.  On Eliquis at home.    3.  HFpEF.    4.  Chronic kidney disease    5.  Permanent pacemaker      Plan:  1.  Hold Eliquis pending decision regarding need for surgery    2.  Rate control.  Currently on Cardizem drip.  Blood pressure is stable.  If rate remains consistently above 110, would add bolus metoprolol every 6 hours.      Thank you.  We will follow

## 2024-12-02 NOTE — ED QUICK NOTES
Orders for admission, patient is aware of plan and ready to go upstairs.     LOC: Aox3, difficulty hearing, no hearing aids. Ambulates with difficulty and a walker. Hx of recent falls. Limited ADLs. Son present and providing helpful assistance.     MARY cameron secured @ 60cm - 700 mLs bilious drainage out     ACCESS: 20g PIV    Any questions, please call ED KEYON Butts  at extension [phone].

## 2024-12-02 NOTE — ED INITIAL ASSESSMENT (HPI)
Patient arrives to the ER complaining of abdominal pain since Friday. +nausea +vomiting. No fevers, no diarrhea.

## 2024-12-02 NOTE — PLAN OF CARE
From home with son Haja  PMH: obeasity, OA, DM2, HTN, TIA, anemia, macular degeneration R eye, chronic solder pain, R breast cancer (lumpectomy), Afib (2/24) on [employer] MRI conditional R AV duel chamber pacemaker  Came in with CHF, afib rvr, and SBO  AxOx3-4, 8L HFNC, Afib, Incontinent x2, x1 walker baseline. Q6 accucheck  PLAN: NPO, NGT LIS, ECHO, diltiazem drip, consults: cards, gen surg, card rehab, dietary, US L arm, SBO series 12/3 8am (contrast through NGT)   Problem: Patient Centered Care  Goal: Patient preferences are identified and integrated in the patient's plan of care  Description: Interventions:  - What would you like us to know as we care for you? From home with son  - Provide timely, complete, and accurate information to patient/family  - Incorporate patient and family knowledge, values, beliefs, and cultural backgrounds into the planning and delivery of care  - Encourage patient/family to participate in care and decision-making at the level they choose  - Honor patient and family perspectives and choices  Outcome: Progressing     Problem: Patient/Family Goals  Goal: Patient/Family Long Term Goal  Description: Patient's Long Term Goal: go home    Interventions:  - go home  - See additional Care Plan goals for specific interventions  Outcome: Progressing  Goal: Patient/Family Short Term Goal  Description: Patient's Short Term Goal: clear SBO    Interventions:   - NGT LIS  -NPO  - See additional Care Plan goals for specific interventions  Outcome: Progressing

## 2024-12-02 NOTE — CONSULTS
[facility]  Report of Consultation  Is this a shared or split note between Advanced Practice Provider and Physician? Yes   Isabel Patel Patient Status:  Emergency    1931 MRN [id number]   Location [facility] EMERGENCY DEPARTMENT Attending Robert Fountain DO   Hosp Day # 0 PCP Fredrick Cornelius MD     Requesting Physician:  Jorge Fountain DO    Reason for Consultation:  Small bowel obstruction    Chief Complaint:  Abdominal pain, nausea, and vomiting.     History of Present Illness:  Isabel Patel is a 89 year old female with who presents to [facility] on 24 for abdominal pain, nausea, and vomiting. Her son, Haja, is bedside and provides most of the history. Starting Friday, the patient had mid-abdominal pain that was low grade but had resolved by Saturday. She denies experiencing the pain before or being hospitalized for it. The patient reports decreased oral intake the past few days due to abdominal discomfort. She ate peaches and pudding  night since she was feeling better and developed sudden onset of nausea and non-bloody vomiting. She also vomited in the ED with re-positioning of the cart recline. Her last bowel movement was Friday, and she struggles with chronic constipation. She denies chest pain, SOB, or dysuria.     Upon presentation to the hospital, patient is afebrile, tachycardic and with oxygen saturation in 80-90's% requiring HFNC 8L O2. CBC WNL.  PT 18.1, INR 1.41.  BMP 5448.  Troponin WNL.  BUN 29, creatinine 1.52.  Total bili 1.0.  CTAP with findings of high-grade small bowel obstruction with focal transition point in the central abdomen.  Multiple dilated loops of small bowel measuring up to 3.7 cm in diameter.  Loops of ileum distal to transition point collapsed, no pneumatosis.  There are scattered fat stranding and free fluid adjacent to the dilated loops of bowel, most noticeable in the left hemiabdomen.  Stomach is distended.  No  dilated loop of large intestine.  4.3 cm fat and fluid containing right ventral supraumbilical hernia.  Trace fat-containing umbilical hernia.  Trace fat-containing left inguinal hernia. CT PE negative for acute pulmonary embolism. NGT to LIS was placed in ED, with bilious output of more than 700 mL. IV antibiotics were initiated.     Past medical history significant for afib on Eliquis, CHF, s/p pacemaker 2024, breast cancer s/p mastectomy and chemotherapy , DM, HLD, HTN, OA,     Past abdominal surgical history includes remote appendectomy and cholecystectomy, as well as above mentioned    Patient takes Eliquis, last dose was 24    History:  Past Medical History:    Acute, but ill-defined, cerebrovascular disease    Arthritis    Breast CA (HCC)    Cancer (HCC)    Diabetes (HCC)    diet controlled    Diabetes mellitus, type II (HCC)    Essential hypertension    Hearing impairment    High blood pressure    High cholesterol    Hyperlipidemia    Osteoarthritis    Squamous cell carcinoma, keratinizing    R posterior thigh     Past Surgical History:   Procedure Laterality Date    Appendectomy      Appendectomy      Cataract  -    Cataract extraction w/  intraocular lens implant Bilateral     Ian Garcia MD    Chemotherapy  2016    rt breast.    Cholecystectomy      D & c      Knee replacement surgery Right 2012    Lumpectomy right  2016    cancer    Mastectomy partial Right 2016      [id number]    Skin surgery Right 2018    Tonsillectomy      Wrist fracture surgery Right      Family History   Problem Relation Age of Onset    Heart Disease Father         CAD    Diabetes Father     Heart Disease Mother         CAD    Diabetes Mother     Breast Cancer Mother 83        had lumpectomy and RT.  No other treatment.   of stroke at 89    Other (appendicitis[other]) Brother         age 13    Other (alive and well[other]) Sister     Other (alive and well[other])  Son         x3    Breast Cancer Self 85    Glaucoma Neg     Macular degeneration Neg       reports that she has never smoked. She has never been exposed to tobacco smoke. She has never used smokeless tobacco. She reports that she does not currently use alcohol. She reports that she does not use drugs.    Allergies:  Allergies[1]    Medications:  (Not in a hospital admission)        Current Facility-Administered Medications:     piperacillin-tazobactam (Zosyn) 4.5 g in dextrose 5% 100 mL IVPB-ADDV, 4.5 g, Intravenous, Once    lidocaine (Urojet) 2 % urethral jelly 10 mL, 10 mL, Intravesical, Once    Review of Systems:  Review of Systems   Constitutional:  Negative for chills, fatigue and fever.   Respiratory:  Negative for shortness of breath and wheezing.    Cardiovascular:  Negative for chest pain and leg swelling.   Gastrointestinal:  Positive for nausea, vomiting, abdominal pain and constipation. Negative for diarrhea.   Genitourinary:  Negative for dysuria, hematuria and flank pain.   Neurological:  Negative for dizziness and weakness.       Physical Exam:  /86   Pulse (!) 129   Temp 97.8 °F (36.6 °C)   Resp 22   Ht 5' 1\" (1.549 m)   Wt 160 lb (72.6 kg)   SpO2 (!) 89%   BMI 30.23 kg/m²   Physical Exam  Constitutional:       General: She is not in acute distress.     Appearance: Normal appearance. She is normal weight. She is not ill-appearing.   HENT:      Head: Normocephalic and atraumatic.      Mouth/Throat:      Mouth: Mucous membranes are moist.      Pharynx: Oropharynx is clear.   Eyes:      Conjunctiva/sclera: Conjunctivae normal.   Cardiovascular:      Rate and Rhythm: Normal rate and regular rhythm.      Pulses: Normal pulses.      Heart sounds: Normal heart sounds.   Pulmonary:      Effort: Pulmonary effort is normal.      Breath sounds: Normal breath sounds.   Abdominal:      General: There is distension.      Palpations: Abdomen is soft.      Tenderness: There is abdominal tenderness in  the periumbilical area. There is no guarding or rebound.      Comments: Moderately tender to palpation   Neurological:      Mental Status: She is alert.         Laboratory Data:  Recent Labs   Lab 12/02/24  0654   RBC 5.15   HGB 14.7   HCT 45.5   MCV 88.3   MCH 28.5   MCHC 32.3   RDW 15.9*   NEPRELIM 6.89   WBC 8.4   .0       Recent Labs   Lab 12/02/24  0654   *   BUN 29*   CREATSERUM 1.52*   CA 10.3   ALB 4.4      K 4.2   CL 98   CO2 29.0   ALKPHO 93   AST 15   ALT 10   BILT 1.0*   TP 7.9         Recent Labs   Lab 12/02/24  0654   PTP 18.1*   INR 1.41*   PTT 31.9         XR CHEST AP PORTABLE  (CPT=71045)    Result Date: 12/2/2024  CONCLUSION:  1. Nasogastric tube with tip in profile with the gastric body.  2. Nonspecific interstitial opacities seen throughout the lungs bilaterally, progressed from previous, and may be indicative of underlying pulmonary interstitial edema or other interstitial process.  3. Cardiomegaly.    Dictated by (CST): Winston Stephen MD on 12/02/2024 at 10:11 AM     Finalized by (CST): Winston Stephen MD on 12/02/2024 at 10:13 AM          CT ABDOMEN+PELVIS(CONTRAST ONLY)(CPT=74177)    Result Date: 12/2/2024  CONCLUSION:   1. High-grade small bowel obstruction with focal transition point in the central abdomen.  There is associated fat stranding and scattered areas of free fluid adjacent to the dilated loops of small bowel, which is most noticeable in the left hemiabdomen. 2. No pneumatosis, pneumoperitoneum, drainable fluid collection, or acute diverticulitis.  3. Atrophy and hypoenhancement of the left kidney, which is likely related to the severe left renal artery stenosis. 4. Postoperative changes from prior cholecystectomy with moderate intrahepatic and extrahepatic biliary ductal dilatation.  These findings are not significantly changed when compared to 04/17/2016 are favored to be postoperative/age-related but recommend  correlation with liver function tests. 5. Left  lateral bladder wall predominant thickening, which may be secondary to underdistention, cystitis, or less likely an underlying bladder lesion. 6. Unchanged thickening of both adrenal glands, which is nonspecific and may reflect adrenal hyperplasia or sequela of prior infectious/inflammatory etiologies. 7. Lesser incidental findings described above.     Dictated by (CST): Joseluis Hoover MD on 12/02/2024 at 8:57 AM     Finalized by (CST): Joseluis Hoover MD on 12/02/2024 at 9:11 AM          CT CHEST PE AORTA (IV ONLY) (CPT=71260)    Result Date: 12/2/2024  CONCLUSION:   1. No acute pulmonary embolism through the segmental pulmonary arteries. 2. Dilated main pulmonary artery, which can be seen in pulmonary artery hypertension. 3. Biatrial predominant cardiomegaly with triple-vessel coronary artery calcifications and a well-positioned left chest wall dual lead pacemaker device. 4. Mild-to-moderate interstitial pulmonary edema. 5. Small airways disease. 6. Mild multifocal areas of atelectasis/scarring in both lungs. 7. No pneumonia, pleural effusion, or pneumothorax. 8. Mild mediastinal lymphadenopathy, which is nonspecific but favored to be reactive.  9. Lesser incidental findings described above.     Dictated by (CST): Joseluis Hoover MD on 12/02/2024 at 8:41 AM     Finalized by (CST): Joseluis Hoover MD on 12/02/2024 at 8:50 AM          XR CHEST AP PORTABLE  (CPT=71045)    Result Date: 12/2/2024  CONCLUSION:  1. Cardiomegaly.  Tortuous atherosclerotic aorta 2. Atelectatic changes and/or scarring in the perihilar bibasilar regions with slight progression left perihilar region.  No acute airspace consolidation. 3. Transvenous pacing leads unchanged.  No pneumothorax    Dictated by (CST): Kings Underwood MD on 12/02/2024 at 7:49 AM     Finalized by (CST): Kings Underwood MD on 12/02/2024 at 7:51 AM                     Medical Decision Making         Impression:  Patient Active Problem List   Diagnosis    Right  knee DJD    Osteoarthritis    Controlled type 2 diabetes mellitus with diabetic nephropathy, without long-term current use of insulin (HCC)    HTN (hypertension)    Hypercholesterolemia    Obesity    Left rotator cuff tear arthropathy    Pessary maintenance    Osteopenia of multiple sites    Pain of right lower extremity    Pelvic relaxation    Uterovaginal prolapse    AGUSTO (stress urinary incontinence, female)    TIA (transient ischemic attack)    Spinal stenosis of lumbar region    Macular degeneration    Mass of skin of left shoulder    Closed fracture of left distal femur (HCC)    Closed bicondylar fracture of distal end of right femur (HCC)    Exudative age-related macular degeneration, right eye, with inactive choroidal neovascularization (HCC)    Anemia    Bilateral lower extremity edema    Asymptomatic menopause    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 3 chronic kidney disease, without long-term current use of insulin (HCC)    Keratosis    Chronic shoulder pain    History of right breast cancer    Urinary incontinence    Atrial fibrillation, new onset (HCC)    Status post biventricular pacemaker    Acute on chronic congestive heart failure, unspecified heart failure type (HCC)       High grade small bowel obstruction, likely related to previous abdominal surgeries    Plan:  No acute surgical intervention planned at this time. Patient with multiple cardiac co-morbidities, increasing lj-operative risk. Will pursue conservative management at this time, serial imaging as clinically indicated. Patient and son express understanding.   Keep NPO  Continue NGT to LIS  Continue trending labs  Continue IV antibiotics     Thank you for allowing me to participate in the care of the patient.   CHRISTIAN Grijalva  12/2/2024  10:28 AM      The patient was independently examined. Agree with the PA's assessment and plan. First time episode of SBO. Nonperitonitic. High risk for surgery given full anticoagulation, age,  cardiopulmonary issues. Plan for SBFT in AM.       Anna Ashraf MD  [facility] - General Surgery   [address], [place]  p [phone]        [1]   Allergies  Allergen Reactions    Adhesive Tape RASH

## 2024-12-02 NOTE — HISTORICAL OFFICE NOTE
[facility]  Outside Information  Continuity of Care Document  10/21/2024  Isabel Patel - 89 y.o. Female; born [birth year]Summary of episode note, generated on Dec. 02, 2024December 02, 2024   CHIEF COMPLAINT    CHIEF COMPLAINT  Reason for Visit/Chief Complaint   f/u:  f/u:AF  AF  HTN  Edema  Pacemaker   The patient returns to follow-up the above conditions and concerns. Edema is better. She has cold feet. She has no chest pain, palpitations, lightheadedness, or syncope.     PROBLEMS  Reconcile with Patient's ChartPROBLEMS  Problem Effective Dates Date resolved Problem Status   Encounter for monitoring digoxin therapy, [SNOMED-CT: 316070144] 5/28/2024 - Active   Persistent atrial fibrillation, [SNOMED-CT: 004784915] 3/11/2024 - Active   Bradycardia, [SNOMED-CT: 20119199] 3/11/2024 - Active   Pulmonary artery hypertension, [SNOMED-CT: 57137600] 3/11/2024 - Active   TR (tricuspid regurgitation), [SNOMED-CT: 557608911] 3/11/2024 - Active   Aortic stenosis, mild, [SNOMED-CT: 089666215] 3/11/2024 - Active   HTN (hypertension), [SNOMED-CT: 04777279] 2/13/2024 - Active   Controlled type 2 diabetes mellitus with diabetic nephropathy, without long-term current use of insulin, [SNOMED-CT: 55817417] 2/13/2024 - Active   TIA (transient ischemic attack), [SNOMED-CT: 253028786] 12/3/2018 - Active   Bilateral edema of lower extremity, [SNOMED-CT: 214972654] 3/11/2024 - Active   Bilateral cold feet, [SNOMED-CT: 296011305] 5/28/2024 - Active   Current use of anticoagulant therapy, [SNOMED-CT: 866791638] 3/11/2024 - Active   S/P placement of cardiac pacemaker, [SNOMED-CT: 239173294] 2/15/2024 - Active     ENCOUNTER    ENCOUNTER  Problem Effective Dates Date resolved Problem Status   Encounter for monitoring digoxin therapy, [SNOMED-CT: 745618577] 5/28/2024 - Active   Persistent atrial fibrillation, [SNOMED-CT: 484973589] 3/11/2024 - Active   Bradycardia, [[facility]-CT: 31925555] 3/11/2024 - Active    Pulmonary artery hypertension, [SNOMED-CT: 51725424] 3/11/2024 - Active   TR (tricuspid regurgitation), [SNOMED-CT: 373870603] 3/11/2024 - Active   Aortic stenosis, mild, [SNOMED-CT: 370469786] 3/11/2024 - Active   HTN (hypertension), [SNOMED-CT: 10282430] 2/13/2024 - Active   TIA (transient ischemic attack), [SNOMED-CT: 773092034] 12/3/2018 - Active   Bilateral edema of lower extremity, [SNOMED-CT: 385199118] 3/11/2024 - Active   Bilateral cold feet, [SNOMED-CT: 368126217] 5/28/2024 - Active   Current use of anticoagulant therapy, [SNOMED-CT: 041817908] 3/11/2024 - Active   S/P placement of cardiac pacemaker, [SNOMED-CT: 284745365] 2/15/2024 - Active     VITAL SIGNS    VITAL SIGNS  Date / Time: 10/22/2024   BP Systolic 116 mmHg   BP Diastolic 74 mmHg   Height 61 inches   Weight 161 lbs   Pulse Rate 88 bpm   BSA (Body Surface Area) 1.8 cc/m2   BMI (Body Mass Index) 30.4 cc/m2   Blood Pressure 116 / 74 mmHg     PHYSICAL EXAMINATION    PHYSICAL EXAMINATION  Header Details   Constitutional 92%o2sat   Vitals Left Arm Sitting  / 74 mmHg, Pulse rate 88 bpm, Height in 5' 1\", BMI: 30.4, Weight in 161 lbs (or) 73.03 kgs, BSA : 1.8 cc/m²   General Appearance No Acute Distress, Well groomed   Head/Eyes/Ears/Nose/Mouth/Throat Sclera Clear, Mucous membranes Moist   Neck Normal carotid pulsations, No carotid bruits, No JVD   Respiratory Unlabored, Lungs clear with normal breath sounds, Equal bilaterally   Cardiovascular    Gastrointestinal Abdomen soft, Non-tender   Upper Extremities No clubbing, No cyanosis, No edema   Skin Warm and dry   Neurologic / Psychiatric Alert and Oriented, Non-focal   Speech Normal speech     ALLERGIES, ADVERSE REACTIONS, ALERTS    No data available    MEDICATIONS ADMINISTERED DURING VISIT    No data available    MEDICATIONS  Reconcile with Patient's ChartMEDICATIONS  Medication Start Date Route/Frequency Status   acetaminophen 500 MG Oral Tab, [RxNorm: 655972] 3/1/2024 Take 2 tablets (1,000 mg  total) by mouth every 6 (six) hours as needed for Pain. Active   atorvastatin 20 MG Oral Tab, [RxNorm: 076284] 3/1/2024 Take 1 tablet (20 mg total) by mouth daily. Active   carvediloL 12.5 mg tablet, [RxNorm: 968702] 5/2/2024 Take 1 tablet orally 2 times a day. Active   cholecalciferol (VITAMIN D HIGH POTENCY) 25 MCG (1000 UT) Oral Cap, [RxNorm: 111191] 3/1/2024 Take 1 capsule (1,000 Units total) by mouth daily. Active   Coenzyme Q 10 180mcg, [RxNorm: 0] 4/15/2024 1 tablet daily Active   digoxin 125 mcg (0.125 mg) tablet, [RxNorm: 165101] 5/28/2024 Take 1 tablet orally once a day. Active   dilTIAZem HCl ER Coated Beads 180 MG Oral Capsule SR 24 Hr, [RxNorm: 705912] 3/1/2024 Take 2 capsules (360 mg total) by mouth daily. Active   Eliquis 5 mg tablet, [RxNorm: 7348243] 4/9/2024 Take 1 tablet orally 2 times a day. Active   Lasix 20 mg tablet, [RxNorm: 472727] 3/11/2024 Take 1 tablet orally once a day. increase to 40 for 5 days Active   losartan 50 mg tablet, [RxNorm: 684310] 5/2/2024 Take 1 tablet orally once a day. Active     ASSESSMENT    Atrial fibrillation  Tachycardia-bradycardia syndrome  -Device interrogation reviewed:  Normal [facility] dual-chamber pacemaker function  Sensing, impedance and thresholds reviewed and tested: stable.  Events or Alerts: AF, no VHR events  Battery: stable  -Follow-up with the [facility] with remote and in-office checks as scheduled Hypertension  Pulmonary hypertension  Aortic stenosis  Tricuspid regurgitation  Lower extremity edema  -Edema is better.  -Continue coreg, losartan, diltiazem  -Use lasix 20 g weekly, and increase to 2-3 times weekly if needed for water weight gain  -Echocardiogram in 6 monthsHyperlipidemia  -Continue atorvastatin  -Semiannual blood work to monitor Bilateral cold feet  -Better when sitting up and legs down, worse when supine  -She may have PAD, but without signif sx will not pursue ABIs or other studies at this time     FAMILY HISTORY    FAMILY  HISTORY  Relationship Age Diagnosis   Father 0 Family history of heart disease   Mother 0 Family history of heart disease     GENERAL STATUS    No data available    PAST MEDICAL HISTORY    PAST MEDICAL HISTORY  Problem Date diagonsed Date resolved Status   Encounter for monitoring digoxin therapy, [SNOMED-CT: 755122622] 5/28/2024 - Active   Persistent atrial fibrillation, [SNOMED-CT: 953522982] 3/11/2024 - Active   Bradycardia, [SNOMED-CT: 99882203] 3/11/2024 - Active   Pulmonary artery hypertension, [SNOMED-CT: 14651103] 3/11/2024 - Active   TR (tricuspid regurgitation), [SNOMED-CT: 815842218] 3/11/2024 - Active   Aortic stenosis, mild, [SNOMED-CT: 704253855] 3/11/2024 - Active   HTN (hypertension), [SNOMED-CT: 59388943] 2/13/2024 - Active   Controlled type 2 diabetes mellitus with diabetic nephropathy, without long-term current use of insulin, [SNOMED-CT: 96658949] 2/13/2024 - Active   TIA (transient ischemic attack), [SNOMED-CT: 482590724] 12/3/2018 - Active   Bilateral edema of lower extremity, [SNOMED-CT: 078172113] 3/11/2024 - Active   Bilateral cold feet, [SNOMED-CT: 710669821] 5/28/2024 - Active   Current use of anticoagulant therapy, [SNOMED-CT: 813033278] 3/11/2024 - Active     HISTORY OF PRESENT ILLNESS    The patient returns to follow-up the above conditions and concerns. Edema is better. She has cold feet. She has no chest pain, palpitations, lightheadedness, or syncope.     IMMUNIZATIONS    No data available    PLAN OF CARE    PLAN OF CARE  Planned Care Date   EKG (electrocardiogram) 1/1/1900   Referral Visit - Fredrick Cornelius ([email or web address]) : 1/1/1900     PROCEDURES    No data available    RESULTS    RESULTS  Name Result Date Location - Ordered By   DIGOXIN [LOINC: 10535-3] <0.14 ng/mL [Low] 10/15/2024 09:19:00 AM [facility] LAB ([facility])  Address: [address] [place] [address]  [address]  tel:   WBC [LOINC: 6690-2] 6.4 x10(3) uL 10/15/2024 09:19:00 AM [facility] ([facility])  Address: [address]  [facility] [place]  [address]  tel:   RED BLOOD COUNT [LOINC: 789-8] 4.92 x10(6)uL 10/15/2024 09:19:00 AM [facility] LAB ([facility])  Address: [address]  [facility] [place]  [address]  tel:   HGB [LOINC: 718-7] 14.3 g/dL 10/15/2024 09:19:00 AM [facility] LAB ([facility])  Address: [address]  [facility] [place]  [address]  tel:   HCT [LOINC: 4544-3] 44.2 % 10/15/2024 09:19:00 AM [facility] LAB ([facility])  Address: [address]  [facility] [place]  [address]  tel:   MEAN CELL VOLUME [LOINC: 787-2] 89.8 fL 10/15/2024 09:19:00 AM [facility] LAB ([facility])  Address: [address]  [facility] [place]  [address]  tel:   MEAN CORPUSCULAR HEMOGLOBIN [LOINC: 785-6] 29.1 pg 10/15/2024 09:19:00 AM [facility] LAB ([facility])  Address: [address]  [facility] [place]  [address]  tel:   MEAN CORPUSCULAR HGB CONC [LOINC: 786-4] 32.4 g/dL 10/15/2024 09:19:00 AM [facility] LAB ([facility])  Address: [address]  [facility] [place]  [address]  tel:   RED CELL DISTRIBUTION WIDTH-SD [LOINC: 68432-6] 54.8 fL [High] 10/15/2024 09:19:00 AM [facility] ([facility])  Address: [address]  [facility] [place]  [address]  tel:   RED CELL DISTRIBUTION WIDTH CV [LOINC: 788-0] 16.7 % [High] 10/15/2024 09:19:00 AM [facility] LAB ([facility])  Address: [address]  [facility] [place]  [address]  tel:   PLATELETS [LOINC: 777-3] 213.0 10(3)uL 10/15/2024 09:19:00 AM [facility] LAB ([facility])  Address: [address]  [facility] [place]  [address]  tel:   NEUTROPHIL ABS PRELIM [LOINC: 751-8] 4.42 x10 (3) uL 10/15/2024 09:19:00 AM [facility] LAB ([facility])  Address: [address]  [facility] [place]  [address]  tel:   NEUTROPHIL ABSOLUTE [LOINC: 751-8] 4.42 x10(3) uL 10/15/2024 09:19:00 AM [facility]  LAB ([facility])  Address: [address]  [place]  [place]  [address]  tel:   LYMPHOCYTE ABSOLUTE [LOINC: 731-0] 1.25 x10(3) uL 10/15/2024 09:19:00 AM [facility] LAB ([facility])  Address: [address]  [place]  [place]  [address]  tel:   MONOCYTE ABSOLUTE [LOINC: 742-7] 0.56 x10(3) uL 10/15/2024 09:19:00 AM [facility] LAB ([facility])  Address: [address]  [place]  [place]  [address]  tel:   EOSINOPHIL ABSOLUTE [LOINC: 711-2] 0.10 x10(3) uL 10/15/2024 09:19:00 AM [facility] LAB ([facility])  Address: [address]  [facility]  [address]  tel:   BASOPHIL ABSOLUTE [LOINC: 704-7] 0.04 x10(3) uL 10/15/2024 09:19:00 AM [facility] LAB ([facility])  Address: [address]  [place]  [place]  [address]  tel:   IMMATURE GRANULOCYTE COUNT [LOINC: 43269-6] 0.02 x10(3) uL 10/15/2024 09:19:00 AM [facility] LAB ([facility])  Address: [address]  [place]  [place]  [address]  tel:   NEUTROPHIL % [LOINC: 770-8] 69.1 % 10/15/2024 09:19:00 AM [facility] LAB ([facility])  Address: [address]  [place]  [place]  [address]  tel:   LYMPHOCYTE % [LOINC: 736-9] 19.6 % 10/15/2024 09:19:00 AM [facility] LAB ([facility])  Address: [address]  [place]  [place]  [address]  tel:   MONOCYTE % [LOINC: 5905-5] 8.8 % 10/15/2024 09:19:00 AM [facility] LAB ([facility])  Address: [address]  [facility]  [address]  tel:   EOSINOPHIL % [LOINC: 713-8] 1.6 % 10/15/2024 09:19:00 AM [facility] LAB ([facility])  Address: [address]  [facility]  [address]  tel:   BASOPHIL % [LOINC: 706-2] 0.6 % 10/15/2024 09:19:00 AM [facility] LAB ([facility])  Address: [address]  [facility]  [address]  tel:   IMMATURE GRANULOCYTE RATIO % [LOINC: 11161-9] 0.3 % 10/15/2024 09:19:00 AM [facility] LAB ([facility])  Address: [address]  [facility]  [address]  tel:   GLUCOSE [LOINC: 2339-0] 163 mg/dL [High] 03/13/2024 11:49:00 AM [facility] LAB ([facility])  Address: [address]  [facility]  [address]  tel:   SODIUM [LOINC: 2951-2] 138 mmol/L 03/13/2024 11:49:00 AM [facility] LAB ([facility])  Address: [address]  [facility]  [address]  tel:   POTASSIUM [LOINC: 2823-3] 4.1 mmol/L 03/13/2024 11:49:00 AM [facility] LAB ([facility])  Address: [address]  [facility]  [address]  tel:   CHLORIDE [LOINC: 2075-0] 104 mmol/L 03/13/2024 11:49:00 AM [facility] LAB ([facility])  Address: [address]  [facility]  [address]  tel:   CO2 [LOINC: 2028-9] 32.0 mmol/L 03/13/2024 11:49:00 AM [facility] LAB ([facility])  Address: [address]  [facility]  [address]  tel:   ANION GAP [LOINC: 33037-3] 2 mmol/L 03/13/2024 11:49:00 AM [facility] LAB ([facility])  Address: [address]  [facility]  [address]  tel:   BUN [LOINC: 6299-2] 33 mg/dL [High] 03/13/2024 11:49:00 AM [facility] LAB ([facility])  Address: [address]  [facility]  [address]  tel:   CREATININE [LOINC: 11592-5] 1.44 mg/dL [High] 03/13/2024 11:49:00 AM [facility] LAB ([facility])  Address: [address]  [facility]  [address]  tel:   BUN/ CREAT RATIO [LOINC: 3097-3] 22.9 [High] 03/13/2024 11:49:00 AM [facility] LAB ([facility])  Address: [address]  [facility]  [address]  tel:   CALCIUM [LOINC: 59536-2] 10.1 mg/dL 03/13/2024 11:49:00 AM [facility] LAB ([facility])  Address: [address]  [facility]  [address]  tel:   OSMOLALITY CALCULATED [LOINC: 26274-5] 297 mOsm/kg [High] 03/13/2024 11:49:00 AM [facility] LAB ([facility])  Address: [address]  [facility]  [address]  tel:   E GFR CR [LOINC: 68111-3] 34  mL/min/1.73m2 [Low] 03/13/2024 11:49:00 AM [facility] LAB ([facility])  Address: [address]  [facility]  [address]  tel:   FASTING PATIENT BMP ANSWER [LOINC: 83027-2] No 03/13/2024 11:49:00 AM [facility] LAB ([facility])  Address: [address]  [facility]  [address]  tel:   MRSA SCREEN BY PCR [LOINC: 75857-8] Negative 02/13/2024 06:04:00 PM [facility] LAB ([facility])  Address: [address]  [facility]  [address]  tel:   Lower Extremity Venous Ultrasound 1.The study quality is average. 2.Exam performed with the patient in reverse Trendelenburg position.3.Normal compressibility, augmentation, and largely pulsatile flow in the visualized portion of the bilateral lower extremity venous system. Negative study for DVT.4.DEEP REFLUX: Right PTV distal calf - 1.3sec.5.---------------6.Reflux is noted in the right and left GSV.7.No reflux is noted in the right or left SSV.8.Calf views are limited and unable to visualize perforators due to significant edema in bilateral lower extremity. 3/12/2024 1:30:00 PM Dayton Oleary MD     REVIEW OF SYSTEMS    REVIEW OF SYSTEMS  Header Details   Constitutional No history of Weight Loss, Anorexia   Eyes No history of Blurry vision, Visual changes, Double vision   ENT No history of Bloody nose (epistaxis), Gingival bleeding   Hem/Lymphatic No history of Easy bruising, Blood clots, Hx of blood transfusion, Anemia, Bleeding problems     SOCIAL HISTORY    SOCIAL HISTORY  Social History Element Description Effective Dates   Smoking status Never smoked -     FUNCTIONAL STATUS    No data available    MEDICAL EQUIPMENT    No data available    Goals Sections    No data available    REASON FOR REFERRAL                 Health Concerns Section    No data available    COGNITIVE/MENTAL STATUS    No data available    Patient Demographics    Patient Demographics  Patient Address Patient Name Communication   [address]  [place] [address] Isabel Patel [phone] (Home)     Patient Demographics  Language Race / Ethnicity Marital Status   English - Spoken (Preferred) White / Not  or       Document Information    Primary Care Provider Other Service Providers Document Coverage Dates   Shamika Manjarrez  NPI: [id number]  [phone] (Work)  [address], [address]  [place] [address]  [place] [address]  Interpreting Physicians  [facility]  [phone] (Work)  [address] [place] [address] Ofe KAROLINA Briggs  NPI: [id number]  [phone] (Work)  [address], [address]  [place] [address]  [place] [address]  Nurses     Fay Azul  NPI: [id number]  [phone] (Work)  [address], [address]  [place] [address]  [place] [address]  Nurses     Landy Jennings  NPI: [id number]  [phone] (Work)  [address], [address]  [place] [address]  [place] [address]  Nurses Oct. 22, 2024October 22, 2024      Organization   [facility]  [phone] (Work)  [address] [place] [address], [address]  [place] [address]  [place] [address]     Encounter Providers Encounter Date    Oct. 22, 2024October 22, 2024     Legal Authenticator    Shamika Manjarrez  NPI: [id number]  [phone] (Work)  [address], [address]  [place] [address]  [place] [address]

## 2024-12-02 NOTE — HISTORICAL OFFICE NOTE
[facility]  Outside Information  Continuity of Care Document  4/9/2024  Isabel Patel - 89 y.o. Female; born [birth year]Summary of episode note, generated on Apr. 17, 2024April 17, 2024   CHIEF COMPLAINT    CHIEF COMPLAINT  Reason for Visit/Chief Complaint   Gami patient, vein consult   Patient is here for new patient appointment to the vein clinic. She denies any significant symptoms other than restless leg throughout the day. Denies any cramps, swelling, heaviness or any other symptoms. She had an ultrasound done which showed mild to moderate bilateral chronic venous reflux.     PROBLEMS  Reconcile with Patient's ChartPROBLEMS  Problem Effective Dates Date resolved Problem Status   Persistent atrial fibrillation, [SNOMED-CT: 153348251] 3/11/2024 - Active   Bradycardia, [SNOMED-CT: 05134462] 3/11/2024 - Active   Pulmonary artery hypertension, [SNOMED-CT: 18328709] 3/11/2024 - Active   TR (tricuspid regurgitation), [SNOMED-CT: 002327125] 3/11/2024 - Active   Aortic stenosis, mild, [SNOMED-CT: 648761035] 3/11/2024 - Active   HTN (hypertension), [SNOMED-CT: 00381585] 2/13/2024 - Active   Controlled type 2 diabetes mellitus with diabetic nephropathy, without long-term current use of insulin, [SNOMED-CT: 46685908] 2/13/2024 - Active   TIA (transient ischemic attack), [SNOMED-CT: 951918973] 12/3/2018 - Active   Bilateral edema of lower extremity, [SNOMED-CT: 750314163] 3/11/2024 - Active   S/P placement of cardiac pacemaker, [SNOMED-CT: 271466981] 2/15/2024 - Active   Current use of anticoagulant therapy, [SNOMED-CT: 753729154] 3/11/2024 - Active     ENCOUNTER DIAGNOSIS    ENCOUNTER DIAGNOSIS  Problem Effective Dates Date resolved Problem Status   Persistent atrial fibrillation, [SNOMED-CT: 423449335] 3/11/2024 - Active   Bradycardia, [SNOMED-CT: 35953171] 3/11/2024 - Active   Pulmonary artery hypertension, [SNOMED-CT: 62196640] 3/11/2024 - Active   TR (tricuspid regurgitation),  [SNOMED-CT: 869993358] 3/11/2024 - Active   Aortic stenosis, mild, [SNOMED-CT: 279786785] 3/11/2024 - Active   HTN (hypertension), [SNOMED-CT: 59732353] 2/13/2024 - Active   TIA (transient ischemic attack), [SNOMED-CT: 641298718] 12/3/2018 - Active   Bilateral edema of lower extremity, [SNOMED-CT: 814148937] 3/11/2024 - Active   S/P placement of cardiac pacemaker, [SNOMED-CT: 755815457] 2/15/2024 - Active   Current use of anticoagulant therapy, [SNOMED-CT: 231850953] 3/11/2024 - Active     VITAL SIGNS    VITAL SIGNS  Date / Time: 4/9/2024   BP Systolic 130 mmHg   BP Diastolic 80 mmHg   Height 61 inches   Weight 170 lbs   Pulse Rate 80 bpm   BSA (Body Surface Area) 1.8 cc/m2   BMI (Body Mass Index) 32.1 cc/m2   Blood Pressure 130 / 80 mmHg     PHYSICAL EXAMINATION    PHYSICAL EXAMINATION  Header Details   Constitutional 02 95%   Vitals Left Arm Sitting  / 80 mmHg, Pulse rate 80 bpm, Regular, Height in 5' 1\", BMI: 32.1, Weight in 169.76 lbs (or) 77 kgs, BSA : 1.85 cc/m²   Head/Eyes/Ears/Nose/Mouth/Throat EOMI, PERRLA   Neck No carotid bruits, No JVD   Respiratory Unlabored, Lungs clear with normal breath sounds, Equal bilaterally   Cardiovascular Regular rhythm. Normal and normal S1 and S2   Gastrointestinal Abdomen soft, Non-tender   Gait Normal gait   Upper Extremities No clubbing, No cyanosis, No edema   Lower Extremities Pulses 2+ and equal bilaterally   Skin Warm and dry     ALLERGIES, ADVERSE REACTIONS, ALERTS    No data available    MEDICATIONS ADMINISTERED DURING VISIT    No data available    MEDICATIONS  Reconcile with Patient's ChartMEDICATIONS  Medication Start Date Route/Frequency Status   acetaminophen 500 MG Oral Tab, [RxNorm: 054178] 3/1/2024 Take 2 tablets (1,000 mg total) by mouth every 6 (six) hours as needed for Pain. Active   APIXABAN 5 MG Oral Tab, [RxNorm: 0539631] 3/1/2024 Take 1 tablet (5 mg total) by mouth 2 (two) times daily. Active   atenoloL 50 mg tablet, [RxNorm: 934676] 4/9/2024  Take 1 to 2 tablets orally once a day depending on low B/P Active   atorvastatin 20 MG Oral Tab, [RxNorm: 722761] 3/1/2024 Take 1 tablet (20 mg total) by mouth daily. Active   cholecalciferol (VITAMIN D HIGH POTENCY) 25 MCG (1000 UT) Oral Cap, [RxNorm: 423707] 3/1/2024 Take 1 capsule (1,000 Units total) by mouth daily. Active   Coenzyme Q10 (CO Q 10) 10 MG Oral Cap, [RxNorm: 373567] 3/1/2024 Take 180 mg by mouth daily. Active   dilTIAZem HCl ER Coated Beads 180 MG Oral Capsule SR 24 Hr, [RxNorm: 202442] 3/1/2024 Take 2 capsules (360 mg total) by mouth daily. Active   Lasix 20 mg tablet, [RxNorm: 633508] 3/11/2024 Take 1 tablet orally once a day. increase to 40 for 5 days Active   losartan 50 MG Oral Tab, [RxNorm: 945484] 3/1/2024 Take 1 tablet (50 mg total) by mouth daily. Active   Eliquis 5 mg tablet, [RxNorm: 5608515] 4/9/2024 Take 1 tablet orally 2 times a day. Active     ASSESSMENT    Continue current medications  CEAP class III symptoms, continue conservative therapy with leg elevations, compressions and exercises  Follow-up with Dr. Manjarrez for heart rate management  Follow-up with me as needed     FAMILY HISTORY    FAMILY HISTORY  Relationship Age Diagnosis   Father 0 Family history of heart disease   Mother 0 Family history of heart disease     GENERAL STATUS    No data available    PAST MEDICAL HISTORY    PAST MEDICAL HISTORY  Problem Date diagonsed Date resolved Status   Persistent atrial fibrillation, [SNOMED-CT: 914141969] 3/11/2024 - Active   Bradycardia, [SNOMED-CT: 87302780] 3/11/2024 - Active   Pulmonary artery hypertension, [SNOMED-CT: 25490985] 3/11/2024 - Active   TR (tricuspid regurgitation), [SNOMED-CT: 058719328] 3/11/2024 - Active   Aortic stenosis, mild, [SNOMED-CT: 442515575] 3/11/2024 - Active   HTN (hypertension), [SNOMED-CT: 55028944] 2/13/2024 - Active   Controlled type 2 diabetes mellitus with diabetic nephropathy, without long-term current use of insulin, [SNOMED-CT: 15481626] 2/13/2024 -  Active   TIA (transient ischemic attack), [SNOMED-CT: 285179646] 12/3/2018 - Active   Bilateral edema of lower extremity, [SNOMED-CT: 846971839] 3/11/2024 - Active   Current use of anticoagulant therapy, [SNOMED-CT: 476934717] 3/11/2024 - Active     HISTORY OF PRESENT ILLNESS    Patient is here for new patient appointment to the vein clinic. She denies any significant symptoms other than restless leg throughout the day. Denies any cramps, swelling, heaviness or any other symptoms. She had an ultrasound done which showed mild to moderate bilateral chronic venous reflux.     IMMUNIZATIONS    No data available    PLAN OF CARE    PLAN OF CARE  Planned Care Date   Referral Visit - Fredrick Cornelius ([email or web address]) : 1/1/1900     PROCEDURES    No data available    RESULTS    RESULTS  Name Result Date Location - Ordered By   GLUCOSE [LOINC: 2339-0] 163 mg/dL [High] 03/13/2024 11:49:00 AM [facility] ([facility])  Address: [address] [facility]  [address]  tel:   SODIUM [LOINC: 2951-2] 138 mmol/L 03/13/2024 11:49:00 AM [facility] ([facility])  Address: [address] [facility]  [address]  tel:   POTASSIUM [LOINC: 2823-3] 4.1 mmol/L 03/13/2024 11:49:00 AM [facility] ([facility])  Address: [address] [facility]  [address]  tel:   CHLORIDE [LOINC: 2075-0] 104 mmol/L 03/13/2024 11:49:00 AM [facility] ([facility])  Address: [address] [facility]  [address]  tel:   CO2 [LOINC: 2028-9] 32.0 mmol/L 03/13/2024 11:49:00 AM [facility] ([facility])  Address: [address] [place]  [facility]  [address]  tel:   ANION GAP [LOINC: 33037-3] 2 mmol/L 03/13/2024 11:49:00 AM [facility] ([facility])  Address: [address] [facility]  [address]  tel:   BUN [LOINC: 6299-2] 33 mg/dL [High] 03/13/2024 11:49:00 AM [facility] ([facility])  Address: [address]  [facility]  [address]  tel:   CREATININE [LOINC: 15647-8] 1.44 mg/dL [High] 03/13/2024 11:49:00 AM [facility] ([facility])  Address: [address]  [facility]  [address]  tel:   BUN/ CREAT RATIO [LOINC: 3097-3] 22.9 [High] 03/13/2024 11:49:00 AM [facility] ([facility])  Address: [address]  [facility]  [address]  tel:   CALCIUM [LOINC: 43917-3] 10.1 mg/dL 03/13/2024 11:49:00 AM [facility] ([facility])  Address: [address]  [facility]  [address]  tel:   OSMOLALITY CALCULATED [LOINC: 80670-2] 297 mOsm/kg [High] 03/13/2024 11:49:00 AM [facility] ([facility])  Address: [address]  [facility]  [address]  tel:   E GFR CR [LOINC: 50755-4] 34 mL/min/1.73m2 [Low] 03/13/2024 11:49:00 AM [facility] ([facility])  Address: [address]  [facility]  [address]  tel:   FASTING PATIENT BMP ANSWER [LOINC: 27016-4] No 03/13/2024 11:49:00 AM [facility] ([facility])  Address: [address]  [facility]  [address]  tel:   MRSA SCREEN BY PCR [LOINC: 59174-8] Negative 02/13/2024 06:04:00 PM [facility] ([facility])  Address: [address]  [facility]  [address]  tel:   Lower Extremity Venous Ultrasound 1.The study quality is average. 2.Exam performed with the patient in reverse Trendelenburg position.3.Normal compressibility, augmentation, and largely pulsatile flow in the visualized portion of the bilateral lower extremity venous system. Negative study for DVT.4.DEEP REFLUX: Right PTV distal calf - 1.3sec.5.---------------6.Reflux is noted in the right and left GSV.7.No reflux is noted in the right or left SSV.8.Calf views are limited and unable to visualize perforators due to significant edema in bilateral lower extremity. 3/12/2024 1:30:00 PM Dayton Oleary MD     REVIEW OF SYSTEMS    REVIEW OF  SYSTEMS  Header Details   Cardiovascular Edema  No history of Chest pain, POSEY, Palpitations, Syncope, PND, Orthopnea, Claudication   Respiratory No history of SOB, Wheezing, Sputum   Hem/Lymphatic No history of Easy bruising, Blood clots, Hx of blood transfusion, Anemia, Bleeding problems     SOCIAL HISTORY    SOCIAL HISTORY  Social History Element Description Effective Dates   Smoking status Never smoked -     FUNCTIONAL STATUS    No data available    MEDICAL EQUIPMENT    No data available    Goals Sections    No data available    REASON FOR REFERRAL         Health Concerns Section    No data available    COGNITIVE/MENTAL STATUS    No data available    Patient Demographics    Patient Demographics  Patient Address Patient Name Communication   [address] [place] [address] Isabel Patel [phone] (Home)     Patient Demographics  Language Race / Ethnicity Marital Status   English - Spoken (Preferred) White / Not  or       Document Information    Primary Care Provider Other Service Providers Document Coverage Dates   Dayton Oleary  NPI: [id number]  [phone] (Work)  [address], [address]  [place] [address]  [place] [address]  Interpreting Physicians  [facility]  [phone] (Work)  [address] [place] [address] Ashley Godwin  NPI: [id number]  [phone] (Work)  [address], [address]  [place] [address]  [place] [address]  Nurses     Bridget Martínez  NPI: [id number]  [phone] (Work)  [address], [address]  [place] [address]  [place] [address]  Nurses     Claudia Yancey  NPI: [id number]  [phone] (Work)  [address], [address]  [place] [address]  [place] [address]  Nurses Apr. 09, 2024April 09, 2024      Organization   [facility]  [phone] (Work)  [address], [address]  [place] [address]  [place] [address]      Encounter Providers Encounter Date    Apr. 09, 2024April 09, 2024     Legal Authenticator    Dayton Oleary  NPI: [id number]  [phone] (Work)  [address], [address]  [place] [address]  [place] [address]

## 2024-12-02 NOTE — CONSULTS
[facility]    PATIENT'S NAME: KASI KAM   ATTENDING PHYSICIAN: Renan Davis MD   CONSULTING PHYSICIAN: Cal Banks MD   PATIENT ACCOUNT#:   [id number]    LOCATION:  [address] [place] [address]  MEDICAL RECORD #:   [id number]       YOB: 1931  ADMISSION DATE:       12/02/2024      CONSULT DATE:  12/02/2024    REPORT OF CONSULTATION      HISTORY OF PRESENT ILLNESS:  The patient is a 89-year-old female who was brought to the hospital because of nausea and progressive abdominal pain.  She has had emesis but no hematemesis.  She has been found to have a high-grade small-bowel obstruction.  She has a history of atrial fibrillation, permanent pacemaker, and hypertension, and we were asked to consult.  At the present time, she denies having any chest pain or shortness of breath.    PAST MEDICAL HISTORY:  Diabetes, hypertension, dual-chamber pacemaker, permanent atrial fibrillation, dyslipidemia, chronic kidney disease stage 3, heart failure with preserved ejection fraction, mild aortic stenosis.      PAST SURGICAL HISTORY:  Partial right mastectomy plus chemotherapy for breast cancer, right total knee arthroplasty, left distal femur ORIF, appendectomy, cholecystectomy, cataracts, tonsillectomy, ORIF of the right wrist.     MEDICATIONS:  Home medications of vitamin D, losartan 50 q.a.m., diltiazem  q.a.m., carvedilol 12.5 b.i.d., digoxin 0.125 q.a.m., Eliquis 5 mg b.i.d., atorvastatin 20 at bedtime, furosemide 40 q.a.m.    ALLERGIES:  No known drug allergies.    SOCIAL HISTORY:  Nonsmoker, nondrinker.  Son is with her at the bedside.      FAMILY HISTORY:  Coronary disease is positive in her mother.    PHYSICAL EXAMINATION:    GENERAL:  Well-developed, well-nourished female in no acute distress, alert and oriented x3.  VITAL SIGNS:  Blood pressure 126/76.  Respirations 20, unlabored.  Pulse 115 and irregular.  Afebrile.  Saturation 96% on the 8L.  HEENT:  Unremarkable.   NECK:  There is normal  jugular venous pressure.  Carotids are brisk without bruits.  No thyromegaly.  LUNGS:  Mild basilar crackles.  HEART:  S1, S2 normal.  Grade 2/6 early to mid systolic ejection murmur.  No S3.  ABDOMEN:  Soft.  Minor bowel sounds present.  EXTREMITIES:  Warm and dry with good perfusion.    LABORATORY DATA:  Sodium 136, potassium 4.2, bicarb 29, BUN 29, creatinine 1.52.  ProBNP 5448.  INR 1.41.  CBC and differential normal.    EKG shows atrial fibrillation, heart rate of 140, no acute ischemic changes.  There is a left axis deviation and poor R-wave progression and a ventricular couplet.    CT of the abdomen and pelvis shows the bowel obstruction.  No other significant findings.    CT of the chest shows no pulmonary embolism.  There is interstitial pulmonary edema.    Chest x-ray reveals poor inspiration.  Pacemaker noted.    ASSESSMENT:    1.   Small-bowel obstruction.  2.   Permanent atrial fibrillation, now with rapid ventricular response.  On Eliquis at home for stroke prevention.  3.   History of heart failure with preserved ejection fraction.  4.   Permanent dual-chamber pacemaker.  5.   Chronic kidney disease.    PLAN:    1.   Hold Eliquis.  2.   Rate control, currently on Cardizem drip.  Blood pressure is satisfactory.  If rate remains consistently above 110, would add bolus doses of metoprolol.    Thank you for this consultation.  We will follow.    Dictated By Cal Banks MD  d: 12/02/2024 13:31:52  t: 12/02/2024 13:48:07  Job [id number]  [facility]

## 2024-12-03 ENCOUNTER — APPOINTMENT (OUTPATIENT)
Dept: ULTRASOUND IMAGING | Facility: HOSPITAL | Age: 89
End: 2024-12-03
Attending: HOSPITALIST
Payer: MEDICARE

## 2024-12-03 ENCOUNTER — APPOINTMENT (OUTPATIENT)
Dept: GENERAL RADIOLOGY | Facility: HOSPITAL | Age: 89
End: 2024-12-03
Attending: STUDENT IN AN ORGANIZED HEALTH CARE EDUCATION/TRAINING PROGRAM
Payer: MEDICARE

## 2024-12-03 LAB
ANION GAP SERPL CALC-SCNC: 5 MMOL/L (ref 0–18)
ANION GAP SERPL CALC-SCNC: 8 MMOL/L (ref 0–18)
APTT PPP: 28.5 SECONDS (ref 23–36)
BASOPHILS # BLD AUTO: 0.04 X10(3) UL (ref 0–0.2)
BASOPHILS NFR BLD AUTO: 0.5 %
BUN BLD-MCNC: 22 MG/DL (ref 9–23)
BUN BLD-MCNC: 22 MG/DL (ref 9–23)
BUN/CREAT SERPL: 12.9 (ref 10–20)
BUN/CREAT SERPL: 13.8 (ref 10–20)
CALCIUM BLD-MCNC: 9.9 MG/DL (ref 8.7–10.4)
CALCIUM BLD-MCNC: 9.9 MG/DL (ref 8.7–10.4)
CHLORIDE SERPL-SCNC: 97 MMOL/L (ref 98–112)
CHLORIDE SERPL-SCNC: 97 MMOL/L (ref 98–112)
CO2 SERPL-SCNC: 32 MMOL/L (ref 21–32)
CO2 SERPL-SCNC: 35 MMOL/L (ref 21–32)
CREAT BLD-MCNC: 1.6 MG/DL
CREAT BLD-MCNC: 1.71 MG/DL
DEPRECATED RDW RBC AUTO: 51 FL (ref 35.1–46.3)
DEPRECATED RDW RBC AUTO: 51.6 FL (ref 35.1–46.3)
EGFRCR SERPLBLD CKD-EPI 2021: 28 ML/MIN/1.73M2 (ref 60–?)
EGFRCR SERPLBLD CKD-EPI 2021: 30 ML/MIN/1.73M2 (ref 60–?)
EOSINOPHIL # BLD AUTO: 0.17 X10(3) UL (ref 0–0.7)
EOSINOPHIL NFR BLD AUTO: 2.1 %
ERYTHROCYTE [DISTWIDTH] IN BLOOD BY AUTOMATED COUNT: 15.4 % (ref 11–15)
ERYTHROCYTE [DISTWIDTH] IN BLOOD BY AUTOMATED COUNT: 15.7 % (ref 11–15)
GLUCOSE BLD-MCNC: 143 MG/DL (ref 70–99)
GLUCOSE BLD-MCNC: 189 MG/DL (ref 70–99)
GLUCOSE BLDC GLUCOMTR-MCNC: 141 MG/DL (ref 70–99)
GLUCOSE BLDC GLUCOMTR-MCNC: 155 MG/DL (ref 70–99)
GLUCOSE BLDC GLUCOMTR-MCNC: 182 MG/DL (ref 70–99)
HCT VFR BLD AUTO: 44.8 %
HCT VFR BLD AUTO: 47.9 %
HGB BLD-MCNC: 14.8 G/DL
HGB BLD-MCNC: 15.6 G/DL
IMM GRANULOCYTES # BLD AUTO: 0.02 X10(3) UL (ref 0–1)
IMM GRANULOCYTES NFR BLD: 0.3 %
LYMPHOCYTES # BLD AUTO: 1.02 X10(3) UL (ref 1–4)
LYMPHOCYTES NFR BLD AUTO: 12.8 %
MAGNESIUM SERPL-MCNC: 2.3 MG/DL (ref 1.6–2.6)
MCH RBC QN AUTO: 29.4 PG (ref 26–34)
MCH RBC QN AUTO: 29.8 PG (ref 26–34)
MCHC RBC AUTO-ENTMCNC: 32.6 G/DL (ref 31–37)
MCHC RBC AUTO-ENTMCNC: 33 G/DL (ref 31–37)
MCV RBC AUTO: 90.3 FL
MCV RBC AUTO: 90.4 FL
MONOCYTES # BLD AUTO: 0.74 X10(3) UL (ref 0.1–1)
MONOCYTES NFR BLD AUTO: 9.3 %
NEUTROPHILS # BLD AUTO: 5.97 X10 (3) UL (ref 1.5–7.7)
NEUTROPHILS # BLD AUTO: 5.97 X10(3) UL (ref 1.5–7.7)
NEUTROPHILS NFR BLD AUTO: 75 %
OSMOLALITY SERPL CALC.SUM OF ELEC: 290 MOSM/KG (ref 275–295)
OSMOLALITY SERPL CALC.SUM OF ELEC: 292 MOSM/KG (ref 275–295)
PLATELET # BLD AUTO: 274 10(3)UL (ref 150–450)
PLATELET # BLD AUTO: 318 10(3)UL (ref 150–450)
POTASSIUM SERPL-SCNC: 3.4 MMOL/L (ref 3.5–5.1)
POTASSIUM SERPL-SCNC: 3.5 MMOL/L (ref 3.5–5.1)
RBC # BLD AUTO: 4.96 X10(6)UL
RBC # BLD AUTO: 5.3 X10(6)UL
SODIUM SERPL-SCNC: 137 MMOL/L (ref 136–145)
SODIUM SERPL-SCNC: 137 MMOL/L (ref 136–145)
WBC # BLD AUTO: 11.8 X10(3) UL (ref 4–11)
WBC # BLD AUTO: 8 X10(3) UL (ref 4–11)

## 2024-12-03 PROCEDURE — 74018 RADEX ABDOMEN 1 VIEW: CPT | Performed by: STUDENT IN AN ORGANIZED HEALTH CARE EDUCATION/TRAINING PROGRAM

## 2024-12-03 PROCEDURE — 99233 SBSQ HOSP IP/OBS HIGH 50: CPT | Performed by: HOSPITALIST

## 2024-12-03 PROCEDURE — 93971 EXTREMITY STUDY: CPT | Performed by: HOSPITALIST

## 2024-12-03 PROCEDURE — 74250 X-RAY XM SM INT 1CNTRST STD: CPT | Performed by: STUDENT IN AN ORGANIZED HEALTH CARE EDUCATION/TRAINING PROGRAM

## 2024-12-03 RX ORDER — CARVEDILOL 12.5 MG/1
12.5 TABLET ORAL 2 TIMES DAILY WITH MEALS
Status: DISCONTINUED | OUTPATIENT
Start: 2024-12-03 | End: 2024-12-13

## 2024-12-03 RX ORDER — METOPROLOL TARTRATE 1 MG/ML
5 INJECTION, SOLUTION INTRAVENOUS EVERY 4 HOURS
Status: DISCONTINUED | OUTPATIENT
Start: 2024-12-03 | End: 2024-12-06

## 2024-12-03 RX ORDER — ATORVASTATIN CALCIUM 20 MG/1
20 TABLET, FILM COATED ORAL DAILY
Status: DISCONTINUED | OUTPATIENT
Start: 2024-12-03 | End: 2024-12-14

## 2024-12-03 RX ORDER — DILTIAZEM HYDROCHLORIDE 180 MG/1
360 CAPSULE, EXTENDED RELEASE ORAL DAILY
Status: DISCONTINUED | OUTPATIENT
Start: 2024-12-03 | End: 2024-12-03

## 2024-12-03 RX ORDER — HEPARIN SODIUM AND DEXTROSE 10000; 5 [USP'U]/100ML; G/100ML
18 INJECTION INTRAVENOUS ONCE
Status: COMPLETED | OUTPATIENT
Start: 2024-12-03 | End: 2024-12-03

## 2024-12-03 RX ORDER — HEPARIN SODIUM 1000 [USP'U]/ML
80 INJECTION, SOLUTION INTRAVENOUS; SUBCUTANEOUS ONCE
Status: COMPLETED | OUTPATIENT
Start: 2024-12-03 | End: 2024-12-03

## 2024-12-03 RX ORDER — HEPARIN SODIUM AND DEXTROSE 10000; 5 [USP'U]/100ML; G/100ML
INJECTION INTRAVENOUS CONTINUOUS
Status: DISCONTINUED | OUTPATIENT
Start: 2024-12-04 | End: 2024-12-06

## 2024-12-03 RX ORDER — LOSARTAN POTASSIUM 50 MG/1
50 TABLET ORAL DAILY
Status: DISCONTINUED | OUTPATIENT
Start: 2024-12-03 | End: 2024-12-13

## 2024-12-03 NOTE — DIETARY NOTE
DIETITIAN NOTE     Received consult for \"heart failure diet education.\" Considering pt's advanced age of 89 y/o, diet education not appropriate at this time. RD will clear consult and remain available for further nutrition interventions as needed.       Geena Saldana RD, LDN  Clinical Dietitian  P: [phone]

## 2024-12-03 NOTE — CM/SW NOTE
12/03/24 1000   CM/SW Referral Data   Referral Source Social Work (self-referral)   Reason for Referral Discharge planning   Informant Son  (Haja)   Medical Hx   Does patient have an established PCP? Yes  (Fredrick Cornelius)   Patient Info   Patient's Home Environment House   Number of Levels in Home 1   Number of Stair in Home 2 to enter   Patient lives with Son  (both sons: Sedrick)   Patient Status Prior to Admission   Independent with ADLs and Mobility No   Pt. requires assistance with Housework;Driving;Meals;Ambulating   Services in place prior to admission DME/Supplies at home   Type of DME/Supplies Standard Walker;Wheelchair;Shower Chair;Grab Bars   Discharge Needs   Anticipated D/C needs Home health care;To be determined;Medical equipment  (watch O2 needs)   Choice of Post-Acute Provider   Informed patient of right to choose their preferred provider Yes   List of appropriate post-acute services provided to patient/family with quality data   (HH ref in [facility] - needs f/up)       SW self referred pt for DC Planning.  Met w/ pt's son Haja at bedside. Pt was out of the room for an Xray at this time. Above assessment completed.    Verified home address and confirmed pt lives w/ her sons Sedrick.  They have a home w/ 2 levels but pt stays on the main floor. There are approx 2 steps to enter.    Per Haja, it is very rarely that pt is left alone.  Pt uses a walker for ambulation but also has a WC.    Pt has hx w/ HH services in the Summer (via [facility]). Pt's son confirmed, they may be open to HH but wants to see how pt does while at [facility].    Per RN rounds, pt is on 5L O2 w/ no home oxygen. Pt's son confirmed she does NOT wear home O2.    Tentative HH referrals sent in [facility]. F2F entered/sent.    PLAN: Home w/ poss HH - pending pt/family decision, list/choice, O2 needs, & med clear      SW/CM to remain available for support and/or discharge planning.         MS LateshaW, LSW [id number]

## 2024-12-03 NOTE — PLAN OF CARE
Problem: Patient Centered Care  Goal: Patient preferences are identified and integrated in the patient's plan of care  Description: Interventions:  - What would you like us to know as we care for you? From home with son  - Provide timely, complete, and accurate information to patient/family  - Incorporate patient and family knowledge, values, beliefs, and cultural backgrounds into the planning and delivery of care  - Encourage patient/family to participate in care and decision-making at the level they choose  - Honor patient and family perspectives and choices  Outcome: Progressing     Problem: Patient/Family Goals  Goal: Patient/Family Long Term Goal  Description: Patient's Long Term Goal: go home    Interventions:  - go home  - See additional Care Plan goals for specific interventions  Outcome: Progressing  Goal: Patient/Family Short Term Goal  Description: Patient's Short Term Goal: clear SBO    Interventions:   - NGT LIS  -NPO  - See additional Care Plan goals for specific interventions  Outcome: Progressing     Problem: CARDIOVASCULAR - ADULT  Goal: Maintains optimal cardiac output and hemodynamic stability  Description: INTERVENTIONS:  - Monitor vital signs, rhythm, and trends  - Monitor for bleeding, hypotension and signs of decreased cardiac output  - Evaluate effectiveness of vasoactive medications to optimize hemodynamic stability  - Monitor arterial and/or venous puncture sites for bleeding and/or hematoma  - Assess quality of pulses, skin color and temperature  - Assess for signs of decreased coronary artery perfusion - ex. Angina  - Evaluate fluid balance, assess for edema, trend weights  Outcome: Progressing  Goal: Absence of cardiac arrhythmias or at baseline  Description: INTERVENTIONS:  - Continuous cardiac monitoring, monitor vital signs, obtain 12 lead EKG if indicated  - Evaluate effectiveness of antiarrhythmic and heart rate control medications as ordered  - Initiate emergency measures for  life threatening arrhythmias  - Monitor electrolytes and administer replacement therapy as ordered  Outcome: Progressing     Problem: RESPIRATORY - ADULT  Goal: Achieves optimal ventilation and oxygenation  Description: INTERVENTIONS:  - Assess for changes in respiratory status  - Assess for changes in mentation and behavior  - Position to facilitate oxygenation and minimize respiratory effort  - Oxygen supplementation based on oxygen saturation or ABGs  - Provide Smoking Cessation handout, if applicable  - Encourage broncho-pulmonary hygiene including cough, deep breathe, Incentive Spirometry  - Assess the need for suctioning and perform as needed  - Assess and instruct to report SOB or any respiratory difficulty  - Respiratory Therapy support as indicated  - Manage/alleviate anxiety  - Monitor for signs/symptoms of CO2 retention  Outcome: Progressing     Problem: GASTROINTESTINAL - ADULT  Goal: Minimal or absence of nausea and vomiting  Description: INTERVENTIONS:  - Maintain adequate hydration with IV or PO as ordered and tolerated  - Nasogastric tube to low intermittent suction as ordered  - Evaluate effectiveness of ordered antiemetic medications  - Provide nonpharmacologic comfort measures as appropriate  - Advance diet as tolerated, if ordered  - Obtain nutritional consult as needed  - Evaluate fluid balance  Outcome: Progressing  Goal: Maintains or returns to baseline bowel function  Description: INTERVENTIONS:  - Assess bowel function  - Maintain adequate hydration with IV or PO as ordered and tolerated  - Evaluate effectiveness of GI medications  - Encourage mobilization and activity  - Obtain nutritional consult as needed  - Establish a toileting routine/schedule  - Consider collaborating with pharmacy to review patient's medication profile  Outcome: Progressing     Problem: SAFETY ADULT - FALL  Goal: Free from fall injury  Description: INTERVENTIONS:  - Assess pt frequently for physical needs  - Identify  cognitive and physical deficits and behaviors that affect risk of falls.  - Alpha fall precautions as indicated by assessment.  - Educate pt/family on patient safety including physical limitations  - Instruct pt to call for assistance with activity based on assessment  - Modify environment to reduce risk of injury  - Provide assistive devices as appropriate  - Consider OT/PT consult to assist with strengthening/mobility  - Encourage toileting schedule  Outcome: Progressing     Problem: DISCHARGE PLANNING  Goal: Discharge to home or other facility with appropriate resources  Description: INTERVENTIONS:  - Identify barriers to discharge w/pt and caregiver  - Include patient/family/discharge partner in discharge planning  - Arrange for needed discharge resources and transportation as appropriate  - Identify discharge learning needs (meds, wound care, etc)  - Arrange for interpreters to assist at discharge as needed  - Consider post-discharge preferences of patient/family/discharge partner  - Complete POLST form as appropriate  - Assess patient's ability to be responsible for managing their own health  - Refer to Case Management Department for coordinating discharge planning if the patient needs post-hospital services based on physician/LIP order or complex needs related to functional status, cognitive ability or social support system  Outcome: Progressing     Kept on NPO, NGT right nare to LIS.  Afib on tele, on cardizem drip.  Call light within reach.  IV fluids continued.  Safety precautions in place.  Plan for US left arm, 2Decho and xray small bowel today.

## 2024-12-03 NOTE — DISCHARGE INSTRUCTIONS
Going Home Instructions  In this section you will find the tools which will guide you through the first few days after you leave the hospital. Continued use of these tools will help you develop the skills necessary to keep your heart failure under control.     Home Care Instructions Following Heart Failure - the most important things to do every day include:   Weigh yourself and review the “Self-Check Plan” sheet every morning.   Call your cardiologist office if you are in the “Pay Attention-Use Caution” (yellow zone) or “Medical Alert-Warning!” (red zone) as outlined in the Self-Check Plan sheet.  Take your medicines as prescribed.  Limit your sodium (salt) intake.  Know when to call your cardiologist, primary doctor, or nurse.  Know when to seek emergency care.      Things for You to Remember:   1. See your doctor or healthcare provider as written on your discharge instructions.  It is important that you attend this appointment to make sure your symptoms are under control.     2. Your recommended sodium intake is 8384-8921 mg daily.    3.  Weigh yourself every day.    4. Some exercise and activity is important to help keep your heart functioning and strong. Unless instructed not to exercise, you may walk at a slow to moderate pace for 10-15 minutes 2-3 days per week to start. Pace your activity to prevent shortness of breath or fatigue. Stop exercising if you develop chest pain, lightheadedness, or significant shortness of breath.       Call Your Cardiologist If:   You gain 2-3 pounds in one day or 5 pounds in one week.  You have more difficulty breathing.  You are getting more tired with normal activity.  You are more short of breath lying down, or awaken at night short of breath.  You have swelling of your feet or legs.  You urinate less often during the day and more often at night.  You have cramps in your legs.  You have blurred vision or see yellowish-green halos around objects of lights.    Go to the  Emergency Room If:   You have pain or tightness in your chest  You are extremely short of breath  You are coughing up pink-frothy mucus  You are traveling and develop symptoms of worsening heart failure      ** Please follow up with your cardiologist or Advanced Practice Provider as written on your discharge instructions. If you are not provided with an appointment, let your nurse know so you can get an appointment**  Sometimes managing your health at home requires assistance.  The [facility] team has recognized your preference to use [facility].  They can be reached by phone at [phone].  The fax number for your reference is [phone]. A representative from the home health agency will contact you or your family to schedule your first visit.      Discharge Instructions    Call your surgeon in 1 to 2 days to schedule a follow-up appointment in 1-2 weeks.    OK to shower.  OK for activity as tolerated.  No lifting over 10 pounds.      Wound Care: keep wound clean and dry, reinforce dressing PRN and ice to area for comfort.    If you have steri-strips, you may remove them 5 days after your surgery. If your steri-strips fall off sooner, you may leave them off.      If you have staples, they can be removed in 14 days after your surgery by your surgeon or PCP.     If you have skin glue, that will fall off on its own, just keep area clean and dry.    If you have a drain, please record daily output amounts and bring the recordings with you to your office follow up.    No driving while taking narcotic/sedating medications.      You may take an over the counter stool softener while on narcotics for constipation as needed (colace, miralax, etc).    Continue a soft diet for 1-2 weeks. Eat small portions (<25-50% or your meal) and go slow with you diet until you are tolerating it well.  Chew your food really well.  Eat things that can easily be broken up with a fork.  Try to avoid too many raw  fruits or vegetables for the first 1-2 weeks.  Cooked/canned fruits and vegetables are ok.     Call your Physician or return to the Emergency Room if you experience:    -New or increased pain.    -New or increased abdominal bloating or distention    -New or increased bleeding.    -Nausea & vomitting.    -Fever & chills.    -Shortness of breath.    -Chest pain.      Post-Operative Pain Reduction Strategy to Minimize Opioid Use      Please take acetaminophen (Tylenol) and/or NSAIDS (Advil, Motrin) for postoperative pain control before taking opioid prescriptions for pain. You can alternate between the two or stagger them to achieve a more durable pain control regimen.   -Do not exceed more than 4g of acetaminophen within 24 hours   -Do not exceed more than 3200 mg NSAIDs in 24 hours  -Do not take NSAIDs (ex: kidney problems or bleeding) or acetaminophen (ex: liver problems) if you have any contraindications to them .     If your pain is not controlled by acetaminophen and/or NSAIDS, please take your opioid prescription (Norco/vicodin/percocet have tylenol in them) as needed for pain relief and try to use the minimum amount necessary for adequate pain relief.      Opioid narcotics can suppress your breathing and decrease your level of alertness. It can also cause your bowel function to slow down and potentially make you constipated.   -Do not drive or operate heavy machinery while on narcotic medications.   -You can take Colace or MiraLAX over the counter as needed for constipation.      Please properly dispose your excess opioid medications at the appropriate facility/location.       An EXAMPLE schedule of how to take these medications is below. It is not necessary to wake yourself from sleep to take pain medication.     6am: 1000 mg tylenol and 1-2 oxycodone (5mg) tablets if needed  9am: 800 mg ibuprofen  12pm: 1000 mg tylenol and 1-2 oxycodone (5mg) tablets if needed  3pm: 800 mg ibuprofen  6pm: 1000 mg tylenol and  1-2 oxycodone (5mg) tablets if needed  9pm: 800 mg ibuprofen  12am: 1000 mg tylenol and 1-2 oxycodone (5mg) tablets if needed  3am: 800 mg ibuprofen  6am: 1000 mg tylenol and 1-2 oxycodone (5mg) tablets if needed

## 2024-12-03 NOTE — PROGRESS NOTES
[facility]  Progress Note    Isabel Patel Patient Status:  Inpatient    1931 MRN [id number]   Location [facility] 3W/SW Attending Miah Santos MD   Hosp Day # 1 PCP Fredrick Cornelius MD     Subjective:  Pt seen laying in bed. SBFT being completed during exam. Patient denies nausea or vomiting. No bm or flatus. Reports some improvement in abdominal pain and distention. No fever or chills.    Objective/Physical Exam:  General: Alert, orientated x3.  Cooperative.  No apparent distress.  Vital Signs:  Blood pressure 120/82, pulse 105, temperature 98.7 °F (37.1 °C), temperature source Oral, resp. rate 20, height 5' 1\" (1.549 m), weight 141 lb (64 kg), SpO2 93%, not currently breastfeeding.  Wt Readings from Last 3 Encounters:   24 141 lb (64 kg)   24 163 lb (73.9 kg)   24 163 lb (73.9 kg)     Lungs: No respiratory distress.  Cardiac: Regular rate and rhythm.   Abdomen:  Soft, non distended, mild lower tender, with no rebound or guarding.  No peritoneal signs.   Extremities:  No lower extremity edema noted.      Intake/Output:    Intake/Output Summary (Last 24 hours) at 12/3/2024 1324  Last data filed at 12/3/2024 0600  Gross per 24 hour   Intake 1559 ml   Output 2400 ml   Net -841 ml     I/O last 3 completed shifts:  In: 1909 [P.O.:60; I.V.:1749; IV PIGGYBACK:100]  Out: 3100 [Urine:2150; Emesis/NG output:950]  No intake/output data recorded.    Medications:    piperacillin-tazobactam  3.375 g Intravenous Q8H    [Held by provider] apixaban  5 mg Oral BID    [Held by provider] atorvastatin  20 mg Oral Daily    [Held by provider] carvedilol  12.5 mg Oral BID with meals    [Held by provider] dilTIAZem HCl ER Coated Beads  360 mg Oral Daily    [Held by provider] losartan  50 mg Oral Daily    dilTIAZem  30 mg Oral 4 times per day    furosemide  20 mg Intravenous BID (Diuretic)    insulin regular human  1-5 Units Subcutaneous 4 times per day    metoprolol  5 mg Intravenous Q6H        Labs:  Lab Results   Component Value Date    WBC 8.0 12/03/2024    HGB 14.8 12/03/2024    HCT 44.8 12/03/2024    .0 12/03/2024     Lab Results   Component Value Date     12/03/2024    K 3.5 12/03/2024    CL 97 12/03/2024    CO2 32.0 12/03/2024    BUN 22 12/03/2024    CREATSERUM 1.60 12/03/2024     12/03/2024    CA 9.9 12/03/2024     Lab Results   Component Value Date    INR 1.41 (H) 12/02/2024    INR 1.0 12/03/2018         Assessment  Patient Active Problem List   Diagnosis    Right knee DJD    Osteoarthritis    Controlled type 2 diabetes mellitus with diabetic nephropathy, without long-term current use of insulin (HCC)    HTN (hypertension)    Hypercholesterolemia    Obesity    Left rotator cuff tear arthropathy    Pessary maintenance    Osteopenia of multiple sites    Pain of right lower extremity    Pelvic relaxation    Uterovaginal prolapse    AGUSTO (stress urinary incontinence, female)    TIA (transient ischemic attack)    Spinal stenosis of lumbar region    Macular degeneration    Mass of skin of left shoulder    Closed fracture of left distal femur (HCC)    Closed bicondylar fracture of distal end of right femur (HCC)    Exudative age-related macular degeneration, right eye, with inactive choroidal neovascularization (HCC)    Anemia    Bilateral lower extremity edema    Asymptomatic menopause    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 3 chronic kidney disease, without long-term current use of insulin (HCC)    Keratosis    Chronic shoulder pain    History of right breast cancer    Urinary incontinence    Atrial fibrillation, new onset (HCC)    Status post biventricular pacemaker    Acute on chronic congestive heart failure, unspecified heart failure type (HCC)    Complete intestinal obstruction, unspecified cause (HCC)    Atrial fibrillation with rapid ventricular response (HCC)    Anticoagulated    Small bowel obstruction (HCC)     Small bowel obstruction      Plan:  Await SBFT  results  Continue NGT to LIS, NPO except sips of clears  Ambulate and up to chair  DVT prophylaxis with eliquis, being held    Quality:  DVT Mechanical Prophylaxis:   SCDs,    DVT Pharmacologic Prophylaxis   Medication    [Held by provider] apixaban (Eliquis) tab 5 mg                Code Status: Full Code  Mills: External urinary catheter in place  Mills Duration (in days):   Central line:    WILLIE: 12/5/2024        Joseluis Watson PA-C  12/3/2024  1:24 PM

## 2024-12-03 NOTE — PROGRESS NOTES
[facility]  part of [facility]  Hospitalist Progress Note     Isabel Patel Patient Status:  Inpatient    1931  89 year old CSN [id number]   Location 301/301-A Attending Miah Santos MD   Hosp Day # 1 PCP Fredrick Cornelius MD     Assessment & Plan:   ----------------------------------  Small bowel obstruction.  Likely due to adhesive disease from previous abdominal surgery.  Pain controlled.  -Surgical consult appreciated  -NG tube: In place  -Small bowel follow-through pending  -NPO  -IV fluids  -Pain control  -Encourage activity as tolerated  -per surg, pt is on iv abx    Atrial fibrillation, paroxysmal.  With RVR.  Hemodynamically stable. Anticoagulation with Eliquis on hold.  -Cardiology consult appreciated  -Monitor on telemetry  -Monitor electrolytes  -Anticoagulation as above  -Rate/rhythm control with IV metoprolol    Other problems  CKD stage III  Type 2 diabetes  Permanent pacemaker for sick sinus syndrome  Hypertension  Dyslipidemia  Arthritis  Aortic stenosis, mild  Moderate pulmonary hypertension    DVT Mechanical Prophylaxis:   SCDs,    DVT Pharmacologic Prophylaxis   Medication    [Held by provider] apixaban (Eliquis) tab 5 mg              code status: Full  dispo: home upon medical clearance  If applicable, malnutrition status at bottom of note    I personally reviewed the available laboratories, imaging including. I discussed/will discuss the case with consultants. I ordered laboratories and/or radiographic studies. I adjusted medications as detailed above.  Medical decision making high, risk is high.    Subjective:   ----------------------------------  Get up to the chair, has not had opportunity to walk much as her small bowel follow-through exam is taken most of her day.  Not passing gas.      Objective:   Chief Complaint:   Chief Complaint   Patient presents with    Nausea/Vomiting/Diarrhea     ----------------------------------  Temp:  [97.7 °F (36.5 °C)-98.7 °F (37.1  °C)] 98.7 °F (37.1 °C)  Pulse:  [] 105  Resp:  [16-20] 20  BP: ()/(77-94) 120/82  SpO2:  [88 %-100 %] 93 %  Gen: A+Ox2.  No distress.   HEENT: NCAT, neck supple, no carotid bruit.  NG tube in place  CV: irreg, S1S2, and intact distal pulses. No gallop, rub, murmur.  Pulm: Effort and breath sounds normal. No distress, wheezes, rales, rhonchi.  Abd: Soft nondistended, mildly tender diffusely no rebound or guarding  Neuro: Normal reflexes, CN. Sensory/motor exams grossly normal deficit.   MS: No joint effusions.  No peripheral edema.  Skin: Skin is warm and dry. No rashes, erythema, diaphoresis.   Psych: Normal mood and affect. Calm, cooperative    Labs:  Lab Results   Component Value Date    HGB 14.8 12/03/2024    WBC 8.0 12/03/2024    .0 12/03/2024     12/03/2024    K 3.5 12/03/2024    CREATSERUM 1.60 (H) 12/03/2024    INR 1.41 (H) 12/02/2024    AST 15 12/02/2024    ALT 10 12/02/2024    TROP 0.00 12/03/2018            piperacillin-tazobactam  3.375 g Intravenous Q8H    [Held by provider] apixaban  5 mg Oral BID    [Held by provider] atorvastatin  20 mg Oral Daily    [Held by provider] carvedilol  12.5 mg Oral BID with meals    [Held by provider] dilTIAZem HCl ER Coated Beads  360 mg Oral Daily    [Held by provider] losartan  50 mg Oral Daily    dilTIAZem  30 mg Oral 4 times per day    furosemide  20 mg Intravenous BID (Diuretic)    insulin regular human  1-5 Units Subcutaneous 4 times per day    metoprolol  5 mg Intravenous Q6H       acetaminophen    ondansetron    morphINE **OR** morphINE **OR** morphINE    dilTIAZem    glucose **OR** glucose **OR** glucose-vitamin C **OR** dextrose **OR** glucose **OR** glucose **OR** glucose-vitamin C  **Certification      PHYSICIAN Certification of Need for Inpatient Hospitalization - Initial Certification    Patient will require inpatient services that will reasonably be expected to span two midnight's based on the clinical documentation in H+P.   Based  on patients current state of illness, I anticipate that, after discharge, patient will require TBD.

## 2024-12-03 NOTE — PROGRESS NOTES
Sachi Biggs APRN  Nurse Practitioner CARDIOLOGY [phone] [facility] [address] [address] [facility] [place] [address]      Next Steps: Go on 12/12/2024  Instructions: @230   Patient off floor during this time.

## 2024-12-03 NOTE — PLAN OF CARE
Patient is alert and oriented x4, very hard of hearing, on 3L hi flow nasal cannula able to move x1 with a walker. NPO with NG tube clamped due to small bowel x-ray series today. Diltiazem stopped per cardiology and IV lopressor given for heart rate. Ultrasound of arm done. PRN medication given for pain and nausea.      Problem: Patient Centered Care  Goal: Patient preferences are identified and integrated in the patient's plan of care  Description: Interventions:  - What would you like us to know as we care for you? From home with son  - Provide timely, complete, and accurate information to patient/family  - Incorporate patient and family knowledge, values, beliefs, and cultural backgrounds into the planning and delivery of care  - Encourage patient/family to participate in care and decision-making at the level they choose  - Honor patient and family perspectives and choices  Outcome: Progressing     Problem: Patient/Family Goals  Goal: Patient/Family Long Term Goal  Description: Patient's Long Term Goal: go home    Interventions:  - go home  - See additional Care Plan goals for specific interventions  Outcome: Progressing  Goal: Patient/Family Short Term Goal  Description: Patient's Short Term Goal: clear SBO    Interventions:   - NGT LIS  -NPO  - See additional Care Plan goals for specific interventions  Outcome: Progressing     Problem: CARDIOVASCULAR - ADULT  Goal: Maintains optimal cardiac output and hemodynamic stability  Description: INTERVENTIONS:  - Monitor vital signs, rhythm, and trends  - Monitor for bleeding, hypotension and signs of decreased cardiac output  - Evaluate effectiveness of vasoactive medications to optimize hemodynamic stability  - Monitor arterial and/or venous puncture sites for bleeding and/or hematoma  - Assess quality of pulses, skin color and temperature  - Assess for signs of decreased coronary artery perfusion - ex. Angina  - Evaluate fluid balance, assess for edema, trend  weights  Outcome: Progressing  Goal: Absence of cardiac arrhythmias or at baseline  Description: INTERVENTIONS:  - Continuous cardiac monitoring, monitor vital signs, obtain 12 lead EKG if indicated  - Evaluate effectiveness of antiarrhythmic and heart rate control medications as ordered  - Initiate emergency measures for life threatening arrhythmias  - Monitor electrolytes and administer replacement therapy as ordered  Outcome: Progressing     Problem: RESPIRATORY - ADULT  Goal: Achieves optimal ventilation and oxygenation  Description: INTERVENTIONS:  - Assess for changes in respiratory status  - Assess for changes in mentation and behavior  - Position to facilitate oxygenation and minimize respiratory effort  - Oxygen supplementation based on oxygen saturation or ABGs  - Provide Smoking Cessation handout, if applicable  - Encourage broncho-pulmonary hygiene including cough, deep breathe, Incentive Spirometry  - Assess the need for suctioning and perform as needed  - Assess and instruct to report SOB or any respiratory difficulty  - Respiratory Therapy support as indicated  - Manage/alleviate anxiety  - Monitor for signs/symptoms of CO2 retention  Outcome: Progressing     Problem: GASTROINTESTINAL - ADULT  Goal: Minimal or absence of nausea and vomiting  Description: INTERVENTIONS:  - Maintain adequate hydration with IV or PO as ordered and tolerated  - Nasogastric tube to low intermittent suction as ordered  - Evaluate effectiveness of ordered antiemetic medications  - Provide nonpharmacologic comfort measures as appropriate  - Advance diet as tolerated, if ordered  - Obtain nutritional consult as needed  - Evaluate fluid balance  Outcome: Progressing  Goal: Maintains or returns to baseline bowel function  Description: INTERVENTIONS:  - Assess bowel function  - Maintain adequate hydration with IV or PO as ordered and tolerated  - Evaluate effectiveness of GI medications  - Encourage mobilization and activity  -  Obtain nutritional consult as needed  - Establish a toileting routine/schedule  - Consider collaborating with pharmacy to review patient's medication profile  Outcome: Progressing     Problem: SAFETY ADULT - FALL  Goal: Free from fall injury  Description: INTERVENTIONS:  - Assess pt frequently for physical needs  - Identify cognitive and physical deficits and behaviors that affect risk of falls.  - [place] fall precautions as indicated by assessment.  - Educate pt/family on patient safety including physical limitations  - Instruct pt to call for assistance with activity based on assessment  - Modify environment to reduce risk of injury  - Provide assistive devices as appropriate  - Consider OT/PT consult to assist with strengthening/mobility  - Encourage toileting schedule  Outcome: Progressing     Problem: DISCHARGE PLANNING  Goal: Discharge to home or other facility with appropriate resources  Description: INTERVENTIONS:  - Identify barriers to discharge w/pt and caregiver  - Include patient/family/discharge partner in discharge planning  - Arrange for needed discharge resources and transportation as appropriate  - Identify discharge learning needs (meds, wound care, etc)  - Arrange for interpreters to assist at discharge as needed  - Consider post-discharge preferences of patient/family/discharge partner  - Complete POLST form as appropriate  - Assess patient's ability to be responsible for managing their own health  - Refer to Case Management Department for coordinating discharge planning if the patient needs post-hospital services based on physician/LIP order or complex needs related to functional status, cognitive ability or social support system  Outcome: Progressing

## 2024-12-03 NOTE — PROGRESS NOTES
Progress Note  Isabel Patel Patient Status:  Inpatient    1931 MRN [id number]   Location [facility] 3W/SW Attending Miah Santos MD   Hosp Day # 1 PCP Fredrick Cronelius MD     Subjective:  No acute events overnight.  Resting in bed this afternoon, denies any cardiac symptoms currently.  A-fib, HR slightly elevated 100s, currently on dilt gtt.  Remains NPO with NGT to LIS.      Objective:  /82 (BP Location: Left arm)   Pulse 105   Temp 98.7 °F (37.1 °C) (Oral)   Resp 20   Ht 5' 1\" (1.549 m)   Wt 141 lb (64 kg)   SpO2 93%   BMI 26.64 kg/m²     Telemetry: A-fib, HR 100s      Intake/Output:    Intake/Output Summary (Last 24 hours) at 12/3/2024 1259  Last data filed at 12/3/2024 0600  Gross per 24 hour   Intake 1559 ml   Output 2400 ml   Net -841 ml       Last 3 Weights   24 0520 141 lb (64 kg)   24 1146 147 lb (66.7 kg)   24 0558 160 lb (72.6 kg)   24 1107 163 lb (73.9 kg)   24 0521 163 lb (73.9 kg)   24 0340 163 lb 9.6 oz (74.2 kg)   02/15/24 0632 169 lb 4.8 oz (76.8 kg)   24 0615 167 lb (75.8 kg)   24 0449 165 lb 12.8 oz (75.2 kg)   24 1615 164 lb 14.4 oz (74.8 kg)   24 1229 163 lb (73.9 kg)       Labs:  Recent Labs   Lab 24  0654 24  0650   * 143*   BUN 29* 22   CREATSERUM 1.52* 1.60*   EGFRCR 32* 30*   CA 10.3 9.9    137   K 4.2 3.5   CL 98 97*   CO2 29.0 32.0     Recent Labs   Lab 24  0654 24  0650   RBC 5.15 4.96   HGB 14.7 14.8   HCT 45.5 44.8   MCV 88.3 90.3   MCH 28.5 29.8   MCHC 32.3 33.0   RDW 15.9* 15.7*   NEPRELIM 6.89 5.97   WBC 8.4 8.0   .0 274.0         Recent Labs   Lab 24  0654   TROPHS 16       Review of Systems   Constitutional: Negative.   Cardiovascular:  Positive for irregular heartbeat.   Respiratory: Negative.     Gastrointestinal:  Positive for abdominal pain.       Physical Exam:    Gen: alert, oriented x 3, NAD, [place]  Heent: pupils equal, reactive. Mucous  membranes moist.   Neck: no jvd  Cardiac: irregular rate and rhythm, normal S1,S2, no murmur, gallop or rub   Lungs: diminished   Abd: soft, NT/ND +bs  Ext: no edema  Skin: Warm, dry  Neuro: No focal deficits        Medications:     piperacillin-tazobactam  3.375 g Intravenous Q8H    [Held by provider] apixaban  5 mg Oral BID    [Held by provider] atorvastatin  20 mg Oral Daily    [Held by provider] carvedilol  12.5 mg Oral BID with meals    [Held by provider] dilTIAZem HCl ER Coated Beads  360 mg Oral Daily    [Held by provider] losartan  50 mg Oral Daily    dilTIAZem  30 mg Oral 4 times per day    furosemide  20 mg Intravenous BID (Diuretic)    insulin regular human  1-5 Units Subcutaneous 4 times per day    metoprolol  5 mg Intravenous Q6H      dilTIAZem 2.5 mg/hr (12/02/24 1220)    dextrose 5%-sodium chloride 0.45% 83 mL/hr at 12/02/24 4614       Assessment:  Permanent atrial fibrillation with RVR on admit in the settings of small bowel obstruction-presented with nausea and abdominal pain    Rates moderately controlled with diltiazem gtt  TSH pending   Historically on eliquis for stroke prevention, currently held-may need bridging with heparin gtt while NPO  High grade small bowel obstruction  Gen surg following no plans for surgical intervention at this time, plan for conservative management  NPO, NGT to LIS  On IV abx   New/acute HFrEF, LVEF 25-30% (prior LVEF 60-65% -2/13/24)   Echo 12/2/24 LVEF 25-30%, mod diffuse hypokinesis, mild aortic insuffiencey, sev TR, PASP 73 mmHg  Historically on carvedilol, losartan, lasix-now held d/t NPO status  On IV lasix pushes, net fluid off ~1L, renal function trending up-will hold lasix for now    Valvular dysfunction, severe TR, mild AI, PASP 73 mmg   Hx of DC PPM  HTN-controlled   HLP-on statin   DM2, A1C 6.8%  CKD3, renal function trending up, crt 1.6 today (baseline ~1.2-1.4)    Plan:  A-fib, rates still elevated 100s, currently on diltiazem gtt, will switch to IV  lopressor pushes with new sev LV dysfunction 25-30%.  Echo results noted, new, sev reduced LVEF 25-30% from prior 2/2024 60-65%, sev TR, PASP 73 mmHg -will discuss eventual ischemic work up once acute issues resolve.  Not on any GDMT d/t NPO status.   Creatinine tending up at 1.6 today, will hold IV diuretics for now.  Holding DOAC d/t NPO status, can consider starting on hep gtt for stroke prevention pending gen surg clearance-will review with Dr Holcomb.   Further management of SBO per gen surg/primary.        Plan of care discussed with patient, RN.    Aubrie Caraballo, NESSA  12/3/2024  12:59 PM  [phone]

## 2024-12-04 ENCOUNTER — APPOINTMENT (OUTPATIENT)
Dept: GENERAL RADIOLOGY | Facility: HOSPITAL | Age: 89
End: 2024-12-04
Attending: STUDENT IN AN ORGANIZED HEALTH CARE EDUCATION/TRAINING PROGRAM
Payer: MEDICARE

## 2024-12-04 LAB
ANION GAP SERPL CALC-SCNC: 7 MMOL/L (ref 0–18)
APTT PPP: 187.8 SECONDS (ref 23–36)
APTT PPP: 204.2 SECONDS (ref 23–36)
APTT PPP: 66.2 SECONDS (ref 23–36)
BUN BLD-MCNC: 22 MG/DL (ref 9–23)
BUN/CREAT SERPL: 13.5 (ref 10–20)
CALCIUM BLD-MCNC: 9.6 MG/DL (ref 8.7–10.4)
CHLORIDE SERPL-SCNC: 99 MMOL/L (ref 98–112)
CO2 SERPL-SCNC: 29 MMOL/L (ref 21–32)
CREAT BLD-MCNC: 1.63 MG/DL
EGFRCR SERPLBLD CKD-EPI 2021: 29 ML/MIN/1.73M2 (ref 60–?)
GLUCOSE BLD-MCNC: 225 MG/DL (ref 70–99)
GLUCOSE BLDC GLUCOMTR-MCNC: 185 MG/DL (ref 70–99)
GLUCOSE BLDC GLUCOMTR-MCNC: 205 MG/DL (ref 70–99)
GLUCOSE BLDC GLUCOMTR-MCNC: 210 MG/DL (ref 70–99)
GLUCOSE BLDC GLUCOMTR-MCNC: 242 MG/DL (ref 70–99)
OSMOLALITY SERPL CALC.SUM OF ELEC: 290 MOSM/KG (ref 275–295)
PLATELET # BLD AUTO: 280 10(3)UL (ref 150–450)
POTASSIUM SERPL-SCNC: 4.4 MMOL/L (ref 3.5–5.1)
POTASSIUM SERPL-SCNC: 4.4 MMOL/L (ref 3.5–5.1)
SODIUM SERPL-SCNC: 135 MMOL/L (ref 136–145)

## 2024-12-04 PROCEDURE — 99233 SBSQ HOSP IP/OBS HIGH 50: CPT | Performed by: INTERNAL MEDICINE

## 2024-12-04 PROCEDURE — 99233 SBSQ HOSP IP/OBS HIGH 50: CPT | Performed by: STUDENT IN AN ORGANIZED HEALTH CARE EDUCATION/TRAINING PROGRAM

## 2024-12-04 PROCEDURE — 74018 RADEX ABDOMEN 1 VIEW: CPT | Performed by: STUDENT IN AN ORGANIZED HEALTH CARE EDUCATION/TRAINING PROGRAM

## 2024-12-04 NOTE — CDS QUERY
Dear Doctor Chance,     Please document the respiratory status on admission? Thank you     (  x  ) Acute Respiratory Failure     (    ) Other - please specify:       Documentation from the Medical Record:      Clinical Indicators:   Admitted for a SBO   02 Sat in ER was 76% on room air  Heart Rate 133-140; RR 23-30  O2 sat increased to 85% on 6L Nasal Cannula   O2 sat increased to 98% on 8L nasal cannula   Potential Risk Factors:   Diagnosed with CHF exacerbation and SBO    Treatment:   Oxygen, lasix, NG to LIS      If you have any questions, please contact Clinical :  Melba Holbrook RN at [phone]     Thank You!         THIS FORM IS A PERMANENT PART OF THE MEDICAL RECORD

## 2024-12-04 NOTE — PROGRESS NOTES
[facility]  Progress Note    Isabel Patel Patient Status:  Inpatient    1931 MRN [id number]   Location [facility] 3W/SW Attending Claudia Fletcher MD   Hosp Day # 2 PCP Fredrick Cornelius MD     Subjective:  Patient resting in bed, son is bedside.  She reports minimal improvement in abdominal pain and distention.  She denies nausea or vomiting.  She has not passed gas or had a bowel movement since admission.  She denies fever or chills.  She has been n.p.o., NGT to [facility] with minimal bilious output.    Objective/Physical Exam:  General: Alert, orientated x3.  Cooperative.  No apparent distress.  Vital Signs:  Blood pressure (!) 115/98, pulse 107, temperature 98 °F (36.7 °C), temperature source Oral, resp. rate 20, height 5' 1\" (1.549 m), weight 141 lb (64 kg), SpO2 97%, not currently breastfeeding.  Wt Readings from Last 3 Encounters:   24 141 lb (64 kg)   24 163 lb (73.9 kg)   24 163 lb (73.9 kg)     Lungs: No respiratory distress.  Cardiac: Regular rate and rhythm.   Abdomen:  Soft,  distended, mildly tender, with no rebound or guarding.  No peritoneal signs.   Extremities:  No lower extremity edema noted.        Intake/Output:    Intake/Output Summary (Last 24 hours) at 2024 0832  Last data filed at 2024 0700  Gross per 24 hour   Intake 1072.17 ml   Output 700 ml   Net 372.17 ml     I/O last 3 completed shifts:  In: 2611.2 [P.O.:110; I.V.:2401.2; IV PIGGYBACK:100]  Out:  [Urine:1700; Emesis/NG output:300]  I/O this shift:  In: 20 [P.O.:20]  Out: -     Medications:    piperacillin-tazobactam  3.375 g Intravenous Q8H    [Held by provider] apixaban  5 mg Oral BID    [Held by provider] atorvastatin  20 mg Oral Daily    [Held by provider] carvedilol  12.5 mg Oral BID with meals    [Held by provider] losartan  50 mg Oral Daily    metoprolol  5 mg Intravenous Q4H    [Held by provider] furosemide  20 mg Intravenous BID (Diuretic)    insulin regular human  1-5  Units Subcutaneous 4 times per day       Labs:  Lab Results   Component Value Date    WBC 11.8 12/03/2024    HGB 15.6 12/03/2024    HCT 47.9 12/03/2024    .0 12/04/2024     Lab Results   Component Value Date     12/04/2024    K 4.4 12/04/2024    K 4.4 12/04/2024    CL 99 12/04/2024    CO2 29.0 12/04/2024    BUN 22 12/04/2024    CREATSERUM 1.63 12/04/2024     12/04/2024    CA 9.6 12/04/2024     Lab Results   Component Value Date    INR 1.41 (H) 12/02/2024    INR 1.0 12/03/2018         Assessment  Patient Active Problem List   Diagnosis    Right knee DJD    Osteoarthritis    Controlled type 2 diabetes mellitus with diabetic nephropathy, without long-term current use of insulin ([facility])    HTN (hypertension)    Hypercholesterolemia    Obesity    Left rotator cuff tear arthropathy    Pessary maintenance    Osteopenia of multiple sites    Pain of right lower extremity    Pelvic relaxation    Uterovaginal prolapse    AGUSTO (stress urinary incontinence, female)    TIA (transient ischemic attack)    Spinal stenosis of lumbar region    Macular degeneration    Mass of skin of left shoulder    Closed fracture of left distal femur (HCC)    Closed bicondylar fracture of distal end of right femur ([facility])    Exudative age-related macular degeneration, right eye, with inactive choroidal neovascularization (Former[facility])    Anemia    Bilateral lower extremity edema    Asymptomatic menopause    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 3 chronic kidney disease, without long-term current use of insulin ([facility])    Keratosis    Chronic shoulder pain    History of right breast cancer    Urinary incontinence    Atrial fibrillation, new onset (HCC)    Status post biventricular pacemaker    Acute on chronic congestive heart failure, unspecified heart failure type (HCC)    Complete intestinal obstruction, unspecified cause (HCC)    Atrial fibrillation with rapid ventricular response ([facility])    Anticoagulated    Small bowel obstruction  (HCC)     Small bowel obstruction      Plan:  Repeat abdominal x-ray today.  Clinical course pending results  Keep n.p.o.  Continue NGT to LIS  Continue IV antibiotics as per hospitalist  Ambulate as able  DVT prophylaxis with heparin GTT. Eliquis currently on hold, last dose 12-1    Quality:  DVT Mechanical Prophylaxis:   SCDs,    DVT Pharmacologic Prophylaxis   Medication    [Held by provider] apixaban (Eliquis) tab 5 mg    heparin (Porcine) 01264 units/250mL infusion PE/DVT/THROMBUS CONTINUOUS                Code Status: Full Code  Mills: External urinary catheter in place  Mills Duration (in days):   Central line:    WILLIE: 12/6/2024        CHRISTIAN Grijalva  12/4/2024  8:32 AM      The patient was independently examined. Agree with the PA's assessment and plan.    Discussed with son and the patient recommendation for surgery. They are understandably hesitant. Appreciate cardiology input. Will discuss with them again to see if surgery (diagnostic laparoscopy, possible open, lysis of adhesions, possible bowel resection) is of acceptable risk to them. If so, will go to OR tomorrow.        Anna Ashraf MD  [facility] - General Surgery   [address] [place] [address]  p [phone]

## 2024-12-04 NOTE — CDS QUERY
How to answer this Query:     1.) DON'T CLICK COSIGN BUTTON FIRST  2.) Click \"3 dots...\" to the right of cosign button and click EDIT on the toolbar.  2.) Type an \"X\" in the bracket for the diagnosis that applies. (You may also add additional clinical details as you feel necessary to substantiate your response).   3.) Finally click \"Sign\" to complete response.  Thank You   CLINICAL DOCUMENTATION CLARIFICATION FORM  Dear Doctor: Darren or Aubrie SZYMANSKI    Please clarify CHF acuity and type? Thank you     PLEASE (X) ALL DIAGNOSES THAT APPLY.    (  x  ) Acute Systolic Heart Failure   (    ) Acute on Chronic Systolic Heart Failure    (    ) Chronic Systolic Heart Failure      (    ) Acute Diastolic Heart Failure  (    ) Acute on Chronic Diastolic Heart Failure   (    ) Chronic Diastolic Heart Failure       (    ) Acute combined systolic and diastolic Heart Failure  (    ) Acute on chronic combined systolic and diastolic Heart Failure  (    ) Chronic combined systolic and diastolic Heart Failure     (    ) Other - please specify:   (    ) Unable to determine - please comment:       Documentation from the Medical Record:     12/02/25-- ProBNP- 5448  Cardiology Consult Dr. Banks- history of heart failure with preserved ejection fraction.   12/03- APN Progress Note -New/acute HFrEF, LVEF 25-30% (prior LVEF 60-65% -2/13/24)   Echo 12/2/24 LVEF 25-30%, mod diffuse hypokinesis, mild aortic insuffiencey, sev TR, PASP 73 mmHg  Historically on carvedilol, losartan, lasix-now held d/t NPO status  On IV lasix pushes, net fluid off ~1L, renal function trending up-will hold lasix for now            If you have any questions, please contact Clinical :  Melba MONROY RN at [phone]     Thank You!     THIS FORM IS A PERMANENT PART OF THE MEDICAL RECORD

## 2024-12-04 NOTE — PROGRESS NOTES
[facility]  part of [facility]  Hospitalist Progress Note     Isabel Patel Patient Status:  Inpatient    1931  89 year old CSN [id number]   Location 301/301-A Attending Miah Santos MD   Hosp Day # 2 PCP Fredrick Cornelius MD     Assessment & Plan:   ----------------------------------  Small bowel obstruction.  Likely due to adhesive disease from previous abdominal surgery.  Pain controlled.  -Surgical consult appreciated  -NG tube: In place  -Small bowel follow-through pending  -NPO  -IV fluids  -Pain control  -Encourage activity as tolerated  -per surg, pt is on iv abx  -: NG tube to LIS, n.p.o. IV fluids continue present management.    Atrial fibrillation, paroxysmal.  With RVR.  Hemodynamically stable. Anticoagulation with Eliquis on hold.  -Cardiology consult appreciated  -Monitor on telemetry  -Monitor electrolytes  -Anticoagulation as above  -Rate/rhythm control with IV metoprolol    Other problems  CKD stage III  Type 2 diabetes  Permanent pacemaker for sick sinus syndrome  Hypertension  Dyslipidemia  Arthritis  Aortic stenosis, mild  Moderate pulmonary hypertension    DVT Mechanical Prophylaxis:   SCDs,    DVT Pharmacologic Prophylaxis   Medication    [Held by provider] apixaban (Eliquis) tab 5 mg    heparin (Porcine) 85090 units/250mL infusion PE/DVT/THROMBUS CONTINUOUS              code status: Full  dispo: home upon medical clearance  If applicable, malnutrition status at bottom of note    I personally reviewed the available laboratories, imaging including. I discussed/will discuss the case with consultants. I ordered laboratories and/or radiographic studies. I adjusted medications as detailed above.  Medical decision making high, risk is high.    Subjective:   ----------------------------------    Patient denies any abdominal pain.  NG tube remains in place high output.  Bilious in nature.  Patient does complain of nausea.  Son in the room.  No other acute issues at this  time      No other acute complaints or other nursing concerns or overnight events        Objective:   Chief Complaint:   Chief Complaint   Patient presents with    Nausea/Vomiting/Diarrhea     ----------------------------------  Temp:  [97.2 °F (36.2 °C)-98 °F (36.7 °C)] 98 °F (36.7 °C)  Pulse:  [107-120] 107  Resp:  [18-22] 20  BP: (115-129)/(77-98) 115/98  SpO2:  [91 %-97 %] 97 %  Gen: A+Ox2.  No distress.   HEENT: NCAT, neck supple, no carotid bruit.  NG tube in place  CV: irreg, S1S2, and intact distal pulses. No gallop, rub, murmur.  Pulm: Effort and breath sounds normal. No distress, wheezes, rales, rhonchi.  Abd: Soft nondistended, mildly tender diffusely no rebound or guarding-no active bowel sounds auscultated  Neuro: Normal reflexes, CN. Sensory/motor exams grossly normal deficit.   MS: No joint effusions.  No peripheral edema.  Skin: Skin is warm and dry. No rashes, erythema, diaphoresis.   Psych: Normal mood and affect. Calm, cooperative    Labs:  Lab Results   Component Value Date    HGB 15.6 12/03/2024    WBC 11.8 (H) 12/03/2024    .0 12/04/2024     (L) 12/04/2024    K 4.4 12/04/2024    K 4.4 12/04/2024    CREATSERUM 1.63 (H) 12/04/2024    INR 1.41 (H) 12/02/2024    AST 15 12/02/2024    ALT 10 12/02/2024    TROP 0.00 12/03/2018            piperacillin-tazobactam  3.375 g Intravenous Q8H    [Held by provider] apixaban  5 mg Oral BID    [Held by provider] atorvastatin  20 mg Oral Daily    [Held by provider] carvedilol  12.5 mg Oral BID with meals    [Held by provider] losartan  50 mg Oral Daily    metoprolol  5 mg Intravenous Q4H    [Held by provider] furosemide  20 mg Intravenous BID (Diuretic)    insulin regular human  1-5 Units Subcutaneous 4 times per day       acetaminophen    ondansetron    morphINE **OR** morphINE **OR** morphINE    dilTIAZem    glucose **OR** glucose **OR** glucose-vitamin C **OR** dextrose **OR** glucose **OR** glucose **OR** glucose-vitamin  C  **Certification      PHYSICIAN Certification of Need for Inpatient Hospitalization - Initial Certification    Patient will require inpatient services that will reasonably be expected to span two midnight's based on the clinical documentation in H+P.   Based on patients current state of illness, I anticipate that, after discharge, patient will require TBD.

## 2024-12-04 NOTE — PROGRESS NOTES
Heart Failure Nurse  Progress Note    Patient was evaluated by the Heart Failure Nurse  for understanding, verbalization, demonstration, and recall of education related to heart failure, overall adherence to the behaviors necessary to maintain a compensated status, and risk for readmission.     Patient assessment:Son at bedside during encounter. Patient awake alert to name and place. Folder givne to son at bedside.     Patient is able to verbalize signs/symptoms fluid overload/impending HF exacerbation and who to contact with problems                                          _x__ yes  ___ no      Patient is following a 2000 mg sodium diet                                             _x__ yes  ___ no    If no, barriers to 2000 mg sodium diet:_______________________________________________    Patient informed of 2-Part dietician classes that is free if sign up within 30 days of discharge or $40  ___ yes  __x_ no      Patient is adherent to medication regimen                                              __x_ yes  ___ no    If no, barriers to medication regimen:    Patient has sufficient funds to purchase medication                      _x__ yes  ___ no      Patient has a scale in the home              __x_ yes  ___ no      Patient is adherent to daily weight monitoring                                        _x__ yes   ___ no    If no, barriers to daily weight monitoring:    Symptom Tracker Worksheet reviewed with patient  ___ yes   __x_ no      Patient verbalizes understanding of stoplight/heart failure zones          __x_ yes   ___ no      Patient understands the importance of 7-day follow-up appointment      __x_ yes  ___ no    Appointment Date:          Patient has adequate transportation to attend follow-up appointments    __x_ yes  ___ no    If no, was referral to Social Work made  ___yes  ___ no      Family/Friend present during education: none    Additional consultations required: doreen Shafer  [place] TA HF  XT [phone]

## 2024-12-04 NOTE — PROGRESS NOTES
Progress Note  Isabel Patel Patient Status:  Inpatient    1931 MRN [id number]   Location [facility] 3W/SW Attending Miah Santos MD   Hosp Day # 2 PCP Fredrick Cornelius MD       ASSESSMENT / PLAN:    Permanent AF  -Rates reasonably controlled on Lopressor 5 mg IV q 6 hrs  -Can adjust dose as necessary  -Would anticoagulate with IV heparin if okay with Surgery Svc, and then resuem Eliquis after acute abdominal issues and/or surgical bleeding risks resolve.    SSS  -S/p dual-chamber pacemaker    Acute HFrEF  -LVEF 25-30%, mild AR, severe TR, severe pulm HTN  -GDMT for HF being held due to SBO  -Diuresed with IV lasix, now on hold with slight pre-renal state    Preoperative cardiac risk assessment  -She is at high risk for cardiac decompensation with general anesthesia due to her LV dysfunction which may be due to occult CAD, and severe pulmonary hypertension.    -However, surgery is not excluded if no other chances for survival.  -Attention to fluid balance, minimizing myocardial demand with HR/BP control, and perioperative inotropic/pressor support would all be necessary. Perioperative acute MI or acute HF decompensation is possible, with long-term morbidity or fatality.    Shamika Manjarrez M.D.   Cardiac Electrophysiology     2024   L3  -----------------------------------------    Subjective:  Remains NPO with NGT to LIS.  Needs surgery for SBO.    Objective:  BP (!) 115/98 (BP Location: Radial)   Pulse 107   Temp 98 °F (36.7 °C) (Oral)   Resp 20   Ht 5' 1\" (1.549 m)   Wt 141 lb (64 kg)   SpO2 97%   BMI 26.64 kg/m²     Intake/Output Summary (Last 24 hours) at 2024 1303  Last data filed at 2024 1200  Gross per 24 hour   Intake 1072.17 ml   Output 800 ml   Net 272.17 ml     Gen: alert, oriented x 3, NAD, [place]  Heent: pupils equal, reactive. Mucous membranes moist.   Neck: no jvd  Cardiac: irregular rate and rhythm, normal S1,S2, no murmur, gallop or rub   Lungs: diminished   Abd: no  rigidity  Ext: no edema  Skin: warm, dry  Neuro: no focal deficits    LABS:   Recent Results (from the past 72 hours)   EKG 12 Lead    Collection Time: 12/02/24  6:20 AM   Result Value Ref Range    Ventricular rate 140 BPM    Atrial rate  BPM    P-R Interval  ms    QRS Duration 62 ms    Q-T Interval 290 ms    QTC Calculation (Bezet) 442 ms    P Axis  degrees    R Axis -51 degrees    T Axis 114 degrees   CBC With Differential With Platelet    Collection Time: 12/02/24  6:54 AM   Result Value Ref Range    WBC 8.4 4.0 - 11.0 x10(3) uL    RBC 5.15 3.80 - 5.30 x10(6)uL    HGB 14.7 12.0 - 16.0 g/dL    HCT 45.5 35.0 - 48.0 %    MCV 88.3 80.0 - 100.0 fL    MCH 28.5 26.0 - 34.0 pg    MCHC 32.3 31.0 - 37.0 g/dL    RDW-SD 51.8 (H) 35.1 - 46.3 fL    RDW 15.9 (H) 11.0 - 15.0 %    .0 150.0 - 450.0 10(3)uL    Neutrophil Absolute Prelim 6.89 1.50 - 7.70 x10 (3) uL    Neutrophil Absolute 6.89 1.50 - 7.70 x10(3) uL    Lymphocyte Absolute 0.87 (L) 1.00 - 4.00 x10(3) uL    Monocyte Absolute 0.53 0.10 - 1.00 x10(3) uL    Eosinophil Absolute 0.01 0.00 - 0.70 x10(3) uL    Basophil Absolute 0.02 0.00 - 0.20 x10(3) uL    Immature Granulocyte Absolute 0.05 0.00 - 1.00 x10(3) uL    Neutrophil % 82.4 %    Lymphocyte % 10.4 %    Monocyte % 6.3 %    Eosinophil % 0.1 %    Basophil % 0.2 %    Immature Granulocyte % 0.6 %   Comp Metabolic Panel (14)    Collection Time: 12/02/24  6:54 AM   Result Value Ref Range    Glucose 155 (H) 70 - 99 mg/dL    Sodium 136 136 - 145 mmol/L    Potassium 4.2 3.5 - 5.1 mmol/L    Chloride 98 98 - 112 mmol/L    CO2 29.0 21.0 - 32.0 mmol/L    Anion Gap 9 0 - 18 mmol/L    BUN 29 (H) 9 - 23 mg/dL    Creatinine 1.52 (H) 0.55 - 1.02 mg/dL    BUN/CREA Ratio 19.1 10.0 - 20.0    Calcium, Total 10.3 8.7 - 10.4 mg/dL    Calculated Osmolality 291 275 - 295 mOsm/kg    eGFR-Cr 32 (L) >=60 mL/min/1.73m2    ALT 10 10 - 49 U/L    AST 15 <34 U/L    Alkaline Phosphatase 93 55 - 142 U/L    Bilirubin, Total 1.0 (H) 0.2 - 0.9 mg/dL     Total Protein 7.9 5.7 - 8.2 g/dL    Albumin 4.4 3.2 - 4.8 g/dL    Globulin  3.5 2.0 - 3.5 g/dL    A/G Ratio 1.3 1.0 - 2.0   Lipase    Collection Time: 12/02/24  6:54 AM   Result Value Ref Range    Lipase 32 12 - 53 U/L   Troponin I (High Sensitivity)    Collection Time: 12/02/24  6:54 AM   Result Value Ref Range    Troponin I (High Sensitivity) 16 <=34 ng/L   Magnesium    Collection Time: 12/02/24  6:54 AM   Result Value Ref Range    Magnesium 1.8 1.6 - 2.6 mg/dL   PTT, Activated    Collection Time: 12/02/24  6:54 AM   Result Value Ref Range    PTT 31.9 23.0 - 36.0 seconds   Prothrombin Time (PT)    Collection Time: 12/02/24  6:54 AM   Result Value Ref Range    PT 18.1 (H) 11.6 - 14.8 seconds    INR 1.41 (H) 0.80 - 1.20   Pro Beta Natriuretic Peptide    Collection Time: 12/02/24  6:54 AM   Result Value Ref Range    Pro-Beta Natriuretic Peptide 5,448 (H) <450 pg/mL   Urinalysis, Routine    Collection Time: 12/02/24 10:36 AM   Result Value Ref Range    Urine Color Colorless (A) Yellow    Clarity Urine Clear Clear    Spec Gravity 1.015 1.005 - 1.030    Glucose Urine Normal Normal mg/dL    Bilirubin Urine Negative Negative    Ketones Urine Negative Negative mg/dL    Blood Urine Negative Negative    pH Urine 6.5 5.0 - 8.0    Protein Urine Negative Negative mg/dL    Urobilinogen Urine Normal Normal    Nitrite Urine Negative Negative    Leukocyte Esterase Urine 25 (A) Negative    WBC Urine 1-5 0 - 5 /HPF    RBC Urine 0-2 0 - 2 /HPF    Bacteria Urine None Seen None Seen /HPF    Squamous Epi. Cells Few (A) None Seen /HPF    Renal Tubular Epithelial Cells None Seen None Seen /HPF    Transitional Cells None Seen None Seen /HPF    Yeast Urine None Seen None Seen /HPF   Blood Culture    Collection Time: 12/02/24 10:36 AM    Specimen: Blood,peripheral   Result Value Ref Range    Blood Culture Result No Growth 2 Days    Blood Culture    Collection Time: 12/02/24 10:36 AM    Specimen: Blood,peripheral   Result Value Ref Range     Blood Culture Result No Growth 2 Days    Lactic Acid, Plasma    Collection Time: 12/02/24 10:36 AM   Result Value Ref Range    Lactic Acid 1.1 0.5 - 2.0 mmol/L   POCT Glucose    Collection Time: 12/02/24 12:49 PM   Result Value Ref Range    POC Glucose  175 (H) 70 - 99 mg/dL   POCT Glucose    Collection Time: 12/02/24  5:31 PM   Result Value Ref Range    POC Glucose  149 (H) 70 - 99 mg/dL   POCT Glucose    Collection Time: 12/02/24 11:46 PM   Result Value Ref Range    POC Glucose  168 (H) 70 - 99 mg/dL   POCT Glucose    Collection Time: 12/03/24  6:12 AM   Result Value Ref Range    POC Glucose  141 (H) 70 - 99 mg/dL   Basic Metabolic Panel (8)    Collection Time: 12/03/24  6:50 AM   Result Value Ref Range    Glucose 143 (H) 70 - 99 mg/dL    Sodium 137 136 - 145 mmol/L    Potassium 3.5 3.5 - 5.1 mmol/L    Chloride 97 (L) 98 - 112 mmol/L    CO2 32.0 21.0 - 32.0 mmol/L    Anion Gap 8 0 - 18 mmol/L    BUN 22 9 - 23 mg/dL    Creatinine 1.60 (H) 0.55 - 1.02 mg/dL    BUN/CREA Ratio 13.8 10.0 - 20.0    Calcium, Total 9.9 8.7 - 10.4 mg/dL    Calculated Osmolality 290 275 - 295 mOsm/kg    eGFR-Cr 30 (L) >=60 mL/min/1.73m2   CBC With Differential With Platelet    Collection Time: 12/03/24  6:50 AM   Result Value Ref Range    WBC 8.0 4.0 - 11.0 x10(3) uL    RBC 4.96 3.80 - 5.30 x10(6)uL    HGB 14.8 12.0 - 16.0 g/dL    HCT 44.8 35.0 - 48.0 %    MCV 90.3 80.0 - 100.0 fL    MCH 29.8 26.0 - 34.0 pg    MCHC 33.0 31.0 - 37.0 g/dL    RDW-SD 51.6 (H) 35.1 - 46.3 fL    RDW 15.7 (H) 11.0 - 15.0 %    .0 150.0 - 450.0 10(3)uL    Neutrophil Absolute Prelim 5.97 1.50 - 7.70 x10 (3) uL    Neutrophil Absolute 5.97 1.50 - 7.70 x10(3) uL    Lymphocyte Absolute 1.02 1.00 - 4.00 x10(3) uL    Monocyte Absolute 0.74 0.10 - 1.00 x10(3) uL    Eosinophil Absolute 0.17 0.00 - 0.70 x10(3) uL    Basophil Absolute 0.04 0.00 - 0.20 x10(3) uL    Immature Granulocyte Absolute 0.02 0.00 - 1.00 x10(3) uL    Neutrophil % 75.0 %    Lymphocyte % 12.8 %     Monocyte % 9.3 %    Eosinophil % 2.1 %    Basophil % 0.5 %    Immature Granulocyte % 0.3 %   Magnesium    Collection Time: 12/03/24  6:50 AM   Result Value Ref Range    Magnesium 2.3 1.6 - 2.6 mg/dL   POCT Glucose    Collection Time: 12/03/24 12:24 PM   Result Value Ref Range    POC Glucose  155 (H) 70 - 99 mg/dL   POCT Glucose    Collection Time: 12/03/24  5:12 PM   Result Value Ref Range    POC Glucose  182 (H) 70 - 99 mg/dL   Basic Metabolic Panel (8)    Collection Time: 12/03/24  8:09 PM   Result Value Ref Range    Glucose 189 (H) 70 - 99 mg/dL    Sodium 137 136 - 145 mmol/L    Potassium 3.4 (L) 3.5 - 5.1 mmol/L    Chloride 97 (L) 98 - 112 mmol/L    CO2 35.0 (H) 21.0 - 32.0 mmol/L    Anion Gap 5 0 - 18 mmol/L    BUN 22 9 - 23 mg/dL    Creatinine 1.71 (H) 0.55 - 1.02 mg/dL    BUN/CREA Ratio 12.9 10.0 - 20.0    Calcium, Total 9.9 8.7 - 10.4 mg/dL    Calculated Osmolality 292 275 - 295 mOsm/kg    eGFR-Cr 28 (L) >=60 mL/min/1.73m2   CBC, Platelet; No Differential    Collection Time: 12/03/24  8:09 PM   Result Value Ref Range    WBC 11.8 (H) 4.0 - 11.0 x10(3) uL    RBC 5.30 3.80 - 5.30 x10(6)uL    HGB 15.6 12.0 - 16.0 g/dL    HCT 47.9 35.0 - 48.0 %    MCV 90.4 80.0 - 100.0 fL    MCH 29.4 26.0 - 34.0 pg    MCHC 32.6 31.0 - 37.0 g/dL    RDW 15.4 (H) 11.0 - 15.0 %    RDW-SD 51.0 (H) 35.1 - 46.3 fL    .0 150.0 - 450.0 10(3)uL   PTT, Activated    Collection Time: 12/03/24  8:09 PM   Result Value Ref Range    PTT 28.5 23.0 - 36.0 seconds   POCT Glucose    Collection Time: 12/03/24 11:55 PM   Result Value Ref Range    POC Glucose  205 (H) 70 - 99 mg/dL   Basic Metabolic Panel (8)    Collection Time: 12/04/24  4:57 AM   Result Value Ref Range    Glucose 225 (H) 70 - 99 mg/dL    Sodium 135 (L) 136 - 145 mmol/L    Potassium 4.4 3.5 - 5.1 mmol/L    Chloride 99 98 - 112 mmol/L    CO2 29.0 21.0 - 32.0 mmol/L    Anion Gap 7 0 - 18 mmol/L    BUN 22 9 - 23 mg/dL    Creatinine 1.63 (H) 0.55 - 1.02 mg/dL    BUN/CREA Ratio 13.5  10.0 - 20.0    Calcium, Total 9.6 8.7 - 10.4 mg/dL    Calculated Osmolality 290 275 - 295 mOsm/kg    eGFR-Cr 29 (L) >=60 mL/min/1.73m2   Platelet Count    Collection Time: 12/04/24  4:57 AM   Result Value Ref Range    .0 150.0 - 450.0 10(3)uL   Potassium    Collection Time: 12/04/24  4:57 AM   Result Value Ref Range    Potassium 4.4 3.5 - 5.1 mmol/L   PTT, Activated    Collection Time: 12/04/24  4:57 AM   Result Value Ref Range    .8 (H) 23.0 - 36.0 seconds   POCT Glucose    Collection Time: 12/04/24  5:34 AM   Result Value Ref Range    POC Glucose  242 (H) 70 - 99 mg/dL   PTT, Activated    Collection Time: 12/04/24 11:42 AM   Result Value Ref Range    .2 (H) 23.0 - 36.0 seconds   RAINBOW DRAW LAVENDER    Collection Time: 12/04/24 11:42 AM   Result Value Ref Range    Hold Lavender Auto Resulted    RAINBOW DRAW LIGHT GREEN    Collection Time: 12/04/24 11:42 AM   Result Value Ref Range    Hold Lt Green Auto Resulted    POCT Glucose    Collection Time: 12/04/24 11:43 AM   Result Value Ref Range    POC Glucose  185 (H) 70 - 99 mg/dL

## 2024-12-04 NOTE — PLAN OF CARE
Vital signs stable overnight, Zofran given for nausea. Heparin drip started for DVT of L arm. NGT to LIS. Continue IVF and IV abx. XR abdomen today.    Problem: Patient Centered Care  Goal: Patient preferences are identified and integrated in the patient's plan of care  Description: Interventions:  - What would you like us to know as we care for you? From home with son  - Provide timely, complete, and accurate information to patient/family  - Incorporate patient and family knowledge, values, beliefs, and cultural backgrounds into the planning and delivery of care  - Encourage patient/family to participate in care and decision-making at the level they choose  - Honor patient and family perspectives and choices  Outcome: Progressing     Problem: Patient/Family Goals  Goal: Patient/Family Long Term Goal  Description: Patient's Long Term Goal: go home    Interventions:  - go home  - See additional Care Plan goals for specific interventions  Outcome: Progressing  Goal: Patient/Family Short Term Goal  Description: Patient's Short Term Goal: clear SBO    Interventions:   - NGT LIS  -NPO  - See additional Care Plan goals for specific interventions  Outcome: Progressing     Problem: CARDIOVASCULAR - ADULT  Goal: Maintains optimal cardiac output and hemodynamic stability  Description: INTERVENTIONS:  - Monitor vital signs, rhythm, and trends  - Monitor for bleeding, hypotension and signs of decreased cardiac output  - Evaluate effectiveness of vasoactive medications to optimize hemodynamic stability  - Monitor arterial and/or venous puncture sites for bleeding and/or hematoma  - Assess quality of pulses, skin color and temperature  - Assess for signs of decreased coronary artery perfusion - ex. Angina  - Evaluate fluid balance, assess for edema, trend weights  Outcome: Progressing  Goal: Absence of cardiac arrhythmias or at baseline  Description: INTERVENTIONS:  - Continuous cardiac monitoring, monitor vital signs, obtain 12  lead EKG if indicated  - Evaluate effectiveness of antiarrhythmic and heart rate control medications as ordered  - Initiate emergency measures for life threatening arrhythmias  - Monitor electrolytes and administer replacement therapy as ordered  Outcome: Progressing     Problem: RESPIRATORY - ADULT  Goal: Achieves optimal ventilation and oxygenation  Description: INTERVENTIONS:  - Assess for changes in respiratory status  - Assess for changes in mentation and behavior  - Position to facilitate oxygenation and minimize respiratory effort  - Oxygen supplementation based on oxygen saturation or ABGs  - Provide Smoking Cessation handout, if applicable  - Encourage broncho-pulmonary hygiene including cough, deep breathe, Incentive Spirometry  - Assess the need for suctioning and perform as needed  - Assess and instruct to report SOB or any respiratory difficulty  - Respiratory Therapy support as indicated  - Manage/alleviate anxiety  - Monitor for signs/symptoms of CO2 retention  Outcome: Progressing     Problem: GASTROINTESTINAL - ADULT  Goal: Minimal or absence of nausea and vomiting  Description: INTERVENTIONS:  - Maintain adequate hydration with IV or PO as ordered and tolerated  - Nasogastric tube to low intermittent suction as ordered  - Evaluate effectiveness of ordered antiemetic medications  - Provide nonpharmacologic comfort measures as appropriate  - Advance diet as tolerated, if ordered  - Obtain nutritional consult as needed  - Evaluate fluid balance  Outcome: Progressing  Goal: Maintains or returns to baseline bowel function  Description: INTERVENTIONS:  - Assess bowel function  - Maintain adequate hydration with IV or PO as ordered and tolerated  - Evaluate effectiveness of GI medications  - Encourage mobilization and activity  - Obtain nutritional consult as needed  - Establish a toileting routine/schedule  - Consider collaborating with pharmacy to review patient's medication profile  Outcome:  Progressing     Problem: SAFETY ADULT - FALL  Goal: Free from fall injury  Description: INTERVENTIONS:  - Assess pt frequently for physical needs  - Identify cognitive and physical deficits and behaviors that affect risk of falls.  - [place] fall precautions as indicated by assessment.  - Educate pt/family on patient safety including physical limitations  - Instruct pt to call for assistance with activity based on assessment  - Modify environment to reduce risk of injury  - Provide assistive devices as appropriate  - Consider OT/PT consult to assist with strengthening/mobility  - Encourage toileting schedule  Outcome: Progressing     Problem: DISCHARGE PLANNING  Goal: Discharge to home or other facility with appropriate resources  Description: INTERVENTIONS:  - Identify barriers to discharge w/pt and caregiver  - Include patient/family/discharge partner in discharge planning  - Arrange for needed discharge resources and transportation as appropriate  - Identify discharge learning needs (meds, wound care, etc)  - Arrange for interpreters to assist at discharge as needed  - Consider post-discharge preferences of patient/family/discharge partner  - Complete POLST form as appropriate  - Assess patient's ability to be responsible for managing their own health  - Refer to Case Management Department for coordinating discharge planning if the patient needs post-hospital services based on physician/LIP order or complex needs related to functional status, cognitive ability or social support system  Outcome: Progressing

## 2024-12-04 NOTE — PLAN OF CARE
Pt [place] hearing; able to understand if spoken to loudly and square on; Pt experienced high levels of nausea; Zofran given and pt more comfortable; pt will be having surgical interventions 12/5/24 to address SBO.  Pt is as comfortable as can be expected and wanting to go home as soon as possible

## 2024-12-05 ENCOUNTER — APPOINTMENT (OUTPATIENT)
Dept: ULTRASOUND IMAGING | Facility: HOSPITAL | Age: 89
End: 2024-12-05
Attending: INTERNAL MEDICINE
Payer: MEDICARE

## 2024-12-05 ENCOUNTER — TELEPHONE (OUTPATIENT)
Dept: SURGERY | Facility: CLINIC | Age: 89
End: 2024-12-05

## 2024-12-05 ENCOUNTER — APPOINTMENT (OUTPATIENT)
Dept: CT IMAGING | Facility: HOSPITAL | Age: 89
End: 2024-12-05
Attending: HOSPITALIST
Payer: MEDICARE

## 2024-12-05 LAB
ALBUMIN SERPL-MCNC: 3.4 G/DL (ref 3.2–4.8)
ANION GAP SERPL CALC-SCNC: 7 MMOL/L (ref 0–18)
APTT PPP: 85.9 SECONDS (ref 23–36)
BUN BLD-MCNC: 22 MG/DL (ref 9–23)
BUN/CREAT SERPL: 13.7 (ref 10–20)
CALCIUM BLD-MCNC: 9 MG/DL (ref 8.7–10.4)
CHLORIDE SERPL-SCNC: 98 MMOL/L (ref 98–112)
CO2 SERPL-SCNC: 32 MMOL/L (ref 21–32)
CREAT BLD-MCNC: 1.61 MG/DL
DEPRECATED RDW RBC AUTO: 51.4 FL (ref 35.1–46.3)
EGFRCR SERPLBLD CKD-EPI 2021: 30 ML/MIN/1.73M2 (ref 60–?)
ERYTHROCYTE [DISTWIDTH] IN BLOOD BY AUTOMATED COUNT: 15.6 % (ref 11–15)
GLUCOSE BLD-MCNC: 135 MG/DL (ref 70–99)
GLUCOSE BLDC GLUCOMTR-MCNC: 161 MG/DL (ref 70–99)
GLUCOSE BLDC GLUCOMTR-MCNC: 177 MG/DL (ref 70–99)
GLUCOSE BLDC GLUCOMTR-MCNC: 179 MG/DL (ref 70–99)
GLUCOSE BLDC GLUCOMTR-MCNC: 184 MG/DL (ref 70–99)
GLUCOSE BLDC GLUCOMTR-MCNC: 188 MG/DL (ref 70–99)
HCT VFR BLD AUTO: 43.4 %
HGB BLD-MCNC: 13.7 G/DL
LACTATE SERPL-SCNC: 1.6 MMOL/L (ref 0.5–2)
MAGNESIUM SERPL-MCNC: 2.1 MG/DL (ref 1.6–2.6)
MCH RBC QN AUTO: 28.5 PG (ref 26–34)
MCHC RBC AUTO-ENTMCNC: 31.6 G/DL (ref 31–37)
MCV RBC AUTO: 90.2 FL
OSMOLALITY SERPL CALC.SUM OF ELEC: 289 MOSM/KG (ref 275–295)
PHOSPHATE SERPL-MCNC: 3.2 MG/DL (ref 2.4–5.1)
PLATELET # BLD AUTO: 278 10(3)UL (ref 150–450)
PLATELET # BLD AUTO: 280 10(3)UL (ref 150–450)
POTASSIUM SERPL-SCNC: 3.6 MMOL/L (ref 3.5–5.1)
RBC # BLD AUTO: 4.81 X10(6)UL
SODIUM SERPL-SCNC: 137 MMOL/L (ref 136–145)
TROPONIN I SERPL HS-MCNC: 12 NG/L
WBC # BLD AUTO: 8.7 X10(3) UL (ref 4–11)

## 2024-12-05 PROCEDURE — 99222 1ST HOSP IP/OBS MODERATE 55: CPT | Performed by: INTERNAL MEDICINE

## 2024-12-05 PROCEDURE — 99233 SBSQ HOSP IP/OBS HIGH 50: CPT | Performed by: STUDENT IN AN ORGANIZED HEALTH CARE EDUCATION/TRAINING PROGRAM

## 2024-12-05 PROCEDURE — 99233 SBSQ HOSP IP/OBS HIGH 50: CPT | Performed by: INTERNAL MEDICINE

## 2024-12-05 PROCEDURE — 93970 EXTREMITY STUDY: CPT | Performed by: INTERNAL MEDICINE

## 2024-12-05 PROCEDURE — 95819 EEG AWAKE AND ASLEEP: CPT | Performed by: INTERNAL MEDICINE

## 2024-12-05 PROCEDURE — 70450 CT HEAD/BRAIN W/O DYE: CPT | Performed by: HOSPITALIST

## 2024-12-05 NOTE — PROGRESS NOTES
RRT    *See RRT Documentation Record*    Reason the RRT was called: AMS, slurred speech  Assessment of patient leading up to RRT: Vitals, Neuro check, questioning family  Interventions/Testing: CT head, labs, blood cultures, EEG, NIH, ABG  Patient Outcome/Disposition: more alert and oriented returning from CT  Family Notified: yes  Name of family notified: Miah, son at bedside. Haja SARABIA updated by Miah

## 2024-12-05 NOTE — SIGNIFICANT EVENT
Rapid Response Note  Responded immediately to RRT called to pt with sudden AMS.  Per RN, she woke up pt from asleep to prepare for surgery (going to OR for SBO) and noticed pt not responding well, lethargic, confused, repeating herself.  Overall clammy.  BP, HR wnl  Breathing ok  Pt seems to be in no pain, abd soft  Blood glucose nl  Son at bedside  Per RN, pt usually conversant and cooperative  Chart quickly reviewed and d/w over the phone with primary hospitalist Dr. Fletcher  Heparin on hold since am  -labs now  -CT head  -stroke alert called, d/w neurologist  -likely hold off on surgery unless emergency, but abd soft, seems stable   RN to update surgery  -d/w cardiology [facility] at bedside        /89 (BP Location: Right arm)   Pulse 94   Temp 97.7 °F (36.5 °C) (Oral)   Resp 18   Ht 5' 1\" (1.549 m)   Wt 144 lb 11.2 oz (65.6 kg)   SpO2 92%   BMI 27.34 kg/m²         Critical care time spent more than 35 min. Critical care time was exclusive of:  Separately billable procedures and treating other patients. Critical care was necessary to treat or prevent imminent or life-threatening deterioration of the following conditions: Acute encephalopathy. Critical care was time spent personally by me on the following activities: Development of treatment plan with patient or surrogate, discussions with consultants, discussions with primary provider, evaluation of patient's response to treatment, examination of patient, obtaining history from patient or surrogate, ordering and performing treatments and interventions, ordering and review of laboratory studies, ordering and review of radiographic studies, pulse oximetry and re-evaluation of patient's condition.

## 2024-12-05 NOTE — PHYSICAL THERAPY NOTE
Physical Therapy Defer Note    Orders received via functional mobility screen and chart reviewed. Physical Therapy services are deferred secondary to patient to OR for anticipated surgery today. Patient to be placed on therapy Hold list and will require updated PT orders. Please re-order Physical Therapy services with the updated activity and weight bearing status post-operatively when medically appropriate.     Gauri Alcaraz, PT, DPT  [facility]  Rehab Services - Physical Therapy  [id number]

## 2024-12-05 NOTE — PLAN OF CARE
"Progress Notes by Luis Sharp MD at 3/6/2018  9:15 AM     Author: Luis Sharp MD Service: -- Author Type: Physician    Filed: 3/6/2018  9:41 AM Encounter Date: 3/6/2018 Status: Signed    : Luis Sharp MD (Physician)       Chief Complaint   Patient presents with   ? Rash     both cheeks and L ear. Woke up this morning 3/6. Raised rash on L leg. Given Benadryl       HPI: 4-year-old boy brought in today by his mom with rash on his face, legs and arms for less than 24 hours duration.  Mom noted the rash mostly today and gave him Benadryl.  Interestingly, his 2 siblings have also had streptococcal pharyngitis and influenza.    ROS:The mom reports the child is eating well, no cough, no rhinorrhea no vomiting or diarrhea.    SH: The Patient's  reports that he has never smoked. He has never used smokeless tobacco.     FH: The Patient's family history is not on file.    Meds:  Dylan has a current medication list which includes the following prescription(s): bacillus coagulans, diphenhydrAMINE 50 mg in aluminum-magnesium hydroxide-simethicone 400-400-40 mg/5 mL 20 mL (PEDIATRIC MAGIC MOUTH WASH), and pediatric multivitamin-iron.    O:  Pulse 79  Temp 98  F (36.7  C) (Oral)   Resp 18  Ht 3' 11\" (1.194 m)  Wt 48 lb 3 oz (21.9 kg)  SpO2 99%  BMI 15.34 kg/m2   Constitutional:    --Vitals as above  --No acute distress  Eyes-  --Sclera noninjected  --Lids and conjunctiva normal  ENT-  --TMs clear  --Sclera noninjected  --Pharynx not erythematous  Neck-  --Neck supple    Lymph-  --No cervical lymphadenopathy  Lungs-  --Clear to Auscultation  Heart-  --Regular rate and rhythm  Skin-  --red raised rash on cheeks, left greater than right with petechial rash on the hands and feet/legs (see photo)              A/P:   1. Hand, foot and mouth disease  -Tylenol and ibuprofen as needed  -Benadryl as needed  -Stay home from  today  -May return if no fever " VSS, patient repositioned for comfort. Continue IVF, IV abx, heparin drip. NGT to LIS, patient strict NPO for surgical intervention today. Mouth swabbed PRN. Patient bathed + CHG.     Problem: Patient Centered Care  Goal: Patient preferences are identified and integrated in the patient's plan of care  Description: Interventions:  - What would you like us to know as we care for you? From home with son  - Provide timely, complete, and accurate information to patient/family  - Incorporate patient and family knowledge, values, beliefs, and cultural backgrounds into the planning and delivery of care  - Encourage patient/family to participate in care and decision-making at the level they choose  - Honor patient and family perspectives and choices  Outcome: Progressing     Problem: Patient/Family Goals  Goal: Patient/Family Long Term Goal  Description: Patient's Long Term Goal: go home    Interventions:  - go home  - See additional Care Plan goals for specific interventions  Outcome: Progressing  Goal: Patient/Family Short Term Goal  Description: Patient's Short Term Goal: clear SBO    Interventions:   - NGT LIS  -NPO  - See additional Care Plan goals for specific interventions  Outcome: Progressing     Problem: CARDIOVASCULAR - ADULT  Goal: Maintains optimal cardiac output and hemodynamic stability  Description: INTERVENTIONS:  - Monitor vital signs, rhythm, and trends  - Monitor for bleeding, hypotension and signs of decreased cardiac output  - Evaluate effectiveness of vasoactive medications to optimize hemodynamic stability  - Monitor arterial and/or venous puncture sites for bleeding and/or hematoma  - Assess quality of pulses, skin color and temperature  - Assess for signs of decreased coronary artery perfusion - ex. Angina  - Evaluate fluid balance, assess for edema, trend weights  Outcome: Progressing  Goal: Absence of cardiac arrhythmias or at baseline  Description: INTERVENTIONS:  - Continuous cardiac monitoring,  monitor vital signs, obtain 12 lead EKG if indicated  - Evaluate effectiveness of antiarrhythmic and heart rate control medications as ordered  - Initiate emergency measures for life threatening arrhythmias  - Monitor electrolytes and administer replacement therapy as ordered  Outcome: Progressing     Problem: RESPIRATORY - ADULT  Goal: Achieves optimal ventilation and oxygenation  Description: INTERVENTIONS:  - Assess for changes in respiratory status  - Assess for changes in mentation and behavior  - Position to facilitate oxygenation and minimize respiratory effort  - Oxygen supplementation based on oxygen saturation or ABGs  - Provide Smoking Cessation handout, if applicable  - Encourage broncho-pulmonary hygiene including cough, deep breathe, Incentive Spirometry  - Assess the need for suctioning and perform as needed  - Assess and instruct to report SOB or any respiratory difficulty  - Respiratory Therapy support as indicated  - Manage/alleviate anxiety  - Monitor for signs/symptoms of CO2 retention  Outcome: Progressing     Problem: GASTROINTESTINAL - ADULT  Goal: Minimal or absence of nausea and vomiting  Description: INTERVENTIONS:  - Maintain adequate hydration with IV or PO as ordered and tolerated  - Nasogastric tube to low intermittent suction as ordered  - Evaluate effectiveness of ordered antiemetic medications  - Provide nonpharmacologic comfort measures as appropriate  - Advance diet as tolerated, if ordered  - Obtain nutritional consult as needed  - Evaluate fluid balance  Outcome: Progressing  Goal: Maintains or returns to baseline bowel function  Description: INTERVENTIONS:  - Assess bowel function  - Maintain adequate hydration with IV or PO as ordered and tolerated  - Evaluate effectiveness of GI medications  - Encourage mobilization and activity  - Obtain nutritional consult as needed  - Establish a toileting routine/schedule  - Consider collaborating with pharmacy to review patient's  tomorrow.                                                medication profile  Outcome: Progressing     Problem: SAFETY ADULT - FALL  Goal: Free from fall injury  Description: INTERVENTIONS:  - Assess pt frequently for physical needs  - Identify cognitive and physical deficits and behaviors that affect risk of falls.  - [place] fall precautions as indicated by assessment.  - Educate pt/family on patient safety including physical limitations  - Instruct pt to call for assistance with activity based on assessment  - Modify environment to reduce risk of injury  - Provide assistive devices as appropriate  - Consider OT/PT consult to assist with strengthening/mobility  - Encourage toileting schedule  Outcome: Progressing     Problem: DISCHARGE PLANNING  Goal: Discharge to home or other facility with appropriate resources  Description: INTERVENTIONS:  - Identify barriers to discharge w/pt and caregiver  - Include patient/family/discharge partner in discharge planning  - Arrange for needed discharge resources and transportation as appropriate  - Identify discharge learning needs (meds, wound care, etc)  - Arrange for interpreters to assist at discharge as needed  - Consider post-discharge preferences of patient/family/discharge partner  - Complete POLST form as appropriate  - Assess patient's ability to be responsible for managing their own health  - Refer to Case Management Department for coordinating discharge planning if the patient needs post-hospital services based on physician/LIP order or complex needs related to functional status, cognitive ability or social support system  Outcome: Progressing

## 2024-12-05 NOTE — TELEPHONE ENCOUNTER
Patients son Haja calling to speak to  for surgery that is being postponed. Patients son asking if it can be done tomorrow, earliest time possible. Please call at [phone],thanks.

## 2024-12-05 NOTE — CM/SW NOTE
Per RN rounds, plan for lap bowel resection this afternoon. PT/OT evals on hold pending procedure and will need to be re-consulted as appropriate post procedure.    SW to f/up post procedure as well pending clinical status and needs prior to DC.    PLAN: Home w/ poss HH - pending clinical needs, list/choice, & med clear      SW/CM to remain available for support and/or discharge planning.         MS LateshaW, LSW [id number]

## 2024-12-05 NOTE — PROGRESS NOTES
[facility]  part of [facility]  Hospitalist Progress Note     Isabel Patel Patient Status:  Inpatient    1931  89 year old CSN [id number]   Location 301/301-A Attending Miah Santos MD   Hosp Day # 3 PCP Fredrick Cornelius MD     Assessment & Plan:   ----------------------------------  Small bowel obstruction.  Likely due to adhesive disease from previous abdominal surgery.  Pain controlled.  -Surgical consult appreciated  -NG tube: In place  -Small bowel follow-through pending  -NPO  -IV fluids  -Pain control  -Encourage activity as tolerated  -per surg, pt is on iv abx  -: NG tube to LIS, n.p.o. IV fluids: plan for Or today. POLST form signed for DNR- will convert post porcedure    Atrial fibrillation, paroxysmal.  With RVR.  Hemodynamically stable. Anticoagulation with Eliquis on hold.  -Cardiology consult appreciated  -Monitor on telemetry  -Monitor electrolytes  -Anticoagulation as above  -Rate/rhythm control with IV metoprolol  -Continue diltiazem gtt   -Continue heparin gtt, hold DOAC while NPO  -will likely need ischemic eval with newly reduced EF    Other problems  CKD stage III  Type 2 diabetes  Permanent pacemaker for sick sinus syndrome  Hypertension  Dyslipidemia  Arthritis  Aortic stenosis, mild  Moderate pulmonary hypertension    DVT Mechanical Prophylaxis:   SCDs,    DVT Pharmacologic Prophylaxis   Medication    [Held by provider] apixaban (Eliquis) tab 5 mg    [Held by provider] heparin (Porcine) 22776 units/250mL infusion PE/DVT/THROMBUS CONTINUOUS              code status: Full  dispo: home upon medical clearance  If applicable, malnutrition status at bottom of note    I personally reviewed the available laboratories, imaging including. I discussed/will discuss the case with consultants. I ordered laboratories and/or radiographic studies. I adjusted medications as detailed above.  Medical decision making high, risk is high.    Subjective:    ----------------------------------    Patient denies any abdominal pain or nausea-  NG tube remains in place high output.  Bilious in nature    No other acute complaints or other nursing concerns or overnight events        Objective:   Chief Complaint:   Chief Complaint   Patient presents with    Nausea/Vomiting/Diarrhea     ----------------------------------  Temp:  [97.1 °F (36.2 °C)-98.3 °F (36.8 °C)] 98.3 °F (36.8 °C)  Pulse:  [105-136] 118  Resp:  [16-18] 18  BP: (109-129)/(81-98) 120/89  SpO2:  [89 %-94 %] 94 %  Gen: A+Ox2.  No distress.   HEENT: NCAT, neck supple, no carotid bruit.  NG tube in place  CV: irreg, S1S2, and intact distal pulses. No gallop, rub, murmur.  Pulm: Effort and breath sounds normal. No distress, wheezes, rales, rhonchi.  Abd: Soft nondistended, mildly tender diffusely no rebound or guarding-no active bowel sounds auscultated  Neuro: Normal reflexes, CN. Sensory/motor exams grossly normal deficit.   MS: No joint effusions.  No peripheral edema.  Skin: Skin is warm and dry. No rashes, erythema, diaphoresis.   Psych: Normal mood and affect. Calm, cooperative    Labs:  Lab Results   Component Value Date    HGB 15.6 12/03/2024    WBC 11.8 (H) 12/03/2024    .0 12/05/2024     (L) 12/04/2024    K 4.4 12/04/2024    K 4.4 12/04/2024    CREATSERUM 1.63 (H) 12/04/2024    INR 1.41 (H) 12/02/2024    AST 15 12/02/2024    ALT 10 12/02/2024    TROP 0.00 12/03/2018            miconazole   Topical BID    piperacillin-tazobactam  3.375 g Intravenous Q8H    [Held by provider] apixaban  5 mg Oral BID    [Held by provider] atorvastatin  20 mg Oral Daily    [Held by provider] carvedilol  12.5 mg Oral BID with meals    [Held by provider] losartan  50 mg Oral Daily    metoprolol  5 mg Intravenous Q4H    [Held by provider] furosemide  20 mg Intravenous BID (Diuretic)    insulin regular human  1-5 Units Subcutaneous 4 times per day       acetaminophen    ondansetron    morphINE **OR** morphINE  **OR** morphINE    dilTIAZem    glucose **OR** glucose **OR** glucose-vitamin C **OR** dextrose **OR** glucose **OR** glucose **OR** glucose-vitamin C  **Certification      PHYSICIAN Certification of Need for Inpatient Hospitalization - Initial Certification    Patient will require inpatient services that will reasonably be expected to span two midnight's based on the clinical documentation in H+P.   Based on patients current state of illness, I anticipate that, after discharge, patient will require TBD.

## 2024-12-05 NOTE — PROGRESS NOTES
Progress Note  Isabel Patel Patient Status:  Inpatient    1931 MRN [id number]   Location [facility] [place] 3W/SW Attending Claudia Fletcher MD   Hosp Day # 3 PCP Fredrick Cornelius MD     Subjective:  Plan for possible OR today for SBO  Denies chest pain, shortness of breath, admits to feeling \"full\" in her abdomen    Objective:  BP (!) 119/98 (BP Location: Right arm)   Pulse (!) 121   Temp 97.1 °F (36.2 °C) (Oral)   Resp 18   Ht 5' 1\" (1.549 m)   Wt 144 lb 11.2 oz (65.6 kg)   SpO2 92%   BMI 27.34 kg/m²     Telemetry: AF/AFL RVR rates 100-110s    Intake/Output:    Intake/Output Summary (Last 24 hours) at 2024 0703  Last data filed at 2024 0657  Gross per 24 hour   Intake 2201.45 ml   Output 1450 ml   Net 751.45 ml     Last 3 Weights   24 0358 144 lb 11.2 oz (65.6 kg)   24 0520 141 lb (64 kg)   24 1146 147 lb (66.7 kg)   24 0558 160 lb (72.6 kg)   24 1107 163 lb (73.9 kg)   24 0521 163 lb (73.9 kg)   24 0340 163 lb 9.6 oz (74.2 kg)   02/15/24 0632 169 lb 4.8 oz (76.8 kg)   24 0615 167 lb (75.8 kg)   24 0449 165 lb 12.8 oz (75.2 kg)   24 1615 164 lb 14.4 oz (74.8 kg)   24 1229 163 lb (73.9 kg)     Labs:  Recent Labs   Lab 24  0650 24  0457   * 189* 225*   BUN 22 22 22   CREATSERUM 1.60* 1.71* 1.63*   EGFRCR 30* 28* 29*   CA 9.9 9.9 9.6    137 135*   K 3.5 3.4* 4.4  4.4   CL 97* 97* 99   CO2 32.0 35.0* 29.0     Recent Labs   Lab 24  0654 24  0650 24  0457   RBC 5.15 4.96 5.30  --    HGB 14.7 14.8 15.6  --    HCT 45.5 44.8 47.9  --    MCV 88.3 90.3 90.4  --    MCH 28.5 29.8 29.4  --    MCHC 32.3 33.0 32.6  --    RDW 15.9* 15.7* 15.4*  --    NEPRELIM 6.89 5.97  --   --    WBC 8.4 8.0 11.8*  --    .0 274.0 318.0 280.0     Recent Labs   Lab 24  0654   TROPHS 16     Lab Results   Component Value Date/Time    HDL 52 10/15/2024 09:19 AM    LDL 98  10/15/2024 09:19 AM    TRIG 83 10/15/2024 09:19 AM     Lab Results   Component Value Date    DDIMER 2.97 (H) 02/16/2024     Lab Results   Component Value Date    TSH 1.508 04/23/2024     Review of Systems:   Constitutional: No fevers, chills, fatigue or night sweats.  Respiratory: Denies cough, wheezing or shortness of breath.  CV: Denies chest pain, palpitations, orthopnea, PND or dizziness.  GI: No nausea, vomiting or diarrhea. No blood in stools.    Physical Exam:  General: Alert, cooperative, no distress, appears stated age.  Neck: no JVD.  Lungs: Clear to auscultation bilaterally.  Chest wall: No tenderness or deformity.  Heart: Irregularly irregular rate and rhythm, S1, S2 normal, no murmur, click, rub or gallop.  Abdomen: Soft, non-tender. Bowel sounds normal. No masses,  No organomegaly.  Extremities: Extremities normal, atraumatic, no cyanosis or edema.  Pulses: 2+ and symmetric all extremities.  Neurologic: Grossly intact.    Diagnostics:  XR ABDOMEN (1 VIEW) (CPT=74018)    Result Date: 12/4/2024  CONCLUSION:   Persistent contrast filled dilatation of the small bowel with little transit of contrast compared to the prior exam of 12/03/2024.  Findings are again compatible with small bowel obstruction.    Dictated by (CST): Zurdo Vee MD on 12/04/2024 at 9:36 AM     Finalized by (CST): Zurdo Vee MD on 12/04/2024 at 9:40 AM           Medications:   miconazole   Topical BID    piperacillin-tazobactam  3.375 g Intravenous Q8H    [Held by provider] apixaban  5 mg Oral BID    [Held by provider] atorvastatin  20 mg Oral Daily    [Held by provider] carvedilol  12.5 mg Oral BID with meals    [Held by provider] losartan  50 mg Oral Daily    metoprolol  5 mg Intravenous Q4H    [Held by provider] furosemide  20 mg Intravenous BID (Diuretic)    insulin regular human  1-5 Units Subcutaneous 4 times per day      continuous dose heparin 800 Units/hr (12/05/24 0024)    dilTIAZem Stopped (12/03/24 1358)    dextrose  5%-sodium chloride 0.45% 83 mL/hr at 12/05/24 0022     Assessment:    89 year old female with PMH of permanent atrial fibrillation, HTN, DM, HLD, CKD3, HFpEF, mild aortic stenosis, pulmonary HTN, tricuspid regurgitation, TIA, SSS s/p dual chamber PPM who presented with nausea and abdominal pain and was found to have a small bowel obstruction. Cardiology was consulted due to AF with RVR.     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  -rates generally controlled with digoxin PRN, BB, diltiazem, now elevated in the setting of SBO  -OAC with eliquis, now on hold for possible surgery  -LVEF 25-30%, moderate diffuse hypokinesis, biatrial dilation, mild Aortic regurgitation, severe TR, possible pulmonic vegetation vs artifact?, PASP 73mmHg  -Rates fairly controlled on diltiazem gtt and IV lopressor, NPO due to SBO, PO meds on hold  -heparin gtt while NPO    New Severe LV dysfunction  Newly reduced LV function 25-30%, 60-65% back in February, 2024  -Diffuse hypokinesis noted on echo with severe TR, mild aortic regurgitation  -possible vegetation noted on pulmonic valve, KRISTA?  -proBNP elevated 5,448 on admission, diuresed with IV lasix, now holding  -GDMT on hold due to NPO with SBO  -weight up 3# in 2 days, down 3# from peak weight    Preoperative cardiac risk assessment  -She is at high risk for cardiac decompensation with general anesthesia due to her LV dysfunction which may be due to occult CAD, and severe pulmonary hypertension.    -However, surgery is not excluded if no other chances for survival.  -Attention to fluid balance, minimizing myocardial demand with HR/BP control, and perioperative inotropic/pressor support would all be necessary. Perioperative acute MI or acute HF decompensation is possible, with long-term morbidity or fatality.    High grade Small Bowel Obstruction  Admitted with nausea/vomiting, found to have  high grade SBO on Abdominal XR  -NGT to LIS with minimal relief  -plan for possible OR today with general surgery  -Abx with zosyn    HTN  BP mostly controlled on lopressor, dilitazem    HLD-statin, on hold while NPO, LDL 98 in October    Severe Tricuspid regurgitation  Mild Aortic regurgitation  Pulmonary HTN,  PASP 73mmHg  -possible pulmonic vegetation on echo, may consider KRISTA for further evaluation    SSS  S/p dual chamber PPM ([employer])    DM2-A1c 6.8    CKD3  Cr baseline 1.4  -currently at 1.6      Plan:  -Continue diltiazem gtt to titrate HR <110, lopressor IV, minimize diltiazem gtt use if possible due to low EF  -Continue heparin gtt, hold DOAC while NPO  -will likely need ischemic eval with newly reduced EF  -will discuss possible  KRISTA with Dr. Oleary for questionable pulmonic vegetation  -Continue to hold diuresis with cr 1.6  -further recs per general surgery with possible surgical repair      Plan of care discussed with patient, RN.        Liz Trevizo, APRN  12/5/2024  7:03 AM  [phone] [place]  [phone] [place]

## 2024-12-05 NOTE — SPIRITUAL CARE NOTE
Spiritual Care Visit Note    Patient Name: Isabel Patel Date of Spiritual Care Visit: 24   : 1931 Primary Dx: Acute on chronic congestive heart failure, unspecified heart failure type (HCC)       Referred By:      Spiritual Care Taxonomy:    Intended Effects: Lessen anxiety    Methods: Explore spiritual/Gnosticist beliefs;Offer spiritual/Gnosticist support    Interventions: Acknowledge current situation;Active listening;[place];Explain  role;Provide hospitality;Discuss spirituality/Zoroastrian with someone    Visit Type/Summary:     - Spiritual Care: Responded to a request via the on call phone Offered empathic listening and emotional support. Provided information regarding how to contact Spiritual Care and left a Spiritual Care information card. Listen to the patient who trusts in God and prayed before going to [place].  remains available as needed for follow up.    Spiritual Care support can be requested via an [facility] consult. For urgent/immediate needs, please contact the On Call  at: [place]: ext [phone]    Chaplain Resident, Alexia Lee PhD

## 2024-12-05 NOTE — PLAN OF CARE
Pt alert and oriented x4, forgetful. Max assist. 4L NC. IV Metoprolol. IVF at 83. Cardizem gtt at 5. IV Zosyn. NG to low inter suction. NPO. Bilateral arm precautions. RRT called for AMS and slurred speech, per family afterwards occurs after waking up at times. Pt and family updated on plan of care. Plan for laparoscopy. Safety precautions in place. Call light within reach.    Problem: Patient Centered Care  Goal: Patient preferences are identified and integrated in the patient's plan of care  Description: Interventions:  - What would you like us to know as we care for you? From home with son  - Provide timely, complete, and accurate information to patient/family  - Incorporate patient and family knowledge, values, beliefs, and cultural backgrounds into the planning and delivery of care  - Encourage patient/family to participate in care and decision-making at the level they choose  - Honor patient and family perspectives and choices  Outcome: Progressing     Problem: Patient/Family Goals  Goal: Patient/Family Long Term Goal  Description: Patient's Long Term Goal: go home    Interventions:  - go home  - See additional Care Plan goals for specific interventions  Outcome: Progressing  Goal: Patient/Family Short Term Goal  Description: Patient's Short Term Goal: clear SBO    Interventions:   - NGT LIS  -NPO  - See additional Care Plan goals for specific interventions  Outcome: Progressing     Problem: CARDIOVASCULAR - ADULT  Goal: Maintains optimal cardiac output and hemodynamic stability  Description: INTERVENTIONS:  - Monitor vital signs, rhythm, and trends  - Monitor for bleeding, hypotension and signs of decreased cardiac output  - Evaluate effectiveness of vasoactive medications to optimize hemodynamic stability  - Monitor arterial and/or venous puncture sites for bleeding and/or hematoma  - Assess quality of pulses, skin color and temperature  - Assess for signs of decreased coronary artery perfusion - ex.  Angina  - Evaluate fluid balance, assess for edema, trend weights  Outcome: Progressing  Goal: Absence of cardiac arrhythmias or at baseline  Description: INTERVENTIONS:  - Continuous cardiac monitoring, monitor vital signs, obtain 12 lead EKG if indicated  - Evaluate effectiveness of antiarrhythmic and heart rate control medications as ordered  - Initiate emergency measures for life threatening arrhythmias  - Monitor electrolytes and administer replacement therapy as ordered  Outcome: Progressing     Problem: RESPIRATORY - ADULT  Goal: Achieves optimal ventilation and oxygenation  Description: INTERVENTIONS:  - Assess for changes in respiratory status  - Assess for changes in mentation and behavior  - Position to facilitate oxygenation and minimize respiratory effort  - Oxygen supplementation based on oxygen saturation or ABGs  - Provide Smoking Cessation handout, if applicable  - Encourage broncho-pulmonary hygiene including cough, deep breathe, Incentive Spirometry  - Assess the need for suctioning and perform as needed  - Assess and instruct to report SOB or any respiratory difficulty  - Respiratory Therapy support as indicated  - Manage/alleviate anxiety  - Monitor for signs/symptoms of CO2 retention  Outcome: Progressing     Problem: GASTROINTESTINAL - ADULT  Goal: Minimal or absence of nausea and vomiting  Description: INTERVENTIONS:  - Maintain adequate hydration with IV or PO as ordered and tolerated  - Nasogastric tube to low intermittent suction as ordered  - Evaluate effectiveness of ordered antiemetic medications  - Provide nonpharmacologic comfort measures as appropriate  - Advance diet as tolerated, if ordered  - Obtain nutritional consult as needed  - Evaluate fluid balance  Outcome: Progressing  Goal: Maintains or returns to baseline bowel function  Description: INTERVENTIONS:  - Assess bowel function  - Maintain adequate hydration with IV or PO as ordered and tolerated  - Evaluate effectiveness of  GI medications  - Encourage mobilization and activity  - Obtain nutritional consult as needed  - Establish a toileting routine/schedule  - Consider collaborating with pharmacy to review patient's medication profile  Outcome: Progressing     Problem: SAFETY ADULT - FALL  Goal: Free from fall injury  Description: INTERVENTIONS:  - Assess pt frequently for physical needs  - Identify cognitive and physical deficits and behaviors that affect risk of falls.  - Dixie fall precautions as indicated by assessment.  - Educate pt/family on patient safety including physical limitations  - Instruct pt to call for assistance with activity based on assessment  - Modify environment to reduce risk of injury  - Provide assistive devices as appropriate  - Consider OT/PT consult to assist with strengthening/mobility  - Encourage toileting schedule  Outcome: Progressing     Problem: DISCHARGE PLANNING  Goal: Discharge to home or other facility with appropriate resources  Description: INTERVENTIONS:  - Identify barriers to discharge w/pt and caregiver  - Include patient/family/discharge partner in discharge planning  - Arrange for needed discharge resources and transportation as appropriate  - Identify discharge learning needs (meds, wound care, etc)  - Arrange for interpreters to assist at discharge as needed  - Consider post-discharge preferences of patient/family/discharge partner  - Complete POLST form as appropriate  - Assess patient's ability to be responsible for managing their own health  - Refer to Case Management Department for coordinating discharge planning if the patient needs post-hospital services based on physician/LIP order or complex needs related to functional status, cognitive ability or social support system  Outcome: Progressing

## 2024-12-05 NOTE — PROCEDURES
Inpatient Video EEG report    REFERRING PHYSICIAN: Claudia Fletcher MD      DURATION: 21:47  TECHNIQUE: 21 channels of EEG, 2 channels of EOG, and 1 channel of EKG were recorded utilizing the 10-20 International System of Electrode Placement. The recording was performed in a digitized monopolar referential format and playback was reformatted into various referential and bipolar montages utilizing appropriate filter settings. Automatic seizure and spike detection programs were utilized throughout the recording. Video was recorded during the study  CLINICAL DATA:  Patient is sent for the evaluation of possible seizures.    MEDICATION:  Continuous Medications:   [Held by provider] continuous dose heparin Stopped (12/05/24 0842)    dilTIAZem 5 mg/hr (12/05/24 1058)    dextrose 5%-sodium chloride 0.45% 83 mL/hr at 12/05/24 1324     Scheduled Medications:  No current outpatient medications on file.  PRN Medications:    acetaminophen    ondansetron    morphINE **OR** morphINE **OR** morphINE    dilTIAZem    glucose **OR** glucose **OR** glucose-vitamin C **OR** dextrose **OR** glucose **OR** glucose **OR** glucose-vitamin C    BACKGROUND:  The posterior dominant rhythm consisted of well-organized 9-10 Hz rhythmic waveforms, symmetrically distributed over both posterior quadrants and was reactive to eye opening.    Sleep: Stage II sleep was captured and appeared bilaterally synchronous and symmetrical.      EEG ABNORMALITY:  Slowing: None  Epileptiform activity: None  Seizures: None  Abnormal movements: None    IMPRESSION:  This is a normal wake and sleep EEG.   No epileptiform activity, focal slowing, nor seizures were seen throughout the study    Carmen Belle M.D.  Neurology

## 2024-12-05 NOTE — PROGRESS NOTES
[facility]  Progress Note    Isabel Patel Patient Status:  Inpatient    1931 MRN [id number]   Location [facility] 3W/SW Attending Claudia Fletcher MD   Hosp Day # 3 PCP Fredrick Cornelius MD     Subjective:  Pt seen laying in bed. No bm or flatus.    Objective/Physical Exam:  General: Alert, orientated x3.  Cooperative.  No apparent distress.  Vital Signs:  Blood pressure 120/89, pulse 118, temperature 98.3 °F (36.8 °C), temperature source Oral, resp. rate 18, height 5' 1\" (1.549 m), weight 144 lb 11.2 oz (65.6 kg), SpO2 94%, not currently breastfeeding.  Wt Readings from Last 3 Encounters:   24 144 lb 11.2 oz (65.6 kg)   24 163 lb (73.9 kg)   24 163 lb (73.9 kg)     Lungs: No respiratory distress.  Cardiac: Regular rate and rhythm.   Abdomen:  Soft, mild distended, non tender, with no rebound or guarding.  No peritoneal signs.   Extremities:  No lower extremity edema noted.      Intake/Output:    Intake/Output Summary (Last 24 hours) at 2024 1032  Last data filed at 2024 0900  Gross per 24 hour   Intake 2180.7 ml   Output 1450 ml   Net 730.7 ml     I/O last 3 completed shifts:  In: 3342.8 [P.O.:70; I.V.:2772.8; IV PIGGYBACK:500]  Out: 1850 [Urine:500; Emesis/NG output:1350]  No intake/output data recorded.    Medications:    miconazole   Topical BID    piperacillin-tazobactam  3.375 g Intravenous Q8H    [Held by provider] apixaban  5 mg Oral BID    [Held by provider] atorvastatin  20 mg Oral Daily    [Held by provider] carvedilol  12.5 mg Oral BID with meals    [Held by provider] losartan  50 mg Oral Daily    metoprolol  5 mg Intravenous Q4H    [Held by provider] furosemide  20 mg Intravenous BID (Diuretic)    insulin regular human  1-5 Units Subcutaneous 4 times per day       Labs:  Lab Results   Component Value Date    .0 2024        Lab Results   Component Value Date    INR 1.41 (H) 2024    INR 1.0 2018         Assessment  Patient  Active Problem List   Diagnosis    Right knee DJD    Osteoarthritis    Controlled type 2 diabetes mellitus with diabetic nephropathy, without long-term current use of insulin (HCC)    HTN (hypertension)    Hypercholesterolemia    Obesity    Left rotator cuff tear arthropathy    Pessary maintenance    Osteopenia of multiple sites    Pain of right lower extremity    Pelvic relaxation    Uterovaginal prolapse    AGUSTO (stress urinary incontinence, female)    TIA (transient ischemic attack)    Spinal stenosis of lumbar region    Macular degeneration    Mass of skin of left shoulder    Closed fracture of left distal femur (HCC)    Closed bicondylar fracture of distal end of right femur (HCC)    Exudative age-related macular degeneration, right eye, with inactive choroidal neovascularization (HCC)    Anemia    Bilateral lower extremity edema    Asymptomatic menopause    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 3 chronic kidney disease, without long-term current use of insulin (HCC)    Keratosis    Chronic shoulder pain    History of right breast cancer    Urinary incontinence    Atrial fibrillation, new onset (HCC)    Status post biventricular pacemaker    Acute on chronic congestive heart failure, unspecified heart failure type (HCC)    Complete intestinal obstruction, unspecified cause (HCC)    Atrial fibrillation with rapid ventricular response (HCC)    Anticoagulated    Small bowel obstruction (HCC)     Small bowel obstruction      Plan:  Plan for exploratory laparotomy, possible bowel resection  High risk for surgery from cardiac standpoint, acceptable risk profile to patient's POA, her son  Hold heparin gtt   NPO, continue NGT    Quality:  DVT Mechanical Prophylaxis:   SCDs,    DVT Pharmacologic Prophylaxis   Medication    [Held by provider] apixaban (Eliquis) tab 5 mg    [Held by provider] heparin (Porcine) 31304 units/250mL infusion PE/DVT/THROMBUS CONTINUOUS                Code Status: Full Code  Mills:  External urinary catheter in place  Mills Duration (in days):   Central line:    WILLIE: 12/8/2024        Joseluis Watson PA-C  12/5/2024  10:32 AM      The patient was independently examined. Agree with the PA's assessment and plan.       Anna Ashraf MD  [facility] - General Surgery   [address] [place], [place] [address], [place]  p [phone]

## 2024-12-05 NOTE — CONSULTS
[facility]  [address] [place] [address], [address]  [facility] [id number]  [phone]            Isabel Patel Patient Status:  Inpatient    1931 MRN [id number]   Location [facility] 3W/SW Attending Claudia Fletcher MD   Hosp Day # 3 PCP Fredrick Cornelius MD     Date of Admission:  2024  Date of Consult:  2024  Reason for Consultation:   Acute change in mental status    History of Present Illness:   Patient is a 89 year old female with h/o paroxysmal Afib, DM, CKD, pacemaker, HTN, HL, who was admitted to the hospital for small bowel obstruction.  This afternoon, she was woken up from sleep to go to OR for SBO, but was lethargic, confused. Stroke code activated and sent to CT where she became agitated, moving too much in CT scanner.  At baseline conversant, cooperative.  Son at bedside does state that she has had issues with confusion coming out of sleep before.  When she is in a very deep sleep and wakes up she will be very out of it and confused and take a long time to come back to normal.  Was on heparin gtt for Afib which was stopped this morning. At baseline walks with a walker, needs help with getting dressed and some activities due to mobility issues, but no forgetful behavior.  Lives with son.    Past Medical History  Past Medical History:    Acute, but ill-defined, cerebrovascular disease    Arthritis    Breast CA (HCC)    Cancer (HCC)    Diabetes (HCC)    diet controlled    Diabetes mellitus, type II (HCC)    Essential hypertension    Hearing impairment    High blood pressure    High cholesterol    Hyperlipidemia    Osteoarthritis    Squamous cell carcinoma, keratinizing    R posterior thigh       Past Surgical History  Past Surgical History:   Procedure Laterality Date    Appendectomy      Appendectomy      Cataract  -    Cataract extraction w/  intraocular lens implant Bilateral     Ian Garcia MD    Chemotherapy  2016    rt breast.     Cholecystectomy      D & c      Knee replacement surgery Right 2012    Lumpectomy right  2016    cancer    Mastectomy partial Right 2016      [id number]    Skin surgery Right 2018    Tonsillectomy      Wrist fracture surgery Right        Family History  Family History   Problem Relation Age of Onset    Heart Disease Father         CAD    Diabetes Father     Heart Disease Mother         CAD    Diabetes Mother     Breast Cancer Mother 83        had lumpectomy and RT.  No other treatment.   of stroke at 89    Other (appendicitis[other]) Brother         age 13    Other (alive and well[other]) Sister     Other (alive and well[other]) Son         x3    Breast Cancer Self 85    Glaucoma Neg     Macular degeneration Neg        Social History  Pediatric History   Patient Parents    Not on file     Other Topics Concern     Service No    Blood Transfusions Not Asked    Caffeine Concern Yes     Comment: coffee, 1 cups daily    Occupational Exposure No    Hobby Hazards Not Asked    Sleep Concern Not Asked    Stress Concern Not Asked    Weight Concern Not Asked    Special Diet Not Asked    Back Care Not Asked    Exercise Not Asked    Bike Helmet Not Asked    Seat Belt Not Asked    Self-Exams Not Asked   Social History Narrative    Not on file           Current Medications:  Current Facility-Administered Medications   Medication Dose Route Frequency    miconazole 2 % powder   Topical BID    piperacillin-tazobactam (Zosyn) 3.375 g in dextrose 5% 100 mL IVPB-ADDV  3.375 g Intravenous Q8H    [Held by provider] apixaban (Eliquis) tab 5 mg  5 mg Oral BID    [Held by provider] atorvastatin (Lipitor) tab 20 mg  20 mg Oral Daily    [Held by provider] carvedilol (Coreg) tab 12.5 mg  12.5 mg Oral BID with meals    [Held by provider] losartan (Cozaar) tab 50 mg  50 mg Oral Daily    metoprolol (Lopressor) 5 mg/5mL injection 5 mg  5 mg Intravenous Q4H    [Held by provider] heparin (Porcine) 70146  units/250mL infusion PE/DVT/THROMBUS CONTINUOUS  200-3,000 Units/hr Intravenous Continuous    acetaminophen (Tylenol Extra Strength) tab 500 mg  500 mg Oral Q4H PRN    ondansetron (Zofran) 4 MG/2ML injection 4 mg  4 mg Intravenous Q6H PRN    morphINE PF 2 MG/ML injection 1 mg  1 mg Intravenous Q2H PRN    Or    morphINE PF 2 MG/ML injection 2 mg  2 mg Intravenous Q2H PRN    Or    morphINE PF 4 MG/ML injection 4 mg  4 mg Intravenous Q2H PRN    dilTIAZem 10 mg BOLUS FROM BAG infusion  10 mg Intravenous Q1H PRN    dilTIAZem (cardIZEM) 100 mg in sodium chloride 0.9% 100 mL IVPB-ADDV  2.5-20 mg/hr Intravenous Continuous    furosemide (Lasix) 10 mg/mL injection 20 mg  20 mg Intravenous BID (Diuretic)    glucose (Dex4) 15 GM/59ML oral liquid 15 g  15 g Oral Q15 Min PRN    Or    glucose (Glutose) 40% oral gel 15 g  15 g Oral Q15 Min PRN    Or    glucose-vitamin C (Dex-4) chewable tab 4 tablet  4 tablet Oral Q15 Min PRN    Or    dextrose 50% injection 50 mL  50 mL Intravenous Q15 Min PRN    Or    glucose (Dex4) 15 GM/59ML oral liquid 30 g  30 g Oral Q15 Min PRN    Or    glucose (Glutose) 40% oral gel 30 g  30 g Oral Q15 Min PRN    Or    glucose-vitamin C (Dex-4) chewable tab 8 tablet  8 tablet Oral Q15 Min PRN    insulin regular human (Novolin R, Humulin R) 100 UNIT/ML injection 1-5 Units  1-5 Units Subcutaneous 4 times per day    dextrose 5%-sodium chloride 0.45% infusion   Intravenous Continuous     Medications Prior to Admission   Medication Sig    Ergocalciferol (VITAMIN D OR) Take 1-4 drops by mouth daily. Pt takes Vitamin D oral drops    Shilpa, Zingiber officinalis, (SHILPA OR) Take 1 capsule by mouth daily. Pt takes Shilpa with [place] once daily for pain    losartan 50 MG Oral Tab Take 1 tablet (50 mg total) by mouth daily.    dilTIAZem HCl ER Coated Beads 180 MG Oral Capsule SR 24 Hr Take 2 capsules (360 mg total) by mouth daily.    carvedilol 12.5 MG Oral Tab Take 1 tablet (12.5 mg total) by mouth 2 (two) times  daily with meals.    digoxin 0.125 MG Oral Tab Take 1 tablet (125 mcg total) by mouth daily. (Patient taking differently: Take 1 tablet (125 mcg total) by mouth daily as needed (Heart rate consistently greater than 120).)    APIXABAN 5 MG Oral Tab Take 1 tablet (5 mg total) by mouth 2 (two) times daily.    atorvastatin 20 MG Oral Tab Take 1 tablet (20 mg total) by mouth daily.    lidocaine 5 % External Patch Place 1 patch onto the skin Q24H PRN.    TURMERIC CURCUMIN OR Take 1 capsule by mouth daily.    acetaminophen 500 MG Oral Tab Take 1 tablet (500 mg total) by mouth daily as needed for Pain.    Coenzyme Q10 (CO Q 10) 10 MG Oral Cap Take 1 capsule by mouth daily.    Glucose Blood (ACCU-CHEK GUIDE) In Vitro Strip 1 strip by In Vitro route daily.    Accu-Chek Softclix Lancets Does not apply Misc 1 Lancet by Finger stick route daily. For blood glucose monitoring.    furosemide 20 MG Oral Tab Take 1 tablet (20 mg total) by mouth daily. (Patient not taking: Reported on 12/2/2024)    Blood Glucose Calibration (ACCU-CHEK GUIDE CONTROL) In Vitro Liquid 1 each by In Vitro route every 30 (thirty) days.    Blood Glucose Monitoring Suppl (ACCU-CHEK GUIDE ME) w/Device Does not apply Kit 1 each daily. Check once a day.       Allergies  Allergies[1]    Review of Systems:   As in HPI, the rest of the 14 system review was done and was negative    Physical Exam:     Vitals:    12/05/24 0640 12/05/24 0942 12/05/24 1442 12/05/24 1543   BP: (!) 119/98 120/89 129/89 133/82   Pulse: (!) 121 118 94 102   Resp: 18 18 18    Temp: 97.1 °F (36.2 °C) 98.3 °F (36.8 °C) 97.7 °F (36.5 °C)    TempSrc: Oral Oral Oral    SpO2: 92% 94% 92%    Weight:       Height:           General: No apparent distress, well nourished, well groomed.    Neurological:     Mental Status:  Awake, alert.  Following all commands. Speech fluent.  Stated month Nov, age 89    Cranial Nerves:  II.- Visual fields full to confrontation, PERLL,  III, IV, VI- EOM intact, V.  Facial sensation intact, and VII. Face symmetric, no facial weakness    Motor Exam:  Strength- upper extremities 5/5 proximally and distally                  - lower  extremities antigravity, no drift.    Sensory Exam:  Light touch- Intact in all 4 limbs    Finger to nose intact  No abnormal movements    NIHSS 0    Results:     Laboratory Data:  Lab Results   Component Value Date    WBC 11.8 (H) 12/03/2024    HGB 15.6 12/03/2024    HCT 47.9 12/03/2024    .0 12/05/2024    CREATSERUM 1.63 (H) 12/04/2024    BUN 22 12/04/2024     (L) 12/04/2024    K 4.4 12/04/2024    K 4.4 12/04/2024    CL 99 12/04/2024    CO2 29.0 12/04/2024     (H) 12/04/2024    CA 9.6 12/04/2024    ALB 4.4 12/02/2024    ALKPHO 93 12/02/2024    TP 7.9 12/02/2024    AST 15 12/02/2024    ALT 10 12/02/2024    PTT 85.9 (H) 12/05/2024    INR 1.41 (H) 12/02/2024    PTP 18.1 (H) 12/02/2024    TSH 1.508 04/23/2024    LIP 32 12/02/2024    DDIMER 2.97 (H) 02/16/2024    MG 2.3 12/03/2024    PHOS 3.2 09/22/2023    TROP 0.00 12/03/2018    B12 599 03/16/2021         Imaging:    CT STROKE BRAIN (NO IV)(CPT=70450)    Result Date: 12/5/2024  CONCLUSION:   Evaluation limited due to motion artifact.  Within the limits of this evaluation, no acute intracranial abnormality.  Moderate involutional and chronic microvascular ischemic changes.   These findings were communicated with KEYON Fulton by Dr. Zurdo Vee at 1543 hours by telephone on 12/05/2024.   Dictated by (CST): Zurdo Vee MD on 12/05/2024 at 3:35 PM     Finalized by (CST): Zurdo Vee MD on 12/05/2024 at 3:43 PM          US VENOUS DOPPLER LEG BILAT - DIAG IMG (CPT=93970)    Result Date: 12/5/2024  CONCLUSION: No sonographic evidence of bilateral lower extremity DVT    Dictated by (CST): Germain Zurita MD on 12/05/2024 at 1:00 PM     Finalized by (CST): Germain Zurita MD on 12/05/2024 at 1:01 PM          XR ABDOMEN (1 VIEW) (CPT=74018)    Result Date: 12/4/2024  CONCLUSION:   Persistent  contrast filled dilatation of the small bowel with little transit of contrast compared to the prior exam of 12/03/2024.  Findings are again compatible with small bowel obstruction.    Dictated by (CST): Zurdo Vee MD on 12/04/2024 at 9:36 AM     Finalized by (CST): Zurdo Vee MD on 12/04/2024 at 9:40 AM          XR SMALL BOWEL SINGLE CONTRAST (CPT=74250)    Result Date: 12/3/2024  CONCLUSION:  1. HIGH-GRADE SMALL-BOWEL OBSTRUCTION with distention of the distal jejunum proximal and mid ileum.  Nonvisualization of the distal ileum and right colon after 7 hours. .     Dictated by (CST): Kings Underwood MD on 12/03/2024 at 6:07 PM     Finalized by (CST): Kings Underwood MD on 12/03/2024 at 6:12 PM                 Impression:     Assessment   Isabel Patel is a 89 year old female with h/o paroxysmal Afib, DM, CKD, pacemaker, HTN, HL, who was admitted to the hospital for small bowel obstruction.  Had episode of confusion today when woken up from sleep.  At first was minimally responsive, but then shortly after became agitated, aggressive.  Now she is back to baseline.  Has had episodes like this before when going from sleep to wake.    1. Episode of decreased responsiveness  - Low suspicion for stroke  - CT head limited due to motion artifact but appears non-focal  - Obtain EEG given abrupt onset/improvement with episode  - Overall, more likely that episode was related to sleep/wake transition  - Resume heparin gtt if not going for surgery today     Thank you for allowing me to participate in the care of your patient.    Carmen Belle MD  12/5/2024         [1]   Allergies  Allergen Reactions    Adhesive Tape RASH

## 2024-12-06 ENCOUNTER — ANESTHESIA (OUTPATIENT)
Dept: SURGERY | Facility: HOSPITAL | Age: 89
End: 2024-12-06
Payer: MEDICARE

## 2024-12-06 ENCOUNTER — APPOINTMENT (OUTPATIENT)
Dept: PICC SERVICES | Facility: HOSPITAL | Age: 89
End: 2024-12-06
Attending: STUDENT IN AN ORGANIZED HEALTH CARE EDUCATION/TRAINING PROGRAM
Payer: MEDICARE

## 2024-12-06 ENCOUNTER — APPOINTMENT (OUTPATIENT)
Dept: GENERAL RADIOLOGY | Facility: HOSPITAL | Age: 89
End: 2024-12-06
Attending: STUDENT IN AN ORGANIZED HEALTH CARE EDUCATION/TRAINING PROGRAM
Payer: MEDICARE

## 2024-12-06 ENCOUNTER — ANESTHESIA EVENT (OUTPATIENT)
Dept: SURGERY | Facility: HOSPITAL | Age: 89
End: 2024-12-06
Payer: MEDICARE

## 2024-12-06 LAB
ANION GAP SERPL CALC-SCNC: 4 MMOL/L (ref 0–18)
ANION GAP SERPL CALC-SCNC: 5 MMOL/L (ref 0–18)
ANTIBODY SCREEN: NEGATIVE
APTT PPP: 169.8 SECONDS (ref 23–36)
BASOPHILS # BLD AUTO: 0.02 X10(3) UL (ref 0–0.2)
BASOPHILS NFR BLD AUTO: 0.3 %
BUN BLD-MCNC: 20 MG/DL (ref 9–23)
BUN BLD-MCNC: 21 MG/DL (ref 9–23)
BUN/CREAT SERPL: 12.9 (ref 10–20)
CALCIUM BLD-MCNC: 8.9 MG/DL (ref 8.7–10.4)
CALCIUM BLD-MCNC: 8.9 MG/DL (ref 8.7–10.4)
CHLORIDE SERPL-SCNC: 100 MMOL/L (ref 98–112)
CHLORIDE SERPL-SCNC: 97 MMOL/L (ref 98–112)
CO2 SERPL-SCNC: 32 MMOL/L (ref 21–32)
CO2 SERPL-SCNC: 33 MMOL/L (ref 21–32)
CREAT BLD-MCNC: 1.63 MG/DL
CREAT BLD-MCNC: 1.8 MG/DL
DEPRECATED RDW RBC AUTO: 50.6 FL (ref 35.1–46.3)
DEPRECATED RDW RBC AUTO: 51.1 FL (ref 35.1–46.3)
EGFRCR SERPLBLD CKD-EPI 2021: 26 ML/MIN/1.73M2 (ref 60–?)
EGFRCR SERPLBLD CKD-EPI 2021: 29 ML/MIN/1.73M2 (ref 60–?)
EOSINOPHIL # BLD AUTO: 0.05 X10(3) UL (ref 0–0.7)
EOSINOPHIL NFR BLD AUTO: 0.6 %
ERYTHROCYTE [DISTWIDTH] IN BLOOD BY AUTOMATED COUNT: 15.5 % (ref 11–15)
ERYTHROCYTE [DISTWIDTH] IN BLOOD BY AUTOMATED COUNT: 15.5 % (ref 11–15)
GLUCOSE BLD-MCNC: 172 MG/DL (ref 70–99)
GLUCOSE BLD-MCNC: 176 MG/DL (ref 70–99)
GLUCOSE BLDC GLUCOMTR-MCNC: 130 MG/DL (ref 70–99)
GLUCOSE BLDC GLUCOMTR-MCNC: 140 MG/DL (ref 70–99)
GLUCOSE BLDC GLUCOMTR-MCNC: 141 MG/DL (ref 70–99)
GLUCOSE BLDC GLUCOMTR-MCNC: 172 MG/DL (ref 70–99)
HCT VFR BLD AUTO: 44.6 %
HCT VFR BLD AUTO: 44.8 %
HGB BLD-MCNC: 14.4 G/DL
HGB BLD-MCNC: 14.5 G/DL
IMM GRANULOCYTES # BLD AUTO: 0.03 X10(3) UL (ref 0–1)
IMM GRANULOCYTES NFR BLD: 0.4 %
LYMPHOCYTES # BLD AUTO: 1.11 X10(3) UL (ref 1–4)
LYMPHOCYTES NFR BLD AUTO: 14.1 %
MAGNESIUM SERPL-MCNC: 1.7 MG/DL (ref 1.6–2.6)
MCH RBC QN AUTO: 28.7 PG (ref 26–34)
MCH RBC QN AUTO: 28.8 PG (ref 26–34)
MCHC RBC AUTO-ENTMCNC: 32.3 G/DL (ref 31–37)
MCHC RBC AUTO-ENTMCNC: 32.4 G/DL (ref 31–37)
MCV RBC AUTO: 88.8 FL
MCV RBC AUTO: 88.9 FL
MONOCYTES # BLD AUTO: 0.64 X10(3) UL (ref 0.1–1)
MONOCYTES NFR BLD AUTO: 8.1 %
NEUTROPHILS # BLD AUTO: 6.05 X10 (3) UL (ref 1.5–7.7)
NEUTROPHILS # BLD AUTO: 6.05 X10(3) UL (ref 1.5–7.7)
NEUTROPHILS NFR BLD AUTO: 76.5 %
OSMOLALITY SERPL CALC.SUM OF ELEC: 291 MOSM/KG (ref 275–295)
PHOSPHATE SERPL-MCNC: 2.6 MG/DL (ref 2.4–5.1)
PLATELET # BLD AUTO: 267 10(3)UL (ref 150–450)
PLATELET # BLD AUTO: 271 10(3)UL (ref 150–450)
PLATELET # BLD AUTO: 288 10(3)UL (ref 150–450)
POTASSIUM SERPL-SCNC: 3.1 MMOL/L (ref 3.5–5.1)
POTASSIUM SERPL-SCNC: 4.5 MMOL/L (ref 3.5–5.1)
RBC # BLD AUTO: 5.02 X10(6)UL
RBC # BLD AUTO: 5.04 X10(6)UL
RH BLOOD TYPE: POSITIVE
SODIUM SERPL-SCNC: 134 MMOL/L (ref 136–145)
SODIUM SERPL-SCNC: 137 MMOL/L (ref 136–145)
TRIGL SERPL-MCNC: 88 MG/DL (ref 30–149)
WBC # BLD AUTO: 7.9 X10(3) UL (ref 4–11)
WBC # BLD AUTO: 8.1 X10(3) UL (ref 4–11)

## 2024-12-06 PROCEDURE — 0DJD0ZZ INSPECTION OF LOWER INTESTINAL TRACT, OPEN APPROACH: ICD-10-PCS | Performed by: STUDENT IN AN ORGANIZED HEALTH CARE EDUCATION/TRAINING PROGRAM

## 2024-12-06 PROCEDURE — 02HV33Z INSERTION OF INFUSION DEVICE INTO SUPERIOR VENA CAVA, PERCUTANEOUS APPROACH: ICD-10-PCS | Performed by: HOSPITALIST

## 2024-12-06 PROCEDURE — B548ZZA ULTRASONOGRAPHY OF SUPERIOR VENA CAVA, GUIDANCE: ICD-10-PCS | Performed by: HOSPITALIST

## 2024-12-06 PROCEDURE — 3E0436Z INTRODUCTION OF NUTRITIONAL SUBSTANCE INTO CENTRAL VEIN, PERCUTANEOUS APPROACH: ICD-10-PCS | Performed by: HOSPITALIST

## 2024-12-06 PROCEDURE — 49000 EXPLORATION OF ABDOMEN: CPT | Performed by: STUDENT IN AN ORGANIZED HEALTH CARE EDUCATION/TRAINING PROGRAM

## 2024-12-06 PROCEDURE — 71045 X-RAY EXAM CHEST 1 VIEW: CPT | Performed by: STUDENT IN AN ORGANIZED HEALTH CARE EDUCATION/TRAINING PROGRAM

## 2024-12-06 RX ORDER — HEPARIN SODIUM AND DEXTROSE 10000; 5 [USP'U]/100ML; G/100ML
INJECTION INTRAVENOUS CONTINUOUS
Status: DISCONTINUED | OUTPATIENT
Start: 2024-12-06 | End: 2024-12-09

## 2024-12-06 RX ORDER — ROCURONIUM BROMIDE 10 MG/ML
INJECTION, SOLUTION INTRAVENOUS AS NEEDED
Status: DISCONTINUED | OUTPATIENT
Start: 2024-12-06 | End: 2024-12-06 | Stop reason: SURG

## 2024-12-06 RX ORDER — METOPROLOL TARTRATE 1 MG/ML
7.5 INJECTION, SOLUTION INTRAVENOUS EVERY 4 HOURS
Status: DISCONTINUED | OUTPATIENT
Start: 2024-12-06 | End: 2024-12-06

## 2024-12-06 RX ORDER — MORPHINE SULFATE 4 MG/ML
4 INJECTION, SOLUTION INTRAMUSCULAR; INTRAVENOUS EVERY 10 MIN PRN
Status: DISCONTINUED | OUTPATIENT
Start: 2024-12-06 | End: 2024-12-07

## 2024-12-06 RX ORDER — LIDOCAINE HYDROCHLORIDE 10 MG/ML
5 INJECTION, SOLUTION EPIDURAL; INFILTRATION; INTRACAUDAL; PERINEURAL
Status: COMPLETED | OUTPATIENT
Start: 2024-12-06 | End: 2024-12-06

## 2024-12-06 RX ORDER — NALOXONE HYDROCHLORIDE 0.4 MG/ML
0.08 INJECTION, SOLUTION INTRAMUSCULAR; INTRAVENOUS; SUBCUTANEOUS AS NEEDED
Status: ACTIVE | OUTPATIENT
Start: 2024-12-06 | End: 2024-12-07

## 2024-12-06 RX ORDER — HYDROMORPHONE HYDROCHLORIDE 1 MG/ML
0.6 INJECTION, SOLUTION INTRAMUSCULAR; INTRAVENOUS; SUBCUTANEOUS EVERY 5 MIN PRN
Status: DISCONTINUED | OUTPATIENT
Start: 2024-12-06 | End: 2024-12-07

## 2024-12-06 RX ORDER — EPHEDRINE SULFATE 50 MG/ML
INJECTION, SOLUTION INTRAVENOUS AS NEEDED
Status: DISCONTINUED | OUTPATIENT
Start: 2024-12-06 | End: 2024-12-06 | Stop reason: SURG

## 2024-12-06 RX ORDER — MORPHINE SULFATE 10 MG/ML
6 INJECTION, SOLUTION INTRAMUSCULAR; INTRAVENOUS EVERY 10 MIN PRN
Status: DISCONTINUED | OUTPATIENT
Start: 2024-12-06 | End: 2024-12-07

## 2024-12-06 RX ORDER — HYDROMORPHONE HYDROCHLORIDE 1 MG/ML
0.2 INJECTION, SOLUTION INTRAMUSCULAR; INTRAVENOUS; SUBCUTANEOUS EVERY 5 MIN PRN
Status: DISCONTINUED | OUTPATIENT
Start: 2024-12-06 | End: 2024-12-07

## 2024-12-06 RX ORDER — PHENYLEPHRINE HCL 10 MG/ML
VIAL (ML) INJECTION AS NEEDED
Status: DISCONTINUED | OUTPATIENT
Start: 2024-12-06 | End: 2024-12-06 | Stop reason: SURG

## 2024-12-06 RX ORDER — MAGNESIUM SULFATE HEPTAHYDRATE 40 MG/ML
2 INJECTION, SOLUTION INTRAVENOUS ONCE
Status: COMPLETED | OUTPATIENT
Start: 2024-12-06 | End: 2024-12-06

## 2024-12-06 RX ORDER — SODIUM CHLORIDE, SODIUM LACTATE, POTASSIUM CHLORIDE, CALCIUM CHLORIDE 600; 310; 30; 20 MG/100ML; MG/100ML; MG/100ML; MG/100ML
INJECTION, SOLUTION INTRAVENOUS CONTINUOUS PRN
Status: DISCONTINUED | OUTPATIENT
Start: 2024-12-06 | End: 2024-12-06 | Stop reason: SURG

## 2024-12-06 RX ORDER — HYDROMORPHONE HYDROCHLORIDE 1 MG/ML
0.4 INJECTION, SOLUTION INTRAMUSCULAR; INTRAVENOUS; SUBCUTANEOUS EVERY 5 MIN PRN
Status: DISCONTINUED | OUTPATIENT
Start: 2024-12-06 | End: 2024-12-07

## 2024-12-06 RX ORDER — SODIUM CHLORIDE, SODIUM LACTATE, POTASSIUM CHLORIDE, CALCIUM CHLORIDE 600; 310; 30; 20 MG/100ML; MG/100ML; MG/100ML; MG/100ML
INJECTION, SOLUTION INTRAVENOUS CONTINUOUS
Status: DISCONTINUED | OUTPATIENT
Start: 2024-12-06 | End: 2024-12-07

## 2024-12-06 RX ORDER — METOPROLOL TARTRATE 1 MG/ML
7.5 INJECTION, SOLUTION INTRAVENOUS EVERY 4 HOURS
Status: DISCONTINUED | OUTPATIENT
Start: 2024-12-06 | End: 2024-12-07

## 2024-12-06 RX ORDER — BUPIVACAINE HYDROCHLORIDE 2.5 MG/ML
INJECTION, SOLUTION EPIDURAL; INFILTRATION; INTRACAUDAL AS NEEDED
Status: DISCONTINUED | OUTPATIENT
Start: 2024-12-06 | End: 2024-12-06 | Stop reason: HOSPADM

## 2024-12-06 RX ORDER — MORPHINE SULFATE 2 MG/ML
2 INJECTION, SOLUTION INTRAMUSCULAR; INTRAVENOUS EVERY 10 MIN PRN
Status: DISCONTINUED | OUTPATIENT
Start: 2024-12-06 | End: 2024-12-07

## 2024-12-06 RX ORDER — ACETAMINOPHEN 10 MG/ML
1000 INJECTION, SOLUTION INTRAVENOUS EVERY 6 HOURS PRN
Status: DISCONTINUED | OUTPATIENT
Start: 2024-12-06 | End: 2024-12-14

## 2024-12-06 RX ADMIN — EPHEDRINE SULFATE 10 MG: 50 INJECTION, SOLUTION INTRAVENOUS at 16:39:00

## 2024-12-06 RX ADMIN — PHENYLEPHRINE HCL 40 MCG: 10 MG/ML VIAL (ML) INJECTION at 16:40:00

## 2024-12-06 RX ADMIN — PHENYLEPHRINE HCL 40 MCG: 10 MG/ML VIAL (ML) INJECTION at 16:48:00

## 2024-12-06 RX ADMIN — SODIUM CHLORIDE, SODIUM LACTATE, POTASSIUM CHLORIDE, CALCIUM CHLORIDE: 600; 310; 30; 20 INJECTION, SOLUTION INTRAVENOUS at 16:24:00

## 2024-12-06 RX ADMIN — ROCURONIUM BROMIDE 20 MG: 10 INJECTION, SOLUTION INTRAVENOUS at 16:43:00

## 2024-12-06 RX ADMIN — EPHEDRINE SULFATE 10 MG: 50 INJECTION, SOLUTION INTRAVENOUS at 16:45:00

## 2024-12-06 NOTE — ANESTHESIA PROCEDURE NOTES
Airway  Date/Time: 12/6/2024 4:31 PM  Urgency: elective    Airway not difficult    General Information and Staff    Patient location during procedure: OR  Anesthesiologist: Lambert Chandler MD  Performed: anesthesiologist   Performed by: Lambert Chandler MD  Authorized by: Lambert Chandler MD      Indications and Patient Condition  Indications for airway management: anesthesia  Spontaneous Ventilation: absent  Sedation level: deep  Preoxygenated: yes  Patient position: sniffing  Mask difficulty assessment: 1 - vent by mask  Planned trial extubation    Final Airway Details  Final airway type: endotracheal airway      Successful airway: ETT  Cuffed: yes   Successful intubation technique: direct laryngoscopy  Facilitating devices/methods: intubating stylet and cricoid pressure  Endotracheal tube insertion site: oral  Blade: Peri  Blade size: #3  ETT size (mm): 7.0    Cormack-Lehane Classification: grade I - full view of glottis  Placement verified by: capnometry   Cuff volume (mL): 6  Measured from: teeth  ETT to teeth (cm): 21  Number of attempts at approach: 1  Number of other approaches attempted: 0

## 2024-12-06 NOTE — PROGRESS NOTES
Progress Note  Isabel Patel Patient Status:  Inpatient    1931 MRN [id number]   Location [facility] 3W/SW Attending Claudia Fletcher MD   Hosp Day # 4 PCP Fredrick Cornelius MD     Subjective:  In bed awaiting surgery. Surgery postponed with concern for acute CVA with AMS.   Denies any CV complaints.     Objective:  /57 (BP Location: Right arm)   Pulse 84   Temp 98.2 °F (36.8 °C) (Axillary)   Resp 18   Ht 5' 1\" (1.549 m)   Wt 146 lb 9.6 oz (66.5 kg)   SpO2 94%   BMI 27.70 kg/m²     Telemetry: afib with heart rates down to 90's       Intake/Output:    Intake/Output Summary (Last 24 hours) at 2024 0628  Last data filed at 2024 0423  Gross per 24 hour   Intake 3034.74 ml   Output 800 ml   Net 2234.74 ml       Last 3 Weights   24 0423 146 lb 9.6 oz (66.5 kg)   24 0358 144 lb 11.2 oz (65.6 kg)   24 0520 141 lb (64 kg)   24 1146 147 lb (66.7 kg)   24 0558 160 lb (72.6 kg)   24 1107 163 lb (73.9 kg)   24 0521 163 lb (73.9 kg)   24 0340 163 lb 9.6 oz (74.2 kg)   02/15/24 0632 169 lb 4.8 oz (76.8 kg)   24 0615 167 lb (75.8 kg)   24 0449 165 lb 12.8 oz (75.2 kg)   24 1615 164 lb 14.4 oz (74.8 kg)   24 1229 163 lb (73.9 kg)       Labs:  Recent Labs   Lab 24  0457 24  1715   * 225* 135*   BUN 22 22 22   CREATSERUM 1.71* 1.63* 1.61*   EGFRCR 28* 29* 30*   CA 9.9 9.6 9.0    135* 137   K 3.4* 4.4  4.4 3.6   CL 97* 99 98   CO2 35.0* 29.0 32.0     Recent Labs   Lab 24  0654 24  0650 247 24  0658 24  1601 24  0432   RBC 5.15 4.96 5.30  --   --  4.81  --    HGB 14.7 14.8 15.6  --   --  13.7  --    HCT 45.5 44.8 47.9  --   --  43.4  --    MCV 88.3 90.3 90.4  --   --  90.2  --    MCH 28.5 29.8 29.4  --   --  28.5  --    MCHC 32.3 33.0 32.6  --   --  31.6  --    RDW 15.9* 15.7* 15.4*  --   --  15.6*  --    NEPRELIM 6.89 5.97  --   --    --   --   --    WBC 8.4 8.0 11.8*  --   --  8.7  --    .0 274.0 318.0   < > 280.0 278.0 288.0    < > = values in this interval not displayed.         Recent Labs   Lab 12/02/24  0654 12/05/24  1601   TROPHS 16 12       Review of Systems:   Constitutional:No fevers, chills, fatigue or night sweats.  Respiratory: Denies cough, wheezing or shortness of breath .  CV: Denies chest pain, palpitations, orthopnea, PND or dizziness .  Neurologic: No seizures, tremors, weakness or numbness.     Physical Exam:    Gen: alert, NAD  Heent: pupils equal, reactive. Mucous membranes moist.   Neck: no jvd  Cardiac: regular rate and rhythm, normal S1,S2   Lungs: diminished   Abd: soft, NT/ND +bs  Ext: no edema  Skin: Warm, dry  Neuro: No focal deficits        Medications:     miconazole   Topical BID    piperacillin-tazobactam  3.375 g Intravenous Q8H    [Held by provider] apixaban  5 mg Oral BID    [Held by provider] atorvastatin  20 mg Oral Daily    [Held by provider] carvedilol  12.5 mg Oral BID with meals    [Held by provider] losartan  50 mg Oral Daily    metoprolol  5 mg Intravenous Q4H    furosemide  20 mg Intravenous BID (Diuretic)    insulin regular human  1-5 Units Subcutaneous 4 times per day      continuous dose heparin Stopped (12/06/24 0603)    dilTIAZem 5 mg/hr (12/06/24 0412)    dextrose 5%-sodium chloride 0.45% 83 mL/hr at 12/05/24 1324           Assessment:  89 year old female with PMH of permanent atrial fibrillation, HTN, DM, HLD, CKD3, HFpEF, mild aortic stenosis, pulmonary HTN, tricuspid regurgitation, TIA, SSS s/p dual chamber PPM who presented with nausea and abdominal pain and was found to have a small bowel obstruction. Cardiology was consulted due to AF with RVR.     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   proBNP elevated 5,448 on admission, diuresed with IV lasix, now holding with Cr trending up   GDMT: BB and ARB; currently on hold due to NPO with SBO   Strict I&O and daily weights   # Severe Tricuspid regurgitation  # Mild Aortic regurgitation  # Pulmonary HTN, PASP 73 mmHg  possible pulmonic vegetation on echo, may consider KRISTA for further evaluation    # High grade Small Bowel Obstruction   NGT to LIS with minimal relief   plan for OR today with general surgery   Abx with zosyn     # HTN - controlled   # DM2 - A1c 6.8   # CKD 3 (Cr baseline 1.4) > Cr trending up 1.61 > 1.8       Plan:  Plan for surgery today. She is at high risk for cardiac decompensation with general anesthesia due to her LV dysfunction which may be due to occult CAD, and severe pulmonary hypertension.   GDMT on hold given NPO > resume when able post surgery   Eliquis held in anticipation for surgery > resume (at reduced dose for age and Cr) when ok per surgery   IV heparin for stroke risk reduction > resume when ok per surgery   Hold diuresis with Cr trending up; monitor renal function closely   Consult nephrology with Cr trending up and electrolytes imbalance   Can consider KRISTA for questionable pulmonic vegetation once recovered from surgery       Plan of care discussed with patient, RN.      Arcelia Castle, APRN  12/6/2024  6:28 AM  [phone]

## 2024-12-06 NOTE — PROGRESS NOTES
Progress Note  Isabel Patel Patient Status:  Inpatient    1931 MRN [id number]   Location [facility] 3W/SW Attending Miah Santos MD   Hosp Day # 4 PCP Fredrick Cornelius MD     ASSESSMENT / PLAN:    Permanent AF  -Increase Lopressor to 7.5 mg IV q 4 hrs  -Adjust dose as necessary  -Anticoagulate with IV heparin when okay with Surgery Svc, and then resume Eliquis after acute abdominal issues and/or surgical bleeding risks resolve.    SSS  -S/p dual-chamber pacemaker    Acute HFrEF  -LVEF 25-30%, mild AR, severe TR, severe pulm HTN  -GDMT for HF being held due to SBO  -Continue to hold Lasix with increasing creatinine    Preoperative cardiac risk assessment  -She is at high risk for cardiac decompensation with general anesthesia due to her LV dysfunction which may be due to occult CAD, and severe pulmonary hypertension.    -However, surgery is not excluded if no other chances for survival.  -Attention to fluid balance, minimizing myocardial demand with HR/BP control, and perioperative inotropic/pressor support would all be necessary. Perioperative acute MI or acute HF decompensation is possible, with long-term morbidity or fatality.  -Note plans for surgery today.  We will follow.    Discussed with pt's son today.    Shamika Manjarrez M.D.   Cardiac Electrophysiology     2024   L3  -----------------------------------------    Subjective:  Remains NPO with NGT to LIS.  Needs surgery for SBO.    Objective:  /81 (BP Location: Right arm)   Pulse 110   Temp 97.5 °F (36.4 °C) (Axillary)   Resp 18   Ht 5' 1\" (1.549 m)   Wt 146 lb 9.6 oz (66.5 kg)   SpO2 97%   BMI 27.70 kg/m²     Intake/Output Summary (Last 24 hours) at 2024 1403  Last data filed at 2024 1305  Gross per 24 hour   Intake 1733.29 ml   Output 200 ml   Net 1533.29 ml     GEN - + distress  HEENT - atraumatic  Neck - no masses  Lungs - nonlabored resps, normal symmetric excursion  CV - no precordial heave, no edema  Abd -  distended, not rigid  Ext - no cyanosis  Skin - no rash  Neuro - lethargic, no focal deficits    LABS:   Recent Results (from the past 72 hours)   POCT Glucose    Collection Time: 12/03/24  5:12 PM   Result Value Ref Range    POC Glucose  182 (H) 70 - 99 mg/dL   Basic Metabolic Panel (8)    Collection Time: 12/03/24  8:09 PM   Result Value Ref Range    Glucose 189 (H) 70 - 99 mg/dL    Sodium 137 136 - 145 mmol/L    Potassium 3.4 (L) 3.5 - 5.1 mmol/L    Chloride 97 (L) 98 - 112 mmol/L    CO2 35.0 (H) 21.0 - 32.0 mmol/L    Anion Gap 5 0 - 18 mmol/L    BUN 22 9 - 23 mg/dL    Creatinine 1.71 (H) 0.55 - 1.02 mg/dL    BUN/CREA Ratio 12.9 10.0 - 20.0    Calcium, Total 9.9 8.7 - 10.4 mg/dL    Calculated Osmolality 292 275 - 295 mOsm/kg    eGFR-Cr 28 (L) >=60 mL/min/1.73m2   CBC, Platelet; No Differential    Collection Time: 12/03/24  8:09 PM   Result Value Ref Range    WBC 11.8 (H) 4.0 - 11.0 x10(3) uL    RBC 5.30 3.80 - 5.30 x10(6)uL    HGB 15.6 12.0 - 16.0 g/dL    HCT 47.9 35.0 - 48.0 %    MCV 90.4 80.0 - 100.0 fL    MCH 29.4 26.0 - 34.0 pg    MCHC 32.6 31.0 - 37.0 g/dL    RDW 15.4 (H) 11.0 - 15.0 %    RDW-SD 51.0 (H) 35.1 - 46.3 fL    .0 150.0 - 450.0 10(3)uL   PTT, Activated    Collection Time: 12/03/24  8:09 PM   Result Value Ref Range    PTT 28.5 23.0 - 36.0 seconds   POCT Glucose    Collection Time: 12/03/24 11:55 PM   Result Value Ref Range    POC Glucose  205 (H) 70 - 99 mg/dL   Basic Metabolic Panel (8)    Collection Time: 12/04/24  4:57 AM   Result Value Ref Range    Glucose 225 (H) 70 - 99 mg/dL    Sodium 135 (L) 136 - 145 mmol/L    Potassium 4.4 3.5 - 5.1 mmol/L    Chloride 99 98 - 112 mmol/L    CO2 29.0 21.0 - 32.0 mmol/L    Anion Gap 7 0 - 18 mmol/L    BUN 22 9 - 23 mg/dL    Creatinine 1.63 (H) 0.55 - 1.02 mg/dL    BUN/CREA Ratio 13.5 10.0 - 20.0    Calcium, Total 9.6 8.7 - 10.4 mg/dL    Calculated Osmolality 290 275 - 295 mOsm/kg    eGFR-Cr 29 (L) >=60 mL/min/1.73m2   Platelet Count    Collection  Time: 12/04/24  4:57 AM   Result Value Ref Range    .0 150.0 - 450.0 10(3)uL   Potassium    Collection Time: 12/04/24  4:57 AM   Result Value Ref Range    Potassium 4.4 3.5 - 5.1 mmol/L   PTT, Activated    Collection Time: 12/04/24  4:57 AM   Result Value Ref Range    .8 (H) 23.0 - 36.0 seconds   POCT Glucose    Collection Time: 12/04/24  5:34 AM   Result Value Ref Range    POC Glucose  242 (H) 70 - 99 mg/dL   PTT, Activated    Collection Time: 12/04/24 11:42 AM   Result Value Ref Range    .2 (H) 23.0 - 36.0 seconds   RAINBOW DRAW LAVENDER    Collection Time: 12/04/24 11:42 AM   Result Value Ref Range    Hold Lavender Auto Resulted    RAINBOW DRAW LIGHT GREEN    Collection Time: 12/04/24 11:42 AM   Result Value Ref Range    Hold Lt Green Auto Resulted    POCT Glucose    Collection Time: 12/04/24 11:43 AM   Result Value Ref Range    POC Glucose  185 (H) 70 - 99 mg/dL   POCT Glucose    Collection Time: 12/04/24  5:36 PM   Result Value Ref Range    POC Glucose  210 (H) 70 - 99 mg/dL   PTT, Activated    Collection Time: 12/04/24 10:55 PM   Result Value Ref Range    PTT 66.2 (H) 23.0 - 36.0 seconds   POCT Glucose    Collection Time: 12/05/24 12:07 AM   Result Value Ref Range    POC Glucose  184 (H) 70 - 99 mg/dL   POCT Glucose    Collection Time: 12/05/24  6:38 AM   Result Value Ref Range    POC Glucose  179 (H) 70 - 99 mg/dL   Platelet Count    Collection Time: 12/05/24  6:58 AM   Result Value Ref Range    .0 150.0 - 450.0 10(3)uL   PTT, Activated    Collection Time: 12/05/24  6:58 AM   Result Value Ref Range    PTT 85.9 (H) 23.0 - 36.0 seconds   POCT Glucose    Collection Time: 12/05/24 11:54 AM   Result Value Ref Range    POC Glucose  188 (H) 70 - 99 mg/dL   POCT Glucose    Collection Time: 12/05/24  2:51 PM   Result Value Ref Range    POC Glucose  177 (H) 70 - 99 mg/dL   CBC, Platelet; No Differential    Collection Time: 12/05/24  4:01 PM   Result Value Ref Range    WBC 8.7 4.0 - 11.0  x10(3) uL    RBC 4.81 3.80 - 5.30 x10(6)uL    HGB 13.7 12.0 - 16.0 g/dL    HCT 43.4 35.0 - 48.0 %    MCV 90.2 80.0 - 100.0 fL    MCH 28.5 26.0 - 34.0 pg    MCHC 31.6 31.0 - 37.0 g/dL    RDW 15.6 (H) 11.0 - 15.0 %    RDW-SD 51.4 (H) 35.1 - 46.3 fL    .0 150.0 - 450.0 10(3)uL   Troponin I (High Sensitivity)    Collection Time: 12/05/24  4:01 PM   Result Value Ref Range    Troponin I (High Sensitivity) 12 <=34 ng/L   Lactic Acid, Plasma    Collection Time: 12/05/24  4:01 PM   Result Value Ref Range    Lactic Acid 1.6 0.5 - 2.0 mmol/L   Renal Function Panel    Collection Time: 12/05/24  5:15 PM   Result Value Ref Range    Glucose 135 (H) 70 - 99 mg/dL    Sodium 137 136 - 145 mmol/L    Potassium 3.6 3.5 - 5.1 mmol/L    Chloride 98 98 - 112 mmol/L    CO2 32.0 21.0 - 32.0 mmol/L    Anion Gap 7 0 - 18 mmol/L    BUN 22 9 - 23 mg/dL    Creatinine 1.61 (H) 0.55 - 1.02 mg/dL    BUN/CREA Ratio 13.7 10.0 - 20.0    Calcium, Total 9.0 8.7 - 10.4 mg/dL    Calculated Osmolality 289 275 - 295 mOsm/kg    eGFR-Cr 30 (L) >=60 mL/min/1.73m2    Albumin 3.4 3.2 - 4.8 g/dL    Phosphorus 3.2 2.4 - 5.1 mg/dL   Magnesium    Collection Time: 12/05/24  5:15 PM   Result Value Ref Range    Magnesium 2.1 1.6 - 2.6 mg/dL   POCT Glucose    Collection Time: 12/05/24  6:05 PM   Result Value Ref Range    POC Glucose  161 (H) 70 - 99 mg/dL   POCT Glucose    Collection Time: 12/06/24 12:45 AM   Result Value Ref Range    POC Glucose  130 (H) 70 - 99 mg/dL   Platelet Count    Collection Time: 12/06/24  4:32 AM   Result Value Ref Range    .0 150.0 - 450.0 10(3)uL   POCT Glucose    Collection Time: 12/06/24  6:03 AM   Result Value Ref Range    POC Glucose  140 (H) 70 - 99 mg/dL   PTT, Activated    Collection Time: 12/06/24  6:15 AM   Result Value Ref Range    .8 (H) 23.0 - 36.0 seconds   Basic Metabolic Panel (8)    Collection Time: 12/06/24  8:55 AM   Result Value Ref Range    Glucose 172 (H) 70 - 99 mg/dL    Sodium 134 (L) 136 - 145  mmol/L    Potassium      Chloride 97 (L) 98 - 112 mmol/L    CO2 33.0 (H) 21.0 - 32.0 mmol/L    Anion Gap 4 0 - 18 mmol/L    BUN      Creatinine 1.80 (H) 0.55 - 1.02 mg/dL    BUN/CREA Ratio      Calcium, Total 8.9 8.7 - 10.4 mg/dL    Calculated Osmolality      eGFR-Cr 26 (L) >=60 mL/min/1.73m2   CBC With Differential With Platelet    Collection Time: 12/06/24  8:55 AM   Result Value Ref Range    WBC 7.9 4.0 - 11.0 x10(3) uL    RBC 5.02 3.80 - 5.30 x10(6)uL    HGB 14.4 12.0 - 16.0 g/dL    HCT 44.6 35.0 - 48.0 %    MCV 88.8 80.0 - 100.0 fL    MCH 28.7 26.0 - 34.0 pg    MCHC 32.3 31.0 - 37.0 g/dL    RDW-SD 50.6 (H) 35.1 - 46.3 fL    RDW 15.5 (H) 11.0 - 15.0 %    .0 150.0 - 450.0 10(3)uL    Neutrophil Absolute Prelim 6.05 1.50 - 7.70 x10 (3) uL    Neutrophil Absolute 6.05 1.50 - 7.70 x10(3) uL    Lymphocyte Absolute 1.11 1.00 - 4.00 x10(3) uL    Monocyte Absolute 0.64 0.10 - 1.00 x10(3) uL    Eosinophil Absolute 0.05 0.00 - 0.70 x10(3) uL    Basophil Absolute 0.02 0.00 - 0.20 x10(3) uL    Immature Granulocyte Absolute 0.03 0.00 - 1.00 x10(3) uL    Neutrophil % 76.5 %    Lymphocyte % 14.1 %    Monocyte % 8.1 %    Eosinophil % 0.6 %    Basophil % 0.3 %    Immature Granulocyte % 0.4 %   REDRAW BMP    Collection Time: 12/06/24  9:29 AM   Result Value Ref Range    Potassium 3.1 (L) 3.5 - 5.1 mmol/L    BUN 20 9 - 23 mg/dL   Magnesium    Collection Time: 12/06/24  9:29 AM   Result Value Ref Range    Magnesium 1.7 1.6 - 2.6 mg/dL   Phosphorus    Collection Time: 12/06/24  9:29 AM   Result Value Ref Range    Phosphorus 2.6 2.4 - 5.1 mg/dL   Triglycerides    Collection Time: 12/06/24  9:29 AM   Result Value Ref Range    Triglycerides 88 30 - 149 mg/dL   POCT Glucose    Collection Time: 12/06/24 12:08 PM   Result Value Ref Range    POC Glucose  172 (H) 70 - 99 mg/dL     Current meds:   potassium chloride 40 mEq in 250mL sodium chloride 0.9% IVPB premix  40 mEq Intravenous Once    dextrose 10% infusion (TPN no rate)    Intravenous Continuous PRN    adult 3 in 1 TPN   Intravenous Continuous TPN    [START ON 12/7/2024] piperacillin-tazobactam (Zosyn) 3.375 g in dextrose 5% 100 mL IVPB-ADDV  3.375 g Intravenous Q12H    miconazole 2 % powder   Topical BID    [Held by provider] apixaban (Eliquis) tab 5 mg  5 mg Oral BID    [Held by provider] atorvastatin (Lipitor) tab 20 mg  20 mg Oral Daily    [Held by provider] carvedilol (Coreg) tab 12.5 mg  12.5 mg Oral BID with meals    [Held by provider] losartan (Cozaar) tab 50 mg  50 mg Oral Daily    metoprolol (Lopressor) 5 mg/5mL injection 5 mg  5 mg Intravenous Q4H    [COMPLETED] heparin (Porcine) 1000 UNIT/ML injection - BOLUS IV 5,100 Units  80 Units/kg Intravenous Once    [COMPLETED] heparin (Porcine) 14286 units/250mL infusion (PE/DVT/THROMBUS) INITIAL DOSE  18 Units/kg/hr Intravenous Once    heparin (Porcine) 13394 units/250mL infusion PE/DVT/THROMBUS CONTINUOUS  200-3,000 Units/hr Intravenous Continuous    [COMPLETED] potassium chloride 40 mEq in 250mL sodium chloride 0.9% IVPB premix  40 mEq Intravenous Once    [COMPLETED] sodium chloride 0.9 % IV bolus 500 mL  500 mL Intravenous Once    [COMPLETED] ondansetron (Zofran) 4 MG/2ML injection 4 mg  4 mg Intravenous Once    [COMPLETED] famotidine (Pepcid) 20 mg/2mL injection 20 mg  20 mg Intravenous Once    [COMPLETED] dilTIAZem (cardIZEM) 25 mg/5mL injection 20 mg  20 mg Intravenous Once    [COMPLETED] furosemide (Lasix) 10 mg/mL injection 40 mg  40 mg Intravenous Once    [COMPLETED] iopamidol 76% (ISOVUE-370) injection for power injector  80 mL Intravenous ONCE PRN    [COMPLETED] iopamidol 76% (ISOVUE-370) injection for power injector  65 mL Intravenous ONCE PRN    [COMPLETED] piperacillin-tazobactam (Zosyn) 4.5 g in dextrose 5% 100 mL IVPB-ADDV  4.5 g Intravenous Once    [COMPLETED] dilTIAZem (cardIZEM) 25 mg/5mL injection 20 mg  20 mg Intravenous Once    acetaminophen (Tylenol Extra Strength) tab 500 mg  500 mg Oral Q4H PRN     ondansetron (Zofran) 4 MG/2ML injection 4 mg  4 mg Intravenous Q6H PRN    morphINE PF 2 MG/ML injection 1 mg  1 mg Intravenous Q2H PRN    Or    morphINE PF 2 MG/ML injection 2 mg  2 mg Intravenous Q2H PRN    Or    morphINE PF 4 MG/ML injection 4 mg  4 mg Intravenous Q2H PRN    dilTIAZem 10 mg BOLUS FROM BAG infusion  10 mg Intravenous Q1H PRN    dilTIAZem (cardIZEM) 100 mg in sodium chloride 0.9% 100 mL IVPB-ADDV  2.5-20 mg/hr Intravenous Continuous    [Held by provider] furosemide (Lasix) 10 mg/mL injection 20 mg  20 mg Intravenous BID (Diuretic)    glucose (Dex4) 15 GM/59ML oral liquid 15 g  15 g Oral Q15 Min PRN    Or    glucose (Glutose) 40% oral gel 15 g  15 g Oral Q15 Min PRN    Or    glucose-vitamin C (Dex-4) chewable tab 4 tablet  4 tablet Oral Q15 Min PRN    Or    dextrose 50% injection 50 mL  50 mL Intravenous Q15 Min PRN    Or    glucose (Dex4) 15 GM/59ML oral liquid 30 g  30 g Oral Q15 Min PRN    Or    glucose (Glutose) 40% oral gel 30 g  30 g Oral Q15 Min PRN    Or    glucose-vitamin C (Dex-4) chewable tab 8 tablet  8 tablet Oral Q15 Min PRN    insulin regular human (Novolin R, Humulin R) 100 UNIT/ML injection 1-5 Units  1-5 Units Subcutaneous 4 times per day    dextrose 5%-sodium chloride 0.45% infusion   Intravenous Continuous    [COMPLETED] magnesium sulfate in sterile water for injection 2 g/50mL IVPB premix 2 g  2 g Intravenous Once    [COMPLETED] Perflutren Lipid Microsphere (DEFINITY) 6.52 MG/ML injection 1.5 mL  1.5 mL Intravenous ONCE PRN

## 2024-12-06 NOTE — PLAN OF CARE
Pt. A/Ox3-4, forgetful, on 4L O2, VSS. Gtts infusing. Son at the bedside in the evening. No acute events. Pt educated on call light use, within reach. Safety measures in place.   Heparin held @ 0600 for procedure today    Problem: Patient Centered Care  Goal: Patient preferences are identified and integrated in the patient's plan of care  Description: Interventions:  - What would you like us to know as we care for you? From home with son  - Provide timely, complete, and accurate information to patient/family  - Incorporate patient and family knowledge, values, beliefs, and cultural backgrounds into the planning and delivery of care  - Encourage patient/family to participate in care and decision-making at the level they choose  - Honor patient and family perspectives and choices  Outcome: Progressing     Problem: Patient/Family Goals  Goal: Patient/Family Long Term Goal  Description: Patient's Long Term Goal: go home    Interventions:  - go home  - See additional Care Plan goals for specific interventions  Outcome: Progressing  Goal: Patient/Family Short Term Goal  Description: Patient's Short Term Goal: clear SBO    Interventions:   - NGT LIS  -NPO  - See additional Care Plan goals for specific interventions  Outcome: Progressing     Problem: CARDIOVASCULAR - ADULT  Goal: Maintains optimal cardiac output and hemodynamic stability  Description: INTERVENTIONS:  - Monitor vital signs, rhythm, and trends  - Monitor for bleeding, hypotension and signs of decreased cardiac output  - Evaluate effectiveness of vasoactive medications to optimize hemodynamic stability  - Monitor arterial and/or venous puncture sites for bleeding and/or hematoma  - Assess quality of pulses, skin color and temperature  - Assess for signs of decreased coronary artery perfusion - ex. Angina  - Evaluate fluid balance, assess for edema, trend weights  Outcome: Progressing  Goal: Absence of cardiac arrhythmias or at baseline  Description:  INTERVENTIONS:  - Continuous cardiac monitoring, monitor vital signs, obtain 12 lead EKG if indicated  - Evaluate effectiveness of antiarrhythmic and heart rate control medications as ordered  - Initiate emergency measures for life threatening arrhythmias  - Monitor electrolytes and administer replacement therapy as ordered  Outcome: Progressing     Problem: RESPIRATORY - ADULT  Goal: Achieves optimal ventilation and oxygenation  Description: INTERVENTIONS:  - Assess for changes in respiratory status  - Assess for changes in mentation and behavior  - Position to facilitate oxygenation and minimize respiratory effort  - Oxygen supplementation based on oxygen saturation or ABGs  - Provide Smoking Cessation handout, if applicable  - Encourage broncho-pulmonary hygiene including cough, deep breathe, Incentive Spirometry  - Assess the need for suctioning and perform as needed  - Assess and instruct to report SOB or any respiratory difficulty  - Respiratory Therapy support as indicated  - Manage/alleviate anxiety  - Monitor for signs/symptoms of CO2 retention  Outcome: Progressing     Problem: GASTROINTESTINAL - ADULT  Goal: Minimal or absence of nausea and vomiting  Description: INTERVENTIONS:  - Maintain adequate hydration with IV or PO as ordered and tolerated  - Nasogastric tube to low intermittent suction as ordered  - Evaluate effectiveness of ordered antiemetic medications  - Provide nonpharmacologic comfort measures as appropriate  - Advance diet as tolerated, if ordered  - Obtain nutritional consult as needed  - Evaluate fluid balance  Outcome: Progressing  Goal: Maintains or returns to baseline bowel function  Description: INTERVENTIONS:  - Assess bowel function  - Maintain adequate hydration with IV or PO as ordered and tolerated  - Evaluate effectiveness of GI medications  - Encourage mobilization and activity  - Obtain nutritional consult as needed  - Establish a toileting routine/schedule  - Consider  collaborating with pharmacy to review patient's medication profile  Outcome: Progressing     Problem: SAFETY ADULT - FALL  Goal: Free from fall injury  Description: INTERVENTIONS:  - Assess pt frequently for physical needs  - Identify cognitive and physical deficits and behaviors that affect risk of falls.  - [place] fall precautions as indicated by assessment.  - Educate pt/family on patient safety including physical limitations  - Instruct pt to call for assistance with activity based on assessment  - Modify environment to reduce risk of injury  - Provide assistive devices as appropriate  - Consider OT/PT consult to assist with strengthening/mobility  - Encourage toileting schedule  Outcome: Progressing     Problem: DISCHARGE PLANNING  Goal: Discharge to home or other facility with appropriate resources  Description: INTERVENTIONS:  - Identify barriers to discharge w/pt and caregiver  - Include patient/family/discharge partner in discharge planning  - Arrange for needed discharge resources and transportation as appropriate  - Identify discharge learning needs (meds, wound care, etc)  - Arrange for interpreters to assist at discharge as needed  - Consider post-discharge preferences of patient/family/discharge partner  - Complete POLST form as appropriate  - Assess patient's ability to be responsible for managing their own health  - Refer to Case Management Department for coordinating discharge planning if the patient needs post-hospital services based on physician/LIP order or complex needs related to functional status, cognitive ability or social support system  Outcome: Progressing

## 2024-12-06 NOTE — ANESTHESIA PREPROCEDURE EVALUATION
Anesthesia PreOp Note    HPI:     Isabel Patel is a 89 year old female who presents for preoperative consultation requested by: Anna Ashraf MD    Date of Surgery: 12/2/2024 - 12/6/2024    Procedure(s):  EXPLORATORY LAPAROTOMY, POSSIBLE BOWEL RESECTION  Indication: Abdominal pain [R10.9]    Relevant Problems   No relevant active problems       NPO:  Last Liquid Consumption Date: 12/04/24     Last Solid Consumption Date: 11/28/24     Last Liquid Consumption Date: 12/04/24          History Review:  Patient Active Problem List    Diagnosis Date Noted    Acute on chronic congestive heart failure, unspecified heart failure type ([facility]) 12/02/2024    Complete intestinal obstruction, unspecified cause ([facility]) 12/02/2024    Atrial fibrillation with rapid ventricular response ([facility]) 12/02/2024    Anticoagulated 12/02/2024    Small bowel obstruction ([facility]) 12/02/2024    Status post biventricular pacemaker 02/15/2024    Atrial fibrillation, new onset ([facility]) 02/12/2024    Urinary incontinence 02/27/2023    History of right breast cancer 08/24/2022    Chronic shoulder pain 06/21/2022    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 3 chronic kidney disease, without long-term current use of insulin ([facility]) 12/07/2021    Keratosis 12/07/2021    Asymptomatic menopause 04/06/2021    Exudative age-related macular degeneration, right eye, with inactive choroidal neovascularization ([facility]) 03/16/2021    Anemia 03/16/2021    Bilateral lower extremity edema 03/16/2021    Closed bicondylar fracture of distal end of right femur ([facility])     Closed fracture of left distal femur ([facility]) 12/02/2020    Mass of skin of left shoulder 06/13/2020    Spinal stenosis of lumbar region 09/24/2019    Macular degeneration 09/24/2019    TIA (transient ischemic attack) 12/03/2018    Uterovaginal prolapse 06/26/2018    AGUSTO (stress urinary incontinence, female) 06/26/2018    Pain of right lower extremity 05/09/2018    Pelvic relaxation 05/09/2018    Osteopenia of  multiple sites 2017    Pessary maintenance 2017    Left rotator cuff tear arthropathy 2016    Controlled type 2 diabetes mellitus with diabetic nephropathy, without long-term current use of insulin (HCC) 2014    HTN (hypertension) 2014    Hypercholesterolemia 2014    Obesity 2014    Osteoarthritis 2013    Right knee DJD 2012       Past Medical History:    Acute, but ill-defined, cerebrovascular disease    Arthritis    Breast CA (HCC)    Cancer (HCC)    Diabetes (HCC)    diet controlled    Diabetes mellitus, type II (HCC)    Essential hypertension    Hearing impairment    High blood pressure    High cholesterol    Hyperlipidemia    Osteoarthritis    Squamous cell carcinoma, keratinizing    R posterior thigh       Past Surgical History:   Procedure Laterality Date    Appendectomy      Appendectomy      Cataract  -    Cataract extraction w/  intraocular lens implant Bilateral     Ian Garcia MD    Chemotherapy      rt breast.    Cholecystectomy      D & c      Knee replacement surgery Right 2012    Lumpectomy right  2016    cancer    Mastectomy partial Right 2016      [id number]    Skin surgery Right 2018    Tonsillectomy      Wrist fracture surgery Right        Prescriptions Prior to Admission[1]  Current Medications and Prescriptions Ordered in Epic[2]    Allergies[3]    Family History   Problem Relation Age of Onset    Heart Disease Father         CAD    Diabetes Father     Heart Disease Mother         CAD    Diabetes Mother     Breast Cancer Mother 83        had lumpectomy and RT.  No other treatment.   of stroke at 89    Other (appendicitis[other]) Brother         age 13    Other (alive and well[other]) Sister     Other (alive and well[other]) Son         x3    Breast Cancer Self 85    Glaucoma Neg     Macular degeneration Neg      Social History     Socioeconomic History    Marital status:     Number  of children: 3   Occupational History    Occupation:      Comment: retired   Tobacco Use    Smoking status: Never     Passive exposure: Never    Smokeless tobacco: Never   Vaping Use    Vaping status: Never Used   Substance and Sexual Activity    Alcohol use: Not Currently    Drug use: Never    Sexual activity: Not Currently   Other Topics Concern     Service No    Caffeine Concern Yes     Comment: coffee, 1 cups daily    Occupational Exposure No       Available pre-op labs reviewed.  Lab Results   Component Value Date    WBC 7.9 12/06/2024    RBC 5.02 12/06/2024    HGB 14.4 12/06/2024    HCT 44.6 12/06/2024    MCV 88.8 12/06/2024    MCH 28.7 12/06/2024    MCHC 32.3 12/06/2024    RDW 15.5 (H) 12/06/2024    .0 12/06/2024     Lab Results   Component Value Date     (L) 12/06/2024    K 3.1 (L) 12/06/2024    CL 97 (L) 12/06/2024    CO2 33.0 (H) 12/06/2024    BUN 20 12/06/2024    CREATSERUM 1.80 (H) 12/06/2024     (H) 12/06/2024    PGLU 172 (H) 12/06/2024    CA 8.9 12/06/2024     Lab Results   Component Value Date    INR 1.41 (H) 12/02/2024       Vital Signs:  Body mass index is 27.7 kg/m².   height is 1.549 m (5' 1\") and weight is 66.5 kg (146 lb 9.6 oz). Her axillary temperature is 97.5 °F (36.4 °C). Her blood pressure is 109/75 and her pulse is 110. Her respiration is 18 and oxygen saturation is 95%.   Vitals:    12/06/24 0350 12/06/24 0423 12/06/24 1031 12/06/24 1509   BP: 120/57  132/81 109/75   Pulse: 84  110 110   Resp: 18  18 18   Temp: 98.2 °F (36.8 °C)  97.5 °F (36.4 °C)    TempSrc: Axillary  Axillary    SpO2: 94%  97% 95%   Weight:  66.5 kg (146 lb 9.6 oz)     Height:            Anesthesia Evaluation     Patient summary reviewed and Nursing notes reviewed    Airway   Dental    (+) partials    Pulmonary - negative ROS   (+) decreased breath sounds  Cardiovascular   (+) pacemaker, hypertension, dysrhythmias    Rhythm: irregular  ROS comment: Low EF by echo    Neuro/Psych     (+)  neuromuscular disease, TIA,        GI/Hepatic/Renal    (+) chronic renal disease CRI    Comments: Possible SBO    Endo/Other    (+) diabetes mellitus poorly controlled  Abdominal     Abdomen: tender.  Bowel sounds: absent.                 Anesthesia Plan:   ASA:  4  Plan:   General  Monitors and Lines:   A-line  Airway:  ETT  Post-op Pain Management: IV analgesics  Plan Comments: Might stay intubated, ICU  Informed Consent Plan and Risks Discussed With:  Patient  Use of Blood Products Discussed With:  Patient  Blood Product Use Consented    Discussed plan with:  Surgeon      I have informed Isabel Patel and/or legal guardian or family member of the nature of the anesthetic plan, benefits, risks including possible dental damage if relevant, major complications, and any alternative forms of anesthetic management.   All of the patient's questions were answered to the best of my ability. The patient desires the anesthetic management as planned.  Lambert Chandler MD  12/6/2024 3:27 PM  Present on Admission:  **None**           [1]   Facility-Administered Medications Prior to Admission   Medication Dose Route Frequency Provider Last Rate Last Admin    [COMPLETED] lidocaine (Urojet) 2 % urethral jelly 10 mL  10 mL Intravesical Once Zulema Holland PA   10 mL at 11/18/24 1145    [COMPLETED] lidocaine (Urojet) 2 % urethral jelly 10 mL  10 mL Intravesical Once Zulema Holland PA   10 mL at 10/07/24 1220    lidocaine (Urojet) 2 % urethral jelly 10 mL  10 mL Intravesical Once Liz Truong, DO         Medications Prior to Admission   Medication Sig Dispense Refill Last Dose/Taking    Ergocalciferol (VITAMIN D OR) Take 1-4 drops by mouth daily. Pt takes Vitamin D oral drops   Past Week    Miri, Zingiber officinalis, (MIRI OR) Take 1 capsule by mouth daily. Pt takes Miri with [place] once daily for pain   Past Week    losartan 50 MG Oral Tab Take 1 tablet (50 mg total) by mouth daily. 90 tablet 3  11/29/2024    dilTIAZem HCl ER Coated Beads 180 MG Oral Capsule SR 24 Hr Take 2 capsules (360 mg total) by mouth daily. 180 capsule 3 12/1/2024 at  8:30 PM    carvedilol 12.5 MG Oral Tab Take 1 tablet (12.5 mg total) by mouth 2 (two) times daily with meals.   11/28/2024    digoxin 0.125 MG Oral Tab Take 1 tablet (125 mcg total) by mouth daily. (Patient taking differently: Take 1 tablet (125 mcg total) by mouth daily as needed (Heart rate consistently greater than 120).)   Past Week    APIXABAN 5 MG Oral Tab Take 1 tablet (5 mg total) by mouth 2 (two) times daily. 60 tablet 1 12/1/2024 at  8:30 PM    atorvastatin 20 MG Oral Tab Take 1 tablet (20 mg total) by mouth daily. 90 tablet 1 Past Week    lidocaine 5 % External Patch Place 1 patch onto the skin Q24H PRN. 30 patch 0 Past Month    TURMERIC CURCUMIN OR Take 1 capsule by mouth daily.   Past Week    acetaminophen 500 MG Oral Tab Take 1 tablet (500 mg total) by mouth daily as needed for Pain.   Taking As Needed    Coenzyme Q10 (CO Q 10) 10 MG Oral Cap Take 1 capsule by mouth daily.   Past Week    Glucose Blood (ACCU-CHEK GUIDE) In Vitro Strip 1 strip by In Vitro route daily. 100 strip 3     Accu-Chek Softclix Lancets Does not apply Misc 1 Lancet by Finger stick route daily. For blood glucose monitoring. 100 each 3     furosemide 20 MG Oral Tab Take 1 tablet (20 mg total) by mouth daily. (Patient not taking: Reported on 12/2/2024) 30 tablet 0 Not Taking    Blood Glucose Calibration (ACCU-CHEK GUIDE CONTROL) In Vitro Liquid 1 each by In Vitro route every 30 (thirty) days. 1 each 11     Blood Glucose Monitoring Suppl (ACCU-CHEK GUIDE ME) w/Device Does not apply Kit 1 each daily. Check once a day. 1 kit 0    [2]   Current Facility-Administered Medications Ordered in Epic   Medication Dose Route Frequency Provider Last Rate Last Admin    dextrose 10% infusion (TPN no rate)   Intravenous Continuous PRN Anna Ashraf MD        adult 3 in 1 TPN   Intravenous Continuous TPN  Anna Ashraf MD        [START ON 12/7/2024] piperacillin-tazobactam (Zosyn) 3.375 g in dextrose 5% 100 mL IVPB-ADDV  3.375 g Intravenous Q12H Claudia Fletcher MD        magnesium sulfate in sterile water for injection 2 g/50mL IVPB premix 2 g  2 g Intravenous Once Claudia Fletcher MD 50 mL/hr at 12/06/24 1515 2 g at 12/06/24 1515    metoprolol (Lopressor) 5 mg/5mL injection 7.5 mg  7.5 mg Intravenous Q4H Arcelia Castle APRN   7.5 mg at 12/06/24 1513    heparin (Porcine) 20567 units/250mL infusion ACS/AFIB CONTINUOUS  200-3,000 Units/hr Intravenous Continuous Arcelia Castle APRN        miconazole 2 % powder   Topical BID Aleena Escobedo MD   Given at 12/06/24 1220    [Held by provider] apixaban (Eliquis) tab 5 mg  5 mg Oral BID Miah Santos MD        [Held by provider] atorvastatin (Lipitor) tab 20 mg  20 mg Oral Daily Miah Santos MD        [Held by provider] carvedilol (Coreg) tab 12.5 mg  12.5 mg Oral BID with meals Miah Santos MD        [Held by provider] losartan (Cozaar) tab 50 mg  50 mg Oral Daily Miah Santos MD        acetaminophen (Tylenol Extra Strength) tab 500 mg  500 mg Oral Q4H PRN Renan Davis MD        ondansetron (Zofran) 4 MG/2ML injection 4 mg  4 mg Intravenous Q6H PRN Renan Davis MD   4 mg at 12/04/24 1358    morphINE PF 2 MG/ML injection 1 mg  1 mg Intravenous Q2H PRN Renan Davis MD        Or    morphINE PF 2 MG/ML injection 2 mg  2 mg Intravenous Q2H PRN Renan Davis MD   2 mg at 12/03/24 1701    Or    morphINE PF 4 MG/ML injection 4 mg  4 mg Intravenous Q2H PRN Renan Davis MD        dilTIAZem 10 mg BOLUS FROM BAG infusion  10 mg Intravenous Q1H PRN Renan Davis MD        dilTIAZem (cardIZEM) 100 mg in sodium chloride 0.9% 100 mL IVPB-ADDV  2.5-20 mg/hr Intravenous Continuous Renan Davis MD 5 mL/hr at 12/06/24 0412 5 mg/hr at 12/06/24 0412    [Held by provider] furosemide (Lasix) 10 mg/mL injection 20 mg  20 mg Intravenous BID (Diuretic)  Renan Davis MD   20 mg at 12/03/24 1121    glucose (Dex4) 15 GM/59ML oral liquid 15 g  15 g Oral Q15 Min PRN Renan Davis MD        Or    glucose (Glutose) 40% oral gel 15 g  15 g Oral Q15 Min PRN Renan Davis MD        Or    glucose-vitamin C (Dex-4) chewable tab 4 tablet  4 tablet Oral Q15 Min PRN Renan Davis MD        Or    dextrose 50% injection 50 mL  50 mL Intravenous Q15 Min PRN Renan Davis MD        Or    glucose (Dex4) 15 GM/59ML oral liquid 30 g  30 g Oral Q15 Min PRN Renan Davis MD        Or    glucose (Glutose) 40% oral gel 30 g  30 g Oral Q15 Min PRN Renan Davis MD        Or    glucose-vitamin C (Dex-4) chewable tab 8 tablet  8 tablet Oral Q15 Min PRN Renan Davis MD        insulin regular human (Novolin R, Humulin R) 100 UNIT/ML injection 1-5 Units  1-5 Units Subcutaneous 4 times per day Renan Davis MD   1 Units at 12/06/24 1218    dextrose 5%-sodium chloride 0.45% infusion   Intravenous Continuous Renan Davis MD 83 mL/hr at 12/06/24 0820 New Bag at 12/06/24 0820     No current Epic-ordered outpatient medications on file.   [3]   Allergies  Allergen Reactions    Adhesive Tape RASH

## 2024-12-06 NOTE — PROGRESS NOTES
[facility]  Progress Note    Isabel Patel Patient Status:  Inpatient    1931 MRN [id number]   Location [facility] 3W/SW Attending Claudia Fletcher MD   Hosp Day # 4 PCP Fredrick Cornelius MD     Subjective:  Pt seen laying in bed. Had sudden AMS yesterday, surgery rescheduled to today. Patient alert this morning, some confusion.    Objective/Physical Exam:  General: Alert, orientated x3.  Cooperative.  No apparent distress.  Vital Signs:  Blood pressure 120/57, pulse 84, temperature 98.2 °F (36.8 °C), temperature source Axillary, resp. rate 18, height 5' 1\" (1.549 m), weight 146 lb 9.6 oz (66.5 kg), SpO2 94%, not currently breastfeeding.  Wt Readings from Last 3 Encounters:   24 146 lb 9.6 oz (66.5 kg)   24 163 lb (73.9 kg)   24 163 lb (73.9 kg)     Lungs: No respiratory distress.  Cardiac: Regular rate and rhythm.   Abdomen:  Soft, mild distended, non tender, with no rebound or guarding.  No peritoneal signs.   Extremities:  No lower extremity edema noted.      Intake/Output:    Intake/Output Summary (Last 24 hours) at 2024 0926  Last data filed at 2024 0423  Gross per 24 hour   Intake 1833.29 ml   Output 200 ml   Net 1633.29 ml     I/O last 3 completed shifts:  In: 3084.7 [I.V.:2684.7; IV PIGGYBACK:400]  Out: 950 [Urine:300; Emesis/NG output:650]  No intake/output data recorded.    Medications:    miconazole   Topical BID    piperacillin-tazobactam  3.375 g Intravenous Q8H    [Held by provider] apixaban  5 mg Oral BID    [Held by provider] atorvastatin  20 mg Oral Daily    [Held by provider] carvedilol  12.5 mg Oral BID with meals    [Held by provider] losartan  50 mg Oral Daily    metoprolol  5 mg Intravenous Q4H    furosemide  20 mg Intravenous BID (Diuretic)    insulin regular human  1-5 Units Subcutaneous 4 times per day       Labs:  Lab Results   Component Value Date    WBC 7.9 2024    HGB 14.4 2024    HCT 44.6 2024    .0  12/06/2024     Lab Results   Component Value Date     12/06/2024    K  12/06/2024      Comment:      Test not reported due to hemolysis, redraw ordered by lab    CL 97 12/06/2024    CO2 33.0 12/06/2024    BUN  12/06/2024      Comment:      Test not reported due to hemolysis, redraw ordered by lab    CREATSERUM 1.80 12/06/2024     12/06/2024    CA 8.9 12/06/2024    ALB 3.4 12/05/2024     Lab Results   Component Value Date    INR 1.41 (H) 12/02/2024    INR 1.0 12/03/2018         Assessment  Patient Active Problem List   Diagnosis    Right knee DJD    Osteoarthritis    Controlled type 2 diabetes mellitus with diabetic nephropathy, without long-term current use of insulin (HCC)    HTN (hypertension)    Hypercholesterolemia    Obesity    Left rotator cuff tear arthropathy    Pessary maintenance    Osteopenia of multiple sites    Pain of right lower extremity    Pelvic relaxation    Uterovaginal prolapse    AGUSTO (stress urinary incontinence, female)    TIA (transient ischemic attack)    Spinal stenosis of lumbar region    Macular degeneration    Mass of skin of left shoulder    Closed fracture of left distal femur (HCC)    Closed bicondylar fracture of distal end of right femur (HCC)    Exudative age-related macular degeneration, right eye, with inactive choroidal neovascularization (HCC)    Anemia    Bilateral lower extremity edema    Asymptomatic menopause    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 3 chronic kidney disease, without long-term current use of insulin (HCC)    Keratosis    Chronic shoulder pain    History of right breast cancer    Urinary incontinence    Atrial fibrillation, new onset (HCC)    Status post biventricular pacemaker    Acute on chronic congestive heart failure, unspecified heart failure type (HCC)    Complete intestinal obstruction, unspecified cause (HCC)    Atrial fibrillation with rapid ventricular response (HCC)    Anticoagulated    Small bowel obstruction (HCC)        Small bowel obstruction  Sudden AMS, improving    Plan:  Plan for ex lap with possible bowel resection today  High risk for surgery from cardiac standpoint, acceptable risk profile to patient's POA, her son  Continue NPO and NGT  Hold heparin gtt      Quality:  DVT Mechanical Prophylaxis:   SCDs,    DVT Pharmacologic Prophylaxis   Medication    [Held by provider] apixaban (Eliquis) tab 5 mg    [Held by provider] heparin (Porcine) 56746 units/250mL infusion PE/DVT/THROMBUS CONTINUOUS                Code Status: Full Code  Mills: External urinary catheter in place  Mills Duration (in days):   Central line:    WILLIE: 12/9/2024        Joseluis Watson PA-C  12/6/2024  9:26 AM

## 2024-12-06 NOTE — PROGRESS NOTES
[facility]  part of [facility]  Hospitalist Progress Note     Isabel Patel Patient Status:  Inpatient    1931  89 year old CSN [id number]   Location 301/301-A Attending Miah Santos MD   Hosp Day # 4 PCP Fredrick Cornelius MD     Assessment & Plan:   ----------------------------------  Small bowel obstruction.  Likely due to adhesive disease from previous abdominal surgery.  Pain controlled.  -Surgical consult appreciated  -NG tube: In place  -Small bowel follow-through pending  -NPO  -IV fluids  -Pain control  -Encourage activity as tolerated  -per surg, pt is on iv abx  -: OR held due to RRT due to AMS- neuro cleared, CT negative ok to proceed with surgery  -plan for OR 2024        Atrial fibrillation, paroxysmal.  With RVR.  Hemodynamically stable. Anticoagulation with Eliquis on hold.  -Cardiology consult appreciated  -Monitor on telemetry  -Monitor electrolytes  -Anticoagulation as above  -Rate/rhythm control with IV metoprolol  -Continue diltiazem gtt   -Continue heparin gtt, hold DOAC while NPO  -will likely need ischemic eval with newly reduced EF    Other problems  CKD stage III  Type 2 diabetes  Permanent pacemaker for sick sinus syndrome  Hypertension  Dyslipidemia  Arthritis  Aortic stenosis, mild  Moderate pulmonary hypertension    DVT Mechanical Prophylaxis:   SCDs,    DVT Pharmacologic Prophylaxis   Medication    [Held by provider] apixaban (Eliquis) tab 5 mg    [Held by provider] heparin (Porcine) 32182 units/250mL infusion PE/DVT/THROMBUS CONTINUOUS              code status: Full  dispo: home upon medical clearance  If applicable, malnutrition status at bottom of note    I personally reviewed the available laboratories, imaging including. I discussed/will discuss the case with consultants. I ordered laboratories and/or radiographic studies. I adjusted medications as detailed above.  Medical decision making high, risk is high.    Subjective:    ----------------------------------    Patient denies any abdominal pain or nausea- No other acute complaints or other nursing concerns or overnight events        Objective:   Chief Complaint:   Chief Complaint   Patient presents with    Nausea/Vomiting/Diarrhea     ----------------------------------  Temp:  [97.7 °F (36.5 °C)-98.3 °F (36.8 °C)] 98.2 °F (36.8 °C)  Pulse:  [] 84  Resp:  [18-22] 18  BP: (120-133)/(57-89) 120/57  SpO2:  [92 %-95 %] 94 %  Gen: A+Ox2.  No distress.   HEENT: NCAT, neck supple, no carotid bruit.  NG tube in place  CV: irreg, S1S2, and intact distal pulses. No gallop, rub, murmur.  Pulm: Effort and breath sounds normal. No distress, wheezes, rales, rhonchi.  Abd: Soft nondistended, mildly tender diffusely no rebound or guarding-no active bowel sounds auscultated  Neuro: Normal reflexes, CN. Sensory/motor exams grossly normal deficit.   MS: No joint effusions.  No peripheral edema.  Skin: Skin is warm and dry. No rashes, erythema, diaphoresis.   Psych: Normal mood and affect. Calm, cooperative    Labs:  Lab Results   Component Value Date    HGB 13.7 12/05/2024    WBC 8.7 12/05/2024    .0 12/06/2024     12/05/2024    K 3.6 12/05/2024    CREATSERUM 1.61 (H) 12/05/2024    INR 1.41 (H) 12/02/2024    AST 15 12/02/2024    ALT 10 12/02/2024    TROP 0.00 12/03/2018            miconazole   Topical BID    piperacillin-tazobactam  3.375 g Intravenous Q8H    [Held by provider] apixaban  5 mg Oral BID    [Held by provider] atorvastatin  20 mg Oral Daily    [Held by provider] carvedilol  12.5 mg Oral BID with meals    [Held by provider] losartan  50 mg Oral Daily    metoprolol  5 mg Intravenous Q4H    furosemide  20 mg Intravenous BID (Diuretic)    insulin regular human  1-5 Units Subcutaneous 4 times per day       acetaminophen    ondansetron    morphINE **OR** morphINE **OR** morphINE    dilTIAZem    glucose **OR** glucose **OR** glucose-vitamin C **OR** dextrose **OR** glucose  **OR** glucose **OR** glucose-vitamin C  **Certification      PHYSICIAN Certification of Need for Inpatient Hospitalization - Initial Certification    Patient will require inpatient services that will reasonably be expected to span two midnight's based on the clinical documentation in H+P.   Based on patients current state of illness, I anticipate that, after discharge, patient will require TBD.

## 2024-12-06 NOTE — DIETARY NOTE
ADULT NUTRITION INITIAL ASSESSMENT    Pt is at high nutrition risk.  Pt does not meet malnutrition criteria.      RECOMMENDATIONS TO MD: See Nutrition Intervention for PN specifics     ADMITTING DIAGNOSIS:  Anticoagulated [Z79.01]  Atrial fibrillation with rapid ventricular response (HCC) [I48.91]  Complete intestinal obstruction, unspecified cause (HCC) [K56.601]  Acute on chronic congestive heart failure, unspecified heart failure type (HCC) [I50.9]  PERTINENT PAST MEDICAL HISTORY:   Past Medical History:    Acute, but ill-defined, cerebrovascular disease    Arthritis    Breast CA (HCC)    Cancer (HCC)    Diabetes (HCC)    diet controlled    Diabetes mellitus, type II (HCC)    Essential hypertension    Hearing impairment    High blood pressure    High cholesterol    Hyperlipidemia    Osteoarthritis    Squamous cell carcinoma, keratinizing    R posterior thigh       PATIENT STATUS: Initial 12/06/24: Pt assessed due to consult for dietitian to initiate and manage TPN/PPN. Pt admitted with CHF, presented with n/v and abdominal pain for 2-3 days PTA. Found to have SBO. Planning for sx: ex lap with possible bowel resection, scheduled for today. Per Dr. Ashraf, likely to be prolonged NPO. Discussed with RN and vascular RN today. PICC line placed this afternoon to right brachial, but placed after 1pm, so PPN was ordered to start tonight at 9pm. RN aware. Unable to meet with pt at bedside today. Son in POA. Will complete NFPE when able.      FOOD/NUTRITION RELATED HISTORY:  Appetite: NPO  Intake: NPO  Intake Meeting Needs:  TPN to meet needs once at goal  Percent Meals Eaten (last 6 days)       Date/Time Percent Meals Eaten (%)    12/04/24 0700 0 %     Percent Meals Eaten (%): NPO at 12/04/24 0700    12/04/24 1200 0 %     Percent Meals Eaten (%): NPO at 12/04/24 1200    12/04/24 1800 0 %     Percent Meals Eaten (%): NPOP at 12/04/24 1800    12/05/24 0900 0 %     Percent Meals Eaten (%): npo at 12/05/24 0900    12/05/24 1202  0 %     Percent Meals Eaten (%): npo at 12/05/24 1202    12/05/24 1700 0 %     Percent Meals Eaten (%): npo at 12/05/24 1700            Food Allergies: No Known Food Allergies (NKFA)  Cultural/Ethnic/Shinto Preferences: Not Obtained    GASTROINTESTINAL: NGT to LIS and SBO  NG output 12/4: 1.1L   12/5: 150ml with one shift skipped.     MEDICATIONS: reviewed   potassium chloride  40 mEq Intravenous Once    miconazole   Topical BID    piperacillin-tazobactam  3.375 g Intravenous Q8H    [Held by provider] apixaban  5 mg Oral BID    [Held by provider] atorvastatin  20 mg Oral Daily    [Held by provider] carvedilol  12.5 mg Oral BID with meals    [Held by provider] losartan  50 mg Oral Daily    metoprolol  5 mg Intravenous Q4H    [Held by provider] furosemide  20 mg Intravenous BID (Diuretic)    insulin regular human  1-5 Units Subcutaneous 4 times per day     LABS: reviewed; hypokalemia - 40mEq rider today.   Slight hyponatremia noted.   Recent Labs     12/03/24  0650 12/03/24 2009 12/04/24  0457 12/05/24  1715 12/06/24  0855 12/06/24  0929   * 189* 225* 135* 172*  --    BUN 22 22 22 22  --  20   CREATSERUM 1.60* 1.71* 1.63* 1.61* 1.80*  --    CA 9.9 9.9 9.6 9.0 8.9  --    MG 2.3  --   --  2.1  --   --     137 135* 137 134*  --    K 3.5 3.4* 4.4  4.4 3.6  --  3.1*   CL 97* 97* 99 98 97*  --    CO2 32.0 35.0* 29.0 32.0 33.0*  --    PHOS  --   --   --  3.2  --   --    OSMOCALC 290 292 290 289  --   --      NUTRITION RELATED PHYSICAL FINDINGS:  - Nutrition Focused Physical Exam (NFPE):  Unable to completed at this time. Will complete when able.  - Fluid Accumulation: non-pitting Bilateral Lower extremity and Left Upper extremity ---> See RN documentation for details  - Skin Integrity: intact ---> See RN documentation for details    ANTHROPOMETRICS:  HT: 154.9 cm (5' 1\")  WT: 66.5 kg (146 lb 9.6 oz)   BMI: Body mass index is 27.7 kg/m².  BMI CLASSIFICATION: 25-29.9 kg/m2 - overweight  IBW/lbs (Calculated)  Female: 105 lbs  140% IBW  Usual Body Wt: 160's in the past      91% UBW    WEIGHT HISTORY:  Patient Weight(s) for the past 336 hrs:   Weight   12/06/24 0423 66.5 kg (146 lb 9.6 oz)   12/05/24 0358 65.6 kg (144 lb 11.2 oz)   12/03/24 0520 64 kg (141 lb)   12/02/24 1146 66.7 kg (147 lb)   12/02/24 0558 72.6 kg (160 lb)     Wt Readings from Last 10 Encounters:   12/06/24 66.5 kg (146 lb 9.6 oz)   03/20/24 73.9 kg (163 lb)   02/17/24 73.9 kg (163 lb)   02/12/24 73.9 kg (163 lb)   02/05/24 73.9 kg (163 lb)   01/29/24 72.6 kg (160 lb)   10/17/23 72.6 kg (160 lb)   05/16/23 77.1 kg (170 lb)   04/11/23 77.1 kg (170 lb)   03/31/23 77.1 kg (170 lb)     NUTRITION DIAGNOSIS/PROBLEM:   Inadequate oral intake related to Decreased ability to consume sufficient energy as evidenced by NPO status and no PO intake since admission (need for PN)     NUTRITION INTERVENTION:     NUTRITION PRESCRIPTION:   Estimated Nutrition needs: --dosing wt of 66.5 kg - wt taken on 12/6  Calories: 8302-1417 calories/day (22-25 calories per kg Dosing wt)  Protein: 80-93 g protein/day (1.2-1.4 g protein/kg Dosing wt)  Fluid Needs: 1660 ml (chronological age method) or   Lugoff Segar = 2195 ml    - Diet:       Procedures    NPO      -Parenteral Nutrition: 2000ml volume. 70g protein (280 kcal), 100g dextrose (340 kcal), and 80g lipids (800 kcal). Provides a total of 1420 kcal and meets 96% of minimum estimated energy needs and 87% of estimated minimum protein needs.     - RD Malnutrition Care Plan:  PPN to start  - Meals and snacks: NPO  - Medical Food Supplements-RD added NPO. Rational/use of oral supplements discussed.  - Vitamin and mineral supplements: none  - Feeding assistance: NPO  - Nutrition education: not appropriate     - Coordination of nutrition care: collaboration with other providers  - Discharge and transfer of nutrition care to new setting or provider: monitor plans    MONITOR AND EVALUATE/NUTRITION GOALS:  - Food and Nutrient Intake:       Monitor: for PO initiation  - Food and Nutrient Administration:      Monitor: TPN initiation, TPN tolerance, and adequacy of TPN  - Anthropometric Measurement:    Monitor weight  - Nutrition Goals:      maintain wt within 5%, TPN to meet greater than 80% nutrition needs, minimize lean body mass loss, and improved GI status    DIETITIAN FOLLOW UP: RD to follow and monitor nutrition status       Geena Saldana RD, LDN  Clinical Dietitian  P: [phone]

## 2024-12-06 NOTE — OPERATIVE REPORT
OPERATIVE NOTE    PATIENT NAME: Isabel Patel    :  1931    MRN:  [id number]  ATTENDING PHYSICIAN:  Claudia Fletcher MD    PROCEDURE DATE:  2024       PREOPERATIVE DIAGNOSIS: Small bowel obstruction     POSTOPERATIVE DIAGNOSIS: prior closed loop obstruction     SURGEON: Anna Ashraf MD    ASSISTANT: JOCELYNN Dawson     OPERATION: Exploratory laparotomy     ANESTHESIA: General    ESTIMATED BLOOD LOSS:  5 ml      COMPLICATIONS: none     SPECIMENS: none    INDICATIONS: The patient is a 89 year old year old female with history of above preop diagnosis.  I explained the risk, benefits, expected outcome, and alternatives to the procedure to the patient and her sons. She has significant cardiac comorbidities. Cardiology was consulted for pre operative risk assessment. Risk of periop cardiac complications were discussed. The patient's POA indicates understanding and wishes to proceed with surgery.     DESCRIPTION OF PROCEDURE:    The patient was brought to the operating room and placed in supine position. General endotracheal anesthesia was induced. The abdomen was prepped and draped in the usual sterile fashion. 2 g Ancef was given. Surgical time out was performed and all in the room were in agreement to proceed.    A low midline laparotomy incision was created. There was clear ascites which was suctioned out. The entire small bowel was exteriorized. It was ran from the ligament of treitz to the cecum. The entire jejunum was dilated and hyperemic and there was evidence of prior volvulus with scarring on the mesentery and on the small bowel proximally and distally, but there was no active source of obstruction. The nasogastric tube was palpated in the body of the stomach. The small bowel was returned to the abdominal cavity. The fascia was closed from both directions with 0 looped PDS and tied in the middle. The subcutaneous layer was infiltrated with local anesthetic. The skin was closed with skin  staples and sterile dressing was applied.     All instrument and sponge counts were correct at the end of the case. I was present and scrubbed for the entire operation. The patient tolerated the procedure well, was extubated, and sent to the recovery room in stable condition.         Anna Ashraf MD  [facility] - General Surgery   [address] [place] [address]  p [phone]

## 2024-12-06 NOTE — CM/SW NOTE
Per RN rounds, plan for ex lap w/ possible bowel resection later today.    Pt remains on 4L O2 w/ no home oxygen.    SW met w/ pt's son Haja at bedside. Confirmed and acknowledged pt is not ready for DC at this time. Presented HH list of quality data to Haja to begin reviewing for potential choice/services when pt is cleared for DC.    Haja is agreeable to review. No questions at this time.    PLAN: Home w/ HH - pending choice, O2 needs, clinical course & med clear      SW/CM to remain available for support and/or discharge planning.       Latesha, MSW, LSW [id number]

## 2024-12-07 ENCOUNTER — APPOINTMENT (OUTPATIENT)
Dept: GENERAL RADIOLOGY | Facility: HOSPITAL | Age: 89
End: 2024-12-07
Attending: INTERNAL MEDICINE
Payer: MEDICARE

## 2024-12-07 LAB
ALBUMIN SERPL-MCNC: 3.1 G/DL (ref 3.2–4.8)
ALBUMIN/GLOB SERPL: 1.2 {RATIO} (ref 1–2)
ALP LIVER SERPL-CCNC: 97 U/L
ALT SERPL-CCNC: 48 U/L
ANION GAP SERPL CALC-SCNC: 6 MMOL/L (ref 0–18)
ANION GAP SERPL CALC-SCNC: 6 MMOL/L (ref 0–18)
APTT PPP: 227 SECONDS (ref 23–36)
APTT PPP: 29.2 SECONDS (ref 23–36)
APTT PPP: 31.7 SECONDS (ref 23–36)
AST SERPL-CCNC: 28 U/L (ref ?–34)
BASOPHILS # BLD AUTO: 0.02 X10(3) UL (ref 0–0.2)
BASOPHILS NFR BLD AUTO: 0.1 %
BILIRUB SERPL-MCNC: 0.7 MG/DL (ref 0.2–0.9)
BUN BLD-MCNC: 23 MG/DL (ref 9–23)
BUN BLD-MCNC: 23 MG/DL (ref 9–23)
BUN/CREAT SERPL: 14.6 (ref 10–20)
BUN/CREAT SERPL: 14.6 (ref 10–20)
CALCIUM BLD-MCNC: 8.7 MG/DL (ref 8.7–10.4)
CALCIUM BLD-MCNC: 8.7 MG/DL (ref 8.7–10.4)
CHLORIDE SERPL-SCNC: 101 MMOL/L (ref 98–112)
CHLORIDE SERPL-SCNC: 101 MMOL/L (ref 98–112)
CO2 SERPL-SCNC: 28 MMOL/L (ref 21–32)
CO2 SERPL-SCNC: 28 MMOL/L (ref 21–32)
CREAT BLD-MCNC: 1.57 MG/DL
CREAT BLD-MCNC: 1.57 MG/DL
DEPRECATED RDW RBC AUTO: 51.9 FL (ref 35.1–46.3)
EGFRCR SERPLBLD CKD-EPI 2021: 31 ML/MIN/1.73M2 (ref 60–?)
EGFRCR SERPLBLD CKD-EPI 2021: 31 ML/MIN/1.73M2 (ref 60–?)
EOSINOPHIL # BLD AUTO: 0.01 X10(3) UL (ref 0–0.7)
EOSINOPHIL NFR BLD AUTO: 0.1 %
ERYTHROCYTE [DISTWIDTH] IN BLOOD BY AUTOMATED COUNT: 15.5 % (ref 11–15)
GLOBULIN PLAS-MCNC: 2.5 G/DL (ref 2–3.5)
GLUCOSE BLD-MCNC: 250 MG/DL (ref 70–99)
GLUCOSE BLD-MCNC: 250 MG/DL (ref 70–99)
GLUCOSE BLDC GLUCOMTR-MCNC: 154 MG/DL (ref 70–99)
GLUCOSE BLDC GLUCOMTR-MCNC: 175 MG/DL (ref 70–99)
GLUCOSE BLDC GLUCOMTR-MCNC: 207 MG/DL (ref 70–99)
GLUCOSE BLDC GLUCOMTR-MCNC: 209 MG/DL (ref 70–99)
GLUCOSE BLDC GLUCOMTR-MCNC: 235 MG/DL (ref 70–99)
HCT VFR BLD AUTO: 44.4 %
HGB BLD-MCNC: 14.2 G/DL
IMM GRANULOCYTES # BLD AUTO: 0.07 X10(3) UL (ref 0–1)
IMM GRANULOCYTES NFR BLD: 0.5 %
LYMPHOCYTES # BLD AUTO: 0.39 X10(3) UL (ref 1–4)
LYMPHOCYTES NFR BLD AUTO: 2.8 %
MAGNESIUM SERPL-MCNC: 2.2 MG/DL (ref 1.6–2.6)
MAGNESIUM SERPL-MCNC: 2.2 MG/DL (ref 1.6–2.6)
MCH RBC QN AUTO: 28.9 PG (ref 26–34)
MCHC RBC AUTO-ENTMCNC: 32 G/DL (ref 31–37)
MCV RBC AUTO: 90.4 FL
MONOCYTES # BLD AUTO: 0.48 X10(3) UL (ref 0.1–1)
MONOCYTES NFR BLD AUTO: 3.4 %
NEUTROPHILS # BLD AUTO: 13.21 X10 (3) UL (ref 1.5–7.7)
NEUTROPHILS # BLD AUTO: 13.21 X10(3) UL (ref 1.5–7.7)
NEUTROPHILS NFR BLD AUTO: 93.1 %
OSMOLALITY SERPL CALC.SUM OF ELEC: 292 MOSM/KG (ref 275–295)
OSMOLALITY SERPL CALC.SUM OF ELEC: 292 MOSM/KG (ref 275–295)
PHOSPHATE SERPL-MCNC: 3.6 MG/DL (ref 2.4–5.1)
PLATELET # BLD AUTO: 256 10(3)UL (ref 150–450)
POTASSIUM SERPL-SCNC: 4.5 MMOL/L (ref 3.5–5.1)
PROT SERPL-MCNC: 5.6 G/DL (ref 5.7–8.2)
RBC # BLD AUTO: 4.91 X10(6)UL
SODIUM SERPL-SCNC: 135 MMOL/L (ref 136–145)
SODIUM SERPL-SCNC: 135 MMOL/L (ref 136–145)
TRIGL SERPL-MCNC: 150 MG/DL (ref 30–149)
WBC # BLD AUTO: 14.2 X10(3) UL (ref 4–11)

## 2024-12-07 PROCEDURE — 99223 1ST HOSP IP/OBS HIGH 75: CPT | Performed by: INTERNAL MEDICINE

## 2024-12-07 PROCEDURE — 71045 X-RAY EXAM CHEST 1 VIEW: CPT | Performed by: INTERNAL MEDICINE

## 2024-12-07 RX ORDER — HEPARIN SODIUM 1000 [USP'U]/ML
30 INJECTION, SOLUTION INTRAVENOUS; SUBCUTANEOUS ONCE
Status: COMPLETED | OUTPATIENT
Start: 2024-12-07 | End: 2024-12-07

## 2024-12-07 RX ORDER — METOPROLOL TARTRATE 1 MG/ML
7.5 INJECTION, SOLUTION INTRAVENOUS EVERY 4 HOURS
Status: DISCONTINUED | OUTPATIENT
Start: 2024-12-07 | End: 2024-12-10

## 2024-12-07 RX ORDER — FUROSEMIDE 10 MG/ML
20 INJECTION INTRAMUSCULAR; INTRAVENOUS ONCE
Status: COMPLETED | OUTPATIENT
Start: 2024-12-07 | End: 2024-12-07

## 2024-12-07 RX ORDER — HYDROMORPHONE HYDROCHLORIDE 1 MG/ML
0.4 INJECTION, SOLUTION INTRAMUSCULAR; INTRAVENOUS; SUBCUTANEOUS EVERY 2 HOUR PRN
Status: DISCONTINUED | OUTPATIENT
Start: 2024-12-07 | End: 2024-12-14

## 2024-12-07 RX ORDER — HYDROMORPHONE HYDROCHLORIDE 1 MG/ML
0.2 INJECTION, SOLUTION INTRAMUSCULAR; INTRAVENOUS; SUBCUTANEOUS EVERY 2 HOUR PRN
Status: DISCONTINUED | OUTPATIENT
Start: 2024-12-07 | End: 2024-12-14

## 2024-12-07 RX ORDER — HYDROMORPHONE HYDROCHLORIDE 1 MG/ML
0.1 INJECTION, SOLUTION INTRAMUSCULAR; INTRAVENOUS; SUBCUTANEOUS EVERY 2 HOUR PRN
Status: DISCONTINUED | OUTPATIENT
Start: 2024-12-07 | End: 2024-12-14

## 2024-12-07 NOTE — PROGRESS NOTES
Cardiology Progress Note    Isabel Patel Patient Status:  Inpatient    1931 MRN [id number]   Location [facility] 2W/SW Attending Claudia Fletcher MD   Hosp Day # 5 PCP Fredrick Cornelius MD       Subjective:     Seen and examined this morning.  Resting comfortably in bed.  NG tube in place.  Confused as to why she is there but responsive appropriately.  Discussed case with RN, no acute overnight events.    Objective:   Temp: 97 °F (36.1 °C)  Pulse: 99  Resp: 22  BP: 126/87    Intake/Output:     Intake/Output Summary (Last 24 hours) at 2024 0722  Last data filed at 2024 0600  Gross per 24 hour   Intake 2011.2 ml   Output 925 ml   Net 1086.2 ml       Last 3 Weights   24 1817 152 lb 6.4 oz (69.1 kg)   24 0423 146 lb 9.6 oz (66.5 kg)   24 0358 144 lb 11.2 oz (65.6 kg)   24 0520 141 lb (64 kg)   24 1146 147 lb (66.7 kg)   24 0558 160 lb (72.6 kg)   24 1107 163 lb (73.9 kg)   24 0521 163 lb (73.9 kg)   24 0340 163 lb 9.6 oz (74.2 kg)   02/15/24 0632 169 lb 4.8 oz (76.8 kg)   24 0615 167 lb (75.8 kg)   24 0449 165 lb 12.8 oz (75.2 kg)   24 1615 164 lb 14.4 oz (74.8 kg)   24 1229 163 lb (73.9 kg)         Physical Exam:     General: Alert. No apparent distress. No respiratory or constitutional distress.  HEENT: Normocephalic, anicteric sclera, neck supple.  NG tube in place.  Cardiac: Irregularly irregular.  Normal rate.  S1, S2 normal.  No murmur.  Lungs: Dim but clear anterior fields.  Abdomen: Soft, surgical dressing in place.  Extremities: Without clubbing, cyanosis or significant edema.    Neurologic: Alert.  Skin: Warm and dry.     Laboratory/Data:    Labs:         Recent Labs   Lab 24  0654 24  0650 24  2009 24  0457 24  1601 24  0432 24  0855 24  1521 24  0424   WBC 8.4   < > 11.8*  --  8.7  --  7.9 8.1 14.2*   HGB 14.7   < > 15.6  --  13.7  --  14.4 14.5 14.2    MCV 88.3   < > 90.4  --  90.2  --  88.8 88.9 90.4   .0   < > 318.0   < > 278.0 288.0 267.0 271.0 256.0   INR 1.41*  --   --   --   --   --   --   --   --     < > = values in this interval not displayed.       Recent Labs   Lab 12/02/24  0654 12/03/24  0650 12/03/24 2009 12/04/24 0457 12/05/24  1715 12/06/24  0855 12/06/24  0929 12/06/24  1521 12/07/24 0424    137   < > 135* 137 134*  --  137 135*  135*   K 4.2 3.5   < > 4.4  4.4 3.6  --  3.1* 4.5 4.5  4.5  4.5   CL 98 97*   < > 99 98 97*  --  100 101  101   CO2 29.0 32.0   < > 29.0 32.0 33.0*  --  32.0 28.0  28.0   BUN 29* 22   < > 22 22  --  20 21 23  23   CREATSERUM 1.52* 1.60*   < > 1.63* 1.61* 1.80*  --  1.63* 1.57*  1.57*   CA 10.3 9.9   < > 9.6 9.0 8.9  --  8.9 8.7  8.7   MG 1.8 2.3  --   --  2.1  --  1.7  --  2.2  2.2   PHOS  --   --   --   --  3.2  --  2.6  --  3.6   * 143*   < > 225* 135* 172*  --  176* 250*  250*    < > = values in this interval not displayed.       Recent Labs   Lab 12/02/24  0654 12/05/24 1715 12/07/24 0424   ALT 10  --  48   AST 15  --  28   ALB 4.4 3.4 3.1*       No results for input(s): \"TROP\" in the last 168 hours.    Allergies:   Allergies[1]    Medications:  Current Facility-Administered Medications   Medication Dose Route Frequency    metoprolol (Lopressor) 5 mg/5mL injection 7.5 mg  7.5 mg Intravenous Q4H    heparin (Porcine) 1000 UNIT/ML injection 2,100 Units  30 Units/kg Intravenous Once    dextrose 10% infusion (TPN no rate)   Intravenous Continuous PRN    adult 3 in 1 TPN   Intravenous Continuous TPN    piperacillin-tazobactam (Zosyn) 3.375 g in dextrose 5% 100 mL IVPB-ADDV  3.375 g Intravenous Q12H    heparin (Porcine) 18236 units/250mL infusion ACS/AFIB CONTINUOUS  200-3,000 Units/hr Intravenous Continuous    lactated ringers infusion   Intravenous Continuous    fentaNYL (Sublimaze) 50 mcg/mL injection 25 mcg  25 mcg Intravenous Q5 Min PRN    fentaNYL (Sublimaze) 50 mcg/mL injection 50  mcg  50 mcg Intravenous Q5 Min PRN    HYDROmorphone (Dilaudid) 1 MG/ML injection 0.2 mg  0.2 mg Intravenous Q5 Min PRN    HYDROmorphone (Dilaudid) 1 MG/ML injection 0.4 mg  0.4 mg Intravenous Q5 Min PRN    HYDROmorphone (Dilaudid) 1 MG/ML injection 0.6 mg  0.6 mg Intravenous Q5 Min PRN    morphINE PF 2 MG/ML injection 2 mg  2 mg Intravenous Q10 Min PRN    morphINE PF 4 MG/ML injection 4 mg  4 mg Intravenous Q10 Min PRN    morphINE PF 10 MG/ML injection 6 mg  6 mg Intravenous Q10 Min PRN    acetaminophen (Ofirmev) 10 mg/mL infusion premix 1,000 mg  1,000 mg Intravenous Q6H PRN    [Transfer Hold] miconazole 2 % powder   Topical BID    [Held by provider] apixaban (Eliquis) tab 5 mg  5 mg Oral BID    [Held by provider] atorvastatin (Lipitor) tab 20 mg  20 mg Oral Daily    [Held by provider] carvedilol (Coreg) tab 12.5 mg  12.5 mg Oral BID with meals    [Held by provider] losartan (Cozaar) tab 50 mg  50 mg Oral Daily    [Transfer Hold] acetaminophen (Tylenol Extra Strength) tab 500 mg  500 mg Oral Q4H PRN    [Transfer Hold] ondansetron (Zofran) 4 MG/2ML injection 4 mg  4 mg Intravenous Q6H PRN    [Transfer Hold] morphINE PF 2 MG/ML injection 1 mg  1 mg Intravenous Q2H PRN    Or    [Transfer Hold] morphINE PF 2 MG/ML injection 2 mg  2 mg Intravenous Q2H PRN    Or    [Transfer Hold] morphINE PF 4 MG/ML injection 4 mg  4 mg Intravenous Q2H PRN    [Transfer Hold] dilTIAZem 10 mg BOLUS FROM BAG infusion  10 mg Intravenous Q1H PRN    dilTIAZem (cardIZEM) 100 mg in sodium chloride 0.9% 100 mL IVPB-ADDV  2.5-20 mg/hr Intravenous Continuous    [Held by provider] furosemide (Lasix) 10 mg/mL injection 20 mg  20 mg Intravenous BID (Diuretic)    [Transfer Hold] glucose (Dex4) 15 GM/59ML oral liquid 15 g  15 g Oral Q15 Min PRN    Or    [Transfer Hold] glucose (Glutose) 40% oral gel 15 g  15 g Oral Q15 Min PRN    Or    [Transfer Hold] glucose-vitamin C (Dex-4) chewable tab 4 tablet  4 tablet Oral Q15 Min PRN    Or    [Transfer Hold]  dextrose 50% injection 50 mL  50 mL Intravenous Q15 Min PRN    Or    [Transfer Hold] glucose (Dex4) 15 GM/59ML oral liquid 30 g  30 g Oral Q15 Min PRN    Or    [Transfer Hold] glucose (Glutose) 40% oral gel 30 g  30 g Oral Q15 Min PRN    Or    [Transfer Hold] glucose-vitamin C (Dex-4) chewable tab 8 tablet  8 tablet Oral Q15 Min PRN    [Transfer Hold] insulin regular human (Novolin R, Humulin R) 100 UNIT/ML injection 1-5 Units  1-5 Units Subcutaneous 4 times per day    dextrose 5%-sodium chloride 0.45% infusion   Intravenous Continuous         Assessment:  SBO s/p ex lap noting prior closed loop obstruction 12/7   Permanent Afib   SSS s/p hx DC PPM  Acute on chronic HFrEF (newly noted), LVEF 25-30% - w/ valvular component via severe TR, severe pulm HTN  Pulmonic valve veg? - Bcx negative thus far   CKD3  DM2   HTN  HLD      Plan:  Wean cardizem. Keep IV scheduled lopressor. Expect HR improvement as her concurrent issues improve.  Hep gtt resumed. Plan resumption of DOAC when safe from surgery perspective.  Will likely require diureses/GDMT - currently vol status ok. Monitor renal function.   Repeat limited TTE once acute illness has improved. Depending on findings may pursue ischemia eval/KRISTA for valvular eval.   Will cont to monitor      Siddharth Florence DO  Cardiologist  [facility]  12/7/2024 7:22 AM        Note to the patient: The 21st Century Cures Act makes medical notes like these available to patients in the interest of transparency. However, be advised that this is a medical document. It is intended as peer to peer communication. It is written in medical language and may contain abbreviations or verbiage that are unfamiliar. It may appear blunt or direct. Medical documents are intended to carry relevant information, facts as evident, and clinical opinion of the practitioner.     Disclaimer: Components of this note were documented using voice recognition system and are subject to errors not  corrected at proofreading. Contact the author of this note for any clarifications.          [1]   Allergies  Allergen Reactions    Adhesive Tape RASH

## 2024-12-07 NOTE — PROGRESS NOTES
[facility]  Progress Note    Isabel Patel Patient Status:  Inpatient    1931 MRN [id number]   Location [facility] 2W/SW Attending Elsa sTai MD   Hosp Day # 5 PCP Fredrick Cornelius MD     Subjective:  Patient resting in bed, son bedside.  She reports she feels okay today.  She has minimal abdominal pain.  She had some nausea with administration of pain meds, denies vomiting.  She had a bowel movement yesterday.  She has been n.p.o., NGT to [facility].    Objective/Physical Exam:  General: Alert, orientated x3.  Cooperative.  No apparent distress.  Vital Signs:  Blood pressure 131/83, pulse 99, temperature 97.8 °F (36.6 °C), temperature source Temporal, resp. rate 24, height 5' 1\" (1.549 m), weight 152 lb 6.4 oz (69.1 kg), SpO2 97%, not currently breastfeeding.  Wt Readings from Last 3 Encounters:   24 152 lb 6.4 oz (69.1 kg)   24 163 lb (73.9 kg)   24 163 lb (73.9 kg)     Lungs: No respiratory distress.  Cardiac: Regular rate and rhythm.   Abdomen:  Soft,  distended, mildly tender, with no rebound or guarding.  No peritoneal signs.   Extremities:  No lower extremity edema noted.    Incision: Clean, dry, intact, no erythema.       Intake/Output:    Intake/Output Summary (Last 24 hours) at 2024 1054  Last data filed at 2024 1000  Gross per 24 hour   Intake 2061.2 ml   Output 1385 ml   Net 676.2 ml     I/O last 3 completed shifts:  In: 3744.5 [I.V.:2906.2; IV PIGGYBACK:410]  Out: 1335 [Urine:585; Emesis/NG output:750]  I/O this shift:  In: 50 [I.V.:20; NG/GT:30]  Out: 200 [Urine:100; Emesis/NG output:100]    Medications:    metoprolol  7.5 mg Intravenous Q4H    piperacillin-tazobactam  3.375 g Intravenous Q12H    [Transfer Hold] miconazole   Topical BID    [Held by provider] apixaban  5 mg Oral BID    [Held by provider] atorvastatin  20 mg Oral Daily    [Held by provider] carvedilol  12.5 mg Oral BID with meals    [Held by provider] losartan  50 mg Oral Daily     [Held by provider] furosemide  20 mg Intravenous BID (Diuretic)    [Transfer Hold] insulin regular human  1-5 Units Subcutaneous 4 times per day       Labs:  Lab Results   Component Value Date    WBC 14.2 12/07/2024    HGB 14.2 12/07/2024    HCT 44.4 12/07/2024    .0 12/07/2024     Lab Results   Component Value Date     12/07/2024     12/07/2024    K 4.5 12/07/2024    K 4.5 12/07/2024    K 4.5 12/07/2024     12/07/2024     12/07/2024    CO2 28.0 12/07/2024    CO2 28.0 12/07/2024    BUN 23 12/07/2024    BUN 23 12/07/2024    CREATSERUM 1.57 12/07/2024    CREATSERUM 1.57 12/07/2024     12/07/2024     12/07/2024    CA 8.7 12/07/2024    CA 8.7 12/07/2024    ALKPHO 97 12/07/2024    ALT 48 12/07/2024    AST 28 12/07/2024    BILT 0.7 12/07/2024    ALB 3.1 12/07/2024    TP 5.6 12/07/2024     Lab Results   Component Value Date    INR 1.41 (H) 12/02/2024    INR 1.0 12/03/2018         Assessment  Patient Active Problem List   Diagnosis    Right knee DJD    Osteoarthritis    Controlled type 2 diabetes mellitus with diabetic nephropathy, without long-term current use of insulin ([facility])    HTN (hypertension)    Hypercholesterolemia    Obesity    Left rotator cuff tear arthropathy    Pessary maintenance    Osteopenia of multiple sites    Pain of right lower extremity    Pelvic relaxation    Uterovaginal prolapse    AGUSTO (stress urinary incontinence, female)    TIA (transient ischemic attack)    Spinal stenosis of lumbar region    Macular degeneration    Mass of skin of left shoulder    Closed fracture of left distal femur (HCC)    Closed bicondylar fracture of distal end of right femur ([facility])    Exudative age-related macular degeneration, right eye, with inactive choroidal neovascularization ([facility])    Anemia    Bilateral lower extremity edema    Asymptomatic menopause    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 3 chronic kidney disease, without long-term current use of insulin ([facility])     Keratosis    Chronic shoulder pain    History of right breast cancer    Urinary incontinence    Atrial fibrillation, new onset (HCC)    Status post biventricular pacemaker    Acute on chronic congestive heart failure, unspecified heart failure type (HCC)    Complete intestinal obstruction, unspecified cause (HCC)    Atrial fibrillation with rapid ventricular response (HCC)    Anticoagulated    Small bowel obstruction (HCC)     POD 1: Exploratory laparotomy      Plan:  NGT clamp trial today.  Continue TPN, n.p.o.  Analgesics as needed  Continue IV abx   Continue care as per CCU  DVT prophylaxis with heparin GTT, OK to resume DOAC      Quality:  DVT Mechanical Prophylaxis:   SCDs,    DVT Pharmacologic Prophylaxis   Medication    heparin (Porcine) 69929 units/250mL infusion ACS/AFIB CONTINUOUS    [Held by provider] apixaban (Eliquis) tab 5 mg                Code Status: Full Code  Mills: Mills catheter in place  Mills Duration (in days): 2  Central line:    WILLIE: 12/9/2024        CHRISTIAN Grijalva  12/7/2024  10:54 AM

## 2024-12-07 NOTE — PROGRESS NOTES
[facility]  part of [facility]    Progress Note    Isabel Patel Patient Status:  Inpatient    1931 MRN [id number]   Location [facility] 2W/SW Attending Elsa Tsai MD   Hosp Day # 5 PCP Fredrick Cornelius MD     Chief Complaint:     Acute on chronic congestive heart failure    Subjective:   Subjective:    Patient seen and examined this morning  Afebrile slightly tachycardic  Appears comfortable in bed    Objective:   Blood pressure 131/83, pulse 99, temperature 97.8 °F (36.6 °C), temperature source Temporal, resp. rate 24, height 5' 1\" (1.549 m), weight 152 lb 6.4 oz (69.1 kg), SpO2 97%, not currently breastfeeding.  Physical Exam    General: Patient is alert and oriented x2 does not appear to be in acute distress at this time  HEENT: EOMI PERRLA, atraumatic normocephalic  Cardiac: S1-S2 appreciated  Lungs: Good air entry bilaterally clear to auscultation  Abdomen: Soft   Ext: Peripheral pulses are positive  Neuro: No focal deficits noted  Psych: Normal mood  Skin: No rashes noted  MSK: Full range of motion intact      Results:   Lab Results   Component Value Date    WBC 14.2 (H) 2024    HGB 14.2 2024    HCT 44.4 2024    .0 2024    CREATSERUM 1.57 (H) 2024    CREATSERUM 1.57 (H) 2024    BUN 23 2024    BUN 23 2024     (L) 2024     (L) 2024    K 4.5 2024    K 4.5 2024    K 4.5 2024     2024     2024    CO2 28.0 2024    CO2 28.0 2024     (H) 2024     (H) 2024    CA 8.7 2024    CA 8.7 2024    ALB 3.1 (L) 2024    ALKPHO 97 2024    BILT 0.7 2024    TP 5.6 (L) 2024    AST 28 2024    ALT 48 2024    PTT 29.2 2024    INR 1.41 (H) 2024    TSH 1.508 2024    LIP 32 2024    DDIMER 2.97 (H) 2024    MG 2.2 2024    MG 2.2 2024    PHOS 3.6  12/07/2024    TROP 0.00 12/03/2018    TROPHS 12 12/05/2024    B12 599 03/16/2021       XR CHEST AP PORTABLE  (CPT=71045)    Result Date: 12/6/2024  CONCLUSION:   Right upper extremity PICC terminates at the cavoatrial junction.  No pneumothorax.  Multiple additional findings are not significantly changed when compared to 12/02/2024    Dictated by (CST): Joseluis Hoover MD on 12/06/2024 at 3:50 PM     Finalized by (CST): Joseluis Hoover MD on 12/06/2024 at 3:52 PM          CT STROKE BRAIN (NO IV)(CPT=70450)    Result Date: 12/5/2024  CONCLUSION:   Evaluation limited due to motion artifact.  Within the limits of this evaluation, no acute intracranial abnormality.  Moderate involutional and chronic microvascular ischemic changes.   These findings were communicated with KEYON Fulton by Dr. Zurdo Vee at 1543 hours by telephone on 12/05/2024.   Dictated by (CST): Zurdo Vee MD on 12/05/2024 at 3:35 PM     Finalized by (CST): Zurdo Vee MD on 12/05/2024 at 3:43 PM          US VENOUS DOPPLER LEG BILAT - DIAG IMG (CPT=93970)    Result Date: 12/5/2024  CONCLUSION: No sonographic evidence of bilateral lower extremity DVT    Dictated by (CST): Germain Zurita MD on 12/05/2024 at 1:00 PM     Finalized by (CST): Germain Zurita MD on 12/05/2024 at 1:01 PM               Assessment & Plan:     Acute respiratory failure with hypoxia  -currently on High flow  -will order CXR  -monitor closely.   -low threshold for pulmonology consult.    Small bowel obstruction.    -Likely due to adhesive disease from previous abdominal surgery.  Pain controlled.  -Surgical consult appreciated  -NG tube: In place  -Small bowel follow-through pending  -NPO  -IV fluids  -Pain control  -Encourage activity as tolerated  -per surg, pt is on iv abx  -12/5: OR held due to RRT due to AMS- neuro cleared, CT negative ok to proceed with surgery  -plan for OR 12/6/2024           Atrial fibrillation, paroxysmal.  With RVR.  Hemodynamically stable.  Anticoagulation with Eliquis on hold.  -Cardiology consult appreciated  -Monitor on telemetry  -Monitor electrolytes  -Anticoagulation as above  -Rate/rhythm control with IV metoprolol  -Continue diltiazem gtt   -Continue heparin gtt, hold DOAC while NPO  -will likely need ischemic eval with newly reduced EF     Other problems  CKD stage III  Type 2 diabetes  Permanent pacemaker for sick sinus syndrome  Hypertension  Dyslipidemia  Arthritis  Aortic stenosis, mild  Moderate pulmonary hypertension     DVT Mechanical Prophylaxis:   SCDs,        DVT Pharmacologic Prophylaxis   Medication    [Held by provider] apixaban (Eliquis) tab 5 mg    [Held by provider] heparin (Porcine) 44680 units/250mL infusion PE/DVT/THROMBUS CONTINUOUS               code status: Full  dispo: home upon medical clearance  If applicable, malnutrition status at bottom of note     I personally reviewed the available laboratories, imaging including. I discussed/will discuss the case with consultants. I ordered laboratories and/or radiographic studies. I adjusted medications as detailed above.  Medical decision making high, risk is high.    Global A/P  -appreciate cardio recs  -continue to wean IV cardizem  -will o  -lasix currently held.        Elsa Tsai MD  12/7/2024

## 2024-12-07 NOTE — ANESTHESIA POSTPROCEDURE EVALUATION
Patient: Isabel NUNEZ Aspito    Procedure Summary       Date: 12/06/24 Room / Location: [facility] MAIN OR 12 / [facility] MAIN OR    Anesthesia Start: 1623 Anesthesia Stop:     Procedure: EXPLORATORY LAPAROTOMY (Abdomen) Diagnosis:       Abdominal pain      (Abdominal pain [R10.9])    Surgeons: Anna Ashraf MD Anesthesiologist: Lambert Chandler MD    Anesthesia Type: general ASA Status: 4            Anesthesia Type: No value filed.    Vitals Value Taken Time   /67 12/06/24 1805   Temp 97.6 12/06/24 1805   Pulse 109 12/06/24 1805   Resp 14 12/06/24 1805   SpO2 92 12/06/24 1805       EM AN Post Evaluation:   Patient Evaluated in ICU  Patient Participation: complete - patient participated  Level of Consciousness: awake  Pain Management: adequate  Airway Patency:patent  Dental exam unchanged from preop  Yes    Cardiovascular Status: acceptable  Respiratory Status: acceptable  Postoperative Hydration acceptable      Lambert Chandler MD  12/6/2024 6:05 PM

## 2024-12-07 NOTE — CONSULTS
[facility]  part of [facility]    Consult Note    Date:  2024  Date of Admission:  2024    Chief Complaint:   Isabel Patel is a(n) 89 year old female with dyspnea.    HPI:   Patient has a history of permanent atrial fibrillation, heart failure with preserved ejection fraction, permanent dual-chamber pacemaker, chronic kidney disease and she now presents with small bowel obstruction.  Yesterday, the patient underwent exploratory laparotomy and there was evidence of prior volvulus.  No bowel resection was performed.  The patient had been extubated but now is requiring supplemental oxygen.  She denies dyspnea, chest pain, pleurisy, personal nor family history of deep venous thrombosis, TB exposure in the history is obtained per discussion with the patient and with her son at the bedside.  The patient is a lifetime non-smoker.    History     Past Medical History:    Acute, but ill-defined, cerebrovascular disease    Arthritis    Breast CA (HCC)    Cancer (HCC)    Diabetes (HCC)    diet controlled    Diabetes mellitus, type II (HCC)    Essential hypertension    Hearing impairment    High blood pressure    High cholesterol    Hyperlipidemia    Osteoarthritis    Squamous cell carcinoma, keratinizing    R posterior thigh     Past Surgical History:   Procedure Laterality Date    Appendectomy      Appendectomy      Cataract  -    Cataract extraction w/  intraocular lens implant Bilateral     Ian Garcia MD    Chemotherapy  2016    rt breast.    Cholecystectomy      D & c      Knee replacement surgery Right     Lumpectomy right  2016    cancer    Mastectomy partial Right 2016      [id number]    Skin surgery Right 2018    Tonsillectomy      Wrist fracture surgery Right      Family History   Problem Relation Age of Onset    Heart Disease Father         CAD    Diabetes Father     Heart Disease Mother         CAD    Diabetes Mother     Breast Cancer  Mother 83        had lumpectomy and RT.  No other treatment.   of stroke at 89    Other (appendicitis[other]) Brother         age 13    Other (alive and well[other]) Sister     Other (alive and well[other]) Son         x3    Breast Cancer Self 85    Glaucoma Neg     Macular degeneration Neg      Social History: , 3 kids, no tobacco, no alcohol, homemaker  Social History     Socioeconomic History    Marital status:     Number of children: 3   Occupational History    Occupation:      Comment: retired   Tobacco Use    Smoking status: Never     Passive exposure: Never    Smokeless tobacco: Never   Vaping Use    Vaping status: Never Used   Substance and Sexual Activity    Alcohol use: Not Currently    Drug use: Never    Sexual activity: Not Currently   Other Topics Concern     Service No    Caffeine Concern Yes     Comment: coffee, 1 cups daily    Occupational Exposure No     Social Drivers of Health     Financial Resource Strain: Low Risk  (2024)    Financial Resource Strain     Difficulty of Paying Living Expenses: Not hard at all     Med Affordability: No   Food Insecurity: Unknown (2024)    Food Insecurity     Food Insecurity: Patient declined   Transportation Needs: Unknown (2024)    Transportation Needs     Lack of Transportation: Patient declined   Housing Stability: Unknown (2024)    Housing Stability     Housing Instability: Patient declined     Allergies/Medications:   Allergies: Allergies[1]  Medications Prior to Admission   Medication Sig    Ergocalciferol (VITAMIN D OR) Take 1-4 drops by mouth daily. Pt takes Vitamin D oral drops    Shilpa, Zingiber officinalis, (SHILPA OR) Take 1 capsule by mouth daily. Pt takes Shilpa with [place] once daily for pain    losartan 50 MG Oral Tab Take 1 tablet (50 mg total) by mouth daily.    dilTIAZem HCl ER Coated Beads 180 MG Oral Capsule SR 24 Hr Take 2 capsules (360 mg total) by mouth daily.    carvedilol 12.5 MG  Oral Tab Take 1 tablet (12.5 mg total) by mouth 2 (two) times daily with meals.    digoxin 0.125 MG Oral Tab Take 1 tablet (125 mcg total) by mouth daily. (Patient taking differently: Take 1 tablet (125 mcg total) by mouth daily as needed (Heart rate consistently greater than 120).)    APIXABAN 5 MG Oral Tab Take 1 tablet (5 mg total) by mouth 2 (two) times daily.    atorvastatin 20 MG Oral Tab Take 1 tablet (20 mg total) by mouth daily.    lidocaine 5 % External Patch Place 1 patch onto the skin Q24H PRN.    TURMERIC CURCUMIN OR Take 1 capsule by mouth daily.    acetaminophen 500 MG Oral Tab Take 1 tablet (500 mg total) by mouth daily as needed for Pain.    Coenzyme Q10 (CO Q 10) 10 MG Oral Cap Take 1 capsule by mouth daily.    Glucose Blood (ACCU-CHEK GUIDE) In Vitro Strip 1 strip by In Vitro route daily.    Accu-Chek Softclix Lancets Does not apply Misc 1 Lancet by Finger stick route daily. For blood glucose monitoring.    furosemide 20 MG Oral Tab Take 1 tablet (20 mg total) by mouth daily. (Patient not taking: Reported on 12/2/2024)    Blood Glucose Calibration (ACCU-CHEK GUIDE CONTROL) In Vitro Liquid 1 each by In Vitro route every 30 (thirty) days.    Blood Glucose Monitoring Suppl (ACCU-CHEK GUIDE ME) w/Device Does not apply Kit 1 each daily. Check once a day.       Review of Systems:   Review of Systems:  Vision abnormal with macular degeneration. Ear nose and throat normal except that she is hard of hearing. Bowel normal. Bladder function normal. No depression. No thyroid disease. No lymphatic system concerns.  No rash. Muscles and joints unremarkable. No weight loss no weight gain.    Physical Exam:   Vital Signs:  Blood pressure 135/71, pulse 88, temperature 97.8 °F (36.6 °C), temperature source Temporal, resp. rate 21, height 5' 1\" (1.549 m), weight 152 lb 6.4 oz (69.1 kg), SpO2 96%, not currently breastfeeding.     Alert white female  HEENT examination is unremarkable with pupils equal round and  reactive to light and accommodation.  Hard of hearing.  Diminished vision.  Neck without adenopathy, thyromegaly, JVD nor bruit.   Lungs bibasilar crackle to auscultation and percussion.  Cardiac regular rate and rhythm no murmur.   Abdomen bandaged with mild tenderness and diminished bowel sounds, without hepatosplenomegaly and no mass appreciable.   Extremities without clubbing cyanosis nor edema.   Neurologic grossly intact with symmetric tone and strength and reflex.  Skin without gross abnormality    Results:     Lab Results   Component Value Date    WBC 14.2 12/07/2024    HGB 14.2 12/07/2024    HCT 44.4 12/07/2024    .0 12/07/2024    CREATSERUM 1.57 12/07/2024    CREATSERUM 1.57 12/07/2024    BUN 23 12/07/2024    BUN 23 12/07/2024     12/07/2024     12/07/2024    K 4.5 12/07/2024    K 4.5 12/07/2024    K 4.5 12/07/2024     12/07/2024     12/07/2024    CO2 28.0 12/07/2024    CO2 28.0 12/07/2024     12/07/2024     12/07/2024    CA 8.7 12/07/2024    CA 8.7 12/07/2024    ALB 3.1 12/07/2024    ALKPHO 97 12/07/2024    BILT 0.7 12/07/2024    TP 5.6 12/07/2024    AST 28 12/07/2024    ALT 48 12/07/2024    PTT 29.2 12/07/2024    MG 2.2 12/07/2024    MG 2.2 12/07/2024    PHOS 3.6 12/07/2024     Chest x-ray from yesterday demonstrates cardiomegaly with basilar atelectasis.    Assessment/Plan:   1.  Respiratory impairment-likely multifactorial in this non-smoker.  She likely has mild postoperative edema with bibasilar crackles as well as basilar atelectasis.    Recommendations:  1.  Stat chest x-ray  2.  Incentive spirometry  3.  Taper oxygen  4.  May need a small dose of Lasix but will await the chest x-ray  5.  Out of bed  6.  Will follow clinically    2.  DVT prophylaxis-currently on heparin drip, having been on Eliquis previously    3.  SBO with volvulus status post exploratory laparotomy-doing well clinically.  As per general surgery.    4.  Atrial fibrillation with history  of heart failure with preserved ejection fraction-as per cardiology    I am delighted to assist with this patient's care.    Rosalino Nguyen MD  Medical Director, Critical Care, [facility]  Medical Director, [facility]  Pager: [phone]               [1]   Allergies  Allergen Reactions    Adhesive Tape RASH

## 2024-12-07 NOTE — PLAN OF CARE
Pt is alert & or x 2, following cammands, periods of forgetfulness. 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  Extremly [place] in bilat ears, she does not like to wear hearing aids per son  lawrence at bed at bedside. Afib. EF 25- 30%.  On heparin drip at 400 units next ptt is at 2100 tonight. . Eliquis is currently on hold.  Pt has a dual chamber pacemaker  she is currently afib. she recieved 20 mg of lasix x 1 dose.  Cardizem drip at 5, sched. Lopresser iv.  She had 12/06 Accuchecks q 6 hrs.  Iv fluids stopped this am.  Abdom incision, clean dry and intact. Ng clamped per [facility]. Pt had no nausea, clamped over 4 hours. ok to remove, sips of water started. Prn dilaudid 0.2 lowest dose.  Tpn at 83.3 going into right double picc line in place. No c/o of nasuea with ice chips, sips of water. Right double Picc lineDraws well.   Problem: Patient Centered Care  Goal: Patient preferences are identified and integrated in the patient's plan of care  Description: Interventions:  - What would you like us to know as we care for you? From home with son  - Provide timely, complete, and accurate information to patient/family  - Incorporate patient and family knowledge, values, beliefs, and cultural backgrounds into the planning and delivery of care  - Encourage patient/family to participate in care and decision-making at the level they choose  - Honor patient and family perspectives and choices  Outcome: Progressing     Problem: Patient/Family Goals  Goal: Patient/Family Long Term Goal  Description: Patient's Long Term Goal: go home    Interventions:  - go home  - See additional Care Plan goals for specific interventions  Outcome: Progressing  Goal: Patient/Family Short Term Goal  Description: Patient's Short Term Goal: clear SBO    Interventions:   - NGT LIS  -NPO  - See additional Care Plan goals for specific interventions  Outcome: Progressing     Problem: CARDIOVASCULAR - ADULT  Goal: Maintains optimal cardiac output and  hemodynamic stability  Description: INTERVENTIONS:  - Monitor vital signs, rhythm, and trends  - Monitor for bleeding, hypotension and signs of decreased cardiac output  - Evaluate effectiveness of vasoactive medications to optimize hemodynamic stability  - Monitor arterial and/or venous puncture sites for bleeding and/or hematoma  - Assess quality of pulses, skin color and temperature  - Assess for signs of decreased coronary artery perfusion - ex. Angina  - Evaluate fluid balance, assess for edema, trend weights  Outcome: Progressing  Goal: Absence of cardiac arrhythmias or at baseline  Description: INTERVENTIONS:  - Continuous cardiac monitoring, monitor vital signs, obtain 12 lead EKG if indicated  - Evaluate effectiveness of antiarrhythmic and heart rate control medications as ordered  - Initiate emergency measures for life threatening arrhythmias  - Monitor electrolytes and administer replacement therapy as ordered  Outcome: Progressing     Problem: RESPIRATORY - ADULT  Goal: Achieves optimal ventilation and oxygenation  Description: INTERVENTIONS:  - Assess for changes in respiratory status  - Assess for changes in mentation and behavior  - Position to facilitate oxygenation and minimize respiratory effort  - Oxygen supplementation based on oxygen saturation or ABGs  - Provide Smoking Cessation handout, if applicable  - Encourage broncho-pulmonary hygiene including cough, deep breathe, Incentive Spirometry  - Assess the need for suctioning and perform as needed  - Assess and instruct to report SOB or any respiratory difficulty  - Respiratory Therapy support as indicated  - Manage/alleviate anxiety  - Monitor for signs/symptoms of CO2 retention  Outcome: Progressing     Problem: GASTROINTESTINAL - ADULT  Goal: Minimal or absence of nausea and vomiting  Description: INTERVENTIONS:  - Maintain adequate hydration with IV or PO as ordered and tolerated  - Nasogastric tube to low intermittent suction as ordered  -  Evaluate effectiveness of ordered antiemetic medications  - Provide nonpharmacologic comfort measures as appropriate  - Advance diet as tolerated, if ordered  - Obtain nutritional consult as needed  - Evaluate fluid balance  Outcome: Progressing  Goal: Maintains or returns to baseline bowel function  Description: INTERVENTIONS:  - Assess bowel function  - Maintain adequate hydration with IV or PO as ordered and tolerated  - Evaluate effectiveness of GI medications  - Encourage mobilization and activity  - Obtain nutritional consult as needed  - Establish a toileting routine/schedule  - Consider collaborating with pharmacy to review patient's medication profile  Outcome: Progressing     Problem: SAFETY ADULT - FALL  Goal: Free from fall injury  Description: INTERVENTIONS:  - Assess pt frequently for physical needs  - Identify cognitive and physical deficits and behaviors that affect risk of falls.  - [place] fall precautions as indicated by assessment.  - Educate pt/family on patient safety including physical limitations  - Instruct pt to call for assistance with activity based on assessment  - Modify environment to reduce risk of injury  - Provide assistive devices as appropriate  - Consider OT/PT consult to assist with strengthening/mobility  - Encourage toileting schedule  Outcome: Progressing     Problem: DISCHARGE PLANNING  Goal: Discharge to home or other facility with appropriate resources  Description: INTERVENTIONS:  - Identify barriers to discharge w/pt and caregiver  - Include patient/family/discharge partner in discharge planning  - Arrange for needed discharge resources and transportation as appropriate  - Identify discharge learning needs (meds, wound care, etc)  - Arrange for interpreters to assist at discharge as needed  - Consider post-discharge preferences of patient/family/discharge partner  - Complete POLST form as appropriate  - Assess patient's ability to be responsible for managing their own  health  - Refer to Case Management Department for coordinating discharge planning if the patient needs post-hospital services based on physician/LIP order or complex needs related to functional status, cognitive ability or social support system  Outcome: Progressing     Problem: HEMATOLOGIC - ADULT  Goal: Free from bleeding injury  Description: (Example usage: patient with low platelets)  INTERVENTIONS:  - Avoid intramuscular injections, enemas and rectal medication administration  - Ensure safe mobilization of patient  - Hold pressure on venipuncture sites to achieve adequate hemostasis  - Assess for signs and symptoms of internal bleeding  - Monitor lab trends  - Patient is to report abnormal signs of bleeding to staff  - Avoid use of toothpicks and dental floss  - Use electric shaver for shaving  - Use soft bristle tooth brush  - Limit straining and forceful nose blowing  Outcome: Progressing

## 2024-12-07 NOTE — PLAN OF CARE
pt. AxO x3 disoriented to time and following commands. HR maintaining 120s to 130s with Cardizem gtt going, bolus given w/ no improvements. Md notified, see orders. PICC MD dat notified, see orders. TPN and Heparin gtt going. Bed in lowest position and call within reach. Family @ bedside.   Problem: Patient Centered Care  Goal: Patient preferences are identified and integrated in the patient's plan of care  Description: Interventions:  - What would you like us to know as we care for you? From home with son  - Provide timely, complete, and accurate information to patient/family  - Incorporate patient and family knowledge, values, beliefs, and cultural backgrounds into the planning and delivery of care  - Encourage patient/family to participate in care and decision-making at the level they choose  - Honor patient and family perspectives and choices  Outcome: Progressing     Problem: Patient/Family Goals  Goal: Patient/Family Long Term Goal  Description: Patient's Long Term Goal: go home    Interventions:  - go home  - See additional Care Plan goals for specific interventions  Outcome: Progressing  Goal: Patient/Family Short Term Goal  Description: Patient's Short Term Goal: clear SBO    Interventions:   - NGT LIS  -NPO  - See additional Care Plan goals for specific interventions  Outcome: Progressing     Problem: CARDIOVASCULAR - ADULT  Goal: Maintains optimal cardiac output and hemodynamic stability  Description: INTERVENTIONS:  - Monitor vital signs, rhythm, and trends  - Monitor for bleeding, hypotension and signs of decreased cardiac output  - Evaluate effectiveness of vasoactive medications to optimize hemodynamic stability  - Monitor arterial and/or venous puncture sites for bleeding and/or hematoma  - Assess quality of pulses, skin color and temperature  - Assess for signs of decreased coronary artery perfusion - ex. Angina  - Evaluate fluid balance, assess for edema, trend weights  Outcome:  Progressing  Goal: Absence of cardiac arrhythmias or at baseline  Description: INTERVENTIONS:  - Continuous cardiac monitoring, monitor vital signs, obtain 12 lead EKG if indicated  - Evaluate effectiveness of antiarrhythmic and heart rate control medications as ordered  - Initiate emergency measures for life threatening arrhythmias  - Monitor electrolytes and administer replacement therapy as ordered  Outcome: Progressing     Problem: RESPIRATORY - ADULT  Goal: Achieves optimal ventilation and oxygenation  Description: INTERVENTIONS:  - Assess for changes in respiratory status  - Assess for changes in mentation and behavior  - Position to facilitate oxygenation and minimize respiratory effort  - Oxygen supplementation based on oxygen saturation or ABGs  - Provide Smoking Cessation handout, if applicable  - Encourage broncho-pulmonary hygiene including cough, deep breathe, Incentive Spirometry  - Assess the need for suctioning and perform as needed  - Assess and instruct to report SOB or any respiratory difficulty  - Respiratory Therapy support as indicated  - Manage/alleviate anxiety  - Monitor for signs/symptoms of CO2 retention  Outcome: Progressing     Problem: GASTROINTESTINAL - ADULT  Goal: Minimal or absence of nausea and vomiting  Description: INTERVENTIONS:  - Maintain adequate hydration with IV or PO as ordered and tolerated  - Nasogastric tube to low intermittent suction as ordered  - Evaluate effectiveness of ordered antiemetic medications  - Provide nonpharmacologic comfort measures as appropriate  - Advance diet as tolerated, if ordered  - Obtain nutritional consult as needed  - Evaluate fluid balance  Outcome: Progressing  Goal: Maintains or returns to baseline bowel function  Description: INTERVENTIONS:  - Assess bowel function  - Maintain adequate hydration with IV or PO as ordered and tolerated  - Evaluate effectiveness of GI medications  - Encourage mobilization and activity  - Obtain nutritional  consult as needed  - Establish a toileting routine/schedule  - Consider collaborating with pharmacy to review patient's medication profile  Outcome: Progressing     Problem: SAFETY ADULT - FALL  Goal: Free from fall injury  Description: INTERVENTIONS:  - Assess pt frequently for physical needs  - Identify cognitive and physical deficits and behaviors that affect risk of falls.  - [place] fall precautions as indicated by assessment.  - Educate pt/family on patient safety including physical limitations  - Instruct pt to call for assistance with activity based on assessment  - Modify environment to reduce risk of injury  - Provide assistive devices as appropriate  - Consider OT/PT consult to assist with strengthening/mobility  - Encourage toileting schedule  Outcome: Progressing     Problem: DISCHARGE PLANNING  Goal: Discharge to home or other facility with appropriate resources  Description: INTERVENTIONS:  - Identify barriers to discharge w/pt and caregiver  - Include patient/family/discharge partner in discharge planning  - Arrange for needed discharge resources and transportation as appropriate  - Identify discharge learning needs (meds, wound care, etc)  - Arrange for interpreters to assist at discharge as needed  - Consider post-discharge preferences of patient/family/discharge partner  - Complete POLST form as appropriate  - Assess patient's ability to be responsible for managing their own health  - Refer to Case Management Department for coordinating discharge planning if the patient needs post-hospital services based on physician/LIP order or complex needs related to functional status, cognitive ability or social support system  Outcome: Progressing     Problem: HEMATOLOGIC - ADULT  Goal: Free from bleeding injury  Description: (Example usage: patient with low platelets)  INTERVENTIONS:  - Avoid intramuscular injections, enemas and rectal medication administration  - Ensure safe mobilization of patient  -  Hold pressure on venipuncture sites to achieve adequate hemostasis  - Assess for signs and symptoms of internal bleeding  - Monitor lab trends  - Patient is to report abnormal signs of bleeding to staff  - Avoid use of toothpicks and dental floss  - Use electric shaver for shaving  - Use soft bristle tooth brush  - Limit straining and forceful nose blowing  Outcome: Progressing

## 2024-12-08 ENCOUNTER — APPOINTMENT (OUTPATIENT)
Dept: GENERAL RADIOLOGY | Facility: HOSPITAL | Age: 89
End: 2024-12-08
Attending: INTERNAL MEDICINE
Payer: MEDICARE

## 2024-12-08 LAB
ANION GAP SERPL CALC-SCNC: 6 MMOL/L (ref 0–18)
APTT PPP: 32.9 SECONDS (ref 23–36)
APTT PPP: 33.1 SECONDS (ref 23–36)
APTT PPP: 96.1 SECONDS (ref 23–36)
BASOPHILS # BLD AUTO: 0.02 X10(3) UL (ref 0–0.2)
BASOPHILS NFR BLD AUTO: 0.2 %
BUN BLD-MCNC: 35 MG/DL (ref 9–23)
BUN/CREAT SERPL: 23.6 (ref 10–20)
CALCIUM BLD-MCNC: 8.8 MG/DL (ref 8.7–10.4)
CHLORIDE SERPL-SCNC: 101 MMOL/L (ref 98–112)
CO2 SERPL-SCNC: 30 MMOL/L (ref 21–32)
CREAT BLD-MCNC: 1.48 MG/DL
DEPRECATED RDW RBC AUTO: 51 FL (ref 35.1–46.3)
EGFRCR SERPLBLD CKD-EPI 2021: 33 ML/MIN/1.73M2 (ref 60–?)
EOSINOPHIL # BLD AUTO: 0.15 X10(3) UL (ref 0–0.7)
EOSINOPHIL NFR BLD AUTO: 1.5 %
ERYTHROCYTE [DISTWIDTH] IN BLOOD BY AUTOMATED COUNT: 15.5 % (ref 11–15)
GLUCOSE BLD-MCNC: 165 MG/DL (ref 70–99)
GLUCOSE BLDC GLUCOMTR-MCNC: 161 MG/DL (ref 70–99)
GLUCOSE BLDC GLUCOMTR-MCNC: 180 MG/DL (ref 70–99)
GLUCOSE BLDC GLUCOMTR-MCNC: 205 MG/DL (ref 70–99)
GLUCOSE BLDC GLUCOMTR-MCNC: 209 MG/DL (ref 70–99)
HCT VFR BLD AUTO: 37.1 %
HGB BLD-MCNC: 12.3 G/DL
IMM GRANULOCYTES # BLD AUTO: 0.04 X10(3) UL (ref 0–1)
IMM GRANULOCYTES NFR BLD: 0.4 %
LYMPHOCYTES # BLD AUTO: 0.93 X10(3) UL (ref 1–4)
LYMPHOCYTES NFR BLD AUTO: 9.3 %
MAGNESIUM SERPL-MCNC: 2.2 MG/DL (ref 1.6–2.6)
MCH RBC QN AUTO: 29.8 PG (ref 26–34)
MCHC RBC AUTO-ENTMCNC: 33.2 G/DL (ref 31–37)
MCV RBC AUTO: 89.8 FL
MONOCYTES # BLD AUTO: 0.71 X10(3) UL (ref 0.1–1)
MONOCYTES NFR BLD AUTO: 7.1 %
NEUTROPHILS # BLD AUTO: 8.17 X10 (3) UL (ref 1.5–7.7)
NEUTROPHILS # BLD AUTO: 8.17 X10(3) UL (ref 1.5–7.7)
NEUTROPHILS NFR BLD AUTO: 81.5 %
OSMOLALITY SERPL CALC.SUM OF ELEC: 296 MOSM/KG (ref 275–295)
PHOSPHATE SERPL-MCNC: 3.9 MG/DL (ref 2.4–5.1)
PLATELET # BLD AUTO: 204 10(3)UL (ref 150–450)
POTASSIUM SERPL-SCNC: 4.2 MMOL/L (ref 3.5–5.1)
RBC # BLD AUTO: 4.13 X10(6)UL
SODIUM SERPL-SCNC: 137 MMOL/L (ref 136–145)
WBC # BLD AUTO: 10 X10(3) UL (ref 4–11)

## 2024-12-08 PROCEDURE — 99233 SBSQ HOSP IP/OBS HIGH 50: CPT | Performed by: INTERNAL MEDICINE

## 2024-12-08 PROCEDURE — 71045 X-RAY EXAM CHEST 1 VIEW: CPT | Performed by: INTERNAL MEDICINE

## 2024-12-08 RX ORDER — HEPARIN SODIUM 1000 [USP'U]/ML
30 INJECTION, SOLUTION INTRAVENOUS; SUBCUTANEOUS ONCE
Status: COMPLETED | OUTPATIENT
Start: 2024-12-08 | End: 2024-12-08

## 2024-12-08 RX ORDER — FUROSEMIDE 10 MG/ML
20 INJECTION INTRAMUSCULAR; INTRAVENOUS ONCE
Status: COMPLETED | OUTPATIENT
Start: 2024-12-08 | End: 2024-12-08

## 2024-12-08 NOTE — PROGRESS NOTES
[facility]  Progress Note    Isabel Patel Patient Status:  Inpatient    1931 MRN [id number]   Location [facility] 2W/SW Attending Elsa Tsai MD   Hosp Day # 6 PCP Fredrick Cornelius MD     Subjective:  Patient resting in bed.  She reports she feels okay today.  She has minimal abdominal pain.  She denies nausea or vomiting.  She is not passing gas and has not had a bowel movement.  She has been tolerating sips of clears, will try up to chair today.     Objective/Physical Exam:  General: Alert, orientated x3.  Cooperative.  No apparent distress.  Vital Signs:  Blood pressure (!) 159/96, pulse 85, temperature 97.7 °F (36.5 °C), temperature source Temporal, resp. rate 23, height 5' 1\" (1.549 m), weight 159 lb 6.3 oz (72.3 kg), SpO2 93%, not currently breastfeeding.  Wt Readings from Last 3 Encounters:   24 159 lb 6.3 oz (72.3 kg)   24 163 lb (73.9 kg)   24 163 lb (73.9 kg)     Lungs: No respiratory distress. 3 L O2 HFNC.  Cardiac: Regular rate and rhythm.   Abdomen:  Soft,  distended, mildly tender, with no rebound or guarding.  No peritoneal signs.   Extremities:  No lower extremity edema noted.    Incision: Clean, dry, intact, no erythema.       Intake/Output:    Intake/Output Summary (Last 24 hours) at 2024 1033  Last data filed at 2024 0507  Gross per 24 hour   Intake 2661.5 ml   Output 2125 ml   Net 536.5 ml     I/O last 3 completed shifts:  In: 4712.7 [I.V.:2448.9; NG/GT:60; IV PIGGYBACK:396.9]  Out: 2610 [Urine:2410; Emesis/NG output:200]  No intake/output data recorded.    Medications:    metoprolol  7.5 mg Intravenous Q4H    piperacillin-tazobactam  3.375 g Intravenous Q12H    miconazole   Topical BID    [Held by provider] apixaban  5 mg Oral BID    [Held by provider] atorvastatin  20 mg Oral Daily    [Held by provider] carvedilol  12.5 mg Oral BID with meals    [Held by provider] losartan  50 mg Oral Daily    [Transfer Hold] furosemide  20 mg  Intravenous BID (Diuretic)    insulin regular human  1-5 Units Subcutaneous 4 times per day       Labs:  Lab Results   Component Value Date    WBC 10.0 12/08/2024    HGB 12.3 12/08/2024    HCT 37.1 12/08/2024    .0 12/08/2024     Lab Results   Component Value Date     12/08/2024    K 4.2 12/08/2024     12/08/2024    CO2 30.0 12/08/2024    BUN 35 12/08/2024    CREATSERUM 1.48 12/08/2024     12/08/2024    CA 8.8 12/08/2024     Lab Results   Component Value Date    INR 1.41 (H) 12/02/2024    INR 1.0 12/03/2018         Assessment  Patient Active Problem List   Diagnosis    Right knee DJD    Osteoarthritis    Controlled type 2 diabetes mellitus with diabetic nephropathy, without long-term current use of insulin ([facility])    HTN (hypertension)    Hypercholesterolemia    Obesity    Left rotator cuff tear arthropathy    Pessary maintenance    Osteopenia of multiple sites    Pain of right lower extremity    Pelvic relaxation    Uterovaginal prolapse    AGUSTO (stress urinary incontinence, female)    TIA (transient ischemic attack)    Spinal stenosis of lumbar region    Macular degeneration    Mass of skin of left shoulder    Closed fracture of left distal femur (HCC)    Closed bicondylar fracture of distal end of right femur ([facility])    Exudative age-related macular degeneration, right eye, with inactive choroidal neovascularization ([facility])    Anemia    Bilateral lower extremity edema    Asymptomatic menopause    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 3 chronic kidney disease, without long-term current use of insulin ([facility])    Keratosis    Chronic shoulder pain    History of right breast cancer    Urinary incontinence    Atrial fibrillation, new onset ([facility])    Status post biventricular pacemaker    Acute on chronic congestive heart failure, unspecified heart failure type ([facility])    Complete intestinal obstruction, unspecified cause ([facility])    Atrial fibrillation with rapid ventricular response ([facility])     Anticoagulated    Small bowel obstruction (HCC)     POD 2: Exploratory laparotomy      Plan:  Continue clear liquid diet as tolerated, continue TPN  Analgesics as needed  Continue IV abx   Continue care as per CCU  Cardiology following, weaning Cardizem  DVT prophylaxis with heparin GTT, OK from surgery standpoint to resume DOAC once tolerating more PO intake.      Quality:  DVT Mechanical Prophylaxis:   SCDs,    DVT Pharmacologic Prophylaxis   Medication    heparin (Porcine) 12917 units/250mL infusion ACS/AFIB CONTINUOUS    [Held by provider] apixaban (Eliquis) tab 5 mg                Code Status: Full Code  Mills: Mills catheter in place  Mills Duration (in days): 2  Central line:    WILLIE: 12/9/2024        CHRISTIAN Grijalva  12/8/2024  10:33 AM

## 2024-12-08 NOTE — PROGRESS NOTES
[facility]  part of [facility]    Progress Note      Assessment and Plan:   1.  Respiratory impairment-likely multifactorial in this non-smoker.  She likely has mild postoperative edema with bibasilar crackles as well as basilar atelectasis.  The chest x-ray yesterday was unimpressive.     Recommendations:  1.  Okay to floor transfer  2.  Incentive spirometry  3.  Taper oxygen  4.  Out of bed  5.  Will follow clinically     2.  DVT prophylaxis-currently on heparin drip, having been on Eliquis previously     3.  SBO with volvulus status post exploratory laparotomy-doing well clinically.  As per general surgery.     4.  Atrial fibrillation with history of heart failure with preserved ejection fraction-as per cardiology    Subjective:   Isabel Patel is a(n) 89 year old female who is breathing better    Objective:   Blood pressure (!) 159/96, pulse 85, temperature 97.7 °F (36.5 °C), temperature source Temporal, resp. rate 23, height 5' 1\" (1.549 m), weight 159 lb 6.3 oz (72.3 kg), SpO2 93%, not currently breastfeeding.    Physical Exam alert white female  HEENT examination is unremarkable with pupils equal round and reactive to light and accommodation.   Neck without adenopathy, thyromegaly, JVD nor bruit.   Lungs couple basilar crackles to auscultation and percussion.  Cardiac regular rate and rhythm no murmur.   Abdomen nontender with subtle bowel sounds, without hepatosplenomegaly and no mass appreciable.   Extremities without clubbing cyanosis nor edema.   Neurologic grossly intact with symmetric tone and strength and reflex.  Skin without gross abnormality     Results:     Lab Results   Component Value Date    WBC 10.0 12/08/2024    HGB 12.3 12/08/2024    HCT 37.1 12/08/2024    .0 12/08/2024    CREATSERUM 1.48 12/08/2024    BUN 35 12/08/2024     12/08/2024    K 4.2 12/08/2024     12/08/2024    CO2 30.0 12/08/2024     12/08/2024    CA 8.8 12/08/2024    PTT 33.1  12/08/2024    MG 2.2 12/08/2024    PHOS 3.9 12/08/2024       Rosalino Nguyen MD  Medical Director, Critical Care, [facility]  Medical Director, [facility]  Pager: [phone]

## 2024-12-08 NOTE — PROGRESS NOTES
Cardiology Progress Note    Isabel Patel Patient Status:  Inpatient    1931 MRN [id number]   Location [facility] 2W/SW Attending Claudia Fletcher MD   Hosp Day # 6 PCP Fredrick Cornelius MD       Subjective:     Resting comfortably. No complaints. No acute overnight events.     Objective:   Temp: 97.4 °F (36.3 °C)  Pulse: 82  Resp: 20  BP: 146/75    Intake/Output:     Intake/Output Summary (Last 24 hours) at 2024 0830  Last data filed at 2024 0507  Gross per 24 hour   Intake 2711.5 ml   Output 2225 ml   Net 486.5 ml       Last 3 Weights   24 0507 159 lb 6.3 oz (72.3 kg)   24 0700 154 lb (69.9 kg)   24 1817 152 lb 6.4 oz (69.1 kg)   24 0423 146 lb 9.6 oz (66.5 kg)   24 0358 144 lb 11.2 oz (65.6 kg)   24 0520 141 lb (64 kg)   24 1146 147 lb (66.7 kg)   24 0558 160 lb (72.6 kg)   24 1107 163 lb (73.9 kg)   24 0521 163 lb (73.9 kg)   24 0340 163 lb 9.6 oz (74.2 kg)   02/15/24 0632 169 lb 4.8 oz (76.8 kg)   24 0615 167 lb (75.8 kg)   24 0449 165 lb 12.8 oz (75.2 kg)   24 1615 164 lb 14.4 oz (74.8 kg)   24 1229 163 lb (73.9 kg)         Physical Exam:     General: Alert.No respiratory or constitutional distress.  HEENT: Normocephalic, anicteric sclera, neck supple.   Cardiac: Irregularly irregular.  Normal rate.  S1, S2 normal.  No murmur.  Lungs: Dim anterior fields.  Abdomen: Soft, surgical dressing in place.  Extremities: Without clubbing, cyanosis or significant edema.    Neurologic: Alert.  Skin: Warm and dry.     Laboratory/Data:    Labs:         Recent Labs   Lab 24  0654 24  0650 24  1601 24  0432 24  0855 24  1521 24  0424 24  0416   WBC 8.4   < > 8.7  --  7.9 8.1 14.2* 10.0   HGB 14.7   < > 13.7  --  14.4 14.5 14.2 12.3   MCV 88.3   < > 90.2  --  88.8 88.9 90.4 89.8   .0   < > 278.0 288.0 267.0 271.0 256.0 204.0   INR 1.41*  --   --   --   --    --   --   --     < > = values in this interval not displayed.       Recent Labs   Lab 12/03/24  0650 12/03/24  2009 12/05/24  1715 12/06/24  0855 12/06/24  0929 12/06/24  1521 12/07/24 0424 12/08/24 0416      < > 137 134*  --  137 135*  135* 137   K 3.5   < > 3.6  --  3.1* 4.5 4.5  4.5  4.5 4.2   CL 97*   < > 98 97*  --  100 101  101 101   CO2 32.0   < > 32.0 33.0*  --  32.0 28.0  28.0 30.0   BUN 22   < > 22  --  20 21 23  23 35*   CREATSERUM 1.60*   < > 1.61* 1.80*  --  1.63* 1.57*  1.57* 1.48*   CA 9.9   < > 9.0 8.9  --  8.9 8.7  8.7 8.8   MG 2.3  --  2.1  --  1.7  --  2.2  2.2 2.2   PHOS  --   --  3.2  --  2.6  --  3.6 3.9   *   < > 135* 172*  --  176* 250*  250* 165*    < > = values in this interval not displayed.       Recent Labs   Lab 12/02/24  0654 12/05/24 1715 12/07/24 0424   ALT 10  --  48   AST 15  --  28   ALB 4.4 3.4 3.1*       No results for input(s): \"TROP\" in the last 168 hours.    Allergies:   Allergies[1]    Medications:  Current Facility-Administered Medications   Medication Dose Route Frequency    metoprolol (Lopressor) 5 mg/5mL injection 7.5 mg  7.5 mg Intravenous Q4H    adult 3 in 1 TPN   Intravenous Continuous TPN    HYDROmorphone (Dilaudid) 1 MG/ML injection 0.1 mg  0.1 mg Intravenous Q2H PRN    Or    HYDROmorphone (Dilaudid) 1 MG/ML injection 0.2 mg  0.2 mg Intravenous Q2H PRN    Or    HYDROmorphone (Dilaudid) 1 MG/ML injection 0.4 mg  0.4 mg Intravenous Q2H PRN    dextrose 10% infusion (TPN no rate)   Intravenous Continuous PRN    piperacillin-tazobactam (Zosyn) 3.375 g in dextrose 5% 100 mL IVPB-ADDV  3.375 g Intravenous Q12H    heparin (Porcine) 69195 units/250mL infusion ACS/AFIB CONTINUOUS  200-3,000 Units/hr Intravenous Continuous    acetaminophen (Ofirmev) 10 mg/mL infusion premix 1,000 mg  1,000 mg Intravenous Q6H PRN    miconazole 2 % powder   Topical BID    [Held by provider] apixaban (Eliquis) tab 5 mg  5 mg Oral BID    [Held by provider] atorvastatin  (Lipitor) tab 20 mg  20 mg Oral Daily    [Held by provider] carvedilol (Coreg) tab 12.5 mg  12.5 mg Oral BID with meals    [Held by provider] losartan (Cozaar) tab 50 mg  50 mg Oral Daily    acetaminophen (Tylenol Extra Strength) tab 500 mg  500 mg Oral Q4H PRN    ondansetron (Zofran) 4 MG/2ML injection 4 mg  4 mg Intravenous Q6H PRN    [Transfer Hold] dilTIAZem 10 mg BOLUS FROM BAG infusion  10 mg Intravenous Q1H PRN    dilTIAZem (cardIZEM) 100 mg in sodium chloride 0.9% 100 mL IVPB-ADDV  2.5-20 mg/hr Intravenous Continuous    [Transfer Hold] furosemide (Lasix) 10 mg/mL injection 20 mg  20 mg Intravenous BID (Diuretic)    glucose (Dex4) 15 GM/59ML oral liquid 15 g  15 g Oral Q15 Min PRN    Or    glucose (Glutose) 40% oral gel 15 g  15 g Oral Q15 Min PRN    Or    glucose-vitamin C (Dex-4) chewable tab 4 tablet  4 tablet Oral Q15 Min PRN    Or    dextrose 50% injection 50 mL  50 mL Intravenous Q15 Min PRN    Or    glucose (Dex4) 15 GM/59ML oral liquid 30 g  30 g Oral Q15 Min PRN    Or    glucose (Glutose) 40% oral gel 30 g  30 g Oral Q15 Min PRN    Or    glucose-vitamin C (Dex-4) chewable tab 8 tablet  8 tablet Oral Q15 Min PRN    insulin regular human (Novolin R, Humulin R) 100 UNIT/ML injection 1-5 Units  1-5 Units Subcutaneous 4 times per day         Assessment:  SBO s/p ex lap noting prior closed loop obstruction 12/7   Permanent Afib   SSS s/p hx DC PPM  Acute on chronic HFrEF (newly noted), LVEF 25-30% - w/ valvular component via severe TR, severe pulm HTN  Pulmonic valve veg? - Bcx negative thus far   CKD3  DM2   HTN  HLD      Plan:  Wean cardizem. Keep IV scheduled lopressor. Expect HR improvement as her concurrent issues improve.  Hep gtt resumed. Plan resumption of DOAC when safe from surgery perspective.  IV lasix 20 mg x1 today. Cxr small left pleural effusion.   Repeat limited TTE once acute illness has improved. Depending on findings may pursue ischemia eval/KRISTA for valvular eval.   Will cont to  monitor      Siddharth Florence DO  Cardiologist  [facility]  12/8/2024 8:30 AM          Note to the patient: The 21st Century Cures Act makes medical notes like these available to patients in the interest of transparency. However, be advised that this is a medical document. It is intended as peer to peer communication. It is written in medical language and may contain abbreviations or verbiage that are unfamiliar. It may appear blunt or direct. Medical documents are intended to carry relevant information, facts as evident, and clinical opinion of the practitioner.     Disclaimer: Components of this note were documented using voice recognition system and are subject to errors not corrected at proofreading. Contact the author of this note for any clarifications.        [1]   Allergies  Allergen Reactions    Adhesive Tape RASH

## 2024-12-08 NOTE — PROGRESS NOTES
[facility]  part of [facility]    Progress Note    Isabel Patel Patient Status:  Inpatient    1931 MRN [id number]   Location [facility] 2W/SW Attending Elsa Tsai MD   Hosp Day # 6 PCP Fredrick Cornelius MD     Chief Complaint:     Acute on chronic congestive heart failure    Subjective:   Subjective:    Patient seen and examined this morning  Sitting up in bed.  Denies any worsening abd pain or nausea.     Objective:   Blood pressure (!) 159/96, pulse 85, temperature 97.7 °F (36.5 °C), temperature source Temporal, resp. rate 23, height 5' 1\" (1.549 m), weight 159 lb 6.3 oz (72.3 kg), SpO2 93%, not currently breastfeeding.  Physical Exam    General: Patient is alert and oriented x2 does not appear to be in acute distress at this time  HEENT: EOMI PERRLA, atraumatic normocephalic  Cardiac: S1-S2 appreciated  Lungs: decreased bs  Abdomen: Soft   Ext: Peripheral pulses are positive  Neuro: No acute focal deficits noted  Psych: Normal mood  Skin: No rashes noted  MSK: Full range of motion intact      Results:   Lab Results   Component Value Date    WBC 10.0 2024    HGB 12.3 2024    HCT 37.1 2024    .0 2024    CREATSERUM 1.48 (H) 2024    BUN 35 (H) 2024     2024    K 4.2 2024     2024    CO2 30.0 2024     (H) 2024    CA 8.8 2024    ALB 3.1 (L) 2024    ALKPHO 97 2024    BILT 0.7 2024    TP 5.6 (L) 2024    AST 28 2024    ALT 48 2024    PTT 33.1 2024    INR 1.41 (H) 2024    TSH 1.508 2024    LIP 32 2024    DDIMER 2.97 (H) 2024    MG 2.2 2024    PHOS 3.9 2024    TROP 0.00 2018    TROPHS 12 2024    B12 599 2021       XR CHEST AP PORTABLE  (CPT=71045)    Result Date: 2024  CONCLUSION:   Unchanged small left pleural effusion.  Unchanged bibasilar opacities.      Dictated by (CST): Abdiel  MD Darcy on 12/08/2024 at 7:44 AM     Finalized by (CST): Darcy Meadows MD on 12/08/2024 at 7:46 AM          XR CHEST AP PORTABLE  (CPT=71045)    Result Date: 12/7/2024  PROCEDURE: XR CHEST AP PORTABLE  (CPT=71045) TIME: 12 50.   COMPARISON: [facility], XR CHEST AP PORTABLE (CPT=71045), 12/06/2024, 2:52 PM.  INDICATIONS: Hypoxia  TECHNIQUE:   Single view.   FINDINGS/IMPRESSION:   1. The heart and mediastinal structures are enlarged.  Pulmonary vascularity is increased.  There is a moderate-sized left-sided pleural effusion.  The findings are compatible with slight to moderate fluid overload.  2. Lung volumes are diminished.  There are streaky opacities within the right mid lung region and left lower lobe which could represent atelectasis, early/small infiltrates, or a combination in addition to the left pleural effusion.  3. There is a right sided PICC line with tip overlying the cavoatrial junction.  The thoracic component of an enteric feeding tube is identified traversing the thoracic esophagus.  The distal tip is not visualized but lies below the GE junction.       Dictated by (CST): Germain Zurita MD on 12/07/2024 at 2:18 PM     Finalized by (CST): Germain Zurita MD on 12/07/2024 at 2:21 PM          XR CHEST AP PORTABLE  (CPT=71045)    Result Date: 12/6/2024  CONCLUSION:   Right upper extremity PICC terminates at the cavoatrial junction.  No pneumothorax.  Multiple additional findings are not significantly changed when compared to 12/02/2024    Dictated by (CST): Joseluis Hoover MD on 12/06/2024 at 3:50 PM     Finalized by (CST): Joseluis Hoover MD on 12/06/2024 at 3:52 PM               Assessment & Plan:     Acute respiratory failure with hypoxia  -wean oxygen as able  -cxr reviewed  -monitor closely.   -pulmonology consult.    Small bowel obstruction.    -Likely due to adhesive disease from previous abdominal surgery.    -12/5: OR held due to RRT due to AMS- neuro cleared, CT  negative ok to proceed with surgery  -plan for OR 12/6/2024Pain controlled.  -Surgical consult appreciated  -NG tube: removed  -CLD  -Pain control prn  -Encourage activity as tolerated  -per surg, pt is on iv abx     Atrial fibrillation, permanent.  With RVR.  Hemodynamically stable.   Acute exacerbation of HF with reduced EF  Anticoagulation with Eliquis on hold.  -Cardiology consult appreciated  -Monitor on telemetry  -Monitor electrolytes  -Anticoagulation as above  -Rate/rhythm control with IV metoprolol  -Wean diltiazem gtt   -Resume doac when cleared by surgery  -give a dose of IV lasix today  -repeat echo once acute illness improved, depending on findings may do ischemic work up     Other problems  CKD stage III  Type 2 diabetes  Permanent pacemaker for sick sinus syndrome  Hypertension  Dyslipidemia  Arthritis  Aortic stenosis, mild  Moderate pulmonary hypertension     DVT Mechanical Prophylaxis:   SCDs,        DVT Pharmacologic Prophylaxis   Medication    [Held by provider] apixaban (Eliquis) tab 5 mg    [Held by provider] heparin (Porcine) 28065 units/250mL infusion PE/DVT/THROMBUS CONTINUOUS               code status: Full  dispo: home upon medical clearance  If applicable, malnutrition status at bottom of note     I personally reviewed the available laboratories, imaging including. I discussed/will discuss the case with consultants. I ordered laboratories and/or radiographic studies. I adjusted medications as detailed above.  Medical decision making high, risk is high.

## 2024-12-08 NOTE — PLAN OF CARE
Patient A/Ox4, had some bouts of confusion in the am, returned to baseline after redirection. Follows commands. 3 L HF N/C. Patient in A-fib, on heparin gtt, which was titrated according to protocol. LUE DVT. BUE limb alert. Patient is NPO. Mills in place. IV Offirmev given 12/7 @ 2300 for neck pain. Cardizem gtt and TPN continued. Safety checks maintained, bed in lowest locked position.   Problem: Patient Centered Care  Goal: Patient preferences are identified and integrated in the patient's plan of care  Description: Interventions:  - What would you like us to know as we care for you? From home with son  - Provide timely, complete, and accurate information to patient/family  - Incorporate patient and family knowledge, values, beliefs, and cultural backgrounds into the planning and delivery of care  - Encourage patient/family to participate in care and decision-making at the level they choose  - Honor patient and family perspectives and choices  Outcome: Progressing     Problem: Patient/Family Goals  Goal: Patient/Family Long Term Goal  Description: Patient's Long Term Goal: go home    Interventions:  - go home  - See additional Care Plan goals for specific interventions  Outcome: Progressing  Goal: Patient/Family Short Term Goal  Description: Patient's Short Term Goal: clear SBO    Interventions:   - NGT LIS  -NPO  - See additional Care Plan goals for specific interventions  Outcome: Progressing     Problem: CARDIOVASCULAR - ADULT  Goal: Maintains optimal cardiac output and hemodynamic stability  Description: INTERVENTIONS:  - Monitor vital signs, rhythm, and trends  - Monitor for bleeding, hypotension and signs of decreased cardiac output  - Evaluate effectiveness of vasoactive medications to optimize hemodynamic stability  - Monitor arterial and/or venous puncture sites for bleeding and/or hematoma  - Assess quality of pulses, skin color and temperature  - Assess for signs of decreased coronary artery perfusion  - ex. Angina  - Evaluate fluid balance, assess for edema, trend weights  Outcome: Progressing  Goal: Absence of cardiac arrhythmias or at baseline  Description: INTERVENTIONS:  - Continuous cardiac monitoring, monitor vital signs, obtain 12 lead EKG if indicated  - Evaluate effectiveness of antiarrhythmic and heart rate control medications as ordered  - Initiate emergency measures for life threatening arrhythmias  - Monitor electrolytes and administer replacement therapy as ordered  Outcome: Progressing     Problem: RESPIRATORY - ADULT  Goal: Achieves optimal ventilation and oxygenation  Description: INTERVENTIONS:  - Assess for changes in respiratory status  - Assess for changes in mentation and behavior  - Position to facilitate oxygenation and minimize respiratory effort  - Oxygen supplementation based on oxygen saturation or ABGs  - Provide Smoking Cessation handout, if applicable  - Encourage broncho-pulmonary hygiene including cough, deep breathe, Incentive Spirometry  - Assess the need for suctioning and perform as needed  - Assess and instruct to report SOB or any respiratory difficulty  - Respiratory Therapy support as indicated  - Manage/alleviate anxiety  - Monitor for signs/symptoms of CO2 retention  Outcome: Progressing     Problem: GASTROINTESTINAL - ADULT  Goal: Minimal or absence of nausea and vomiting  Description: INTERVENTIONS:  - Maintain adequate hydration with IV or PO as ordered and tolerated  - Nasogastric tube to low intermittent suction as ordered  - Evaluate effectiveness of ordered antiemetic medications  - Provide nonpharmacologic comfort measures as appropriate  - Advance diet as tolerated, if ordered  - Obtain nutritional consult as needed  - Evaluate fluid balance  Outcome: Progressing  Goal: Maintains or returns to baseline bowel function  Description: INTERVENTIONS:  - Assess bowel function  - Maintain adequate hydration with IV or PO as ordered and tolerated  - Evaluate  effectiveness of GI medications  - Encourage mobilization and activity  - Obtain nutritional consult as needed  - Establish a toileting routine/schedule  - Consider collaborating with pharmacy to review patient's medication profile  Outcome: Progressing     Problem: SAFETY ADULT - FALL  Goal: Free from fall injury  Description: INTERVENTIONS:  - Assess pt frequently for physical needs  - Identify cognitive and physical deficits and behaviors that affect risk of falls.  - [place] fall precautions as indicated by assessment.  - Educate pt/family on patient safety including physical limitations  - Instruct pt to call for assistance with activity based on assessment  - Modify environment to reduce risk of injury  - Provide assistive devices as appropriate  - Consider OT/PT consult to assist with strengthening/mobility  - Encourage toileting schedule  Outcome: Progressing     Problem: DISCHARGE PLANNING  Goal: Discharge to home or other facility with appropriate resources  Description: INTERVENTIONS:  - Identify barriers to discharge w/pt and caregiver  - Include patient/family/discharge partner in discharge planning  - Arrange for needed discharge resources and transportation as appropriate  - Identify discharge learning needs (meds, wound care, etc)  - Arrange for interpreters to assist at discharge as needed  - Consider post-discharge preferences of patient/family/discharge partner  - Complete POLST form as appropriate  - Assess patient's ability to be responsible for managing their own health  - Refer to Case Management Department for coordinating discharge planning if the patient needs post-hospital services based on physician/LIP order or complex needs related to functional status, cognitive ability or social support system  Outcome: Progressing     Problem: HEMATOLOGIC - ADULT  Goal: Free from bleeding injury  Description: (Example usage: patient with low platelets)  INTERVENTIONS:  - Avoid intramuscular  injections, enemas and rectal medication administration  - Ensure safe mobilization of patient  - Hold pressure on venipuncture sites to achieve adequate hemostasis  - Assess for signs and symptoms of internal bleeding  - Monitor lab trends  - Patient is to report abnormal signs of bleeding to staff  - Avoid use of toothpicks and dental floss  - Use electric shaver for shaving  - Use soft bristle tooth brush  - Limit straining and forceful nose blowing  Outcome: Progressing

## 2024-12-09 LAB
ANION GAP SERPL CALC-SCNC: 9 MMOL/L (ref 0–18)
APTT PPP: 32 SECONDS (ref 23–36)
APTT PPP: 44.9 SECONDS (ref 23–36)
APTT PPP: 49.5 SECONDS (ref 23–36)
BASOPHILS # BLD AUTO: 0.03 X10(3) UL (ref 0–0.2)
BASOPHILS NFR BLD AUTO: 0.4 %
BUN BLD-MCNC: 38 MG/DL (ref 9–23)
BUN/CREAT SERPL: 27.1 (ref 10–20)
CALCIUM BLD-MCNC: 8.5 MG/DL (ref 8.7–10.4)
CHLORIDE SERPL-SCNC: 95 MMOL/L (ref 98–112)
CO2 SERPL-SCNC: 29 MMOL/L (ref 21–32)
CREAT BLD-MCNC: 1.4 MG/DL
DEPRECATED RDW RBC AUTO: 51.2 FL (ref 35.1–46.3)
EGFRCR SERPLBLD CKD-EPI 2021: 35 ML/MIN/1.73M2 (ref 60–?)
EOSINOPHIL # BLD AUTO: 0.29 X10(3) UL (ref 0–0.7)
EOSINOPHIL NFR BLD AUTO: 3.4 %
ERYTHROCYTE [DISTWIDTH] IN BLOOD BY AUTOMATED COUNT: 15.7 % (ref 11–15)
GLUCOSE BLD-MCNC: 266 MG/DL (ref 70–99)
GLUCOSE BLDC GLUCOMTR-MCNC: 201 MG/DL (ref 70–99)
GLUCOSE BLDC GLUCOMTR-MCNC: 201 MG/DL (ref 70–99)
GLUCOSE BLDC GLUCOMTR-MCNC: 222 MG/DL (ref 70–99)
HCT VFR BLD AUTO: 34.4 %
HGB BLD-MCNC: 11.3 G/DL
IMM GRANULOCYTES # BLD AUTO: 0.03 X10(3) UL (ref 0–1)
IMM GRANULOCYTES NFR BLD: 0.4 %
LYMPHOCYTES # BLD AUTO: 0.92 X10(3) UL (ref 1–4)
LYMPHOCYTES NFR BLD AUTO: 10.8 %
MAGNESIUM SERPL-MCNC: 2 MG/DL (ref 1.6–2.6)
MCH RBC QN AUTO: 29.3 PG (ref 26–34)
MCHC RBC AUTO-ENTMCNC: 32.8 G/DL (ref 31–37)
MCV RBC AUTO: 89.1 FL
MONOCYTES # BLD AUTO: 0.69 X10(3) UL (ref 0.1–1)
MONOCYTES NFR BLD AUTO: 8.1 %
NEUTROPHILS # BLD AUTO: 6.53 X10 (3) UL (ref 1.5–7.7)
NEUTROPHILS # BLD AUTO: 6.53 X10(3) UL (ref 1.5–7.7)
NEUTROPHILS NFR BLD AUTO: 76.9 %
OSMOLALITY SERPL CALC.SUM OF ELEC: 294 MOSM/KG (ref 275–295)
PHOSPHATE SERPL-MCNC: 4 MG/DL (ref 2.4–5.1)
PLATELET # BLD AUTO: 194 10(3)UL (ref 150–450)
POTASSIUM SERPL-SCNC: 3.3 MMOL/L (ref 3.5–5.1)
RBC # BLD AUTO: 3.86 X10(6)UL
SODIUM SERPL-SCNC: 133 MMOL/L (ref 136–145)
WBC # BLD AUTO: 8.5 X10(3) UL (ref 4–11)

## 2024-12-09 PROCEDURE — 99233 SBSQ HOSP IP/OBS HIGH 50: CPT | Performed by: INTERNAL MEDICINE

## 2024-12-09 RX ORDER — HEPARIN SODIUM AND DEXTROSE 10000; 5 [USP'U]/100ML; G/100ML
INJECTION INTRAVENOUS CONTINUOUS
Status: DISCONTINUED | OUTPATIENT
Start: 2024-12-09 | End: 2024-12-09

## 2024-12-09 RX ORDER — HEPARIN SODIUM 1000 [USP'U]/ML
30 INJECTION, SOLUTION INTRAVENOUS; SUBCUTANEOUS ONCE
Status: COMPLETED | OUTPATIENT
Start: 2024-12-09 | End: 2024-12-09

## 2024-12-09 RX ORDER — HEPARIN SODIUM AND DEXTROSE 10000; 5 [USP'U]/100ML; G/100ML
INJECTION INTRAVENOUS CONTINUOUS
Status: DISCONTINUED | OUTPATIENT
Start: 2024-12-09 | End: 2024-12-12

## 2024-12-09 NOTE — PROGRESS NOTES
[facility]  part of [facility]    Progress Note      Assessment and Plan:   1.  Respiratory impairment-likely multifactorial in this non-smoker.  She likely has mild postoperative edema with bibasilar crackles as well as basilar atelectasis.  The chest x-ray yesterday was unimpressive.  No new respiratory complaints.     Recommendations:  1.  Okay to floor transfer  2.  Incentive spirometry  3.  Taper oxygen  4.  Out of bed  5.  Will follow clinically     2.  DVT prophylaxis-currently on heparin drip, having been on Eliquis previously     3.  SBO with volvulus status post exploratory laparotomy-I found the abdominal exam to be more tender today and the patient complains of more pain.  Discussed with general surgery.    Recommendations: As per general surgery.     4.  Atrial fibrillation with history of heart failure with preserved ejection fraction-as per cardiology    Subjective:   Isabel Patel is a(n) 89 year old female who is breathing comfortably but has more abdominal pain.    Objective:   Blood pressure 128/78, pulse 80, temperature 98.6 °F (37 °C), temperature source Temporal, resp. rate 23, height 5' 1\" (1.549 m), weight 162 lb 14.7 oz (73.9 kg), SpO2 97%, not currently breastfeeding.    Physical Exam alert white female  HEENT examination is unremarkable with pupils equal round and reactive to light and accommodation.   Neck without adenopathy, thyromegaly, JVD nor bruit.   Lungs couple basilar crackles to auscultation and percussion.  Cardiac regular rate and rhythm no murmur.   Abdomen tender with fullness at the mid abdomen with subtle bowel sounds, without hepatosplenomegaly and no mass appreciable.   Extremities without clubbing cyanosis nor edema.   Neurologic grossly intact with symmetric tone and strength and reflex.  Skin without gross abnormality     Results:     Lab Results   Component Value Date    WBC 8.5 12/09/2024    HGB 11.3 12/09/2024    HCT 34.4 12/09/2024    PLT  194.0 12/09/2024    CREATSERUM 1.40 12/09/2024    BUN 38 12/09/2024     12/09/2024    K 3.3 12/09/2024    CL 95 12/09/2024    CO2 29.0 12/09/2024     12/09/2024    CA 8.5 12/09/2024    PTT 32.0 12/09/2024    MG 2.0 12/09/2024    PHOS 4.0 12/09/2024       Rosalino Nguyen MD  Medical Director, Critical Care, [facility]  Medical Director, [facility]  Pager: [phone]

## 2024-12-09 NOTE — PROGRESS NOTES
[facility] - CARDIOLOGY PROGRESS NOTE  Isabel Patel Patient Status:  Inpatient    1931 MRN [id number]   Location [facility] 2W/SW Attending Kathya Cook MD   Hosp Day # 7 PCP Fredrick Cornelius MD     Assessment:    1.  Small bowel obstruction, status post exploratory laparotomy 2024.    2.  Persistent atrial fibrillation.  Rate controlled.  On low-dose IV Cardizem drip.  Still NPO.  On heparin drip.    3.  Permanent dual-chamber pacemaker.    4.  New severe left ventricular dysfunction.    5.  Positive fluid balance, but generally appears euvolemic.  Few basilar crackles.  Hyponatremic and hypokalemic.  Will hold off on diuretics today.    6.  KEIL.  Creatinine slowly improving.    Plan:    When able to take p.o. meds, we will wean off Cardizem drip and resume GDMT for left ventricular dysfunction, including beta-blocker.  Hold losartan for now until creatinine stabilizes.    Resume DOAC and stop heparin drip when able to take p.o., and when surgical recovery is stable enough.      Subjective:  No chest pain or shortness of breath.    Objective:  /76 (BP Location: Right leg)   Pulse 89   Temp 98.8 °F (37.1 °C) (Temporal)   Resp 22   Ht 154.9 cm (5' 1\")   Wt 162 lb 14.7 oz (73.9 kg)   SpO2 93%   BMI 30.78 kg/m²     Temp (24hrs), Av.4 °F (36.9 °C), Min:97.7 °F (36.5 °C), Max:98.9 °F (37.2 °C)      Intake/Output:    Intake/Output Summary (Last 24 hours) at 2024 0908  Last data filed at 2024 0522  Gross per 24 hour   Intake 2485.97 ml   Output 1550 ml   Net 935.97 ml       Wt Readings from Last 2 Encounters:   24 162 lb 14.7 oz (73.9 kg)   24 163 lb (73.9 kg)       Physical Exam:    General: Alert and oriented x 3,  No apparent distress.  HEENT: No focal deficits.  Neck: No JVD, carotids 2+ no bruits.  Cardiac: Regular rate and rhythm, S1, S2 normal, no  murmur  Lungs: Basilar crackles, right greater than left.  Normal excursions and effort.  Abdomen: Soft, non-tender.   Extremities: Without clubbing, cyanosis or edema.  Peripheral pulses are 2+.  Skin: Warm and dry.  Good distal perfusion.    Labs:  Lab Results   Component Value Date    WBC 8.5 12/09/2024    HGB 11.3 12/09/2024    HCT 34.4 12/09/2024    .0 12/09/2024     Lab Results   Component Value Date    INR 1.41 (H) 12/02/2024     Lab Results   Component Value Date     12/09/2024    K 3.3 12/09/2024    CL 95 12/09/2024    CO2 29.0 12/09/2024    BUN 38 12/09/2024    CREATSERUM 1.40 12/09/2024     12/09/2024    MG 2.0 12/09/2024    CA 8.5 12/09/2024        Lab Results   Component Value Date    TROP 0.00 12/03/2018        Medications:     potassium chloride  40 mEq Intravenous Once    metoprolol  7.5 mg Intravenous Q4H    piperacillin-tazobactam  3.375 g Intravenous Q12H    miconazole   Topical BID    [Held by provider] apixaban  5 mg Oral BID    [Held by provider] atorvastatin  20 mg Oral Daily    [Held by provider] carvedilol  12.5 mg Oral BID with meals    [Held by provider] losartan  50 mg Oral Daily    [Transfer Hold] furosemide  20 mg Intravenous BID (Diuretic)    insulin regular human  1-5 Units Subcutaneous 4 times per day      adult 3 in 1 TPN 83.3 mL/hr at 12/08/24 2120    dextrose 10%      continuous dose heparin 300 Units/hr (12/08/24 2152)    dilTIAZem 5 mg/hr (12/08/24 2352)       Cal Banks MD  12/9/2024  9:08 AM

## 2024-12-09 NOTE — PLAN OF CARE
Problem: Patient Centered Care  Goal: Patient preferences are identified and integrated in the patient's plan of care  Description: Interventions:  - What would you like us to know as we care for you? From home with son  - Provide timely, complete, and accurate information to patient/family  - Incorporate patient and family knowledge, values, beliefs, and cultural backgrounds into the planning and delivery of care  - Encourage patient/family to participate in care and decision-making at the level they choose  - Honor patient and family perspectives and choices  Outcome: Progressing     Problem: Patient/Family Goals  Goal: Patient/Family Long Term Goal  Description: Patient's Long Term Goal: go home    Interventions:  - go home  - See additional Care Plan goals for specific interventions  Outcome: Progressing  Goal: Patient/Family Short Term Goal  Description: Patient's Short Term Goal: clear SBO    Interventions:   - NGT LIS  -NPO  - See additional Care Plan goals for specific interventions  Outcome: Progressing     Problem: CARDIOVASCULAR - ADULT  Goal: Maintains optimal cardiac output and hemodynamic stability  Description: INTERVENTIONS:  - Monitor vital signs, rhythm, and trends  - Monitor for bleeding, hypotension and signs of decreased cardiac output  - Evaluate effectiveness of vasoactive medications to optimize hemodynamic stability  - Monitor arterial and/or venous puncture sites for bleeding and/or hematoma  - Assess quality of pulses, skin color and temperature  - Assess for signs of decreased coronary artery perfusion - ex. Angina  - Evaluate fluid balance, assess for edema, trend weights  Outcome: Progressing  Goal: Absence of cardiac arrhythmias or at baseline  Description: INTERVENTIONS:  - Continuous cardiac monitoring, monitor vital signs, obtain 12 lead EKG if indicated  - Evaluate effectiveness of antiarrhythmic and heart rate control medications as ordered  - Initiate emergency measures for  I have reviewed discharge instructions with the patient. The patient verbalized understanding. Patient left ED via Discharge Method: ambulatory to Home with himself. Opportunity for questions and clarification provided. Patient given 0 scripts. To continue your aftercare when you leave the hospital, you may receive an automated call from our care team to check in on how you are doing. This is a free service and part of our promise to provide the best care and service to meet your aftercare needs.  If you have questions, or wish to unsubscribe from this service please call [phone]. Thank you for Choosing our [facility] Emergency Department. life threatening arrhythmias  - Monitor electrolytes and administer replacement therapy as ordered  Outcome: Progressing     Problem: RESPIRATORY - ADULT  Goal: Achieves optimal ventilation and oxygenation  Description: INTERVENTIONS:  - Assess for changes in respiratory status  - Assess for changes in mentation and behavior  - Position to facilitate oxygenation and minimize respiratory effort  - Oxygen supplementation based on oxygen saturation or ABGs  - Provide Smoking Cessation handout, if applicable  - Encourage broncho-pulmonary hygiene including cough, deep breathe, Incentive Spirometry  - Assess the need for suctioning and perform as needed  - Assess and instruct to report SOB or any respiratory difficulty  - Respiratory Therapy support as indicated  - Manage/alleviate anxiety  - Monitor for signs/symptoms of CO2 retention  Outcome: Progressing     Problem: GASTROINTESTINAL - ADULT  Goal: Minimal or absence of nausea and vomiting  Description: INTERVENTIONS:  - Maintain adequate hydration with IV or PO as ordered and tolerated  - Nasogastric tube to low intermittent suction as ordered  - Evaluate effectiveness of ordered antiemetic medications  - Provide nonpharmacologic comfort measures as appropriate  - Advance diet as tolerated, if ordered  - Obtain nutritional consult as needed  - Evaluate fluid balance  Outcome: Progressing  Goal: Maintains or returns to baseline bowel function  Description: INTERVENTIONS:  - Assess bowel function  - Maintain adequate hydration with IV or PO as ordered and tolerated  - Evaluate effectiveness of GI medications  - Encourage mobilization and activity  - Obtain nutritional consult as needed  - Establish a toileting routine/schedule  - Consider collaborating with pharmacy to review patient's medication profile  Outcome: Progressing     Problem: SAFETY ADULT - FALL  Goal: Free from fall injury  Description: INTERVENTIONS:  - Assess pt frequently for physical needs  - Identify  cognitive and physical deficits and behaviors that affect risk of falls.  - [place] fall precautions as indicated by assessment.  - Educate pt/family on patient safety including physical limitations  - Instruct pt to call for assistance with activity based on assessment  - Modify environment to reduce risk of injury  - Provide assistive devices as appropriate  - Consider OT/PT consult to assist with strengthening/mobility  - Encourage toileting schedule  Outcome: Progressing     Problem: DISCHARGE PLANNING  Goal: Discharge to home or other facility with appropriate resources  Description: INTERVENTIONS:  - Identify barriers to discharge w/pt and caregiver  - Include patient/family/discharge partner in discharge planning  - Arrange for needed discharge resources and transportation as appropriate  - Identify discharge learning needs (meds, wound care, etc)  - Arrange for interpreters to assist at discharge as needed  - Consider post-discharge preferences of patient/family/discharge partner  - Complete POLST form as appropriate  - Assess patient's ability to be responsible for managing their own health  - Refer to Case Management Department for coordinating discharge planning if the patient needs post-hospital services based on physician/LIP order or complex needs related to functional status, cognitive ability or social support system  Outcome: Progressing     Problem: HEMATOLOGIC - ADULT  Goal: Free from bleeding injury  Description: (Example usage: patient with low platelets)  INTERVENTIONS:  - Avoid intramuscular injections, enemas and rectal medication administration  - Ensure safe mobilization of patient  - Hold pressure on venipuncture sites to achieve adequate hemostasis  - Assess for signs and symptoms of internal bleeding  - Monitor lab trends  - Patient is to report abnormal signs of bleeding to staff  - Avoid use of toothpicks and dental floss  - Use electric shaver for shaving  - Use soft bristle tooth  brush  - Limit straining and forceful nose blowing  Outcome: Progressing

## 2024-12-09 NOTE — PROGRESS NOTES
[facility]  part of [facility]    Progress Note    Isabel Patel Patient Status:  Inpatient    1931 MRN [id number]   Location [facility] 2W/SW Attending Elsa Tsai MD   Hosp Day # 7 PCP Fredrick Cornelius MD     Chief Complaint:     Acute on chronic congestive heart failure    Subjective:   Subjective:    Patient up in the chair.  At the time of my exam she did not report any increased pain.  Stated she had just had some jello and she thought it was good.     Objective:   Blood pressure 133/68, pulse 80, temperature 98.4 °F (36.9 °C), temperature source Temporal, resp. rate 26, height 5' 1\" (1.549 m), weight 162 lb 14.7 oz (73.9 kg), SpO2 98%, not currently breastfeeding.  Physical Exam    General: Patient is alert and oriented x2 does not appear to be in acute distress at this time  HEENT: EOMI PERRLA, atraumatic normocephalic  Cardiac: S1-S2 appreciated  Lungs: decreased bs  Abdomen: Soft   Ext: Peripheral pulses are positive  Neuro: No acute focal deficits noted  Psych: Normal mood  Skin: No rashes noted  MSK: Full range of motion intact      Results:   Lab Results   Component Value Date    WBC 8.5 2024    HGB 11.3 (L) 2024    HCT 34.4 (L) 2024    .0 2024    CREATSERUM 1.40 (H) 2024    BUN 38 (H) 2024     (L) 2024    K 3.3 (L) 2024    CL 95 (L) 2024    CO2 29.0 2024     (H) 2024    CA 8.5 (L) 2024    ALB 3.1 (L) 2024    ALKPHO 97 2024    BILT 0.7 2024    TP 5.6 (L) 2024    AST 28 2024    ALT 48 2024    PTT 32.0 2024    INR 1.41 (H) 2024    TSH 1.508 2024    LIP 32 2024    DDIMER 2.97 (H) 2024    MG 2.0 2024    PHOS 4.0 2024    TROP 0.00 2018    TROPHS 12 2024    B12 599 2021       XR CHEST AP PORTABLE  (CPT=71045)    Result Date: 2024  CONCLUSION:   Unchanged small left pleural  effusion.  Unchanged bibasilar opacities.      Dictated by (CST): Darcy Meadows MD on 12/08/2024 at 7:44 AM     Finalized by (CST): Darcy Meadows MD on 12/08/2024 at 7:46 AM          XR CHEST AP PORTABLE  (CPT=71045)    Result Date: 12/7/2024  PROCEDURE: XR CHEST AP PORTABLE  (CPT=71045) TIME: 12 50.   COMPARISON: [facility], XR CHEST AP PORTABLE (CPT=71045), 12/06/2024, 2:52 PM.  INDICATIONS: Hypoxia  TECHNIQUE:   Single view.   FINDINGS/IMPRESSION:   1. The heart and mediastinal structures are enlarged.  Pulmonary vascularity is increased.  There is a moderate-sized left-sided pleural effusion.  The findings are compatible with slight to moderate fluid overload.  2. Lung volumes are diminished.  There are streaky opacities within the right mid lung region and left lower lobe which could represent atelectasis, early/small infiltrates, or a combination in addition to the left pleural effusion.  3. There is a right sided PICC line with tip overlying the cavoatrial junction.  The thoracic component of an enteric feeding tube is identified traversing the thoracic esophagus.  The distal tip is not visualized but lies below the GE junction.       Dictated by (CST): Germain Zurita MD on 12/07/2024 at 2:18 PM     Finalized by (CST): Germain Zurita MD on 12/07/2024 at 2:21 PM               Assessment & Plan:     Acute respiratory failure with hypoxia  -wean oxygen as able  -cxr reviewed  -monitor closely.   -pulmonology consult.    Small bowel obstruction.    -Likely due to adhesive disease from previous abdominal surgery.    -12/5: OR held due to RRT due to AMS- neuro cleared, CT negative ok to proceed with surgery  -plan for OR 12/6/2024Pain controlled.  -Surgical consult appreciated  -NG tube: removed  -CLD - advance per surgery  -Pain control prn  -Encourage activity as tolerated  -per surg, pt is on iv abx - ok to stop now per surgery     Atrial fibrillation, permanent.  With RVR.   Hemodynamically stable.   Acute exacerbation of HF with reduced EF  Anticoagulation with Eliquis on hold.  -Cardiology consult appreciated  -Monitor on telemetry  -Monitor electrolytes  -Anticoagulation as above  -Rate/rhythm control with IV metoprolol  -Wean diltiazem gtt as able  -hold diuretics today  -Resume doac when cleared by surgery - per sx recs when she is taking more po  -repeat echo once acute illness improved, depending on findings may do ischemic work up     Other problems  CKD stage III  Type 2 diabetes  Permanent pacemaker for sick sinus syndrome  Hypertension  Dyslipidemia  Arthritis  Aortic stenosis, mild  Moderate pulmonary hypertension     DVT Mechanical Prophylaxis:   SCDs,        DVT Pharmacologic Prophylaxis   Medication    [Held by provider] apixaban (Eliquis) tab 5 mg    [Held by provider] heparin (Porcine) 18302 units/250mL infusion PE/DVT/THROMBUS CONTINUOUS               code status: Full  dispo: home upon medical clearance  If applicable, malnutrition status at bottom of note     I personally reviewed the available laboratories, imaging including. I discussed/will discuss the case with consultants. I ordered laboratories and/or radiographic studies. I adjusted medications as detailed above.  Medical decision making high, risk is high.

## 2024-12-09 NOTE — PROGRESS NOTES
[facility]  Progress Note    Isabel Patel Patient Status:  Inpatient    1931 MRN [id number]   Location [facility] 2W/SW Attending Elsa Tsai MD   Hosp Day # 7 PCP Fredrick Cornelius MD     Subjective:  No acute events overnight. Pain well controlled, sore with palpation. No nausea. Had x2 BM yesterday. No flatus.     Objective/Physical Exam:  General: Alert, orientated x3.  Cooperative.  No apparent distress.  Vital Signs:  Blood pressure 128/78, pulse 80, temperature 98.6 °F (37 °C), temperature source Temporal, resp. rate 23, height 5' 1\" (1.549 m), weight 162 lb 14.7 oz (73.9 kg), SpO2 97%, not currently breastfeeding.  Wt Readings from Last 3 Encounters:   24 162 lb 14.7 oz (73.9 kg)   24 163 lb (73.9 kg)   24 163 lb (73.9 kg)     Lungs: No respiratory distress. 3 L O2 HFNC.  Cardiac: Regular rate and rhythm.   Abdomen:  Soft,  distended, mildly tender with palpation, with no rebound or guarding.  No peritoneal signs. Staples intact. Incision c/d/I   Extremities:  No lower extremity edema noted.    Incision: Clean, dry, intact, no erythema.       Intake/Output:    Intake/Output Summary (Last 24 hours) at 2024 1000  Last data filed at 2024 0522  Gross per 24 hour   Intake 2485.97 ml   Output 1550 ml   Net 935.97 ml     I/O last 3 completed shifts:  In: 3730.5 [P.O.:120; I.V.:448; IV PIGGYBACK:546.9]  Out: 2475 [Urine:2475]  No intake/output data recorded.    Medications:    metoprolol  7.5 mg Intravenous Q4H    piperacillin-tazobactam  3.375 g Intravenous Q12H    miconazole   Topical BID    [Held by provider] apixaban  5 mg Oral BID    [Held by provider] atorvastatin  20 mg Oral Daily    [Held by provider] carvedilol  12.5 mg Oral BID with meals    [Held by provider] losartan  50 mg Oral Daily    [Transfer Hold] furosemide  20 mg Intravenous BID (Diuretic)    insulin regular human  1-5 Units Subcutaneous 4 times per day       Labs:  Lab Results    Component Value Date    WBC 8.5 12/09/2024    HGB 11.3 12/09/2024    HCT 34.4 12/09/2024    .0 12/09/2024     Lab Results   Component Value Date     12/09/2024    K 3.3 12/09/2024    CL 95 12/09/2024    CO2 29.0 12/09/2024    BUN 38 12/09/2024    CREATSERUM 1.40 12/09/2024     12/09/2024    CA 8.5 12/09/2024     Lab Results   Component Value Date    INR 1.41 (H) 12/02/2024    INR 1.0 12/03/2018         Assessment  Patient Active Problem List   Diagnosis    Right knee DJD    Osteoarthritis    Controlled type 2 diabetes mellitus with diabetic nephropathy, without long-term current use of insulin (HCC)    HTN (hypertension)    Hypercholesterolemia    Obesity    Left rotator cuff tear arthropathy    Pessary maintenance    Osteopenia of multiple sites    Pain of right lower extremity    Pelvic relaxation    Uterovaginal prolapse    AGUSTO (stress urinary incontinence, female)    TIA (transient ischemic attack)    Spinal stenosis of lumbar region    Macular degeneration    Mass of skin of left shoulder    Closed fracture of left distal femur (HCC)    Closed bicondylar fracture of distal end of right femur (HCC)    Exudative age-related macular degeneration, right eye, with inactive choroidal neovascularization (HCC)    Anemia    Bilateral lower extremity edema    Asymptomatic menopause    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 3 chronic kidney disease, without long-term current use of insulin (HCC)    Keratosis    Chronic shoulder pain    History of right breast cancer    Urinary incontinence    Atrial fibrillation, new onset (HCC)    Status post biventricular pacemaker    Acute on chronic congestive heart failure, unspecified heart failure type (HCC)    Complete intestinal obstruction, unspecified cause (HCC)    Atrial fibrillation with rapid ventricular response (HCC)    Anticoagulated    Small bowel obstruction (HCC)     POD 2: Exploratory laparotomy      Plan:  CLD, continue TPN until able  to advance diet  Encourage OOB, ambulation   Appreciate cardiology input  DVT prophylaxis with heparin GTT, OK from surgery standpoint to resume DOAC once tolerating more PO intake.      Quality:  DVT Mechanical Prophylaxis:   SCDs,    DVT Pharmacologic Prophylaxis   Medication    heparin (Porcine) 99533 units/250mL infusion ACS/AFIB CONTINUOUS    [Held by provider] apixaban (Eliquis) tab 5 mg                Code Status: Full Code  Mills: External urinary catheter in place  Mills Duration (in days): 2  Central line:    WILLIE: 12/9/2024        Anna Ashraf MD  [facility] - General Surgery   [address] [place] [address]  p [phone]

## 2024-12-09 NOTE — CARDIAC REHAB
CARDIAC REHAB HEART FAILURE EDUCATION    Handouts provided and reviewed: CHF Booklet provided to patient and son. Pt is currently recovering from surgery and is not a good candidate for Phase 2 Cardiac Rehab at this time. Discussed with son CHF signs and symptoms and daily weights recommended once patient is home. Discussed simple chair exercises at this time and possible Phase 2 Cardiac Rehab if patient is strong enough after surgical recovery.  Tootie Begum RN

## 2024-12-09 NOTE — PLAN OF CARE
Pt is alert & or x 3, following cammands, blind in right eye. severe [place],  right arm limb alert. Left arm + dvt. On low dose heparin drip. Titrating to ptt level.  Chronic afib. On cardizem drip. Ng removed yesterday. Mills cathetor removed. Periwick in place. Bm x 1 brown soft. Incont as she was walking to chair. Up in chair. Heart rate did  stayed in 80's 90's when up to chair.   Lolarut Hurley PA to surgery removed abdominal dressing, open to air. Staples well approx., dry , no drainage. She is drinking sips of water. No nausea. Call light usage reviewed. No c/o of pain. Son Haja and Jaiml at bedside.. She lives with her 2 sons jamil and haja.  Problem: Patient Centered Care  Goal: Patient preferences are identified and integrated in the patient's plan of care  Description: Interventions:  - What would you like us to know as we care for you? From home with son  - Provide timely, complete, and accurate information to patient/family  - Incorporate patient and family knowledge, values, beliefs, and cultural backgrounds into the planning and delivery of care  - Encourage patient/family to participate in care and decision-making at the level they choose  - Honor patient and family perspectives and choices  Outcome: Progressing     Problem: Patient/Family Goals  Goal: Patient/Family Long Term Goal  Description: Patient's Long Term Goal: go home    Interventions:  - go home  - See additional Care Plan goals for specific interventions  Outcome: Progressing  Goal: Patient/Family Short Term Goal  Description: Patient's Short Term Goal: clear SBO    Interventions:   - NGT LIS  -NPO  - See additional Care Plan goals for specific interventions  Outcome: Progressing     Problem: CARDIOVASCULAR - ADULT  Goal: Maintains optimal cardiac output and hemodynamic stability  Description: INTERVENTIONS:  - Monitor vital signs, rhythm, and trends  - Monitor for bleeding, hypotension and signs of decreased cardiac output  - Evaluate  effectiveness of vasoactive medications to optimize hemodynamic stability  - Monitor arterial and/or venous puncture sites for bleeding and/or hematoma  - Assess quality of pulses, skin color and temperature  - Assess for signs of decreased coronary artery perfusion - ex. Angina  - Evaluate fluid balance, assess for edema, trend weights  Outcome: Progressing  Goal: Absence of cardiac arrhythmias or at baseline  Description: INTERVENTIONS:  - Continuous cardiac monitoring, monitor vital signs, obtain 12 lead EKG if indicated  - Evaluate effectiveness of antiarrhythmic and heart rate control medications as ordered  - Initiate emergency measures for life threatening arrhythmias  - Monitor electrolytes and administer replacement therapy as ordered  Outcome: Progressing     Problem: RESPIRATORY - ADULT  Goal: Achieves optimal ventilation and oxygenation  Description: INTERVENTIONS:  - Assess for changes in respiratory status  - Assess for changes in mentation and behavior  - Position to facilitate oxygenation and minimize respiratory effort  - Oxygen supplementation based on oxygen saturation or ABGs  - Provide Smoking Cessation handout, if applicable  - Encourage broncho-pulmonary hygiene including cough, deep breathe, Incentive Spirometry  - Assess the need for suctioning and perform as needed  - Assess and instruct to report SOB or any respiratory difficulty  - Respiratory Therapy support as indicated  - Manage/alleviate anxiety  - Monitor for signs/symptoms of CO2 retention  Outcome: Progressing     Problem: GASTROINTESTINAL - ADULT  Goal: Minimal or absence of nausea and vomiting  Description: INTERVENTIONS:  - Maintain adequate hydration with IV or PO as ordered and tolerated  - Nasogastric tube to low intermittent suction as ordered  - Evaluate effectiveness of ordered antiemetic medications  - Provide nonpharmacologic comfort measures as appropriate  - Advance diet as tolerated, if ordered  - Obtain nutritional  consult as needed  - Evaluate fluid balance  Outcome: Progressing  Goal: Maintains or returns to baseline bowel function  Description: INTERVENTIONS:  - Assess bowel function  - Maintain adequate hydration with IV or PO as ordered and tolerated  - Evaluate effectiveness of GI medications  - Encourage mobilization and activity  - Obtain nutritional consult as needed  - Establish a toileting routine/schedule  - Consider collaborating with pharmacy to review patient's medication profile  Outcome: Progressing     Problem: SAFETY ADULT - FALL  Goal: Free from fall injury  Description: INTERVENTIONS:  - Assess pt frequently for physical needs  - Identify cognitive and physical deficits and behaviors that affect risk of falls.  - [place] fall precautions as indicated by assessment.  - Educate pt/family on patient safety including physical limitations  - Instruct pt to call for assistance with activity based on assessment  - Modify environment to reduce risk of injury  - Provide assistive devices as appropriate  - Consider OT/PT consult to assist with strengthening/mobility  - Encourage toileting schedule  Outcome: Progressing     Problem: DISCHARGE PLANNING  Goal: Discharge to home or other facility with appropriate resources  Description: INTERVENTIONS:  - Identify barriers to discharge w/pt and caregiver  - Include patient/family/discharge partner in discharge planning  - Arrange for needed discharge resources and transportation as appropriate  - Identify discharge learning needs (meds, wound care, etc)  - Arrange for interpreters to assist at discharge as needed  - Consider post-discharge preferences of patient/family/discharge partner  - Complete POLST form as appropriate  - Assess patient's ability to be responsible for managing their own health  - Refer to Case Management Department for coordinating discharge planning if the patient needs post-hospital services based on physician/LIP order or complex needs related  to functional status, cognitive ability or social support system  Outcome: Progressing     Problem: HEMATOLOGIC - ADULT  Goal: Free from bleeding injury  Description: (Example usage: patient with low platelets)  INTERVENTIONS:  - Avoid intramuscular injections, enemas and rectal medication administration  - Ensure safe mobilization of patient  - Hold pressure on venipuncture sites to achieve adequate hemostasis  - Assess for signs and symptoms of internal bleeding  - Monitor lab trends  - Patient is to report abnormal signs of bleeding to staff  - Avoid use of toothpicks and dental floss  - Use electric shaver for shaving  - Use soft bristle tooth brush  - Limit straining and forceful nose blowing  Outcome: Progressing

## 2024-12-09 NOTE — PLAN OF CARE
PT up in chair start of shift, back to bed x2 assist. VSS. Cardizem gtt cont. Heparin gtt titrated per protocol. TPN infusing. No c/o pain. 1 BM overnight. All safety measures maintained.    Problem: Patient Centered Care  Goal: Patient preferences are identified and integrated in the patient's plan of care  Description: Interventions:  - What would you like us to know as we care for you? From home with son  - Provide timely, complete, and accurate information to patient/family  - Incorporate patient and family knowledge, values, beliefs, and cultural backgrounds into the planning and delivery of care  - Encourage patient/family to participate in care and decision-making at the level they choose  - Honor patient and family perspectives and choices  Outcome: Progressing     Problem: Patient/Family Goals  Goal: Patient/Family Long Term Goal  Description: Patient's Long Term Goal: go home    Interventions:  - go home  - See additional Care Plan goals for specific interventions  Outcome: Progressing  Goal: Patient/Family Short Term Goal  Description: Patient's Short Term Goal: clear SBO    Interventions:   - NGT LIS  -NPO  - See additional Care Plan goals for specific interventions  Outcome: Progressing     Problem: CARDIOVASCULAR - ADULT  Goal: Maintains optimal cardiac output and hemodynamic stability  Description: INTERVENTIONS:  - Monitor vital signs, rhythm, and trends  - Monitor for bleeding, hypotension and signs of decreased cardiac output  - Evaluate effectiveness of vasoactive medications to optimize hemodynamic stability  - Monitor arterial and/or venous puncture sites for bleeding and/or hematoma  - Assess quality of pulses, skin color and temperature  - Assess for signs of decreased coronary artery perfusion - ex. Angina  - Evaluate fluid balance, assess for edema, trend weights  Outcome: Progressing  Goal: Absence of cardiac arrhythmias or at baseline  Description: INTERVENTIONS:  - Continuous cardiac  monitoring, monitor vital signs, obtain 12 lead EKG if indicated  - Evaluate effectiveness of antiarrhythmic and heart rate control medications as ordered  - Initiate emergency measures for life threatening arrhythmias  - Monitor electrolytes and administer replacement therapy as ordered  Outcome: Progressing     Problem: RESPIRATORY - ADULT  Goal: Achieves optimal ventilation and oxygenation  Description: INTERVENTIONS:  - Assess for changes in respiratory status  - Assess for changes in mentation and behavior  - Position to facilitate oxygenation and minimize respiratory effort  - Oxygen supplementation based on oxygen saturation or ABGs  - Provide Smoking Cessation handout, if applicable  - Encourage broncho-pulmonary hygiene including cough, deep breathe, Incentive Spirometry  - Assess the need for suctioning and perform as needed  - Assess and instruct to report SOB or any respiratory difficulty  - Respiratory Therapy support as indicated  - Manage/alleviate anxiety  - Monitor for signs/symptoms of CO2 retention  Outcome: Progressing     Problem: GASTROINTESTINAL - ADULT  Goal: Minimal or absence of nausea and vomiting  Description: INTERVENTIONS:  - Maintain adequate hydration with IV or PO as ordered and tolerated  - Nasogastric tube to low intermittent suction as ordered  - Evaluate effectiveness of ordered antiemetic medications  - Provide nonpharmacologic comfort measures as appropriate  - Advance diet as tolerated, if ordered  - Obtain nutritional consult as needed  - Evaluate fluid balance  Outcome: Progressing  Goal: Maintains or returns to baseline bowel function  Description: INTERVENTIONS:  - Assess bowel function  - Maintain adequate hydration with IV or PO as ordered and tolerated  - Evaluate effectiveness of GI medications  - Encourage mobilization and activity  - Obtain nutritional consult as needed  - Establish a toileting routine/schedule  - Consider collaborating with pharmacy to review  patient's medication profile  Outcome: Progressing     Problem: SAFETY ADULT - FALL  Goal: Free from fall injury  Description: INTERVENTIONS:  - Assess pt frequently for physical needs  - Identify cognitive and physical deficits and behaviors that affect risk of falls.  - [place] fall precautions as indicated by assessment.  - Educate pt/family on patient safety including physical limitations  - Instruct pt to call for assistance with activity based on assessment  - Modify environment to reduce risk of injury  - Provide assistive devices as appropriate  - Consider OT/PT consult to assist with strengthening/mobility  - Encourage toileting schedule  Outcome: Progressing     Problem: DISCHARGE PLANNING  Goal: Discharge to home or other facility with appropriate resources  Description: INTERVENTIONS:  - Identify barriers to discharge w/pt and caregiver  - Include patient/family/discharge partner in discharge planning  - Arrange for needed discharge resources and transportation as appropriate  - Identify discharge learning needs (meds, wound care, etc)  - Arrange for interpreters to assist at discharge as needed  - Consider post-discharge preferences of patient/family/discharge partner  - Complete POLST form as appropriate  - Assess patient's ability to be responsible for managing their own health  - Refer to Case Management Department for coordinating discharge planning if the patient needs post-hospital services based on physician/LIP order or complex needs related to functional status, cognitive ability or social support system  Outcome: Progressing     Problem: HEMATOLOGIC - ADULT  Goal: Free from bleeding injury  Description: (Example usage: patient with low platelets)  INTERVENTIONS:  - Avoid intramuscular injections, enemas and rectal medication administration  - Ensure safe mobilization of patient  - Hold pressure on venipuncture sites to achieve adequate hemostasis  - Assess for signs and symptoms of internal  bleeding  - Monitor lab trends  - Patient is to report abnormal signs of bleeding to staff  - Avoid use of toothpicks and dental floss  - Use electric shaver for shaving  - Use soft bristle tooth brush  - Limit straining and forceful nose blowing  Outcome: Progressing

## 2024-12-10 ENCOUNTER — APPOINTMENT (OUTPATIENT)
Dept: ULTRASOUND IMAGING | Facility: HOSPITAL | Age: 89
End: 2024-12-10
Attending: HOSPITALIST
Payer: MEDICARE

## 2024-12-10 LAB
ANION GAP SERPL CALC-SCNC: 5 MMOL/L (ref 0–18)
APTT PPP: 230.6 SECONDS (ref 23–36)
APTT PPP: 39.3 SECONDS (ref 23–36)
APTT PPP: 39.3 SECONDS (ref 23–36)
BASOPHILS # BLD AUTO: 0.03 X10(3) UL (ref 0–0.2)
BASOPHILS NFR BLD AUTO: 0.3 %
BUN BLD-MCNC: 40 MG/DL (ref 9–23)
BUN/CREAT SERPL: 33.1 (ref 10–20)
CALCIUM BLD-MCNC: 9 MG/DL (ref 8.7–10.4)
CHLORIDE SERPL-SCNC: 101 MMOL/L (ref 98–112)
CO2 SERPL-SCNC: 29 MMOL/L (ref 21–32)
CREAT BLD-MCNC: 1.21 MG/DL
DEPRECATED RDW RBC AUTO: 51.1 FL (ref 35.1–46.3)
EGFRCR SERPLBLD CKD-EPI 2021: 42 ML/MIN/1.73M2 (ref 60–?)
EOSINOPHIL # BLD AUTO: 0.31 X10(3) UL (ref 0–0.7)
EOSINOPHIL NFR BLD AUTO: 3.1 %
ERYTHROCYTE [DISTWIDTH] IN BLOOD BY AUTOMATED COUNT: 15.7 % (ref 11–15)
GLUCOSE BLD-MCNC: 143 MG/DL (ref 70–99)
GLUCOSE BLDC GLUCOMTR-MCNC: 151 MG/DL (ref 70–99)
GLUCOSE BLDC GLUCOMTR-MCNC: 173 MG/DL (ref 70–99)
GLUCOSE BLDC GLUCOMTR-MCNC: 183 MG/DL (ref 70–99)
GLUCOSE BLDC GLUCOMTR-MCNC: 198 MG/DL (ref 70–99)
GLUCOSE BLDC GLUCOMTR-MCNC: 217 MG/DL (ref 70–99)
HCT VFR BLD AUTO: 35.9 %
HGB BLD-MCNC: 11.9 G/DL
IMM GRANULOCYTES # BLD AUTO: 0.05 X10(3) UL (ref 0–1)
IMM GRANULOCYTES NFR BLD: 0.5 %
LYMPHOCYTES # BLD AUTO: 1.13 X10(3) UL (ref 1–4)
LYMPHOCYTES NFR BLD AUTO: 11.5 %
MAGNESIUM SERPL-MCNC: 2.2 MG/DL (ref 1.6–2.6)
MCH RBC QN AUTO: 29.7 PG (ref 26–34)
MCHC RBC AUTO-ENTMCNC: 33.1 G/DL (ref 31–37)
MCV RBC AUTO: 89.5 FL
MONOCYTES # BLD AUTO: 0.87 X10(3) UL (ref 0.1–1)
MONOCYTES NFR BLD AUTO: 8.8 %
NEUTROPHILS # BLD AUTO: 7.46 X10 (3) UL (ref 1.5–7.7)
NEUTROPHILS # BLD AUTO: 7.46 X10(3) UL (ref 1.5–7.7)
NEUTROPHILS NFR BLD AUTO: 75.8 %
OSMOLALITY SERPL CALC.SUM OF ELEC: 292 MOSM/KG (ref 275–295)
PHOSPHATE SERPL-MCNC: 3.8 MG/DL (ref 2.4–5.1)
PLATELET # BLD AUTO: 213 10(3)UL (ref 150–450)
PLATELET # BLD AUTO: 213 10(3)UL (ref 150–450)
POTASSIUM SERPL-SCNC: 4.5 MMOL/L (ref 3.5–5.1)
POTASSIUM SERPL-SCNC: 4.5 MMOL/L (ref 3.5–5.1)
RBC # BLD AUTO: 4.01 X10(6)UL
SODIUM SERPL-SCNC: 135 MMOL/L (ref 136–145)
WBC # BLD AUTO: 9.9 X10(3) UL (ref 4–11)

## 2024-12-10 PROCEDURE — 93971 EXTREMITY STUDY: CPT | Performed by: HOSPITALIST

## 2024-12-10 RX ORDER — METOPROLOL TARTRATE 1 MG/ML
10 INJECTION, SOLUTION INTRAVENOUS EVERY 4 HOURS
Status: DISCONTINUED | OUTPATIENT
Start: 2024-12-10 | End: 2024-12-12

## 2024-12-10 NOTE — PHYSICAL THERAPY NOTE
PHYSICAL THERAPY EVALUATION - INPATIENT     Room Number: 206/206-A  Evaluation Date: 12/10/2024  Type of Evaluation: Initial   Physician Order: PT Eval and Treat    Presenting Problem: exploratory laparotomy performed 12/6  Co-Morbidities : Paroxysmal atrial fibrillation with sick sinus syndrome, status post dual chamber pacemaker, diabetes mellitus type 2, hypertension, hyperlipidemia, osteoarthritis, chronic kidney disease stage 3, heart failure, preserved ejection fraction, mild aortic stenosis, and moderate pulmonary artery hypertension.  Reason for Therapy: Mobility Dysfunction and Discharge Planning    PHYSICAL THERAPY ASSESSMENT   Patient is a 89 year old female admitted 12/2/2024 for exploratory laparotomy.  Prior to admission, patient's baseline is ambulatory short distances with RW.  Patient is currently functioning below baseline with bed mobility, transfers, and gait.  Patient is requiring moderate assist as a result of the following impairments: decreased functional strength, decreased endurance/aerobic capacity, pain, and medical status.  Physical Therapy will continue to follow for duration of hospitalization.    Patient will benefit from continued skilled PT Services at discharge to promote prior level of function and safety with additional support and return home with home health PT. Pt could qualify for more gradual rehab stay but son at bedside is very involved and states that they prefer home with increases services. Will continue to assess and provide family training this admission.     PLAN DURING HOSPITALIZATION  Nursing Mobility Recommendation : Lift Equipment  PT Device Recommendation: Rolling walker  PT Treatment Plan: Bed mobility;Body mechanics;Endurance;Energy conservation;Family education;Gait training;Range of motion;Stoop training;Stair training;Transfer training;Balance training  Rehab Potential : Good  Frequency (Obs): 5x/week     PHYSICAL THERAPY MEDICAL/SOCIAL HISTORY   History  related to current admission: afib with RVR     Problem List  Principal Problem:    Acute on chronic congestive heart failure, unspecified heart failure type (HCC)  Active Problems:    Complete intestinal obstruction, unspecified cause (HCC)    Atrial fibrillation with rapid ventricular response (HCC)    Anticoagulated    Small bowel obstruction (HCC)      HOME SITUATION  Type of Home: House  Home Layout: Two level;Able to live on main level (tub shower with transfer bench)  Stairs to Enter : 2                  Lives With:  (Two adult sons - help with all IADLs and ADLs as needed)    Drives: No   Patient Regularly Uses: Glasses;Rolling walker (wheelchair in community)     Prior Level of [place]: independent RW short distances, sons help with dressing, showers, all IADLs    SUBJECTIVE  \"I can get up.\"     PHYSICAL THERAPY EXAMINATION   OBJECTIVE  Precautions: Bed/chair alarm  Fall Risk: High fall risk    WEIGHT BEARING RESTRICTION       PAIN ASSESSMENT     Location: abdominal and LE pain unrated       COGNITION  Overall Cognitive Status:  WFL - within functional limits, [place]    BALANCE  Static Sitting: Fair  Dynamic Sitting: Fair -  Static Standing: Poor +  Dynamic Standing: Poor +    AM-PAC '6-Clicks' INPATIENT SHORT FORM - BASIC MOBILITY  How much difficulty does the patient currently have...  Patient Difficulty: Turning over in bed (including adjusting bedclothes, sheets and blankets)?: A Lot   Patient Difficulty: Sitting down on and standing up from a chair with arms (e.g., wheelchair, bedside commode, etc.): A Lot   Patient Difficulty: Moving from lying on back to sitting on the side of the bed?: A Lot   How much help from another person does the patient currently need...   Help from Another: Moving to and from a bed to a chair (including a wheelchair)?: A Lot   Help from Another: Need to walk in hospital room?: A Lot   Help from Another: Climbing 3-5 steps with a railing?: Total     AM-PAC Score:  Raw  Score: 11   Approx Degree of Impairment: 72.57%   Standardized Score (AM-PAC Scale): 33.86   CMS Modifier (G-Code): CL    FUNCTIONAL ABILITY STATUS  Functional Mobility/Gait Assessment  Gait Assistance: Moderate assistance (CGA of second person)  Distance (ft): 2'  Assistive Device: Rolling walker  Pattern: Shuffle  Rolling: moderate assist  Supine to Sit: moderate assist  Sit to Supine: moderate assist  Sit to Stand: moderate assist    Exercise/Education Provided:  Bed mobility  Body mechanics  Functional activity tolerated  Transfer training    Skilled Therapy Provided: Seated marches, LAQ, bicep curls sitting in chair x 10 reps each    The patient's Approx Degree of Impairment: 72.57% has been calculated based on documentation in the [facility] '6 clicks' Inpatient Basic Mobility Short Form.  Research supports that patients with this level of impairment may benefit from LYNN.  Final disposition will be made by interdisciplinary medical team.    Patient End of Session: Up in chair;With  staff;Needs met;Call light within reach;RN aware of session/findings;All patient questions and concerns addressed;Alarm set;Hospital anti-slip socks;Family present    CURRENT GOALS  Goals to be met by: 1/5  Patient Goal Patient's self-stated goal is: unstated    Goal #1 Patient is able to demonstrate supine - sit EOB @ level: supervision     Goal #1   Current Status    Goal #2 Patient is able to demonstrate transfers Sit to/from Stand at assistance level: supervision with walker - rolling     Goal #2  Current Status    Goal #3 Patient is able to ambulate 25 feet with assist device: walker - rolling at assistance level: supervision   Goal #3   Current Status    Goal #4 Patient will negotiate 2 stairs/one curb w/ assistive device and supervision   Goal #4   Current Status    Goal #5 Patient to demonstrate independence with home activity/exercise instructions provided to patient in preparation for discharge.   Goal #5   Current Status     Goal #6    Goal #6  Current Status      Patient Evaluation Complexity Level:  History Moderate - 1 or 2 personal factors and/or co-morbidities   Examination of body systems Moderate - addressing a total of 3 or more elements   Clinical Presentation  Moderate - Evolving   Clinical Decision Making  Moderate Complexity     Therapeutic Activity:  24 minutes

## 2024-12-10 NOTE — PROGRESS NOTES
[facility]  Progress Note    Isabel Patel Patient Status:  Inpatient    1931 MRN [id number]   Location [facility] 2W/SW Attending Ezequiel Tai MD   Hosp Day # 8 PCP Fredrick Cornelius MD     Subjective:  Pt seen laying in bed. Feeling well, pain controlled. No nausea or vomiting. No fever or chills.    Objective/Physical Exam:  General: Alert, orientated x3.  Cooperative.  No apparent distress.  Vital Signs:  Blood pressure 142/48, pulse 91, temperature 98.6 °F (37 °C), temperature source Temporal, resp. rate 21, height 5' 1\" (1.549 m), weight 159 lb 6.3 oz (72.3 kg), SpO2 95%, not currently breastfeeding.  Wt Readings from Last 3 Encounters:   12/10/24 159 lb 6.3 oz (72.3 kg)   24 163 lb (73.9 kg)   24 163 lb (73.9 kg)     Lungs: No respiratory distress.  Cardiac: Regular rate and rhythm.   Abdomen:  Soft, mildly distended, expected incisional tender, with no rebound or guarding.  No peritoneal signs.   Extremities:  No lower extremity edema noted.    Incision: Clean, dry, intact, no erythema    Intake/Output:    Intake/Output Summary (Last 24 hours) at 12/10/2024 1112  Last data filed at 12/10/2024 0657  Gross per 24 hour   Intake 2355.4 ml   Output 250 ml   Net 2105.4 ml     I/O last 3 completed shifts:  In: 3886.2 [I.V.:624.2; IV PIGGYBACK:350]  Out: 250 [Urine:250]  No intake/output data recorded.    Medications:    metoprolol  10 mg Intravenous Q4H    miconazole   Topical BID    [Held by provider] apixaban  5 mg Oral BID    [Held by provider] atorvastatin  20 mg Oral Daily    [Held by provider] carvedilol  12.5 mg Oral BID with meals    [Held by provider] losartan  50 mg Oral Daily    [Transfer Hold] furosemide  20 mg Intravenous BID (Diuretic)    insulin regular human  1-5 Units Subcutaneous 4 times per day       Labs:  Lab Results   Component Value Date    WBC 9.9 12/10/2024    HGB 11.9 12/10/2024    HCT 35.9 12/10/2024    .0 12/10/2024    .0  12/10/2024     Lab Results   Component Value Date     12/10/2024    K 4.5 12/10/2024    K 4.5 12/10/2024     12/10/2024    CO2 29.0 12/10/2024    BUN 40 12/10/2024    CREATSERUM 1.21 12/10/2024     12/10/2024    CA 9.0 12/10/2024     Lab Results   Component Value Date    INR 1.41 (H) 12/02/2024    INR 1.0 12/03/2018         Assessment  Patient Active Problem List   Diagnosis    Right knee DJD    Osteoarthritis    Controlled type 2 diabetes mellitus with diabetic nephropathy, without long-term current use of insulin (HCC)    HTN (hypertension)    Hypercholesterolemia    Obesity    Left rotator cuff tear arthropathy    Pessary maintenance    Osteopenia of multiple sites    Pain of right lower extremity    Pelvic relaxation    Uterovaginal prolapse    AGUSTO (stress urinary incontinence, female)    TIA (transient ischemic attack)    Spinal stenosis of lumbar region    Macular degeneration    Mass of skin of left shoulder    Closed fracture of left distal femur (HCC)    Closed bicondylar fracture of distal end of right femur (HCC)    Exudative age-related macular degeneration, right eye, with inactive choroidal neovascularization (HCC)    Anemia    Bilateral lower extremity edema    Asymptomatic menopause    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 3 chronic kidney disease, without long-term current use of insulin (HCC)    Keratosis    Chronic shoulder pain    History of right breast cancer    Urinary incontinence    Atrial fibrillation, new onset (HCC)    Status post biventricular pacemaker    Acute on chronic congestive heart failure, unspecified heart failure type (HCC)    Complete intestinal obstruction, unspecified cause (HCC)    Atrial fibrillation with rapid ventricular response (HCC)    Anticoagulated    Small bowel obstruction (HCC)       POD4 s/p Ex lap     Plan:  Okay to advance to full liquids. Continue TPN until PO intake improves  Ambulate and up to chair  Appreciate cardiology  input  DVT prophylaxis with heparin, can resume DOAC once PO intake improves      Quality:  DVT Mechanical Prophylaxis:   SCDs,    DVT Pharmacologic Prophylaxis   Medication    heparin (Porcine) 75739 units/250mL infusion ACS/AFIB CONTINUOUS    [Held by provider] apixaban (Eliquis) tab 5 mg                Code Status: Full Code  Mills: External urinary catheter in place  Mills Duration (in days):   Central line:    WILLIE: 12/9/2024        Joseluis Watson PA-C  12/10/2024  11:12 AM

## 2024-12-10 NOTE — HOME CARE LIAISON
[facility] accepted the home health referral for this patient.  I meet with the patient and patient's son today at the bedside, the patient / son agrees to [facility] services.

## 2024-12-10 NOTE — PROGRESS NOTES
[facility]  part of [facility]    Progress Note    Isabel Patel Patient Status:  Inpatient    1931 MRN [id number]   Location [facility] 2W/SW Attending Ezequiel Tai MD   Hosp Day # 8 PCP Fredrick Cornelius MD     Chief Complaint:   Chief Complaint   Patient presents with    Nausea/Vomiting/Diarrhea   Abdominal pain, N/V    Subjective:   Isabel Patel is doing well today. No abd pain no N/V. Tolerating clears likes her jello. Son at bedside. No SOB no CP     Weight down 3lbs from yesterday. Net + 6L ?   Objective:   Objective:    Blood pressure 142/48, pulse 91, temperature 98.6 °F (37 °C), temperature source Temporal, resp. rate 21, height 5' 1\" (1.549 m), weight 159 lb 6.3 oz (72.3 kg), SpO2 95%, not currently breastfeeding.    Physical Exam:    General: No acute distress.   Respiratory: Clear to auscultation bilaterally. No wheezes. No rhonchi.  Cardiovascular: S1, S2. Regular rate and rhythm. No murmurs, rubs or gallops.   Abdomen: Soft, nontender, nondistended.  Positive bowel sounds. No rebound or guarding.  Neurologic: No focal neurological deficits.   Musculoskeletal: Moves all extremities. RUE with tenderness over medial forearm and has edema.   Extremities: + edema       Results:   Results:    Labs:  Recent Labs   Lab 24  1521 24  0424 24  0416 24  0353 12/10/24  0444   WBC 8.1 14.2* 10.0 8.5 9.9   HGB 14.5 14.2 12.3 11.3* 11.9*   MCV 88.9 90.4 89.8 89.1 89.5   .0 256.0 204.0 194.0 213.0  213.0       Recent Labs   Lab 24  1715 24  0855 24  0424 24  0416 24  0353 12/10/24  0444   *   < > 250*  250* 165* 266* 143*   BUN 22   < > 23  23 35* 38* 40*   CREATSERUM 1.61*   < > 1.57*  1.57* 1.48* 1.40* 1.21*   CA 9.0   < > 8.7  8.7 8.8 8.5* 9.0   ALB 3.4  --  3.1*  --   --   --       < > 135*  135* 137 133* 135*   K 3.6   < > 4.5  4.5  4.5 4.2 3.3* 4.5  4.5   CL 98   < > 101  101 101 95* 101    CO2 32.0   < > 28.0  28.0 30.0 29.0 29.0   ALKPHO  --   --  97  --   --   --    AST  --   --  28  --   --   --    ALT  --   --  48  --   --   --    BILT  --   --  0.7  --   --   --    TP  --   --  5.6*  --   --   --     < > = values in this interval not displayed.       Estimated Creatinine Clearance: 21.9 mL/min (A) (based on SCr of 1.21 mg/dL (H)).    No results for input(s): \"PTP\", \"INR\" in the last 168 hours.         Culture:  Hospital Encounter on 12/02/24   1. Blood Culture     Status: None (Preliminary result)    Collection Time: 12/05/24  4:04 PM    Specimen: Blood,peripheral   Result Value Ref Range    Blood Culture Result No Growth 4 Days N/A       Cardiac  No results for input(s): \"TROP\", \"PBNP\" in the last 168 hours.      Imaging: Imaging data reviewed in Epic.  No results found.    Medications:    metoprolol  10 mg Intravenous Q4H    miconazole   Topical BID    [Held by provider] apixaban  5 mg Oral BID    [Held by provider] atorvastatin  20 mg Oral Daily    [Held by provider] carvedilol  12.5 mg Oral BID with meals    [Held by provider] losartan  50 mg Oral Daily    [Transfer Hold] furosemide  20 mg Intravenous BID (Diuretic)    insulin regular human  1-5 Units Subcutaneous 4 times per day         Assessment and Plan:   Assessment & Plan:        Acute hypoxic respiratory failure   Pulmonary on consult.   Multifactorial from postop pulm edema and atelectasis.   Encourage IS.   Taper o2.   Off IV lasix.   Small bowel volvulus   S/p exploratory laparotomy 12/6/24   Diet per surgery clear liquids. Cont TPN  OOB.   Permanent A. fib  Acute on chronic HFrEF, new  Cards on consult.   ECHO LVEF 25-30% (new compared to Feb 24' EF 60-65%)   Likely takotsubo variant vs cardiomyopathy secondary to aortic stenosis (less likely)   Off cardizem now on IV lopressor   Heparin gtt. Will transition to DOAC once tolerating PO diet   Will need to slowly add on GDMT  Off IV lasix.   DM II   A1c 6.8 (prev 7.5)   WARD VILLALOBOS  DVT, acute   Heparin gtt. Eventual DOAC  KIEL on CKD III   Baseline creat 1.4 -1.6   BUN/Creat improving.   Monitor Uop.   Other medical problems  CKD III  SSS s/p PPM   HTN  Hyperlipidemia  OA  Mild aortic stenosis  Moderate pulm HTN     >55min spent, >50% spent counseling and coordinating care in the form of educating pt/family and d/w consultants and staff. Most of the time spent discussing the above plan.        Plan of care discussed with patient or family at bedside.    Ezequiel Tai MD  Hospitalist          Supplementary Documentation:     Quality:  DVT Prophylaxis: heparin gtt   CODE status: Full  Dispo: per clinical course            Estimated date of discharge: TBD  Discharge is dependent on: clinical stability  At this point Ms. Patel is expected to be discharge to: TBD

## 2024-12-10 NOTE — PLAN OF CARE
Problem: Patient Centered Care  Goal: Patient preferences are identified and integrated in the patient's plan of care  Description: Interventions:  - What would you like us to know as we care for you? From home with son  - Provide timely, complete, and accurate information to patient/family  - Incorporate patient and family knowledge, values, beliefs, and cultural backgrounds into the planning and delivery of care  - Encourage patient/family to participate in care and decision-making at the level they choose  - Honor patient and family perspectives and choices  Outcome: Progressing     Problem: Patient/Family Goals  Goal: Patient/Family Long Term Goal  Description: Patient's Long Term Goal: go home    Interventions:  - go home  - See additional Care Plan goals for specific interventions  Outcome: Progressing  Goal: Patient/Family Short Term Goal  Description: Patient's Short Term Goal: clear SBO    Interventions:   - NGT LIS  -NPO  - See additional Care Plan goals for specific interventions  Outcome: Progressing     Problem: CARDIOVASCULAR - ADULT  Goal: Maintains optimal cardiac output and hemodynamic stability  Description: INTERVENTIONS:  - Monitor vital signs, rhythm, and trends  - Monitor for bleeding, hypotension and signs of decreased cardiac output  - Evaluate effectiveness of vasoactive medications to optimize hemodynamic stability  - Monitor arterial and/or venous puncture sites for bleeding and/or hematoma  - Assess quality of pulses, skin color and temperature  - Assess for signs of decreased coronary artery perfusion - ex. Angina  - Evaluate fluid balance, assess for edema, trend weights  Outcome: Progressing  Goal: Absence of cardiac arrhythmias or at baseline  Description: INTERVENTIONS:  - Continuous cardiac monitoring, monitor vital signs, obtain 12 lead EKG if indicated  - Evaluate effectiveness of antiarrhythmic and heart rate control medications as ordered  - Initiate emergency measures for  life threatening arrhythmias  - Monitor electrolytes and administer replacement therapy as ordered  Outcome: Progressing     Problem: RESPIRATORY - ADULT  Goal: Achieves optimal ventilation and oxygenation  Description: INTERVENTIONS:  - Assess for changes in respiratory status  - Assess for changes in mentation and behavior  - Position to facilitate oxygenation and minimize respiratory effort  - Oxygen supplementation based on oxygen saturation or ABGs  - Provide Smoking Cessation handout, if applicable  - Encourage broncho-pulmonary hygiene including cough, deep breathe, Incentive Spirometry  - Assess the need for suctioning and perform as needed  - Assess and instruct to report SOB or any respiratory difficulty  - Respiratory Therapy support as indicated  - Manage/alleviate anxiety  - Monitor for signs/symptoms of CO2 retention  Outcome: Progressing     Problem: GASTROINTESTINAL - ADULT  Goal: Minimal or absence of nausea and vomiting  Description: INTERVENTIONS:  - Maintain adequate hydration with IV or PO as ordered and tolerated  - Nasogastric tube to low intermittent suction as ordered  - Evaluate effectiveness of ordered antiemetic medications  - Provide nonpharmacologic comfort measures as appropriate  - Advance diet as tolerated, if ordered  - Obtain nutritional consult as needed  - Evaluate fluid balance  Outcome: Progressing  Goal: Maintains or returns to baseline bowel function  Description: INTERVENTIONS:  - Assess bowel function  - Maintain adequate hydration with IV or PO as ordered and tolerated  - Evaluate effectiveness of GI medications  - Encourage mobilization and activity  - Obtain nutritional consult as needed  - Establish a toileting routine/schedule  - Consider collaborating with pharmacy to review patient's medication profile  Outcome: Progressing     Problem: SAFETY ADULT - FALL  Goal: Free from fall injury  Description: INTERVENTIONS:  - Assess pt frequently for physical needs  - Identify  cognitive and physical deficits and behaviors that affect risk of falls.  - [place] fall precautions as indicated by assessment.  - Educate pt/family on patient safety including physical limitations  - Instruct pt to call for assistance with activity based on assessment  - Modify environment to reduce risk of injury  - Provide assistive devices as appropriate  - Consider OT/PT consult to assist with strengthening/mobility  - Encourage toileting schedule  Outcome: Progressing     Problem: DISCHARGE PLANNING  Goal: Discharge to home or other facility with appropriate resources  Description: INTERVENTIONS:  - Identify barriers to discharge w/pt and caregiver  - Include patient/family/discharge partner in discharge planning  - Arrange for needed discharge resources and transportation as appropriate  - Identify discharge learning needs (meds, wound care, etc)  - Arrange for interpreters to assist at discharge as needed  - Consider post-discharge preferences of patient/family/discharge partner  - Complete POLST form as appropriate  - Assess patient's ability to be responsible for managing their own health  - Refer to Case Management Department for coordinating discharge planning if the patient needs post-hospital services based on physician/LIP order or complex needs related to functional status, cognitive ability or social support system  Outcome: Progressing     Problem: HEMATOLOGIC - ADULT  Goal: Free from bleeding injury  Description: (Example usage: patient with low platelets)  INTERVENTIONS:  - Avoid intramuscular injections, enemas and rectal medication administration  - Ensure safe mobilization of patient  - Hold pressure on venipuncture sites to achieve adequate hemostasis  - Assess for signs and symptoms of internal bleeding  - Monitor lab trends  - Patient is to report abnormal signs of bleeding to staff  - Avoid use of toothpicks and dental floss  - Use electric shaver for shaving  - Use soft bristle tooth  brush  - Limit straining and forceful nose blowing  Outcome: Progressing

## 2024-12-10 NOTE — PROGRESS NOTES
Cardiology Progress Note    Isabel Patel Patient Status:  Inpatient    1931 MRN [id number]   Location [facility] 2W/SW Attending Ezequiel Tai MD   Hosp Day # 8 PCP Fredrick Cornelius MD     Interval Note:  Patient seen and examined  Feels well this morning  No specific complaints, denies chest pain or shortness of breath      --------------------------------------------------------------------------------------------------------------------------------  ROS 12 systems reviewed, pertinent findings above.  ROS    History:  Past Medical History:    Acute, but ill-defined, cerebrovascular disease    Arthritis    Breast CA (HCC)    Cancer (HCC)    Diabetes (HCC)    diet controlled    Diabetes mellitus, type II (HCC)    Essential hypertension    Hearing impairment    High blood pressure    High cholesterol    Hyperlipidemia    Osteoarthritis    Squamous cell carcinoma, keratinizing    R posterior thigh     Past Surgical History:   Procedure Laterality Date    Appendectomy      Appendectomy      Cataract  -    Cataract extraction w/  intraocular lens implant Bilateral     Ian Garcia MD    Chemotherapy  2016    rt breast.    Cholecystectomy      D & c      Knee replacement surgery Right     Lumpectomy right  2016    cancer    Mastectomy partial Right 2016      [id number]    Skin surgery Right 2018    Tonsillectomy      Wrist fracture surgery Right      Family History   Problem Relation Age of Onset    Heart Disease Father         CAD    Diabetes Father     Heart Disease Mother         CAD    Diabetes Mother     Breast Cancer Mother 83        had lumpectomy and RT.  No other treatment.   of stroke at 89    Other (appendicitis[other]) Brother         age 13    Other (alive and well[other]) Sister     Other (alive and well[other]) Son         x3    Breast Cancer Self 85    Glaucoma Neg     Macular degeneration Neg       reports that she has never smoked.  She has never been exposed to tobacco smoke. She has never used smokeless tobacco. She reports that she does not currently use alcohol. She reports that she does not use drugs.    Objective:   Temp: 98.6 °F (37 °C)  Pulse: 91  Resp: 21  BP: 142/48    Intake/Output:     Intake/Output Summary (Last 24 hours) at 12/10/2024 0929  Last data filed at 12/10/2024 0657  Gross per 24 hour   Intake 2355.4 ml   Output 250 ml   Net 2105.4 ml       Physical Exam:    General: Alert and oriented x 3, no acute distress  HEENT: Normocephalic, anicteric sclera, neck supple.  Neck: No JVD, carotids 2+, no bruits.  Cardiac: Irregularly irregular rate and rhythm. S1, S2 normal. No murmur, pericardial rub, S3.  Lungs: Clear without wheezes, rales, rhonchi or dullness.  Normal excursions and effort.  Abdomen: Soft, non-tender. BS-present.  Extremities: Without clubbing, cyanosis or edema.  Peripheral pulses are 2+.  Neurologic: Non-focal  Skin: Warm and dry.       Assessment   Small bowel obstruction status post exploratory laparotomy 12/6/2024  Persistent atrial fibrillation  Status post DC-PPM  Severe LV dysfunction, EF 25 to 30% (EF 60 to 65% 2/2024)  Mild to moderate aortic valve stenosis (previous KAYLYNN 1.07 cm², now 1.7-possibly LFLGSAS)  Left IJ, subclavian, axillary, brachial DVT    Plan  - Likely transient LV dysfunction due to acute abdominal process, Takotsubo variant versus cardiomyopathy secondary to aortic stenosis (less likely)  -Wean diltiazem infusion, optimize IV beta-blocker and transition to orals once appropriate  -Continue parenteral anticoagulation for DVT and A-fib, transition to loading dose of DOAC once tolerating orals    Thank you for allowing me to take part in the care of Isabel MAYRA Patel. Please call with any questions of concerns.      Level of care: L4    Ghazal Lake DO  [facility]   Interventional Cardiac and Vascular Services      Ghazal Lake DO  December 10, 2024  9:29 AM

## 2024-12-10 NOTE — DIETARY NOTE
ADULT NUTRITION REASSESSMENT    Pt is at high nutrition risk.  Pt does not meet malnutrition criteria.      RECOMMENDATIONS TO MD: CPN tapered to 1L next bag and hope to discontinue 12/11 if po intake is tolerated and adequate.  DM Oral nutrition supplements (ONS) initiated BID. Assess need for insulin rx outside of PN if POC BG remains high.     ADMITTING DIAGNOSIS:  Anticoagulated [Z79.01]  Atrial fibrillation with rapid ventricular response (HCC) [I48.91]  Complete intestinal obstruction, unspecified cause (HCC) [K56.601]  Acute on chronic congestive heart failure, unspecified heart failure type (HCC) [I50.9]  PERTINENT PAST MEDICAL HISTORY:   Past Medical History:    Acute, but ill-defined, cerebrovascular disease    Arthritis    Breast CA (HCC)    Cancer (HCC)    Diabetes (HCC)    diet controlled    Diabetes mellitus, type II (HCC)    Essential hypertension    Hearing impairment    High blood pressure    High cholesterol    Hyperlipidemia    Osteoarthritis    Squamous cell carcinoma, keratinizing    R posterior thigh       PATIENT STATUS: Initial 12/06/24: Pt assessed due to consult for dietitian to initiate and manage TPN/PPN. Pt admitted with CHF, presented with n/v and abdominal pain for 2-3 days PTA. Found to have SBO. Planning for sx: ex lap with possible bowel resection, scheduled for today. Per Dr. Ashraf, likely to be prolonged NPO. Discussed with RN and vascular RN today. PICC line placed this afternoon to right brachial, but placed after 1pm, so PPN was ordered to start tonight at 9pm. RN aware. Unable to meet with pt at bedside today. Son in POA. Will complete NFPE when able.    12/10/24 Update: Visited pt 12/9 with family present.Per family present at bedside  pt was eating less over last few weeks PTA  d/t early satiety. States -160 lbs same as current weight.   Pt had only taken few bites and sips at meal visit yesterday but today pt consumed 100% and tolerated well per RN. MD has advanced  diet to full liquid today. Will Taper CPN to 1L with reduced nutrition and if po intake is tolerated well w/adequate intake discontinue CPN. Pt has transferred to floor.   FOOD/NUTRITION RELATED HISTORY:  Appetite: improving.   Intake: 100% X 2 clear liquid meals 12/10.   Intake Meeting Needs: CPN had been to goal but tapering now as diet is advancing.   Percent Meals Eaten (last 6 days)       Date/Time Percent Meals Eaten (%)    12/04/24 0700 0 %     Percent Meals Eaten (%): NPO at 12/04/24 0700    12/04/24 1200 0 %     Percent Meals Eaten (%): NPO at 12/04/24 1200    12/04/24 1800 0 %     Percent Meals Eaten (%): NPOP at 12/04/24 1800    12/05/24 0900 0 %     Percent Meals Eaten (%): npo at 12/05/24 0900    12/05/24 1202 0 %     Percent Meals Eaten (%): npo at 12/05/24 1202    12/05/24 1700 0 %     Percent Meals Eaten (%): npo at 12/05/24 1700    12/06/24 1031 0 %     Percent Meals Eaten (%): npo at 12/06/24 1031    12/06/24 1305 0 %     Percent Meals Eaten (%): npo at 12/06/24 1305    12/10/24 0800 100 %    12/10/24 1200 100 %            Food Allergies: No Known Food Allergies (NKFA)  Cultural/Ethnic/Religion Preferences: Not Obtained    GASTROINTESTINAL: +BM 12/9 loose;soft.     MEDICATIONS: reviewed    adult 3 in 1 TPN      adult 3 in 1 TPN 62.5 mL/hr at 12/10/24 0700    continuous dose heparin 700 Units/hr (12/10/24 1006)    dextrose 10%      [Held by provider] dilTIAZem Stopped (12/10/24 0934)        metoprolol  10 mg Intravenous Q4H    miconazole   Topical BID    [Held by provider] apixaban  5 mg Oral BID    [Held by provider] atorvastatin  20 mg Oral Daily    [Held by provider] carvedilol  12.5 mg Oral BID with meals    [Held by provider] losartan  50 mg Oral Daily    [Transfer Hold] furosemide  20 mg Intravenous BID (Diuretic)    insulin regular human  1-5 Units Subcutaneous 4 times per day   PRN:   HYDROmorphone **OR** HYDROmorphone **OR** HYDROmorphone    dextrose 10%    acetaminophen    acetaminophen     ondansetron    [Transfer Hold] dilTIAZem    glucose **OR** glucose **OR** glucose-vitamin C **OR** dextrose **OR** glucose **OR** glucose **OR** glucose-vitamin C    LABS: reviewed;KIEL improving. POC B (after po intake), 151  today. (Increased now with po intake) receives Novolin R low dose correction factor while on CPN.    Recent Labs     24  0416 24  0353 12/10/24  044   * 266* 143*   BUN 35* 38* 40*   CREATSERUM 1.48* 1.40* 1.21*   CA 8.8 8.5* 9.0   MG 2.2 2.0 2.2    133* 135*   K 4.2 3.3* 4.5  4.5    95* 101   CO2 30.0 29.0 29.0   PHOS 3.9 4.0 3.8   OSMOCALC 296* 294 292     NUTRITION RELATED PHYSICAL FINDINGS:  - Nutrition Focused Physical Exam (NFPE): Well nourished per exam.   - Fluid Accumulation: non-pitting Bilateral Lower extremity and Left Upper extremity ---> See RN documentation for details  - Skin Integrity: intact ---> See RN documentation for details    ANTHROPOMETRICS:  HT: 154.9 cm (5' 1\")  WT: 72.3 kg (159 lb 6.3 oz)   BMI: Body mass index is 30.12 kg/m².  BMI CLASSIFICATION: 25-29.9 kg/m2 - overweight  IBW/lbs (Calculated) Female: 105 lbs  140% IBW  Usual Body Wt: 160's in       99 % UBW    WEIGHT HISTORY:  Patient Weight(s) for the past 336 hrs:   Weight   12/10/24 0500 72.3 kg (159 lb 6.3 oz)   24 0500 73.9 kg (162 lb 14.7 oz)   24 0507 72.3 kg (159 lb 6.3 oz)   24 0700 69.9 kg (154 lb)   24 69.1 kg (152 lb 6.4 oz)   24 0423 66.5 kg (146 lb 9.6 oz)   24 65.6 kg (144 lb 11.2 oz)   24 0520 64 kg (141 lb)   24 1146 66.7 kg (147 lb)   24 0558 72.6 kg (160 lb)     Wt Readings from Last 10 Encounters:   12/10/24 72.3 kg (159 lb 6.3 oz)   24 73.9 kg (163 lb)   24 73.9 kg (163 lb)   24 73.9 kg (163 lb)   24 73.9 kg (163 lb)   24 72.6 kg (160 lb)   10/17/23 72.6 kg (160 lb)   23 77.1 kg (170 lb)   23 77.1 kg (170 lb)   23 77.1 kg (170 lb)      NUTRITION DIAGNOSIS/PROBLEM:   Inadequate oral intake related to Decreased ability to consume sufficient energy as evidenced by NPO status and no PO intake since admission (need for PN)   NUTRITION DIAGNOSIS PROGRESS:  Improvement (unresolved) as diet advanced with improving po intake and transitioning off CPN.     NUTRITION INTERVENTION:     NUTRITION PRESCRIPTION:   Estimated Nutrition needs: --dosing wt of 66.5 kg - wt taken on 12/6  Calories: 5923-2149 calories/day (22-25 calories per kg Dosing wt)  Protein: 80-93 g protein/day (1.2-1.4 g protein/kg Dosing wt)  Fluid Needs: 1660 ml (chronological age method) or   Landis Segar = 2195 ml    - Diet:       Procedures    Full liquid diet Sodium Restriction: 2 GM NA; Fluid Restriction: 2000 ml; Is Patient on Accuchecks? Yes; Misc Restriction: Cardiac      -Parenteral Nutrition: 1000 ml, 65 g protein  (260 kcals) , 400 dextrose + 250 lipid kcals, 910 total kcals, 60% hiral goal and 70% protein goal.     - RD Care Plan: CPN until po intake adequate   - Meals and snacks: NPO  - Medical Food Supplements-RD added Glucerna BID strawberry preferred flavor. Rational/use of oral supplements discussed.  - Vitamin and mineral supplements: none  - Feeding assistance: set up with assist.   - Nutrition education: not appropriate   - Coordination of nutrition care: collaboration with other providers  - Discharge and transfer of nutrition care to new setting or provider: monitor plans    MONITOR AND EVALUATE/NUTRITION GOALS:  - Food and Nutrient Intake:      Monitor: adequacy of PO intake, tolerance of PO intake, and adequacy of supplement intake and diet advancement to solid foods.   - Food and Nutrient Administration:      Monitor: TPN initiation, TPN tolerance, and adequacy of TPN, ability to discontinue PN .   - Anthropometric Measurement:    Monitor weight  - Nutrition Goals:      maintain wt within 5%, PO and supplement greater than 75% of needs, euglycemia, and discontinue  PN if po intake is >2/3-3/4 of goal or >6373-5007 kcals    DIETITIAN FOLLOW UP: RD to follow and monitor nutrition status       Latrice Nguyen RD, LDN, [facility] ([id number])

## 2024-12-10 NOTE — PLAN OF CARE
Problem: Patient Centered Care  Goal: Patient preferences are identified and integrated in the patient's plan of care  Description: Interventions:  - What would you like us to know as we care for you? From home with son  - Provide timely, complete, and accurate information to patient/family  - Incorporate patient and family knowledge, values, beliefs, and cultural backgrounds into the planning and delivery of care  - Encourage patient/family to participate in care and decision-making at the level they choose  - Honor patient and family perspectives and choices  Outcome: Progressing     Problem: Patient/Family Goals  Goal: Patient/Family Long Term Goal  Description: Patient's Long Term Goal: go home    Interventions:  - go home  - See additional Care Plan goals for specific interventions  Outcome: Progressing  Goal: Patient/Family Short Term Goal  Description: Patient's Short Term Goal: clear SBO    Interventions:   - NGT LIS  -NPO  - See additional Care Plan goals for specific interventions  Outcome: Progressing     Problem: CARDIOVASCULAR - ADULT  Goal: Maintains optimal cardiac output and hemodynamic stability  Description: INTERVENTIONS:  - Monitor vital signs, rhythm, and trends  - Monitor for bleeding, hypotension and signs of decreased cardiac output  - Evaluate effectiveness of vasoactive medications to optimize hemodynamic stability  - Monitor arterial and/or venous puncture sites for bleeding and/or hematoma  - Assess quality of pulses, skin color and temperature  - Assess for signs of decreased coronary artery perfusion - ex. Angina  - Evaluate fluid balance, assess for edema, trend weights  Outcome: Progressing  Goal: Absence of cardiac arrhythmias or at baseline  Description: INTERVENTIONS:  - Continuous cardiac monitoring, monitor vital signs, obtain 12 lead EKG if indicated  - Evaluate effectiveness of antiarrhythmic and heart rate control medications as ordered  - Initiate emergency measures for  life threatening arrhythmias  - Monitor electrolytes and administer replacement therapy as ordered  Outcome: Progressing     Problem: RESPIRATORY - ADULT  Goal: Achieves optimal ventilation and oxygenation  Description: INTERVENTIONS:  - Assess for changes in respiratory status  - Assess for changes in mentation and behavior  - Position to facilitate oxygenation and minimize respiratory effort  - Oxygen supplementation based on oxygen saturation or ABGs  - Provide Smoking Cessation handout, if applicable  - Encourage broncho-pulmonary hygiene including cough, deep breathe, Incentive Spirometry  - Assess the need for suctioning and perform as needed  - Assess and instruct to report SOB or any respiratory difficulty  - Respiratory Therapy support as indicated  - Manage/alleviate anxiety  - Monitor for signs/symptoms of CO2 retention  Outcome: Progressing     Problem: GASTROINTESTINAL - ADULT  Goal: Minimal or absence of nausea and vomiting  Description: INTERVENTIONS:  - Maintain adequate hydration with IV or PO as ordered and tolerated  - Nasogastric tube to low intermittent suction as ordered  - Evaluate effectiveness of ordered antiemetic medications  - Provide nonpharmacologic comfort measures as appropriate  - Advance diet as tolerated, if ordered  - Obtain nutritional consult as needed  - Evaluate fluid balance  Outcome: Progressing  Goal: Maintains or returns to baseline bowel function  Description: INTERVENTIONS:  - Assess bowel function  - Maintain adequate hydration with IV or PO as ordered and tolerated  - Evaluate effectiveness of GI medications  - Encourage mobilization and activity  - Obtain nutritional consult as needed  - Establish a toileting routine/schedule  - Consider collaborating with pharmacy to review patient's medication profile  Outcome: Progressing     Problem: SAFETY ADULT - FALL  Goal: Free from fall injury  Description: INTERVENTIONS:  - Assess pt frequently for physical needs  - Identify  cognitive and physical deficits and behaviors that affect risk of falls.  - [place] fall precautions as indicated by assessment.  - Educate pt/family on patient safety including physical limitations  - Instruct pt to call for assistance with activity based on assessment  - Modify environment to reduce risk of injury  - Provide assistive devices as appropriate  - Consider OT/PT consult to assist with strengthening/mobility  - Encourage toileting schedule  Outcome: Progressing     Problem: DISCHARGE PLANNING  Goal: Discharge to home or other facility with appropriate resources  Description: INTERVENTIONS:  - Identify barriers to discharge w/pt and caregiver  - Include patient/family/discharge partner in discharge planning  - Arrange for needed discharge resources and transportation as appropriate  - Identify discharge learning needs (meds, wound care, etc)  - Arrange for interpreters to assist at discharge as needed  - Consider post-discharge preferences of patient/family/discharge partner  - Complete POLST form as appropriate  - Assess patient's ability to be responsible for managing their own health  - Refer to Case Management Department for coordinating discharge planning if the patient needs post-hospital services based on physician/LIP order or complex needs related to functional status, cognitive ability or social support system  Outcome: Progressing     Problem: HEMATOLOGIC - ADULT  Goal: Free from bleeding injury  Description: (Example usage: patient with low platelets)  INTERVENTIONS:  - Avoid intramuscular injections, enemas and rectal medication administration  - Ensure safe mobilization of patient  - Hold pressure on venipuncture sites to achieve adequate hemostasis  - Assess for signs and symptoms of internal bleeding  - Monitor lab trends  - Patient is to report abnormal signs of bleeding to staff  - Avoid use of toothpicks and dental floss  - Use electric shaver for shaving  - Use soft bristle tooth  brush  - Limit straining and forceful nose blowing  Outcome: Progressing       Transfer from CCU. TPN. Heparin drip. No n/v. No abd pain. Alert x 3 but forgetful. No acute events.

## 2024-12-10 NOTE — CM/SW NOTE
12/10/24 1200   Discharge Needs   Anticipated D/C needs Home health care   Choice of Post-Acute Provider   Informed patient of right to choose their preferred provider Yes   List of appropriate post-acute services provided to patient/family with quality data Yes   Patient/family choice [facility]   Information given to Son   [facility] financial disclosure given Yes     Anticipated therapy need: Home with Home Healthcare.    Updated F2F and updates sent via [employer].  Son had received HH list earlier.  CM spoke to son Haja and choice is [facility].  [facility] notified via secure chat that they are choice.    Stella ÁLVAREZA BSN RN CRRN   RN Case Manager  [phone]

## 2024-12-11 ENCOUNTER — APPOINTMENT (OUTPATIENT)
Dept: CT IMAGING | Facility: HOSPITAL | Age: 89
End: 2024-12-11
Attending: HOSPITALIST
Payer: MEDICARE

## 2024-12-11 ENCOUNTER — APPOINTMENT (OUTPATIENT)
Dept: GENERAL RADIOLOGY | Facility: HOSPITAL | Age: 89
End: 2024-12-11
Payer: MEDICARE

## 2024-12-11 PROBLEM — I82.623: Status: ACTIVE | Noted: 2024-12-11

## 2024-12-11 LAB
ANION GAP SERPL CALC-SCNC: 5 MMOL/L (ref 0–18)
APTT PPP: 31.8 SECONDS (ref 23–36)
APTT PPP: 39.9 SECONDS (ref 23–36)
APTT PPP: 45.9 SECONDS (ref 23–36)
BUN BLD-MCNC: 38 MG/DL (ref 9–23)
BUN/CREAT SERPL: 33 (ref 10–20)
CALCIUM BLD-MCNC: 9.4 MG/DL (ref 8.7–10.4)
CHLORIDE SERPL-SCNC: 103 MMOL/L (ref 98–112)
CO2 SERPL-SCNC: 28 MMOL/L (ref 21–32)
CREAT BLD-MCNC: 1.15 MG/DL
DEPRECATED RDW RBC AUTO: 51.2 FL (ref 35.1–46.3)
EGFRCR SERPLBLD CKD-EPI 2021: 44 ML/MIN/1.73M2 (ref 60–?)
ERYTHROCYTE [DISTWIDTH] IN BLOOD BY AUTOMATED COUNT: 15.8 % (ref 11–15)
GLUCOSE BLD-MCNC: 156 MG/DL (ref 70–99)
GLUCOSE BLDC GLUCOMTR-MCNC: 138 MG/DL (ref 70–99)
GLUCOSE BLDC GLUCOMTR-MCNC: 150 MG/DL (ref 70–99)
GLUCOSE BLDC GLUCOMTR-MCNC: 171 MG/DL (ref 70–99)
GLUCOSE BLDC GLUCOMTR-MCNC: 178 MG/DL (ref 70–99)
GLUCOSE BLDC GLUCOMTR-MCNC: 251 MG/DL (ref 70–99)
HCT VFR BLD AUTO: 35.8 %
HGB BLD-MCNC: 11.8 G/DL
MCH RBC QN AUTO: 29.7 PG (ref 26–34)
MCHC RBC AUTO-ENTMCNC: 33 G/DL (ref 31–37)
MCV RBC AUTO: 90.2 FL
OSMOLALITY SERPL CALC.SUM OF ELEC: 294 MOSM/KG (ref 275–295)
PLATELET # BLD AUTO: 230 10(3)UL (ref 150–450)
PLATELET # BLD AUTO: 230 10(3)UL (ref 150–450)
POTASSIUM SERPL-SCNC: 4.8 MMOL/L (ref 3.5–5.1)
RBC # BLD AUTO: 3.97 X10(6)UL
SODIUM SERPL-SCNC: 136 MMOL/L (ref 136–145)
WBC # BLD AUTO: 11 X10(3) UL (ref 4–11)

## 2024-12-11 PROCEDURE — 70450 CT HEAD/BRAIN W/O DYE: CPT | Performed by: HOSPITALIST

## 2024-12-11 PROCEDURE — 71045 X-RAY EXAM CHEST 1 VIEW: CPT

## 2024-12-11 PROCEDURE — 99232 SBSQ HOSP IP/OBS MODERATE 35: CPT | Performed by: INTERNAL MEDICINE

## 2024-12-11 PROCEDURE — 99222 1ST HOSP IP/OBS MODERATE 55: CPT | Performed by: INTERNAL MEDICINE

## 2024-12-11 RX ORDER — FUROSEMIDE 10 MG/ML
20 INJECTION INTRAMUSCULAR; INTRAVENOUS DAILY
Status: DISCONTINUED | OUTPATIENT
Start: 2024-12-12 | End: 2024-12-12

## 2024-12-11 RX ORDER — FUROSEMIDE 10 MG/ML
20 INJECTION INTRAMUSCULAR; INTRAVENOUS
Status: DISCONTINUED | OUTPATIENT
Start: 2024-12-11 | End: 2024-12-11

## 2024-12-11 RX ORDER — POLYETHYLENE GLYCOL 3350 17 G/17G
17 POWDER, FOR SOLUTION ORAL DAILY
Status: DISCONTINUED | OUTPATIENT
Start: 2024-12-11 | End: 2024-12-14

## 2024-12-11 RX ORDER — BISACODYL 10 MG
10 SUPPOSITORY, RECTAL RECTAL DAILY
Status: DISCONTINUED | OUTPATIENT
Start: 2024-12-11 | End: 2024-12-14

## 2024-12-11 NOTE — PROGRESS NOTES
Progress Note  Isabel Patel Patient Status:  Inpatient    1931 MRN [id number]   Location [facility] 3W/SW Attending Ezequiel Tai MD   Hosp Day # 9 PCP Fredrick Cornelius MD     Subjective:  Denies complaints, son at bedside    Objective:  /83 (BP Location: Right leg)   Pulse (!) 123   Temp 98.1 °F (36.7 °C) (Oral)   Resp 22   Ht 5' 1\" (1.549 m)   Wt 160 lb 6.4 oz (72.8 kg)   SpO2 98%   BMI 30.31 kg/m²     Telemetry: AFRVR, rates 110s-120s    Intake/Output:    Intake/Output Summary (Last 24 hours) at 2024 1002  Last data filed at 2024 0600  Gross per 24 hour   Intake 712 ml   Output 600 ml   Net 112 ml     Last 3 Weights   24 0507 160 lb 6.4 oz (72.8 kg)   12/10/24 0500 159 lb 6.3 oz (72.3 kg)   24 0500 162 lb 14.7 oz (73.9 kg)   24 0507 159 lb 6.3 oz (72.3 kg)   24 0700 154 lb (69.9 kg)   24 1817 152 lb 6.4 oz (69.1 kg)   24 0423 146 lb 9.6 oz (66.5 kg)   24 0358 144 lb 11.2 oz (65.6 kg)   24 0520 141 lb (64 kg)   24 1146 147 lb (66.7 kg)   24 0558 160 lb (72.6 kg)   24 1107 163 lb (73.9 kg)   24 0521 163 lb (73.9 kg)   24 0340 163 lb 9.6 oz (74.2 kg)   02/15/24 0632 169 lb 4.8 oz (76.8 kg)   24 0615 167 lb (75.8 kg)   24 0449 165 lb 12.8 oz (75.2 kg)   24 1615 164 lb 14.4 oz (74.8 kg)   24 1229 163 lb (73.9 kg)     Labs:  Recent Labs   Lab 24  0353 12/10/24  0444 12/11/24  0619   * 143* 156*   BUN 38* 40* 38*   CREATSERUM 1.40* 1.21* 1.15*   EGFRCR 35* 42* 44*   CA 8.5* 9.0 9.4   * 135* 136   K 3.3* 4.5  4.5 4.8   CL 95* 101 103   CO2 29.0 29.0 28.0     Recent Labs   Lab 24  0416 24  0353 12/10/24  0444 12/11/24  0619   RBC 4.13 3.86 4.01 3.97   HGB 12.3 11.3* 11.9* 11.8*   HCT 37.1 34.4* 35.9 35.8   MCV 89.8 89.1 89.5 90.2   MCH 29.8 29.3 29.7 29.7   MCHC 33.2 32.8 33.1 33.0   RDW 15.5* 15.7* 15.7* 15.8*   NEPRELIM 8.17* 6.53 7.46  --     WBC 10.0 8.5 9.9 11.0   .0 194.0 213.0  213.0 230.0  230.0     Recent Labs   Lab 12/05/24  1601   TROPHS 12     Lab Results   Component Value Date/Time    HDL 52 10/15/2024 09:19 AM    LDL 98 10/15/2024 09:19 AM    TRIG 150 (H) 12/07/2024 04:24 AM     Lab Results   Component Value Date    DDIMER 2.97 (H) 02/16/2024     Lab Results   Component Value Date    TSH 1.508 04/23/2024     Review of Systems:   Constitutional: No fevers, chills, fatigue or night sweats.  Respiratory: Denies cough, wheezing or shortness of breath.  CV: Denies chest pain, palpitations, orthopnea, PND or dizziness.  GI: No nausea, vomiting or diarrhea. No blood in stools.    Physical Exam:  General: Alert, cooperative, no distress, appears stated age.  Neck: no JVD.  Lungs: Clear to auscultation bilaterally.  Chest wall: No tenderness or deformity.  Heart: Irregularly irregular rate and rhythm, tachycardic, S1, S2 normal, no murmur, click, rub or gallop.  Abdomen: Soft, non-tender. Bowel sounds normal. No masses,  No organomegaly.  Extremities: Extremities normal, atraumatic, no cyanosis or edema.  Pulses: 2+ and symmetric all extremities.  Neurologic: Grossly intact.    Diagnostics:  XR CHEST AP PORTABLE  (CPT=71045)    Result Date: 12/11/2024  CONCLUSION:   No new abnormality.  Persistent streaky bibasilar opacities, probably atelectasis.  Persistent small left pleural effusion.      Dictated by (CST): Zurdo Vee MD on 12/11/2024 at 8:50 AM     Finalized by (CST): Zurdo Vee MD on 12/11/2024 at 8:52 AM          US VENOUS DOPPLER ARM RIGHT - DIAG IMG (CPT=93971)    Result Date: 12/10/2024  CONCLUSION:   Partially imaged right PICC line.  Small amount of thrombus in the right axillary vein along the PICC line.  Thrombus also seen in the right basilic vein.  Incidentally noted left subclavian and thrombus which was also present on recent left upper extremity venous Doppler examination 12/03/2024.     Dictated by (CST): José Miguel  Higinio HOOD MD on 12/10/2024 at 5:46 PM     Finalized by (CST): Higinio Valdez MD on 12/10/2024 at 5:54 PM           Medications:   metoprolol  10 mg Intravenous Q4H    miconazole   Topical BID    [Held by provider] apixaban  5 mg Oral BID    [Held by provider] atorvastatin  20 mg Oral Daily    [Held by provider] carvedilol  12.5 mg Oral BID with meals    [Held by provider] losartan  50 mg Oral Daily    [Transfer Hold] furosemide  20 mg Intravenous BID (Diuretic)    insulin regular human  1-5 Units Subcutaneous 4 times per day      adult 3 in 1 TPN 41.7 mL/hr at 12/10/24 2130    continuous dose heparin 700 Units/hr (12/11/24 0713)    dextrose 10%      [Held by provider] dilTIAZem Stopped (12/10/24 0934)     Assessment:    89 year old female with PMH of DM2, HTN, Permanent atrial fibrillation, SSS s/p DC-PPM, HLD, CKD3, HFpEF, Mild aortic stenosis, pulmonary HTN, tricuspid regurgitation, TIA,  who presented with nausea and abdominal pain and was found to have a small bowel obstruction and AFRVR.     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  -rates generally controlled with digoxin PRN, BB, diltiazem, now elevated in the setting of SBO  -OAC with eliquis, now  on heparin, while holding PO meds  -LVEF 25-30%, moderate diffuse hypokinesis, biatrial dilation, mild Aortic regurgitation, severe TR, possible pulmonic vegetation,   PASP 73mmHg  -Rates remain elevated on IV lopressor, NPO due to SBO, PO meds on hold  -heparin gtt while NPO    New Severe LV dysfunction  Newly reduced LV function 25-30%, 60-65% back in February, 2024  -Diffuse hypokinesis noted on echo with severe TR, mild aortic regurgitation  -possible vegetation noted on pulmonic valve, likely  artifact  -proBNP elevated 5,448 on admission, diuresed with IV lasix, now holding  -GDMT on hold due to NPO with SBO  -weight up to 160#    HTN  BP mostly controlled on lopressor    HLD-statin, on hold while NPO, LDL 98 in October     Severe Tricuspid regurgitation  Mild Aortic regurgitation  Pulmonary HTN,  PASP 73mmHg     SSS  S/p dual chamber PPM ([employer])     DM2-A1c 6.8     CKD3  Cr baseline 1.4  -currently at 1.15      Plan:  -Continue IV lopressor Q4hrs  -Resume IV lasix 20mg daily, monitor strict I/Os, daily weights, daily BMP  -Okay to use diltiazem gtt for HR sustained >120, would try to minimize use due to low EF  -will need to resume DOAC and PO BB when cleared to take PO        Plan of care discussed with patient, RN.        Liz Trevizo, APRN  12/11/2024  10:02 AM  [phone] [place]  [phone] [place]

## 2024-12-11 NOTE — PROGRESS NOTES
[facility]  Progress Note    Isabel Patel Patient Status:  Inpatient    1931 MRN [id number]   Location [facility] 3W/SW Attending Ezequiel Tai MD   Hosp Day # 9 PCP Fredrick Cornelius MD     Subjective:  Patient resting in chair, son bedside. She reports she does not feel abdominal pain. She denies nausea or vomiting. She is unsure if she is passing gas, she had a bowel movement a few days ago. She is tolerating full liquid diet well, ambulating.     Objective/Physical Exam:  General: Alert, orientated x3.  Cooperative.  No apparent distress.  Vital Signs:  Blood pressure (!) 107/95, pulse 111, temperature 98.1 °F (36.7 °C), temperature source Oral, resp. rate 20, height 5' 1\" (1.549 m), weight 160 lb 6.4 oz (72.8 kg), SpO2 98%, not currently breastfeeding.  Wt Readings from Last 3 Encounters:   24 160 lb 6.4 oz (72.8 kg)   24 163 lb (73.9 kg)   24 163 lb (73.9 kg)     Lungs: No respiratory distress.  Cardiac: Regular rate and rhythm.   Abdomen:  Soft, not distended, minimally tender, with no rebound or guarding.  No peritoneal signs.   Extremities:  No lower extremity edema noted.    Incision: Clean, dry, intact, no erythema.       Intake/Output:    Intake/Output Summary (Last 24 hours) at 2024 1121  Last data filed at 2024 0600  Gross per 24 hour   Intake 712 ml   Output 600 ml   Net 112 ml     I/O last 3 completed shifts:  In: 1956.4 [P.O.:660; I.V.:294.4]  Out: 850 [Urine:850]  No intake/output data recorded.    Medications:    furosemide  20 mg Intravenous BID (Diuretic)    metoprolol  10 mg Intravenous Q4H    miconazole   Topical BID    [Held by provider] apixaban  5 mg Oral BID    [Held by provider] atorvastatin  20 mg Oral Daily    [Held by provider] carvedilol  12.5 mg Oral BID with meals    [Held by provider] losartan  50 mg Oral Daily    insulin regular human  1-5 Units Subcutaneous 4 times per day       Labs:  Lab Results   Component Value  Date    WBC 11.0 12/11/2024    HGB 11.8 12/11/2024    HCT 35.8 12/11/2024    .0 12/11/2024    .0 12/11/2024     Lab Results   Component Value Date     12/11/2024    K 4.8 12/11/2024     12/11/2024    CO2 28.0 12/11/2024    BUN 38 12/11/2024    CREATSERUM 1.15 12/11/2024     12/11/2024    CA 9.4 12/11/2024     Lab Results   Component Value Date    INR 1.41 (H) 12/02/2024    INR 1.0 12/03/2018         Assessment  Patient Active Problem List   Diagnosis    Right knee DJD    Osteoarthritis    Controlled type 2 diabetes mellitus with diabetic nephropathy, without long-term current use of insulin (HCC)    HTN (hypertension)    Hypercholesterolemia    Obesity    Left rotator cuff tear arthropathy    Pessary maintenance    Osteopenia of multiple sites    Pain of right lower extremity    Pelvic relaxation    Uterovaginal prolapse    AGUSTO (stress urinary incontinence, female)    TIA (transient ischemic attack)    Spinal stenosis of lumbar region    Macular degeneration    Mass of skin of left shoulder    Closed fracture of left distal femur (HCC)    Closed bicondylar fracture of distal end of right femur (HCC)    Exudative age-related macular degeneration, right eye, with inactive choroidal neovascularization (HCC)    Anemia    Bilateral lower extremity edema    Asymptomatic menopause    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 3 chronic kidney disease, without long-term current use of insulin (HCC)    Keratosis    Chronic shoulder pain    History of right breast cancer    Urinary incontinence    Atrial fibrillation, new onset (HCC)    Status post biventricular pacemaker    Acute on chronic congestive heart failure, unspecified heart failure type (HCC)    Complete intestinal obstruction, unspecified cause (HCC)    Atrial fibrillation with rapid ventricular response (HCC)    Anticoagulated    Small bowel obstruction (HCC)     POD 2: Exploratory laparotomy       Plan:  Advance to soft diet  as tolerated. Continue TPN  OK to resume Eliquis, stop heparin drip  Ambulate and up to chair, PT following and recommending rehab or HH PT  Patient doing well, anticipate discharge within the next 24-48 hours      Quality:  DVT Mechanical Prophylaxis:   SCDs,    DVT Pharmacologic Prophylaxis   Medication    heparin (Porcine) 38100 units/250mL infusion ACS/AFIB CONTINUOUS    [Held by provider] apixaban (Eliquis) tab 5 mg                Code Status: Full Code  Mills: External urinary catheter in place  Mills Duration (in days):   Central line:    WILLIE: 12/13/2024        CHRISTIAN Grijalva  12/11/2024  11:21 AM

## 2024-12-11 NOTE — PHYSICAL THERAPY NOTE
PHYSICAL THERAPY TREATMENT NOTE - INPATIENT     Room Number: 326/326-A       Presenting Problem: exploratory laparotomy performed   Co-Morbidities : Hx SSS s/p dual chamber pacemaker, DM2, HTN, OA, CKD3    Problem List  Principal Problem:    Acute on chronic congestive heart failure, unspecified heart failure type (HCC)  Active Problems:    Complete intestinal obstruction, unspecified cause (HCC)    Atrial fibrillation with rapid ventricular response (HCC)    Anticoagulated    Small bowel obstruction (HCC)      PHYSICAL THERAPY ASSESSMENT   Patient demonstrates fair progress this session, goals  remain in progress.      Patient is requiring maximum assist as a result of the following impairments: decreased functional strength, impaired   balance, and cognitive deficits (confused).     Patient continues to function below baseline with bed mobility, transfers, and gait.  Next session anticipate patient to progress bed mobility, transfers, and gait.  Physical Therapy will continue to follow patient for duration of hospitalization.    Patient continues to benefit from continued skilled PT services: to promote return to prior level of function and safety with continuous assistance and gradual rehabilitative therapy .    PLAN DURING HOSPITALIZATION  Nursing Mobility Recommendation : Lift Equipment  PT Device Recommendation: Rolling walker  PT Treatment Plan: Bed mobility;Family education;Gait training;Transfer training  Frequency (Obs): 5x/week     SUBJECTIVE  \"I can't\"    OBJECTIVE  Precautions: Bed/chair alarm;Limb alert - left;Limb alert - right;Hard of hearing    WEIGHT BEARING RESTRICTION       PAIN ASSESSMENT   Ratin  Location: abdominal and LE pain unrated       BALANCE  Static Sitting: Fair  Dynamic Sitting: Fair -  Static Standing: Poor +  Dynamic Standing: Poor    ACTIVITY TOLERANCE                          O2 WALK       AM-PAC '6-Clicks' INPATIENT SHORT FORM - BASIC MOBILITY  How much difficulty does  the patient currently have...  Patient Difficulty: Turning over in bed (including adjusting bedclothes, sheets and blankets)?: A Lot   Patient Difficulty: Sitting down on and standing up from a chair with arms (e.g., wheelchair, bedside commode, etc.): A Lot   Patient Difficulty: Moving from lying on back to sitting on the side of the bed?: A Lot   How much help from another person does the patient currently need...   Help from Another: Moving to and from a bed to a chair (including a wheelchair)?: A Lot   Help from Another: Need to walk in hospital room?: A Lot   Help from Another: Climbing 3-5 steps with a railing?: Total     AM-PAC Score:  Raw Score: 11   Approx Degree of Impairment: 72.57%   Standardized Score (AM-PAC Scale): 33.86   CMS Modifier (G-Code): CL    FUNCTIONAL ABILITY STATUS  Functional Mobility/Gait Assessment  Gait Assistance: Moderate assistance (x2)  Distance (ft):  (2ft)  Assistive Device: Rolling walker  Pattern: Shuffle  Rolling: maximum assist  Supine to Sit: maximum assist  Sit to Supine: maximum assist  Sit to Stand: maximum assist    Skilled Therapy Provided: transfer training, balance training    The patient's Approx Degree of Impairment: 72.57% has been calculated based on documentation in the [facility] '6 clicks' Inpatient Daily Activity Short Form.  Research supports that patients with this level of impairment may benefit from gradual.  Final disposition will be made by interdisciplinary medical team.    THERAPEUTIC EXERCISES  Lower Extremity Ankle pumps  Knee extension  LAQ  10xea   Position Sitting       Patient End of Session: Up in chair    CURRENT GOALS   Goals to be met by: 1/5  Patient Goal Patient's self-stated goal is: unstated    Goal #1 Patient is able to demonstrate supine - sit EOB @ level: supervision     Goal #1   Current Status Max A   Goal #2 Patient is able to demonstrate transfers Sit to/from Stand at assistance level: supervision with walker - rolling     Goal  #2  Current Status Max A   Goal #3 Patient is able to ambulate 25 feet with assist device: walker - rolling at assistance level: supervision   Goal #3   Current Status Not met, unable to make steps   Goal #4 Patient will negotiate 2 stairs/one curb w/ assistive device and supervision   Goal #4   Current Status NT   Goal #5 Patient to demonstrate independence with home activity/exercise instructions provided to patient in preparation for discharge.   Goal #5   Current Status    Goal #6    Goal #6  Current Status      Therapeutic Activity:  24 minutes

## 2024-12-11 NOTE — PLAN OF CARE
Problem: Patient Centered Care  Goal: Patient preferences are identified and integrated in the patient's plan of care  Description: Interventions:  - What would you like us to know as we care for you? From home with son  - Provide timely, complete, and accurate information to patient/family  - Incorporate patient and family knowledge, values, beliefs, and cultural backgrounds into the planning and delivery of care  - Encourage patient/family to participate in care and decision-making at the level they choose  - Honor patient and family perspectives and choices  Outcome: Progressing     Problem: Patient/Family Goals  Goal: Patient/Family Long Term Goal  Description: Patient's Long Term Goal: go home    Interventions:  - go home  - See additional Care Plan goals for specific interventions  Outcome: Progressing  Goal: Patient/Family Short Term Goal  Description: Patient's Short Term Goal: clear SBO    Interventions:   - NGT LIS  -NPO  - See additional Care Plan goals for specific interventions  Outcome: Progressing     Problem: CARDIOVASCULAR - ADULT  Goal: Maintains optimal cardiac output and hemodynamic stability  Description: INTERVENTIONS:  - Monitor vital signs, rhythm, and trends  - Monitor for bleeding, hypotension and signs of decreased cardiac output  - Evaluate effectiveness of vasoactive medications to optimize hemodynamic stability  - Monitor arterial and/or venous puncture sites for bleeding and/or hematoma  - Assess quality of pulses, skin color and temperature  - Assess for signs of decreased coronary artery perfusion - ex. Angina  - Evaluate fluid balance, assess for edema, trend weights  Outcome: Progressing  Goal: Absence of cardiac arrhythmias or at baseline  Description: INTERVENTIONS:  - Continuous cardiac monitoring, monitor vital signs, obtain 12 lead EKG if indicated  - Evaluate effectiveness of antiarrhythmic and heart rate control medications as ordered  - Initiate emergency measures for  life threatening arrhythmias  - Monitor electrolytes and administer replacement therapy as ordered  Outcome: Progressing     Problem: RESPIRATORY - ADULT  Goal: Achieves optimal ventilation and oxygenation  Description: INTERVENTIONS:  - Assess for changes in respiratory status  - Assess for changes in mentation and behavior  - Position to facilitate oxygenation and minimize respiratory effort  - Oxygen supplementation based on oxygen saturation or ABGs  - Provide Smoking Cessation handout, if applicable  - Encourage broncho-pulmonary hygiene including cough, deep breathe, Incentive Spirometry  - Assess the need for suctioning and perform as needed  - Assess and instruct to report SOB or any respiratory difficulty  - Respiratory Therapy support as indicated  - Manage/alleviate anxiety  - Monitor for signs/symptoms of CO2 retention  Outcome: Progressing     Problem: GASTROINTESTINAL - ADULT  Goal: Minimal or absence of nausea and vomiting  Description: INTERVENTIONS:  - Maintain adequate hydration with IV or PO as ordered and tolerated  - Nasogastric tube to low intermittent suction as ordered  - Evaluate effectiveness of ordered antiemetic medications  - Provide nonpharmacologic comfort measures as appropriate  - Advance diet as tolerated, if ordered  - Obtain nutritional consult as needed  - Evaluate fluid balance  Outcome: Progressing  Goal: Maintains or returns to baseline bowel function  Description: INTERVENTIONS:  - Assess bowel function  - Maintain adequate hydration with IV or PO as ordered and tolerated  - Evaluate effectiveness of GI medications  - Encourage mobilization and activity  - Obtain nutritional consult as needed  - Establish a toileting routine/schedule  - Consider collaborating with pharmacy to review patient's medication profile  Outcome: Progressing     Problem: SAFETY ADULT - FALL  Goal: Free from fall injury  Description: INTERVENTIONS:  - Assess pt frequently for physical needs  - Identify  cognitive and physical deficits and behaviors that affect risk of falls.  - [place] fall precautions as indicated by assessment.  - Educate pt/family on patient safety including physical limitations  - Instruct pt to call for assistance with activity based on assessment  - Modify environment to reduce risk of injury  - Provide assistive devices as appropriate  - Consider OT/PT consult to assist with strengthening/mobility  - Encourage toileting schedule  Outcome: Progressing     Problem: DISCHARGE PLANNING  Goal: Discharge to home or other facility with appropriate resources  Description: INTERVENTIONS:  - Identify barriers to discharge w/pt and caregiver  - Include patient/family/discharge partner in discharge planning  - Arrange for needed discharge resources and transportation as appropriate  - Identify discharge learning needs (meds, wound care, etc)  - Arrange for interpreters to assist at discharge as needed  - Consider post-discharge preferences of patient/family/discharge partner  - Complete POLST form as appropriate  - Assess patient's ability to be responsible for managing their own health  - Refer to Case Management Department for coordinating discharge planning if the patient needs post-hospital services based on physician/LIP order or complex needs related to functional status, cognitive ability or social support system  Outcome: Progressing     Problem: HEMATOLOGIC - ADULT  Goal: Free from bleeding injury  Description: (Example usage: patient with low platelets)  INTERVENTIONS:  - Avoid intramuscular injections, enemas and rectal medication administration  - Ensure safe mobilization of patient  - Hold pressure on venipuncture sites to achieve adequate hemostasis  - Assess for signs and symptoms of internal bleeding  - Monitor lab trends  - Patient is to report abnormal signs of bleeding to staff  - Avoid use of toothpicks and dental floss  - Use electric shaver for shaving  - Use soft bristle tooth  brush  - Limit straining and forceful nose blowing  Outcome: Progressing     Heparin Drip. TPN. Worked with PT/OT. Leaning towards L side while working with PT/OT, notified MD. Neuro check completed, no other deficits noted, see orders per MD. Ct head completed.  Upgraded to soft diet per general surgery.

## 2024-12-11 NOTE — PLAN OF CARE
Problem: Patient Centered Care  Goal: Patient preferences are identified and integrated in the patient's plan of care  Description: Interventions:  - What would you like us to know as we care for you? From home with son  - Provide timely, complete, and accurate information to patient/family  - Incorporate patient and family knowledge, values, beliefs, and cultural backgrounds into the planning and delivery of care  - Encourage patient/family to participate in care and decision-making at the level they choose  - Honor patient and family perspectives and choices  Outcome: Progressing     Problem: Patient/Family Goals  Goal: Patient/Family Long Term Goal  Description: Patient's Long Term Goal: go home    Interventions:  - go home  - See additional Care Plan goals for specific interventions  Outcome: Progressing  Goal: Patient/Family Short Term Goal  Description: Patient's Short Term Goal: clear SBO    Interventions:   - NGT LIS  -NPO  - See additional Care Plan goals for specific interventions  Outcome: Progressing     Problem: CARDIOVASCULAR - ADULT  Goal: Maintains optimal cardiac output and hemodynamic stability  Description: INTERVENTIONS:  - Monitor vital signs, rhythm, and trends  - Monitor for bleeding, hypotension and signs of decreased cardiac output  - Evaluate effectiveness of vasoactive medications to optimize hemodynamic stability  - Monitor arterial and/or venous puncture sites for bleeding and/or hematoma  - Assess quality of pulses, skin color and temperature  - Assess for signs of decreased coronary artery perfusion - ex. Angina  - Evaluate fluid balance, assess for edema, trend weights  Outcome: Progressing  Goal: Absence of cardiac arrhythmias or at baseline  Description: INTERVENTIONS:  - Continuous cardiac monitoring, monitor vital signs, obtain 12 lead EKG if indicated  - Evaluate effectiveness of antiarrhythmic and heart rate control medications as ordered  - Initiate emergency measures for  life threatening arrhythmias  - Monitor electrolytes and administer replacement therapy as ordered  Outcome: Progressing     Problem: RESPIRATORY - ADULT  Goal: Achieves optimal ventilation and oxygenation  Description: INTERVENTIONS:  - Assess for changes in respiratory status  - Assess for changes in mentation and behavior  - Position to facilitate oxygenation and minimize respiratory effort  - Oxygen supplementation based on oxygen saturation or ABGs  - Provide Smoking Cessation handout, if applicable  - Encourage broncho-pulmonary hygiene including cough, deep breathe, Incentive Spirometry  - Assess the need for suctioning and perform as needed  - Assess and instruct to report SOB or any respiratory difficulty  - Respiratory Therapy support as indicated  - Manage/alleviate anxiety  - Monitor for signs/symptoms of CO2 retention  Outcome: Progressing     Problem: GASTROINTESTINAL - ADULT  Goal: Minimal or absence of nausea and vomiting  Description: INTERVENTIONS:  - Maintain adequate hydration with IV or PO as ordered and tolerated  - Nasogastric tube to low intermittent suction as ordered  - Evaluate effectiveness of ordered antiemetic medications  - Provide nonpharmacologic comfort measures as appropriate  - Advance diet as tolerated, if ordered  - Obtain nutritional consult as needed  - Evaluate fluid balance  Outcome: Progressing  Goal: Maintains or returns to baseline bowel function  Description: INTERVENTIONS:  - Assess bowel function  - Maintain adequate hydration with IV or PO as ordered and tolerated  - Evaluate effectiveness of GI medications  - Encourage mobilization and activity  - Obtain nutritional consult as needed  - Establish a toileting routine/schedule  - Consider collaborating with pharmacy to review patient's medication profile  Outcome: Progressing     Problem: SAFETY ADULT - FALL  Goal: Free from fall injury  Description: INTERVENTIONS:  - Assess pt frequently for physical needs  - Identify  cognitive and physical deficits and behaviors that affect risk of falls.  - [place] fall precautions as indicated by assessment.  - Educate pt/family on patient safety including physical limitations  - Instruct pt to call for assistance with activity based on assessment  - Modify environment to reduce risk of injury  - Provide assistive devices as appropriate  - Consider OT/PT consult to assist with strengthening/mobility  - Encourage toileting schedule  Outcome: Progressing     Problem: DISCHARGE PLANNING  Goal: Discharge to home or other facility with appropriate resources  Description: INTERVENTIONS:  - Identify barriers to discharge w/pt and caregiver  - Include patient/family/discharge partner in discharge planning  - Arrange for needed discharge resources and transportation as appropriate  - Identify discharge learning needs (meds, wound care, etc)  - Arrange for interpreters to assist at discharge as needed  - Consider post-discharge preferences of patient/family/discharge partner  - Complete POLST form as appropriate  - Assess patient's ability to be responsible for managing their own health  - Refer to Case Management Department for coordinating discharge planning if the patient needs post-hospital services based on physician/LIP order or complex needs related to functional status, cognitive ability or social support system  Outcome: Progressing     Problem: HEMATOLOGIC - ADULT  Goal: Free from bleeding injury  Description: (Example usage: patient with low platelets)  INTERVENTIONS:  - Avoid intramuscular injections, enemas and rectal medication administration  - Ensure safe mobilization of patient  - Hold pressure on venipuncture sites to achieve adequate hemostasis  - Assess for signs and symptoms of internal bleeding  - Monitor lab trends  - Patient is to report abnormal signs of bleeding to staff  - Avoid use of toothpicks and dental floss  - Use electric shaver for shaving  - Use soft bristle tooth  brush  - Limit straining and forceful nose blowing  Outcome: Progressing

## 2024-12-11 NOTE — CONSULTS
[facility]  Initial Inpatient Consult Note    Isabel Patel Patient Status:  Inpatient    1931 MRN [id number]   Location [facility] 3W/SW Attending Ezequiel Tai MD   Hosp Day # 9 PCP Fredrick Cornelius MD     Reason for consultation: UE DVT      History of Present Illness  Isabel Patel is a 89 year old female hospitalized here with small bowel obstruction that is being managed conservatively.  She has a complex medical history with paroxysmal atrial fibrillation stage III CKD CHF and pulmonary arterial hypertension.  Also history of breast cancer s/p lumpectomy followed by chemotherapy and radiation.    She had been on Eliquis previously but was found to have swelling of bilateral upper extremities.  This prompted a venous Doppler which shows a small amount of thrombus in the axillary vein along the PICC line as well as incidentally noted left subclavian thrombus.  She does have pacemaker leads in this area.  Lower extremity venous Doppler on 2024 showed no evidence of DVT.    She is currently on heparin and hematology was asked to evaluate for recommendations regarding anticoagulation.  She is able to eat some clear liquids now and denies any nausea vomiting.      Review of Systems:  Hematology/Oncology ROS performed and negative except as above in HPI    History/Other:   Past Medical History:  Past Medical History:    Acute, but ill-defined, cerebrovascular disease    Arthritis    Breast CA (HCC)    Cancer (HCC)    Diabetes (HCC)    diet controlled    Diabetes mellitus, type II (HCC)    Essential hypertension    Hearing impairment    High blood pressure    High cholesterol    Hyperlipidemia    Osteoarthritis    Squamous cell carcinoma, keratinizing    R posterior thigh       Past Surgical History:  Past Surgical History:   Procedure Laterality Date    Appendectomy      Appendectomy      Cataract  -    Cataract extraction w/  intraocular lens  implant Bilateral     Ian Garcia MD    Chemotherapy  2016    rt breast.    Cholecystectomy  1995    D & c      Knee replacement surgery Right 2012    Lumpectomy right  2016    cancer    Mastectomy partial Right 2016      [id number]    Skin surgery Right 2018    Tonsillectomy      Wrist fracture surgery Right        Current Medications:   adult 3 in 1 TPN   Intravenous Continuous TPN    [START ON 2024] furosemide (Lasix) 10 mg/mL injection 20 mg  20 mg Intravenous Daily    metoprolol (Lopressor) 5 mg/5mL injection 10 mg  10 mg Intravenous Q4H    adult 3 in 1 TPN   Intravenous Continuous TPN    [COMPLETED] potassium chloride 40 mEq in 250mL sodium chloride 0.9% IVPB premix  40 mEq Intravenous Once    [COMPLETED] heparin (Porcine) 1000 UNIT/ML injection 2,200 Units  30 Units/kg Intravenous Once    [] adult 3 in 1 TPN   Intravenous Continuous TPN    heparin (Porcine) 33573 units/250mL infusion ACS/AFIB CONTINUOUS  200-3,000 Units/hr Intravenous Continuous    [COMPLETED] furosemide (Lasix) 10 mg/mL injection 20 mg  20 mg Intravenous Once    [] adult 3 in 1 TPN   Intravenous Continuous TPN    [COMPLETED] heparin (Porcine) 1000 UNIT/ML injection 2,200 Units  30 Units/kg Intravenous Once    [COMPLETED] heparin (Porcine) 1000 UNIT/ML injection 2,200 Units  30 Units/kg Intravenous Once    [COMPLETED] heparin (Porcine) 1000 UNIT/ML injection 2,100 Units  30 Units/kg Intravenous Once    [] adult 3 in 1 TPN   Intravenous Continuous TPN    [COMPLETED] heparin (Porcine) 1000 UNIT/ML injection 2,100 Units  30 Units/kg Intravenous Once    [COMPLETED] furosemide (Lasix) 10 mg/mL injection 20 mg  20 mg Intravenous Once    HYDROmorphone (Dilaudid) 1 MG/ML injection 0.1 mg  0.1 mg Intravenous Q2H PRN    Or    HYDROmorphone (Dilaudid) 1 MG/ML injection 0.2 mg  0.2 mg Intravenous Q2H PRN    Or    HYDROmorphone (Dilaudid) 1 MG/ML injection 0.4 mg  0.4 mg Intravenous Q2H PRN     [COMPLETED] potassium chloride 40 mEq in 250mL sodium chloride 0.9% IVPB premix  40 mEq Intravenous Once    dextrose 10% infusion (TPN no rate)   Intravenous Continuous PRN    [] adult 3 in 1 TPN   Intravenous Continuous TPN    [COMPLETED] magnesium sulfate in sterile water for injection 2 g/50mL IVPB premix 2 g  2 g Intravenous Once    [COMPLETED] lidocaine PF (Xylocaine-MPF) 1% injection  5 mL Intradermal Once    [] lactated ringers IV bolus 500 mL  500 mL Intravenous Once PRN    [] atropine 0.1 MG/ML injection 0.5 mg  0.5 mg Intravenous PRN    [] naloxone (Narcan) 0.4 MG/ML injection 0.08 mg  0.08 mg Intravenous PRN    acetaminophen (Ofirmev) 10 mg/mL infusion premix 1,000 mg  1,000 mg Intravenous Q6H PRN    [COMPLETED] alteplase (Activase) 2 mg in sterile water for injection (PF) 2.2 mL IV push to declot line  2 mg Intravenous Once    miconazole 2 % powder   Topical BID    [Held by provider] apixaban (Eliquis) tab 5 mg  5 mg Oral BID    [Held by provider] atorvastatin (Lipitor) tab 20 mg  20 mg Oral Daily    [Held by provider] carvedilol (Coreg) tab 12.5 mg  12.5 mg Oral BID with meals    [Held by provider] losartan (Cozaar) tab 50 mg  50 mg Oral Daily    [COMPLETED] heparin (Porcine) 1000 UNIT/ML injection - BOLUS IV 5,100 Units  80 Units/kg Intravenous Once    [COMPLETED] heparin (Porcine) 13321 units/250mL infusion (PE/DVT/THROMBUS) INITIAL DOSE  18 Units/kg/hr Intravenous Once    [COMPLETED] potassium chloride 40 mEq in 250mL sodium chloride 0.9% IVPB premix  40 mEq Intravenous Once    [COMPLETED] sodium chloride 0.9 % IV bolus 500 mL  500 mL Intravenous Once    [COMPLETED] ondansetron (Zofran) 4 MG/2ML injection 4 mg  4 mg Intravenous Once    [COMPLETED] famotidine (Pepcid) 20 mg/2mL injection 20 mg  20 mg Intravenous Once    [COMPLETED] dilTIAZem (cardIZEM) 25 mg/5mL injection 20 mg  20 mg Intravenous Once    [COMPLETED] furosemide (Lasix) 10 mg/mL injection 40 mg  40 mg  Intravenous Once    [COMPLETED] iopamidol 76% (ISOVUE-370) injection for power injector  80 mL Intravenous ONCE PRN    [COMPLETED] iopamidol 76% (ISOVUE-370) injection for power injector  65 mL Intravenous ONCE PRN    [COMPLETED] piperacillin-tazobactam (Zosyn) 4.5 g in dextrose 5% 100 mL IVPB-ADDV  4.5 g Intravenous Once    [COMPLETED] dilTIAZem (cardIZEM) 25 mg/5mL injection 20 mg  20 mg Intravenous Once    acetaminophen (Tylenol Extra Strength) tab 500 mg  500 mg Oral Q4H PRN    ondansetron (Zofran) 4 MG/2ML injection 4 mg  4 mg Intravenous Q6H PRN    [Transfer Hold] dilTIAZem 10 mg BOLUS FROM BAG infusion  10 mg Intravenous Q1H PRN    [Held by provider] dilTIAZem (cardIZEM) 100 mg in sodium chloride 0.9% 100 mL IVPB-ADDV  2.5-20 mg/hr Intravenous Continuous    glucose (Dex4) 15 GM/59ML oral liquid 15 g  15 g Oral Q15 Min PRN    Or    glucose (Glutose) 40% oral gel 15 g  15 g Oral Q15 Min PRN    Or    glucose-vitamin C (Dex-4) chewable tab 4 tablet  4 tablet Oral Q15 Min PRN    Or    dextrose 50% injection 50 mL  50 mL Intravenous Q15 Min PRN    Or    glucose (Dex4) 15 GM/59ML oral liquid 30 g  30 g Oral Q15 Min PRN    Or    glucose (Glutose) 40% oral gel 30 g  30 g Oral Q15 Min PRN    Or    glucose-vitamin C (Dex-4) chewable tab 8 tablet  8 tablet Oral Q15 Min PRN    insulin regular human (Novolin R, Humulin R) 100 UNIT/ML injection 1-5 Units  1-5 Units Subcutaneous 4 times per day    [COMPLETED] magnesium sulfate in sterile water for injection 2 g/50mL IVPB premix 2 g  2 g Intravenous Once    [COMPLETED] Perflutren Lipid Microsphere (DEFINITY) 6.52 MG/ML injection 1.5 mL  1.5 mL Intravenous ONCE PRN       Allergies:   Allergies[1]    Family Medical History:  Family History   Problem Relation Age of Onset    Heart Disease Father         CAD    Diabetes Father     Heart Disease Mother         CAD    Diabetes Mother     Breast Cancer Mother 83        had lumpectomy and RT.  No other treatment.   of stroke at  89    Other (appendicitis[other]) Brother         age 13    Other (alive and well[other]) Sister     Other (alive and well[other]) Son         x3    Breast Cancer Self 85    Glaucoma Neg     Macular degeneration Neg        Social History:  Social History     Socioeconomic History    Marital status:      Spouse name: Not on file    Number of children: 3    Years of education: Not on file    Highest education level: Not on file   Occupational History    Occupation:      Comment: retired   Tobacco Use    Smoking status: Never     Passive exposure: Never    Smokeless tobacco: Never   Vaping Use    Vaping status: Never Used   Substance and Sexual Activity    Alcohol use: Not Currently    Drug use: Never    Sexual activity: Not Currently   Other Topics Concern     Service No    Blood Transfusions Not Asked    Caffeine Concern Yes     Comment: coffee, 1 cups daily    Occupational Exposure No    Hobby Hazards Not Asked    Sleep Concern Not Asked    Stress Concern Not Asked    Weight Concern Not Asked    Special Diet Not Asked    Back Care Not Asked    Exercise Not Asked    Bike Helmet Not Asked    Seat Belt Not Asked    Self-Exams Not Asked   Social History Narrative    Not on file     Social Drivers of Health     Financial Resource Strain: Low Risk  (2/20/2024)    Financial Resource Strain     Difficulty of Paying Living Expenses: Not hard at all     Med Affordability: No   Food Insecurity: Unknown (12/2/2024)    Food Insecurity     Food Insecurity: Patient declined   Transportation Needs: Unknown (12/2/2024)    Transportation Needs     Lack of Transportation: Patient declined     Car Seat: Not on file   Physical Activity: Not on file   Stress: Not on file   Social Connections: Not on file   Housing Stability: Unknown (12/2/2024)    Housing Stability     Housing Instability: Patient declined     Housing Instability Emergency: Not on file     Crib or Bassinette: Not on file       Gyn History:  OB  History    Para Term  AB Living   3 3 0 0 0 0   SAB IAB Ectopic Multiple Live Births   0 0 0 0 0       Objective:    BP (!) 107/95 (BP Location: Right leg)   Pulse 103   Temp 98.1 °F (36.7 °C) (Oral)   Resp 20   Ht 1.549 m (5' 1\")   Wt 72.8 kg (160 lb 6.4 oz)   SpO2 98%   BMI 30.31 kg/m²   Physical Exam:  General: A&Ox3, NAD  HEENT: PERRL, OP clear  Neck: supple, no LAD or JVD  CV: RRR, no murmurs, + pulses  Pulm: CTA b/l, no w/r/r, normal effort  Lymph: no palpable lymphadenopathy throughout the cervical, supraclavicular, or axillary regions  Extremities: bilateral UE edema,   Neurological: Grossly intact    Labs:  Lab Results   Component Value Date/Time    WBC 11.0 2024 06:19 AM    RBC 3.97 2024 06:19 AM    HGB 11.8 (L) 2024 06:19 AM    HCT 35.8 2024 06:19 AM    MCV 90.2 2024 06:19 AM    MCH 29.7 2024 06:19 AM    MCHC 33.0 2024 06:19 AM    RDW 15.8 (H) 2024 06:19 AM    NEPRELIM 7.46 12/10/2024 04:44 AM    .0 2024 06:19 AM    .0 2024 06:19 AM       Lab Results   Component Value Date/Time     (H) 2024 06:19 AM    BUN 38 (H) 2024 06:19 AM    CREATSERUM 1.15 (H) 2024 06:19 AM    GFRNAA 42 (L) 2022 09:58 AM    CA 9.4 2024 06:19 AM    ALB 3.1 (L) 2024 04:24 AM     2024 06:19 AM    K 4.8 2024 06:19 AM     2024 06:19 AM    CO2 28.0 2024 06:19 AM    ALKPHO 97 2024 04:24 AM    AST 28 2024 04:24 AM    ALT 48 2024 04:24 AM       Imaging:  XR CHEST AP PORTABLE  (CPT=71045)    Result Date: 2024  CONCLUSION:   No new abnormality.  Persistent streaky bibasilar opacities, probably atelectasis.  Persistent small left pleural effusion.      Dictated by (CST): Zurdo Vee MD on 2024 at 8:50 AM     Finalized by (CST): Zurdo Vee MD on 2024 at 8:52 AM          US VENOUS DOPPLER ARM RIGHT - DIAG IMG (CPT=93971)    Result  Date: 12/10/2024  CONCLUSION:   Partially imaged right PICC line.  Small amount of thrombus in the right axillary vein along the PICC line.  Thrombus also seen in the right basilic vein.  Incidentally noted left subclavian and thrombus which was also present on recent left upper extremity venous Doppler examination 12/03/2024.     Dictated by (CST): Higinio Valdez MD on 12/10/2024 at 5:46 PM     Finalized by (CST): Higinio Valdez MD on 12/10/2024 at 5:54 PM            Assessment & Plan:    Isabel Patel is a 89 year old female with bilateral provoked DVTs in both the right and left upper extremities secondary to PICC line as well as a subclavian pacemaker leads.  There is no lower extremity DVT. She had been on Eliquis previously.  However given the indwelling catheter/pacemaker leads this does not necessarily represent a DOAC failure    # Continue heparin GTT and once po intake established resume Eliquis at 10mg BID for a week followed by 5 mg twice daily thereafter.    # I suspect her chronic LUE edema (Since Oct) may be related to subclavian stenosis in addition to thrombosis that has been reported in patients with pacemaker leads.  If upper extremity edema does not improve, can consider MR or CT Venogram to have this evaluated further, but given her age/frailty would suggest conservative mx at this time    # Remove RUE PICC once off TPN    # Further mx per primary svc    Thank you Dr Fredrick Cornelius MD for the opportunity to participate in the care of this interesting patient. Please do contact me if I may be of any further assistance    Tobias Granados MD  [facility]ematology/On[facility]  [facility]    This note was created using a voice-recognition transcribing system. Incorrect words or phrases may have been missed during proofreading. Please interpret accordingly.         [1]   Allergies  Allergen Reactions    Adhesive Tape RASH

## 2024-12-11 NOTE — PROGRESS NOTES
[facility]  part of [facility]    Progress Note    Isabel Patel Patient Status:  Inpatient    1931 MRN [id number]   Location [facility] 2W/SW Attending Ezequiel Tai MD   Hosp Day # 9 PCP Fredrick Cornelius MD     Chief Complaint:   Chief Complaint   Patient presents with    Nausea/Vomiting/Diarrhea   Abdominal pain, N/V    Subjective:   Isabel Patel is feeling well. Worked with therapy up to chair. No abd pain no N/V. Tolerated full liquid diet.   Per RN - leaning towards L side when working with therapy - son says she did that a little bit at home as well. Son says she has chronic UE weakness from her shoulder OA which is severe.   Objective:   Objective:    Blood pressure (!) 107/95, pulse 111, temperature 98.1 °F (36.7 °C), temperature source Oral, resp. rate 20, height 5' 1\" (1.549 m), weight 160 lb 6.4 oz (72.8 kg), SpO2 98%, not currently breastfeeding.    Physical Exam:    General: No acute distress. Up to chair leaning to L   Respiratory: Diminished bases B/L   Cardiovascular: irregularly irregular   Abdomen: Soft, nontender, nondistended.  Positive bowel sounds. No rebound or guarding.  Neurologic: leaning to L side in chair. UE weakness B/L due to shoulder pain LLE weakness due to prev hip injury per son   Extremities: + edema       Results:   Results:    Labs:  Recent Labs   Lab 24  0424 24  0416 24  0353 12/10/24  0444 24  0619   WBC 14.2* 10.0 8.5 9.9 11.0   HGB 14.2 12.3 11.3* 11.9* 11.8*   MCV 90.4 89.8 89.1 89.5 90.2   .0 204.0 194.0 213.0  213.0 230.0  230.0       Recent Labs   Lab 24  1715 24  0855 24  0424 24  0416 24  0353 12/10/24  0444 24  0619   *   < > 250*  250*   < > 266* 143* 156*   BUN 22   < > 23  23   < > 38* 40* 38*   CREATSERUM 1.61*   < > 1.57*  1.57*   < > 1.40* 1.21* 1.15*   CA 9.0   < > 8.7  8.7   < > 8.5* 9.0 9.4   ALB 3.4  --  3.1*  --   --   --   --        < > 135*  135*   < > 133* 135* 136   K 3.6   < > 4.5  4.5  4.5   < > 3.3* 4.5  4.5 4.8   CL 98   < > 101  101   < > 95* 101 103   CO2 32.0   < > 28.0  28.0   < > 29.0 29.0 28.0   ALKPHO  --   --  97  --   --   --   --    AST  --   --  28  --   --   --   --    ALT  --   --  48  --   --   --   --    BILT  --   --  0.7  --   --   --   --    TP  --   --  5.6*  --   --   --   --     < > = values in this interval not displayed.       Estimated Creatinine Clearance: 23.1 mL/min (A) (based on SCr of 1.15 mg/dL (H)).    No results for input(s): \"PTP\", \"INR\" in the last 168 hours.         Culture:  Hospital Encounter on 12/02/24   1. Blood Culture     Status: None    Collection Time: 12/05/24  4:04 PM    Specimen: Blood,peripheral   Result Value Ref Range    Blood Culture Result No Growth 5 Days N/A       Cardiac  No results for input(s): \"TROP\", \"PBNP\" in the last 168 hours.      Imaging: Imaging data reviewed in [facility].  XR CHEST AP PORTABLE  (CPT=71045)    Result Date: 12/11/2024  CONCLUSION:   No new abnormality.  Persistent streaky bibasilar opacities, probably atelectasis.  Persistent small left pleural effusion.      Dictated by (CST): Zurdo Vee MD on 12/11/2024 at 8:50 AM     Finalized by (CST): Zurdo Vee MD on 12/11/2024 at 8:52 AM          US VENOUS DOPPLER ARM RIGHT - DIAG IMG (CPT=93971)    Result Date: 12/10/2024  CONCLUSION:   Partially imaged right PICC line.  Small amount of thrombus in the right axillary vein along the PICC line.  Thrombus also seen in the right basilic vein.  Incidentally noted left subclavian and thrombus which was also present on recent left upper extremity venous Doppler examination 12/03/2024.     Dictated by (CST): Higinio Valdez MD on 12/10/2024 at 5:46 PM     Finalized by (CST): Higinio Valdez MD on 12/10/2024 at 5:54 PM           Medications:    furosemide  20 mg Intravenous BID (Diuretic)    metoprolol  10 mg Intravenous Q4H    miconazole   Topical BID    [Held  by provider] apixaban  5 mg Oral BID    [Held by provider] atorvastatin  20 mg Oral Daily    [Held by provider] carvedilol  12.5 mg Oral BID with meals    [Held by provider] losartan  50 mg Oral Daily    insulin regular human  1-5 Units Subcutaneous 4 times per day         Assessment and Plan:   Assessment & Plan:        Acute hypoxic respiratory failure   Pulmonary on consult.   Multifactorial from postop pulm edema and atelectasis.   Encourage IS.   Taper o2.   IV lasix 20mg daily   Small bowel volvulus   S/p exploratory laparotomy 12/6/24   Soft diet per surgery. Cont TPN   OOB.   Permanent A. fib  Acute on chronic HFrEF, new  Cards on consult.   ECHO LVEF 25-30% (new compared to Feb 24' EF 60-65%)   Likely takotsubo variant vs cardiomyopathy secondary to aortic stenosis (less likely)   IV lopressor consider PO once tolerating soft diet   Heparin gtt. Will transition to DOAC once tolerating PO diet   Will need to slowly add on GDMT  IV lasix for volume overload   DM II   A1c 6.8 (prev 7.5)   ISS  LUE DVT, acute   Heparin gtt. Eventual DOAC  Will d/w HONC as pt was on 5mg BID of eliquis when she developed LUE edema in Oct. US done here showed DVT  KIEL on CKD III   Baseline creat 1.4 -1.6   BUN/Creat improving.   Monitor Uop.   Chronic weakness   PT/OT - LYNN   Will obtain CT head to ensure no CVA.   Other medical problems  CKD III  SSS s/p PPM   HTN  Hyperlipidemia  OA  Mild aortic stenosis  Moderate pulm HTN     >55min spent, >50% spent counseling and coordinating care in the form of educating pt/family and d/w consultants and staff. Most of the time spent discussing the above plan.        Plan of care discussed with patient or family at bedside.    Ezequiel Tai MD  Hospitalist          Supplementary Documentation:     Quality:  DVT Prophylaxis: heparin gtt   CODE status: Full  Dispo: per clinical course            Estimated date of discharge: TBD  Discharge is dependent on: clinical stability  At this point  Ms. Patel is expected to be discharge to: TBD

## 2024-12-11 NOTE — CM/SW NOTE
Social work was able to meet with the patient and her son Haja at bedside per family request.    The patient and her son's are now agreeable to LYNN.    LYNN referral is in [place].    Social work will provide LYNN list to the patient's son's once options are available.    SW/CM to remain available for support and/or discharge planning.     Natalie Flores MSW, LSW  Discharge Planner [id number]

## 2024-12-11 NOTE — CM/SW NOTE
12/11/24 1500   Choice of Post-Acute Provider   Informed patient of right to choose their preferred provider Yes   List of appropriate post-acute services provided to patient/family with quality data Yes   Information given to Patient;Son     Social work was able to meet with the patient and her son at bedside to provide LYNN list.    The LYNN list was provided to the patient and her son Haja.    Social work will follow up for choice.    SW/CM to remain available for support and/or discharge planning.     Natalie Flores MSW, LSW  Discharge Planner [id number]

## 2024-12-11 NOTE — PROGRESS NOTES
[facility]  part of [facility]    Progress Note      Assessment and Plan:   1.  Respiratory impairment-likely multifactorial in this non-smoker.  She likely has mild postoperative edema with bibasilar crackles as well as basilar atelectasis.  The chest x-ray yesterday was unimpressive.  There is basilar atelectasis.  She also has some basilar crackles.  No new respiratory complaints.  Still on supplemental oxygen.     Recommendations:  1.  Out of bed to chair  2.  Incentive spirometry  3.  Taper oxygen  4.  Out of bed  5.  Will follow clinically     2.  DVT prophylaxis-currently on heparin drip, having been on Eliquis previously     3.  SBO with volvulus status post exploratory laparotomy-the abdominal exam is benign today.    Recommendations: As per general surgery.     4.  Atrial fibrillation with history of heart failure with preserved ejection fraction-as per cardiology.    5.  Mild confusion.  CT scan the brain is unrevealing.    Subjective:   Isabel Patel is a(n) 89 year old female who is breathing comfortably and in good spirits.  Objective:   Blood pressure (!) 107/95, pulse 103, temperature 98.1 °F (36.7 °C), temperature source Oral, resp. rate 20, height 5' 1\" (1.549 m), weight 160 lb 6.4 oz (72.8 kg), SpO2 98%, not currently breastfeeding.    Physical Exam alert white female  HEENT examination is unremarkable with pupils equal round and reactive to light and accommodation.   Neck without adenopathy, thyromegaly, JVD nor bruit.   Lungs couple basilar crackles to auscultation and percussion.  Cardiac regular rate and rhythm no murmur.   Abdomen tender with fullness at the mid abdomen with subtle bowel sounds, without hepatosplenomegaly and no mass appreciable.   Extremities without clubbing cyanosis nor edema.   Neurologic grossly intact with symmetric tone and strength and reflex.  Skin without gross abnormality     Results:     Lab Results   Component Value Date    WBC 11.0  12/11/2024    HGB 11.8 12/11/2024    HCT 35.8 12/11/2024    .0 12/11/2024    .0 12/11/2024    CREATSERUM 1.15 12/11/2024    BUN 38 12/11/2024     12/11/2024    K 4.8 12/11/2024     12/11/2024    CO2 28.0 12/11/2024     12/11/2024    CA 9.4 12/11/2024    PTT 45.9 12/11/2024     CT scan the brain-no acute abnormality.    Chest x-ray-mild basilar atelectasis bilaterally    Rosalino Nguyen MD  Medical Director, Critical Care, [facility]  Medical Director, [facility]  Pager: [phone]

## 2024-12-11 NOTE — OCCUPATIONAL THERAPY NOTE
OCCUPATIONAL THERAPY EVALUATION - INPATIENT     Room Number: 326/326-A  Evaluation Date: 12/11/2024  Type of Evaluation: Initial  Presenting Problem: acute on chronic CHF, s/p ex lap 12/6  Hx SSS s/p dual chamber pacemaker, DM2, HTN, OA, CKD3     Physician Order: IP Consult to Occupational Therapy  Reason for Therapy: ADL/IADL Dysfunction and Discharge Planning    OCCUPATIONAL THERAPY ASSESSMENT   Patient is a 89 year old female admitted 12/2/2024 for acute on chronic CHF, s/p ex lap 12/6. Patient receives son's assist with I/ADLs as needed at baseline. 1-assist for ambulating short distances using RW and for pivot transfers to/from w/c. Patient is currently functioning below baseline with ADLs and fx mobility/transfers.  Patient is requiring up to total assist for ADLs as a result of the following impairments: decreased functional strength, decreased functional reach, decreased endurance, impaired  balance, decreased muscular endurance, increased O2 needs from baseline, and decreased safety awareness. Occupational Therapy will continue to follow for duration of hospitalization.    Patient will benefit from continued skilled OT Services to promote return to prior level of function and safety with continuous assistance and gradual rehabilitative therapy.    PLAN DURING HOSPITALIZATION  OT Device Recommendations: TBD  OT Treatment Plan: Energy conservation/work simplification techniques;Balance activities;ADL training;Functional transfer training;Endurance training;Patient/Family education;Patient/Family training;Equipment eval/education;Compensatory technique education     OCCUPATIONAL THERAPY MEDICAL/SOCIAL HISTORY   Problem List  Principal Problem:    Acute on chronic congestive heart failure, unspecified heart failure type (HCC)  Active Problems:    Complete intestinal obstruction, unspecified cause (HCC)    Atrial fibrillation with rapid ventricular response (HCC)    Anticoagulated    Small bowel obstruction  ([facility])    HOME SITUATION  Type of Home: House  Home Layout: Two level; Able to live on main level  Lives With: Family (two adult sons assist with I/ADLs)  Shower/Tub and Equipment: Tub-shower combo; Tub transfer bench  Drives: No  Patient Regularly Uses: Rolling walker; Glasses (w/c in community)  Stairs in Home: 2 ALTHEA    SUBJECTIVE  \"Scratch under my arm.\"    OCCUPATIONAL THERAPY EXAMINATION      OBJECTIVE  Precautions: Bed/chair alarm; Limb alert - left; Limb alert - right; Hard of hearing  Fall Risk: High fall risk      PAIN ASSESSMENT  Ratin    COGNITION  Following Commands:  follows one step commands with increased time and follows one step commands with repetition    SENSATION  Light touch:  intact    RANGE OF MOTION   Upper extremity ROM is within functional limits     STRENGTH ASSESSMENT  Upper extremity strength is within functional limits     COORDINATION  Gross Motor: WFL   Fine Motor: WFL     ACTIVITIES OF DAILY LIVING ASSESSMENT  AM-PAC ‘6-Clicks’ Inpatient Daily Activity Short Form  How much help from another person does the patient currently need…  -   Putting on and taking off regular lower body clothing?: A Lot  -   Bathing (including washing, rinsing, drying)?: A Lot  -   Toileting, which includes using toilet, bedpan or urinal? : Total  -   Putting on and taking off regular upper body clothing?: A Lot  -   Taking care of personal grooming such as brushing teeth?: A Little  -   Eating meals?: A Little    AM-PAC Score:  Score: 13  Approx Degree of Impairment: 63.03%  Standardized Score (AM-PAC Scale): 32.03  CMS Modifier (G-Code): CL    BED MOBILITY  Supine to Sit: mod assist x2  Comments:  Required mod progressing to min for static sitting balance at EOB d/t prominent L posterolateral lean.    FUNCTIONAL TRANSFER ASSESSMENT  Sit to Stand from EOB: mod assist  Stand Pivot Transfer from EOB to Chair: mod assist x2  Sit to Stand from Chair: mod assist x2;  Chair Transfer: mod assist for controlled  descent  Comments:  Required physical assist for RW management during SPT. Demonstrated prominent posterior lean upon standing from bedside chair. Patient continued to demonstrate L side lean throughout mobility and after transfer to chair, RN notified and arrived to assess patient.    ACTIVITIES OF DAILY LIVING  Eating: min assist (per obs)   Grooming: mod assist (per obs)   UB Dressing: max assist (per obs)   LB Dressing: total assist  Toileting: total assist    EDUCATION PROVIDED  Patient Education : Role of Occupational Therapy; Discharge Recommendations; Plan of Care; Fall Prevention; Functional Transfer Techniques; Posture/Positioning; Energy Conservation; Proper Body Mechanics  Patient's Response to Education: Demonstrates Poor Carry Over to Information; Demonstrates Disinterest; Requires Further Education  Family/Caregiver Education : Discharge Recommendations; Plan of Care; Role of Occupational Therapy; Fall Prevention; Functional Transfer Techniques; Posture/Positioning; Proper Body Mechanics  Family/Caregiver's Response to Education: Verbalized Understanding; Returned Demonstration    The patient's Approx Degree of Impairment: 63.03% has been calculated based on documentation in the [facility] '6 clicks' Inpatient Daily Activity Short Form.  Research supports that patients with this level of impairment may benefit from rehab.  Final disposition will be made by interdisciplinary medical team.     Patient End of Session: Up in chair;Needs met;Call light within reach;RN aware of session/findings;All patient questions and concerns addressed;Hospital anti-slip socks;Alarm set;Family present    OT Goals  Patient's self stated goal is: none stated     Patient will complete functional transfer with Min A   Comment:     Patient will sit EOB with SBA in prep for ADLs  Comment:     Patient will tolerate standing for 1-2 minutes in prep for ADLs with Min A x1  Comment:    Patient will complete oral/facial grooming task in  supported sitting with Setup Assist  Comment:           Goals  on: 24  Frequency: 3-5x/week    Patient Evaluation Complexity Level:   Occupational Profile/Medical History MODERATE - Expanded review of history including review of medical or therapy record   Specific performance deficits impacting engagement in ADL/IADL HIGH  5+ performance deficits    Client Assessment/Performance Deficits HIGH - Comorbidities and significant modifications of tasks    Clinical Decision Making MODERATE - Analysis of occupational profile, detailed assessments, several treatment options    Overall Complexity MODERATE     OT Session Time  Therapeutic Activity: 25 minutes    JALEEL Cool/L  [facility]  #[id number]

## 2024-12-12 LAB
ANION GAP SERPL CALC-SCNC: 4 MMOL/L (ref 0–18)
APTT PPP: 35 SECONDS (ref 23–36)
BUN BLD-MCNC: 42 MG/DL (ref 9–23)
BUN/CREAT SERPL: 39.3 (ref 10–20)
CALCIUM BLD-MCNC: 9.4 MG/DL (ref 8.7–10.4)
CHLORIDE SERPL-SCNC: 104 MMOL/L (ref 98–112)
CO2 SERPL-SCNC: 28 MMOL/L (ref 21–32)
CREAT BLD-MCNC: 1.07 MG/DL
EGFRCR SERPLBLD CKD-EPI 2021: 48 ML/MIN/1.73M2 (ref 60–?)
GLUCOSE BLD-MCNC: 151 MG/DL (ref 70–99)
GLUCOSE BLDC GLUCOMTR-MCNC: 134 MG/DL (ref 70–99)
GLUCOSE BLDC GLUCOMTR-MCNC: 146 MG/DL (ref 70–99)
GLUCOSE BLDC GLUCOMTR-MCNC: 167 MG/DL (ref 70–99)
GLUCOSE BLDC GLUCOMTR-MCNC: 181 MG/DL (ref 70–99)
GLUCOSE BLDC GLUCOMTR-MCNC: 220 MG/DL (ref 70–99)
MAGNESIUM SERPL-MCNC: 2 MG/DL (ref 1.6–2.6)
OSMOLALITY SERPL CALC.SUM OF ELEC: 295 MOSM/KG (ref 275–295)
PHOSPHATE SERPL-MCNC: 3.4 MG/DL (ref 2.4–5.1)
PLATELET # BLD AUTO: 221 10(3)UL (ref 150–450)
POTASSIUM SERPL-SCNC: 4.8 MMOL/L (ref 3.5–5.1)
SODIUM SERPL-SCNC: 136 MMOL/L (ref 136–145)

## 2024-12-12 PROCEDURE — 99232 SBSQ HOSP IP/OBS MODERATE 35: CPT | Performed by: INTERNAL MEDICINE

## 2024-12-12 RX ORDER — FUROSEMIDE 20 MG/1
20 TABLET ORAL DAILY
Status: DISCONTINUED | OUTPATIENT
Start: 2024-12-12 | End: 2024-12-14

## 2024-12-12 RX ORDER — SENNOSIDES 8.6 MG
8.6 TABLET ORAL 2 TIMES DAILY
Status: DISCONTINUED | OUTPATIENT
Start: 2024-12-12 | End: 2024-12-14

## 2024-12-12 RX ORDER — DOCUSATE SODIUM 100 MG/1
100 CAPSULE, LIQUID FILLED ORAL 2 TIMES DAILY
Status: DISCONTINUED | OUTPATIENT
Start: 2024-12-12 | End: 2024-12-14

## 2024-12-12 RX ORDER — HEPARIN SODIUM 1000 [USP'U]/ML
30 INJECTION, SOLUTION INTRAVENOUS; SUBCUTANEOUS ONCE
Status: COMPLETED | OUTPATIENT
Start: 2024-12-12 | End: 2024-12-12

## 2024-12-12 NOTE — PHYSICAL THERAPY NOTE
PHYSICAL THERAPY TREATMENT NOTE - INPATIENT     Room Number: 326/326-A       Presenting Problem: exploratory laparotomy performed 12/6  Co-Morbidities : Hx SSS s/p dual chamber pacemaker, DM2, HTN, OA, CKD3    Problem List  Principal Problem:    Acute on chronic congestive heart failure, unspecified heart failure type (HCC)  Active Problems:    Complete intestinal obstruction, unspecified cause (HCC)    Atrial fibrillation with rapid ventricular response (HCC)    Anticoagulated    Small bowel obstruction (HCC)    Deep vein thrombosis (DVT) of both upper extremities ([facility])      PHYSICAL THERAPY ASSESSMENT   Patient demonstrates fair progress this session, goals  remain in progress.      Patient is requiring maximum assist and total assist  as a result of the following impairments: decreased functional strength, decreased endurance/aerobic capacity, decreased muscular endurance, medical status, and decreased compliance/participation.     Patient continues to function below baseline with bed mobility, transfers, and gait.  Next session anticipate patient to progress bed mobility and transfers.  Physical Therapy will continue to follow patient for duration of hospitalization.    Patient continues to benefit from continued skilled PT services: to promote return to prior level of function and safety with continuous assistance and gradual rehabilitative therapy pending further progress/participation.     PLAN DURING HOSPITALIZATION  Nursing Mobility Recommendation : Lift Equipment  PT Device Recommendation: Rolling walker  PT Treatment Plan: Bed mobility;Family education;Gait training;Transfer training  Frequency (Obs): 3-5x/week     SUBJECTIVE  \"I don't want to sit up. I'm already sitting up.\"    OBJECTIVE  Precautions: Bed/chair alarm;Limb alert - left;Limb alert - right;Hard of hearing    WEIGHT BEARING RESTRICTION  none    PAIN ASSESSMENT   Rating: Unable to rate  Location: abdomen  Management Techniques: Body  mechanics;Breathing techniques    BALANCE  Static Sitting: Fair  Dynamic Sitting: Fair -  Static Standing: Poor +  Dynamic Standing: Not tested      AM-PAC '6-Clicks' INPATIENT SHORT FORM - BASIC MOBILITY  How much difficulty does the patient currently have...  Patient Difficulty: Turning over in bed (including adjusting bedclothes, sheets and blankets)?: A Lot   Patient Difficulty: Sitting down on and standing up from a chair with arms (e.g., wheelchair, bedside commode, etc.): A Lot   Patient Difficulty: Moving from lying on back to sitting on the side of the bed?: A Lot   How much help from another person does the patient currently need...   Help from Another: Moving to and from a bed to a chair (including a wheelchair)?: A Lot   Help from Another: Need to walk in hospital room?: Total   Help from Another: Climbing 3-5 steps with a railing?: Total     AM-PAC Score:  Raw Score: 10   Approx Degree of Impairment: 76.75%   Standardized Score (AM-PAC Scale): 32.29   CMS Modifier (G-Code): CL    FUNCTIONAL ABILITY STATUS  Functional Mobility/Gait Assessment  Gait Assistance: Not tested  Distance (ft): deferred 2/2 pt refusal, limited standing tolerance  Assistive Device: Rolling walker  Pattern: Shuffle  Supine to Sit: maximum assist  Stand-pivot transfer bed>chair: maximum assist and dependent - pt performing <25% effort required    Skilled Therapy Provided: Pt's son present and other son on phone, both sons encouraging patient to participate. Pt demonstrates disinterest in participating. Pt able to dangle edge of bed with Lisa-SBA for ~5 minutes. Pt demonstrates decreased BLE muscular strength unable to fully support self in standing during stand-pivot transfer.      Patient received semi-fowlers in bed, agreeable to physical therapy with maximal education and encouragement from therapist and sons. Anticipated therapy needs remain appropriate based on the patient's performance, personal factors, and remaining  functional impairments.    PATIENT EDUCATION  Education Provided To: Patient;Family/Caregiver  Patient Education: Role of Physical Therapy;Plan of Care;Functional Transfer Techniques;Proper Body Mechanics;Fall Prevention;Posture/Positioning;Energy Conservation  Patient's Response to Education: Demonstrates Disinterest  Family/Caregiver Education: Role of Physical Therapy;Plan of Care  Family/Caregiver's Response to Education: Verbalized Understanding    The patient's Approx Degree of Impairment: 76.75% has been calculated based on documentation in the [facility] '6 clicks' Inpatient Daily Activity Short Form.  Research supports that patients with this level of impairment may benefit from long-term care.  Final disposition will be made by interdisciplinary medical team.    THERAPEUTIC EXERCISES  Lower Extremity Ankle pumps  LAQ  SLR     Position Sitting       Patient End of Session: Up in chair;Needs met;Call light within reach;All patient questions and concerns addressed;Alarm set;Family present    CURRENT GOALS   Goals to be met by: 1/5  Patient Goal Patient's self-stated goal is: unstated    Goal #1 Patient is able to demonstrate supine - sit EOB @ level: supervision     Goal #1   Current Status NOT MET/IN PROGRESS    Goal #2 Patient is able to demonstrate transfers Sit to/from Stand at assistance level: supervision with walker - rolling     Goal #2  Current Status NOT MET/IN PROGRESS    Goal #3 Patient is able to ambulate 25 feet with assist device: walker - rolling at assistance level: supervision   Goal #3   Current Status NOT MET/IN PROGRESS    Goal #4 Patient will negotiate 2 stairs/one curb w/ assistive device and supervision   Goal #4   Current Status NOT MET/IN PROGRESS    Goal #5 Patient to demonstrate independence with home activity/exercise instructions provided to patient in preparation for discharge.   Goal #5   Current Status IN PROGRESS/ONGOING    Goal #6    Goal #6  Current Status        Therapeutic  Activity: 23 minutes      Gauri Alcaraz, PT, DPT  [facility]  Rehab Services - Physical Therapy  [id number]

## 2024-12-12 NOTE — PLAN OF CARE
Patient weaned to room air. Heparin drip infusing. TPN running in PICC line. Soft diet. Max assist. Plan for LYNN pending choice.    Problem: Patient Centered Care  Goal: Patient preferences are identified and integrated in the patient's plan of care  Description: Interventions:  - What would you like us to know as we care for you? From home with son  - Provide timely, complete, and accurate information to patient/family  - Incorporate patient and family knowledge, values, beliefs, and cultural backgrounds into the planning and delivery of care  - Encourage patient/family to participate in care and decision-making at the level they choose  - Honor patient and family perspectives and choices  Outcome: Progressing     Problem: Patient/Family Goals  Goal: Patient/Family Long Term Goal  Description: Patient's Long Term Goal: go home    Interventions:  - go home  - See additional Care Plan goals for specific interventions  Outcome: Progressing  Goal: Patient/Family Short Term Goal  Description: Patient's Short Term Goal: clear SBO    Interventions:   - NGT LIS  -NPO  - See additional Care Plan goals for specific interventions  Outcome: Progressing     Problem: CARDIOVASCULAR - ADULT  Goal: Maintains optimal cardiac output and hemodynamic stability  Description: INTERVENTIONS:  - Monitor vital signs, rhythm, and trends  - Monitor for bleeding, hypotension and signs of decreased cardiac output  - Evaluate effectiveness of vasoactive medications to optimize hemodynamic stability  - Monitor arterial and/or venous puncture sites for bleeding and/or hematoma  - Assess quality of pulses, skin color and temperature  - Assess for signs of decreased coronary artery perfusion - ex. Angina  - Evaluate fluid balance, assess for edema, trend weights  Outcome: Progressing  Goal: Absence of cardiac arrhythmias or at baseline  Description: INTERVENTIONS:  - Continuous cardiac monitoring, monitor vital signs, obtain 12 lead EKG if  indicated  - Evaluate effectiveness of antiarrhythmic and heart rate control medications as ordered  - Initiate emergency measures for life threatening arrhythmias  - Monitor electrolytes and administer replacement therapy as ordered  Outcome: Progressing     Problem: RESPIRATORY - ADULT  Goal: Achieves optimal ventilation and oxygenation  Description: INTERVENTIONS:  - Assess for changes in respiratory status  - Assess for changes in mentation and behavior  - Position to facilitate oxygenation and minimize respiratory effort  - Oxygen supplementation based on oxygen saturation or ABGs  - Provide Smoking Cessation handout, if applicable  - Encourage broncho-pulmonary hygiene including cough, deep breathe, Incentive Spirometry  - Assess the need for suctioning and perform as needed  - Assess and instruct to report SOB or any respiratory difficulty  - Respiratory Therapy support as indicated  - Manage/alleviate anxiety  - Monitor for signs/symptoms of CO2 retention  Outcome: Progressing     Problem: GASTROINTESTINAL - ADULT  Goal: Minimal or absence of nausea and vomiting  Description: INTERVENTIONS:  - Maintain adequate hydration with IV or PO as ordered and tolerated  - Nasogastric tube to low intermittent suction as ordered  - Evaluate effectiveness of ordered antiemetic medications  - Provide nonpharmacologic comfort measures as appropriate  - Advance diet as tolerated, if ordered  - Obtain nutritional consult as needed  - Evaluate fluid balance  Outcome: Progressing  Goal: Maintains or returns to baseline bowel function  Description: INTERVENTIONS:  - Assess bowel function  - Maintain adequate hydration with IV or PO as ordered and tolerated  - Evaluate effectiveness of GI medications  - Encourage mobilization and activity  - Obtain nutritional consult as needed  - Establish a toileting routine/schedule  - Consider collaborating with pharmacy to review patient's medication profile  Outcome: Progressing      Problem: SAFETY ADULT - FALL  Goal: Free from fall injury  Description: INTERVENTIONS:  - Assess pt frequently for physical needs  - Identify cognitive and physical deficits and behaviors that affect risk of falls.  - [place] fall precautions as indicated by assessment.  - Educate pt/family on patient safety including physical limitations  - Instruct pt to call for assistance with activity based on assessment  - Modify environment to reduce risk of injury  - Provide assistive devices as appropriate  - Consider OT/PT consult to assist with strengthening/mobility  - Encourage toileting schedule  Outcome: Progressing     Problem: DISCHARGE PLANNING  Goal: Discharge to home or other facility with appropriate resources  Description: INTERVENTIONS:  - Identify barriers to discharge w/pt and caregiver  - Include patient/family/discharge partner in discharge planning  - Arrange for needed discharge resources and transportation as appropriate  - Identify discharge learning needs (meds, wound care, etc)  - Arrange for interpreters to assist at discharge as needed  - Consider post-discharge preferences of patient/family/discharge partner  - Complete POLST form as appropriate  - Assess patient's ability to be responsible for managing their own health  - Refer to Case Management Department for coordinating discharge planning if the patient needs post-hospital services based on physician/LIP order or complex needs related to functional status, cognitive ability or social support system  Outcome: Progressing     Problem: HEMATOLOGIC - ADULT  Goal: Free from bleeding injury  Description: (Example usage: patient with low platelets)  INTERVENTIONS:  - Avoid intramuscular injections, enemas and rectal medication administration  - Ensure safe mobilization of patient  - Hold pressure on venipuncture sites to achieve adequate hemostasis  - Assess for signs and symptoms of internal bleeding  - Monitor lab trends  - Patient is to  report abnormal signs of bleeding to staff  - Avoid use of toothpicks and dental floss  - Use electric shaver for shaving  - Use soft bristle tooth brush  - Limit straining and forceful nose blowing  Outcome: Progressing

## 2024-12-12 NOTE — PROGRESS NOTES
[facility]    Progress Note    Isabel Patel Patient Status:  Inpatient    1931 MRN [id number]   Location [facility] 2W/SW Attending Ezequiel Tai MD   Hosp Day # 10 PCP Fredrick Cornelius MD     Chief Complaint:   Chief Complaint   Patient presents with    Nausea/Vomiting/Diarrhea   Abdominal pain, N/V    Subjective:   Isabel Patel is doing well - had smear BM - was up to the chair earlier. Son at bedside. Appetite poor only eating a little bit. No F/C no abd pain no SOB on 1L NC o2   Objective:   Objective:    Blood pressure 107/61, pulse 116, temperature 99.1 °F (37.3 °C), temperature source Axillary, resp. rate 18, height 5' 1\" (1.549 m), weight 163 lb 6.4 oz (74.1 kg), SpO2 97%, not currently breastfeeding.    Physical Exam:    General: No acute distress.   Respiratory: Diminished bases B/L   Cardiovascular: irregularly irregular   Abdomen: Soft, nontender, nondistended.  Positive bowel sounds. No rebound or guarding.  Neurologic: nonfocal   Extremities: + edema       Results:   Results:    Labs:  Recent Labs   Lab 24  0424 24  0416 24  0353 12/10/24  0444 24  0619 24  0443   WBC 14.2* 10.0 8.5 9.9 11.0  --    HGB 14.2 12.3 11.3* 11.9* 11.8*  --    MCV 90.4 89.8 89.1 89.5 90.2  --    .0 204.0 194.0 213.0  213.0 230.0  230.0 221.0       Recent Labs   Lab 24  1715 24  0855 24  0424 24  0416 12/10/24  0444 24  0619 24  0443   *   < > 250*  250*   < > 143* 156* 151*   BUN 22   < > 23  23   < > 40* 38* 42*   CREATSERUM 1.61*   < > 1.57*  1.57*   < > 1.21* 1.15* 1.07*   CA 9.0   < > 8.7  8.7   < > 9.0 9.4 9.4   ALB 3.4  --  3.1*  --   --   --   --       < > 135*  135*   < > 135* 136 136   K 3.6   < > 4.5  4.5  4.5   < > 4.5  4.5 4.8 4.8   CL 98   < > 101  101   < > 101 103 104   CO2 32.0   < > 28.0  28.0   < > 29.0 28.0 28.0   ALKPHO  --   --  97  --   --   --    --    AST  --   --  28  --   --   --   --    ALT  --   --  48  --   --   --   --    BILT  --   --  0.7  --   --   --   --    TP  --   --  5.6*  --   --   --   --     < > = values in this interval not displayed.       Estimated Creatinine Clearance: 24.8 mL/min (A) (based on SCr of 1.07 mg/dL (H)).    No results for input(s): \"PTP\", \"INR\" in the last 168 hours.         Culture:  Hospital Encounter on 12/02/24   1. Blood Culture     Status: None    Collection Time: 12/05/24  4:04 PM    Specimen: Blood,peripheral   Result Value Ref Range    Blood Culture Result No Growth 5 Days N/A       Cardiac  No results for input(s): \"TROP\", \"PBNP\" in the last 168 hours.      Imaging: Imaging data reviewed in [facility].  CT BRAIN OR HEAD (CPT=70450)    Result Date: 12/11/2024  CONCLUSION: No acute intracranial abnormality.  Nonacute findings are present and are described within the body of the report    Dictated by (CST): Germain Zurita MD on 12/11/2024 at 12:29 PM     Finalized by (CST): Germain Zurita MD on 12/11/2024 at 12:31 PM          XR CHEST AP PORTABLE  (CPT=71045)    Result Date: 12/11/2024  CONCLUSION:   No new abnormality.  Persistent streaky bibasilar opacities, probably atelectasis.  Persistent small left pleural effusion.      Dictated by (CST): Zurdo Vee MD on 12/11/2024 at 8:50 AM     Finalized by (CST): Zurdo Vee MD on 12/11/2024 at 8:52 AM          US VENOUS DOPPLER ARM RIGHT - DIAG IMG (CPT=93971)    Result Date: 12/10/2024  CONCLUSION:   Partially imaged right PICC line.  Small amount of thrombus in the right axillary vein along the PICC line.  Thrombus also seen in the right basilic vein.  Incidentally noted left subclavian and thrombus which was also present on recent left upper extremity venous Doppler examination 12/03/2024.     Dictated by (CST): Higinio Valdez MD on 12/10/2024 at 5:46 PM     Finalized by (CST): Higinio Valdez MD on 12/10/2024 at 5:54 PM           Medications:    furosemide   20 mg Oral Daily    apixaban  5 mg Oral BID    bisacodyl  10 mg Rectal Daily    polyethylene glycol (PEG 3350)  17 g Oral Daily    miconazole   Topical BID    atorvastatin  20 mg Oral Daily    carvedilol  12.5 mg Oral BID with meals    losartan  50 mg Oral Daily    insulin regular human  1-5 Units Subcutaneous 4 times per day         Assessment and Plan:   Assessment & Plan:        Acute hypoxic respiratory failure   Pulmonary on consult.   Multifactorial from postop pulm edema and atelectasis.   Encourage IS.   Taper o2.   Po lasix   Small bowel volvulus   S/p exploratory laparotomy 12/6/24   Soft diet per surgery. Cont TPN   OOB.   Permanent A. fib  Acute on chronic HFrEF, new  Cards on consult.   ECHO LVEF 25-30% (new compared to Feb 24' EF 60-65%)   Likely takotsubo variant   Coreg resumed .stop lopressor IV   Eliquis   GDMT: coreg, losartan   Stop IV lasix. PO lasix tomorrow   DM II   A1c 6.8 (prev 7.5)   ISS  LUE DVT, acute   Eliquis.   HONC on consult.   Needs to remove RUE PICC line prior to DC.   Provoked   KIEL on CKD III   Baseline creat 1.4 -1.6   BUN/Creat improving.   Monitor Uop.   Chronic weakness   PT/OT - LYNN   Will obtain CT head to ensure no CVA.   Other medical problems  CKD III  SSS s/p PPM   HTN  Hyperlipidemia  OA  Mild aortic stenosis  Moderate pulm HTN     IF HR controlled to LYNN tomorrow     >55min spent, >50% spent counseling and coordinating care in the form of educating pt/family and d/w consultants and staff. Most of the time spent discussing the above plan.        Plan of care discussed with patient or family at bedside.    Ezequiel Tai MD  Hospitalist          Supplementary Documentation:     Quality:  DVT Prophylaxis: heparin gtt   CODE status: Full  Dispo: per clinical course            Estimated date of discharge: TBD  Discharge is dependent on: clinical stability  At this point Ms. Patel is expected to be discharge to: TBD

## 2024-12-12 NOTE — PROGRESS NOTES
Progress Note  Isabel Patel Patient Status:  Inpatient    1931 MRN [id number]   Location [facility] 3W/SW Attending Ezequiel Tai MD   Hosp Day # 10 PCP Fredrick Cornelius MD     Subjective:  Denies complaints, son at bedside    Objective:  BP (!) 137/93 (BP Location: Right arm)   Pulse 116   Temp 99.1 °F (37.3 °C) (Axillary)   Resp 18   Ht 5' 1\" (1.549 m)   Wt 163 lb 6.4 oz (74.1 kg)   SpO2 93%   BMI 30.87 kg/m²     Telemetry: AFRVR, rates 110s-120s    Intake/Output:    Intake/Output Summary (Last 24 hours) at 2024 1016  Last data filed at 2024 1006  Gross per 24 hour   Intake 944.3 ml   Output 1450 ml   Net -505.7 ml     Last 3 Weights   24 0459 163 lb 6.4 oz (74.1 kg)   24 0507 160 lb 6.4 oz (72.8 kg)   12/10/24 0500 159 lb 6.3 oz (72.3 kg)   24 0500 162 lb 14.7 oz (73.9 kg)   24 0507 159 lb 6.3 oz (72.3 kg)   24 0700 154 lb (69.9 kg)   24 1817 152 lb 6.4 oz (69.1 kg)   24 0423 146 lb 9.6 oz (66.5 kg)   24 0358 144 lb 11.2 oz (65.6 kg)   24 0520 141 lb (64 kg)   24 1146 147 lb (66.7 kg)   24 0558 160 lb (72.6 kg)   24 1107 163 lb (73.9 kg)   24 0521 163 lb (73.9 kg)   24 0340 163 lb 9.6 oz (74.2 kg)   02/15/24 0632 169 lb 4.8 oz (76.8 kg)   24 0615 167 lb (75.8 kg)   24 0449 165 lb 12.8 oz (75.2 kg)   24 1615 164 lb 14.4 oz (74.8 kg)   24 1229 163 lb (73.9 kg)     Labs:  Recent Labs   Lab 12/10/24  0444 24  0619 24  0443   * 156* 151*   BUN 40* 38* 42*   CREATSERUM 1.21* 1.15* 1.07*   EGFRCR 42* 44* 48*   CA 9.0 9.4 9.4   * 136 136   K 4.5  4.5 4.8 4.8    103 104   CO2 29.0 28.0 28.0     Recent Labs   Lab 24  0416 24  0353 12/10/24  0444 24  0619 24  0443   RBC 4.13 3.86 4.01 3.97  --    HGB 12.3 11.3* 11.9* 11.8*  --    HCT 37.1 34.4* 35.9 35.8  --    MCV 89.8 89.1 89.5 90.2  --    MCH 29.8 29.3 29.7 29.7  --     MCHC 33.2 32.8 33.1 33.0  --    RDW 15.5* 15.7* 15.7* 15.8*  --    NEPRELIM 8.17* 6.53 7.46  --   --    WBC 10.0 8.5 9.9 11.0  --    .0 194.0 213.0  213.0 230.0  230.0 221.0     Recent Labs   Lab 12/05/24  1601   TROPHS 12     Lab Results   Component Value Date/Time    HDL 52 10/15/2024 09:19 AM    LDL 98 10/15/2024 09:19 AM    TRIG 150 (H) 12/07/2024 04:24 AM     Lab Results   Component Value Date    DDIMER 2.97 (H) 02/16/2024     Lab Results   Component Value Date    TSH 1.508 04/23/2024     Review of Systems:   Constitutional: No fevers, chills, fatigue or night sweats.  Respiratory: Denies cough, wheezing or shortness of breath.  CV: Denies chest pain, palpitations, orthopnea, PND or dizziness.  GI: No nausea, vomiting or diarrhea. No blood in stools.    Physical Exam:  General: Alert, cooperative, no distress, appears stated age.  Neck: no JVD.  Lungs: Clear to auscultation bilaterally.  Chest wall: No tenderness or deformity.  Heart: Irregularly irregular rate and rhythm, tachycardic, S1, S2 normal, no murmur, click, rub or gallop.  Abdomen: Soft, non-tender. Bowel sounds normal. No masses,  No organomegaly.  Extremities: Extremities normal, atraumatic, no cyanosis or edema.  Pulses: 2+ and symmetric all extremities.  Neurologic: Grossly intact.    Diagnostics:  CT BRAIN OR HEAD (CPT=70450)    Result Date: 12/11/2024  CONCLUSION: No acute intracranial abnormality.  Nonacute findings are present and are described within the body of the report    Dictated by (CST): Germain Zurita MD on 12/11/2024 at 12:29 PM     Finalized by (CST): Germain Zurita MD on 12/11/2024 at 12:31 PM           Medications:   furosemide  20 mg Intravenous Daily    bisacodyl  10 mg Rectal Daily    polyethylene glycol (PEG 3350)  17 g Oral Daily    metoprolol  10 mg Intravenous Q4H    miconazole   Topical BID    [Held by provider] apixaban  5 mg Oral BID    [Held by provider] atorvastatin  20 mg Oral Daily    [Held by  provider] carvedilol  12.5 mg Oral BID with meals    [Held by provider] losartan  50 mg Oral Daily    insulin regular human  1-5 Units Subcutaneous 4 times per day      adult 3 in 1 TPN 41.7 mL/hr at 12/11/24 2300    continuous dose heparin 900 Units/hr (12/12/24 0638)    dextrose 10%      [Held by provider] dilTIAZem Stopped (12/10/24 0934)     Assessment:    89 year old female with PMH of DM2, HTN, Permanent atrial fibrillation, SSS s/p DC-PPM, HLD, CKD3, HFpEF, Mild aortic stenosis, pulmonary HTN, tricuspid regurgitation, TIA,  who presented with nausea and abdominal pain and was found to have a small bowel obstruction and AFRVR.     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  -rates generally controlled with digoxin PRN, BB, diltiazem, now elevated in the setting of SBO  -OAC with eliquis, now  on heparin, while holding PO meds  -LVEF 25-30%, moderate diffuse hypokinesis, biatrial dilation, mild Aortic regurgitation, severe TR, possible pulmonic vegetation,   PASP 73mmHg  -taking PO meds, will resume home doses    New Severe LV dysfunction  Newly reduced LV function 25-30%, 60-65% back in February, 2024  -Diffuse hypokinesis noted on echo with severe TR, mild aortic regurgitation  -possible vegetation noted on pulmonic valve, likely artifact  -proBNP elevated 5,448 on admission, diuresed with IV lasix  -I/Os net po 6L, 1L UOP past 24 hours with IV lasix dose  -GDMT on hold due to NPO with SBO  -weight up to 163#, remains on 1L    HTN  BP mostly controlled on lopressor  -resume home coreg now taking PO meds    HLD-statin, on hold while NPO, LDL 98 in October     Severe Tricuspid regurgitation  Mild Aortic regurgitation  Pulmonary HTN,  PASP 73mmHg      SSS  S/p dual chamber PPM ([place])     DM2-A1c 6.8     CKD3  Cr baseline 1.4  -currently at 1.07      Plan:  -stop IV lopressor, resume home coreg 12.5mg BID  -resume losartan  -Resume PO lasix 20mg daily, monitor strict I/Os, daily weights, daily BMP  -Okay to use diltiazem gtt for HR sustained >120, would try to minimize use due to low EF  -stop heparin, resume eliquis  -continue statin      Plan of care discussed with patient and son, RN.        Liz Trevizo, APRN  12/12/24  10:16 AM  [phone] [place]  [phone] [place]

## 2024-12-12 NOTE — PROGRESS NOTES
[facility]  Progress Note    Isabel Patel Patient Status:  Inpatient    1931 MRN [id number]   Location [facility] 3W/SW Attending Ezequiel Tai MD   Hosp Day # 10 PCP Fredrick Cornelius MD     Subjective:  Pt seen laying in bed. Denies abdominal pain. Tolerating diet without nausea or vomiting. TPN running.    Objective/Physical Exam:  General: Alert, orientated x3.  Cooperative.  No apparent distress.  Vital Signs:  Blood pressure (!) 137/93, pulse 116, temperature 99.1 °F (37.3 °C), temperature source Axillary, resp. rate 18, height 5' 1\" (1.549 m), weight 163 lb 6.4 oz (74.1 kg), SpO2 93%, not currently breastfeeding.  Wt Readings from Last 3 Encounters:   24 163 lb 6.4 oz (74.1 kg)   24 163 lb (73.9 kg)   24 163 lb (73.9 kg)     Lungs: No respiratory distress.  Cardiac: Regular rate and rhythm.   Abdomen:  Soft, non distended, mild incisional tender, with no rebound or guarding.  No peritoneal signs.   Extremities:  No lower extremity edema noted.    Incision: Clean, dry, intact, no erythema    Intake/Output:    Intake/Output Summary (Last 24 hours) at 2024 1020  Last data filed at 2024 1006  Gross per 24 hour   Intake 944.3 ml   Output 1450 ml   Net -505.7 ml     I/O last 3 completed shifts:  In: 1096.3 [P.O.:390; I.V.:247.6]  Out: 1600 [Urine:1600]  I/O this shift:  In: 100 [P.O.:100]  Out: 450 [Urine:450]    Medications:    furosemide  20 mg Intravenous Daily    bisacodyl  10 mg Rectal Daily    polyethylene glycol (PEG 3350)  17 g Oral Daily    metoprolol  10 mg Intravenous Q4H    miconazole   Topical BID    [Held by provider] apixaban  5 mg Oral BID    [Held by provider] atorvastatin  20 mg Oral Daily    [Held by provider] carvedilol  12.5 mg Oral BID with meals    [Held by provider] losartan  50 mg Oral Daily    insulin regular human  1-5 Units Subcutaneous 4 times per day       Labs:  Lab Results   Component Value Date    .0 2024      Lab Results   Component Value Date     12/12/2024    K 4.8 12/12/2024     12/12/2024    CO2 28.0 12/12/2024    BUN 42 12/12/2024    CREATSERUM 1.07 12/12/2024     12/12/2024    CA 9.4 12/12/2024     Lab Results   Component Value Date    INR 1.41 (H) 12/02/2024    INR 1.0 12/03/2018         Assessment  Patient Active Problem List   Diagnosis    Right knee DJD    Osteoarthritis    Controlled type 2 diabetes mellitus with diabetic nephropathy, without long-term current use of insulin (HCC)    HTN (hypertension)    Hypercholesterolemia    Obesity    Left rotator cuff tear arthropathy    Pessary maintenance    Osteopenia of multiple sites    Pain of right lower extremity    Pelvic relaxation    Uterovaginal prolapse    AGUSTO (stress urinary incontinence, female)    TIA (transient ischemic attack)    Spinal stenosis of lumbar region    Macular degeneration    Mass of skin of left shoulder    Closed fracture of left distal femur (HCC)    Closed bicondylar fracture of distal end of right femur (HCC)    Exudative age-related macular degeneration, right eye, with inactive choroidal neovascularization (HCC)    Anemia    Bilateral lower extremity edema    Asymptomatic menopause    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 3 chronic kidney disease, without long-term current use of insulin (HCC)    Keratosis    Chronic shoulder pain    History of right breast cancer    Urinary incontinence    Atrial fibrillation, new onset (HCC)    Status post biventricular pacemaker    Acute on chronic congestive heart failure, unspecified heart failure type (HCC)    Complete intestinal obstruction, unspecified cause (HCC)    Atrial fibrillation with rapid ventricular response (HCC)    Anticoagulated    Small bowel obstruction (HCC)    Deep vein thrombosis (DVT) of both upper extremities ([facility])       POD 6: Exploratory laparotomy     Plan:  Continue soft diet, okay to stop TPN  Ambulate and up to chair, planning rehab on  discharge  Stable for discharge to rehab from surgical standpoint once cleared by medical team  Follow up in 1-2 weeks for postoperative appointment  DVT prophylaxis with heparin, okay to switch to eliquis from surgical standpoint      Quality:  DVT Mechanical Prophylaxis:   SCDs,    DVT Pharmacologic Prophylaxis   Medication    heparin (Porcine) 41990 units/250mL infusion ACS/AFIB CONTINUOUS    [Held by provider] apixaban (Eliquis) tab 5 mg                Code Status: Full Code  Mills: External urinary catheter in place  Mills Duration (in days):   Central line:    WILLIE: 12/13/2024        Joseluis Watson PA-C  12/12/2024  10:20 AM

## 2024-12-12 NOTE — PLAN OF CARE
Patient is alert and oriented x4 on 1L of oxygen able to move max assist. Patient up to chair today with therapy, tolerating soft diet. Heparin gtt transitioned to eliquis. TPN stopped per general surgery, patient encouraged to eat. Son at bedside updated on plan and participating in care. Plan for possible discharge to [facility] tomorrow.     Problem: Patient Centered Care  Goal: Patient preferences are identified and integrated in the patient's plan of care  Description: Interventions:  - What would you like us to know as we care for you? From home with son  - Provide timely, complete, and accurate information to patient/family  - Incorporate patient and family knowledge, values, beliefs, and cultural backgrounds into the planning and delivery of care  - Encourage patient/family to participate in care and decision-making at the level they choose  - Honor patient and family perspectives and choices  Outcome: Progressing     Problem: Patient/Family Goals  Goal: Patient/Family Long Term Goal  Description: Patient's Long Term Goal: go home    Interventions:  - go home  - See additional Care Plan goals for specific interventions  Outcome: Progressing  Goal: Patient/Family Short Term Goal  Description: Patient's Short Term Goal: clear SBO    Interventions:   - NGT LIS  -NPO  - See additional Care Plan goals for specific interventions  Outcome: Progressing     Problem: CARDIOVASCULAR - ADULT  Goal: Maintains optimal cardiac output and hemodynamic stability  Description: INTERVENTIONS:  - Monitor vital signs, rhythm, and trends  - Monitor for bleeding, hypotension and signs of decreased cardiac output  - Evaluate effectiveness of vasoactive medications to optimize hemodynamic stability  - Monitor arterial and/or venous puncture sites for bleeding and/or hematoma  - Assess quality of pulses, skin color and temperature  - Assess for signs of decreased coronary artery perfusion - ex. Angina  - Evaluate fluid balance,  assess for edema, trend weights  Outcome: Progressing  Goal: Absence of cardiac arrhythmias or at baseline  Description: INTERVENTIONS:  - Continuous cardiac monitoring, monitor vital signs, obtain 12 lead EKG if indicated  - Evaluate effectiveness of antiarrhythmic and heart rate control medications as ordered  - Initiate emergency measures for life threatening arrhythmias  - Monitor electrolytes and administer replacement therapy as ordered  Outcome: Progressing     Problem: RESPIRATORY - ADULT  Goal: Achieves optimal ventilation and oxygenation  Description: INTERVENTIONS:  - Assess for changes in respiratory status  - Assess for changes in mentation and behavior  - Position to facilitate oxygenation and minimize respiratory effort  - Oxygen supplementation based on oxygen saturation or ABGs  - Provide Smoking Cessation handout, if applicable  - Encourage broncho-pulmonary hygiene including cough, deep breathe, Incentive Spirometry  - Assess the need for suctioning and perform as needed  - Assess and instruct to report SOB or any respiratory difficulty  - Respiratory Therapy support as indicated  - Manage/alleviate anxiety  - Monitor for signs/symptoms of CO2 retention  Outcome: Progressing     Problem: GASTROINTESTINAL - ADULT  Goal: Minimal or absence of nausea and vomiting  Description: INTERVENTIONS:  - Maintain adequate hydration with IV or PO as ordered and tolerated  - Nasogastric tube to low intermittent suction as ordered  - Evaluate effectiveness of ordered antiemetic medications  - Provide nonpharmacologic comfort measures as appropriate  - Advance diet as tolerated, if ordered  - Obtain nutritional consult as needed  - Evaluate fluid balance  Outcome: Progressing  Goal: Maintains or returns to baseline bowel function  Description: INTERVENTIONS:  - Assess bowel function  - Maintain adequate hydration with IV or PO as ordered and tolerated  - Evaluate effectiveness of GI medications  - Encourage  mobilization and activity  - Obtain nutritional consult as needed  - Establish a toileting routine/schedule  - Consider collaborating with pharmacy to review patient's medication profile  Outcome: Progressing     Problem: SAFETY ADULT - FALL  Goal: Free from fall injury  Description: INTERVENTIONS:  - Assess pt frequently for physical needs  - Identify cognitive and physical deficits and behaviors that affect risk of falls.  - [place] fall precautions as indicated by assessment.  - Educate pt/family on patient safety including physical limitations  - Instruct pt to call for assistance with activity based on assessment  - Modify environment to reduce risk of injury  - Provide assistive devices as appropriate  - Consider OT/PT consult to assist with strengthening/mobility  - Encourage toileting schedule  Outcome: Progressing     Problem: DISCHARGE PLANNING  Goal: Discharge to home or other facility with appropriate resources  Description: INTERVENTIONS:  - Identify barriers to discharge w/pt and caregiver  - Include patient/family/discharge partner in discharge planning  - Arrange for needed discharge resources and transportation as appropriate  - Identify discharge learning needs (meds, wound care, etc)  - Arrange for interpreters to assist at discharge as needed  - Consider post-discharge preferences of patient/family/discharge partner  - Complete POLST form as appropriate  - Assess patient's ability to be responsible for managing their own health  - Refer to Case Management Department for coordinating discharge planning if the patient needs post-hospital services based on physician/LIP order or complex needs related to functional status, cognitive ability or social support system  Outcome: Progressing     Problem: HEMATOLOGIC - ADULT  Goal: Free from bleeding injury  Description: (Example usage: patient with low platelets)  INTERVENTIONS:  - Avoid intramuscular injections, enemas and rectal medication  administration  - Ensure safe mobilization of patient  - Hold pressure on venipuncture sites to achieve adequate hemostasis  - Assess for signs and symptoms of internal bleeding  - Monitor lab trends  - Patient is to report abnormal signs of bleeding to staff  - Avoid use of toothpicks and dental floss  - Use electric shaver for shaving  - Use soft bristle tooth brush  - Limit straining and forceful nose blowing  Outcome: Progressing

## 2024-12-12 NOTE — PROGRESS NOTES
Progress Note  Isabel Patel Patient Status:  Inpatient    1931 MRN [id number]   Location [facility] 3W/SW Attending Ezequiel Tai MD   Hosp Day # 10 PCP Fredrick Cornelius MD     Subjective:  Denies complaints, son at bedside        Objective:  BP (!) 137/93 (BP Location: Right arm)   Pulse 116   Temp 99.1 °F (37.3 °C) (Axillary)   Resp 18   Ht 5' 1\" (1.549 m)   Wt 163 lb 6.4 oz (74.1 kg)   SpO2 93%   BMI 30.87 kg/m²     Telemetry: AFRVR, rates 110s-120s    Intake/Output:    Intake/Output Summary (Last 24 hours) at 2024 1222  Last data filed at 2024 1006  Gross per 24 hour   Intake 704.3 ml   Output 1050 ml   Net -345.7 ml     Last 3 Weights   24 0459 163 lb 6.4 oz (74.1 kg)   24 0507 160 lb 6.4 oz (72.8 kg)   12/10/24 0500 159 lb 6.3 oz (72.3 kg)   24 0500 162 lb 14.7 oz (73.9 kg)   24 0507 159 lb 6.3 oz (72.3 kg)   24 0700 154 lb (69.9 kg)   24 1817 152 lb 6.4 oz (69.1 kg)   24 0423 146 lb 9.6 oz (66.5 kg)   24 0358 144 lb 11.2 oz (65.6 kg)   24 0520 141 lb (64 kg)   24 1146 147 lb (66.7 kg)   24 0558 160 lb (72.6 kg)   24 1107 163 lb (73.9 kg)   24 0521 163 lb (73.9 kg)   24 0340 163 lb 9.6 oz (74.2 kg)   02/15/24 0632 169 lb 4.8 oz (76.8 kg)   24 0615 167 lb (75.8 kg)   24 0449 165 lb 12.8 oz (75.2 kg)   24 1615 164 lb 14.4 oz (74.8 kg)   24 1229 163 lb (73.9 kg)     Labs:  Recent Labs   Lab 12/10/24  0444 24  0619 24  0443   * 156* 151*   BUN 40* 38* 42*   CREATSERUM 1.21* 1.15* 1.07*   EGFRCR 42* 44* 48*   CA 9.0 9.4 9.4   * 136 136   K 4.5  4.5 4.8 4.8    103 104   CO2 29.0 28.0 28.0     Recent Labs   Lab 24  0416 24  0353 12/10/24  0444 24  0619 24  0443   RBC 4.13 3.86 4.01 3.97  --    HGB 12.3 11.3* 11.9* 11.8*  --    HCT 37.1 34.4* 35.9 35.8  --    MCV 89.8 89.1 89.5 90.2  --    MCH 29.8 29.3 29.7 29.7  --     MCHC 33.2 32.8 33.1 33.0  --    RDW 15.5* 15.7* 15.7* 15.8*  --    NEPRELIM 8.17* 6.53 7.46  --   --    WBC 10.0 8.5 9.9 11.0  --    .0 194.0 213.0  213.0 230.0  230.0 221.0     Recent Labs   Lab 12/05/24  1601   TROPHS 12     Lab Results   Component Value Date/Time    HDL 52 10/15/2024 09:19 AM    LDL 98 10/15/2024 09:19 AM    TRIG 150 (H) 12/07/2024 04:24 AM     Lab Results   Component Value Date    DDIMER 2.97 (H) 02/16/2024     Lab Results   Component Value Date    TSH 1.508 04/23/2024     Review of Systems:   Constitutional: No fevers, chills, fatigue or night sweats.  Respiratory: Denies cough, wheezing or shortness of breath.  CV: Denies chest pain, palpitations, orthopnea, PND or dizziness.  GI: No nausea, vomiting or diarrhea. No blood in stools.    Physical Exam:  General: Alert, cooperative, no distress, appears stated age.  Neck: no JVD.  Lungs: Clear to auscultation bilaterally.  Chest wall: No tenderness or deformity.  Heart: Irregularly irregular rate and rhythm, tachycardic, S1, S2 normal, no murmur, click, rub or gallop.  Abdomen: Soft, non-tender. Bowel sounds normal. No masses,  No organomegaly.  Extremities: Extremities normal, atraumatic, no cyanosis or edema.  Pulses: 2+ and symmetric all extremities.  Neurologic: Grossly intact.    Diagnostics:  CT BRAIN OR HEAD (CPT=70450)    Result Date: 12/11/2024  CONCLUSION: No acute intracranial abnormality.  Nonacute findings are present and are described within the body of the report    Dictated by (CST): Germain Zurita MD on 12/11/2024 at 12:29 PM     Finalized by (CST): Germain Zurita MD on 12/11/2024 at 12:31 PM           Medications:   furosemide  20 mg Intravenous Daily    bisacodyl  10 mg Rectal Daily    polyethylene glycol (PEG 3350)  17 g Oral Daily    metoprolol  10 mg Intravenous Q4H    miconazole   Topical BID    [Held by provider] apixaban  5 mg Oral BID    [Held by provider] atorvastatin  20 mg Oral Daily    [Held by  provider] carvedilol  12.5 mg Oral BID with meals    [Held by provider] losartan  50 mg Oral Daily    insulin regular human  1-5 Units Subcutaneous 4 times per day      adult 3 in 1 TPN 41.7 mL/hr at 12/11/24 2300    continuous dose heparin 900 Units/hr (12/12/24 1029)    dextrose 10%      [Held by provider] dilTIAZem Stopped (12/10/24 0919)     Assessment:    89 year old female with PMH of DM2, HTN, Permanent atrial fibrillation, SSS s/p DC-PPM, HLD, CKD3, HFpEF, Mild aortic stenosis, pulmonary HTN, tricuspid regurgitation, TIA,  who presented with nausea and abdominal pain and was found to have a small bowel obstruction and AFRVR.     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    New Severe LV dysfunction  Newly reduced LV function 25-30%, 60-65% back in February, 2024  -Diffuse hypokinesis noted on echo with severe TR, mild aortic regurgitation suspected Takotsubo repeat echo after discharge  -possible vegetation noted on pulmonic valve, likely artifact  -proBNP elevated 5,448 on admission, diuresed with IV lasix, now euvolemic  -Resume GDMT  Stop IV Lasix start p.o. 20 mg daily    HTN  Resume Coreg as above    HLD- resume statin, LDL 98 in October     Severe Tricuspid regurgitation  Mild Aortic regurgitation  Pulmonary  HTN,  PASP 73mmHg     SSS  S/p dual chamber PPM ([place])     DM2-A1c 6.8     CKD3  1.1 at baseline         L3 will follow      Tom Correa MD

## 2024-12-12 NOTE — PROGRESS NOTES
[facility]ematology/Oncology [facility]  Initial Inpatient Consult Note    Isabel Patel Patient Status:  Inpatient    1931 MRN [id number]   Location [facility] 3W/SW Attending Ezequiel Tai MD   Hosp Day # 10 PCP Fredrick Cornelius MD     Reason for consultation: UE DVT    INTERVAL HISTORY     eating a little better, but more drowsy today. Denies any pain or nausea.    History of Present Illness  Isabel Patel is a 89 year old female hospitalized here with small bowel obstruction that is being managed conservatively.  She has a complex medical history with paroxysmal atrial fibrillation stage III CKD CHF and pulmonary arterial hypertension.  Also history of breast cancer s/p lumpectomy followed by chemotherapy and radiation.    She had been on Eliquis previously but was found to have swelling of bilateral upper extremities.  This prompted a venous Doppler which shows a small amount of thrombus in the axillary vein along the PICC line as well as incidentally noted left subclavian thrombus.  She does have pacemaker leads in this area.  Lower extremity venous Doppler on 2024 showed no evidence of DVT.    She is currently on heparin and hematology was asked to evaluate for recommendations regarding anticoagulation.  She is able to eat some clear liquids now and denies any nausea vomiting.      Review of Systems:  Hematology/Oncology ROS performed and negative except as above in HPI    History/Other:   Past Medical History:  Past Medical History:    Acute, but ill-defined, cerebrovascular disease    Arthritis    Breast CA (HCC)    Cancer (HCC)    Diabetes (HCC)    diet controlled    Diabetes mellitus, type II (HCC)    Essential hypertension    Hearing impairment    High blood pressure    High cholesterol    Hyperlipidemia    Osteoarthritis    Squamous cell carcinoma, keratinizing    R posterior thigh       Past Surgical History:  Past Surgical History:   Procedure Laterality Date     Appendectomy      Appendectomy  1946    Cataract  -    Cataract extraction w/  intraocular lens implant Bilateral 2008    Ian Garcia MD    Chemotherapy  2016    rt breast.    Cholecystectomy  1995    D & c      Knee replacement surgery Right 2012    Lumpectomy right  2016    cancer    Mastectomy partial Right 2016      [id number]    Skin surgery Right 2018    Tonsillectomy      Wrist fracture surgery Right        Current Medications:   [COMPLETED] heparin (Porcine) 1000 UNIT/ML injection 2,200 Units  30 Units/kg Intravenous Once    furosemide (Lasix) tab 20 mg  20 mg Oral Daily    apixaban (Eliquis) tab 5 mg  5 mg Oral BID    sennosides (Senokot) tab 8.6 mg  8.6 mg Oral BID    docusate sodium (Colace) cap 100 mg  100 mg Oral BID    adult 3 in 1 TPN   Intravenous Continuous TPN    bisacodyl (Dulcolax) 10 MG rectal suppository 10 mg  10 mg Rectal Daily    polyethylene glycol (PEG 3350) (Miralax) 17 g oral packet 17 g  17 g Oral Daily    [] adult 3 in 1 TPN   Intravenous Continuous TPN    [COMPLETED] potassium chloride 40 mEq in 250mL sodium chloride 0.9% IVPB premix  40 mEq Intravenous Once    [COMPLETED] heparin (Porcine) 1000 UNIT/ML injection 2,200 Units  30 Units/kg Intravenous Once    [] adult 3 in 1 TPN   Intravenous Continuous TPN    [COMPLETED] furosemide (Lasix) 10 mg/mL injection 20 mg  20 mg Intravenous Once    [] adult 3 in 1 TPN   Intravenous Continuous TPN    [COMPLETED] heparin (Porcine) 1000 UNIT/ML injection 2,200 Units  30 Units/kg Intravenous Once    [COMPLETED] heparin (Porcine) 1000 UNIT/ML injection 2,200 Units  30 Units/kg Intravenous Once    [COMPLETED] heparin (Porcine) 1000 UNIT/ML injection 2,100 Units  30 Units/kg Intravenous Once    [] adult 3 in 1 TPN   Intravenous Continuous TPN    [COMPLETED] heparin (Porcine) 1000 UNIT/ML injection 2,100 Units  30 Units/kg Intravenous Once    [COMPLETED] furosemide (Lasix) 10 mg/mL injection  20 mg  20 mg Intravenous Once    HYDROmorphone (Dilaudid) 1 MG/ML injection 0.1 mg  0.1 mg Intravenous Q2H PRN    Or    HYDROmorphone (Dilaudid) 1 MG/ML injection 0.2 mg  0.2 mg Intravenous Q2H PRN    Or    HYDROmorphone (Dilaudid) 1 MG/ML injection 0.4 mg  0.4 mg Intravenous Q2H PRN    [COMPLETED] potassium chloride 40 mEq in 250mL sodium chloride 0.9% IVPB premix  40 mEq Intravenous Once    dextrose 10% infusion (TPN no rate)   Intravenous Continuous PRN    [] adult 3 in 1 TPN   Intravenous Continuous TPN    [COMPLETED] magnesium sulfate in sterile water for injection 2 g/50mL IVPB premix 2 g  2 g Intravenous Once    [COMPLETED] lidocaine PF (Xylocaine-MPF) 1% injection  5 mL Intradermal Once    [] lactated ringers IV bolus 500 mL  500 mL Intravenous Once PRN    [] atropine 0.1 MG/ML injection 0.5 mg  0.5 mg Intravenous PRN    [] naloxone (Narcan) 0.4 MG/ML injection 0.08 mg  0.08 mg Intravenous PRN    acetaminophen (Ofirmev) 10 mg/mL infusion premix 1,000 mg  1,000 mg Intravenous Q6H PRN    [COMPLETED] alteplase (Activase) 2 mg in sterile water for injection (PF) 2.2 mL IV push to declot line  2 mg Intravenous Once    miconazole 2 % powder   Topical BID    atorvastatin (Lipitor) tab 20 mg  20 mg Oral Daily    carvedilol (Coreg) tab 12.5 mg  12.5 mg Oral BID with meals    losartan (Cozaar) tab 50 mg  50 mg Oral Daily    [COMPLETED] heparin (Porcine) 1000 UNIT/ML injection - BOLUS IV 5,100 Units  80 Units/kg Intravenous Once    [COMPLETED] heparin (Porcine) 87140 units/250mL infusion (PE/DVT/THROMBUS) INITIAL DOSE  18 Units/kg/hr Intravenous Once    [COMPLETED] potassium chloride 40 mEq in 250mL sodium chloride 0.9% IVPB premix  40 mEq Intravenous Once    [COMPLETED] sodium chloride 0.9 % IV bolus 500 mL  500 mL Intravenous Once    [COMPLETED] ondansetron (Zofran) 4 MG/2ML injection 4 mg  4 mg Intravenous Once    [COMPLETED] famotidine (Pepcid) 20 mg/2mL injection 20 mg  20 mg  Intravenous Once    [COMPLETED] dilTIAZem (cardIZEM) 25 mg/5mL injection 20 mg  20 mg Intravenous Once    [COMPLETED] furosemide (Lasix) 10 mg/mL injection 40 mg  40 mg Intravenous Once    [COMPLETED] iopamidol 76% (ISOVUE-370) injection for power injector  80 mL Intravenous ONCE PRN    [COMPLETED] iopamidol 76% (ISOVUE-370) injection for power injector  65 mL Intravenous ONCE PRN    [COMPLETED] piperacillin-tazobactam (Zosyn) 4.5 g in dextrose 5% 100 mL IVPB-ADDV  4.5 g Intravenous Once    [COMPLETED] dilTIAZem (cardIZEM) 25 mg/5mL injection 20 mg  20 mg Intravenous Once    acetaminophen (Tylenol Extra Strength) tab 500 mg  500 mg Oral Q4H PRN    ondansetron (Zofran) 4 MG/2ML injection 4 mg  4 mg Intravenous Q6H PRN    [Transfer Hold] dilTIAZem 10 mg BOLUS FROM BAG infusion  10 mg Intravenous Q1H PRN    glucose (Dex4) 15 GM/59ML oral liquid 15 g  15 g Oral Q15 Min PRN    Or    glucose (Glutose) 40% oral gel 15 g  15 g Oral Q15 Min PRN    Or    glucose-vitamin C (Dex-4) chewable tab 4 tablet  4 tablet Oral Q15 Min PRN    Or    dextrose 50% injection 50 mL  50 mL Intravenous Q15 Min PRN    Or    glucose (Dex4) 15 GM/59ML oral liquid 30 g  30 g Oral Q15 Min PRN    Or    glucose (Glutose) 40% oral gel 30 g  30 g Oral Q15 Min PRN    Or    glucose-vitamin C (Dex-4) chewable tab 8 tablet  8 tablet Oral Q15 Min PRN    insulin regular human (Novolin R, Humulin R) 100 UNIT/ML injection 1-5 Units  1-5 Units Subcutaneous 4 times per day    [COMPLETED] magnesium sulfate in sterile water for injection 2 g/50mL IVPB premix 2 g  2 g Intravenous Once    [COMPLETED] Perflutren Lipid Microsphere (DEFINITY) 6.52 MG/ML injection 1.5 mL  1.5 mL Intravenous ONCE PRN       Allergies:   Allergies[1]    Family Medical History:  Family History   Problem Relation Age of Onset    Heart Disease Father         CAD    Diabetes Father     Heart Disease Mother         CAD    Diabetes Mother     Breast Cancer Mother 83        had lumpectomy and RT.   No other treatment.   of stroke at 89    Other (appendicitis[other]) Brother         age 13    Other (alive and well[other]) Sister     Other (alive and well[other]) Son         x3    Breast Cancer Self 85    Glaucoma Neg     Macular degeneration Neg        Social History:  Social History     Socioeconomic History    Marital status:      Spouse name: Not on file    Number of children: 3    Years of education: Not on file    Highest education level: Not on file   Occupational History    Occupation:      Comment: retired   Tobacco Use    Smoking status: Never     Passive exposure: Never    Smokeless tobacco: Never   Vaping Use    Vaping status: Never Used   Substance and Sexual Activity    Alcohol use: Not Currently    Drug use: Never    Sexual activity: Not Currently   Other Topics Concern     Service No    Blood Transfusions Not Asked    Caffeine Concern Yes     Comment: coffee, 1 cups daily    Occupational Exposure No    Hobby Hazards Not Asked    Sleep Concern Not Asked    Stress Concern Not Asked    Weight Concern Not Asked    Special Diet Not Asked    Back Care Not Asked    Exercise Not Asked    Bike Helmet Not Asked    Seat Belt Not Asked    Self-Exams Not Asked   Social History Narrative    Not on file     Social Drivers of Health     Financial Resource Strain: Low Risk  (2024)    Financial Resource Strain     Difficulty of Paying Living Expenses: Not hard at all     Med Affordability: No   Food Insecurity: Unknown (2024)    Food Insecurity     Food Insecurity: Patient declined   Transportation Needs: Unknown (2024)    Transportation Needs     Lack of Transportation: Patient declined     Car Seat: Not on file   Physical Activity: Not on file   Stress: Not on file   Social Connections: Not on file   Housing Stability: Unknown (2024)    Housing Stability     Housing Instability: Patient declined     Housing Instability Emergency: Not on file     Crib or  Marquise: Not on file       Gyn History:  OB History    Para Term  AB Living   3 3 0 0 0 0   SAB IAB Ectopic Multiple Live Births   0 0 0 0 0       Objective:    /61 (BP Location: Right leg)   Pulse 116   Temp 99.1 °F (37.3 °C) (Axillary)   Resp 18   Ht 1.549 m (5' 1\")   Wt 74.1 kg (163 lb 6.4 oz)   SpO2 97%   BMI 30.87 kg/m²   Physical Exam:  General: A&Ox3, more drowsy today  HEENT: PERRL, OP clear  Neck: supple, no LAD or JVD  CV: RRR, no murmurs, + pulses  Pulm: CTA b/l, no w/r/r, normal effort  Lymph: no palpable lymphadenopathy throughout the cervical, supraclavicular, or axillary regions  Extremities: bilateral UE edema = some improvement today  Neurological: Grossly intact    Labs:  Lab Results   Component Value Date/Time    WBC 11.0 2024 06:19 AM    RBC 3.97 2024 06:19 AM    HGB 11.8 (L) 2024 06:19 AM    HCT 35.8 2024 06:19 AM    MCV 90.2 2024 06:19 AM    MCH 29.7 2024 06:19 AM    MCHC 33.0 2024 06:19 AM    RDW 15.8 (H) 2024 06:19 AM    NEPRELIM 7.46 12/10/2024 04:44 AM    .0 2024 04:43 AM       Lab Results   Component Value Date/Time     (H) 2024 04:43 AM    BUN 42 (H) 2024 04:43 AM    CREATSERUM 1.07 (H) 2024 04:43 AM    GFRNAA 42 (L) 2022 09:58 AM    CA 9.4 2024 04:43 AM    ALB 3.1 (L) 2024 04:24 AM     2024 04:43 AM    K 4.8 2024 04:43 AM     2024 04:43 AM    CO2 28.0 2024 04:43 AM    ALKPHO 97 2024 04:24 AM    AST 28 2024 04:24 AM    ALT 48 2024 04:24 AM       Imaging:  CT BRAIN OR HEAD (CPT=70450)    Result Date: 2024  CONCLUSION: No acute intracranial abnormality.  Nonacute findings are present and are described within the body of the report    Dictated by (CST): Germain Zurita MD on 2024 at 12:29 PM     Finalized by (CST): Germain Zurita MD on 2024 at 12:31 PM          XR CHEST AP PORTABLE   (CPT=71045)    Result Date: 12/11/2024  CONCLUSION:   No new abnormality.  Persistent streaky bibasilar opacities, probably atelectasis.  Persistent small left pleural effusion.      Dictated by (CST): Zurdo Vee MD on 12/11/2024 at 8:50 AM     Finalized by (CST): Zurdo Vee MD on 12/11/2024 at 8:52 AM          US VENOUS DOPPLER ARM RIGHT - DIAG IMG (CPT=93971)    Result Date: 12/10/2024  CONCLUSION:   Partially imaged right PICC line.  Small amount of thrombus in the right axillary vein along the PICC line.  Thrombus also seen in the right basilic vein.  Incidentally noted left subclavian and thrombus which was also present on recent left upper extremity venous Doppler examination 12/03/2024.     Dictated by (CST): Higinio Valdez MD on 12/10/2024 at 5:46 PM     Finalized by (CST): Higinio Valdez MD on 12/10/2024 at 5:54 PM            Assessment & Plan:    Isabel Patel is a 89 year old female with bilateral provoked DVTs in both the right and left upper extremities secondary to PICC line as well as a subclavian pacemaker leads.  There is no lower extremity DVT. She had been on Eliquis previously.  However given the indwelling catheter/pacemaker leads this does not necessarily represent a DOAC failure    # Continue heparin GTT and once po intake established resume Eliquis at 10mg BID for a week followed by 5 mg twice daily thereafter.    # I suspect her chronic LUE edema (Since Oct) may be related to subclavian stenosis in addition to thrombosis that has been reported in patients with pacemaker leads.  If upper extremity edema does not improve, can consider MR or CT Venogram to have this evaluated further, but given her age/frailty would suggest conservative mx at this time. Her son is in agreement with this    # Remove RUE PICC once off TPN    # Further mx per primary svc. Will follow peripherally. Please contact if we may be of any further assistance    Thank you Dr Fredrick Cornelius MD for the  opportunity to participate in the care of this interesting patient. Please do contact me if I may be of any further assistance    Tobias Granados MD  [facility] Hematology/Oncology  [facility]    This note was created using a voice-recognition transcribing system. Incorrect words or phrases may have been missed during proofreading. Please interpret accordingly.         [1]   Allergies  Allergen Reactions    Adhesive Tape RASH

## 2024-12-12 NOTE — PROGRESS NOTES
[facility]  part of [facility]    Progress Note      Assessment and Plan:   1.  Respiratory impairment-likely multifactorial in this non-smoker.  She likely has mild postoperative edema with bibasilar crackles as well as basilar atelectasis.  The chest x-ray yesterday was unimpressive.  There is basilar atelectasis.    The patient is now off oxygen.  Breathing comfortably and has no new complaints.  Will sign off.  Please call if we can help further.    Recommendations:  1.  Out of bed to chair  2.  Incentive spirometry  3.  Out of bed    2.  DVT prophylaxis-Eliquis.    3.  SBO with volvulus status post exploratory laparotomy-the abdominal exam is benign today.    Recommendations: As per general surgery.     4.  Atrial fibrillation with history of heart failure with preserved ejection fraction-as per cardiology.    5.  Mild confusion.  CT scan the brain is unrevealing.  Improved.    Subjective:   Isabel Patel is a(n) 89 year old female who is breathing comfortably and in good spirits.  Objective:   Blood pressure 107/61, pulse 116, temperature 99.1 °F (37.3 °C), temperature source Axillary, resp. rate 18, height 5' 1\" (1.549 m), weight 163 lb 6.4 oz (74.1 kg), SpO2 97%, not currently breastfeeding.    Physical Exam alert white female  HEENT examination is unremarkable with pupils equal round and reactive to light and accommodation.   Neck without adenopathy, thyromegaly, JVD nor bruit.   Lungs couple basilar crackles to auscultation and percussion.  Cardiac regular rate and rhythm no murmur.   Abdomen tender with fullness at the mid abdomen with subtle bowel sounds, without hepatosplenomegaly and no mass appreciable.   Extremities without clubbing cyanosis nor edema.   Neurologic grossly intact with symmetric tone and strength and reflex.  Skin without gross abnormality     Results:     Lab Results   Component Value Date    .0 12/12/2024    CREATSERUM 1.07 12/12/2024    BUN 42  12/12/2024     12/12/2024    K 4.8 12/12/2024     12/12/2024    CO2 28.0 12/12/2024     12/12/2024    CA 9.4 12/12/2024    PTT 35.0 12/12/2024    MG 2.0 12/12/2024    PHOS 3.4 12/12/2024     CT scan the brain-no acute abnormality.    Chest x-ray-mild basilar atelectasis bilaterally    Rosalino Nguyen MD  Medical Director, Critical Care, [facility]  Medical Director, [facility]  Pager: [phone]

## 2024-12-12 NOTE — CM/SW NOTE
12/12/24 1100   Choice of Post-Acute Provider   Informed patient of right to choose their preferred provider Yes   List of appropriate post-acute services provided to patient/family with quality data Yes   Patient/family choice [facility]   Information given to Patient;Son     Social work was able to meet with the patient and her son Miah at bedside to discuss [facility]  choice.    The patient and her son's 1st choice is [facility].  [facility] can hold the bed up until Friday 12/13.    If the patient is ready after Friday, [facility] if the family's 2nd choice.    [facility] is reserved in [facility].    SW/CM to remain available for support and/or discharge planning.     Natalie Flores MSW, LSW  Discharge Planner [id number]

## 2024-12-13 LAB
ANION GAP SERPL CALC-SCNC: 5 MMOL/L (ref 0–18)
BUN BLD-MCNC: 43 MG/DL (ref 9–23)
BUN/CREAT SERPL: 38.1 (ref 10–20)
CALCIUM BLD-MCNC: 9.1 MG/DL (ref 8.7–10.4)
CHLORIDE SERPL-SCNC: 103 MMOL/L (ref 98–112)
CO2 SERPL-SCNC: 29 MMOL/L (ref 21–32)
CREAT BLD-MCNC: 1.13 MG/DL
DEPRECATED RDW RBC AUTO: 52.1 FL (ref 35.1–46.3)
EGFRCR SERPLBLD CKD-EPI 2021: 45 ML/MIN/1.73M2 (ref 60–?)
ERYTHROCYTE [DISTWIDTH] IN BLOOD BY AUTOMATED COUNT: 16.1 % (ref 11–15)
GLUCOSE BLD-MCNC: 120 MG/DL (ref 70–99)
GLUCOSE BLDC GLUCOMTR-MCNC: 119 MG/DL (ref 70–99)
GLUCOSE BLDC GLUCOMTR-MCNC: 137 MG/DL (ref 70–99)
GLUCOSE BLDC GLUCOMTR-MCNC: 139 MG/DL (ref 70–99)
GLUCOSE BLDC GLUCOMTR-MCNC: 144 MG/DL (ref 70–99)
HCT VFR BLD AUTO: 33.3 %
HGB BLD-MCNC: 11 G/DL
MAGNESIUM SERPL-MCNC: 1.9 MG/DL (ref 1.6–2.6)
MCH RBC QN AUTO: 29.6 PG (ref 26–34)
MCHC RBC AUTO-ENTMCNC: 33 G/DL (ref 31–37)
MCV RBC AUTO: 89.5 FL
OSMOLALITY SERPL CALC.SUM OF ELEC: 296 MOSM/KG (ref 275–295)
PHOSPHATE SERPL-MCNC: 4 MG/DL (ref 2.4–5.1)
PLATELET # BLD AUTO: 226 10(3)UL (ref 150–450)
POTASSIUM SERPL-SCNC: 4.4 MMOL/L (ref 3.5–5.1)
RBC # BLD AUTO: 3.72 X10(6)UL
SARS-COV-2 RNA RESP QL NAA+PROBE: NOT DETECTED
SODIUM SERPL-SCNC: 137 MMOL/L (ref 136–145)
WBC # BLD AUTO: 9.2 X10(3) UL (ref 4–11)

## 2024-12-13 RX ORDER — METOPROLOL SUCCINATE 25 MG/1
25 TABLET, EXTENDED RELEASE ORAL ONCE
Status: COMPLETED | OUTPATIENT
Start: 2024-12-13 | End: 2024-12-13

## 2024-12-13 RX ORDER — LOSARTAN POTASSIUM 25 MG/1
25 TABLET ORAL DAILY
Status: DISCONTINUED | OUTPATIENT
Start: 2024-12-14 | End: 2024-12-14

## 2024-12-13 RX ORDER — METOPROLOL SUCCINATE 50 MG/1
50 TABLET, EXTENDED RELEASE ORAL
Status: DISCONTINUED | OUTPATIENT
Start: 2024-12-13 | End: 2024-12-13

## 2024-12-13 RX ORDER — DIGOXIN 125 MCG
125 TABLET ORAL DAILY
Status: DISCONTINUED | OUTPATIENT
Start: 2024-12-13 | End: 2024-12-14

## 2024-12-13 NOTE — PROGRESS NOTES
[facility]  part of [facility]    Progress Note    Isabel Patel Patient Status:  Inpatient    1931 MRN [id number]   Location [facility] 2W/SW Attending Ezequiel Tai MD   Hosp Day # 11 PCP Fredrick Cornelius MD     Chief Complaint:   Chief Complaint   Patient presents with    Nausea/Vomiting/Diarrhea   Abdominal pain, N/V    Subjective:   Isabel Patel is tired and sleepy.  Tachycardic to 120s. Son at bedside. No chest pain or sob.  No n/v/abd pain.    Objective:   Objective:    Blood pressure 108/75, pulse 107, temperature 99.7 °F (37.6 °C), temperature source Axillary, resp. rate 17, height 5' 1\" (1.549 m), weight 151 lb 12.8 oz (68.9 kg), SpO2 94%, not currently breastfeeding.    Physical Exam:      Gen: No acute distress  Pulm: decreased bs  CV: tachycardic  Abd: Abdomen soft, nontender, nondistended, bowel sounds present  Neuro: No acute focal deficits  MSK: moves extremities  Skin: Warm and dry  Psych: Normal affect  Ext: no cyanosis        Results:   Results:    Labs:  Recent Labs   Lab 24  0416 24  0353 12/10/24  0444 24  0619 24  0443 24  0526   WBC 10.0 8.5 9.9 11.0  --  9.2   HGB 12.3 11.3* 11.9* 11.8*  --  11.0*   MCV 89.8 89.1 89.5 90.2  --  89.5   .0 194.0 213.0  213.0 230.0  230.0 221.0 226.0       Recent Labs   Lab 24  0424 24  0416 24  0619 24  0443 24  0526   *  250*   < > 156* 151* 120*   BUN 23  23   < > 38* 42* 43*   CREATSERUM 1.57*  1.57*   < > 1.15* 1.07* 1.13*   CA 8.7  8.7   < > 9.4 9.4 9.1   ALB 3.1*  --   --   --   --    *  135*   < > 136 136 137   K 4.5  4.5  4.5   < > 4.8 4.8 4.4     101   < > 103 104 103   CO2 28.0  28.0   < > 28.0 28.0 29.0   ALKPHO 97  --   --   --   --    AST 28  --   --   --   --    ALT 48  --   --   --   --    BILT 0.7  --   --   --   --    TP 5.6*  --   --   --   --     < > = values in this interval not displayed.       Estimated  Creatinine Clearance: 23.5 mL/min (A) (based on SCr of 1.13 mg/dL (H)).    No results for input(s): \"PTP\", \"INR\" in the last 168 hours.         Culture:  Hospital Encounter on 12/02/24   1. Blood Culture     Status: None    Collection Time: 12/05/24  4:04 PM    Specimen: Blood,peripheral   Result Value Ref Range    Blood Culture Result No Growth 5 Days N/A       Cardiac  No results for input(s): \"TROP\", \"PBNP\" in the last 168 hours.      Imaging: Imaging data reviewed in Epic.  No results found.    Medications:    digoxin  125 mcg Oral Daily    metoprolol succinate  50 mg Oral 2x Daily(Beta Blocker)    [START ON 12/14/2024] losartan  25 mg Oral Daily    furosemide  20 mg Oral Daily    apixaban  5 mg Oral BID    sennosides  8.6 mg Oral BID    docusate sodium  100 mg Oral BID    insulin aspart  1-7 Units Subcutaneous TID CC and HS    bisacodyl  10 mg Rectal Daily    polyethylene glycol (PEG 3350)  17 g Oral Daily    miconazole   Topical BID    atorvastatin  20 mg Oral Daily         Assessment and Plan:   Assessment & Plan:        Acute hypoxic respiratory failure   Pulmonary on consult.   Multifactorial from postop pulm edema and atelectasis.   Encourage IS.   Taper o2.   Po lasix     Small bowel volvulus   S/p exploratory laparotomy 12/6/24   Soft diet per surgery. Cont TPN   OOB.     Permanent A. fib  Acute on chronic HFrEF, new  Cards on consult.   ECHO LVEF 25-30% (new compared to Feb 24' EF 60-65%)   Likely takotsubo variant   Coreg resumed , stopped lopressor IV - will d/w cards about tachycardia  Eliquis   GDMT: coreg, losartan   Stop IV lasix.  Now PO lasix    DM II   A1c 6.8 (prev 7.5)   ISS    LUE DVT, acute   Eliquis.   HONC on consult.   Needs to remove RUE PICC line prior to DC.   Provoked     KIEL on CKD III   Baseline creat 1.4 -1.6   BUN/Creat improving.   Monitor Uop.     Chronic weakness   PT/OT - LYNN   Will obtain CT head to ensure no CVA.     Other medical problems  CKD III  SSS s/p PPM    HTN  Hyperlipidemia  OA  Mild aortic stenosis  Moderate pulm HTN     IF HR controlled to LYNN tomorrow     >55min spent, >50% spent counseling and coordinating care in the form of educating pt/family and d/w consultants and staff. Most of the time spent discussing the above plan.        Plan of care discussed with patient or family at bedside.          Supplementary Documentation:     Quality:  DVT Prophylaxis: heparin gtt   CODE status: Full  Dispo: per clinical course            Estimated date of discharge: TBD  Discharge is dependent on: clinical stability  At this point Ms. Patel is expected to be discharge to: TBD

## 2024-12-13 NOTE — PROGRESS NOTES
Progress Note  Isabel Patel Patient Status:  Inpatient    1931 MRN [id number]   Location [facility] 3W/SW Attending Kathya Cook MD   Hosp Day # 11 PCP Fredrick Cornelius MD     Subjective:  Denies cardiac complaints. Son at bedside.     Objective:  /75 (BP Location: Right leg)   Pulse 107   Temp 99.7 °F (37.6 °C) (Axillary)   Resp 17   Ht 5' 1\" (1.549 m)   Wt 151 lb 12.8 oz (68.9 kg)   SpO2 94%   BMI 28.68 kg/m²     Telemetry: afib; rates 110's to 120's       Intake/Output:    Intake/Output Summary (Last 24 hours) at 2024 1119  Last data filed at 2024 1051  Gross per 24 hour   Intake 200 ml   Output 1275 ml   Net -1075 ml       Last 3 Weights   24 0422 151 lb 12.8 oz (68.9 kg)   24 0459 163 lb 6.4 oz (74.1 kg)   24 0507 160 lb 6.4 oz (72.8 kg)   12/10/24 0500 159 lb 6.3 oz (72.3 kg)   24 0500 162 lb 14.7 oz (73.9 kg)   24 0507 159 lb 6.3 oz (72.3 kg)   24 0700 154 lb (69.9 kg)   24 1817 152 lb 6.4 oz (69.1 kg)   24 0423 146 lb 9.6 oz (66.5 kg)   24 0358 144 lb 11.2 oz (65.6 kg)   24 0520 141 lb (64 kg)   24 1146 147 lb (66.7 kg)   24 0558 160 lb (72.6 kg)   24 1107 163 lb (73.9 kg)   24 0521 163 lb (73.9 kg)   24 0340 163 lb 9.6 oz (74.2 kg)   02/15/24 0632 169 lb 4.8 oz (76.8 kg)   24 0615 167 lb (75.8 kg)   24 0449 165 lb 12.8 oz (75.2 kg)   24 1615 164 lb 14.4 oz (74.8 kg)   24 1229 163 lb (73.9 kg)       Labs:  Recent Labs   Lab 24  0619 24  0443 24  0526   * 151* 120*   BUN 38* 42* 43*   CREATSERUM 1.15* 1.07* 1.13*   EGFRCR 44* 48* 45*   CA 9.4 9.4 9.1    136 137   K 4.8 4.8 4.4    104 103   CO2 28.0 28.0 29.0     Recent Labs   Lab 24  0416 24  0353 12/10/24  0444 24  0619 24  0443 24  0526   RBC 4.13 3.86 4.01 3.97  --  3.72*   HGB 12.3 11.3* 11.9* 11.8*  --  11.0*   HCT 37.1 34.4*  35.9 35.8  --  33.3*   MCV 89.8 89.1 89.5 90.2  --  89.5   MCH 29.8 29.3 29.7 29.7  --  29.6   MCHC 33.2 32.8 33.1 33.0  --  33.0   RDW 15.5* 15.7* 15.7* 15.8*  --  16.1*   NEPRELIM 8.17* 6.53 7.46  --   --   --    WBC 10.0 8.5 9.9 11.0  --  9.2   .0 194.0 213.0  213.0 230.0  230.0 221.0 226.0         No results for input(s): \"TROP\", \"TROPHS\", \"CK\" in the last 168 hours.    Review of Systems:   Constitutional:No fevers, chills, fatigue or night sweats.  Respiratory: Denies cough, wheezing or shortness of breath .  CV: Denies chest pain, palpitations, orthopnea, PND or dizziness .  GI: No nausea, vomiting or diarrhea. No blood in stools.  Neurologic: No seizures, tremors, weakness or numbness.     Physical Exam:    Gen: alert, oriented x 3, NAD  Heent: pupils equal, reactive. Mucous membranes moist.   Neck: no jvd  Cardiac: irregular rhythm/rate, normal S1,S2, no murmur   Lungs: CTA  Abd: soft, NT/ND +bs  Ext: no edema  Skin: Warm, dry  Neuro: No focal deficits        Medications:     furosemide  20 mg Oral Daily    apixaban  5 mg Oral BID    sennosides  8.6 mg Oral BID    docusate sodium  100 mg Oral BID    insulin aspart  1-7 Units Subcutaneous TID CC and HS    bisacodyl  10 mg Rectal Daily    polyethylene glycol (PEG 3350)  17 g Oral Daily    miconazole   Topical BID    atorvastatin  20 mg Oral Daily    carvedilol  12.5 mg Oral BID with meals    losartan  50 mg Oral Daily      dextrose 10% Stopped (12/12/24 7131)       Assessment:  # High grade Small Bowel Obstruction   s/p exploratory lap on 12/6   General surgery following   # Permanent Atrial fibrillation with RVR   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   rates generally controlled with BB, diltiazem, and digoxin PRN; however, have been elevated throughout this admission likely secondary to SBO/ acute illness and well newly depressed EF  Continue AC with Eliquis   # New Severe LV dysfunction (HFrEF)   EF normal 60-65% back  in 2/2024   New echo also significant for diffuse hypokinesis noted on echo with severe TR, mild aortic regurgitation > plan to repeat Echo OP to re-evaluate   Optimize GDMT as tolerated given age   Cont ARN and BB   # Severe Tricuspid regurgitation  # Mild Aortic regurgitation  # Pulmonary HTN, PASP 73mmHg    # DM2-A1c 6.8   # CKD - Cr stable       Plan:  Euvolemic on oral lasix; good urine output with low dose furosemide   Rates improving down to 110's to 120's   Continue toprol xl 75 mg bid and home dose digoxin  Cont losartan for LV dysfunction > Monitor BP with increased BB   Previously on oral diltiazem; would avoid given severely reduced LVEF     Overall heart rates improving.   Reasonable for discharge to [facility] sometime today.     Plan of care discussed with patient, RN.      Arcelia Castle, APRN  12/14/2024  9:34 AM  [phone]      I saw and examined patient agree the touch findings.  Overall patient feels well, denies any cardiac complaints.  Increase metoprolol succinate to 100 mg twice daily and digoxin.  Heart rate improved ranging between 100-120 beats per minute.  Plan for discharge to facility today.    Robert Holcomb MD  [facility] [place]

## 2024-12-13 NOTE — CM/SW NOTE
12/13/24 1056   Discharge disposition   Expected discharge disposition subacute   Post Acute Care Provider PP SNF   Discharge transportation [facility]     The patient received a MDO for discharge.    The patient will be transported to [facility] via [facility] at 1:30pm.  PCS complete.    The number to call report is [phone].  The room number will me 310-1.    RN to inform patient and family.    SW/CM to remain available for support and/or discharge planning.     Natalie Flores MSW, LSW  Discharge Planner [id number]

## 2024-12-13 NOTE — PROGRESS NOTES
[facility]  Progress Note    Isabel Patel Patient Status:  Inpatient    1931 MRN [id number]   Location [facility] 3W/SW Attending Kathya Cook MD   Hosp Day # 11 PCP Fredrick Cornelius MD     Subjective:  The patient was seen and examined at bedside. She reports mild abdominal pain. She denies nausea or vomiting. She is having bowel function.     Objective/Physical Exam:  General: Alert, orientated x3.  Cooperative.  No apparent distress.  Vital Signs:  Blood pressure 102/56, pulse 107, temperature 98.6 °F (37 °C), temperature source Axillary, resp. rate 18, height 61\", weight 151 lb 12.8 oz (68.9 kg), SpO2 94%, not currently breastfeeding.  Wt Readings from Last 3 Encounters:   24 151 lb 12.8 oz (68.9 kg)   24 163 lb (73.9 kg)   24 163 lb (73.9 kg)     Lungs: No respiratory distress.  Cardiac: Regular rate and rhythm.   Abdomen:  Soft, non distended, mild incisional site tenderness to palpation, with no rebound or guarding.  No peritoneal signs.   Extremities:  No lower extremity edema noted.    Incision: Clean, dry, intact, no erythema. Staples in place    Intake/Output:    Intake/Output Summary (Last 24 hours) at 2024 0815  Last data filed at 2024 0422  Gross per 24 hour   Intake 150 ml   Output 1475 ml   Net -1325 ml     I/O last 3 completed shifts:  In: 754.3 [P.O.:200; I.V.:95.6]  Out:  [Urine:]  No intake/output data recorded.    Medications:    furosemide  20 mg Oral Daily    apixaban  5 mg Oral BID    sennosides  8.6 mg Oral BID    docusate sodium  100 mg Oral BID    insulin aspart  1-7 Units Subcutaneous TID CC and HS    bisacodyl  10 mg Rectal Daily    polyethylene glycol (PEG 3350)  17 g Oral Daily    miconazole   Topical BID    atorvastatin  20 mg Oral Daily    carvedilol  12.5 mg Oral BID with meals    losartan  50 mg Oral Daily       Labs:  Lab Results   Component Value Date    WBC 9.2 2024    HGB 11.0 2024    HCT  33.3 12/13/2024    .0 12/13/2024     Lab Results   Component Value Date     12/13/2024    K 4.4 12/13/2024     12/13/2024    CO2 29.0 12/13/2024    BUN 43 12/13/2024    CREATSERUM 1.13 12/13/2024     12/13/2024    CA 9.1 12/13/2024     Lab Results   Component Value Date    INR 1.41 (H) 12/02/2024    INR 1.0 12/03/2018         Assessment  Patient Active Problem List   Diagnosis    Right knee DJD    Osteoarthritis    Controlled type 2 diabetes mellitus with diabetic nephropathy, without long-term current use of insulin (HCC)    HTN (hypertension)    Hypercholesterolemia    Obesity    Left rotator cuff tear arthropathy    Pessary maintenance    Osteopenia of multiple sites    Pain of right lower extremity    Pelvic relaxation    Uterovaginal prolapse    AGUSTO (stress urinary incontinence, female)    TIA (transient ischemic attack)    Spinal stenosis of lumbar region    Macular degeneration    Mass of skin of left shoulder    Closed fracture of left distal femur (HCC)    Closed bicondylar fracture of distal end of right femur (HCC)    Exudative age-related macular degeneration, right eye, with inactive choroidal neovascularization (HCC)    Anemia    Bilateral lower extremity edema    Asymptomatic menopause    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 3 chronic kidney disease, without long-term current use of insulin (HCC)    Keratosis    Chronic shoulder pain    History of right breast cancer    Urinary incontinence    Atrial fibrillation, new onset (HCC)    Status post biventricular pacemaker    Acute on chronic congestive heart failure, unspecified heart failure type (HCC)    Complete intestinal obstruction, unspecified cause (HCC)    Atrial fibrillation with rapid ventricular response (HCC)    Anticoagulated    Small bowel obstruction (HCC)    Deep vein thrombosis (DVT) of both upper extremities (HCC)     POD 7: exploratory laparotomy for SBO      Plan:  Continue soft diet  The patient  may discharge to [facility] from a general surgical standpoint once cleared by other services  Antiemetics and analgesics as needed  Activity as tolerated  Medical management per medical team  DVT prophylaxis with eliquis  Follow up with general surgery in approximately 1 week for staple removal    Quality:  DVT Mechanical Prophylaxis:   SCDs,    DVT Pharmacologic Prophylaxis   Medication    apixaban (Eliquis) tab 5 mg                Code Status: Full Code  Mills: External urinary catheter in place  Mills Duration (in days):   Central line:    WILLIE: 12/13/2024        Karen Grider PA-C  12/13/2024  8:15 AM

## 2024-12-13 NOTE — PLAN OF CARE
Patient on 0.5 L oxygen. Max assist. PICC line. LYNN at discharge.    Problem: Patient Centered Care  Goal: Patient preferences are identified and integrated in the patient's plan of care  Description: Interventions:  - What would you like us to know as we care for you? From home with son  - Provide timely, complete, and accurate information to patient/family  - Incorporate patient and family knowledge, values, beliefs, and cultural backgrounds into the planning and delivery of care  - Encourage patient/family to participate in care and decision-making at the level they choose  - Honor patient and family perspectives and choices  Outcome: Progressing     Problem: Patient/Family Goals  Goal: Patient/Family Long Term Goal  Description: Patient's Long Term Goal: go home    Interventions:  - go home  - See additional Care Plan goals for specific interventions  Outcome: Progressing  Goal: Patient/Family Short Term Goal  Description: Patient's Short Term Goal: clear SBO    Interventions:   - NGT LIS  -NPO  - See additional Care Plan goals for specific interventions  Outcome: Progressing     Problem: CARDIOVASCULAR - ADULT  Goal: Maintains optimal cardiac output and hemodynamic stability  Description: INTERVENTIONS:  - Monitor vital signs, rhythm, and trends  - Monitor for bleeding, hypotension and signs of decreased cardiac output  - Evaluate effectiveness of vasoactive medications to optimize hemodynamic stability  - Monitor arterial and/or venous puncture sites for bleeding and/or hematoma  - Assess quality of pulses, skin color and temperature  - Assess for signs of decreased coronary artery perfusion - ex. Angina  - Evaluate fluid balance, assess for edema, trend weights  Outcome: Progressing  Goal: Absence of cardiac arrhythmias or at baseline  Description: INTERVENTIONS:  - Continuous cardiac monitoring, monitor vital signs, obtain 12 lead EKG if indicated  - Evaluate effectiveness of antiarrhythmic and heart rate  control medications as ordered  - Initiate emergency measures for life threatening arrhythmias  - Monitor electrolytes and administer replacement therapy as ordered  Outcome: Progressing     Problem: RESPIRATORY - ADULT  Goal: Achieves optimal ventilation and oxygenation  Description: INTERVENTIONS:  - Assess for changes in respiratory status  - Assess for changes in mentation and behavior  - Position to facilitate oxygenation and minimize respiratory effort  - Oxygen supplementation based on oxygen saturation or ABGs  - Provide Smoking Cessation handout, if applicable  - Encourage broncho-pulmonary hygiene including cough, deep breathe, Incentive Spirometry  - Assess the need for suctioning and perform as needed  - Assess and instruct to report SOB or any respiratory difficulty  - Respiratory Therapy support as indicated  - Manage/alleviate anxiety  - Monitor for signs/symptoms of CO2 retention  Outcome: Progressing     Problem: GASTROINTESTINAL - ADULT  Goal: Minimal or absence of nausea and vomiting  Description: INTERVENTIONS:  - Maintain adequate hydration with IV or PO as ordered and tolerated  - Nasogastric tube to low intermittent suction as ordered  - Evaluate effectiveness of ordered antiemetic medications  - Provide nonpharmacologic comfort measures as appropriate  - Advance diet as tolerated, if ordered  - Obtain nutritional consult as needed  - Evaluate fluid balance  Outcome: Progressing  Goal: Maintains or returns to baseline bowel function  Description: INTERVENTIONS:  - Assess bowel function  - Maintain adequate hydration with IV or PO as ordered and tolerated  - Evaluate effectiveness of GI medications  - Encourage mobilization and activity  - Obtain nutritional consult as needed  - Establish a toileting routine/schedule  - Consider collaborating with pharmacy to review patient's medication profile  Outcome: Progressing     Problem: SAFETY ADULT - FALL  Goal: Free from fall injury  Description:  INTERVENTIONS:  - Assess pt frequently for physical needs  - Identify cognitive and physical deficits and behaviors that affect risk of falls.  - [place] fall precautions as indicated by assessment.  - Educate pt/family on patient safety including physical limitations  - Instruct pt to call for assistance with activity based on assessment  - Modify environment to reduce risk of injury  - Provide assistive devices as appropriate  - Consider OT/PT consult to assist with strengthening/mobility  - Encourage toileting schedule  Outcome: Progressing     Problem: DISCHARGE PLANNING  Goal: Discharge to home or other facility with appropriate resources  Description: INTERVENTIONS:  - Identify barriers to discharge w/pt and caregiver  - Include patient/family/discharge partner in discharge planning  - Arrange for needed discharge resources and transportation as appropriate  - Identify discharge learning needs (meds, wound care, etc)  - Arrange for interpreters to assist at discharge as needed  - Consider post-discharge preferences of patient/family/discharge partner  - Complete POLST form as appropriate  - Assess patient's ability to be responsible for managing their own health  - Refer to Case Management Department for coordinating discharge planning if the patient needs post-hospital services based on physician/LIP order or complex needs related to functional status, cognitive ability or social support system  Outcome: Progressing     Problem: HEMATOLOGIC - ADULT  Goal: Free from bleeding injury  Description: (Example usage: patient with low platelets)  INTERVENTIONS:  - Avoid intramuscular injections, enemas and rectal medication administration  - Ensure safe mobilization of patient  - Hold pressure on venipuncture sites to achieve adequate hemostasis  - Assess for signs and symptoms of internal bleeding  - Monitor lab trends  - Patient is to report abnormal signs of bleeding to staff  - Avoid use of toothpicks and  dental floss  - Use electric shaver for shaving  - Use soft bristle tooth brush  - Limit straining and forceful nose blowing  Outcome: Progressing

## 2024-12-13 NOTE — PLAN OF CARE
Patient alert and oriented x 3 on 0.5 LNC. Patient reports headache, see MAR. Transfer to [facility] canceled due to elevated HRs, continue to monitor rates. Frequent rounding and repositioning by staff, call light within reach and safety precautions in place.     Problem: Patient Centered Care  Goal: Patient preferences are identified and integrated in the patient's plan of care  Description: Interventions:  - What would you like us to know as we care for you? From home with son  - Provide timely, complete, and accurate information to patient/family  - Incorporate patient and family knowledge, values, beliefs, and cultural backgrounds into the planning and delivery of care  - Encourage patient/family to participate in care and decision-making at the level they choose  - Honor patient and family perspectives and choices  Outcome: Progressing     Problem: Patient/Family Goals  Goal: Patient/Family Long Term Goal  Description: Patient's Long Term Goal: go home    Interventions:  - go home  - See additional Care Plan goals for specific interventions  Outcome: Progressing  Goal: Patient/Family Short Term Goal  Description: Patient's Short Term Goal: clear SBO    Interventions:   - NGT LIS  -NPO  - See additional Care Plan goals for specific interventions  Outcome: Progressing     Problem: CARDIOVASCULAR - ADULT  Goal: Maintains optimal cardiac output and hemodynamic stability  Description: INTERVENTIONS:  - Monitor vital signs, rhythm, and trends  - Monitor for bleeding, hypotension and signs of decreased cardiac output  - Evaluate effectiveness of vasoactive medications to optimize hemodynamic stability  - Monitor arterial and/or venous puncture sites for bleeding and/or hematoma  - Assess quality of pulses, skin color and temperature  - Assess for signs of decreased coronary artery perfusion - ex. Angina  - Evaluate fluid balance, assess for edema, trend weights  Outcome: Progressing  Goal: Absence of cardiac  arrhythmias or at baseline  Description: INTERVENTIONS:  - Continuous cardiac monitoring, monitor vital signs, obtain 12 lead EKG if indicated  - Evaluate effectiveness of antiarrhythmic and heart rate control medications as ordered  - Initiate emergency measures for life threatening arrhythmias  - Monitor electrolytes and administer replacement therapy as ordered  Outcome: Progressing     Problem: RESPIRATORY - ADULT  Goal: Achieves optimal ventilation and oxygenation  Description: INTERVENTIONS:  - Assess for changes in respiratory status  - Assess for changes in mentation and behavior  - Position to facilitate oxygenation and minimize respiratory effort  - Oxygen supplementation based on oxygen saturation or ABGs  - Provide Smoking Cessation handout, if applicable  - Encourage broncho-pulmonary hygiene including cough, deep breathe, Incentive Spirometry  - Assess the need for suctioning and perform as needed  - Assess and instruct to report SOB or any respiratory difficulty  - Respiratory Therapy support as indicated  - Manage/alleviate anxiety  - Monitor for signs/symptoms of CO2 retention  Outcome: Progressing     Problem: GASTROINTESTINAL - ADULT  Goal: Minimal or absence of nausea and vomiting  Description: INTERVENTIONS:  - Maintain adequate hydration with IV or PO as ordered and tolerated  - Nasogastric tube to low intermittent suction as ordered  - Evaluate effectiveness of ordered antiemetic medications  - Provide nonpharmacologic comfort measures as appropriate  - Advance diet as tolerated, if ordered  - Obtain nutritional consult as needed  - Evaluate fluid balance  Outcome: Progressing  Goal: Maintains or returns to baseline bowel function  Description: INTERVENTIONS:  - Assess bowel function  - Maintain adequate hydration with IV or PO as ordered and tolerated  - Evaluate effectiveness of GI medications  - Encourage mobilization and activity  - Obtain nutritional consult as needed  - Establish a  toileting routine/schedule  - Consider collaborating with pharmacy to review patient's medication profile  Outcome: Progressing     Problem: SAFETY ADULT - FALL  Goal: Free from fall injury  Description: INTERVENTIONS:  - Assess pt frequently for physical needs  - Identify cognitive and physical deficits and behaviors that affect risk of falls.  - [place] fall precautions as indicated by assessment.  - Educate pt/family on patient safety including physical limitations  - Instruct pt to call for assistance with activity based on assessment  - Modify environment to reduce risk of injury  - Provide assistive devices as appropriate  - Consider OT/PT consult to assist with strengthening/mobility  - Encourage toileting schedule  Outcome: Progressing     Problem: DISCHARGE PLANNING  Goal: Discharge to home or other facility with appropriate resources  Description: INTERVENTIONS:  - Identify barriers to discharge w/pt and caregiver  - Include patient/family/discharge partner in discharge planning  - Arrange for needed discharge resources and transportation as appropriate  - Identify discharge learning needs (meds, wound care, etc)  - Arrange for interpreters to assist at discharge as needed  - Consider post-discharge preferences of patient/family/discharge partner  - Complete POLST form as appropriate  - Assess patient's ability to be responsible for managing their own health  - Refer to Case Management Department for coordinating discharge planning if the patient needs post-hospital services based on physician/LIP order or complex needs related to functional status, cognitive ability or social support system  Outcome: Progressing     Problem: HEMATOLOGIC - ADULT  Goal: Free from bleeding injury  Description: (Example usage: patient with low platelets)  INTERVENTIONS:  - Avoid intramuscular injections, enemas and rectal medication administration  - Ensure safe mobilization of patient  - Hold pressure on venipuncture sites  to achieve adequate hemostasis  - Assess for signs and symptoms of internal bleeding  - Monitor lab trends  - Patient is to report abnormal signs of bleeding to staff  - Avoid use of toothpicks and dental floss  - Use electric shaver for shaving  - Use soft bristle tooth brush  - Limit straining and forceful nose blowing  Outcome: Progressing

## 2024-12-14 VITALS
RESPIRATION RATE: 18 BRPM | OXYGEN SATURATION: 95 % | HEIGHT: 61 IN | WEIGHT: 160.63 LBS | BODY MASS INDEX: 30.33 KG/M2 | SYSTOLIC BLOOD PRESSURE: 104 MMHG | DIASTOLIC BLOOD PRESSURE: 89 MMHG | HEART RATE: 107 BPM | TEMPERATURE: 99 F

## 2024-12-14 LAB
ANION GAP SERPL CALC-SCNC: 6 MMOL/L (ref 0–18)
BUN BLD-MCNC: 43 MG/DL (ref 9–23)
BUN/CREAT SERPL: 36.4 (ref 10–20)
CALCIUM BLD-MCNC: 8.9 MG/DL (ref 8.7–10.4)
CHLORIDE SERPL-SCNC: 102 MMOL/L (ref 98–112)
CO2 SERPL-SCNC: 29 MMOL/L (ref 21–32)
CREAT BLD-MCNC: 1.18 MG/DL
DEPRECATED RDW RBC AUTO: 52.1 FL (ref 35.1–46.3)
EGFRCR SERPLBLD CKD-EPI 2021: 43 ML/MIN/1.73M2 (ref 60–?)
ERYTHROCYTE [DISTWIDTH] IN BLOOD BY AUTOMATED COUNT: 16.1 % (ref 11–15)
GLUCOSE BLD-MCNC: 117 MG/DL (ref 70–99)
GLUCOSE BLDC GLUCOMTR-MCNC: 116 MG/DL (ref 70–99)
GLUCOSE BLDC GLUCOMTR-MCNC: 129 MG/DL (ref 70–99)
HCT VFR BLD AUTO: 34.2 %
HGB BLD-MCNC: 11.3 G/DL
MAGNESIUM SERPL-MCNC: 1.8 MG/DL (ref 1.6–2.6)
MCH RBC QN AUTO: 29.6 PG (ref 26–34)
MCHC RBC AUTO-ENTMCNC: 33 G/DL (ref 31–37)
MCV RBC AUTO: 89.5 FL
OSMOLALITY SERPL CALC.SUM OF ELEC: 296 MOSM/KG (ref 275–295)
PLATELET # BLD AUTO: 241 10(3)UL (ref 150–450)
POTASSIUM SERPL-SCNC: 4.5 MMOL/L (ref 3.5–5.1)
RBC # BLD AUTO: 3.82 X10(6)UL
SODIUM SERPL-SCNC: 137 MMOL/L (ref 136–145)
TRIGL SERPL-MCNC: 90 MG/DL (ref 30–149)
WBC # BLD AUTO: 10.4 X10(3) UL (ref 4–11)

## 2024-12-14 RX ORDER — DIGOXIN 125 MCG
125 TABLET ORAL DAILY
Qty: 30 TABLET | Refills: 0 | Status: SHIPPED | OUTPATIENT
Start: 2024-12-15 | End: 2025-01-14

## 2024-12-14 RX ORDER — METOPROLOL SUCCINATE 100 MG/1
100 TABLET, EXTENDED RELEASE ORAL
Qty: 60 TABLET | Refills: 0 | Status: SHIPPED | OUTPATIENT
Start: 2024-12-14 | End: 2025-01-13

## 2024-12-14 RX ORDER — MAGNESIUM OXIDE 400 MG/1
400 TABLET ORAL ONCE
Status: COMPLETED | OUTPATIENT
Start: 2024-12-14 | End: 2024-12-14

## 2024-12-14 RX ORDER — METOPROLOL SUCCINATE 100 MG/1
100 TABLET, EXTENDED RELEASE ORAL
Status: DISCONTINUED | OUTPATIENT
Start: 2024-12-14 | End: 2024-12-14

## 2024-12-14 NOTE — CM/SW NOTE
12/14/24 1100   Discharge disposition   Expected discharge disposition subacute   Post Acute Care Provider PP SNF   Discharge transportation [facility]       Per chart, pt has DC order for today. Received confirmation via phone from MD.    Consulted w/ liaison Yuliya - confirmed they can accept today around 2PM.     RN Susan and DC RN Katie updated. Requested RN update pt's son.    SW spoke to [facility] w/ [facility] Ambulance set for ETA 2PM. PCS completed in Epic - pt's RN to print w/ pt's AVS.    Room #: 310-1  Report phone #: [phone]      PLAN: [facility] LYNN, Ambulance ETA 2PM, PCS done            ARMANDO Charlton, LSW [id number]

## 2024-12-14 NOTE — PLAN OF CARE
Patient is alert and oriented x4 on 0.5L able to roll in bed. Patient is cleared for discharge from cardiology perspective. IV access and PICC line removed, telemetry monitor off. Son provided with discharge instructions, report called to [facility] and given to Aliyah AT. Patient picked up by ambulance to discharge.     Problem: Patient Centered Care  Goal: Patient preferences are identified and integrated in the patient's plan of care  Description: Interventions:  - What would you like us to know as we care for you? From home with son  - Provide timely, complete, and accurate information to patient/family  - Incorporate patient and family knowledge, values, beliefs, and cultural backgrounds into the planning and delivery of care  - Encourage patient/family to participate in care and decision-making at the level they choose  - Honor patient and family perspectives and choices  Outcome: Adequate for Discharge     Problem: Patient/Family Goals  Goal: Patient/Family Long Term Goal  Description: Patient's Long Term Goal: go home    Interventions:  - go home  - See additional Care Plan goals for specific interventions  Outcome: Adequate for Discharge  Goal: Patient/Family Short Term Goal  Description: Patient's Short Term Goal: clear SBO    Interventions:   - NGT LIS  -NPO  - See additional Care Plan goals for specific interventions  Outcome: Adequate for Discharge     Problem: CARDIOVASCULAR - ADULT  Goal: Maintains optimal cardiac output and hemodynamic stability  Description: INTERVENTIONS:  - Monitor vital signs, rhythm, and trends  - Monitor for bleeding, hypotension and signs of decreased cardiac output  - Evaluate effectiveness of vasoactive medications to optimize hemodynamic stability  - Monitor arterial and/or venous puncture sites for bleeding and/or hematoma  - Assess quality of pulses, skin color and temperature  - Assess for signs of decreased coronary artery perfusion - ex. Angina  - Evaluate fluid  balance, assess for edema, trend weights  Outcome: Adequate for Discharge  Goal: Absence of cardiac arrhythmias or at baseline  Description: INTERVENTIONS:  - Continuous cardiac monitoring, monitor vital signs, obtain 12 lead EKG if indicated  - Evaluate effectiveness of antiarrhythmic and heart rate control medications as ordered  - Initiate emergency measures for life threatening arrhythmias  - Monitor electrolytes and administer replacement therapy as ordered  Outcome: Adequate for Discharge     Problem: RESPIRATORY - ADULT  Goal: Achieves optimal ventilation and oxygenation  Description: INTERVENTIONS:  - Assess for changes in respiratory status  - Assess for changes in mentation and behavior  - Position to facilitate oxygenation and minimize respiratory effort  - Oxygen supplementation based on oxygen saturation or ABGs  - Provide Smoking Cessation handout, if applicable  - Encourage broncho-pulmonary hygiene including cough, deep breathe, Incentive Spirometry  - Assess the need for suctioning and perform as needed  - Assess and instruct to report SOB or any respiratory difficulty  - Respiratory Therapy support as indicated  - Manage/alleviate anxiety  - Monitor for signs/symptoms of CO2 retention  Outcome: Adequate for Discharge     Problem: GASTROINTESTINAL - ADULT  Goal: Minimal or absence of nausea and vomiting  Description: INTERVENTIONS:  - Maintain adequate hydration with IV or PO as ordered and tolerated  - Nasogastric tube to low intermittent suction as ordered  - Evaluate effectiveness of ordered antiemetic medications  - Provide nonpharmacologic comfort measures as appropriate  - Advance diet as tolerated, if ordered  - Obtain nutritional consult as needed  - Evaluate fluid balance  Outcome: Adequate for Discharge  Goal: Maintains or returns to baseline bowel function  Description: INTERVENTIONS:  - Assess bowel function  - Maintain adequate hydration with IV or PO as ordered and tolerated  -  Evaluate effectiveness of GI medications  - Encourage mobilization and activity  - Obtain nutritional consult as needed  - Establish a toileting routine/schedule  - Consider collaborating with pharmacy to review patient's medication profile  Outcome: Adequate for Discharge     Problem: SAFETY ADULT - FALL  Goal: Free from fall injury  Description: INTERVENTIONS:  - Assess pt frequently for physical needs  - Identify cognitive and physical deficits and behaviors that affect risk of falls.  - [place] fall precautions as indicated by assessment.  - Educate pt/family on patient safety including physical limitations  - Instruct pt to call for assistance with activity based on assessment  - Modify environment to reduce risk of injury  - Provide assistive devices as appropriate  - Consider OT/PT consult to assist with strengthening/mobility  - Encourage toileting schedule  Outcome: Adequate for Discharge     Problem: DISCHARGE PLANNING  Goal: Discharge to home or other facility with appropriate resources  Description: INTERVENTIONS:  - Identify barriers to discharge w/pt and caregiver  - Include patient/family/discharge partner in discharge planning  - Arrange for needed discharge resources and transportation as appropriate  - Identify discharge learning needs (meds, wound care, etc)  - Arrange for interpreters to assist at discharge as needed  - Consider post-discharge preferences of patient/family/discharge partner  - Complete POLST form as appropriate  - Assess patient's ability to be responsible for managing their own health  - Refer to Case Management Department for coordinating discharge planning if the patient needs post-hospital services based on physician/LIP order or complex needs related to functional status, cognitive ability or social support system  Outcome: Adequate for Discharge     Problem: HEMATOLOGIC - ADULT  Goal: Free from bleeding injury  Description: (Example usage: patient with low  platelets)  INTERVENTIONS:  - Avoid intramuscular injections, enemas and rectal medication administration  - Ensure safe mobilization of patient  - Hold pressure on venipuncture sites to achieve adequate hemostasis  - Assess for signs and symptoms of internal bleeding  - Monitor lab trends  - Patient is to report abnormal signs of bleeding to staff  - Avoid use of toothpicks and dental floss  - Use electric shaver for shaving  - Use soft bristle tooth brush  - Limit straining and forceful nose blowing  Outcome: Adequate for Discharge

## 2024-12-14 NOTE — PLAN OF CARE
Pt very irritable and snappy with staff. PRN pain meds given.  Problem: Patient Centered Care  Goal: Patient preferences are identified and integrated in the patient's plan of care  Description: Interventions:  - What would you like us to know as we care for you? From home with son  - Provide timely, complete, and accurate information to patient/family  - Incorporate patient and family knowledge, values, beliefs, and cultural backgrounds into the planning and delivery of care  - Encourage patient/family to participate in care and decision-making at the level they choose  - Honor patient and family perspectives and choices  Outcome: Progressing     Problem: Patient/Family Goals  Goal: Patient/Family Long Term Goal  Description: Patient's Long Term Goal: go home    Interventions:  - go home  - See additional Care Plan goals for specific interventions  Outcome: Progressing  Goal: Patient/Family Short Term Goal  Description: Patient's Short Term Goal: clear SBO    Interventions:   - NGT LIS  -NPO  - See additional Care Plan goals for specific interventions  Outcome: Progressing     Problem: CARDIOVASCULAR - ADULT  Goal: Maintains optimal cardiac output and hemodynamic stability  Description: INTERVENTIONS:  - Monitor vital signs, rhythm, and trends  - Monitor for bleeding, hypotension and signs of decreased cardiac output  - Evaluate effectiveness of vasoactive medications to optimize hemodynamic stability  - Monitor arterial and/or venous puncture sites for bleeding and/or hematoma  - Assess quality of pulses, skin color and temperature  - Assess for signs of decreased coronary artery perfusion - ex. Angina  - Evaluate fluid balance, assess for edema, trend weights  Outcome: Progressing  Goal: Absence of cardiac arrhythmias or at baseline  Description: INTERVENTIONS:  - Continuous cardiac monitoring, monitor vital signs, obtain 12 lead EKG if indicated  - Evaluate effectiveness of antiarrhythmic and heart rate control  medications as ordered  - Initiate emergency measures for life threatening arrhythmias  - Monitor electrolytes and administer replacement therapy as ordered  Outcome: Progressing     Problem: RESPIRATORY - ADULT  Goal: Achieves optimal ventilation and oxygenation  Description: INTERVENTIONS:  - Assess for changes in respiratory status  - Assess for changes in mentation and behavior  - Position to facilitate oxygenation and minimize respiratory effort  - Oxygen supplementation based on oxygen saturation or ABGs  - Provide Smoking Cessation handout, if applicable  - Encourage broncho-pulmonary hygiene including cough, deep breathe, Incentive Spirometry  - Assess the need for suctioning and perform as needed  - Assess and instruct to report SOB or any respiratory difficulty  - Respiratory Therapy support as indicated  - Manage/alleviate anxiety  - Monitor for signs/symptoms of CO2 retention  Outcome: Progressing     Problem: GASTROINTESTINAL - ADULT  Goal: Minimal or absence of nausea and vomiting  Description: INTERVENTIONS:  - Maintain adequate hydration with IV or PO as ordered and tolerated  - Nasogastric tube to low intermittent suction as ordered  - Evaluate effectiveness of ordered antiemetic medications  - Provide nonpharmacologic comfort measures as appropriate  - Advance diet as tolerated, if ordered  - Obtain nutritional consult as needed  - Evaluate fluid balance  Outcome: Progressing  Goal: Maintains or returns to baseline bowel function  Description: INTERVENTIONS:  - Assess bowel function  - Maintain adequate hydration with IV or PO as ordered and tolerated  - Evaluate effectiveness of GI medications  - Encourage mobilization and activity  - Obtain nutritional consult as needed  - Establish a toileting routine/schedule  - Consider collaborating with pharmacy to review patient's medication profile  Outcome: Progressing     Problem: SAFETY ADULT - FALL  Goal: Free from fall injury  Description:  INTERVENTIONS:  - Assess pt frequently for physical needs  - Identify cognitive and physical deficits and behaviors that affect risk of falls.  - [place] fall precautions as indicated by assessment.  - Educate pt/family on patient safety including physical limitations  - Instruct pt to call for assistance with activity based on assessment  - Modify environment to reduce risk of injury  - Provide assistive devices as appropriate  - Consider OT/PT consult to assist with strengthening/mobility  - Encourage toileting schedule  Outcome: Progressing     Problem: DISCHARGE PLANNING  Goal: Discharge to home or other facility with appropriate resources  Description: INTERVENTIONS:  - Identify barriers to discharge w/pt and caregiver  - Include patient/family/discharge partner in discharge planning  - Arrange for needed discharge resources and transportation as appropriate  - Identify discharge learning needs (meds, wound care, etc)  - Arrange for interpreters to assist at discharge as needed  - Consider post-discharge preferences of patient/family/discharge partner  - Complete POLST form as appropriate  - Assess patient's ability to be responsible for managing their own health  - Refer to Case Management Department for coordinating discharge planning if the patient needs post-hospital services based on physician/LIP order or complex needs related to functional status, cognitive ability or social support system  Outcome: Progressing     Problem: HEMATOLOGIC - ADULT  Goal: Free from bleeding injury  Description: (Example usage: patient with low platelets)  INTERVENTIONS:  - Avoid intramuscular injections, enemas and rectal medication administration  - Ensure safe mobilization of patient  - Hold pressure on venipuncture sites to achieve adequate hemostasis  - Assess for signs and symptoms of internal bleeding  - Monitor lab trends  - Patient is to report abnormal signs of bleeding to staff  - Avoid use of toothpicks and  dental floss  - Use electric shaver for shaving  - Use soft bristle tooth brush  - Limit straining and forceful nose blowing  Outcome: Progressing

## 2024-12-14 NOTE — DISCHARGE SUMMARY
[facility] Hospitalist Discharge Summary   Patient ID:  Isabel Patel  [id number]  89 year old  5/9/1931    Admit date: 12/2/2024  Discharge date: 12/14/2024  Primary Care Physician: Fredrick Cornelius MD   Attending Physician: Ezequiel Tai MD   Consults:   Consultants         Provider   Role Specialty     Tobias Granados MD      Consulting Physician Hematology and Oncology     Rosalino Nguyen MD      Consulting Physician PULMONARY DISEASES     Carmen Belle MD      Consulting Physician NEUROLOGY     Shamika Manjarrez MD      Consulting Physician Cardiac Electrophysiology     Garland Medel APRN      Consulting Physician Nurse Practitioner oJe Muniz MD      Consulting Physician SURGERY, GENERAL            Discharge Diagnoses:   Acute on chronic congestive heart failure, unspecified heart failure type (HCC)    Reason for admission  Copied from admission H&P: ***    Hospital Course:  ***    EXAM:   GENERAL: no apparent distress, comfortable  NEURO: A/A Ox3, no focal deficits  RESP: non labored, CTAB/L  CARDIO: Regular, no murmur  ABD: soft, NT, ND  EXTREMITIES: no edema, no calf tenderness    Operative Procedures: Procedure(s) (LRB):  EXPLORATORY LAPAROTOMY (N/A)    Discharge Instructions     Medication List        START taking these medications      furosemide 20 MG Tabs  Commonly known as: Lasix  Take 1 tablet (20 mg total) by mouth daily.     metoprolol succinate  MG Tb24  Commonly known as: Toprol XL  Take 1 tablet (100 mg total) by mouth 2x Daily(Beta Blocker).            CHANGE how you take these medications      digoxin 0.125 MG Tabs  Commonly known as: Lanoxin  Take 1 tablet (125 mcg total) by mouth daily.  Start taking on: December 15, 2024  What changed:   when to take this  reasons to take this            CONTINUE taking these medications      Accu-Chek Guide Control Liqd  1 each by In Vitro route every 30 (thirty) days.     Accu-Chek Guide Me w/Device Kit  1 each daily. Check once a  day.     Accu-Chek Guide Strp  1 strip by In Vitro route daily.     Accu-Chek Softclix Lancets Misc  1 Lancet by Finger stick route daily. For blood glucose monitoring.     acetaminophen 500 MG Tabs  Commonly known as: Tylenol Extra Strength     apixaban 5 MG Tabs  Commonly known as: Eliquis  Take 1 tablet (5 mg total) by mouth 2 (two) times daily.     atorvastatin 20 MG Tabs  Commonly known as: Lipitor  Take 1 tablet (20 mg total) by mouth daily.     Co Q 10 10 MG Caps     MIRI OR     losartan 50 MG Tabs  Commonly known as: Cozaar  Take 1 tablet (50 mg total) by mouth daily.     VITAMIN D OR            STOP taking these medications      carvedilol 12.5 MG Tabs  Commonly known as: Coreg     dilTIAZem HCl ER Coated Beads 180 MG Cp24  Commonly known as: CARDIZEM CD     lidocaine 5 % Ptch  Commonly known as: Lidoderm     TURMERIC CURCUMIN OR               Where to Get Your Medications        These medications were sent to [facility] - [address] [phone], [phone]  [address] [place] [address] [address] [address] [address]      Phone: [phone]   metoprolol succinate  MG Tb24       You can get these medications from any pharmacy    Bring a paper prescription for each of these medications  digoxin 0.125 MG Tabs         Activity: {discharge activity:06133}  Diet: {diet:81811}  Wound Care: NA  Code Status: Full Code        Discharge Instructions         Going Home Instructions  In this section you will find the tools which will guide you through the first few days after you leave the hospital. Continued use of these tools will help you develop the skills necessary to keep your heart failure under control.     Home Care Instructions Following Heart Failure - the most important things to do every day include:   Weigh yourself and review the “Self-Check Plan” sheet every morning.   Call your cardiologist office if you are in the “Pay  Attention-Use Caution” (yellow zone) or “Medical Alert-Warning!” (red zone) as outlined in the Self-Check Plan sheet.  Take your medicines as prescribed.  Limit your sodium (salt) intake.  Know when to call your cardiologist, primary doctor, or nurse.  Know when to seek emergency care.      Things for You to Remember:   1. See your doctor or healthcare provider as written on your discharge instructions.  It is important that you attend this appointment to make sure your symptoms are under control.     2. Your recommended sodium intake is 6636-4646 mg daily.    3.  Weigh yourself every day.    4. Some exercise and activity is important to help keep your heart functioning and strong. Unless instructed not to exercise, you may walk at a slow to moderate pace for 10-15 minutes 2-3 days per week to start. Pace your activity to prevent shortness of breath or fatigue. Stop exercising if you develop chest pain, lightheadedness, or significant shortness of breath.       Call Your Cardiologist If:   You gain 2-3 pounds in one day or 5 pounds in one week.  You have more difficulty breathing.  You are getting more tired with normal activity.  You are more short of breath lying down, or awaken at night short of breath.  You have swelling of your feet or legs.  You urinate less often during the day and more often at night.  You have cramps in your legs.  You have blurred vision or see yellowish-green halos around objects of lights.    Go to the Emergency Room If:   You have pain or tightness in your chest  You are extremely short of breath  You are coughing up pink-frothy mucus  You are traveling and develop symptoms of worsening heart failure      ** Please follow up with your cardiologist or Advanced Practice Provider as written on your discharge instructions. If you are not provided with an appointment, let your nurse know so you can get an appointment**  Sometimes managing your health at home requires assistance.  The  [facility] team has recognized your preference to use [facility].  They can be reached by phone at [phone].  The fax number for your reference is [phone]. A representative from the home health agency will contact you or your family to schedule your first visit.      Discharge Instructions    Call your surgeon in 1 to 2 days to schedule a follow-up appointment in 1-2 weeks.    OK to shower.  OK for activity as tolerated.  No lifting over 10 pounds.      Wound Care: keep wound clean and dry, reinforce dressing PRN and ice to area for comfort.    If you have steri-strips, you may remove them 5 days after your surgery. If your steri-strips fall off sooner, you may leave them off.      If you have staples, they can be removed in 14 days after your surgery by your surgeon or PCP.     If you have skin glue, that will fall off on its own, just keep area clean and dry.    If you have a drain, please record daily output amounts and bring the recordings with you to your office follow up.    No driving while taking narcotic/sedating medications.      You may take an over the counter stool softener while on narcotics for constipation as needed (colace, miralax, etc).    Continue a soft diet for 1-2 weeks. Eat small portions (<25-50% or your meal) and go slow with you diet until you are tolerating it well.  Chew your food really well.  Eat things that can easily be broken up with a fork.  Try to avoid too many raw fruits or vegetables for the first 1-2 weeks.  Cooked/canned fruits and vegetables are ok.     Call your Physician or return to the Emergency Room if you experience:    -New or increased pain.    -New or increased abdominal bloating or distention    -New or increased bleeding.    -Nausea & vomitting.    -Fever & chills.    -Shortness of breath.    -Chest pain.      Post-Operative Pain Reduction Strategy to Minimize Opioid Use      Please take acetaminophen (Tylenol) and/or NSAIDS  (Advil, Motrin) for postoperative pain control before taking opioid prescriptions for pain. You can alternate between the two or stagger them to achieve a more durable pain control regimen.   -Do not exceed more than 4g of acetaminophen within 24 hours   -Do not exceed more than 3200 mg NSAIDs in 24 hours  -Do not take NSAIDs (ex: kidney problems or bleeding) or acetaminophen (ex: liver problems) if you have any contraindications to them .     If your pain is not controlled by acetaminophen and/or NSAIDS, please take your opioid prescription (Norco/vicodin/percocet have tylenol in them) as needed for pain relief and try to use the minimum amount necessary for adequate pain relief.      Opioid narcotics can suppress your breathing and decrease your level of alertness. It can also cause your bowel function to slow down and potentially make you constipated.   -Do not drive or operate heavy machinery while on narcotic medications.   -You can take Colace or MiraLAX over the counter as needed for constipation.      Please properly dispose your excess opioid medications at the appropriate facility/location.       An EXAMPLE schedule of how to take these medications is below. It is not necessary to wake yourself from sleep to take pain medication.     6am: 1000 mg tylenol and 1-2 oxycodone (5mg) tablets if needed  9am: 800 mg ibuprofen  12pm: 1000 mg tylenol and 1-2 oxycodone (5mg) tablets if needed  3pm: 800 mg ibuprofen  6pm: 1000 mg tylenol and 1-2 oxycodone (5mg) tablets if needed  9pm: 800 mg ibuprofen  12am: 1000 mg tylenol and 1-2 oxycodone (5mg) tablets if needed  3am: 800 mg ibuprofen  6am: 1000 mg tylenol and 1-2 oxycodone (5mg) tablets if needed           Important follow up:   Follow-up Information       Lilo Gleason APRN. Go on 12/23/2024.    Specialty: Nurse Practitioner  Why: @1100  Contact information:  [address] [place] [address]  [facility] [id number] [phone]               Anna Ashraf MD Follow up  in 1 week(s).    Specialty: SURGERY, GENERAL  Why: for staple removal  Contact information:  [address] [facility]  [address]  [facility] [address]  [phone]               Fredrick Cornelius MD Follow up in 2 week(s).    Specialty: Internal Medicine  Contact information:  [address] [facility] [address]  [phone]                             -PCP in [] within 7 days [] within 14 days [] other     Disposition: {disposition:81650}  Discharged Condition: {condition:10108}    Hospital Discharge Diagnoses: {Enter hospital discharge diagnoses here (please select the wildcards and then replace with free text; this allows us to save this data discretely for reporting purposes:5961::\"***\"}    Lace+ Score: 74  59-90 High Risk  29-58 Medium Risk  0-28   Low Risk.    TCM Follow-Up Recommendation:  {Care Managers will evaluate the need for follow-up for all patients ages 50+, and high/moderate risk patients ages 25-49. Low risk patients (LACE < 29) will only be evaluated if the \"Still recommend for TCM follow-up\" option is selected from this list.:7396}            Total Time Coordinating Care: Greater than 30 minutes    Patient had opportunity to ask questions, state understanding, and agree with therapeutic plan as outlined    Ezequiel Tai MD  Hospitalist  12/14/2024

## 2024-12-16 ENCOUNTER — INITIAL APN SNF VISIT (OUTPATIENT)
Dept: INTERNAL MEDICINE CLINIC | Facility: SKILLED NURSING FACILITY | Age: 89
End: 2024-12-16

## 2024-12-16 ENCOUNTER — EXTERNAL FACILITY (OUTPATIENT)
Dept: PULMONOLOGY | Facility: CLINIC | Age: 89
End: 2024-12-16

## 2024-12-16 VITALS
OXYGEN SATURATION: 100 % | TEMPERATURE: 97 F | HEART RATE: 99 BPM | RESPIRATION RATE: 18 BRPM | SYSTOLIC BLOOD PRESSURE: 116 MMHG | DIASTOLIC BLOOD PRESSURE: 61 MMHG

## 2024-12-16 DIAGNOSIS — J95.821 RESPIRATORY FAILURE, POST-OPERATIVE (HCC): Primary | ICD-10-CM

## 2024-12-16 DIAGNOSIS — Z79.01 ANTICOAGULATED: ICD-10-CM

## 2024-12-16 DIAGNOSIS — I82.623 ACUTE DEEP VEIN THROMBOSIS (DVT) OF BOTH UPPER EXTREMITIES, UNSPECIFIED VEIN (HCC): ICD-10-CM

## 2024-12-16 DIAGNOSIS — I50.20 HFREF (HEART FAILURE WITH REDUCED EJECTION FRACTION) (HCC): ICD-10-CM

## 2024-12-16 DIAGNOSIS — R53.81 PHYSICAL DECONDITIONING: ICD-10-CM

## 2024-12-16 DIAGNOSIS — E11.22 CONTROLLED TYPE 2 DIABETES MELLITUS WITH STAGE 3 CHRONIC KIDNEY DISEASE, WITHOUT LONG-TERM CURRENT USE OF INSULIN (HCC): ICD-10-CM

## 2024-12-16 DIAGNOSIS — I50.9 ACUTE ON CHRONIC CONGESTIVE HEART FAILURE, UNSPECIFIED HEART FAILURE TYPE (HCC): Primary | ICD-10-CM

## 2024-12-16 DIAGNOSIS — I48.91 ATRIAL FIBRILLATION WITH RAPID VENTRICULAR RESPONSE (HCC): ICD-10-CM

## 2024-12-16 DIAGNOSIS — N18.30 CONTROLLED TYPE 2 DIABETES MELLITUS WITH STAGE 3 CHRONIC KIDNEY DISEASE, WITHOUT LONG-TERM CURRENT USE OF INSULIN (HCC): ICD-10-CM

## 2024-12-16 DIAGNOSIS — J96.21 ACUTE ON CHRONIC RESPIRATORY FAILURE WITH HYPOXIA (HCC): ICD-10-CM

## 2024-12-16 DIAGNOSIS — Z09 STATUS POST ABDOMINAL SURGERY, FOLLOW-UP EXAM: ICD-10-CM

## 2024-12-16 DIAGNOSIS — I82.623 DEEP VEIN THROMBOSIS (DVT) OF BOTH UPPER EXTREMITIES, UNSPECIFIED CHRONICITY, UNSPECIFIED VEIN (HCC): ICD-10-CM

## 2024-12-16 DIAGNOSIS — K56.609 SMALL BOWEL OBSTRUCTION (HCC): ICD-10-CM

## 2024-12-16 DIAGNOSIS — K56.2 SMALL BOWEL VOLVULUS (HCC): ICD-10-CM

## 2024-12-16 PROCEDURE — 99306 1ST NF CARE HIGH MDM 50: CPT

## 2024-12-16 PROCEDURE — 1123F ACP DISCUSS/DSCN MKR DOCD: CPT

## 2024-12-16 NOTE — PROGRESS NOTES
Pulmonary Consult Note  SNF External Facility - [facility]    History of Present Illness:   Isabel Patel is a(n) 89 year old female with PMH notable for CHF, DM2, HTN, HLD, Afib, who I am now evaluating for respiratory failure at [facility]. Seen with son at bedside. Patient was recently hospitalized with small bowel obstruction with volvulus s/p ex lap. She required supplemental oxygen post operatively due to mild pulmonary edema and basilar atelectasis. CXR 24 demonstrated bibasilar atelectasis. She was also found to have bilateral upper extremity DVTs secondary to PICC line and subclavian pacemaker leads. She is on Eliquis. She overall is doing well and has no complaints. She is currently on 0.5 L O2 with oxygen saturations 93-95%. I spoke with PT and she completed PT today on room air with no reported desaturations. She denies shortness of breath and cough. She denies chest pain. She denies fever and chills. She denies abdominal pain. Appetite is improving.    Past Medical History: CHF, HTN, HLD, Afib, tachy-guy syndrome, severe tricuspid regurgitation, pulmonary HTN, DM2, CKD, DVT    Past Surgical History:  Past Surgical History:   Procedure Laterality Date    Appendectomy      Appendectomy      Cataract  -    Cataract extraction w/  intraocular lens implant Bilateral     Ian Garcia MD    Chemotherapy  2016    rt breast.    Cholecystectomy      D & c      Knee replacement surgery Right     Lumpectomy right  2016    cancer    Mastectomy partial Right 2016      [id number]    Skin surgery Right 2018    Tonsillectomy      Wrist fracture surgery Right      Social History: No tobacco, rare alcohol    Allergies: Adhesive taper    Medications: Reviewed on [facility] EMR    Review of Systems: Vision notable for visual impairment. Ears nose and throat notable for hearing impairment. Bowel normal. Bladder function notable for urinary incontinence. No thyroid  disease. No depression. No rash. Muscles and joints unremarkable. No weight loss or weight gain.     Physical Exam:  /87, HR 73, RR 19, T 97.4, sat 93-95% on 0.5 L O2   Constitutional: Obese, awake, alert, NAD, O2 via NC  HEENT: Head NC/AT, PEERL, MMM  Cardio: Regular rate, irregular rhythm  Respiratory: Thorax symmetrical with no labored breathing. Bibasilar crackles.  GI: NABS. Abd soft, non-tender.  Extremities: No clubbing or cyanosis. No LE edema. No calf tenderness.  Neurologic: A&Ox3. No gross motor deficits.  Skin: Warm, dry.  Psych: Calm, cooperative. Pleasant affect.    Results:  -Labs 12/16/24: wbc 8.40, hgb 12.0, plt 349, cr 1.13, bun 37, na 138, k 4.7, co2 27    Assessment and Plan:  1. Post operative respiratory failure - due to pulmonary edema and basilar atelectasis. CXR 12/11 with basilar atelectasis. She is on 0.5 L O2 but likely does not need at oxygen saturations are between 93-95%. According to PT, she completed therapy today on room air and did not desaturate.    Plan:  -O2 if needed to maintain oxygen saturations >90% but patient likely no longer requires  -Incentive spirometry  -Diuresis per cardiology    2. HFrEF, Afib - varying heart rates.    Plan:  -As per cardiology    3. DVT in bilateral UE - on Eliquis.    Plan:  -Continue Eliquis    D/toña TA.    LUCINA MarquesC  Pulmonary Medicine

## 2024-12-16 NOTE — PROGRESS NOTES
Isabel Patel  : 1931  Age 89 year old  female patient is admitted to [facility] for rehabilitation and strengthening.      [facility] Admission 24 - 24    Reason for visit: Follow up on small bowel volvulus, s/p exploratory laparotomy 24    HPI  Pt is a 89 y/o  female with pmh Paroxysmal atrial fibrillation with sick sinus syndrome, status post dual chamber pacemaker, diabetes mellitus type 2, esrd, chf was brought to [place] ED with progressive abdominal pain, n/v for the last 2-3 days. In ED CBC was unremarkable. CT abdomen revealed small bowel obstruction, high grade, with transition point at the central abdomen. Pt also noted to be in rapid afib. Was started on IV Cardizem, lasix for pulmonary edema, IV zosyn and had NG tube inserted. Pt was admitted with cardiology and general surgery on consult. Pt is s/p exploratory laparotomy 24 with Dr. Anna Ashraf, there was evidence of prior volvulus, but no bowel resection was performed. Was successfully extubated the next day and required supplemental O2 post op due to mild pulmonary edema and basilar atelectasis. Course complicated by bilateral upper extremity DVTs secondary to PICC line and subclavian pacemaker leads. She was started on a heparin drip, later transitioned to oral Eliquis. Pt stabilized and worked in PT who recommended [facility] at discharge. After stabilization and treatment, pt was discharged to [facility] for rehab and strengthening.     Pt seen and examined as initial APRN Visit. Pt is sitting up in chair, appears comfortabel in NAD. Surgical incision appears CDI, is open to air and staples appears to be intact. Pt denies having pain. Denies chest pain or sob. She's a little bit forgetful, could not remember her PT/OT session today, but both RN and son at the bedside pt had sessions today. She's otherwise been eating well and denies n/v or abdominal pain. Denies constipation or diarrhea. No other  issues/concerns. Vss     Past Medical History:    Acute, but ill-defined, cerebrovascular disease    Arthritis    Breast CA (HCC)    Cancer (HCC)    Diabetes (HCC)    diet controlled    Diabetes mellitus, type II (HCC)    Essential hypertension    Hearing impairment    High blood pressure    High cholesterol    Hyperlipidemia    Osteoarthritis    Squamous cell carcinoma, keratinizing    R posterior thigh     Past Surgical History:   Procedure Laterality Date    Appendectomy      Appendectomy  89    Cataract  -    Cataract extraction w/  intraocular lens implant Bilateral     Ian Garcia MD    Chemotherapy  2016    rt breast.    Cholecystectomy      D & c      Knee replacement surgery Right 2012    Lumpectomy right  2016    cancer    Mastectomy partial Right 2016      [id number]    Skin surgery Right 2018    Tonsillectomy      Wrist fracture surgery Right      Family History   Problem Relation Age of Onset    Heart Disease Father         CAD    Diabetes Father     Heart Disease Mother         CAD    Diabetes Mother     Breast Cancer Mother 83        had lumpectomy and RT.  No other treatment.   of stroke at 89    Other (appendicitis[other]) Brother         age 13    Other (alive and well[other]) Sister     Other (alive and well[other]) Son         x3    Breast Cancer Self 85    Glaucoma Neg     Macular degeneration Neg      Social History     Socioeconomic History    Marital status:     Number of children: 3   Occupational History    Occupation:      Comment: retired   Tobacco Use    Smoking status: Never     Passive exposure: Never    Smokeless tobacco: Never   Vaping Use    Vaping status: Never Used   Substance and Sexual Activity    Alcohol use: Not Currently    Drug use: Never    Sexual activity: Not Currently   Other Topics Concern     Service No    Caffeine Concern Yes     Comment: coffee, 1 cups daily    Occupational Exposure No     Social  Drivers of Health     Financial Resource Strain: Low Risk  (2/20/2024)    Financial Resource Strain     Difficulty of Paying Living Expenses: Not hard at all     Med Affordability: No   Food Insecurity: Unknown (12/2/2024)    Food Insecurity     Food Insecurity: Patient declined   Transportation Needs: Unknown (12/2/2024)    Transportation Needs     Lack of Transportation: Patient declined   Housing Stability: Unknown (12/2/2024)    Housing Stability     Housing Instability: Patient declined       IMMUNIZATIONS  Immunization History   Administered Date(s) Administered    FLU VAC High Dose 65 YRS & Older PRSV Free (23989) 08/23/2018    FLU VAC QIV SPLIT 3 YRS AND OLDER (10215) 09/25/2014    FLUAD High Dose 65 yr and older (16705) 08/24/2018    High Dose Fluzone Influenza Vaccine, 65yr+ PF 0.5mL (98097) 10/06/2015, 11/03/2016, 11/01/2017    Influenza 11/29/2007, 10/21/2010, 09/16/2011, 10/15/2013    Influenza Vaccine, trivalent (IIV3), 0.5mL IM 11/20/2008, 09/17/2009    Pneumococcal (Prevnar 13) 03/01/2015    Pneumovax 23 10/13/2004        ALLERGIES:  Allergies[1]    CODE STATUS:  Full Code    ADVANCED CARE PLANNING TEAM: Will need family care plan     CURRENT MEDICATIONS - reviewed and updated on SNF EMAR    SUBJECTIVE    Pt seen and examined as initial APRN Visit. Pt is sitting up in chair, appears comfortabel in NAD. Surgical incision appears CDI, is open to air and staples appears to be intact. Pt denies having pain. Denies chest pain or sob. She's a little bit forgetful, could not remember her PT/OT session today, but both RN and son at the bedside pt had sessions today. She's otherwise been eating well and denies n/v or abdominal pain. Denies constipation or diarrhea. No other issues/concerns. Vss     PHYSICAL EXAM:  Vitals:    12/16/24 1159   BP: 116/61   Pulse: 99   Resp: 18   Temp: 97.2 °F (36.2 °C)   SpO2: 100%      GENERAL HEALTH:well developed, well nourished, in no apparent distress   LINES, TUBES,  DRAINS:  none  SKIN: Surgial area corwin, cdi, staples intact   WOUND: see wound assessment,   EYES: PERRLA, EOMI, sclera anicteric, conjunctiva normal; there is no nystagmus, no drainage from eyes  HENT: normocephalic; normal nose, no nasal drainage, mucous membranes pink, moist, pharynx no exudate, no visible cerumen.   NECK:supple, FROM; no JVD, no TMG, no carotid bruits   BREAST: deferred exam   RESPIRATORY:clear to percussion and auscultation  CARDIOVASCULAR: S1, S2 normal, RRR; no S3, no S4  ABDOMEN:  normal active BS+, soft, nondistended; no organomegaly, no masses; no bruits; nontender, no guarding, no rebound tenderness. Surgial area corwin, cdi, staples intact   :no suprapubic distension  LYMPHATIC:no lymphedema  MUSCULOSKELETAL: no acute synovitis upper or lower extremity   EXTREMITIES/VASCULAR:no cyanosis, clubbing or edema  NEUROLOGIC: Forgetful   PSYCHIATRIC: AO x 2-3. Forgetful, pleasant and cooperative     MEDICAL DECISION MAKING  Unable     DIAGNOSTICS REVIEWED AT THIS VISIT:  Labs 12/17/24: wbc 8.40, hgb 12.0, plt 349, gluc 87, na 138, k 4.7, bun 37, creat 1.13    SEE PLAN BELOW    Small bowel volvulus   - hx of prior abdominal surgeries   - S/p exploratory laparotomy 12/6/24 with Dr. Anna Ashraf, there was evidence of prior volvulus, but no bowel resection was performed. Was successfully extubated the next day and required supplemental O2 post op due to mild pulmonary edema and basilar atelectasis.   - Surgical area CDI, Staples in place and intact   - Soft diet per surgery. Was on TPN in hospital    - f/u with surgery outpatient   - weekly labs in rehab  - monitor     Acute hypoxic respiratory failure   Post op respiratory failure  - due to pulmonary edema and basilar atelectasis   - Pulmonary followed in hospital   - pt currently on 0.5 L of O2, wean as tolerated   - Encourage IS  - continue oral lasix  20 mg daily   - pulm following in rehab   - monitor      Permanent A. fib  Acute on chronic HFrEF,  new  SSS s/p PPM   Mild aortic stenosis  HTN   - Cards consulted in hospital   - ECHO LVEF 25-30% (new compared to Feb 24' EF 60-65%)   - Likely takotsubo variant   - Was on IV diltiazem, heparin in hospital   - continue Eliquis 5 mg bid   - continue digoxin 125 mcg daily   - continue metoprolol succinate 100 mg bid   - continue losartan potassium 50 mg daily   - continue lasix 20 mg daily   - cardiology consult in rehab   - monitor     Physical Deconditioning/Impaired mobility and ADLs/At risk for falling  - Fall Precautions  - PT/OT/ST to evaluate and treat  - [facility] team to establish care plan meeting with patient and POA/family as appropriate  - Anticipate DC on or before TBD; SW to assist patient/family w/ DC planning  - DC Plan:  TBD     DM II   - Last A1c value was 6.8% done 10/15/2024.   - was on ISS in hospital   - currently diet controlled   - Accu checks in rehab   - weekly labs in rehab      LUE DVT, acute   - likely PICC line provoked per hospital records  - initially treated with IV heparin   - continue eliquis 5 mg bid   - monitor      KIEL on CKD III   - Baseline creat 1.4 -1.6   - BUN/Creat improved in hospital   - weekly labs in rehab    HLD   - continue atorvastatin 20 mg daily      Bowel regimen  - Monitor BM status     Pain Management  - Offer to pre-medicate 30-60 min prior to therapy  - Physiatry evaluation with management appreciated  - Acetaminophen 500 mg every 24 hrs prn      Vitamins/supplements as r/t deficiencies  - [facility] RD to follow while in rehab; supplementation/diet as per [facility] RD  - May continue home supplements  - continue vit d 3000 units daily   - continue co q 10 daily   - continue lily oral capsule     LABS  - CBC/CMP weekly while in [facility]    FOLLOW UP APPOINTMENTS  Future Appointments   Date Time Provider Department Center   1/6/2025  2:20 PM Osmel Mary MD [id number]   1/13/2025 11:00 AM Zulema Holland PA [facility] None   1/27/2025 10:45 AM Fredrick Cornelius MD  [facility]      60 minutes spent w/ patient and staff, including but not limited to reviewing present status, needs, abilities with disciplines, reviewing medical records, vital signs, labs, completing med reconciliation and entering orders for continued care in [facility]      Note to patient: The 21st Century Cures Act makes medical notes like these available to patients in the interest of transparency. However, this is a medical document intended as peer to peer communication. It is written in medical language and may contain abbreviations or verbiage that are unfamiliar. It may appear blunt or direct. Medical documents are intended to carry relevant information, facts as evident, and the clinical opinion of the practitioner who signs the document.     Renetta Perkins, APRN  12/16/24         [1]   Allergies  Allergen Reactions    Adhesive Tape RASH

## 2024-12-19 ENCOUNTER — TELEPHONE (OUTPATIENT)
Dept: SURGERY | Facility: CLINIC | Age: 89
End: 2024-12-19

## 2024-12-19 ENCOUNTER — SNF VISIT (OUTPATIENT)
Dept: INTERNAL MEDICINE CLINIC | Facility: SKILLED NURSING FACILITY | Age: 89
End: 2024-12-19

## 2024-12-19 VITALS
TEMPERATURE: 98 F | HEART RATE: 70 BPM | RESPIRATION RATE: 18 BRPM | OXYGEN SATURATION: 95 % | SYSTOLIC BLOOD PRESSURE: 127 MMHG | DIASTOLIC BLOOD PRESSURE: 66 MMHG

## 2024-12-19 DIAGNOSIS — M17.11 PRIMARY OSTEOARTHRITIS OF RIGHT KNEE: ICD-10-CM

## 2024-12-19 DIAGNOSIS — S72.402D CLOSED FRACTURE OF DISTAL END OF LEFT FEMUR WITH ROUTINE HEALING, UNSPECIFIED FRACTURE MORPHOLOGY, SUBSEQUENT ENCOUNTER: ICD-10-CM

## 2024-12-19 DIAGNOSIS — E78.00 HYPERCHOLESTEROLEMIA: ICD-10-CM

## 2024-12-19 DIAGNOSIS — E11.21 CONTROLLED TYPE 2 DIABETES MELLITUS WITH DIABETIC NEPHROPATHY, WITHOUT LONG-TERM CURRENT USE OF INSULIN (HCC): ICD-10-CM

## 2024-12-19 DIAGNOSIS — K56.609 SMALL BOWEL OBSTRUCTION (HCC): Primary | ICD-10-CM

## 2024-12-19 DIAGNOSIS — S72.421D CLOSED BICONDYLAR FRACTURE OF RIGHT FEMUR WITH ROUTINE HEALING, SUBSEQUENT ENCOUNTER: ICD-10-CM

## 2024-12-19 DIAGNOSIS — Z79.01 ANTICOAGULATED: ICD-10-CM

## 2024-12-19 DIAGNOSIS — I10 HYPERTENSION, UNSPECIFIED TYPE: ICD-10-CM

## 2024-12-19 DIAGNOSIS — S72.431D CLOSED BICONDYLAR FRACTURE OF RIGHT FEMUR WITH ROUTINE HEALING, SUBSEQUENT ENCOUNTER: ICD-10-CM

## 2024-12-19 DIAGNOSIS — I48.91 ATRIAL FIBRILLATION WITH RAPID VENTRICULAR RESPONSE (HCC): ICD-10-CM

## 2024-12-19 DIAGNOSIS — I48.91 ATRIAL FIBRILLATION, NEW ONSET (HCC): ICD-10-CM

## 2024-12-19 DIAGNOSIS — I82.623 DEEP VEIN THROMBOSIS (DVT) OF BOTH UPPER EXTREMITIES, UNSPECIFIED CHRONICITY, UNSPECIFIED VEIN (HCC): ICD-10-CM

## 2024-12-19 PROCEDURE — 99309 SBSQ NF CARE MODERATE MDM 30: CPT

## 2024-12-19 NOTE — PROGRESS NOTES
Isabel Patel  : 1931  Age 89 year old  female patient is admitted to [facility] for rehabilitation and strengthening.       [facility] Admission 24 - 24     Reason for visit: Follow up on small bowel volvulus, s/p exploratory laparotomy 24     HPI  Pt is a 89 y/o  female with pmh Paroxysmal atrial fibrillation with sick sinus syndrome, status post dual chamber pacemaker, diabetes mellitus type 2, esrd, chf was brought to [place] ED with progressive abdominal pain, n/v for the last 2-3 days. In ED CBC was unremarkable. CT abdomen revealed small bowel obstruction, high grade, with transition point at the central abdomen. Pt also noted to be in rapid afib. Was started on IV Cardizem, lasix for pulmonary edema, IV zosyn and had NG tube inserted. Pt was admitted with cardiology and general surgery on consult. Pt is s/p exploratory laparotomy 24 with Dr. Anna Ashraf, there was evidence of prior volvulus, but no bowel resection was performed. Was successfully extubated the next day and required supplemental O2 post op due to mild pulmonary edema and basilar atelectasis. Course complicated by bilateral upper extremity DVTs secondary to PICC line and subclavian pacemaker leads. She was started on a heparin drip, later transitioned to oral Eliquis. Pt stabilized and worked in PT who recommended [facility] at discharge. After stabilization and treatment, pt was discharged to [facility] for rehab and strengthening.     Pt seen and examined in follow up. Pt is sitting up in chair and is accompanied by her son. Pt reports feeling well, just a little bit tired and now starting to feel constipated. Son is asking if we could start pt on a stool softener. Pt has been eating well, no n/v or abdominal pain. Staples to abdomen appear intact, clean and dry. Denies having pain. Denies chest pain or sob. She has no other issues/concerns. Vss     ALLERGIES:  Allergies[1]    IMMUNIZATIONS  Immunization  History   Administered Date(s) Administered    FLU VAC High Dose 65 YRS & Older PRSV Free (03752) 08/23/2018    FLU VAC QIV SPLIT 3 YRS AND OLDER (81685) 09/25/2014    FLUAD High Dose 65 yr and older (58373) 08/24/2018    High Dose Fluzone Influenza Vaccine, 65yr+ PF 0.5mL (26416) 10/06/2015, 11/03/2016, 11/01/2017    Influenza 11/29/2007, 10/21/2010, 09/16/2011, 10/15/2013    Influenza Vaccine, trivalent (IIV3), 0.5mL IM 11/20/2008, 09/17/2009    Pneumococcal (Prevnar 13) 03/01/2015    Pneumovax 23 10/13/2004        CODE STATUS:  Full Code    ADVANCED CARE PLANNING TEAM: Will need family care plan    CURRENT MEDICATIONS - reviewed and updated on SNF EMR     SUBJECTIVE    Pt seen and examined in follow up. Pt is sitting up in chair and is accompanied by her son. Pt reports feeling well, just a little bit tired and now starting to feel constipated. Son is asking if we could start pt on a stool softener. Pt has been eating well, no n/v or abdominal pain. Staples to abdomen appear intact, clean and dry. Denies having pain. Denies chest pain or sob. She has no other issues/concerns. s     PHYSICAL EXAM:  Vitals:    12/19/24 1525   BP: 127/66   Pulse: 70   Resp: 18   Temp: 97.6 °F (36.4 °C)   SpO2: 95%      GENERAL HEALTH:well developed, well nourished, in no apparent distress   LINES, TUBES, DRAINS:  none  SKIN: Surgial area corwin, cdi, staples intact   WOUND: see wound assessment,   EYES: PERRLA, EOMI, sclera anicteric, conjunctiva normal; there is no nystagmus, no drainage from eyes  HENT: normocephalic; normal nose, no nasal drainage, mucous membranes pink, moist, pharynx no exudate, no visible cerumen.   NECK:supple, FROM; no JVD, no TMG, no carotid bruits   BREAST: deferred exam   RESPIRATORY:clear to percussion and auscultation  CARDIOVASCULAR: S1, S2 normal, RRR; no S3, no S4  ABDOMEN:  normal active BS+, soft, nondistended; no organomegaly, no masses; no bruits; nontender, no guarding, no rebound tenderness.  Surgial area corwin, cdi, staples intact   :no suprapubic distension  LYMPHATIC:no lymphedema  MUSCULOSKELETAL: no acute synovitis upper or lower extremity   EXTREMITIES/VASCULAR:no cyanosis, clubbing or edema  NEUROLOGIC: Forgetful   PSYCHIATRIC: AO x 2-3. Forgetful, pleasant and cooperative    DIAGNOSTICS REVIEWED AT THIS VISIT:    Labs 12/17/24: wbc 8.40, hgb 12.0, plt 349, gluc 87, na 138, k 4.7, bun 37, creat 1.13     SEE PLAN BELOW    Small bowel volvulus   - hx of prior abdominal surgeries   - S/p exploratory laparotomy 12/6/24 with Dr. Anna Ashraf, there was evidence of prior volvulus, but no bowel resection was performed. Was successfully extubated the next day and required supplemental O2 post op due to mild pulmonary edema and basilar atelectasis.   - Surgical area CDI, Staples in place and intact   - Soft diet per surgery. Was on TPN in hospital    - f/u with surgery outpatient   - weekly labs in rehab  - monitor      Acute hypoxic respiratory failure   Post op respiratory failure  - due to pulmonary edema and basilar atelectasis   - Pulmonary followed in hospital   - pt currently on 0.5 L of O2, wean as tolerated   - Encourage IS  - continue oral lasix  20 mg daily   - pulm following in rehab   - monitor      Permanent A. fib  Acute on chronic HFrEF, new  SSS s/p PPM   Mild aortic stenosis  HTN   - Cards consulted in hospital   - ECHO LVEF 25-30% (new compared to Feb 24' EF 60-65%)   - Likely takotsubo variant   - Was on IV diltiazem, heparin in hospital   - continue Eliquis 5 mg bid   - continue digoxin 125 mcg daily   - continue metoprolol succinate 100 mg bid   - continue losartan potassium 50 mg daily   - continue lasix 20 mg daily   - cardiology consult in rehab   - monitor      Physical Deconditioning/Impaired mobility and ADLs/At risk for falling  - Fall Precautions  - PT/OT/ST to evaluate and treat  - [facility] team to establish care plan meeting with patient and POA/family as appropriate  - Anticipate  DC on or before TBD; SW to assist patient/family w/ DC planning  - DC Plan:  TBD     DM II   - Last A1c value was 6.8% done 10/15/2024.   - was on ISS in hospital   - currently diet controlled   - Accu checks in rehab   - weekly labs in rehab      LUE DVT, acute   - likely PICC line provoked per hospital records  - initially treated with IV heparin   - continue eliquis 5 mg bid   - monitor      KIEL on CKD III   - Baseline creat 1.4 -1.6   - BUN/Creat improved in hospital   - weekly labs in rehab     HLD   - continue atorvastatin 20 mg daily      Bowel regimen  - start colace 100 mg bid   - start miralax 17 g daily   - Monitor BM status      Pain Management  - Offer to pre-medicate 30-60 min prior to therapy  - Physiatry evaluation with management appreciated  - Acetaminophen 500 mg every 24 hrs prn      Vitamins/supplements as r/t deficiencies  - [facility] RD to follow while in rehab; supplementation/diet as per [facility] RD  - May continue home supplements  - continue vit d 3000 units daily   - continue co q 10 daily   - continue lily oral capsule      LABS  - CBC/CMP weekly while in [facility]    FOLLOW UP APPOINTMENTS  Future Appointments   Date Time Provider Department Center   1/6/2025  2:20 PM Osmel Mary MD [id number]   1/13/2025 11:00 AM Zulema Holland PA [facility] None   1/27/2025 10:45 AM Fredrick Cornelius MD [facility]      35 minutes spent w/ patient and staff, including but not limited to/ reviewing present status, needs, abilities with disciplines, reviewing medical records, vital signs, labs, completing medication reconciliation and entering orders for continued care in [facility]     Note to patient: The 21st Century Cures Act makes medical notes like these available to patients in the interest of transparency. However, this is a medical document intended as peer to peer communication. It is written in medical language and may contain abbreviations or verbiage that are unfamiliar. It may appear blunt  or direct. Medical documents are intended to carry relevant information, facts as evident, and the clinical opinion of the practitioner who signs the document.     Renetta Perkins, APRN   12/19/24         [1]   Allergies  Allergen Reactions    Adhesive Tape RASH

## 2024-12-19 NOTE — TELEPHONE ENCOUNTER
Jennie Diaz calling to schedule an office visit for stitches removal, had surgery on 12/6 with Dr Ashraf .Please advise           Please call regina Patel   [phone]

## 2024-12-23 ENCOUNTER — TELEPHONE (OUTPATIENT)
Dept: SURGERY | Facility: CLINIC | Age: 89
End: 2024-12-23

## 2024-12-23 NOTE — TELEPHONE ENCOUNTER
Please contact patients' son Haja at [phone] They are in need of being seen as soon as possible to remove staples. Facility was suppose to call to make a appointment but didn't. She is already 3 weeks out from surgery. Please contact patient's son.

## 2024-12-26 ENCOUNTER — NURSE ONLY (OUTPATIENT)
Dept: SURGERY | Facility: CLINIC | Age: 89
End: 2024-12-26
Payer: MEDICARE

## 2024-12-26 ENCOUNTER — SNF VISIT (OUTPATIENT)
Dept: INTERNAL MEDICINE CLINIC | Facility: SKILLED NURSING FACILITY | Age: 89
End: 2024-12-26

## 2024-12-26 VITALS
BODY MASS INDEX: 27 KG/M2 | SYSTOLIC BLOOD PRESSURE: 132 MMHG | DIASTOLIC BLOOD PRESSURE: 68 MMHG | WEIGHT: 141 LBS | HEART RATE: 72 BPM

## 2024-12-26 VITALS
SYSTOLIC BLOOD PRESSURE: 137 MMHG | DIASTOLIC BLOOD PRESSURE: 72 MMHG | TEMPERATURE: 98 F | HEART RATE: 75 BPM | RESPIRATION RATE: 18 BRPM | OXYGEN SATURATION: 96 %

## 2024-12-26 DIAGNOSIS — E11.21 CONTROLLED TYPE 2 DIABETES MELLITUS WITH DIABETIC NEPHROPATHY, WITHOUT LONG-TERM CURRENT USE OF INSULIN (HCC): ICD-10-CM

## 2024-12-26 DIAGNOSIS — E78.00 HYPERCHOLESTEROLEMIA: ICD-10-CM

## 2024-12-26 DIAGNOSIS — I82.623 DEEP VEIN THROMBOSIS (DVT) OF BOTH UPPER EXTREMITIES, UNSPECIFIED CHRONICITY, UNSPECIFIED VEIN (HCC): ICD-10-CM

## 2024-12-26 DIAGNOSIS — Z85.3 HISTORY OF RIGHT BREAST CANCER: ICD-10-CM

## 2024-12-26 DIAGNOSIS — S72.431D CLOSED BICONDYLAR FRACTURE OF RIGHT FEMUR WITH ROUTINE HEALING, SUBSEQUENT ENCOUNTER: ICD-10-CM

## 2024-12-26 DIAGNOSIS — I48.91 ATRIAL FIBRILLATION, NEW ONSET (HCC): ICD-10-CM

## 2024-12-26 DIAGNOSIS — S72.421D CLOSED BICONDYLAR FRACTURE OF RIGHT FEMUR WITH ROUTINE HEALING, SUBSEQUENT ENCOUNTER: ICD-10-CM

## 2024-12-26 DIAGNOSIS — S72.402D CLOSED FRACTURE OF DISTAL END OF LEFT FEMUR WITH ROUTINE HEALING, UNSPECIFIED FRACTURE MORPHOLOGY, SUBSEQUENT ENCOUNTER: ICD-10-CM

## 2024-12-26 DIAGNOSIS — E11.22 CONTROLLED TYPE 2 DIABETES MELLITUS WITH STAGE 3 CHRONIC KIDNEY DISEASE, WITHOUT LONG-TERM CURRENT USE OF INSULIN (HCC): ICD-10-CM

## 2024-12-26 DIAGNOSIS — I50.9 ACUTE ON CHRONIC CONGESTIVE HEART FAILURE, UNSPECIFIED HEART FAILURE TYPE (HCC): Primary | ICD-10-CM

## 2024-12-26 DIAGNOSIS — G45.9 TIA (TRANSIENT ISCHEMIC ATTACK): ICD-10-CM

## 2024-12-26 DIAGNOSIS — N18.30 CONTROLLED TYPE 2 DIABETES MELLITUS WITH STAGE 3 CHRONIC KIDNEY DISEASE, WITHOUT LONG-TERM CURRENT USE OF INSULIN (HCC): ICD-10-CM

## 2024-12-26 DIAGNOSIS — I10 HYPERTENSION, UNSPECIFIED TYPE: ICD-10-CM

## 2024-12-26 DIAGNOSIS — I48.91 ATRIAL FIBRILLATION WITH RAPID VENTRICULAR RESPONSE (HCC): ICD-10-CM

## 2024-12-26 DIAGNOSIS — Z48.89 ENCOUNTER FOR POSTOPERATIVE CARE: Primary | ICD-10-CM

## 2024-12-26 PROCEDURE — 99309 SBSQ NF CARE MODERATE MDM 30: CPT

## 2024-12-26 PROCEDURE — 99024 POSTOP FOLLOW-UP VISIT: CPT

## 2024-12-26 NOTE — PROGRESS NOTES
Follow Up Visit Note       Active Problems      No diagnosis found.      Chief Complaint   Chief Complaint   Patient presents with    Post-Op     S/P exp lap .  Patient states no pain, bowels are normal, appetite is fair.  Here for staple removal.         History of Present Illness  Isabel is a pleasant 89 year old year old patient presenting for post op appointment. Her son is present at the appointment. She generally feels well, she denies abdominal pain. She is tolerating a soft diet without diarrhea or constipation. She has a bowel movement every other day usually, sometimes every 3 days. Takes a daily bowel regimen. She denies fever, chills, SOB, chest pain, leg swelling or calf tenderness. She is learning to walk again at the [facility] facility.       Allergies  Isabel is allergic to adhesive tape.    Past Medical / Surgical / Social / Family History    The past medical and past surgical history have been reviewed by me today.    Past Medical History:    Acute, but ill-defined, cerebrovascular disease    Arthritis    Breast CA (HCC)    Cancer (HCC)    Diabetes (HCC)    diet controlled    Diabetes mellitus, type II (HCC)    Essential hypertension    Hearing impairment    High blood pressure    High cholesterol    Hyperlipidemia    Osteoarthritis    Squamous cell carcinoma, keratinizing    R posterior thigh     Past Surgical History:   Procedure Laterality Date    Appendectomy      Appendectomy      Cataract  -    Cataract extraction w/  intraocular lens implant Bilateral     Ian Garcia MD    Chemotherapy  2016    rt breast.    Cholecystectomy      D & c      Knee replacement surgery Right     Lumpectomy right  2016    cancer    Mastectomy partial Right 2016      [id number]    Skin surgery Right 2018    Tonsillectomy      Wrist fracture surgery Right        The family history and social history have been reviewed by me today.    Family History   Problem  Relation Age of Onset    Heart Disease Father         CAD    Diabetes Father     Heart Disease Mother         CAD    Diabetes Mother     Breast Cancer Mother 83        had lumpectomy and RT.  No other treatment.   of stroke at 89    Other (appendicitis[other]) Brother         age 13    Other (alive and well[other]) Sister     Other (alive and well[other]) Son         x3    Breast Cancer Self 85    Glaucoma Neg     Macular degeneration Neg      Social History     Socioeconomic History    Marital status:     Number of children: 3   Occupational History    Occupation:      Comment: retired   Tobacco Use    Smoking status: Never     Passive exposure: Never    Smokeless tobacco: Never   Vaping Use    Vaping status: Never Used   Substance and Sexual Activity    Alcohol use: Not Currently    Drug use: Never    Sexual activity: Not Currently   Other Topics Concern     Service No    Caffeine Concern Yes     Comment: coffee, 1 cups daily    Occupational Exposure No        Current Outpatient Medications:     metoprolol succinate  MG Oral Tablet 24 Hr, Take 1 tablet (100 mg total) by mouth 2x Daily(Beta Blocker)., Disp: 60 tablet, Rfl: 0    digoxin 0.125 MG Oral Tab, Take 1 tablet (125 mcg total) by mouth daily., Disp: 30 tablet, Rfl: 0    Ergocalciferol (VITAMIN D OR), Take 1-4 drops by mouth daily. Pt takes Vitamin D oral drops, Disp: , Rfl:     Shilpa, Zingiber officinalis, (SHILPA OR), Take 1 capsule by mouth daily. Pt takes Shilpa with [place] once daily for pain, Disp: , Rfl:     Glucose Blood (ACCU-CHEK GUIDE) In Vitro Strip, 1 strip by In Vitro route daily., Disp: 100 strip, Rfl: 3    Accu-Chek Softclix Lancets Does not apply Misc, 1 Lancet by Finger stick route daily. For blood glucose monitoring., Disp: 100 each, Rfl: 3    losartan 50 MG Oral Tab, Take 1 tablet (50 mg total) by mouth daily., Disp: 90 tablet, Rfl: 3    furosemide 20 MG Oral Tab, Take 1 tablet (20 mg total) by  mouth daily., Disp: 30 tablet, Rfl: 0    APIXABAN 5 MG Oral Tab, Take 1 tablet (5 mg total) by mouth 2 (two) times daily., Disp: 60 tablet, Rfl: 1    atorvastatin 20 MG Oral Tab, Take 1 tablet (20 mg total) by mouth daily., Disp: 90 tablet, Rfl: 1    Blood Glucose Calibration (ACCU-CHEK GUIDE CONTROL) In Vitro Liquid, 1 each by In Vitro route every 30 (thirty) days., Disp: 1 each, Rfl: 11    Blood Glucose Monitoring Suppl (ACCU-CHEK GUIDE ME) w/Device Does not apply Kit, 1 each daily. Check once a day., Disp: 1 kit, Rfl: 0    acetaminophen 500 MG Oral Tab, Take 1 tablet (500 mg total) by mouth daily as needed for Pain., Disp: , Rfl:     Coenzyme Q10 (CO Q 10) 10 MG Oral Cap, Take 1 capsule by mouth daily., Disp: , Rfl:      Review of Systems  The Review of Systems has been reviewed by me during today.  Review of Systems   Constitutional:  Negative for chills, fatigue and fever.   Respiratory:  Negative for shortness of breath.    Cardiovascular:  Negative for chest pain and leg swelling.   Gastrointestinal:  Negative for abdominal pain, constipation, diarrhea, nausea and vomiting.        Physical Findings   /68 (BP Location: Right leg, Patient Position: Prone, Cuff Size: large)   Pulse 72   Wt 141 lb (64 kg)   BMI 26.64 kg/m²   Physical Exam  Constitutional:       General: She is not in acute distress.     Appearance: She is not ill-appearing.   HENT:      Head: Normocephalic and atraumatic.   Eyes:      Extraocular Movements: Extraocular movements intact.      Conjunctiva/sclera: Conjunctivae normal.      Pupils: Pupils are equal, round, and reactive to light.   Abdominal:      General: Abdomen is flat. There is no distension.      Palpations: Abdomen is soft.      Tenderness: There is no abdominal tenderness.   Skin:     General: Skin is warm.      Comments: incision C/D/I. Staples removed.    Neurological:      Mental Status: She is alert.          Assessment   No diagnosis found.  Pathology reviewed  with the patient    Plan   Continue good hygiene of incision site, gently clean site with warm water and mild soap. Keep clean and dry.  OK to make gradual return to general diet  Follow up as needed       No orders of the defined types were placed in this encounter.      Imaging & Referrals   None    Follow Up  No follow-ups on file.    CHRISTIAN Grijalva

## 2024-12-26 NOTE — PROGRESS NOTES
Isabel Patel  : 1931  Age 89 year old  female patient is admitted to [facility] for rehabilitation and strengthening.       [facility] Admission 24 - 24     Reason for visit: Follow up on small bowel volvulus, s/p exploratory laparotomy 24     HPI  Pt is a 89 y/o  female with pmh Paroxysmal atrial fibrillation with sick sinus syndrome, status post dual chamber pacemaker, diabetes mellitus type 2, esrd, chf was brought to [facility] ED with progressive abdominal pain, n/v for the last 2-3 days. In ED CBC was unremarkable. CT abdomen revealed small bowel obstruction, high grade, with transition point at the central abdomen. Pt also noted to be in rapid afib. Was started on IV Cardizem, lasix for pulmonary edema, IV zosyn and had NG tube inserted. Pt was admitted with cardiology and general surgery on consult. Pt is s/p exploratory laparotomy 24 with Dr. Anna Ashraf, there was evidence of prior volvulus, but no bowel resection was performed. Was successfully extubated the next day and required supplemental O2 post op due to mild pulmonary edema and basilar atelectasis. Course complicated by bilateral upper extremity DVTs secondary to PICC line and subclavian pacemaker leads. She was started on a heparin drip, later transitioned to oral Eliquis. Pt stabilized and worked in PT who recommended [facility] at discharge. After stabilization and treatment, pt was discharged to [facility] for rehab and strengthening.     Pt seen and examined in follow up. Pt is sitting up in wheelchair, is accompanied by her son and is about to start a PT session. Pt has apparently developed a fear of standing up and falling and has not not stood up or walked in the last 2-3 days. Pt is finally agreeable to make her first attempt in standing up in the last 3 days and try to overcome that fear. She states she didn't sleep well last night and feels a little tired today d/t poor sleep quality. She denies having  chest pain or sob. States appetite's been great. On and off constipation but no n/v or abdominal pain. She has a surgery post op follow up appt scheduled for later today. Son expresses his concerns about patient's blood pressure readings being high when the patient is sitting in the wheelchair and is checked on patient's lower leg. Patient's blood pressure, however, does normalize and go back to a normal reading when she's laying in bed. Pt denies associated dizziness or headaches. She's also been afebrile. No other issues/concerns. Vss     ALLERGIES:  Allergies[1]    IMMUNIZATIONS  Immunization History   Administered Date(s) Administered    FLU VAC High Dose 65 YRS & Older PRSV Free (19142) 08/23/2018    FLU VAC QIV SPLIT 3 YRS AND OLDER (75334) 09/25/2014    FLUAD High Dose 65 yr and older (37435) 08/24/2018    High Dose Fluzone Influenza Vaccine, 65yr+ PF 0.5mL (35921) 10/06/2015, 11/03/2016, 11/01/2017    Influenza 11/29/2007, 10/21/2010, 09/16/2011, 10/15/2013    Influenza Vaccine, trivalent (IIV3), 0.5mL IM 11/20/2008, 09/17/2009    Pneumococcal (Prevnar 13) 03/01/2015    Pneumovax 23 10/13/2004        CODE STATUS:  Full Code    ADVANCED CARE PLANNING TEAM: Will need family care plan    CURRENT MEDICATIONS - reviewed and updated on [facility] EMR     SUBJECTIVE    Pt seen and examined in follow up. Pt is sitting up in wheelchair, is accompanied by her son and is about to start a PT session. Pt has apparently developed a fear of standing up and falling and has not not stood up or walked in the last 2-3 days. Pt is finally agreeable to make her first attempt in standing up in the last 3 days and try to overcome that fear. She states she didn't sleep well last night and feels a little tired today d/t poor sleep quality. She denies having chest pain or sob. States appetite's been great. On and off constipation but no n/v or abdominal pain. She has a surgery post op follow up appt scheduled for later today. Son expresses  his concerns about patient's blood pressure readings being high when the patient is sitting in the wheelchair and is checked on patient's lower leg. Patient's blood pressure, however, does normalize and go back to a normal reading when she's laying in bed. Pt denies associated dizziness or headaches. She's also been afebrile. No other issues/concerns. Vss     PHYSICAL EXAM:  Vitals:    12/26/24 1301   BP: 137/72   Pulse: 75   Resp: 18   Temp: 97.8 °F (36.6 °C)   SpO2: 96%      GENERAL HEALTH:well developed, well nourished, in no apparent distress   LINES, TUBES, DRAINS:  none  SKIN: Surgial area corwin, cdi, staples intact   WOUND: see wound assessment,   EYES: PERRLA, EOMI, sclera anicteric, conjunctiva normal; there is no nystagmus, no drainage from eyes  HENT: normocephalic; normal nose, no nasal drainage, mucous membranes pink, moist, pharynx no exudate, no visible cerumen.   NECK:supple, FROM; no JVD, no TMG, no carotid bruits   BREAST: deferred exam   RESPIRATORY:clear to percussion and auscultation  CARDIOVASCULAR: S1, S2 normal, RRR; no S3, no S4  ABDOMEN:  normal active BS+, soft, nondistended; no organomegaly, no masses; no bruits; nontender, no guarding, no rebound tenderness. Surgial area corwin, cdi, staples intact   :no suprapubic distension  LYMPHATIC:no lymphedema  MUSCULOSKELETAL: no acute synovitis upper or lower extremity   EXTREMITIES/VASCULAR:no cyanosis, clubbing or edema  NEUROLOGIC: Forgetful   PSYCHIATRIC: AO x 2-3. Forgetful, pleasant and cooperative    DIAGNOSTICS REVIEWED AT THIS VISIT:  Labs 12/17/24: wbc 8.40, hgb 12.0, plt 349, gluc 87, na 138, k 4.7, bun 37, creat 1.13     SEE PLAN BELOW    Small bowel volvulus   - hx of prior abdominal surgeries   - S/p exploratory laparotomy 12/6/24 with Dr. Anna Ashraf, there was evidence of prior volvulus, but no bowel resection was performed. Was successfully extubated the next day and required supplemental O2 post op due to mild pulmonary edema and  basilar atelectasis.   - Surgical area CDI, Staples in place and intact   - Soft diet per surgery. Was on TPN in hospital    - f/u with surgery today 12/26/24  - weekly labs in rehab  - monitor      Acute hypoxic respiratory failure   Post op respiratory failure  - due to pulmonary edema and basilar atelectasis   - Pulmonary followed in hospital   - pt currently on 0.5 L of O2, wean as tolerated   - Encourage IS  - continue oral lasix  20 mg daily   - pulm following in rehab   - monitor      Permanent A. fib  Acute on chronic HFrEF, new  SSS s/p PPM   Mild aortic stenosis  HTN   - Cards consulted in hospital   - ECHO LVEF 25-30% (new compared to Feb 24' EF 60-65%)   - Likely takotsubo variant   - Was on IV diltiazem, heparin in hospital   - continue Eliquis 5 mg bid   - continue digoxin 125 mcg daily   - continue metoprolol succinate 100 mg bid   - continue losartan potassium 50 mg daily   - continue lasix 20 mg daily   - cardiology consult in rehab   - monitor      Physical Deconditioning/Impaired mobility and ADLs/At risk for falling  - Fall Precautions  - PT/OT/ST to evaluate and treat  - [facility] team to establish care plan meeting with patient and POA/family as appropriate  - Anticipate DC on or before 12/31/24 home with hh; SW to assist patient/family w/ DC planning  - DC Plan: 12/31/24 home with hh     DM II   - Last A1c value was 6.8% done 10/15/2024.   - was on ISS in hospital   - currently diet controlled   - Accu checks in rehab   - weekly labs in rehab      LUE DVT, acute   - likely PICC line provoked per hospital records  - initially treated with IV heparin   - continue eliquis 5 mg bid   - monitor      KIEL on CKD III   - Baseline creat 1.4 -1.6   - BUN/Creat improved in hospital   - weekly labs in rehab     HLD   - continue atorvastatin 20 mg daily      Bowel regimen  - start colace 100 mg bid   - start miralax 17 g daily   - Monitor BM status      Pain Management  - Offer to pre-medicate 30-60 min prior to  therapy  - Physiatry evaluation with management appreciated  - Acetaminophen 500 mg every 24 hrs prn      Vitamins/supplements as r/t deficiencies  - [facility] RD to follow while in rehab; supplementation/diet as per [facility] RD  - May continue home supplements  - continue vit d 3000 units daily   - continue co q 10 daily   - continue lily oral capsule      LABS  - CBC/CMP weekly while in [facility]    FOLLOW UP APPOINTMENTS  Future Appointments   Date Time Provider Department Center   12/26/2024  3:30 PM [facility] SURG PA [id number]  [facility]   1/6/2025  2:20 PM Osmel Mayr MD [id number]   1/13/2025 11:00 AM Zulema Holland PA [facility] None   1/27/2025 10:45 AM Fredrick Cornelius MD [facility]      35 minutes spent w/ patient and staff, including but not limited to/ reviewing present status, needs, abilities with disciplines, reviewing medical records, vital signs, labs, completing medication reconciliation and entering orders for continued care in [facility]     Note to patient: The 21st Century Cures Act makes medical notes like these available to patients in the interest of transparency. However, this is a medical document intended as peer to peer communication. It is written in medical language and may contain abbreviations or verbiage that are unfamiliar. It may appear blunt or direct. Medical documents are intended to carry relevant information, facts as evident, and the clinical opinion of the practitioner who signs the document.     Renetta Perkins, APRN   12/26/24         [1]   Allergies  Allergen Reactions    Adhesive Tape RASH

## 2025-01-01 ENCOUNTER — SNF VISIT (OUTPATIENT)
Dept: INTERNAL MEDICINE CLINIC | Facility: SKILLED NURSING FACILITY | Age: OVER 89
End: 2025-01-01

## 2025-01-01 DIAGNOSIS — Z79.01 ANTICOAGULATED: ICD-10-CM

## 2025-01-01 DIAGNOSIS — I50.9 ACUTE ON CHRONIC CONGESTIVE HEART FAILURE, UNSPECIFIED HEART FAILURE TYPE (HCC): ICD-10-CM

## 2025-01-01 DIAGNOSIS — S72.402D CLOSED FRACTURE OF DISTAL END OF LEFT FEMUR WITH ROUTINE HEALING, UNSPECIFIED FRACTURE MORPHOLOGY, SUBSEQUENT ENCOUNTER: ICD-10-CM

## 2025-01-01 DIAGNOSIS — Z98.890 S/P EXPLORATORY LAPAROTOMY: ICD-10-CM

## 2025-01-01 DIAGNOSIS — K56.2 SMALL BOWEL VOLVULUS (HCC): ICD-10-CM

## 2025-01-01 DIAGNOSIS — F51.01 PRIMARY INSOMNIA: ICD-10-CM

## 2025-01-01 DIAGNOSIS — I48.91 ATRIAL FIBRILLATION WITH RAPID VENTRICULAR RESPONSE (HCC): ICD-10-CM

## 2025-01-01 DIAGNOSIS — R41.0 CONFUSION: Primary | ICD-10-CM

## 2025-01-01 DIAGNOSIS — E11.21 CONTROLLED TYPE 2 DIABETES MELLITUS WITH DIABETIC NEPHROPATHY, WITHOUT LONG-TERM CURRENT USE OF INSULIN (HCC): ICD-10-CM

## 2025-01-01 DIAGNOSIS — I10 HYPERTENSION, UNSPECIFIED TYPE: ICD-10-CM

## 2025-01-02 NOTE — PROGRESS NOTES
Isabel Patel  : 1931  Age 89 year old  female patient is admitted to [facility] for rehabilitation and strengthening.       [facility] Admission 24 - 24     Reason for visit: Follow up on small bowel volvulus, s/p exploratory laparotomy 24     HPI  Pt is a 89 y/o  female with pmh Paroxysmal atrial fibrillation with sick sinus syndrome, status post dual chamber pacemaker, diabetes mellitus type 2, esrd, chf was brought to [place] ED with progressive abdominal pain, n/v for the last 2-3 days. In ED CBC was unremarkable. CT abdomen revealed small bowel obstruction, high grade, with transition point at the central abdomen. Pt also noted to be in rapid afib. Was started on IV Cardizem, lasix for pulmonary edema, IV zosyn and had NG tube inserted. Pt was admitted with cardiology and general surgery on consult. Pt is s/p exploratory laparotomy 24 with Dr. Anna Ashraf, there was evidence of prior volvulus, but no bowel resection was performed. Was successfully extubated the next day and required supplemental O2 post op due to mild pulmonary edema and basilar atelectasis. Course complicated by bilateral upper extremity DVTs secondary to PICC line and subclavian pacemaker leads. She was started on a heparin drip, later transitioned to oral Eliquis. Pt stabilized and worked in PT who recommended [facility] at discharge. After stabilization and treatment, pt was discharged to [facility] for rehab and strengthening    Pt seen and examined in follow up. Pt is resting in bed and is accompanied by her son. Son reports pt is still very fearful when it comes to standing and walking in PT. He's also stating, mom appears to be more constipated, had BM today but it was hard for her to push. Mom is also appearing to be more confused. She has hx of frequent UTIs, but no dysuria or blood in the urine at the moment. He states the last time her urine was checked was a few weeks ago and her urine culture  grew bacteria but with a less than a 100,000 count and did not receive antibiotic treatment per his request and advice from urology as she did not have any other symptoms of UTI. He would want to be notified right away if our primary team decides it would be best to treat her for UTI with abx if her urine comes back positive again. Her KUB order was canceled for unknown reasons and son is asking if it could be reordered. Pt otherwise is afebrile, but progress in PT is slow. Staples to the abdomen are removed, area cdi. No other issues/concerns. Vss     ALLERGIES:  Allergies[1]    IMMUNIZATIONS  Immunization History   Administered Date(s) Administered    FLU VAC High Dose 65 YRS & Older PRSV Free (89693) 08/23/2018    FLU VAC QIV SPLIT 3 YRS AND OLDER (54442) 09/25/2014    FLUAD High Dose 65 yr and older (47556) 08/24/2018    High Dose Fluzone Influenza Vaccine, 65yr+ PF 0.5mL (47912) 10/06/2015, 11/03/2016, 11/01/2017    Influenza 11/29/2007, 10/21/2010, 09/16/2011, 10/15/2013    Influenza Vaccine, trivalent (IIV3), 0.5mL IM 11/20/2008, 09/17/2009    Pneumococcal (Prevnar 13) 03/01/2015    Pneumovax 23 10/13/2004        CODE STATUS:  Full Code    ADVANCED CARE PLANNING TEAM: Will need family care plan    CURRENT MEDICATIONS - reviewed and updated on SNF EMR     SUBJECTIVE    Pt seen and examined in follow up. Pt is resting in bed and is accompanied by her son. Son reports pt is still very fearful when it comes to standing and walking in PT. He's also stating, mom appears to be more constipated, had BM today but it was hard for her to push. Mom is also appearing to be more confused. She has hx of frequent UTIs, but no dysuria or blood in the urine at the moment. He states the last time her urine was checked was a few weeks ago and her urine culture grew bacteria but with a less than a 100,000 count and did not receive antibiotic treatment per his request and advice from urology as she did not have any other symptoms  of UTI. He would want to be notified right away if our primary team decides it would be best to treat her for UTI with abx if her urine comes back positive again. Her KUB order was canceled for unknown reasons and son is asking if it could be reordered. Pt otherwise is afebrile, but progress in PT is slow. Staples to the abdomen are removed, area cdi. No other issues/concerns. Vss     PHYSICAL EXAM:  GENERAL HEALTH:well developed, well nourished, in no apparent distress   LINES, TUBES, DRAINS:  none  SKIN: Surgial area corwin, cdi, staples intact   WOUND: see wound assessment,   EYES: PERRLA, EOMI, sclera anicteric, conjunctiva normal; there is no nystagmus, no drainage from eyes  HENT: normocephalic; normal nose, no nasal drainage, mucous membranes pink, moist, pharynx no exudate, no visible cerumen.   NECK:supple, FROM; no JVD, no TMG, no carotid bruits   BREAST: deferred exam   RESPIRATORY:clear to percussion and auscultation  CARDIOVASCULAR: S1, S2 normal, RRR; no S3, no S4  ABDOMEN:  normal active BS+, soft, nondistended; no organomegaly, no masses; no bruits; nontender, no guarding, no rebound tenderness. Surgial area corwin, cdi, staples intact   :no suprapubic distension  LYMPHATIC:no lymphedema  MUSCULOSKELETAL: no acute synovitis upper or lower extremity   EXTREMITIES/VASCULAR:no cyanosis, clubbing or edema  NEUROLOGIC: Forgetful   PSYCHIATRIC: AO x 2-3. Forgetful, pleasant and cooperative    DIAGNOSTICS REVIEWED AT THIS VISIT:  Labs 12/17/24: wbc 8.40, hgb 12.0, plt 349, gluc 87, na 138, k 4.7, bun 37, creat 1.13   Labs 12/30/24: wbc 9.74, hgb 151, plt 363, gluc 149, na 141, k 5.0, bun 35, creat 1.30    SEE PLAN BELOW    Worsening confusion   Slow progress in PT   - check UA with C/S  - continue weekly labs   - monitor     Insomnia  Behavioral problems at night, worsening confusion   - psych consulted   - started on remeron 3.5, but has not been taking it as son refuses     Small bowel volvulus   - hx of  prior abdominal surgeries   - S/p exploratory laparotomy 12/6/24 with Dr. Anna Ashraf, there was evidence of prior volvulus, but no bowel resection was performed. Was successfully extubated the next day and required supplemental O2 post op due to mild pulmonary edema and basilar atelectasis.   - Surgical area CDI, Staples in place and intact   - Soft diet per surgery. Was on TPN in hospital    - f/u with surgery 12/26/24, staples removed   - pt is on general diet, son is requesting soft diet for pt d/t constipation   - weekly labs in rehab  - monitor      Acute hypoxic respiratory failure   Post op respiratory failure  - due to pulmonary edema and basilar atelectasis   - Pulmonary followed in hospital   - pt currently on 0.5 L of O2, wean as tolerated   - Encourage IS  - continue oral lasix  20 mg daily   - pulm following in rehab   - monitor      Permanent A. fib  Acute on chronic HFrEF, new  SSS s/p PPM   Mild aortic stenosis  HTN   - Cards consulted in hospital   - ECHO LVEF 25-30% (new compared to Feb 24' EF 60-65%)   - Likely takotsubo variant   - Was on IV diltiazem, heparin in hospital   - continue Eliquis 5 mg bid   - continue digoxin 125 mcg daily   - continue metoprolol succinate 100 mg bid   - continue losartan potassium 50 mg daily   - continue lasix 20 mg daily   - cardiology consult in rehab   - monitor      Physical Deconditioning/Impaired mobility and ADLs/At risk for falling  - Fall Precautions  - PT/OT/ST to evaluate and treat  - LYNN team to establish care plan meeting with patient and POA/family as appropriate  - Anticipate DC on or before 12/31/24 home with hh; SW to assist patient/family w/ DC planning  - DC Plan: 12/31/24 home with      DM II   - Last A1c value was 6.8% done 10/15/2024.   - was on ISS in hospital   - currently diet controlled   - Accu checks in rehab   - weekly labs in rehab      LUE DVT, acute   - likely PICC line provoked per hospital records  - initially treated with IV  heparin   - continue eliquis 5 mg bid   - monitor      KIEL on CKD III   - Baseline creat 1.4 -1.6   - BUN/Creat improved in hospital   - weekly labs in rehab     HLD   - continue atorvastatin 20 mg daily      Bowel regimen  - DC colace 100 mg bid and start senokot 2 tabs daily   - start miralax 17 g daily   - Monitor BM status      Pain Management  - Offer to pre-medicate 30-60 min prior to therapy  - Physiatry evaluation with management appreciated  - Acetaminophen 500 mg every 24 hrs prn      Vitamins/supplements as r/t deficiencies  - [facility] RD to follow while in rehab; supplementation/diet as per [facility]  - May continue home supplements  - continue vit d 3000 units daily   - continue co q 10 daily   - continue lily oral capsule      LABS  - CBC/CMP weekly while in [facility]    FOLLOW UP APPOINTMENTS  Future Appointments   Date Time Provider Department Center   1/6/2025  2:20 PM Osmel Mary MD [id number]   1/13/2025 11:00 AM Zulema Hollnad PA [facility] None   1/27/2025 10:45 AM Fredrick Cornelius MD [facility]      35 minutes spent w/ patient and staff, including but not limited to/ reviewing present status, needs, abilities with disciplines, reviewing medical records, vital signs, labs, completing medication reconciliation and entering orders for continued care in [facility]     Note to patient: The 21st Century Cures Act makes medical notes like these available to patients in the interest of transparency. However, this is a medical document intended as peer to peer communication. It is written in medical language and may contain abbreviations or verbiage that are unfamiliar. It may appear blunt or direct. Medical documents are intended to carry relevant information, facts as evident, and the clinical opinion of the practitioner who signs the document.     Renetta Perkins, APRN   01/02/25           [1]   Allergies  Allergen Reactions    Adhesive Tape RASH

## 2025-01-06 ENCOUNTER — SNF VISIT (OUTPATIENT)
Dept: INTERNAL MEDICINE CLINIC | Facility: SKILLED NURSING FACILITY | Age: OVER 89
End: 2025-01-06

## 2025-01-06 VITALS
SYSTOLIC BLOOD PRESSURE: 137 MMHG | DIASTOLIC BLOOD PRESSURE: 79 MMHG | RESPIRATION RATE: 18 BRPM | TEMPERATURE: 97 F | HEART RATE: 64 BPM | OXYGEN SATURATION: 97 %

## 2025-01-06 DIAGNOSIS — G47.00 INSOMNIA, UNSPECIFIED TYPE: ICD-10-CM

## 2025-01-06 DIAGNOSIS — R82.90 ABNORMAL URINALYSIS: ICD-10-CM

## 2025-01-06 DIAGNOSIS — R53.81 PHYSICAL DECONDITIONING: ICD-10-CM

## 2025-01-06 DIAGNOSIS — N18.30 CONTROLLED TYPE 2 DIABETES MELLITUS WITH STAGE 3 CHRONIC KIDNEY DISEASE, WITHOUT LONG-TERM CURRENT USE OF INSULIN (HCC): ICD-10-CM

## 2025-01-06 DIAGNOSIS — Z09 STATUS POST ABDOMINAL SURGERY, FOLLOW-UP EXAM: ICD-10-CM

## 2025-01-06 DIAGNOSIS — I10 HYPERTENSION, UNSPECIFIED TYPE: ICD-10-CM

## 2025-01-06 DIAGNOSIS — E11.22 CONTROLLED TYPE 2 DIABETES MELLITUS WITH STAGE 3 CHRONIC KIDNEY DISEASE, WITHOUT LONG-TERM CURRENT USE OF INSULIN (HCC): ICD-10-CM

## 2025-01-06 DIAGNOSIS — I48.91 ATRIAL FIBRILLATION, NEW ONSET (HCC): ICD-10-CM

## 2025-01-06 DIAGNOSIS — I48.91 ATRIAL FIBRILLATION WITH RAPID VENTRICULAR RESPONSE (HCC): ICD-10-CM

## 2025-01-06 DIAGNOSIS — I50.9 ACUTE ON CHRONIC CONGESTIVE HEART FAILURE, UNSPECIFIED HEART FAILURE TYPE (HCC): Primary | ICD-10-CM

## 2025-01-06 DIAGNOSIS — K56.601 COMPLETE INTESTINAL OBSTRUCTION, UNSPECIFIED CAUSE (HCC): ICD-10-CM

## 2025-01-06 NOTE — PROGRESS NOTES
Isabel Patel  : 1931  Age 89 year old  female patient is admitted to [facility] for rehabilitation and strengthening.       [facility] Admission 24 - 24     Reason for visit: Follow up on small bowel volvulus, s/p exploratory laparotomy 24     HPI  Pt is a 89 y/o  female with pmh Paroxysmal atrial fibrillation with sick sinus syndrome, status post dual chamber pacemaker, diabetes mellitus type 2, esrd, chf was brought to [place] ED with progressive abdominal pain, n/v for the last 2-3 days. In ED CBC was unremarkable. CT abdomen revealed small bowel obstruction, high grade, with transition point at the central abdomen. Pt also noted to be in rapid afib. Was started on IV Cardizem, lasix for pulmonary edema, IV zosyn and had NG tube inserted. Pt was admitted with cardiology and general surgery on consult. Pt is s/p exploratory laparotomy 24 with Dr. Anna Ashraf, there was evidence of prior volvulus, but no bowel resection was performed. Was successfully extubated the next day and required supplemental O2 post op due to mild pulmonary edema and basilar atelectasis. Course complicated by bilateral upper extremity DVTs secondary to PICC line and subclavian pacemaker leads. She was started on a heparin drip, later transitioned to oral Eliquis. Pt stabilized and worked in PT who recommended [facility] at discharge. After stabilization and treatment, pt was discharged to [facility] for rehab and strengthening    Pt seen and examined in follow up. Pt is resting in bed, appears tired and does not want to be bothered. Surgical abdominal incision is HUDSON, staples have been removed and area looks CDI, no redness or warmth. Son voices his concern over the fact that the patient is not progressing in PT. She's been reported to be weak and tired during the day and not sleeping well at night. Her final urine cultures should be back today, so far the preliminary findings grew 10-50,000 COL/ML Gram  Negative Bacilli, UA with positive nitrates, trace leukocytes, moderate blood, yellow and cloudy. Once the final culture results come back, son would like the results to be faxed to the patient's urologist to determine whether patient should be treated or not as she has a history of frequent UTIs. Pt also with on and off constipation. So far no nausea, vomiting or abdominal pain. Still with poor appetite. Pt was supposed to discharging on 01/08/25, but we're waiting on final decision from the appeal on extension. No other issues/concerns. Vss     ALLERGIES:  Allergies[1]    IMMUNIZATIONS  Immunization History   Administered Date(s) Administered    FLU VAC High Dose 65 YRS & Older PRSV Free (82436) 08/23/2018    FLU VAC QIV SPLIT 3 YRS AND OLDER (10511) 09/25/2014    FLUAD High Dose 65 yr and older (09842) 08/24/2018    High Dose Fluzone Influenza Vaccine, 65yr+ PF 0.5mL (41541) 10/06/2015, 11/03/2016, 11/01/2017    Influenza 11/29/2007, 10/21/2010, 09/16/2011, 10/15/2013    Influenza Vaccine, trivalent (IIV3), 0.5mL IM 11/20/2008, 09/17/2009    Pneumococcal (Prevnar 13) 03/01/2015    Pneumovax 23 10/13/2004        CODE STATUS:  Full Code    ADVANCED CARE PLANNING TEAM: Will need family care plan    CURRENT MEDICATIONS - reviewed and updated on SNF EMR     SUBJECTIVE    Pt seen and examined in follow up. Pt is resting in bed, appears tired and does not want to be bothered. Surgical abdominal incision is HUDSON, staples have been removed and area looks CDI, no redness or warmth. Son voices his concern over the fact that the patient is not progressing in PT. She's been reported to be weak and tired during the day and not sleeping well at night. Her final urine cultures should be back today, so far the preliminary findings grew 10-50,000 COL/ML Gram Negative Bacilli, UA with positive nitrates, trace leukocytes, moderate blood, yellow and cloudy. Once the final culture results come back, son would like the results to be  faxed to the patient's urologist to determine whether patient should be treated or not as she has a history of frequent UTIs. Pt also with on and off constipation. So far no nausea, vomiting or abdominal pain. Still with poor appetite. Pt was supposed to discharging on 01/08/25, but we're waiting on final decision from the appeal on extension. No other issues/concerns. Vss     PHYSICAL EXAM:  Vitals:    01/06/25 1430   BP: 137/79   Pulse: 64   Resp: 18   Temp: 97.3 °F (36.3 °C)   SpO2: 97%      GENERAL HEALTH:well developed, well nourished, in no apparent distress   LINES, TUBES, DRAINS:  none  SKIN: Surgial area corwin, cdi, staples intact   WOUND: see wound assessment,   EYES: PERRLA, EOMI, sclera anicteric, conjunctiva normal; there is no nystagmus, no drainage from eyes  HENT: normocephalic; normal nose, no nasal drainage, mucous membranes pink, moist, pharynx no exudate, no visible cerumen.   NECK:supple, FROM; no JVD, no TMG, no carotid bruits   BREAST: deferred exam   RESPIRATORY:clear to percussion and auscultation  CARDIOVASCULAR: S1, S2 normal, RRR; no S3, no S4  ABDOMEN:  normal active BS+, soft, nondistended; no organomegaly, no masses; no bruits; nontender, no guarding, no rebound tenderness. Surgial area corwin, cdi, staples intact   :no suprapubic distension  LYMPHATIC:no lymphedema  MUSCULOSKELETAL: no acute synovitis upper or lower extremity   EXTREMITIES/VASCULAR:no cyanosis, clubbing or edema  NEUROLOGIC: Forgetful   PSYCHIATRIC: AO x 2-3. Forgetful, pleasant and cooperative    DIAGNOSTICS REVIEWED AT THIS VISIT:  Labs 12/17/24: wbc 8.40, hgb 12.0, plt 349, gluc 87, na 138, k 4.7, bun 37, creat 1.13   Labs 12/30/24: wbc 9.74, hgb 151, plt 363, gluc 149, na 141, k 5.0, bun 35, creat 1.30  Labs 01/06/24: wbc 8.86, hgb 14.6, plt 253, gluc 96, na 144, k 4.2, bun 37, creat 1.32    UA with C/S collected on 01/02/24: Yellow, Cloudy, positive nitrates, moderate blood, trace leukocytes. Preliminary culture  grow showing 10-50,000 COL/ML Gram Negative Bacill    SEE PLAN BELOW    Worsening confusion   Slow progress in PT   - check UA with C/S  UA with C/S collected on 01/02/24: Yellow, Cloudy, positive nitrates, moderate blood, trace leukocytes. Preliminary culture grow showing 10-50,000 COL/ML Gram Negative Bacill  - Once final culture results come back, son wants results to be faxed to patient's urology to determine if treatment is necessary as pt has hx of frequent UTIs and sees urologist for this   - monitor       Insomnia  Behavioral problems at night, worsening confusion   - psych consulted   - started on remeron 3.5, but has not been taking it as son refuses      Small bowel volvulus   - hx of prior abdominal surgeries   - S/p exploratory laparotomy 12/6/24 with Dr. Anna Ashraf, there was evidence of prior volvulus, but no bowel resection was performed. Was successfully extubated the next day and required supplemental O2 post op due to mild pulmonary edema and basilar atelectasis.   - Surgical area CDI, Staples in place and intact   - Soft diet per surgery. Was on TPN in hospital    - f/u with surgery 12/26/24, staples removed   - pt is on general diet, son is requesting soft diet for pt d/t constipation   - weekly labs in rehab  - monitor      Acute hypoxic respiratory failure   Post op respiratory failure  - due to pulmonary edema and basilar atelectasis   - Pulmonary followed in hospital   - pt currently on 0.5 L of O2, wean as tolerated   - Encourage IS  - continue oral lasix  20 mg daily   - pulm following in rehab   - monitor      Permanent A. fib  Acute on chronic HFrEF, new  SSS s/p PPM   Mild aortic stenosis  HTN   - Cards consulted in hospital   - ECHO LVEF 25-30% (new compared to Feb 24' EF 60-65%)   - Likely takotsubo variant   - Was on IV diltiazem, heparin in hospital   - continue Eliquis 5 mg bid   - continue digoxin 125 mcg daily   - continue metoprolol succinate 100 mg bid   - continue losartan  potassium 50 mg daily   - continue lasix 20 mg daily   - cardiology consult in rehab   - monitor      Physical Deconditioning/Impaired mobility and ADLs/At risk for falling  - Fall Precautions  - PT/OT/ST to evaluate and treat  - LYNN team to establish care plan meeting with patient and POA/family as appropriate  - Anticipate DC on or before TBD; SW to assist patient/family w/ DC planning  - DC Plan: TBD     DM II   - Last A1c value was 6.8% done 10/15/2024.   - was on ISS in hospital   - currently diet controlled   - Accu checks in rehab   - weekly labs in rehab      LUE DVT, acute   - likely PICC line provoked per hospital records  - initially treated with IV heparin   - continue eliquis 5 mg bid   - monitor      KIEL on CKD III   - Baseline creat 1.4 -1.6   - BUN/Creat improved in hospital   - weekly labs in rehab     HLD   - continue atorvastatin 20 mg daily      Bowel regimen  - DC colace 100 mg bid and start senokot 2 tabs daily   - start miralax 17 g daily   - Monitor BM status      Pain Management  - Offer to pre-medicate 30-60 min prior to therapy  - Physiatry evaluation with management appreciated  - Acetaminophen 500 mg every 24 hrs prn      Vitamins/supplements as r/t deficiencies  - [facility] RD to follow while in rehab; supplementation/diet as per [facility]  - May continue home supplements  - continue vit d 3000 units daily   - continue co q 10 daily   - continue lily oral capsule      LABS  - CBC/CMP weekly while in [facility]    FOLLOW UP APPOINTMENTS  Future Appointments   Date Time Provider Department Center   1/13/2025 11:00 AM Zulema Holland PA [facility] None   1/27/2025 10:45 AM Fredrick Cornelius MD [facility] [place]      35 minutes spent w/ patient and staff, including but not limited to/ reviewing present status, needs, abilities with disciplines, reviewing medical records, vital signs, labs, completing medication reconciliation and entering orders for continued care in [facility]     Note to patient: The 21st Century  Cures Act makes medical notes like these available to patients in the interest of transparency. However, this is a medical document intended as peer to peer communication. It is written in medical language and may contain abbreviations or verbiage that are unfamiliar. It may appear blunt or direct. Medical documents are intended to carry relevant information, facts as evident, and the clinical opinion of the practitioner who signs the document.     Renetta Perkins, NESSA   01/06/25         [1]   Allergies  Allergen Reactions    Adhesive Tape RASH

## 2025-01-09 ENCOUNTER — SNF DISCHARGE (OUTPATIENT)
Dept: INTERNAL MEDICINE CLINIC | Facility: SKILLED NURSING FACILITY | Age: OVER 89
End: 2025-01-09

## 2025-01-09 VITALS
OXYGEN SATURATION: 95 % | SYSTOLIC BLOOD PRESSURE: 120 MMHG | DIASTOLIC BLOOD PRESSURE: 87 MMHG | TEMPERATURE: 97 F | HEART RATE: 69 BPM | RESPIRATION RATE: 18 BRPM

## 2025-01-09 DIAGNOSIS — I48.91 ATRIAL FIBRILLATION, NEW ONSET (HCC): ICD-10-CM

## 2025-01-09 DIAGNOSIS — R60.0 BILATERAL LOWER EXTREMITY EDEMA: ICD-10-CM

## 2025-01-09 DIAGNOSIS — I48.91 ATRIAL FIBRILLATION WITH RAPID VENTRICULAR RESPONSE (HCC): ICD-10-CM

## 2025-01-09 DIAGNOSIS — S72.402D CLOSED FRACTURE OF DISTAL END OF LEFT FEMUR WITH ROUTINE HEALING, UNSPECIFIED FRACTURE MORPHOLOGY, SUBSEQUENT ENCOUNTER: ICD-10-CM

## 2025-01-09 DIAGNOSIS — I10 HYPERTENSION, UNSPECIFIED TYPE: Primary | ICD-10-CM

## 2025-01-09 DIAGNOSIS — E11.21 CONTROLLED TYPE 2 DIABETES MELLITUS WITH DIABETIC NEPHROPATHY, WITHOUT LONG-TERM CURRENT USE OF INSULIN (HCC): ICD-10-CM

## 2025-01-09 DIAGNOSIS — G45.9 TIA (TRANSIENT ISCHEMIC ATTACK): ICD-10-CM

## 2025-01-09 DIAGNOSIS — E11.22 CONTROLLED TYPE 2 DIABETES MELLITUS WITH STAGE 3 CHRONIC KIDNEY DISEASE, WITHOUT LONG-TERM CURRENT USE OF INSULIN (HCC): ICD-10-CM

## 2025-01-09 DIAGNOSIS — N18.30 CONTROLLED TYPE 2 DIABETES MELLITUS WITH STAGE 3 CHRONIC KIDNEY DISEASE, WITHOUT LONG-TERM CURRENT USE OF INSULIN (HCC): ICD-10-CM

## 2025-01-09 DIAGNOSIS — E78.00 HYPERCHOLESTEROLEMIA: ICD-10-CM

## 2025-01-09 PROCEDURE — 99316 NF DSCHRG MGMT 30 MIN+: CPT

## 2025-01-09 NOTE — PROGRESS NOTES
Isabel Patel, 5/9/1931, 89 year old, female is being discharged from [facility] to home    DISCHARGE SUMMARY    Date of Admission to [facility]: 12/14/24    Date of Discharge from [facility]: 01/10/25                                 Admitting Diagnoses: small bowel volvulus, s/p exploratory laparotomy 12/06/24     Reason for Admission to [facility]: Rehabilitation and strengthening      PHYSICAL EXAM:  Vitals:    01/09/25 1227   BP: 120/87   Pulse: 69   Resp: 18   Temp: 97.1 °F (36.2 °C)   SpO2: 95%      GENERAL HEALTH:well developed, well nourished, in no apparent distress   LINES, TUBES, DRAINS:  none  SKIN: Surgial area corwin, cdi, staples intact   WOUND: see wound assessment,   EYES: PERRLA, EOMI, sclera anicteric, conjunctiva normal; there is no nystagmus, no drainage from eyes  HENT: normocephalic; normal nose, no nasal drainage, mucous membranes pink, moist, pharynx no exudate, no visible cerumen.   NECK:supple, FROM; no JVD, no TMG, no carotid bruits   BREAST: deferred exam   RESPIRATORY:clear to percussion and auscultation  CARDIOVASCULAR: S1, S2 normal, RRR; no S3, no S4  ABDOMEN:  normal active BS+, soft, nondistended; no organomegaly, no masses; no bruits; nontender, no guarding, no rebound tenderness. Surgial area corwin, cdi, staples intact   :no suprapubic distension  LYMPHATIC:no lymphedema  MUSCULOSKELETAL: no acute synovitis upper or lower extremity   EXTREMITIES/VASCULAR:no cyanosis, clubbing or edema  NEUROLOGIC: Forgetful   PSYCHIATRIC: AO x 2-3. Forgetful, pleasant and cooperative    CURRENT MANAGEMENT AT [facility]     Physical Deconditioning/Impaired mobility and ADLs/At risk for falling  - Fall Precautions  - PT/OT/ST to evaluate and treat  - [facility] team to establish care plan meeting with patient and POA/family as appropriate  - Anticipate DC on or before 01/10/25; SW to assist patient/family w/ DC planning  - DC Plan: 01/10/25 home with son and home health     Small bowel volvulus   -  hx of prior abdominal surgeries   - S/p exploratory laparotomy 12/6/24 with Dr. Anna Ashraf, there was evidence of prior volvulus, but no bowel resection was performed. Was successfully extubated the next day and required supplemental O2 post op due to mild pulmonary edema and basilar atelectasis.   - Surgical area CDI, Staples in place and intact   - Soft diet per surgery. Was on TPN in hospital    - f/u with surgery 12/26/24, staples removed   - pt is on general diet, son is requesting soft diet for pt d/t constipation   - weekly labs in rehab  - monitor     Worsening confusion   Slow progress in PT   - check UA with C/S  UA with C/S collected on 01/02/24: Yellow, Cloudy, positive nitrates, moderate blood, trace leukocytes. Preliminary culture grow showing 10-50,000 COL/ML Gram Negative Bacill  - Once final culture results come back, son wants results to be faxed to patient's urology to determine if treatment is necessary as pt has hx of frequent UTIs and sees urologist for this   - monitor       Insomnia  Behavioral problems at night, worsening confusion   - psych consulted   - started on remeron 3.5, but has not been taking it as son refuses      Small bowel volvulus   - hx of prior abdominal surgeries   - S/p exploratory laparotomy 12/6/24 with Dr. Anna Ashraf, there was evidence of prior volvulus, but no bowel resection was performed. Was successfully extubated the next day and required supplemental O2 post op due to mild pulmonary edema and basilar atelectasis.   - Surgical area CDI, Staples in place and intact   - Soft diet per surgery. Was on TPN in hospital    - f/u with surgery 12/26/24, staples removed   - pt is on general diet, son is requesting soft diet for pt d/t constipation   - weekly labs in rehab  - monitor      Acute hypoxic respiratory failure   Post op respiratory failure  - due to pulmonary edema and basilar atelectasis   - Pulmonary followed in hospital   - pt currently on 0.5 L of O2, wean as  tolerated   - Encourage IS  - continue oral lasix  20 mg daily   - pulm following in rehab   - monitor      Permanent A. fib  Acute on chronic HFrEF, new  SSS s/p PPM   Mild aortic stenosis  HTN   - Cards consulted in hospital   - ECHO LVEF 25-30% (new compared to Feb 24' EF 60-65%)   - Likely takotsubo variant   - Was on IV diltiazem, heparin in hospital   - continue Eliquis 5 mg bid   - continue digoxin 125 mcg daily   - continue metoprolol succinate 100 mg bid   - continue losartan potassium 50 mg daily   - continue lasix 20 mg daily   - cardiology consult in rehab   - monitor      DM II   - Last A1c value was 6.8% done 10/15/2024.   - was on ISS in hospital   - currently diet controlled   - Accu checks in rehab   - weekly labs in rehab      LUE DVT, acute   - likely PICC line provoked per hospital records  - initially treated with IV heparin   - continue eliquis 5 mg bid   - monitor      KIEL on CKD III   - Baseline creat 1.4 -1.6   - BUN/Creat improved in hospital   - weekly labs in rehab     HLD   - continue atorvastatin 20 mg daily      Bowel regimen  - DC colace 100 mg bid and start senokot 2 tabs daily   - start miralax 17 g daily   - Monitor BM status      Pain Management  - Offer to pre-medicate 30-60 min prior to therapy  - Physiatry evaluation with management appreciated  - Acetaminophen 500 mg every 24 hrs prn      Vitamins/supplements as r/t deficiencies  - [facility] RD to follow while in rehab; supplementation/diet as per [facility] RD  - May continue home supplements  - continue vit d 3000 units daily   - continue co q 10 daily   - continue lily oral capsule      LABS  - CBC/CMP weekly while in [facility]    Medication Reconciliation Completed:  Yes - All medications and side effects reviewed with patient    FOLLOW UP APPOINTMENTS  Future Appointments   Date Time Provider Department Center   1/13/2025 11:00 AM Zulema Holland PA [facility] None   1/27/2025 10:45 AM [facility]        60 minutes  spent w/ patient and staff, including but not limited to reviewing present status, needs, abilities with disciplines, reviewing medical records, vital signs, labs, completing med reconciliation and entering orders for continued care in [facility]  and at discharge     Note to patient: The 21st Century Cures Act makes medical notes like these available to patients in the interest of transparency. However, this is a medical document intended as peer to peer communication. It is written in medical language and may contain abbreviations or verbiage that are unfamiliar. It may appear blunt or direct. Medical documents are intended to carry relevant information, facts as evident, and the clinical opinion of the practitioner who signs the document.     Renetta Perkins, APRN   1/9/2025

## 2025-01-22 ENCOUNTER — TELEMEDICINE (OUTPATIENT)
Dept: INTERNAL MEDICINE CLINIC | Facility: CLINIC | Age: OVER 89
End: 2025-01-22

## 2025-01-22 ENCOUNTER — MED REC SCAN ONLY (OUTPATIENT)
Dept: INTERNAL MEDICINE CLINIC | Facility: CLINIC | Age: OVER 89
End: 2025-01-22

## 2025-01-22 DIAGNOSIS — Z09 HOSPITAL DISCHARGE FOLLOW-UP: Primary | ICD-10-CM

## 2025-01-22 PROCEDURE — 99213 OFFICE O/P EST LOW 20 MIN: CPT | Performed by: INTERNAL MEDICINE

## 2025-01-26 NOTE — PROGRESS NOTES
Subjective:   Patient ID: Isabel Patel is a 89 year old female.    Pt seen through live 2 way video visit son present patient was admitted to the hospital with abdominal pain found to have small bowel volvulus had exploratory laparotomy says overall doing okay but not eating as much since discharge but trying and improving every day.  Getting home health and PT at home      As per Dc note from Hospital   \"  Acute hypoxic respiratory failure   º Pulmonary on consult.   º Multifactorial from postop pulm edema and atelectasis.   º Encourage IS.   º Taper o2 on RA now   º Po lasix      Small bowel volvulus   º S/p exploratory laparotomy 12/6/24   º Soft diet per surgery. Cont TPN   º OOB.      Permanent A. fib  Acute on chronic HFrEF, new  º Cards on consult.   º ECHO LVEF 25-30% (new compared to Feb 24' EF 60-65%)   º Likely takotsubo variant   º Coreg resumed , stopped lopressor IV - will d/w cards about tachycardia  º Eliquis   º GDMT: coreg, losartan   º Stop IV lasix.  Now PO lasix     DM II   º A1c 6.8 (prev 7.5)   º ISS     LUE DVT, acute   º Eliquis.   º HONC on consult.   º RUE PICC line removed.   º Provoked      KIEL on CKD III   º Baseline creat 1.4 -1.6   º BUN/Creat improving.   º Monitor Uop.      Chronic weakness   º PT/OT - LYNN   º CT head negative\"          History/Other:   Review of Systems   Constitutional:  Positive for appetite change.   HENT: Negative.     Eyes: Negative.    Respiratory: Negative.     Cardiovascular: Negative.    Gastrointestinal: Negative.    Genitourinary: Negative.    Musculoskeletal: Negative.    Skin: Negative.    Neurological:  Positive for weakness.   Psychiatric/Behavioral: Negative.       Current Outpatient Medications   Medication Sig Dispense Refill    Ergocalciferol (VITAMIN D OR) Take 1-4 drops by mouth daily. Pt takes Vitamin D oral drops      Miri, Zingiber officinalis, (MIRI OR) Take 1 capsule by mouth daily. Pt takes Miri with [place] once daily for  pain      Glucose Blood (ACCU-CHEK GUIDE) In Vitro Strip 1 strip by In Vitro route daily. 100 strip 3    Accu-Chek Softclix Lancets Does not apply Misc 1 Lancet by Finger stick route daily. For blood glucose monitoring. 100 each 3    losartan 50 MG Oral Tab Take 1 tablet (50 mg total) by mouth daily. 90 tablet 3    furosemide 20 MG Oral Tab Take 1 tablet (20 mg total) by mouth daily. 30 tablet 0    APIXABAN 5 MG Oral Tab Take 1 tablet (5 mg total) by mouth 2 (two) times daily. 60 tablet 1    atorvastatin 20 MG Oral Tab Take 1 tablet (20 mg total) by mouth daily. 90 tablet 1    Blood Glucose Calibration (ACCU-CHEK GUIDE CONTROL) In Vitro Liquid 1 each by In Vitro route every 30 (thirty) days. 1 each 11    Blood Glucose Monitoring Suppl (ACCU-CHEK GUIDE ME) w/Device Does not apply Kit 1 each daily. Check once a day. 1 kit 0    acetaminophen 500 MG Oral Tab Take 1 tablet (500 mg total) by mouth daily as needed for Pain.      Coenzyme Q10 (CO Q 10) 10 MG Oral Cap Take 1 capsule by mouth daily.       Allergies:Allergies[1]    Objective:   Physical Exam  Constitutional:       Appearance: She is not ill-appearing.   Pulmonary:      Effort: No respiratory distress.   Neurological:      Mental Status: Mental status is at baseline. She is disoriented.         Assessment & Plan:   1. Hospital discharge follow-up improving every day trying to eat better getting home health and PT at home continue with current care , monitor    22 min spent     No orders of the defined types were placed in this encounter.      Meds This Visit:  Requested Prescriptions      No prescriptions requested or ordered in this encounter       Imaging & Referrals:  None         [1]   Allergies  Allergen Reactions    Adhesive Tape RASH

## 2025-01-29 ENCOUNTER — OFFICE VISIT (OUTPATIENT)
Dept: UROLOGY | Facility: CLINIC | Age: OVER 89
End: 2025-01-29
Attending: OBSTETRICS & GYNECOLOGY
Payer: MEDICARE

## 2025-01-29 VITALS — HEIGHT: 61 IN | TEMPERATURE: 97 F | BODY MASS INDEX: 27 KG/M2 | RESPIRATION RATE: 16 BRPM

## 2025-01-29 DIAGNOSIS — N39.3 FEMALE STRESS INCONTINENCE: ICD-10-CM

## 2025-01-29 DIAGNOSIS — N81.2 UTEROVAGINAL PROLAPSE, INCOMPLETE: Primary | ICD-10-CM

## 2025-01-29 DIAGNOSIS — N81.84 PELVIC MUSCLE WASTING: ICD-10-CM

## 2025-01-29 DIAGNOSIS — N95.2 POSTMENOPAUSAL ATROPHIC VAGINITIS: ICD-10-CM

## 2025-01-29 PROCEDURE — 99212 OFFICE O/P EST SF 10 MIN: CPT

## 2025-01-29 RX ORDER — LIDOCAINE HYDROCHLORIDE 20 MG/ML
10 JELLY TOPICAL ONCE
Status: COMPLETED | OUTPATIENT
Start: 2025-01-29 | End: 2025-01-29

## 2025-01-29 RX ORDER — DIGOXIN 125 MCG
125 TABLET ORAL DAILY
COMMUNITY

## 2025-01-29 NOTE — PROGRESS NOTES
Prior Authorization received until 1/19/2019. Ref# [id number]. Fax of approval sent to pharmacy.   Pt presents for f/u of pelvic organ prolapse, pessary care  Son, Haja, provided history, then moved to waiting room for exam  Using #2 ring with support with knob pessary, office care    Last visit r/s due to recent hospitalization for acute on chronic HF and small bowel volvulus (s/p exp lap)    Reports pessary is comfortable   Denies discharge  Denies bleeding  Denies s/sx of UTI  Bowels reg  Son has refused vag estrogen  Not currently using vag moisturizer    Happy with pessary, feels supported  She is not currently interested in surgical management of her pelvic organ prolapse    Urogynecology Summary:  Urogynecology Summary  Prolapse: No  AGUSTO: Yes  Urge Incontinence: Yes (sometimes)  Frequency: Greater than 3 hours (every 3-4 hours)  Incomplete emptying: No  Constipation: No  Wears pad day?: 4 (heavy)  Wears Pad Night?: 2 (heavy)  Activities are limited by UI/POP?: Yes (afraid of leaking)  Currently Sexually Active: No      Vitals:  Vitals:    01/29/25 1120   Resp: 16   Temp: 97 °F (36.1 °C)       GENERAL EXAM:  GENERAL: alert & oriented, NAD  HEENT: NC/AT, sclera without injection  SKIN: no rashes  LUNGS:  without increased respiratory effort  ABDOMEN: soft, non-tender, non-distended, no masses appreciated  EXT: no edema    PELVIC EXAM: LOR Garland, present for exam as a chaperone  2% topical lidocaine gel was applied intravaginally to reduce patient discomfort with pessary removal/insertion.  Ext. Gen: +atrophy, no lesions, scattered epidermal inclusion cysts  Urethra: +atrophy, nontender  Bladder: some fullness, nontender  Vagina: ++atrophy, no lesions, no bleeding, +scattered calcifications within vagina, removed with pineda swab  Cervix: wnl  Uterus mobile  Perineum: wnl  Anus: wnl  Rectum: deferred      PELVIC SUPPORT  Gypsy:  2  Ant:  3  Post:  2  CST:  neg  UVJ: somewhat hypermobile     Her pessary was removed (+calcifications noted within pessary support), cleaned, and reinserted without difficulty.      Impression:    ICD-10-CM    1. Uterovaginal prolapse, incomplete  N81.2       2. Female stress incontinence  N39.3       3. Postmenopausal atrophic vaginitis  N95.2       4. Pelvic muscle wasting  N81.84           Discussion Items:   Discussed management of pelvic organ prolapse including but not limited to behavioral modifications, conservative options, and surgical management.   Discussed pessary management including benefits and risks. Discussed importance of keeping regularly scheduled pessary checks in prevention of complications related to pessary use.     Son considering discontinuing pessary use  Rec void assessment without pessary next visit to assess emptying    Treatment Plan:  Continue pessary management, office care  Encouraged daily pelvic exercises  Cont bowel regimen  Call with s/sx of UTI, problems with pessary     All questions answered  Pt understands and agrees to plan       Return in about 6 weeks (around 3/12/2025) for pessary care.      Zulema Holland PA-C    Note to patient: The 21st Century Cures Act makes medical notes like these available to patients in the interest of transparency.  However, be advised this is a medical document.  It is intended as peer to peer communication.  It is written in medical language and may contain abbreviations or verbiage that are unfamiliar.  It may appear blunt or direct.  Medical documents are intended to carry relevant information, facts as evident, and the clinical opinion of the practitioner.

## 2025-02-03 ENCOUNTER — PATIENT MESSAGE (OUTPATIENT)
Dept: INTERNAL MEDICINE CLINIC | Facility: CLINIC | Age: OVER 89
End: 2025-02-03

## 2025-02-05 ENCOUNTER — MED REC SCAN ONLY (OUTPATIENT)
Dept: INTERNAL MEDICINE CLINIC | Facility: CLINIC | Age: OVER 89
End: 2025-02-05

## 2025-02-07 RX ORDER — METOPROLOL SUCCINATE 100 MG/1
100 TABLET, EXTENDED RELEASE ORAL
Qty: 180 TABLET | Refills: 0 | Status: SHIPPED | OUTPATIENT
Start: 2025-02-07 | End: 2025-05-08

## 2025-02-07 NOTE — TELEPHONE ENCOUNTER
Please review; protocol failed/ has no protocol      Medication is listed as patient reported.    Requested Prescriptions   Pending Prescriptions Disp Refills    metoprolol succinate  MG Oral Tablet 24 Hr 180 tablet 0     Sig: Take 1 tablet (100 mg total) by mouth 2x Daily(Beta Blocker).       Hypertension Medications Protocol Failed - 2/7/2025 10:32 AM        Failed - EGFRCR or GFRNAA > 50     GFR Evaluation  EGFRCR: 43 , resulted on 12/14/2024          Failed - Medication is active on med list        Passed - CMP or BMP in past 12 months        Passed - Last BP reading less than 140/90     BP Readings from Last 1 Encounters:   01/09/25 120/87               Passed - In person appointment or virtual visit in the past 12 mos or appointment in next 3 mos     Recent Outpatient Visits              1 week ago Uterovaginal prolapse, incomplete    [facility] [place] [facility] Zulema Holland PA    Office Visit    2 weeks ago Hospital discharge follow-up    [facility], [place] Saldaña Agron B, MD    Telemedicine    1 month ago Encounter for postoperative care    [facility], Kim Mane Brittany, PA    Nurse Only    2 months ago Uterovaginal prolapse, incomplete    [facility] [place] [facility] Zulema Holland PA    Office Visit    3 months ago Left arm swelling    [facility], [place] Saldaña Agron B, MD    Office Visit          Future Appointments         Provider Department Appt Notes    In 1 month Zulema Holland PA [facility] [place] [facility] 6 WEEK PESSARY CHECK                       Recent Outpatient Visits              1 week ago Uterovaginal prolapse, incomplete    [facility] [place] [facility] Zulema Holland PA    Office Visit    2 weeks ago Hospital discharge follow-up    [facility], [place] Saldaña Agron B, MD    Telemedicine    1 month ago Encounter for postoperative care    [facility] [address], Nuvia Priest PA    Nurse Only    2 months ago Uterovaginal prolapse, incomplete    [facility] [place] [facility] Zulema Holland PA    Office Visit    3 months ago Left arm swelling    [facility], [place] Saldaña Agron B, MD    Office Visit          Future Appointments         Provider Department Appt Notes    In 1 month Zulema Holland PA [facility] [place] [facility] 6 WEEK PESSARY CHECK

## 2025-02-18 ENCOUNTER — NURSE TRIAGE (OUTPATIENT)
Facility: CLINIC | Age: OVER 89
End: 2025-02-18

## 2025-02-18 NOTE — TELEPHONE ENCOUNTER
Action Requested: Summary for Provider     []  Critical Lab, Recommendations Needed  [] Need Additional Advice  [x]   FYI    []   Need Orders  [] Need Medications Sent to Pharmacy  []  Other     SUMMARY: Sleep problem reported by son, patient will fall asleep and wake up from sleep and cannot fall back asleep. Patient verbalized to son she feels restless when she wakes up. Patient has tried OTC remedies without much help/improvement. Next day virtual visit scheduled as patient cannot come into office. [See below for more details]    Reason for call: Sleep Problem  Onset: about 2-3 months    Reason for Disposition   Insomnia persists > 1 week and following Insomnia Care Advice    Protocols used: Insomnia-A-[place]      Patient's son Haja (Last signed Verbal Release verified) called states patient has been having difficulty sleeping for the past couple of months, falling asleep at about 10 pm and wakes up during the night [can be multiple times] and cannot call back asleep and verbalizes she feels restless. She also has dementia. Patient has tried OTC remedies [such as melatonin] and has not been helpful; son would like to try some homeopathic remedies or even prescription medications to help patient with sleep, but is fearful they can worsen dementia or interact with her current medications. Refer to system/assessment yes/no answers. Visit advised within 3 days--> agreeable to virtual visit and scheduled. Home Care Advice discussed, per protocol. Patient instructed any new or worsening symptoms [reviewed] seek immediate medical attention--> Emergency Department/Immediate Care. He verbalized understanding. No further questions or concerns at this time.    Future Appointments   Date Time Provider Department Center   2/19/2025 11:00 AM Fredrick Cornelius MD [facility] [place]

## 2025-02-19 ENCOUNTER — TELEMEDICINE (OUTPATIENT)
Dept: INTERNAL MEDICINE CLINIC | Facility: CLINIC | Age: OVER 89
End: 2025-02-19

## 2025-02-19 DIAGNOSIS — G47.00 INSOMNIA, UNSPECIFIED TYPE: Primary | ICD-10-CM

## 2025-02-19 PROCEDURE — 99213 OFFICE O/P EST LOW 20 MIN: CPT | Performed by: INTERNAL MEDICINE

## 2025-02-19 RX ORDER — TRAZODONE HYDROCHLORIDE 50 MG/1
50 TABLET ORAL NIGHTLY
Qty: 30 TABLET | Refills: 2 | Status: SHIPPED | OUTPATIENT
Start: 2025-02-19

## 2025-02-19 NOTE — PROGRESS NOTES
Subjective:     Patient ID: Isabel Patel is a 89 year old female.    HPI  Patient seen today through live two-way video visit with son present son complaining that mom usually falls asleep feels little restless but then wakes up few hours later and unable to go back to sleep has tried over-the-counter melatonin but has not helped otherwise doing okay no other complaints  History/Other:   Review of Systems   Constitutional: Negative.    HENT: Negative.     Eyes: Negative.    Respiratory: Negative.     Cardiovascular: Negative.    Gastrointestinal: Negative.    Genitourinary: Negative.    Musculoskeletal: Negative.    Skin: Negative.    Neurological: Negative.    Psychiatric/Behavioral:  Positive for sleep disturbance.      Current Outpatient Medications   Medication Sig Dispense Refill    traZODone 50 MG Oral Tab Take 1 tablet (50 mg total) by mouth nightly. 30 tablet 2    metoprolol succinate  MG Oral Tablet 24 Hr Take 1 tablet (100 mg total) by mouth 2x Daily(Beta Blocker). 180 tablet 0    digoxin 0.125 MG Oral Tab Take 1 tablet (125 mcg total) by mouth daily.      Ergocalciferol (VITAMIN D OR) Take 1-4 drops by mouth daily. Pt takes Vitamin D oral drops      Shilpa, Zingiber officinalis, (SHILPA OR) Take 1 capsule by mouth daily. Pt takes Shilpa with [place] once daily for pain      Glucose Blood (ACCU-CHEK GUIDE) In Vitro Strip 1 strip by In Vitro route daily. 100 strip 3    Accu-Chek Softclix Lancets Does not apply Misc 1 Lancet by Finger stick route daily. For blood glucose monitoring. 100 each 3    losartan 50 MG Oral Tab Take 1 tablet (50 mg total) by mouth daily. 90 tablet 3    furosemide 20 MG Oral Tab Take 1 tablet (20 mg total) by mouth daily. 30 tablet 0    APIXABAN 5 MG Oral Tab Take 1 tablet (5 mg total) by mouth 2 (two) times daily. 60 tablet 1    atorvastatin 20 MG Oral Tab Take 1 tablet (20 mg total) by mouth daily. 90 tablet 1    Blood Glucose Calibration (ACCU-CHEK GUIDE CONTROL) In  Vitro Liquid 1 each by In Vitro route every 30 (thirty) days. 1 each 11    Blood Glucose Monitoring Suppl (ACCU-CHEK GUIDE ME) w/Device Does not apply Kit 1 each daily. Check once a day. 1 kit 0    acetaminophen 500 MG Oral Tab Take 1 tablet (500 mg total) by mouth daily as needed for Pain.      Coenzyme Q10 (CO Q 10) 10 MG Oral Cap Take 1 capsule by mouth daily.       Allergies:Allergies[1]    Past Medical History:    Acute, but ill-defined, cerebrovascular disease    Arthritis    Breast CA (HCC)    Cancer (HCC)    Diabetes (HCC)    diet controlled    Diabetes mellitus, type II (HCC)    Essential hypertension    Hearing impairment    High blood pressure    High cholesterol    Hyperlipidemia    Osteoarthritis    Squamous cell carcinoma, keratinizing    R posterior thigh      Past Surgical History:   Procedure Laterality Date    Appendectomy      Appendectomy      Cataract  -    Cataract extraction w/  intraocular lens implant Bilateral     Ian Garcia MD    Chemotherapy  2016    rt breast.    Cholecystectomy      D & c      Exploratory of abdomen  2024    Knee replacement surgery Right 2012    Lumpectomy right  2016    cancer    Mastectomy partial Right 2016      [id number]    Skin surgery Right 2018    Tonsillectomy      Wrist fracture surgery Right       Family History   Problem Relation Age of Onset    Heart Disease Father         CAD    Diabetes Father     Heart Disease Mother         CAD    Diabetes Mother     Breast Cancer Mother 83        had lumpectomy and RT.  No other treatment.   of stroke at 89    Other (appendicitis[other]) Brother         age 13    Other (alive and well[other]) Sister     Other (alive and well[other]) Son         x3    Breast Cancer Self 85    Glaucoma Neg     Macular degeneration Neg       Social History:   Social History     Socioeconomic History    Marital status:     Number of children: 3   Occupational History     Occupation:      Comment: retired   Tobacco Use    Smoking status: Never     Passive exposure: Never    Smokeless tobacco: Never   Vaping Use    Vaping status: Never Used   Substance and Sexual Activity    Alcohol use: Not Currently    Drug use: Never    Sexual activity: Not Currently   Other Topics Concern     Service No    Caffeine Concern Yes     Comment: coffee, 1 cups daily    Occupational Exposure No     Social Drivers of Health     Food Insecurity: Unknown (12/2/2024)    Food Insecurity     Food Insecurity: Patient declined   Transportation Needs: Unknown (12/2/2024)    Transportation Needs     Lack of Transportation: Patient declined   Housing Stability: Unknown (12/2/2024)    Housing Stability     Housing Instability: Patient declined        Objective:   Physical Exam  Constitutional:       Appearance: She is not ill-appearing.   Pulmonary:      Effort: No respiratory distress.   Neurological:      Mental Status: She is oriented to person, place, and time.         Assessment & Plan:   1. Insomnia, unspecified type -will give trazodone 50 mg take as prescribed let us know if not better let us know   21 min spent      No orders of the defined types were placed in this encounter.      Meds This Visit:  Requested Prescriptions     Signed Prescriptions Disp Refills    traZODone 50 MG Oral Tab 30 tablet 2     Sig: Take 1 tablet (50 mg total) by mouth nightly.       Imaging & Referrals:  None            [1]   Allergies  Allergen Reactions    Adhesive Tape RASH

## 2025-03-04 ENCOUNTER — MED REC SCAN ONLY (OUTPATIENT)
Dept: INTERNAL MEDICINE CLINIC | Facility: CLINIC | Age: OVER 89
End: 2025-03-04

## 2025-03-06 ENCOUNTER — TELEPHONE (OUTPATIENT)
Dept: INTERNAL MEDICINE CLINIC | Facility: CLINIC | Age: OVER 89
End: 2025-03-06

## 2025-03-06 NOTE — TELEPHONE ENCOUNTER
CLINICAL STAFF =please check fax and assist. Thank you .Son is requesting a call back for update.    Per son Haja (release of information ).     Patient's home physical therapy([facility]  )  ended on Saturday. He called Medicare to appeal for the continuation of physical therapy services.    He called again today and was informed that Medicare faxed a form to Dr. Fredrick Cornelius's office for completion, ordering, and signing.

## 2025-03-07 NOTE — TELEPHONE ENCOUNTER
Nurse please f/u Monday if no Call back     Pt's son called ,relayed [facility] # [phone],will need Home health notes Faxed to () [facility] [phone]  [facility] #[phone]  Case #[id number]    Called [facility] -spoke to Rep , transferred ,unable to speak to Rep, relayed detailed message to call back          Hi Dr. Cornelius,     I would like to extend home health therapy with [facility] in order to gain strength and endurance since it is still difficult for me to stand and walk. [employer] has requested your verification in order to extend therapy at home. Their number is [phone] and my case # is [id number]. Please send the requested info ASAP.     Thank You.

## 2025-03-07 NOTE — TELEPHONE ENCOUNTER
Spoke to pts son, informed him that we did receive ppwk from [employer] regarding him moms home health.  HOWEVER, this company is requesting specific notes FROM the home health that we do not have.    Haja will call [employer] and provide them w/ correct information.

## 2025-03-07 NOTE — TELEPHONE ENCOUNTER
FYI: Lauren Ramirez clinical supervisor at [facility] called back stating she received Carolann TA's voicemail as stated below. Lauren Ramirez states patient was discharged from  3/1/25. States the appeal has been submitted to [employer] and still currently pending status.  She will notify our office of decision once received. If needed, Lauren Ramirez's cell [phone].

## 2025-03-07 NOTE — TELEPHONE ENCOUNTER
No orders needed at this time.   [facility] to review pts progress w/ PT and determine if extension of HH services would be adequate for this pt

## 2025-03-13 ENCOUNTER — TELEPHONE (OUTPATIENT)
Dept: INTERNAL MEDICINE CLINIC | Facility: CLINIC | Age: OVER 89
End: 2025-03-13

## 2025-03-13 NOTE — TELEPHONE ENCOUNTER
Called patient to schedule aha px. Spoke to son he will tell his brother to schedule at next dr visit 4/7/25.

## 2025-03-22 ENCOUNTER — TELEPHONE (OUTPATIENT)
Dept: INTERNAL MEDICINE CLINIC | Facility: CLINIC | Age: OVER 89
End: 2025-03-22

## 2025-03-22 NOTE — TELEPHONE ENCOUNTER
[facility] calling to state physical therapy was  scheduled for today, 3/22/25 and cancelled by patient's son.

## 2025-03-24 ENCOUNTER — TELEPHONE (OUTPATIENT)
Dept: INTERNAL MEDICINE CLINIC | Facility: CLINIC | Age: OVER 89
End: 2025-03-24

## 2025-03-24 DIAGNOSIS — M48.061 SPINAL STENOSIS OF LUMBAR REGION, UNSPECIFIED WHETHER NEUROGENIC CLAUDICATION PRESENT: ICD-10-CM

## 2025-03-24 DIAGNOSIS — I48.91 ATRIAL FIBRILLATION WITH RAPID VENTRICULAR RESPONSE (HCC): ICD-10-CM

## 2025-03-24 DIAGNOSIS — I10 HYPERTENSION, UNSPECIFIED TYPE: ICD-10-CM

## 2025-03-24 DIAGNOSIS — E11.22 CONTROLLED TYPE 2 DIABETES MELLITUS WITH STAGE 3 CHRONIC KIDNEY DISEASE, WITHOUT LONG-TERM CURRENT USE OF INSULIN (HCC): ICD-10-CM

## 2025-03-24 DIAGNOSIS — N18.30 CONTROLLED TYPE 2 DIABETES MELLITUS WITH STAGE 3 CHRONIC KIDNEY DISEASE, WITHOUT LONG-TERM CURRENT USE OF INSULIN (HCC): ICD-10-CM

## 2025-03-24 DIAGNOSIS — R53.81 PHYSICAL DECONDITIONING: Primary | ICD-10-CM

## 2025-03-24 NOTE — TELEPHONE ENCOUNTER
Lauren Ramirez Called from home health asking for RN home evaluation   Please advise.   Fax to [phone] if agreeable.

## 2025-03-26 NOTE — TELEPHONE ENCOUNTER
Dr Cornelius, please advise  Clinical staff, please assist    Dr Cornelius-please sign the order pended.    Request for [facility] for patient     [facility]  Cmn-[phone]

## 2025-03-27 NOTE — TELEPHONE ENCOUNTER
Lauren Ramirez calling, did not get fax.   I see order for nursing is signed and I faxed to number requested.  Lauren Ramirez aware, she says if she does not get that soon she will call us back.

## 2025-04-01 ENCOUNTER — LAB REQUISITION (OUTPATIENT)
Dept: LAB | Facility: HOSPITAL | Age: OVER 89
End: 2025-04-01
Payer: MEDICARE

## 2025-04-01 ENCOUNTER — MED REC SCAN ONLY (OUTPATIENT)
Dept: INTERNAL MEDICINE CLINIC | Facility: CLINIC | Age: OVER 89
End: 2025-04-01

## 2025-04-01 DIAGNOSIS — E11.9 TYPE 2 DIABETES MELLITUS WITHOUT COMPLICATIONS (HCC): ICD-10-CM

## 2025-04-01 DIAGNOSIS — I50.43 ACUTE ON CHRONIC COMBINED SYSTOLIC (CONGESTIVE) AND DIASTOLIC (CONGESTIVE) HEART FAILURE (HCC): ICD-10-CM

## 2025-04-01 DIAGNOSIS — I13.0 HYPERTENSIVE HEART AND CHRONIC KIDNEY DISEASE WITH HEART FAILURE AND STAGE 1 THROUGH STAGE 4 CHRONIC KIDNEY DISEASE, OR UNSPECIFIED CHRONIC KIDNEY DISEASE (HCC): ICD-10-CM

## 2025-04-01 LAB
ALBUMIN SERPL-MCNC: 3.8 G/DL (ref 3.2–4.8)
ALBUMIN/GLOB SERPL: 1.5 {RATIO} (ref 1–2)
ALP LIVER SERPL-CCNC: 99 U/L
ALT SERPL-CCNC: 19 U/L
ANION GAP SERPL CALC-SCNC: 6 MMOL/L (ref 0–18)
AST SERPL-CCNC: 18 U/L (ref ?–34)
BASOPHILS # BLD AUTO: 0.03 X10(3) UL (ref 0–0.2)
BASOPHILS NFR BLD AUTO: 0.4 %
BILIRUB SERPL-MCNC: 0.9 MG/DL (ref 0.2–0.9)
BUN BLD-MCNC: 27 MG/DL (ref 9–23)
BUN/CREAT SERPL: 23.3 (ref 10–20)
CALCIUM BLD-MCNC: 9.3 MG/DL (ref 8.7–10.4)
CHLORIDE SERPL-SCNC: 104 MMOL/L (ref 98–112)
CHOLEST SERPL-MCNC: 192 MG/DL (ref ?–200)
CO2 SERPL-SCNC: 29 MMOL/L (ref 21–32)
CREAT BLD-MCNC: 1.16 MG/DL
DEPRECATED RDW RBC AUTO: 51.9 FL (ref 35.1–46.3)
DIGOXIN SERPL-MCNC: 1.24 NG/ML (ref 0.8–2)
EGFRCR SERPLBLD CKD-EPI 2021: 44 ML/MIN/1.73M2 (ref 60–?)
EOSINOPHIL # BLD AUTO: 0.08 X10(3) UL (ref 0–0.7)
EOSINOPHIL NFR BLD AUTO: 1.1 %
ERYTHROCYTE [DISTWIDTH] IN BLOOD BY AUTOMATED COUNT: 15.6 % (ref 11–15)
EST. AVERAGE GLUCOSE BLD GHB EST-MCNC: 137 MG/DL (ref 68–126)
FASTING PATIENT LIPID ANSWER: YES
FASTING STATUS PATIENT QL REPORTED: YES
GLOBULIN PLAS-MCNC: 2.6 G/DL (ref 2–3.5)
GLUCOSE BLD-MCNC: 109 MG/DL (ref 70–99)
HBA1C MFR BLD: 6.4 % (ref ?–5.7)
HCT VFR BLD AUTO: 45.7 %
HDLC SERPL-MCNC: 45 MG/DL (ref 40–59)
HGB BLD-MCNC: 15 G/DL
IMM GRANULOCYTES # BLD AUTO: 0.03 X10(3) UL (ref 0–1)
IMM GRANULOCYTES NFR BLD: 0.4 %
LDLC SERPL CALC-MCNC: 125 MG/DL (ref ?–100)
LYMPHOCYTES # BLD AUTO: 1.59 X10(3) UL (ref 1–4)
LYMPHOCYTES NFR BLD AUTO: 21.4 %
MCH RBC QN AUTO: 29.8 PG (ref 26–34)
MCHC RBC AUTO-ENTMCNC: 32.8 G/DL (ref 31–37)
MCV RBC AUTO: 90.7 FL
MONOCYTES # BLD AUTO: 0.67 X10(3) UL (ref 0.1–1)
MONOCYTES NFR BLD AUTO: 9 %
NEUTROPHILS # BLD AUTO: 5.04 X10 (3) UL (ref 1.5–7.7)
NEUTROPHILS # BLD AUTO: 5.04 X10(3) UL (ref 1.5–7.7)
NEUTROPHILS NFR BLD AUTO: 67.7 %
NONHDLC SERPL-MCNC: 147 MG/DL (ref ?–130)
OSMOLALITY SERPL CALC.SUM OF ELEC: 294 MOSM/KG (ref 275–295)
PLATELET # BLD AUTO: 214 10(3)UL (ref 150–450)
POTASSIUM SERPL-SCNC: 4.3 MMOL/L (ref 3.5–5.1)
PROT SERPL-MCNC: 6.4 G/DL (ref 5.7–8.2)
RBC # BLD AUTO: 5.04 X10(6)UL
SODIUM SERPL-SCNC: 139 MMOL/L (ref 136–145)
TRIGL SERPL-MCNC: 122 MG/DL (ref 30–149)
VLDLC SERPL CALC-MCNC: 22 MG/DL (ref 0–30)
WBC # BLD AUTO: 7.4 X10(3) UL (ref 4–11)

## 2025-04-01 PROCEDURE — 80061 LIPID PANEL: CPT | Performed by: INTERNAL MEDICINE

## 2025-04-01 PROCEDURE — 85025 COMPLETE CBC W/AUTO DIFF WBC: CPT | Performed by: INTERNAL MEDICINE

## 2025-04-01 PROCEDURE — 83036 HEMOGLOBIN GLYCOSYLATED A1C: CPT | Performed by: INTERNAL MEDICINE

## 2025-04-01 PROCEDURE — 80053 COMPREHEN METABOLIC PANEL: CPT | Performed by: INTERNAL MEDICINE

## 2025-04-01 PROCEDURE — 80162 ASSAY OF DIGOXIN TOTAL: CPT | Performed by: INTERNAL MEDICINE

## 2025-04-29 ENCOUNTER — TELEPHONE (OUTPATIENT)
Dept: INTERNAL MEDICINE CLINIC | Facility: CLINIC | Age: OVER 89
End: 2025-04-29

## 2025-05-07 ENCOUNTER — OFFICE VISIT (OUTPATIENT)
Dept: UROLOGY | Facility: CLINIC | Age: OVER 89
End: 2025-05-07
Attending: OBSTETRICS & GYNECOLOGY
Payer: MEDICARE

## 2025-05-07 VITALS — TEMPERATURE: 97 F | BODY MASS INDEX: 27 KG/M2 | RESPIRATION RATE: 16 BRPM | HEIGHT: 61 IN

## 2025-05-07 DIAGNOSIS — N81.2 UTEROVAGINAL PROLAPSE, INCOMPLETE: Primary | ICD-10-CM

## 2025-05-07 DIAGNOSIS — N39.3 FEMALE STRESS INCONTINENCE: ICD-10-CM

## 2025-05-07 DIAGNOSIS — N95.0 POSTMENOPAUSAL BLEEDING: ICD-10-CM

## 2025-05-07 DIAGNOSIS — N89.8 VAGINAL DISCHARGE: ICD-10-CM

## 2025-05-07 DIAGNOSIS — N39.42 URINARY INCONTINENCE WITHOUT SENSORY AWARENESS: ICD-10-CM

## 2025-05-07 DIAGNOSIS — N81.84 PELVIC MUSCLE WASTING: ICD-10-CM

## 2025-05-07 DIAGNOSIS — N39.41 URGE INCONTINENCE: ICD-10-CM

## 2025-05-07 DIAGNOSIS — N95.2 POSTMENOPAUSAL ATROPHIC VAGINITIS: ICD-10-CM

## 2025-05-07 PROCEDURE — 57150 TREAT VAGINA INFECTION: CPT

## 2025-05-07 PROCEDURE — 99212 OFFICE O/P EST SF 10 MIN: CPT

## 2025-05-07 RX ORDER — LIDOCAINE HYDROCHLORIDE 20 MG/ML
10 JELLY TOPICAL ONCE
Status: COMPLETED | OUTPATIENT
Start: 2025-05-07 | End: 2025-05-07

## 2025-05-07 NOTE — PROGRESS NOTES
Pt presents for f/u of pelvic organ prolapse, pessary care  Pt's sons, Haja & Miah, are present  Using #2 ring with support with knob pessary, office care    Last seen 1/29/25, had to r/s multiple times d/t other health issues including worsening dementia    Very limited mobility  +UUI, insensible loss of urine  Has Purewick at home but not using    Reports pessary is comfortable   Denies discharge  Denies bleeding  Denies s/sx of UTI  Bowels reg  Son has refused vag estrogen    Happy with pessary, feels supported  She is not currently interested in surgical management of her pelvic organ prolapse    Urogynecology Summary:  Urogynecology Summary  Prolapse: No  AGUSTO: Yes  Urge Incontinence: Yes  Nocturia Frequency:  (every 2-3 hours)  Frequency: 2 - 3 hours  Incomplete emptying: No  Constipation: Yes (sometimes)  Wears pad day?: 4 (3-4 heavy)  Wears Pad Night?:  (heavy diaper)  Activities are limited by UI/POP?: Yes (both UI and POP)  Currently Sexually Active: No      Vitals:  Vitals:    05/07/25 1325   Resp: 16   Temp: 97 °F (36.1 °C)       GENERAL EXAM:  GENERAL: alert & oriented, NAD  HEENT: NC/AT, sclera without injection  SKIN: no rashes  LUNGS:  without increased respiratory effort  ABDOMEN: soft, non-tender, non-distended, no masses appreciated  EXT: no edema    PELVIC EXAM: LOR Garland, present for exam as a chaperone  2% topical lidocaine gel was applied intravaginally to reduce patient discomfort with pessary removal/insertion.  Ext. Gen: +atrophy, no lesions, scattered epidermal inclusion cysts  Urethra: +atrophy, nontender  Bladder: some fullness, nontender  Vagina: ++atrophy, +calcifications within vagina,irrigated (see below), +erythema and scant bleeding of posterior vaginal wall  Cervix: wnl  Uterus mobile  Perineum: wnl  Anus: wnl  Rectum: deferred      PELVIC SUPPORT  [place]:  2  Ant:  3  Post:  2  CST:  neg  UVJ: somewhat hypermobile     Her pessary was removed with some difficulty (+calcifications in  and around pessary) and discarded    Using a 60 mL stefani syringe and sterile water the vagina was irrigated to clear calcifications within the vagina.  A proctoswab was also used to clear calcifications.    A new #2 ring with support with knob pessary was inserted without difficulty.      Impression:    ICD-10-CM    1. Uterovaginal prolapse, incomplete  N81.2       2. Urinary incontinence without sensory awareness  N39.42       3. Urge incontinence  N39.41       4. Female stress incontinence  N39.3       5. Postmenopausal atrophic vaginitis  N95.2       6. Pelvic muscle wasting  N81.84       7. Vaginal discharge  N89.8       8. Postmenopausal bleeding  N95.0           Discussion Items:   Discussed dietary and behavioral modifications for mgmt of urinary symptoms (urgency, frequency, leaking).  Mentioned OAB meds.  Discussed use of Purewick device.  Discussed risks/benefits of indwelling catheter -- sons wish to think about this.    Discussed management of pelvic organ prolapse including but not limited to behavioral modifications, conservative options, and surgical management.     Discussed potential risk of incomplete bladder emptying and subsequent increased risk for UTIs and kidney complications without management of prolapse.    Discussed pessary management including benefits and risks. Discussed importance of keeping regularly scheduled pessary checks in prevention of complications related to pessary use.    Discussed w/u of PMB with pelvic US, potential need for HSC, D&C pending results.  Discussed risk of underlying endometrial CA.  In light of pt's current state of health sons decline further w/u at this time.    Treatment Plan:  Continue pessary management, office care  Vaginal moisturizers  Encouraged daily pelvic exercises  Cont bowel regimen  Call with s/sx of UTI, problems with pessary     All questions answered  Pt understands and agrees to plan       Return in about 8 weeks (around 7/2/2025) for  pessary care.      Zulema Holland PA-C    Note to patient: The 21st Century Cures Act makes medical notes like these available to patients in the interest of transparency.  However, be advised this is a medical document.  It is intended as peer to peer communication.  It is written in medical language and may contain abbreviations or verbiage that are unfamiliar.  It may appear blunt or direct.  Medical documents are intended to carry relevant information, facts as evident, and the clinical opinion of the practitioner.

## 2025-05-13 ENCOUNTER — MED REC SCAN ONLY (OUTPATIENT)
Dept: INTERNAL MEDICINE CLINIC | Facility: CLINIC | Age: OVER 89
End: 2025-05-13

## 2025-05-16 NOTE — TELEPHONE ENCOUNTER
66 year old female presents with abdominal pain, nausea, and vomiting due to SBO/ileus and possible biliary obstruction. GI evaluation in process with recommendations for inpatient colonoscopy however patient discharged AMA before this was done. Counseled regarding risks of leaving before workup completed. She expressed understanding but was insistent upon discharge.    Both medications filled for 30 days only    CSS--please call patient to schedule appt with LV--see below

## 2025-05-20 ENCOUNTER — PATIENT MESSAGE (OUTPATIENT)
Dept: INTERNAL MEDICINE CLINIC | Facility: CLINIC | Age: OVER 89
End: 2025-05-20

## 2025-05-20 DIAGNOSIS — R53.81 PHYSICAL DECONDITIONING: Primary | ICD-10-CM

## 2025-05-21 NOTE — TELEPHONE ENCOUNTER
Dr. Fredrick Cornelius kindly see patient's MyChart message and advise. Pended referral for review and approval if appropriate.

## 2025-06-24 ENCOUNTER — PATIENT MESSAGE (OUTPATIENT)
Dept: INTERNAL MEDICINE CLINIC | Facility: CLINIC | Age: OVER 89
End: 2025-06-24

## 2025-06-27 RX ORDER — METOPROLOL SUCCINATE 100 MG/1
100 TABLET, EXTENDED RELEASE ORAL 2 TIMES DAILY
Qty: 180 TABLET | Refills: 3 | Status: SHIPPED | OUTPATIENT
Start: 2025-06-27

## 2025-07-03 ENCOUNTER — TELEPHONE (OUTPATIENT)
Dept: INTERNAL MEDICINE CLINIC | Facility: CLINIC | Age: OVER 89
End: 2025-07-03

## 2025-07-03 DIAGNOSIS — I48.91 ATRIAL FIBRILLATION WITH RAPID VENTRICULAR RESPONSE (HCC): ICD-10-CM

## 2025-07-03 DIAGNOSIS — R60.0 BILATERAL LOWER EXTREMITY EDEMA: ICD-10-CM

## 2025-07-03 DIAGNOSIS — I50.22 CHF (CONGESTIVE HEART FAILURE), NYHA CLASS III, CHRONIC, SYSTOLIC (HCC): ICD-10-CM

## 2025-07-03 DIAGNOSIS — R53.81 PHYSICAL DECONDITIONING: Primary | ICD-10-CM

## 2025-07-03 DIAGNOSIS — M17.11 PRIMARY OSTEOARTHRITIS OF RIGHT KNEE: ICD-10-CM

## 2025-07-03 NOTE — TELEPHONE ENCOUNTER
Spoke to pts son, needs order to restart HH, he is asking for RN, PT and OT for the patient.   Referral pended

## 2025-07-03 NOTE — TELEPHONE ENCOUNTER
Haja called to request a referral for [facility] for the patient.     [phone] is the number Haja Provided for [facility].     Call Haja for any questions.

## 2025-07-04 ENCOUNTER — TELEPHONE (OUTPATIENT)
Dept: UROLOGY | Facility: HOSPITAL | Age: OVER 89
End: 2025-07-04

## 2025-07-04 NOTE — TELEPHONE ENCOUNTER
TC from pt's son, Haja  She is having vag bleeding  Known pessary  Taking blood thinners  Denies s/sx of UTI  Pt's dementia is worsening and she's unable to ambulate well  Advised family to take her to ER if bleeding significant  Will need pessary mgmt - Monday, sooner as needed  Questions answered  He understands and agrees to plan

## 2025-07-05 ENCOUNTER — HOSPITAL ENCOUNTER (EMERGENCY)
Facility: HOSPITAL | Age: OVER 89
Discharge: HOME | End: 2025-07-05
Attending: EMERGENCY MEDICINE
Payer: MEDICARE

## 2025-07-05 VITALS
DIASTOLIC BLOOD PRESSURE: 75 MMHG | HEART RATE: 70 BPM | OXYGEN SATURATION: 93 % | TEMPERATURE: 98 F | WEIGHT: 125 LBS | SYSTOLIC BLOOD PRESSURE: 160 MMHG | HEIGHT: 61 IN | BODY MASS INDEX: 23.6 KG/M2 | RESPIRATION RATE: 19 BRPM

## 2025-07-05 DIAGNOSIS — N93.9 VAGINAL BLEEDING: Primary | ICD-10-CM

## 2025-07-05 LAB
ANION GAP SERPL CALC-SCNC: 6 MMOL/L (ref 0–18)
BASOPHILS # BLD AUTO: 0.05 X10(3) UL (ref 0–0.2)
BASOPHILS NFR BLD AUTO: 0.6 %
BUN BLD-MCNC: 31 MG/DL (ref 9–23)
BUN/CREAT SERPL: 22.1 (ref 10–20)
CALCIUM BLD-MCNC: 9.6 MG/DL (ref 8.7–10.4)
CHLORIDE SERPL-SCNC: 100 MMOL/L (ref 98–112)
CO2 SERPL-SCNC: 30 MMOL/L (ref 21–32)
CREAT BLD-MCNC: 1.4 MG/DL (ref 0.55–1.02)
DEPRECATED RDW RBC AUTO: 52.7 FL (ref 35.1–46.3)
EGFRCR SERPLBLD CKD-EPI 2021: 35 ML/MIN/1.73M2 (ref 60–?)
EOSINOPHIL # BLD AUTO: 0.03 X10(3) UL (ref 0–0.7)
EOSINOPHIL NFR BLD AUTO: 0.4 %
ERYTHROCYTE [DISTWIDTH] IN BLOOD BY AUTOMATED COUNT: 14.9 % (ref 11–15)
GLUCOSE BLD-MCNC: 132 MG/DL (ref 70–99)
HCT VFR BLD AUTO: 50.1 % (ref 35–48)
HGB BLD-MCNC: 15.9 G/DL (ref 12–16)
IMM GRANULOCYTES # BLD AUTO: 0.04 X10(3) UL (ref 0–1)
IMM GRANULOCYTES NFR BLD: 0.5 %
LYMPHOCYTES # BLD AUTO: 1.68 X10(3) UL (ref 1–4)
LYMPHOCYTES NFR BLD AUTO: 19.9 %
MCH RBC QN AUTO: 30.3 PG (ref 26–34)
MCHC RBC AUTO-ENTMCNC: 31.7 G/DL (ref 31–37)
MCV RBC AUTO: 95.4 FL (ref 80–100)
MONOCYTES # BLD AUTO: 0.68 X10(3) UL (ref 0.1–1)
MONOCYTES NFR BLD AUTO: 8 %
NEUTROPHILS # BLD AUTO: 5.97 X10 (3) UL (ref 1.5–7.7)
NEUTROPHILS # BLD AUTO: 5.97 X10(3) UL (ref 1.5–7.7)
NEUTROPHILS NFR BLD AUTO: 70.6 %
OSMOLALITY SERPL CALC.SUM OF ELEC: 290 MOSM/KG (ref 275–295)
PLATELET # BLD AUTO: 238 10(3)UL (ref 150–450)
POTASSIUM SERPL-SCNC: 5.4 MMOL/L (ref 3.5–5.1)
RBC # BLD AUTO: 5.25 X10(6)UL (ref 3.8–5.3)
SODIUM SERPL-SCNC: 136 MMOL/L (ref 136–145)
WBC # BLD AUTO: 8.5 X10(3) UL (ref 4–11)

## 2025-07-05 PROCEDURE — 96360 HYDRATION IV INFUSION INIT: CPT

## 2025-07-05 PROCEDURE — 85025 COMPLETE CBC W/AUTO DIFF WBC: CPT | Performed by: EMERGENCY MEDICINE

## 2025-07-05 PROCEDURE — 80048 BASIC METABOLIC PNL TOTAL CA: CPT | Performed by: EMERGENCY MEDICINE

## 2025-07-05 PROCEDURE — 99284 EMERGENCY DEPT VISIT MOD MDM: CPT

## 2025-07-05 PROCEDURE — 99285 EMERGENCY DEPT VISIT HI MDM: CPT

## 2025-07-05 RX ORDER — HALOPERIDOL 5 MG/ML
5 INJECTION INTRAMUSCULAR ONCE
Status: DISCONTINUED | OUTPATIENT
Start: 2025-07-05 | End: 2025-07-05

## 2025-07-05 NOTE — ED PROVIDER NOTES
Patient Seen in: [facility] Emergency Department    History     Chief Complaint   Patient presents with    Bleeding    Vaginal Bleeding       HPI        History is provided by patient/independent historian: patient's son  89 year old female with hx of diabetes, afib on eliquis, HTN, HL, here with vaginal bleeding for the last 2 days. She has a pessary per son, no trauma. She didn't take her eliquis yesterday 2/2 bleeding. No abdominal pain. She's acting normally per son.     History reviewed. Past Medical History[1]      History reviewed. Past Surgical History[2]      Home Medications reviewed :  Prescriptions Prior to Admission[3]      History reviewed. Social Hx on file[4]      ROS  Review of Systems   Respiratory:  Negative for shortness of breath.    Cardiovascular:  Negative for chest pain.   Genitourinary:  Positive for vaginal bleeding.   All other systems reviewed and are negative.     All other pertinent organ systems are reviewed and are negative.      Physical Exam     ED Triage Vitals [07/05/25 1329]   BP (!) 81/32   Pulse 71   Resp 18   Temp 97.7 °F (36.5 °C)   Temp src Temporal   SpO2 90 %   O2 Device None (Room air)     Vital signs reviewed.      Physical Exam  Vitals and nursing note reviewed.   Cardiovascular:      Pulses: Normal pulses.   Pulmonary:      Effort: No respiratory distress.   Abdominal:      General: There is no distension.      Tenderness: There is no abdominal tenderness.   Genitourinary:     Comments: Pessary in place, dark blood in vault  Neurological:      Mental Status: She is alert.             ED Course       Labs:     Labs Reviewed   CBC WITH DIFFERENTIAL WITH PLATELET - Abnormal; Notable for the following components:       Result Value    HCT 50.1 (*)     RDW-SD 52.7 (*)     All other components within normal limits   BASIC METABOLIC PANEL (8) - Abnormal; Notable for the following components:    Glucose 132 (*)     Potassium 5.4 (*)     BUN 31 (*)     Creatinine 1.40  (*)     BUN/CREA Ratio 22.1 (*)     eGFR-Cr 35 (*)     All other components within normal limits         My EKG Interpretation:   As reviewed and Interpreted by me      Imaging Results Available and Reviewed while in ED:   No results found.      Decision rules/scores evaluated: none      Diagnostic labs/tests considered but not ordered: pelvic US, INR    ED Medications Administered:   Medications   sodium zirconium cyclosilicate (Lokelma) oral packet 10 g (has no administration in time range)   sodium chloride 0.9 % IV bolus 1,000 mL (0 mL Intravenous Stopped 7/5/25 1507)                MDM       Medical Decision Making      Differential Diagnosis: After obtaining the patient's history, performing the physical exam and reviewing the diagnostics, multiple initial diagnoses were considered based on the presenting problem including abnormal uterine bleeding, anemia, hemorrhoid, UTI    External document review: I personally reviewed available external medical records for any recent pertinent discharge summaries, testing, and procedures - the findings are as follows: 5/7/25 visit with CHRISTIAN Holland for prolapse    Complicating Factors: The patient already  has a past medical history of Acute, but ill-defined, cerebrovascular disease, Arthritis, Breast CA (HCC) (08/12/2016), Cancer (HCC), Diabetes (HCC), Diabetes mellitus, type II (HCC), Essential hypertension, Hearing impairment, High blood pressure, High cholesterol, Hyperlipidemia, Osteoarthritis, and Squamous cell carcinoma, keratinizing (2018). to contribute to the complexity of this ED evaluation.    Procedures performed: none    Discussed management with physician/appropriate source:Dr. Alexi zuñiga with outpt followup and hold eliquis for now    Considered admission/deescalation of care for: none    Social determinants of health affecting patient care: none    Prescription medications considered: discussed continuing current medication regimen    The patient  requires continuous monitoring for: vaginal bleeding    Shared decision making: discussed possible admission        Disposition and Plan     Clinical Impression:  1. Vaginal bleeding        Disposition:  Discharge    Follow-up:  Liz Truong DO  [address]  [address] [address]  [phone]    Follow up        Medications Prescribed:  Current Discharge Medication List                         [1]   Past Medical History:   Acute, but ill-defined, cerebrovascular disease    Arthritis    Breast CA (HCC)    Cancer (HCC)    Diabetes (HCC)    diet controlled    Diabetes mellitus, type II (HCC)    Essential hypertension    Hearing impairment    High blood pressure    High cholesterol    Hyperlipidemia    Osteoarthritis    Squamous cell carcinoma, keratinizing    R posterior thigh   [2]   Past Surgical History:  Procedure Laterality Date    Appendectomy      Appendectomy      Cataract  -    Cataract extraction w/  intraocular lens implant Bilateral     Ian Garcia MD    Chemotherapy  2016    rt breast.    Cholecystectomy      D & c      Exploratory of abdomen  2024    Knee replacement surgery Right 2012    Lumpectomy right  2016    cancer    Mastectomy partial Right 2016      [id number]    Skin surgery Right 2018    Tonsillectomy      Wrist fracture surgery Right    [3] (Not in a hospital admission)   [4]   Social History  Socioeconomic History    Marital status:     Number of children: 3   Occupational History    Occupation:      Comment: retired   Tobacco Use    Smoking status: Never     Passive exposure: Never    Smokeless tobacco: Never   Vaping Use    Vaping status: Never Used   Substance and Sexual Activity    Alcohol use: Not Currently    Drug use: Never    Sexual activity: Not Currently   Other Topics Concern     Service No    Caffeine Concern Yes     Comment: coffee, 1 cups daily    Occupational Exposure No

## 2025-07-05 NOTE — TELEPHONE ENCOUNTER
TC to family in f/u  Pt conts with vag bleeding  ? Low BP  Otherwise well  Family going to ER for evaluation  Will follow

## 2025-07-05 NOTE — ED INITIAL ASSESSMENT (HPI)
Patient presents to ED via triage, in wheelchair, with c/o vaginal bleeding x 2 days that has worsened in the past 24 hours. Patient has a \"pessory in for her prolapsed bladder\". + thinner. Son stopped her thinners yesterday and today d/t bleeding. Denies pain.

## 2025-07-05 NOTE — ED QUICK NOTES
Discharge instructions went over with patient and family at bedside. All questions answered and no concerns at time of discharge. Pts vitals stable.  Pat in WC w/ family.

## 2025-07-06 ENCOUNTER — TELEPHONE (OUTPATIENT)
Dept: UROLOGY | Facility: CLINIC | Age: OVER 89
End: 2025-07-06

## 2025-07-06 NOTE — TELEPHONE ENCOUNTER
TC to pt's son  Went to ER yesterday  Hb stable  Pt sleeping  Dementia worsening  Vag bleeding persists, no worse  Pt gen health declining overall  Reviewed options with pt's son -- leave pessary in place and follow bleeding OR remove pessary and follow bulge sx  Discussed PMB and eval, pt & her family would likely decline further eval of PMB at 89 - understand underlying risks  Plan for appt wed 7/9 at 1230  Will follow  Questions answered  Family understands and agrees to plan

## 2025-07-07 ENCOUNTER — MED REC SCAN ONLY (OUTPATIENT)
Dept: INTERNAL MEDICINE CLINIC | Facility: CLINIC | Age: OVER 89
End: 2025-07-07

## 2025-07-09 ENCOUNTER — OFFICE VISIT (OUTPATIENT)
Dept: UROLOGY | Facility: HOSPITAL | Age: OVER 89
End: 2025-07-09
Attending: OBSTETRICS & GYNECOLOGY
Payer: MEDICARE

## 2025-07-09 VITALS — BODY MASS INDEX: 23.6 KG/M2 | RESPIRATION RATE: 16 BRPM | WEIGHT: 125 LBS | HEIGHT: 61 IN

## 2025-07-09 DIAGNOSIS — N81.2 UTEROVAGINAL PROLAPSE, INCOMPLETE: Primary | ICD-10-CM

## 2025-07-09 DIAGNOSIS — N95.0 POSTMENOPAUSAL BLEEDING: ICD-10-CM

## 2025-07-09 PROCEDURE — 99212 OFFICE O/P EST SF 10 MIN: CPT

## 2025-07-09 NOTE — PROGRESS NOTES
Patient presents to follow up bulge    She is currently using pessary, office care    She has vag bleeding, was seen in ER HB wnl  Family wouldn't manage thickened endometrium if pelvic ultrasound demonstrates it  Considering options    Less bleeding, darker blood    Recent SBO s/p surgery  Dementia worsening  Gen condition worsening    Resp 16   Ht 61\"   Wt 125 lb (56.7 kg)   BMI 23.62 kg/m²     GEN: NAD  CV: RRR  Pulm: nl effort  Abd: soft    PELVIC EXAM:  Ext. Gen: +atrophy, no lesions, erythema, scattered epidermal inclusion cysts  Urethra: +atrophy, nontender  Bladder:+fullness, nontender  Vagina: ++atrophy  Pessary removed  Calcified pessary  Scant bleeding noted  Cervix: wnl  Uterus mobile  Perineum: wnl  Anus: wnl  Rectum: nontender, nl sphincter tone      PELVIC SUPPORT     [place]:  2  Ant:  3  Post:  2  CST:  neg  UVJ: somewhat hypermobile    Verbal consent obtained  Sterile technique used to empty bladder      Impression/Plan:    ICD-10-CM    1. Uterovaginal prolapse, incomplete  N81.2       2. Postmenopausal bleeding  N95.0           Discussion Items:   Discussed management of pelvic organ prolapse including but not limited to behavioral modifications, conservative options, and surgical management.   Discussed pessary management including benefits and risks. Discussed importance of keeping regularly scheduled pessary checks in prevention of complications related to pessary use.     Discussed PMB - discussed evaluation of PMB with pelvic ultrasound, although would only consider pelvic us if family would evaluate thickened endometrium if there    Discussed PureWick    Discussed catheterization as needed for bothersome UI in the future prn    All questions answered  She & her sons understand and agree to plan    Return if symptoms worsen or fail to improve.    Liz Truong, DO, FACOG, FACS    The 21st Century Cures Act makes medical notes like these available to patients in the interest of transparency.  However, be advised this is a medical document. It is intended as peer to peer communication. It is written in medical language and may contain abbreviations or verbiage that are unfamiliar. It may appear blunt or direct. Medical documents are intended to carry relevant information, facts as evident, and the clinical opinion of the practitioner.

## 2025-07-23 ENCOUNTER — OFFICE VISIT (OUTPATIENT)
Dept: INTERNAL MEDICINE CLINIC | Facility: CLINIC | Age: OVER 89
End: 2025-07-23
Payer: MEDICARE

## 2025-07-23 VITALS
OXYGEN SATURATION: 96 % | RESPIRATION RATE: 17 BRPM | HEIGHT: 61 IN | BODY MASS INDEX: 24 KG/M2 | DIASTOLIC BLOOD PRESSURE: 60 MMHG | TEMPERATURE: 98 F | HEART RATE: 70 BPM | SYSTOLIC BLOOD PRESSURE: 123 MMHG

## 2025-07-23 DIAGNOSIS — Z78.9: Primary | ICD-10-CM

## 2025-07-23 DIAGNOSIS — I48.91 ATRIAL FIBRILLATION WITH RAPID VENTRICULAR RESPONSE (HCC): ICD-10-CM

## 2025-07-23 DIAGNOSIS — F02.C0 SEVERE LATE ONSET ALZHEIMER'S DEMENTIA WITHOUT BEHAVIORAL DISTURBANCE, PSYCHOTIC DISTURBANCE, MOOD DISTURBANCE, OR ANXIETY (HCC): ICD-10-CM

## 2025-07-23 DIAGNOSIS — G30.1 SEVERE LATE ONSET ALZHEIMER'S DEMENTIA WITHOUT BEHAVIORAL DISTURBANCE, PSYCHOTIC DISTURBANCE, MOOD DISTURBANCE, OR ANXIETY (HCC): ICD-10-CM

## 2025-07-23 DIAGNOSIS — I50.22 CHF (CONGESTIVE HEART FAILURE), NYHA CLASS III, CHRONIC, SYSTOLIC (HCC): ICD-10-CM

## 2025-07-23 DIAGNOSIS — I10 HYPERTENSION, UNSPECIFIED TYPE: ICD-10-CM

## 2025-07-23 PROCEDURE — 99214 OFFICE O/P EST MOD 30 MIN: CPT | Performed by: INTERNAL MEDICINE

## 2025-08-06 ENCOUNTER — TELEPHONE (OUTPATIENT)
Dept: INTERNAL MEDICINE CLINIC | Facility: CLINIC | Age: OVER 89
End: 2025-08-06

## (undated) DIAGNOSIS — N18.30 CONTROLLED TYPE 2 DIABETES MELLITUS WITH STAGE 3 CHRONIC KIDNEY DISEASE, WITHOUT LONG-TERM CURRENT USE OF INSULIN (HCC): ICD-10-CM

## (undated) DIAGNOSIS — E11.22 CONTROLLED TYPE 2 DIABETES MELLITUS WITH STAGE 3 CHRONIC KIDNEY DISEASE, WITHOUT LONG-TERM CURRENT USE OF INSULIN (HCC): ICD-10-CM

## (undated) DEVICE — SUTURE MONOCRYL 3-0 Y936H

## (undated) DEVICE — GAMMEX® NON-LATEX PI ORTHO SIZE 6.5, STERILE POLYISOPRENE POWDER-FREE SURGICAL GLOVE: Brand: GAMMEX

## (undated) DEVICE — ROD RM 950MM 2.5MM BALL TIP GW

## (undated) DEVICE — SUCTION CANISTER, 3000CC,SAFELINER: Brand: DEROYAL

## (undated) DEVICE — DERMABOND LIQUID ADHESIVE

## (undated) DEVICE — SUT VICRYL 2-0 CT-1 J945H

## (undated) DEVICE — BANDAGE ROLL,100% COTTON, 6 PLY, LARGE: Brand: KERLIX

## (undated) DEVICE — DRAPE SRG 70X60IN SPLT U IMPRV

## (undated) DEVICE — LITHOTOMY DRAPE: Brand: CONVERTORS

## (undated) DEVICE — SOL  .9 1000ML BTL

## (undated) DEVICE — CONTAINER PATHELOGICAL 16OZ 100/CS 54/PL: Brand: MEDEGEN MEDICAL PRODUCTS, LLC

## (undated) DEVICE — SOL H2O 1000ML BTL

## (undated) DEVICE — SUTURE VICRYL 2-0 FS-1

## (undated) DEVICE — MINOR GENERAL: Brand: MEDLINE INDUSTRIES, INC.

## (undated) DEVICE — SUTURE VICRYL 3-0 SH

## (undated) DEVICE — STERILE LATEX POWDER-FREE SURGICAL GLOVESWITH NITRILE COATING: Brand: PROTEXIS

## (undated) DEVICE — SUT PDS II 0 L60IN ABSRB VLT L48MM CTX 1/2

## (undated) DEVICE — CLEANSER SKN 6OZ BTL PHISODERM

## (undated) DEVICE — CAUTERY BLADE 2IN INS E1455

## (undated) DEVICE — SOLUTION IRRIG 1000ML 0.9% NACL USP BTL

## (undated) DEVICE — TUBING CYSTO TUR DUAL

## (undated) DEVICE — LOWER EXTREMITY: Brand: MEDLINE INDUSTRIES, INC.

## (undated) DEVICE — DRAPE SHEET LAPAROTOMY

## (undated) DEVICE — BIT DRL GRN 145MM 4.2MM 3 FLT

## (undated) DEVICE — VIOLET BRAIDED (POLYGLACTIN 910), SYNTHETIC ABSORBABLE SUTURE: Brand: COATED VICRYL

## (undated) DEVICE — SUTURE ETHILON 4-0 1667G

## (undated) DEVICE — UNDYED BRAIDED (POLYGLACTIN 910), SYNTHETIC ABSORBABLE SUTURE: Brand: COATED VICRYL

## (undated) DEVICE — 3M™ STERI-STRIP™ REINFORCED ADHESIVE SKIN CLOSURES, R1548, 1 IN X 5 IN (25 MM X 125 MM), 4 STRIPS/ENVELOPE: Brand: 3M™ STERI-STRIP™

## (undated) DEVICE — SOL H2O IRRIGATION 3000ML

## (undated) DEVICE — ENCORE® LATEX MICRO SIZE 8.5, STERILE LATEX POWDER-FREE SURGICAL GLOVE: Brand: ENCORE

## (undated) DEVICE — GLOVE SUR 6 SENSICARE PI MIC PIP CRM PWD F

## (undated) DEVICE — Device

## (undated) DEVICE — GLOVE SUR 6.5 SENSICARE PI PIP CRM PWD F

## (undated) DEVICE — INTENDED FOR TISSUE SEPARATION, AND OTHER PROCEDURES THAT REQUIRE A SHARP SURGICAL BLADE TO PUNCTURE OR CUT.: Brand: BARD-PARKER ® STAINLESS STEEL BLADES

## (undated) DEVICE — GAMMEX® PI HYBRID SIZE 6, STERILE POWDER-FREE SURGICAL GLOVE, POLYISOPRENE AND NEOPRENE BLEND: Brand: GAMMEX

## (undated) DEVICE — STIRRUP STRAP W/SLING RING

## (undated) DEVICE — 3M™ TEGADERM™ TRANSPARENT FILM DRESSING, 1626W, 4 IN X 4-3/4 IN (10 CM X 12 CM), 50 EACH/CARTON, 4 CARTON/CASE: Brand: 3M™ TEGADERM™

## (undated) DEVICE — DRILL BIT 4.2/3-FLTD CALIB

## (undated) DEVICE — DECANTER SPIKE TRANSFLOW

## (undated) DEVICE — DRAPE,ABDOMINAL,MAJOR,STERILE: Brand: MEDLINE

## (undated) DEVICE — SOL H2O 1000ML

## (undated) DEVICE — CLIPPER BLADE 3M

## (undated) DEVICE — STANDARD HYPODERMIC NEEDLE,POLYPROPYLENE HUB: Brand: MONOJECT

## (undated) DEVICE — SLEEVE KENDALL SCD EXPRESS MED

## (undated) DEVICE — OCCLUSIVE GAUZE STRIP,3% BISMUTH TRIBROMOPHENATE IN PETROLATUM BLEND: Brand: XEROFORM

## (undated) DEVICE — TETRA-FLEX CF WOVEN LATEX FREE ELASTIC BANDAGE 6" X 5.5 YD: Brand: TETRA-FLEX™CF

## (undated) DEVICE — MEDI-VAC YANKAUER SUCTION HANDLE W/BULBOUS TIP: Brand: CARDINAL HEALTH

## (undated) DEVICE — MEDI-VAC NON-CONDUCTIVE SUCTION TUBING: Brand: CARDINAL HEALTH

## (undated) DEVICE — WOUND RETRACTOR AND PROTECTOR: Brand: ALEXIS O WOUND PROTECTOR-RETRACTOR

## (undated) DEVICE — DRAPE SHEET LG

## (undated) DEVICE — SLIM BODY SKIN STAPLER: Brand: APPOSE ULC

## (undated) DEVICE — CHLORAPREP 26ML APPLICATOR

## (undated) DEVICE — DRESSING 10X4IN ANMC SAFETAC

## (undated) DEVICE — ENCORE® LATEX ACCLAIM SIZE 8.5, STERILE LATEX POWDER-FREE SURGICAL GLOVE: Brand: ENCORE

## (undated) DEVICE — DRAPE UNDER BUTTOCKS

## (undated) DEVICE — SUT VICRYL 1CT-1  J341H

## (undated) DEVICE — GUIDEWIRE ORTH 290MM 3.2MM

## (undated) DEVICE — UROLOGY DRAIN BAG

## (undated) DEVICE — C-ARM: Brand: UNBRANDED

## (undated) DEVICE — CYSTO PACK: Brand: MEDLINE INDUSTRIES, INC.

## (undated) DEVICE — A P RESECTION: Brand: MEDLINE INDUSTRIES, INC.

## (undated) DEVICE — STERILE TETRA-FLEX CF, ELASTIC BANDAGE, 4" X 5.5YD: Brand: TETRA-FLEX™CF

## (undated) DEVICE — SOL NACL IRRIG 0.9% 1000ML BTL

## (undated) DEVICE — DRAPE SRG 131X112X63IN GYN URO

## (undated) DEVICE — DRAPE,UNDRBUT,WHT GRAD PCH,CAPPORT,20/CS: Brand: MEDLINE

## (undated) NOTE — Clinical Note
FYI, TCM outreach completed. Patient eligible until 12/18. Declined appt at this time stating that she would call back when ready to schedule.

## (undated) NOTE — LETTER
AUTHORIZATION FOR SURGICAL OPERATION OR OTHER PROCEDURE    1.  I hereby authorize Dr. Torrey Vega, and [facility] staff assigned to my case to perform the following operation and/or procedure at the [facility]:    Kenalog injection to Right rox Time:  ________ A. M.  P.M.        Patient Name:  ______________________________________________________  (please print)      Patient signature:  ___________________________________________________             Relationship to Patient:           []  Parent

## (undated) NOTE — LETTER
3/4/2021              [address] [facility]         Dear Liza Darling,    This letter is to inform you that our office has made MULTIPLE attempts to reach you by phone without success.   We were attempting to conta

## (undated) NOTE — LETTER
3/4/2021              [address] [facility]         Dear Bernice Bills,    This letter is to inform you that our office has made MULTIPLE attempts to reach you by phone without success.   We were attempting to

## (undated) NOTE — LETTER
Carlos January, [address] [facility]       11/02/17        Patient: Faviola Gamble   YOB: 1931   Date of Visit: 11/2/2017       Dear  Dr. Emilia Villaseñor MD,      Thank you for referring Faviola Gamble to my practice.

## (undated) NOTE — LETTER
[facility]  [address], [place], [place]    Authorization for Surgical Operation and Procedure                               I hereby authorize Anna Ashraf MD, my physician and his/her assistants (if applicable), which may include medical students, residents, and/or fellows, to perform the following surgical operation/ procedure and administer such anesthesia as may be determined necessary by my physician: Operation/Procedure name (s) DIAGNOSTIC LAPAROSCOPY POSSIBLE OPEN, POSSIBLE BOWEL RESECTION on Isabel Patel   2.   I recognize that during the surgical operation/procedure, unforeseen conditions may necessitate additional or different procedures than those listed above.  I, therefore, further authorize and request that the above-named surgeon, assistants, or designees perform such procedures as are, in their judgment, necessary and desirable.    3.   My surgeon/physician has discussed prior to my surgery the potential benefits, risks and side effects of this procedure; the likelihood of achieving goals; and potential problems that might occur during recuperation.  They also discussed reasonable alternatives to the procedure, including risks, benefits, and side effects related to the alternatives and risks related to not receiving this procedure.  I have had all my questions answered and I acknowledge that no guarantee has been made as to the result that may be obtained.    4.   Should the need arise during my operation/procedure, which includes change of level of care prior to discharge, I also consent to the administration of blood and/or blood products.  Further, I understand that despite careful testing and screening of blood or blood products by collecting agencies, I may still be subject to ill effects as a result of receiving a blood transfusion and/or blood products.  The following are some, but not all, of the potential risks that can occur: fever and allergic reactions,  hemolytic reactions, transmission of diseases such as Hepatitis, AIDS and Cytomegalovirus (CMV) and fluid overload.  In the event that I wish to have an autologous transfusion of my own blood, or a directed donor transfusion, I will discuss this with my physician.  Check only if Refusing Blood or Blood Products  I understand refusal of blood or blood products as deemed necessary by my physician may have serious consequences to my condition to include possible death. I hereby assume responsibility for my refusal and release the hospital, its personnel, and my physicians from any responsibility for the consequences of my refusal.    o  Refuse   5.   I authorize the use of any specimen, organs, tissues, body parts or foreign objects that may be removed from my body during the operation/procedure for diagnosis, research or teaching purposes and their subsequent disposal by hospital authorities.  I also authorize the release of specimen test results and/or written reports to my treating physician on the hospital medical staff or other referring or consulting physicians involved in my care, at the discretion of the Pathologist or my treating physician.    6.   I consent to the photographing or videotaping of the operations or procedures to be performed, including appropriate portions of my body for medical, scientific, or educational purposes, provided my identity is not revealed by the pictures or by descriptive texts accompanying them.  If the procedure has been photographed/videotaped, the surgeon will obtain the original picture, image, videotape or CD.  The hospital will not be responsible for storage, release or maintenance of the picture, image, tape or CD.    7.   I consent to the presence of a  or observers in the operating room as deemed necessary by my physician or their designees.    8.   I recognize that in the event my procedure results in extended X-Ray/fluoroscopy time, I may develop a  skin reaction.    9. If I have a Do Not Attempt Resuscitation (DNAR) order in place, that status will be suspended while in the operating room, procedural suite, and during the recovery period unless otherwise explicitly stated by me (or a person authorized to consent on my behalf). The surgeon or my attending physician will determine when the applicable recovery period ends for purposes of reinstating the DNAR order.  10. Patients having a sterilization procedure: I understand that if the procedure is successful the results will be permanent and it will therefore be impossible for me to inseminate, conceive, or bear children.  I also understand that the procedure is intended to result in sterility, although the result has not been guaranteed.   11. I acknowledge that my physician has explained sedation/analgesia administration to me including the risk and benefits I consent to the administration of sedation/analgesia as may be necessary or desirable in the judgment of my physician.    I CERTIFY THAT I HAVE READ AND FULLY UNDERSTAND THE ABOVE CONSENT TO OPERATION and/or OTHER PROCEDURE.     ____________________________________  _________________________________        ______________________________  Signature of Patient    Signature of Responsible Person                Printed Name of Responsible Person                                      ____________________________________  _____________________________                ________________________________  Signature of Witness        Date  Time         Relationship to Patient    STATEMENT OF PHYSICIAN My signature below affirms that prior to the time of the procedure; I have explained to the patient and/or his/her legal representative, the risks and benefits involved in the proposed treatment and any reasonable alternative to the proposed treatment. I have also explained the risks and benefits involved in refusal of the proposed treatment and alternatives to the  proposed treatment and have answered the patient's questions. If I have a significant financial interest in a co-management agreement or a significant financial interest in any product or implant, or other significant relationship used in this procedure/surgery, I have disclosed this and had a discussion with my patient.     _____________________________________________________              _____________________________  (Signature of Physician)                                                                                         (Date)                                   (Time)  Patient Name: Isabel NUNEZ Amanda      : 1931      Printed: 2024     Medical Record #: [id number]                                      Page 1 of 1

## (undated) NOTE — LETTER
Patient Name: Gordon Garrett  YOB: 1931          MRN :  [id number]  Date: 10/23/2020      Dx: Nondisplaced fracture of greater tuberosity of left humerus, initial encounter for closed fracture (R07.413F)  (7/14/2020)          Authorized # o 3. Patient to have [facility] of (L/R) shoulder AROM and at least 4-/5 MMT in all motions to promote all lifting, reaching, carrying, pulling, and pushing without discomfort or deviation.           Plan:   Discharge physical therapy to the (L) shoulder and will st

## (undated) NOTE — [place] [address]  Authorization for Invasive Procedures  Date: 02/13/2024           Time: 1715    I hereby authorize Dr.Cash Haney, my physician and his/her assistants (if applicable), which may include medical students, residents, and/or fellows, to perform the following surgical operation/ procedure and administer such anesthesia as may be determined necessary by my physician: Dual Chamber Permanent Pacemaker Insertion  on Isabel Patel  2.   I recognize that during the surgical operation/procedure, unforeseen conditions may necessitate additional or different procedures than those listed above.  I, therefore, further authorize and request that the above-named surgeon, assistants, or designees perform such procedures as are, in their judgment, necessary and desirable.    3.   My surgeon/physician has discussed prior to my surgery the potential benefits, risks and side effects of this procedure; the likelihood of achieving goals; and potential problems that might occur during recuperation.  They also discussed reasonable alternatives to the procedure, including risks, benefits, and side effects related to the alternatives and risks related to not receiving this procedure.  I have had all my questions answered and I acknowledge that no guarantee has been made as to the result that may be obtained.    4.   Should the need arise during my operation/procedure, which includes change of level of care prior to discharge, I also consent to the administration of blood and/or blood products.  Further, I understand that despite careful testing and screening of blood or blood products by collecting agencies, I may still be subject to ill effects as a result of receiving a blood transfusion and/or blood products.  The following are some, but not all, of the potential risks that can occur: fever and allergic reactions, hemolytic reactions, transmission of diseases such as Hepatitis, AIDS and  Cytomegalovirus (CMV) and fluid overload.  In the event that I wish to have an autologous transfusion of my own blood, or a directed donor transfusion, I will discuss this with my physician.   Check only if Refusing Blood or Blood Products  I understand refusal of blood or blood products as deemed necessary by my physician may have serious consequences to my condition to include possible death. I hereby assume responsibility for my refusal and release the hospital, its personnel, and my physicians from any responsibility for the consequences of my refusal.         o  Refuse         5.   I authorize the use of any specimen, organs, tissues, body parts or foreign objects that may be removed from my body during the operation/procedure for diagnosis, research or teaching purposes and their subsequent disposal by hospital authorities.  I also authorize the release of specimen test results and/or written reports to my treating physician on the hospital medical staff or other referring or consulting physicians involved in my care, at the discretion of the Pathologist or my treating physician.    6.   I consent to the photographing or videotaping of the operations or procedures to be performed, including appropriate portions of my body for medical, scientific, or educational purposes, provided my identity is not revealed by the pictures or by descriptive texts accompanying them.  If the procedure has been photographed/videotaped, the surgeon will obtain the original picture, image, videotape or CD.  The hospital will not be responsible for storage, release or maintenance of the picture, image, tape or CD.    7.   I consent to the presence of a  or observers in the operating room as deemed necessary by my physician or their designees.    8.   I recognize that in the event my procedure results in extended X-Ray/fluoroscopy time, I may develop a skin reaction.    9. If I have a Do Not Attempt Resuscitation  (DNAR) order in place, that status will be suspended while in the operating room, procedural suite, and during the recovery period unless otherwise explicitly stated by me (or a person authorized to consent on my behalf). The surgeon or my attending physician will determine when the applicable recovery period ends for purposes of reinstating the DNAR order.  10. Patients having a sterilization procedure: I understand that if the procedure is successful the results will be permanent and it will therefore be impossible for me to inseminate, conceive, or bear children.  I also understand that the procedure is intended to result in sterility, although the result has not been guaranteed.   11. I acknowledge that my physician has explained sedation/analgesia administration to me including the risk and benefits I consent to the administration of sedation/analgesia as may be necessary or desirable in the judgment of my physician.    I CERTIFY THAT I HAVE READ AND FULLY UNDERSTAND THE ABOVE CONSENT TO OPERATION and/or OTHER PROCEDURE.        ____________________________________       _________________________________      ______________________________  Signature of Patient         Signature of Responsible Person        Printed Name of Responsible Person        ____________________________________      _________________________________      ______________________________       Signature of Witness          Relationship to Patient                       Date                                       Time    Patient Name: Isabel Patel     : 1931                 Printed: 2024      Medical Record #: [id number]                      Page 1 of 2          STATEMENT OF PHYSICIAN My signature below affirms that prior to the time of the procedure; I have explained to the patient and/or his/her legal representative, the risks and benefits involved in the proposed treatment and any reasonable alternative to the  proposed treatment. I have also explained the risks and benefits involved in refusal of the proposed treatment and alternatives to the proposed treatment and have answered the patient's questions. If I have a significant financial interest in a co-management agreement or a significant financial interest in any product or implant, or other significant relationship used in this procedure/surgery, I have disclosed this and had a discussion with my patient.     _______________________________________________________________ _____________________________  (Signature of Physician)                                                                                         (Date)                                   (Time)    Patient Name: Isabel Patel     : 1931                 Printed: 2024      Medical Record #: [id number]                      Page 2 of 2

## (undated) NOTE — MR AVS SNAPSHOT
Guerda Chino Amanda   2017 11:00 AM   Office Visit   MRN:  [id number]    Description:  Female : 1931   Provider:  Amber Westfall   Department:  [facility] Oncology              Visit Summary      Primary Visit Diagnosis Patient Instructions     None      Please Note     Please keep your updated medication list with you to share with other healthcare providers. At [facility] we continue to have an Open Medical Record policy.   We can provide you with y

## (undated) NOTE — LETTER
[address]  [facility] [address], [place], [place]  [address]  INFORMED CONSENT FOR TRANSFUSION OF BLOOD OR BLOOD PRODUCTS  My physician has informed me of the nature, purpose, benefits and risks of transfusion for blood and blood components that __________________________________________          ______________________________________________  (Signature of Patient)                                                            (Responsible party in case of Minor,

## (undated) NOTE — LETTER
[address]  [facility] [address], [place], [place]  [address]  INFORMED CONSENT FOR TRANSFUSION OF BLOOD OR BLOOD PRODUCTS  My physician has informed me of the nature, purpose, benefits and risks of transfusion for blood and blood components that __________________________________________          ______________________________________________  (Signature of Patient)                                                            (Responsible party in case of Minor,

## (undated) NOTE — IP AVS SNAPSHOT
Patient Demographics     Address  [address] [facility] [address] [address] Phone  [phone] (Home) *Preferred*  [phone] [facility]) E-mail Address  [email or web address]      Emergency Contact(s)     Name Relation Home Work Mobile    Haja Patel Jamie [phone] Instructions Authorizing Provider Morning Afternoon Evening As Needed   acetaminophen 500 MG Tabs  Commonly known as: TYLENOL EXTRA STRENGTH      Take 1,000 mg by mouth every 6 (six) hours as needed for Pain.           anastrozole 1 MG Tabs tab  Commonly k Abril Rodriguez MD         losartan Potassium 50 MG Tabs  Commonly known as: COZAAR      TAKE ONE TABLET BY MOUTH ONE TIME DAILY   Eloina Aguillon MD         LUTEIN OR      Take by mouth. LUTEIN-ZEAXANTHIN OR      Take by mouth.           Macular Vit Recent Vital Signs       Most Recent Value   Vitals  135/52 Filed at 12/07/2020 1159   Pulse  71 Filed at 12/07/2020 0844   Resp  16 Filed at 12/07/2020 0844   Temp  99.4 °F (37.4 °C) Filed at 12/07/2020 0844   SpO2  93 % Filed at 12/07/2020 1159      Sonja Gordon Garrett Patient Status:  Emergency    1931 MRN [id number]   Location [address] [facility] Attending Aidee Landry MD   Hosp Day # 0 PCP Sunni Meredith MD     Date:  2020  Date of Admission:  2020    Chief Co had lumpectomy and RT. No other treatment.    of stroke at 80   • Other (appendicitis[other]) Brother         age 15   • Other (alive and well[other]) Sister    • Other (alive and well[other]) Son         x3   • Breast Cancer Self 80   • Glaucoma NON FORMULARY   Yes No   Sig: daily. TURMERIC CURCUMIN OR   Yes No   Sig: Take by mouth. acetaminophen 500 MG Oral Tab   Yes No   Sig: Take 1,000 mg by mouth every 6 (six) hours as needed for Pain.    anastrozole 1 MG Oral Tab tab   No No   Sig: TAKE ON Lymphatics:  No lymphadenopathy neck, axilla, groin. Musculoskeletal: Normal range of motion. normal strength. Feet:  Normal pulses. Neurologic:  Alert, oriented, no focal deficits, cranial nerves II-XII are grossly intact.   Cognition and Speech:  Anurag Reyes Filed: 12/3/2020  1:52 PM Date of Service: 12/3/2020  1:48 PM Status: Signed    : Marily Mckeon MD (Physician)     Consult Orders    1.  ED Consult to Orthopedic Surgery [[id number]] ordered by Hanny Ochoa MD at 12/02/20 8562 Performed by Charla Gallardo MD at [address]   • KNEE REPLACEMENT SURGERY Right 2012   • LUMPECTOMY RIGHT  2016    cancer   • MASTECTOMY PARTIAL Right 8/2016   • SKIN SURGERY Right 01/17/2018   • TONSILLECTOMY     • WRIST FRACTURE SURGERY Right        Fa •  0.9% NaCl infusion, , Intravenous, Continuous    •  [MAR Hold] famoTIDine (PEPCID) injection 20 mg, 20 mg, Intravenous, Daily    •  [MAR Hold] HYDROmorphone HCl (DILAUDID) 1 MG/ML injection 0.5 mg, 0.5 mg, Intravenous, Q2H PRN    Or    •  [MAR Hold] HYD •  aspirin 81 MG Oral Chew Tab, Chew 1 tablet (81 mg total) by mouth daily.  (Patient not taking: Reported on 12/3/2020 )    •  LEE MICROLET LANCETS Does not apply Misc, Test blood sugar once daily    •  acetaminophen 500 MG Oral Tab, Take 1,000 mg by viola PTP 13.2 12/03/2018    TSH 1.140 06/24/2020    MG 1.7 (L) 12/04/2018    TROP 0.00 12/03/2018    B12 1,077 (H) 08/23/2017         Imaging:  Xr Femur Min 2 Views Left (cpt=73552)    Result Date: 12/3/2020  CONCLUSION:  1. Comminuted supracondylar fracture. Discharge Summary - D/C Summary      Discharge Summary signed by Elena Cheek MD at 12/7/2020 12:26 PM  Version 1 of 1    Author: Elena Cheek MD Service: Hospitalist Author Type: Physician    Filed: 12/7/2020 12:26 PM Date of Service Temp:  [98.2 °F (36.8 °C)-99.4 °F (37.4 °C)] 99.4 °F (37.4 °C)  Pulse:  [55-73] 71  Resp:  [16-18] 16  BP: (130-181)/(46-65) 135/52  Gen: A+Ox3. No distress. HEENT: NCAT, neck supple, no carotid bruit. CV: RRR, S1S2, and intact distal pulses.  No gallop Refills: 0     atenolol 50 MG Tabs  Commonly known as: TENORMIN      Take 1 tablet (50 mg total) by mouth once daily.    Quantity: 90 tablet  Refills: 1     atorvastatin 20 MG Tabs  Commonly known as: LIPITOR      TAKE ONE TABLET BY MOUTH ONE TIME DAILY Please  your prescriptions at the location directed by your doctor or nurse    Bring a paper prescription for each of these medications  · traMADol HCl [address]  Consultants     Provider Role Specialty    Aylin Mascorro MD Co Pt is received in the bed and was cleared for therapy session. PPE's donned by therapist of gloves,goggles and mask. Pt also donned mask throughout the therapy session. Pt reported that she had to use the bathroom.  Pt is mod A with bed mobility and to good ACTIVITY TOLERANCE                         O2 WALK                  AM-PAC '6-Clicks' INPATIENT SHORT FORM - BASIC MOBILITY  How much difficulty does the patient currently have. .. [RM.1]  -   Turning over in bed (including adjusting bedclothes, sheets and b Goal #4 Patient will negotiate 2 stairs/one curb w/ assistive device and mod Ax1   Goal #4   Current Status NT   Goal #5 Patient to demonstrate independence with home activity/exercise instructions provided to patient in preparation for discharge.    Goal # The patient's Approx Degree of Impairment: 72.57% has been calculated based on documentation in the [facility] '6 clicks' Inpatient Basic Mobility Short Form.   Research supports that patients with this level of impairment may benefit from subacute rehab facilit FUNCTIONAL ABILITY STATUS  Gait Assessment   Gait Assistance:  Moderate assistance  Distance (ft): SPT  Assistive Device: None  Pattern: (NWB L LE)  Stoop/Curb Assistance: Not tested  Comment : SPT while holding onto the therapist to toilet chair        Juanis Santoro Closed bicondylar fracture of right femur, initial encounter ([facility] [address]  Active Problems:    Closed bicondylar fracture of distal end of right femur ([facility] [address]      PHYSICAL THERAPY ASSESSMENT     RN approved PT session and pt ready to work with PT. Pt's and pt's Management Techniques: Activity promotion; Body mechanics;Repositioning;Breathing techniques    BALANCE  Static Sitting: Good  Dynamic Sitting: Fair +  Static Standing: Fair  Dynamic Standing: Poor +  ACTIVITY TOLERANCE                         O2 WALK  SPO2 Goal #1 Patient is able to demonstrate supine - sit EOB @ level: modified independent     Goal #1   Current Status Mod A   Goal #2 Patient is able to demonstrate transfers EOB to/from Chair/Wheelchair at assistance level: supervision with walker - rolling Description: Patient's Long Term Goal: ***    Interventions:  - ***  - See additional Care Plan goals for specific interventions   Patient/Family Short Term Goal     Interdisciplinary Progressing    Description: Patient's Short Term Goal: ***    Interventi

## (undated) NOTE — LETTER
[address]ell[address], [place]  [address]  INFORMED CONSENT FOR TRANSFUSION OF BLOOD OR BLOOD PRODUCTS  My physician has informed me of the nature, purpose, benefits and risks of transfusion for blood and blood components that __________________________________________          ______________________________________________  (Signature of Patient)                                                            (Responsible party in case of Minor,

## (undated) NOTE — LETTER
[facility]  part of [facility]     PICC INSERTION CONSENT     I agree to have a Peripherally Inserted Central Catheter (PICC) placed in my arm.   1. The PICC insertion procedure, care, maintenance, risks, benefits, and complications have been explained to me by my physician, ________________________, and I understand them.   2. I understand that this may not be the only way I can receive my medication. I understand that my physician has determined that the PICC would be the safest and most effective means of administering my medication at this time. If there are other options of giving medication into my veins those options have been explained to me by my physician and I have chosen this one.   3. I realize a nurse who has been specially trained and certified by the hospital and ’s representative to insert a PICC will perform this procedure. My catheter will be inserted by _____________________________.   4. I have been informed by my doctor of the nature and purpose of this procedure and the risks involved and the possibility of complications. I realize that this is an invasive procedure and has certain risks such as air embolism (air entering the catheter or my vein), arterial puncture (a tearing of one of my arteries), infection, irregular heartbeat and venous thrombosis (a blood clot in a vein) nerve injury and fracture of the catheter with or without migration.   5. In order to numb the area where the line will be placed, a small amount of anesthetic medication will be injected as ordered by my physician.   6. I understand that while the catheter will be placed in my upper arm the end of the catheter will come to rest in an area near my heart.     7. The person performing this procedure has discussed the potential benefits, risks, and side effects of the PICC; the likelihood of achieving goals; and potential problems that might occur during recuperation. They also discussed  reasonable alternatives to the PICC, including risks, benefits, and side effects related to the alternatives and risks related to not receiving this procedure.    8.  I have expressed any questions about this procedure to my physician or the PICC Proceduralist and he/she has answered them.  I certify that I have read and understand this consent to the insertion of a PICC.      _________________________________________________________   Date     Time     Patient/Guardian Signature       ____________________________________   Printed name of Patient/Guardian          ________________________________________________________________    Date        Time                   Witnessing RN Signature      Patient Name: Isabel Patel     : 1931                 Printed: 2024     Medical Record #: [id number]

## (undated) NOTE — LETTER
AUTHORIZATION FOR SURGICAL OPERATION OR OTHER PROCEDURE    1.  I hereby authorize Dr. Jody Rodriguez  and [facility] staff assigned to my case to perform the following operation and/or procedure at the [facility]:    Cortisone injection in [place] Time:  ________ A. M.  P.M.        Patient Name:  ______________________________________________________  (please print)      Patient signature:  ___________________________________________________             Relationship to Patient:

## (undated) NOTE — LETTER
[facility]  part of [facility]     PICC INSERTION CONSENT     I agree to have a Peripherally Inserted Central Catheter (PICC) placed in my arm.   1. The PICC insertion procedure, care, maintenance, risks, benefits, and complications have been explained to me by my physician,  Dr.Kang Anna RUBIO and I understand them.   2. I understand that this may not be the only way I can receive my medication. I understand that my physician has determined that the PICC would be the safest and most effective means of administering my medication at this time. If there are other options of giving medication into my veins those options have been explained to me by my physician and I have chosen this one.   3. I realize a nurse who has been specially trained and certified by the hospital and ’s representative to insert a PICC will perform this procedure. My catheter will be inserted by  Pato Rodriges RN.   4. I have been informed by my doctor of the nature and purpose of this procedure and the risks involved and the possibility of complications. I realize that this is an invasive procedure and has certain risks such as air embolism (air entering the catheter or my vein), arterial puncture (a tearing of one of my arteries), infection, irregular heartbeat and venous thrombosis (a blood clot in a vein) nerve injury and fracture of the catheter with or without migration.   5. In order to numb the area where the line will be placed, a small amount of anesthetic medication will be injected as ordered by my physician.   6. I understand that while the catheter will be placed in my upper arm the end of the catheter will come to rest in an area near my heart.     7. The person performing this procedure has discussed the potential benefits, risks, and side effects of the PICC; the likelihood of achieving goals; and potential problems that might occur during recuperation. They also discussed reasonable alternatives  to the PICC, including risks, benefits, and side effects related to the alternatives and risks related to not receiving this procedure.    8.  I have expressed any questions about this procedure to my physician or the PICC Proceduralist and he/she has answered them.  I certify that I have read and understand this consent to the insertion of a PICC.      _________________________________________________________   Date     Time     Patient/Guardian Signature       ____________________________________   Printed name of Patient/Guardian          ________________________________________________________________    Date        Time                   Witnessing RN Signature      Patient Name: Isabel Patel     : 1931                 Printed: 2024     Medical Record #: [id number]

## (undated) NOTE — LETTER
Patient Name: Shy Garcia  YOB: 1931          MRN :  [id number]  Date: 12/2/2020      Dx: Chronic right shoulder pain (M25.511,G89.29)        Authorized # of Visits:  18 + 10 = 28       Referring MD: Antonette Gonzalez.   Next MD visit: none sche

## (undated) NOTE — LETTER
[address] [place] [address]      Authorization for Surgical Operation and Procedure     Date:___________                                                                                                         Time:_______ 4.   Should the need arise during my operation or immediate post-operative period, I also consent to the administration of blood and/or blood products.   Further, I understand that despite careful testing and screening of blood or blood products by keri 8.   I recognize that in the event my procedure results in extended X-Ray/fluoroscopy time, I may develop a skin reaction. 9.  If I have a Do Not Attempt Resuscitation (DNAR) order in place, that status will be suspended while in the operating room, proc STATEMENT OF PHYSICIAN My signature below affirms that prior to the time of the procedure; I have explained to the patient and/or his/her legal representative, the risks and benefits involved in the proposed treatment and any reasonable alternative to the

## (undated) NOTE — IP AVS SNAPSHOT
[facility]            (For Outpatient Use Only) Initial Admit Date: 12/2/2020   Inpt/Obs Admit Date: Inpt: 12/3/20 / Obs: N/A   Discharge Date:    Skip Lluvia:  [de-identified]   MRN: [de-identified]   CSN: [id number]   CEID: [id number]        [facility] Subscriber ID: [id number] Pt Rel to Subscriber: SELF   TERTIARY INSURANCE   Payor:  Plan:    Group Number:  Insurance Type:    Subscriber Name:  Subscriber :    Subscriber ID:  Pt Rel to Subscriber:    Hospital Account Financial Class: Medicare    Dec

## (undated) NOTE — LETTER
[address]  [address] [place], [place]      Authorization for Surgical Operation and Procedure     Date:___________                                                                                                         Time:_______ 4.   Should the need arise during my operation or immediate post-operative period, I also consent to the administration of blood and/or blood products.   Further, I understand that despite careful testing and screening of blood or blood products by keri 8.   I recognize that in the event my procedure results in extended X-Ray/fluoroscopy time, I may develop a skin reaction. 9.  If I have a Do Not Attempt Resuscitation (DNAR) order in place, that status will be suspended while in the operating room, proc STATEMENT OF PHYSICIAN My signature below affirms that prior to the time of the procedure; I have explained to the patient and/or his/her legal representative, the risks and benefits involved in the proposed treatment and any reasonable alternative to the

## (undated) NOTE — LETTER
No referring provider defined for this encounter. 01/30/18        Patient: Kirk Stephen   YOB: 1931   Date of Visit: 1/30/2018       Dear  Dr. Jes Jones MD,      Thank you for referring Kirk Stephen to my practice.   Please f